# Patient Record
Sex: FEMALE | Race: WHITE | Employment: OTHER | URBAN - METROPOLITAN AREA
[De-identification: names, ages, dates, MRNs, and addresses within clinical notes are randomized per-mention and may not be internally consistent; named-entity substitution may affect disease eponyms.]

---

## 2017-01-05 ENCOUNTER — HOSPITAL ENCOUNTER (OUTPATIENT)
Dept: NUCLEAR MEDICINE | Facility: CLINIC | Age: 67
Discharge: HOME | End: 2017-01-05
Attending: INTERNAL MEDICINE

## 2017-01-05 ENCOUNTER — HOSPITAL ENCOUNTER (OUTPATIENT)
Dept: CARDIOLOGY | Facility: CLINIC | Age: 67
Discharge: HOME | End: 2017-01-05
Attending: INTERNAL MEDICINE

## 2017-01-05 DIAGNOSIS — R06.09 OTHER FORMS OF DYSPNEA: ICD-10-CM

## 2017-01-05 DIAGNOSIS — R06.09 DOE (DYSPNEA ON EXERTION): ICD-10-CM

## 2017-01-05 LAB
CV STRESS CURRENT BP HE: NORMAL
CV STRESS CURRENT HR HE: 101
CV STRESS CURRENT HR HE: 101
CV STRESS CURRENT HR HE: 102
CV STRESS CURRENT HR HE: 103
CV STRESS CURRENT HR HE: 104
CV STRESS CURRENT HR HE: 104
CV STRESS CURRENT HR HE: 105
CV STRESS CURRENT HR HE: 106
CV STRESS CURRENT HR HE: 107
CV STRESS CURRENT HR HE: 108
CV STRESS CURRENT HR HE: 108
CV STRESS CURRENT HR HE: 87
CV STRESS CURRENT HR HE: 89
CV STRESS CURRENT HR HE: 94
CV STRESS DEVIATION TIME HE: NORMAL
CV STRESS EXERCISE STAGE HE: NORMAL
CV STRESS FINAL RESTING BP HE: NORMAL
CV STRESS FINAL RESTING HR HE: 101
CV STRESS MAX TREADMILL GRADE HE: 0
CV STRESS MAX TREADMILL SPEED HE: 1
CV STRESS PEAK DIA BP HE: NORMAL
CV STRESS PEAK SYS BP HE: NORMAL
CV STRESS PHASE HE: NORMAL
CV STRESS PROTOCOL HE: NORMAL
CV STRESS RESTING PT POSITION HE: NORMAL
CV STRESS ST DEVIATION AMOUNT HE: NORMAL
CV STRESS ST DEVIATION ELEVATION HE: NORMAL
CV STRESS ST EVELATION AMOUNT HE: NORMAL
CV STRESS TEST TYPE HE: NORMAL
CV STRESS TOTAL STAGE TIME MIN 1 HE: NORMAL
NUC STRESS EJECTION FRACTION: >70 %
STRESS ECHO BASELINE BP: NORMAL
STRESS ECHO BASELINE HR: 92
STRESS ECHO LAST STRESS BP: NORMAL
STRESS ECHO LAST STRESS HR: 108

## 2017-01-07 ENCOUNTER — OFFICE VISIT - HEALTHEAST (OUTPATIENT)
Dept: FAMILY MEDICINE | Facility: CLINIC | Age: 67
End: 2017-01-07

## 2017-01-07 DIAGNOSIS — J32.9 SINUSITIS: ICD-10-CM

## 2017-01-07 RX ORDER — ACETAMINOPHEN 500 MG
500 TABLET ORAL EVERY 6 HOURS PRN
Status: SHIPPED | COMMUNITY
Start: 2017-01-07 | End: 2024-07-26

## 2017-01-23 ENCOUNTER — COMMUNICATION - HEALTHEAST (OUTPATIENT)
Dept: RHEUMATOLOGY | Facility: CLINIC | Age: 67
End: 2017-01-23

## 2017-01-23 DIAGNOSIS — M06.09 SERONEGATIVE RHEUMATOID ARTHRITIS OF MULTIPLE SITES (H): ICD-10-CM

## 2017-01-24 ENCOUNTER — COMMUNICATION - HEALTHEAST (OUTPATIENT)
Dept: INTERNAL MEDICINE | Facility: CLINIC | Age: 67
End: 2017-01-24

## 2017-01-24 DIAGNOSIS — E78.5 HYPERLIPIDEMIA: ICD-10-CM

## 2017-02-02 ENCOUNTER — COMMUNICATION - HEALTHEAST (OUTPATIENT)
Dept: ADMINISTRATIVE | Facility: CLINIC | Age: 67
End: 2017-02-02

## 2017-02-02 DIAGNOSIS — M06.09 SERONEGATIVE RHEUMATOID ARTHRITIS OF MULTIPLE SITES (H): ICD-10-CM

## 2017-02-06 ENCOUNTER — RECORDS - HEALTHEAST (OUTPATIENT)
Dept: ADMINISTRATIVE | Facility: OTHER | Age: 67
End: 2017-02-06

## 2017-02-15 ENCOUNTER — RECORDS - HEALTHEAST (OUTPATIENT)
Dept: ADMINISTRATIVE | Facility: OTHER | Age: 67
End: 2017-02-15

## 2017-02-15 ENCOUNTER — COMMUNICATION - HEALTHEAST (OUTPATIENT)
Dept: RHEUMATOLOGY | Facility: CLINIC | Age: 67
End: 2017-02-15

## 2017-02-15 DIAGNOSIS — Z79.899 HIGH RISK MEDICATION USE: ICD-10-CM

## 2017-02-15 DIAGNOSIS — M06.09 SERONEGATIVE RHEUMATOID ARTHRITIS OF MULTIPLE SITES (H): ICD-10-CM

## 2017-02-17 ENCOUNTER — RECORDS - HEALTHEAST (OUTPATIENT)
Dept: ADMINISTRATIVE | Facility: OTHER | Age: 67
End: 2017-02-17

## 2017-02-24 ENCOUNTER — COMMUNICATION - HEALTHEAST (OUTPATIENT)
Dept: ADMINISTRATIVE | Facility: CLINIC | Age: 67
End: 2017-02-24

## 2017-02-24 DIAGNOSIS — M06.09 SERONEGATIVE RHEUMATOID ARTHRITIS OF MULTIPLE SITES (H): ICD-10-CM

## 2017-02-27 ENCOUNTER — AMBULATORY - HEALTHEAST (OUTPATIENT)
Dept: LAB | Facility: CLINIC | Age: 67
End: 2017-02-27

## 2017-02-27 DIAGNOSIS — M06.09 SERONEGATIVE RHEUMATOID ARTHRITIS OF MULTIPLE SITES (H): ICD-10-CM

## 2017-02-27 LAB
ALT SERPL W P-5'-P-CCNC: 21 U/L (ref 0–45)
CREAT SERPL-MCNC: 1.39 MG/DL (ref 0.6–1.1)
GFR SERPL CREATININE-BSD FRML MDRD: 38 ML/MIN/1.73M2

## 2017-03-02 ENCOUNTER — OFFICE VISIT - HEALTHEAST (OUTPATIENT)
Dept: RHEUMATOLOGY | Facility: CLINIC | Age: 67
End: 2017-03-02

## 2017-03-02 DIAGNOSIS — M06.09 SERONEGATIVE RHEUMATOID ARTHRITIS OF MULTIPLE SITES (H): ICD-10-CM

## 2017-03-02 DIAGNOSIS — N18.9 CHRONIC KIDNEY DISEASE, UNSPECIFIED: ICD-10-CM

## 2017-03-02 DIAGNOSIS — Z79.899 HIGH RISK MEDICATION USE: ICD-10-CM

## 2017-03-02 DIAGNOSIS — D64.9 ANEMIA, UNSPECIFIED: ICD-10-CM

## 2017-03-16 ENCOUNTER — COMMUNICATION - HEALTHEAST (OUTPATIENT)
Dept: INTERNAL MEDICINE | Facility: CLINIC | Age: 67
End: 2017-03-16

## 2017-03-16 DIAGNOSIS — I10 HTN (HYPERTENSION): ICD-10-CM

## 2017-04-04 ENCOUNTER — OFFICE VISIT - HEALTHEAST (OUTPATIENT)
Dept: INTERNAL MEDICINE | Facility: CLINIC | Age: 67
End: 2017-04-04

## 2017-04-04 DIAGNOSIS — I10 ESSENTIAL HYPERTENSION WITH GOAL BLOOD PRESSURE LESS THAN 130/80: ICD-10-CM

## 2017-04-04 DIAGNOSIS — E78.5 HYPERLIPIDEMIA: ICD-10-CM

## 2017-04-04 DIAGNOSIS — N89.8 VAGINAL DRYNESS: ICD-10-CM

## 2017-04-05 ENCOUNTER — COMMUNICATION - HEALTHEAST (OUTPATIENT)
Dept: INTERNAL MEDICINE | Facility: CLINIC | Age: 67
End: 2017-04-05

## 2017-04-05 DIAGNOSIS — Z78.0 MENOPAUSE: ICD-10-CM

## 2017-04-10 ENCOUNTER — COMMUNICATION - HEALTHEAST (OUTPATIENT)
Dept: INTERNAL MEDICINE | Facility: CLINIC | Age: 67
End: 2017-04-10

## 2017-04-19 ENCOUNTER — RECORDS - HEALTHEAST (OUTPATIENT)
Dept: ADMINISTRATIVE | Facility: OTHER | Age: 67
End: 2017-04-19

## 2017-04-25 ENCOUNTER — COMMUNICATION - HEALTHEAST (OUTPATIENT)
Dept: RHEUMATOLOGY | Facility: CLINIC | Age: 67
End: 2017-04-25

## 2017-04-25 DIAGNOSIS — M06.09 SERONEGATIVE RHEUMATOID ARTHRITIS OF MULTIPLE SITES (H): ICD-10-CM

## 2017-04-25 DIAGNOSIS — Z79.899 HIGH RISK MEDICATION USE: ICD-10-CM

## 2017-04-28 ENCOUNTER — COMMUNICATION - HEALTHEAST (OUTPATIENT)
Dept: INTERNAL MEDICINE | Facility: CLINIC | Age: 67
End: 2017-04-28

## 2017-04-28 DIAGNOSIS — M10.9 GOUT: ICD-10-CM

## 2017-05-01 ENCOUNTER — AMBULATORY - HEALTHEAST (OUTPATIENT)
Dept: LAB | Facility: CLINIC | Age: 67
End: 2017-05-01

## 2017-05-01 DIAGNOSIS — M06.09 SERONEGATIVE RHEUMATOID ARTHRITIS OF MULTIPLE SITES (H): ICD-10-CM

## 2017-05-01 LAB
ALT SERPL W P-5'-P-CCNC: 19 U/L (ref 0–45)
CREAT SERPL-MCNC: 1.54 MG/DL (ref 0.6–1.1)
GFR SERPL CREATININE-BSD FRML MDRD: 34 ML/MIN/1.73M2

## 2017-05-22 ENCOUNTER — COMMUNICATION - HEALTHEAST (OUTPATIENT)
Dept: RHEUMATOLOGY | Facility: CLINIC | Age: 67
End: 2017-05-22

## 2017-05-22 DIAGNOSIS — M06.09 SERONEGATIVE RHEUMATOID ARTHRITIS OF MULTIPLE SITES (H): ICD-10-CM

## 2017-06-02 ENCOUNTER — OFFICE VISIT - HEALTHEAST (OUTPATIENT)
Dept: FAMILY MEDICINE | Facility: CLINIC | Age: 67
End: 2017-06-02

## 2017-06-02 DIAGNOSIS — H69.91 EUSTACHIAN TUBE DYSFUNCTION, RIGHT: ICD-10-CM

## 2017-06-13 ENCOUNTER — RECORDS - HEALTHEAST (OUTPATIENT)
Dept: ADMINISTRATIVE | Facility: OTHER | Age: 67
End: 2017-06-13

## 2017-06-26 ENCOUNTER — AMBULATORY - HEALTHEAST (OUTPATIENT)
Dept: LAB | Facility: CLINIC | Age: 67
End: 2017-06-26

## 2017-06-26 DIAGNOSIS — M06.09 SERONEGATIVE RHEUMATOID ARTHRITIS OF MULTIPLE SITES (H): ICD-10-CM

## 2017-06-26 LAB
ALT SERPL W P-5'-P-CCNC: 21 U/L (ref 0–45)
CREAT SERPL-MCNC: 1.35 MG/DL (ref 0.6–1.1)
GFR SERPL CREATININE-BSD FRML MDRD: 39 ML/MIN/1.73M2

## 2017-07-27 ENCOUNTER — OFFICE VISIT - HEALTHEAST (OUTPATIENT)
Dept: RHEUMATOLOGY | Facility: CLINIC | Age: 67
End: 2017-07-27

## 2017-07-27 DIAGNOSIS — Z79.899 HIGH RISK MEDICATION USE: ICD-10-CM

## 2017-07-27 DIAGNOSIS — M06.09 SERONEGATIVE RHEUMATOID ARTHRITIS OF MULTIPLE SITES (H): ICD-10-CM

## 2017-07-27 ASSESSMENT — MIFFLIN-ST. JEOR: SCORE: 1246.17

## 2017-08-30 ENCOUNTER — AMBULATORY - HEALTHEAST (OUTPATIENT)
Dept: LAB | Facility: CLINIC | Age: 67
End: 2017-08-30

## 2017-08-30 DIAGNOSIS — M06.09 SERONEGATIVE RHEUMATOID ARTHRITIS OF MULTIPLE SITES (H): ICD-10-CM

## 2017-08-30 LAB
ALT SERPL W P-5'-P-CCNC: 23 U/L (ref 0–45)
CREAT SERPL-MCNC: 1.71 MG/DL (ref 0.6–1.1)
GFR SERPL CREATININE-BSD FRML MDRD: 30 ML/MIN/1.73M2

## 2017-09-27 ENCOUNTER — COMMUNICATION - HEALTHEAST (OUTPATIENT)
Dept: INTERNAL MEDICINE | Facility: CLINIC | Age: 67
End: 2017-09-27

## 2017-09-27 DIAGNOSIS — M10.9 GOUT: ICD-10-CM

## 2017-10-04 ENCOUNTER — OFFICE VISIT - HEALTHEAST (OUTPATIENT)
Dept: INTERNAL MEDICINE | Facility: CLINIC | Age: 67
End: 2017-10-04

## 2017-10-04 DIAGNOSIS — D63.1 ANEMIA IN CHRONIC KIDNEY DISEASE: ICD-10-CM

## 2017-10-04 DIAGNOSIS — M47.817 LUMBOSACRAL SPONDYLOSIS WITHOUT MYELOPATHY: ICD-10-CM

## 2017-10-04 DIAGNOSIS — N18.9 ANEMIA IN CHRONIC KIDNEY DISEASE: ICD-10-CM

## 2017-10-04 DIAGNOSIS — I10 ESSENTIAL HYPERTENSION: ICD-10-CM

## 2017-10-04 DIAGNOSIS — R73.09 OTHER ABNORMAL GLUCOSE: ICD-10-CM

## 2017-10-04 DIAGNOSIS — E03.9 HYPOTHYROIDISM: ICD-10-CM

## 2017-10-04 DIAGNOSIS — E78.5 HYPERLIPEMIA: ICD-10-CM

## 2017-10-04 DIAGNOSIS — M10.9 GOUT: ICD-10-CM

## 2017-10-20 ENCOUNTER — RECORDS - HEALTHEAST (OUTPATIENT)
Dept: ADMINISTRATIVE | Facility: OTHER | Age: 67
End: 2017-10-20

## 2017-10-31 ENCOUNTER — AMBULATORY - HEALTHEAST (OUTPATIENT)
Dept: LAB | Facility: CLINIC | Age: 67
End: 2017-10-31

## 2017-10-31 DIAGNOSIS — D63.1 ANEMIA IN CHRONIC KIDNEY DISEASE: ICD-10-CM

## 2017-10-31 DIAGNOSIS — N18.9 ANEMIA IN CHRONIC KIDNEY DISEASE: ICD-10-CM

## 2017-10-31 DIAGNOSIS — E03.9 HYPOTHYROIDISM: ICD-10-CM

## 2017-10-31 DIAGNOSIS — M10.9 GOUT: ICD-10-CM

## 2017-10-31 DIAGNOSIS — M47.817 LUMBOSACRAL SPONDYLOSIS WITHOUT MYELOPATHY: ICD-10-CM

## 2017-10-31 DIAGNOSIS — I10 ESSENTIAL HYPERTENSION: ICD-10-CM

## 2017-10-31 DIAGNOSIS — R73.09 OTHER ABNORMAL GLUCOSE: ICD-10-CM

## 2017-10-31 DIAGNOSIS — E78.5 HYPERLIPEMIA: ICD-10-CM

## 2017-10-31 LAB
HBA1C MFR BLD: 5.5 % (ref 3.5–6)
LDLC SERPL CALC-MCNC: 59 MG/DL

## 2017-11-01 ENCOUNTER — OFFICE VISIT - HEALTHEAST (OUTPATIENT)
Dept: INTERNAL MEDICINE | Facility: CLINIC | Age: 67
End: 2017-11-01

## 2017-11-01 DIAGNOSIS — E78.5 HYPERLIPEMIA: ICD-10-CM

## 2017-11-01 DIAGNOSIS — I10 HTN (HYPERTENSION): ICD-10-CM

## 2017-11-01 DIAGNOSIS — H26.9 CATARACTS, BILATERAL: ICD-10-CM

## 2017-11-01 DIAGNOSIS — Z01.818 PREOP EXAMINATION: ICD-10-CM

## 2017-11-01 DIAGNOSIS — R73.09 OTHER ABNORMAL GLUCOSE: ICD-10-CM

## 2017-11-02 ENCOUNTER — OFFICE VISIT - HEALTHEAST (OUTPATIENT)
Dept: RHEUMATOLOGY | Facility: CLINIC | Age: 67
End: 2017-11-02

## 2017-11-02 DIAGNOSIS — M06.09 SERONEGATIVE RHEUMATOID ARTHRITIS OF MULTIPLE SITES (H): ICD-10-CM

## 2017-11-02 DIAGNOSIS — N18.30 STAGE 3 CHRONIC KIDNEY DISEASE (H): ICD-10-CM

## 2017-11-02 DIAGNOSIS — Z79.899 HIGH RISK MEDICATION USE: ICD-10-CM

## 2017-12-28 ENCOUNTER — AMBULATORY - HEALTHEAST (OUTPATIENT)
Dept: LAB | Facility: CLINIC | Age: 67
End: 2017-12-28

## 2017-12-28 DIAGNOSIS — M06.09 SERONEGATIVE RHEUMATOID ARTHRITIS OF MULTIPLE SITES (H): ICD-10-CM

## 2017-12-28 LAB
ALT SERPL W P-5'-P-CCNC: 25 U/L (ref 0–45)
CREAT SERPL-MCNC: 1.36 MG/DL (ref 0.6–1.1)
GFR SERPL CREATININE-BSD FRML MDRD: 39 ML/MIN/1.73M2

## 2018-01-09 ENCOUNTER — COMMUNICATION - HEALTHEAST (OUTPATIENT)
Dept: INTERNAL MEDICINE | Facility: CLINIC | Age: 68
End: 2018-01-09

## 2018-01-09 DIAGNOSIS — E03.9 HYPOTHYROIDISM: ICD-10-CM

## 2018-01-11 ENCOUNTER — COMMUNICATION - HEALTHEAST (OUTPATIENT)
Dept: INTERNAL MEDICINE | Facility: CLINIC | Age: 68
End: 2018-01-11

## 2018-01-11 DIAGNOSIS — Z78.9 PATIENT TRAVELS: ICD-10-CM

## 2018-01-18 ENCOUNTER — OFFICE VISIT - HEALTHEAST (OUTPATIENT)
Dept: INTERNAL MEDICINE | Facility: CLINIC | Age: 68
End: 2018-01-18

## 2018-01-18 ENCOUNTER — COMMUNICATION - HEALTHEAST (OUTPATIENT)
Dept: INTERNAL MEDICINE | Facility: CLINIC | Age: 68
End: 2018-01-18

## 2018-01-18 DIAGNOSIS — M10.9 GOUT: ICD-10-CM

## 2018-01-18 DIAGNOSIS — I10 ESSENTIAL HYPERTENSION: ICD-10-CM

## 2018-01-18 DIAGNOSIS — R19.09 LEFT GROIN MASS: ICD-10-CM

## 2018-01-18 DIAGNOSIS — F41.1 ANXIETY STATE: ICD-10-CM

## 2018-01-18 DIAGNOSIS — R19.09 MASS OF LEFT INGUINAL REGION: ICD-10-CM

## 2018-01-18 DIAGNOSIS — Z71.84 COUNSELING ABOUT TRAVEL: ICD-10-CM

## 2018-01-18 RX ORDER — ACYCLOVIR 400 MG/1
400 TABLET ORAL EVERY 8 HOURS PRN
Qty: 60 TABLET | Refills: 1 | Status: SHIPPED | OUTPATIENT
Start: 2018-01-18 | End: 2022-01-21

## 2018-01-19 ENCOUNTER — HOSPITAL ENCOUNTER (OUTPATIENT)
Dept: CT IMAGING | Facility: CLINIC | Age: 68
Discharge: HOME | End: 2018-01-19
Attending: INTERNAL MEDICINE

## 2018-01-19 DIAGNOSIS — R19.09 MASS OF LEFT INGUINAL REGION: ICD-10-CM

## 2018-01-23 ENCOUNTER — COMMUNICATION - HEALTHEAST (OUTPATIENT)
Dept: INTERNAL MEDICINE | Facility: CLINIC | Age: 68
End: 2018-01-23

## 2018-01-30 ENCOUNTER — COMMUNICATION - HEALTHEAST (OUTPATIENT)
Dept: INTERNAL MEDICINE | Facility: CLINIC | Age: 68
End: 2018-01-30

## 2018-01-30 DIAGNOSIS — I10 HTN (HYPERTENSION): ICD-10-CM

## 2018-02-05 ENCOUNTER — COMMUNICATION - HEALTHEAST (OUTPATIENT)
Dept: RHEUMATOLOGY | Facility: CLINIC | Age: 68
End: 2018-02-05

## 2018-02-05 DIAGNOSIS — M06.09 SERONEGATIVE RHEUMATOID ARTHRITIS OF MULTIPLE SITES (H): ICD-10-CM

## 2018-02-12 ENCOUNTER — AMBULATORY - HEALTHEAST (OUTPATIENT)
Dept: LAB | Facility: CLINIC | Age: 68
End: 2018-02-12

## 2018-02-12 DIAGNOSIS — M06.09 SERONEGATIVE RHEUMATOID ARTHRITIS OF MULTIPLE SITES (H): ICD-10-CM

## 2018-02-12 LAB
ALBUMIN SERPL-MCNC: 3.9 G/DL (ref 3.5–5)
ALT SERPL W P-5'-P-CCNC: 22 U/L (ref 0–45)
CREAT SERPL-MCNC: 1.27 MG/DL (ref 0.6–1.1)
ERYTHROCYTE [DISTWIDTH] IN BLOOD BY AUTOMATED COUNT: 14.4 % (ref 11–14.5)
GFR SERPL CREATININE-BSD FRML MDRD: 42 ML/MIN/1.73M2
HCT VFR BLD AUTO: 29.5 % (ref 35–47)
HGB BLD-MCNC: 9.8 G/DL (ref 12–16)
MCH RBC QN AUTO: 36.2 PG (ref 27–34)
MCHC RBC AUTO-ENTMCNC: 33.2 G/DL (ref 32–36)
MCV RBC AUTO: 109 FL (ref 80–100)
PLATELET # BLD AUTO: 151 THOU/UL (ref 140–440)
PMV BLD AUTO: 11.3 FL (ref 8.5–12.5)
RBC # BLD AUTO: 2.71 MILL/UL (ref 3.8–5.4)
WBC: 5.1 THOU/UL (ref 4–11)

## 2018-03-15 ENCOUNTER — OFFICE VISIT - HEALTHEAST (OUTPATIENT)
Dept: RHEUMATOLOGY | Facility: CLINIC | Age: 68
End: 2018-03-15

## 2018-03-15 DIAGNOSIS — M06.09 SERONEGATIVE RHEUMATOID ARTHRITIS OF MULTIPLE SITES (H): ICD-10-CM

## 2018-03-15 DIAGNOSIS — N18.30 STAGE 3 CHRONIC KIDNEY DISEASE (H): ICD-10-CM

## 2018-03-15 DIAGNOSIS — Z79.899 HIGH RISK MEDICATION USE: ICD-10-CM

## 2018-03-15 ASSESSMENT — MIFFLIN-ST. JEOR: SCORE: 1241.64

## 2018-03-26 ENCOUNTER — COMMUNICATION - HEALTHEAST (OUTPATIENT)
Dept: RHEUMATOLOGY | Facility: CLINIC | Age: 68
End: 2018-03-26

## 2018-03-26 DIAGNOSIS — M06.09 SERONEGATIVE RHEUMATOID ARTHRITIS OF MULTIPLE SITES (H): ICD-10-CM

## 2018-04-27 ENCOUNTER — COMMUNICATION - HEALTHEAST (OUTPATIENT)
Dept: INTERNAL MEDICINE | Facility: CLINIC | Age: 68
End: 2018-04-27

## 2018-04-27 DIAGNOSIS — M06.09 SERONEGATIVE RHEUMATOID ARTHRITIS OF MULTIPLE SITES (H): ICD-10-CM

## 2018-05-01 ENCOUNTER — COMMUNICATION - HEALTHEAST (OUTPATIENT)
Dept: ADMINISTRATIVE | Facility: CLINIC | Age: 68
End: 2018-05-01

## 2018-05-02 ENCOUNTER — AMBULATORY - HEALTHEAST (OUTPATIENT)
Dept: LAB | Facility: CLINIC | Age: 68
End: 2018-05-02

## 2018-05-02 DIAGNOSIS — M06.09 SERONEGATIVE RHEUMATOID ARTHRITIS OF MULTIPLE SITES (H): ICD-10-CM

## 2018-05-02 LAB
ALBUMIN SERPL-MCNC: 3.6 G/DL (ref 3.5–5)
ALT SERPL W P-5'-P-CCNC: 21 U/L (ref 0–45)
CREAT SERPL-MCNC: 1.39 MG/DL (ref 0.6–1.1)
ERYTHROCYTE [DISTWIDTH] IN BLOOD BY AUTOMATED COUNT: 12.7 % (ref 11–14.5)
GFR SERPL CREATININE-BSD FRML MDRD: 38 ML/MIN/1.73M2
HCT VFR BLD AUTO: 28.8 % (ref 35–47)
HGB BLD-MCNC: 9.8 G/DL (ref 12–16)
MCH RBC QN AUTO: 35.4 PG (ref 27–34)
MCHC RBC AUTO-ENTMCNC: 34.2 G/DL (ref 32–36)
MCV RBC AUTO: 103 FL (ref 80–100)
PLATELET # BLD AUTO: 135 THOU/UL (ref 140–440)
PMV BLD AUTO: 7.8 FL (ref 7–10)
RBC # BLD AUTO: 2.78 MILL/UL (ref 3.8–5.4)
WBC: 3.8 THOU/UL (ref 4–11)

## 2018-05-25 ENCOUNTER — COMMUNICATION - HEALTHEAST (OUTPATIENT)
Dept: ADMINISTRATIVE | Facility: CLINIC | Age: 68
End: 2018-05-25

## 2018-06-04 ENCOUNTER — COMMUNICATION - HEALTHEAST (OUTPATIENT)
Dept: RHEUMATOLOGY | Facility: CLINIC | Age: 68
End: 2018-06-04

## 2018-06-04 DIAGNOSIS — M06.09 SERONEGATIVE RHEUMATOID ARTHRITIS OF MULTIPLE SITES (H): ICD-10-CM

## 2018-06-07 ENCOUNTER — OFFICE VISIT - HEALTHEAST (OUTPATIENT)
Dept: INTERNAL MEDICINE | Facility: CLINIC | Age: 68
End: 2018-06-07

## 2018-06-07 DIAGNOSIS — E03.9 HYPOTHYROIDISM: ICD-10-CM

## 2018-06-07 DIAGNOSIS — I10 ESSENTIAL HYPERTENSION: ICD-10-CM

## 2018-06-07 DIAGNOSIS — Z00.00 HEALTHCARE MAINTENANCE: ICD-10-CM

## 2018-06-07 DIAGNOSIS — D63.1 ANEMIA IN CHRONIC KIDNEY DISEASE: ICD-10-CM

## 2018-06-07 DIAGNOSIS — M06.09 SERONEGATIVE RHEUMATOID ARTHRITIS OF MULTIPLE SITES (H): ICD-10-CM

## 2018-06-07 DIAGNOSIS — N18.30 STAGE 3 CHRONIC KIDNEY DISEASE (H): ICD-10-CM

## 2018-06-07 DIAGNOSIS — Z00.00 ROUTINE GENERAL MEDICAL EXAMINATION AT A HEALTH CARE FACILITY: ICD-10-CM

## 2018-06-07 DIAGNOSIS — N18.9 ANEMIA IN CHRONIC KIDNEY DISEASE: ICD-10-CM

## 2018-06-07 DIAGNOSIS — R73.09 OTHER ABNORMAL GLUCOSE: ICD-10-CM

## 2018-06-07 DIAGNOSIS — E78.5 HYPERLIPEMIA: ICD-10-CM

## 2018-06-07 ASSESSMENT — MIFFLIN-ST. JEOR: SCORE: 1239.08

## 2018-06-11 ENCOUNTER — COMMUNICATION - HEALTHEAST (OUTPATIENT)
Dept: INTERNAL MEDICINE | Facility: CLINIC | Age: 68
End: 2018-06-11

## 2018-06-11 ENCOUNTER — AMBULATORY - HEALTHEAST (OUTPATIENT)
Dept: LAB | Facility: CLINIC | Age: 68
End: 2018-06-11

## 2018-06-11 DIAGNOSIS — D63.1 ANEMIA IN CHRONIC KIDNEY DISEASE: ICD-10-CM

## 2018-06-11 DIAGNOSIS — N18.30 STAGE 3 CHRONIC KIDNEY DISEASE (H): ICD-10-CM

## 2018-06-11 DIAGNOSIS — I10 HTN (HYPERTENSION): ICD-10-CM

## 2018-06-11 DIAGNOSIS — I10 ESSENTIAL HYPERTENSION: ICD-10-CM

## 2018-06-11 DIAGNOSIS — M06.09 SERONEGATIVE RHEUMATOID ARTHRITIS OF MULTIPLE SITES (H): ICD-10-CM

## 2018-06-11 DIAGNOSIS — Z00.00 HEALTHCARE MAINTENANCE: ICD-10-CM

## 2018-06-11 DIAGNOSIS — E78.5 HYPERLIPEMIA: ICD-10-CM

## 2018-06-11 DIAGNOSIS — R73.09 OTHER ABNORMAL GLUCOSE: ICD-10-CM

## 2018-06-11 DIAGNOSIS — E03.9 HYPOTHYROIDISM: ICD-10-CM

## 2018-06-11 DIAGNOSIS — N18.9 ANEMIA IN CHRONIC KIDNEY DISEASE: ICD-10-CM

## 2018-06-11 LAB
ALBUMIN SERPL-MCNC: 3.9 G/DL (ref 3.5–5)
ALBUMIN UR-MCNC: NEGATIVE MG/DL
ALP SERPL-CCNC: 80 U/L (ref 45–120)
ALT SERPL W P-5'-P-CCNC: 22 U/L (ref 0–45)
ANION GAP SERPL CALCULATED.3IONS-SCNC: 12 MMOL/L (ref 5–18)
APPEARANCE UR: CLEAR
AST SERPL W P-5'-P-CCNC: 28 U/L (ref 0–40)
BACTERIA #/AREA URNS HPF: ABNORMAL HPF
BILIRUB SERPL-MCNC: 0.4 MG/DL (ref 0–1)
BILIRUB UR QL STRIP: NEGATIVE
BUN SERPL-MCNC: 19 MG/DL (ref 8–22)
CALCIUM SERPL-MCNC: 9.7 MG/DL (ref 8.5–10.5)
CHLORIDE BLD-SCNC: 108 MMOL/L (ref 98–107)
CHOLEST SERPL-MCNC: 145 MG/DL
CO2 SERPL-SCNC: 18 MMOL/L (ref 22–31)
COLOR UR AUTO: YELLOW
CREAT SERPL-MCNC: 1.42 MG/DL (ref 0.6–1.1)
ERYTHROCYTE [DISTWIDTH] IN BLOOD BY AUTOMATED COUNT: 12.7 % (ref 11–14.5)
FASTING STATUS PATIENT QL REPORTED: YES
GFR SERPL CREATININE-BSD FRML MDRD: 37 ML/MIN/1.73M2
GLUCOSE BLD-MCNC: 105 MG/DL (ref 70–125)
GLUCOSE UR STRIP-MCNC: NEGATIVE MG/DL
HBA1C MFR BLD: 5.6 % (ref 3.5–6)
HCT VFR BLD AUTO: 29.7 % (ref 35–47)
HDLC SERPL-MCNC: 71 MG/DL
HGB BLD-MCNC: 9.7 G/DL (ref 12–16)
HGB UR QL STRIP: NEGATIVE
KETONES UR STRIP-MCNC: NEGATIVE MG/DL
LDLC SERPL CALC-MCNC: 46 MG/DL
LEUKOCYTE ESTERASE UR QL STRIP: ABNORMAL
MCH RBC QN AUTO: 33.9 PG (ref 27–34)
MCHC RBC AUTO-ENTMCNC: 32.5 G/DL (ref 32–36)
MCV RBC AUTO: 104 FL (ref 80–100)
NITRATE UR QL: NEGATIVE
PH UR STRIP: 6 [PH] (ref 5–8)
PLATELET # BLD AUTO: 167 THOU/UL (ref 140–440)
PMV BLD AUTO: 8.2 FL (ref 7–10)
POTASSIUM BLD-SCNC: 4.4 MMOL/L (ref 3.5–5)
PROT SERPL-MCNC: 6.5 G/DL (ref 6–8)
RBC # BLD AUTO: 2.85 MILL/UL (ref 3.8–5.4)
RBC #/AREA URNS AUTO: ABNORMAL HPF
SODIUM SERPL-SCNC: 138 MMOL/L (ref 136–145)
SP GR UR STRIP: 1.02 (ref 1–1.03)
SQUAMOUS #/AREA URNS AUTO: ABNORMAL LPF
TRANS CELLS #/AREA URNS HPF: ABNORMAL LPF
TRIGL SERPL-MCNC: 139 MG/DL
TSH SERPL DL<=0.005 MIU/L-ACNC: 2.96 UIU/ML (ref 0.3–5)
UROBILINOGEN UR STRIP-ACNC: ABNORMAL
WBC #/AREA URNS AUTO: ABNORMAL HPF
WBC: 4.3 THOU/UL (ref 4–11)

## 2018-06-12 LAB
25(OH)D3 SERPL-MCNC: 26.1 NG/ML (ref 30–80)
25(OH)D3 SERPL-MCNC: 26.1 NG/ML (ref 30–80)

## 2018-06-25 ENCOUNTER — COMMUNICATION - HEALTHEAST (OUTPATIENT)
Dept: INTERNAL MEDICINE | Facility: CLINIC | Age: 68
End: 2018-06-25

## 2018-06-25 DIAGNOSIS — E78.5 HYPERLIPIDEMIA: ICD-10-CM

## 2018-07-25 ENCOUNTER — COMMUNICATION - HEALTHEAST (OUTPATIENT)
Dept: INTERNAL MEDICINE | Facility: CLINIC | Age: 68
End: 2018-07-25

## 2018-07-25 DIAGNOSIS — F41.1 ANXIETY STATE: ICD-10-CM

## 2018-07-26 ENCOUNTER — AMBULATORY - HEALTHEAST (OUTPATIENT)
Dept: LAB | Facility: CLINIC | Age: 68
End: 2018-07-26

## 2018-07-26 DIAGNOSIS — M06.09 SERONEGATIVE RHEUMATOID ARTHRITIS OF MULTIPLE SITES (H): ICD-10-CM

## 2018-07-26 LAB
ALBUMIN SERPL-MCNC: 3.9 G/DL (ref 3.5–5)
ALT SERPL W P-5'-P-CCNC: 23 U/L (ref 0–45)
CREAT SERPL-MCNC: 1.63 MG/DL (ref 0.6–1.1)
ERYTHROCYTE [DISTWIDTH] IN BLOOD BY AUTOMATED COUNT: 12.6 % (ref 11–14.5)
GFR SERPL CREATININE-BSD FRML MDRD: 31 ML/MIN/1.73M2
HCT VFR BLD AUTO: 28.1 % (ref 35–47)
HGB BLD-MCNC: 9.4 G/DL (ref 12–16)
MCH RBC QN AUTO: 34.9 PG (ref 27–34)
MCHC RBC AUTO-ENTMCNC: 33.4 G/DL (ref 32–36)
MCV RBC AUTO: 104 FL (ref 80–100)
PLATELET # BLD AUTO: 173 THOU/UL (ref 140–440)
PMV BLD AUTO: 7.6 FL (ref 7–10)
RBC # BLD AUTO: 2.69 MILL/UL (ref 3.8–5.4)
WBC: 4.9 THOU/UL (ref 4–11)

## 2018-07-27 ENCOUNTER — COMMUNICATION - HEALTHEAST (OUTPATIENT)
Dept: RHEUMATOLOGY | Facility: CLINIC | Age: 68
End: 2018-07-27

## 2018-07-27 DIAGNOSIS — Z79.899 HIGH RISK MEDICATION USE: ICD-10-CM

## 2018-07-27 DIAGNOSIS — M06.09 SERONEGATIVE RHEUMATOID ARTHRITIS OF MULTIPLE SITES (H): ICD-10-CM

## 2018-07-30 ENCOUNTER — OFFICE VISIT - HEALTHEAST (OUTPATIENT)
Dept: RHEUMATOLOGY | Facility: CLINIC | Age: 68
End: 2018-07-30

## 2018-07-30 DIAGNOSIS — Z79.899 HIGH RISK MEDICATION USE: ICD-10-CM

## 2018-07-30 DIAGNOSIS — M06.09 SERONEGATIVE RHEUMATOID ARTHRITIS OF MULTIPLE SITES (H): ICD-10-CM

## 2018-07-30 DIAGNOSIS — N18.30 STAGE 3 CHRONIC KIDNEY DISEASE (H): ICD-10-CM

## 2018-09-03 ENCOUNTER — COMMUNICATION - HEALTHEAST (OUTPATIENT)
Dept: RHEUMATOLOGY | Facility: CLINIC | Age: 68
End: 2018-09-03

## 2018-09-03 ENCOUNTER — COMMUNICATION - HEALTHEAST (OUTPATIENT)
Dept: INTERNAL MEDICINE | Facility: CLINIC | Age: 68
End: 2018-09-03

## 2018-09-03 DIAGNOSIS — I10 HTN (HYPERTENSION): ICD-10-CM

## 2018-09-03 DIAGNOSIS — M06.09 SERONEGATIVE RHEUMATOID ARTHRITIS OF MULTIPLE SITES (H): ICD-10-CM

## 2018-09-07 ENCOUNTER — RECORDS - HEALTHEAST (OUTPATIENT)
Dept: ADMINISTRATIVE | Facility: OTHER | Age: 68
End: 2018-09-07

## 2018-09-19 ENCOUNTER — HOSPITAL ENCOUNTER (OUTPATIENT)
Dept: MAMMOGRAPHY | Facility: CLINIC | Age: 68
Discharge: HOME | End: 2018-09-19
Attending: INTERNAL MEDICINE

## 2018-09-19 DIAGNOSIS — Z12.31 VISIT FOR SCREENING MAMMOGRAM: ICD-10-CM

## 2018-09-19 DIAGNOSIS — Z00.00 HEALTHCARE MAINTENANCE: ICD-10-CM

## 2018-09-24 ENCOUNTER — AMBULATORY - HEALTHEAST (OUTPATIENT)
Dept: LAB | Facility: CLINIC | Age: 68
End: 2018-09-24

## 2018-09-24 DIAGNOSIS — M06.09 SERONEGATIVE RHEUMATOID ARTHRITIS OF MULTIPLE SITES (H): ICD-10-CM

## 2018-09-24 LAB
ALBUMIN SERPL-MCNC: 3.9 G/DL (ref 3.5–5)
ALT SERPL W P-5'-P-CCNC: 18 U/L (ref 0–45)
CREAT SERPL-MCNC: 1.34 MG/DL (ref 0.6–1.1)
ERYTHROCYTE [DISTWIDTH] IN BLOOD BY AUTOMATED COUNT: 12.2 % (ref 11–14.5)
GFR SERPL CREATININE-BSD FRML MDRD: 39 ML/MIN/1.73M2
HCT VFR BLD AUTO: 28.9 % (ref 35–47)
HGB BLD-MCNC: 9.6 G/DL (ref 12–16)
MCH RBC QN AUTO: 35.2 PG (ref 27–34)
MCHC RBC AUTO-ENTMCNC: 33.2 G/DL (ref 32–36)
MCV RBC AUTO: 106 FL (ref 80–100)
PLATELET # BLD AUTO: 158 THOU/UL (ref 140–440)
PMV BLD AUTO: 7.9 FL (ref 7–10)
RBC # BLD AUTO: 2.72 MILL/UL (ref 3.8–5.4)
WBC: 5.6 THOU/UL (ref 4–11)

## 2018-10-01 ENCOUNTER — COMMUNICATION - HEALTHEAST (OUTPATIENT)
Dept: INTERNAL MEDICINE | Facility: CLINIC | Age: 68
End: 2018-10-01

## 2018-10-01 DIAGNOSIS — E03.9 HYPOTHYROIDISM: ICD-10-CM

## 2018-10-05 ENCOUNTER — COMMUNICATION - HEALTHEAST (OUTPATIENT)
Dept: RHEUMATOLOGY | Facility: CLINIC | Age: 68
End: 2018-10-05

## 2018-10-05 DIAGNOSIS — M06.09 SERONEGATIVE RHEUMATOID ARTHRITIS OF MULTIPLE SITES (H): ICD-10-CM

## 2018-10-08 ENCOUNTER — COMMUNICATION - HEALTHEAST (OUTPATIENT)
Dept: INTERNAL MEDICINE | Facility: CLINIC | Age: 68
End: 2018-10-08

## 2018-11-21 ENCOUNTER — AMBULATORY - HEALTHEAST (OUTPATIENT)
Dept: LAB | Facility: CLINIC | Age: 68
End: 2018-11-21

## 2018-11-21 DIAGNOSIS — M06.09 SERONEGATIVE RHEUMATOID ARTHRITIS OF MULTIPLE SITES (H): ICD-10-CM

## 2018-11-21 LAB
ALBUMIN SERPL-MCNC: 3.9 G/DL (ref 3.5–5)
ALT SERPL W P-5'-P-CCNC: 20 U/L (ref 0–45)
CREAT SERPL-MCNC: 1.39 MG/DL (ref 0.6–1.1)
ERYTHROCYTE [DISTWIDTH] IN BLOOD BY AUTOMATED COUNT: 12.9 % (ref 11–14.5)
GFR SERPL CREATININE-BSD FRML MDRD: 38 ML/MIN/1.73M2
HCT VFR BLD AUTO: 30 % (ref 35–47)
HGB BLD-MCNC: 10.2 G/DL (ref 12–16)
MCH RBC QN AUTO: 35.6 PG (ref 27–34)
MCHC RBC AUTO-ENTMCNC: 33.9 G/DL (ref 32–36)
MCV RBC AUTO: 105 FL (ref 80–100)
PLATELET # BLD AUTO: 138 THOU/UL (ref 140–440)
PMV BLD AUTO: 7.6 FL (ref 7–10)
RBC # BLD AUTO: 2.85 MILL/UL (ref 3.8–5.4)
WBC: 4.9 THOU/UL (ref 4–11)

## 2018-11-26 ENCOUNTER — OFFICE VISIT - HEALTHEAST (OUTPATIENT)
Dept: RHEUMATOLOGY | Facility: CLINIC | Age: 68
End: 2018-11-26

## 2018-11-26 DIAGNOSIS — Z79.899 HIGH RISK MEDICATION USE: ICD-10-CM

## 2018-11-26 DIAGNOSIS — N18.30 STAGE 3 CHRONIC KIDNEY DISEASE (H): ICD-10-CM

## 2018-11-26 DIAGNOSIS — M06.09 SERONEGATIVE RHEUMATOID ARTHRITIS OF MULTIPLE SITES (H): ICD-10-CM

## 2018-12-07 ENCOUNTER — RECORDS - HEALTHEAST (OUTPATIENT)
Dept: ADMINISTRATIVE | Facility: OTHER | Age: 68
End: 2018-12-07

## 2019-01-24 ENCOUNTER — AMBULATORY - HEALTHEAST (OUTPATIENT)
Dept: LAB | Facility: CLINIC | Age: 69
End: 2019-01-24

## 2019-01-24 DIAGNOSIS — M06.09 SERONEGATIVE RHEUMATOID ARTHRITIS OF MULTIPLE SITES (H): ICD-10-CM

## 2019-01-24 LAB
ALBUMIN SERPL-MCNC: 4 G/DL (ref 3.5–5)
ALT SERPL W P-5'-P-CCNC: 24 U/L (ref 0–45)
CREAT SERPL-MCNC: 1.43 MG/DL (ref 0.6–1.1)
ERYTHROCYTE [DISTWIDTH] IN BLOOD BY AUTOMATED COUNT: 12.6 % (ref 11–14.5)
GFR SERPL CREATININE-BSD FRML MDRD: 36 ML/MIN/1.73M2
HCT VFR BLD AUTO: 30 % (ref 35–47)
HGB BLD-MCNC: 10 G/DL (ref 12–16)
MCH RBC QN AUTO: 34.9 PG (ref 27–34)
MCHC RBC AUTO-ENTMCNC: 33.3 G/DL (ref 32–36)
MCV RBC AUTO: 105 FL (ref 80–100)
PLATELET # BLD AUTO: 162 THOU/UL (ref 140–440)
PMV BLD AUTO: 7.4 FL (ref 7–10)
RBC # BLD AUTO: 2.85 MILL/UL (ref 3.8–5.4)
WBC: 5.9 THOU/UL (ref 4–11)

## 2019-01-30 ENCOUNTER — RECORDS - HEALTHEAST (OUTPATIENT)
Dept: ADMINISTRATIVE | Facility: OTHER | Age: 69
End: 2019-01-30

## 2019-02-03 ENCOUNTER — COMMUNICATION - HEALTHEAST (OUTPATIENT)
Dept: INTERNAL MEDICINE | Facility: CLINIC | Age: 69
End: 2019-02-03

## 2019-02-03 DIAGNOSIS — M10.9 GOUT: ICD-10-CM

## 2019-02-15 ENCOUNTER — COMMUNICATION - HEALTHEAST (OUTPATIENT)
Dept: INTERNAL MEDICINE | Facility: CLINIC | Age: 69
End: 2019-02-15

## 2019-02-15 DIAGNOSIS — Z78.0 MENOPAUSE: ICD-10-CM

## 2019-02-26 ENCOUNTER — COMMUNICATION - HEALTHEAST (OUTPATIENT)
Dept: RHEUMATOLOGY | Facility: CLINIC | Age: 69
End: 2019-02-26

## 2019-02-26 DIAGNOSIS — M06.09 SERONEGATIVE RHEUMATOID ARTHRITIS OF MULTIPLE SITES (H): ICD-10-CM

## 2019-03-01 ENCOUNTER — RECORDS - HEALTHEAST (OUTPATIENT)
Dept: ADMINISTRATIVE | Facility: OTHER | Age: 69
End: 2019-03-01

## 2019-03-04 ENCOUNTER — COMMUNICATION - HEALTHEAST (OUTPATIENT)
Dept: INTERNAL MEDICINE | Facility: CLINIC | Age: 69
End: 2019-03-04

## 2019-03-04 DIAGNOSIS — F41.1 ANXIETY STATE: ICD-10-CM

## 2019-03-06 ENCOUNTER — COMMUNICATION - HEALTHEAST (OUTPATIENT)
Dept: INTERNAL MEDICINE | Facility: CLINIC | Age: 69
End: 2019-03-06

## 2019-03-06 DIAGNOSIS — J31.0 CHRONIC RHINITIS: ICD-10-CM

## 2019-03-06 DIAGNOSIS — J31.0 RHINITIS, UNSPECIFIED TYPE: ICD-10-CM

## 2019-03-12 ENCOUNTER — RECORDS - HEALTHEAST (OUTPATIENT)
Dept: ADMINISTRATIVE | Facility: OTHER | Age: 69
End: 2019-03-12

## 2019-03-13 ENCOUNTER — RECORDS - HEALTHEAST (OUTPATIENT)
Dept: HEALTH INFORMATION MANAGEMENT | Facility: CLINIC | Age: 69
End: 2019-03-13

## 2019-03-19 ENCOUNTER — COMMUNICATION - HEALTHEAST (OUTPATIENT)
Dept: RHEUMATOLOGY | Facility: CLINIC | Age: 69
End: 2019-03-19

## 2019-03-19 DIAGNOSIS — M06.09 SERONEGATIVE RHEUMATOID ARTHRITIS OF MULTIPLE SITES (H): ICD-10-CM

## 2019-03-21 ENCOUNTER — COMMUNICATION - HEALTHEAST (OUTPATIENT)
Dept: INTERNAL MEDICINE | Facility: CLINIC | Age: 69
End: 2019-03-21

## 2019-03-21 DIAGNOSIS — E03.9 HYPOTHYROIDISM: ICD-10-CM

## 2019-03-26 ENCOUNTER — AMBULATORY - HEALTHEAST (OUTPATIENT)
Dept: LAB | Facility: CLINIC | Age: 69
End: 2019-03-26

## 2019-03-26 DIAGNOSIS — M06.09 SERONEGATIVE RHEUMATOID ARTHRITIS OF MULTIPLE SITES (H): ICD-10-CM

## 2019-03-26 LAB
ALBUMIN SERPL-MCNC: 4 G/DL (ref 3.5–5)
ALT SERPL W P-5'-P-CCNC: 17 U/L (ref 0–45)
CREAT SERPL-MCNC: 1.31 MG/DL (ref 0.6–1.1)
ERYTHROCYTE [DISTWIDTH] IN BLOOD BY AUTOMATED COUNT: 12.6 % (ref 11–14.5)
GFR SERPL CREATININE-BSD FRML MDRD: 40 ML/MIN/1.73M2
HCT VFR BLD AUTO: 30.1 % (ref 35–47)
HGB BLD-MCNC: 9.9 G/DL (ref 12–16)
MCH RBC QN AUTO: 34.9 PG (ref 27–34)
MCHC RBC AUTO-ENTMCNC: 32.7 G/DL (ref 32–36)
MCV RBC AUTO: 107 FL (ref 80–100)
PLATELET # BLD AUTO: 167 THOU/UL (ref 140–440)
PMV BLD AUTO: 7.4 FL (ref 7–10)
RBC # BLD AUTO: 2.83 MILL/UL (ref 3.8–5.4)
WBC: 5.8 THOU/UL (ref 4–11)

## 2019-05-01 ENCOUNTER — RECORDS - HEALTHEAST (OUTPATIENT)
Dept: ADMINISTRATIVE | Facility: OTHER | Age: 69
End: 2019-05-01

## 2019-05-10 ENCOUNTER — COMMUNICATION - HEALTHEAST (OUTPATIENT)
Dept: LAB | Facility: CLINIC | Age: 69
End: 2019-05-10

## 2019-05-10 DIAGNOSIS — M06.09 SERONEGATIVE RHEUMATOID ARTHRITIS OF MULTIPLE SITES (H): ICD-10-CM

## 2019-05-20 ENCOUNTER — AMBULATORY - HEALTHEAST (OUTPATIENT)
Dept: LAB | Facility: CLINIC | Age: 69
End: 2019-05-20

## 2019-05-20 DIAGNOSIS — M06.09 SERONEGATIVE RHEUMATOID ARTHRITIS OF MULTIPLE SITES (H): ICD-10-CM

## 2019-05-20 LAB
ALBUMIN SERPL-MCNC: 3.9 G/DL (ref 3.5–5)
ALT SERPL W P-5'-P-CCNC: 15 U/L (ref 0–45)
CREAT SERPL-MCNC: 1.45 MG/DL (ref 0.6–1.1)
ERYTHROCYTE [DISTWIDTH] IN BLOOD BY AUTOMATED COUNT: 11.5 % (ref 11–14.5)
GFR SERPL CREATININE-BSD FRML MDRD: 36 ML/MIN/1.73M2
HCT VFR BLD AUTO: 29.6 % (ref 35–47)
HGB BLD-MCNC: 9.8 G/DL (ref 12–16)
MCH RBC QN AUTO: 34.5 PG (ref 27–34)
MCHC RBC AUTO-ENTMCNC: 33.2 G/DL (ref 32–36)
MCV RBC AUTO: 104 FL (ref 80–100)
PLATELET # BLD AUTO: 148 THOU/UL (ref 140–440)
PMV BLD AUTO: 7.2 FL (ref 7–10)
RBC # BLD AUTO: 2.84 MILL/UL (ref 3.8–5.4)
WBC: 5.2 THOU/UL (ref 4–11)

## 2019-05-23 ENCOUNTER — OFFICE VISIT - HEALTHEAST (OUTPATIENT)
Dept: RHEUMATOLOGY | Facility: CLINIC | Age: 69
End: 2019-05-23

## 2019-05-23 DIAGNOSIS — Z79.899 HIGH RISK MEDICATION USE: ICD-10-CM

## 2019-05-23 DIAGNOSIS — M17.0 BILATERAL PRIMARY OSTEOARTHRITIS OF KNEE: ICD-10-CM

## 2019-05-23 DIAGNOSIS — N18.30 STAGE 3 CHRONIC KIDNEY DISEASE (H): ICD-10-CM

## 2019-05-23 DIAGNOSIS — M06.09 SERONEGATIVE RHEUMATOID ARTHRITIS OF MULTIPLE SITES (H): ICD-10-CM

## 2019-05-28 ENCOUNTER — COMMUNICATION - HEALTHEAST (OUTPATIENT)
Dept: ADMINISTRATIVE | Facility: CLINIC | Age: 69
End: 2019-05-28

## 2019-05-28 DIAGNOSIS — M06.09 SERONEGATIVE RHEUMATOID ARTHRITIS OF MULTIPLE SITES (H): ICD-10-CM

## 2019-06-04 ENCOUNTER — COMMUNICATION - HEALTHEAST (OUTPATIENT)
Dept: INTERNAL MEDICINE | Facility: CLINIC | Age: 69
End: 2019-06-04

## 2019-06-04 DIAGNOSIS — I10 HTN (HYPERTENSION): ICD-10-CM

## 2019-06-20 ENCOUNTER — OFFICE VISIT - HEALTHEAST (OUTPATIENT)
Dept: FAMILY MEDICINE | Facility: CLINIC | Age: 69
End: 2019-06-20

## 2019-06-20 DIAGNOSIS — J20.9 ACUTE BRONCHITIS, UNSPECIFIED ORGANISM: ICD-10-CM

## 2019-06-20 RX ORDER — CYCLOSPORINE 0.5 MG/ML
EMULSION OPHTHALMIC
Refills: 11 | Status: SHIPPED | COMMUNITY
Start: 2019-05-24 | End: 2022-07-20

## 2019-07-02 ENCOUNTER — COMMUNICATION - HEALTHEAST (OUTPATIENT)
Dept: INTERNAL MEDICINE | Facility: CLINIC | Age: 69
End: 2019-07-02

## 2019-07-02 DIAGNOSIS — E78.5 HYPERLIPIDEMIA: ICD-10-CM

## 2019-07-02 DIAGNOSIS — E03.9 HYPOTHYROIDISM: ICD-10-CM

## 2019-07-08 ENCOUNTER — COMMUNICATION - HEALTHEAST (OUTPATIENT)
Dept: RHEUMATOLOGY | Facility: CLINIC | Age: 69
End: 2019-07-08

## 2019-07-08 DIAGNOSIS — M06.09 SERONEGATIVE RHEUMATOID ARTHRITIS OF MULTIPLE SITES (H): ICD-10-CM

## 2019-07-18 ENCOUNTER — OFFICE VISIT - HEALTHEAST (OUTPATIENT)
Dept: RHEUMATOLOGY | Facility: CLINIC | Age: 69
End: 2019-07-18

## 2019-07-18 DIAGNOSIS — M25.561 CHRONIC PAIN OF BOTH KNEES: ICD-10-CM

## 2019-07-18 DIAGNOSIS — M25.562 CHRONIC PAIN OF BOTH KNEES: ICD-10-CM

## 2019-07-18 DIAGNOSIS — M17.0 BILATERAL PRIMARY OSTEOARTHRITIS OF KNEE: ICD-10-CM

## 2019-07-18 DIAGNOSIS — G89.29 CHRONIC PAIN OF BOTH KNEES: ICD-10-CM

## 2019-07-18 DIAGNOSIS — N18.30 STAGE 3 CHRONIC KIDNEY DISEASE (H): ICD-10-CM

## 2019-07-22 ENCOUNTER — COMMUNICATION - HEALTHEAST (OUTPATIENT)
Dept: INTERNAL MEDICINE | Facility: CLINIC | Age: 69
End: 2019-07-22

## 2019-07-22 DIAGNOSIS — F41.1 ANXIETY STATE: ICD-10-CM

## 2019-07-23 ENCOUNTER — COMMUNICATION - HEALTHEAST (OUTPATIENT)
Dept: INTERNAL MEDICINE | Facility: CLINIC | Age: 69
End: 2019-07-23

## 2019-07-23 ENCOUNTER — AMBULATORY - HEALTHEAST (OUTPATIENT)
Dept: LAB | Facility: CLINIC | Age: 69
End: 2019-07-23

## 2019-07-23 DIAGNOSIS — M06.09 SERONEGATIVE RHEUMATOID ARTHRITIS OF MULTIPLE SITES (H): ICD-10-CM

## 2019-07-23 DIAGNOSIS — F41.1 ANXIETY STATE: ICD-10-CM

## 2019-07-23 LAB
ALBUMIN SERPL-MCNC: 4 G/DL (ref 3.5–5)
ALT SERPL W P-5'-P-CCNC: 17 U/L (ref 0–45)
CREAT SERPL-MCNC: 1.5 MG/DL (ref 0.6–1.1)
ERYTHROCYTE [DISTWIDTH] IN BLOOD BY AUTOMATED COUNT: 13 % (ref 11–14.5)
GFR SERPL CREATININE-BSD FRML MDRD: 34 ML/MIN/1.73M2
HCT VFR BLD AUTO: 30.5 % (ref 35–47)
HGB BLD-MCNC: 10 G/DL (ref 12–16)
MCH RBC QN AUTO: 33.4 PG (ref 27–34)
MCHC RBC AUTO-ENTMCNC: 32.7 G/DL (ref 32–36)
MCV RBC AUTO: 102 FL (ref 80–100)
PLATELET # BLD AUTO: 171 THOU/UL (ref 140–440)
PMV BLD AUTO: 7.2 FL (ref 7–10)
RBC # BLD AUTO: 2.99 MILL/UL (ref 3.8–5.4)
WBC: 6.8 THOU/UL (ref 4–11)

## 2019-08-01 ENCOUNTER — OFFICE VISIT - HEALTHEAST (OUTPATIENT)
Dept: INTERNAL MEDICINE | Facility: CLINIC | Age: 69
End: 2019-08-01

## 2019-08-01 DIAGNOSIS — Z00.00 ROUTINE GENERAL MEDICAL EXAMINATION AT A HEALTH CARE FACILITY: ICD-10-CM

## 2019-08-01 DIAGNOSIS — M10.9 GOUT, UNSPECIFIED CAUSE, UNSPECIFIED CHRONICITY, UNSPECIFIED SITE: ICD-10-CM

## 2019-08-01 DIAGNOSIS — I10 ESSENTIAL HYPERTENSION: ICD-10-CM

## 2019-08-01 DIAGNOSIS — R73.09 OTHER ABNORMAL GLUCOSE: ICD-10-CM

## 2019-08-01 DIAGNOSIS — M06.09 SERONEGATIVE RHEUMATOID ARTHRITIS OF MULTIPLE SITES (H): ICD-10-CM

## 2019-08-01 DIAGNOSIS — E03.9 HYPOTHYROIDISM, UNSPECIFIED TYPE: ICD-10-CM

## 2019-08-01 DIAGNOSIS — L98.9 SKIN ABNORMALITY: ICD-10-CM

## 2019-08-01 DIAGNOSIS — E78.5 HYPERLIPIDEMIA, UNSPECIFIED HYPERLIPIDEMIA TYPE: ICD-10-CM

## 2019-08-01 DIAGNOSIS — N18.30 ANEMIA IN STAGE 3 CHRONIC KIDNEY DISEASE (H): ICD-10-CM

## 2019-08-01 DIAGNOSIS — N18.30 STAGE 3 CHRONIC KIDNEY DISEASE (H): ICD-10-CM

## 2019-08-01 DIAGNOSIS — D63.1 ANEMIA IN STAGE 3 CHRONIC KIDNEY DISEASE (H): ICD-10-CM

## 2019-08-01 LAB
ALBUMIN SERPL-MCNC: 4.3 G/DL (ref 3.5–5)
ALBUMIN UR-MCNC: NEGATIVE MG/DL
ALP SERPL-CCNC: 67 U/L (ref 45–120)
ALT SERPL W P-5'-P-CCNC: 23 U/L (ref 0–45)
ANION GAP SERPL CALCULATED.3IONS-SCNC: 11 MMOL/L (ref 5–18)
APPEARANCE UR: CLEAR
AST SERPL W P-5'-P-CCNC: 28 U/L (ref 0–40)
BACTERIA #/AREA URNS HPF: ABNORMAL HPF
BILIRUB SERPL-MCNC: 0.7 MG/DL (ref 0–1)
BILIRUB UR QL STRIP: NEGATIVE
BUN SERPL-MCNC: 27 MG/DL (ref 8–22)
CALCIUM SERPL-MCNC: 9.7 MG/DL (ref 8.5–10.5)
CHLORIDE BLD-SCNC: 108 MMOL/L (ref 98–107)
CO2 SERPL-SCNC: 19 MMOL/L (ref 22–31)
COLOR UR AUTO: YELLOW
CREAT SERPL-MCNC: 1.6 MG/DL (ref 0.6–1.1)
GFR SERPL CREATININE-BSD FRML MDRD: 32 ML/MIN/1.73M2
GLUCOSE BLD-MCNC: 107 MG/DL (ref 70–125)
GLUCOSE UR STRIP-MCNC: NEGATIVE MG/DL
HBA1C MFR BLD: 5.6 % (ref 3.5–6)
HGB UR QL STRIP: ABNORMAL
KETONES UR STRIP-MCNC: NEGATIVE MG/DL
LDLC SERPL CALC-MCNC: 47 MG/DL
LEUKOCYTE ESTERASE UR QL STRIP: ABNORMAL
NITRATE UR QL: NEGATIVE
PH UR STRIP: 5.5 [PH] (ref 5–8)
POTASSIUM BLD-SCNC: 4.7 MMOL/L (ref 3.5–5)
PROT SERPL-MCNC: 6.5 G/DL (ref 6–8)
RBC #/AREA URNS AUTO: ABNORMAL HPF
SODIUM SERPL-SCNC: 138 MMOL/L (ref 136–145)
SP GR UR STRIP: 1.01 (ref 1–1.03)
SQUAMOUS #/AREA URNS AUTO: ABNORMAL LPF
URATE SERPL-MCNC: 4.2 MG/DL (ref 2–7.5)
UROBILINOGEN UR STRIP-ACNC: ABNORMAL
WBC #/AREA URNS AUTO: ABNORMAL HPF

## 2019-08-01 ASSESSMENT — MIFFLIN-ST. JEOR: SCORE: 1240.5

## 2019-08-02 ENCOUNTER — COMMUNICATION - HEALTHEAST (OUTPATIENT)
Dept: INTERNAL MEDICINE | Facility: CLINIC | Age: 69
End: 2019-08-02

## 2019-08-02 DIAGNOSIS — E03.9 HYPOTHYROIDISM, UNSPECIFIED TYPE: ICD-10-CM

## 2019-08-02 LAB
25(OH)D3 SERPL-MCNC: 36 NG/ML (ref 30–80)
25(OH)D3 SERPL-MCNC: 36 NG/ML (ref 30–80)
BACTERIA SPEC CULT: NO GROWTH
TSH SERPL DL<=0.005 MIU/L-ACNC: 2.58 UIU/ML (ref 0.3–5)

## 2019-08-19 ENCOUNTER — OFFICE VISIT - HEALTHEAST (OUTPATIENT)
Dept: INTERNAL MEDICINE | Facility: CLINIC | Age: 69
End: 2019-08-19

## 2019-08-19 ENCOUNTER — COMMUNICATION - HEALTHEAST (OUTPATIENT)
Dept: RHEUMATOLOGY | Facility: CLINIC | Age: 69
End: 2019-08-19

## 2019-08-19 DIAGNOSIS — M06.09 SERONEGATIVE RHEUMATOID ARTHRITIS OF MULTIPLE SITES (H): ICD-10-CM

## 2019-08-19 DIAGNOSIS — Z79.899 HIGH RISK MEDICATION USE: ICD-10-CM

## 2019-08-19 DIAGNOSIS — I10 ESSENTIAL HYPERTENSION: ICD-10-CM

## 2019-08-29 ENCOUNTER — COMMUNICATION - HEALTHEAST (OUTPATIENT)
Dept: INTERNAL MEDICINE | Facility: CLINIC | Age: 69
End: 2019-08-29

## 2019-09-03 ENCOUNTER — OFFICE VISIT - HEALTHEAST (OUTPATIENT)
Dept: INTERNAL MEDICINE | Facility: CLINIC | Age: 69
End: 2019-09-03

## 2019-09-03 DIAGNOSIS — I10 ESSENTIAL HYPERTENSION: ICD-10-CM

## 2019-09-05 ENCOUNTER — RECORDS - HEALTHEAST (OUTPATIENT)
Dept: ADMINISTRATIVE | Facility: OTHER | Age: 69
End: 2019-09-05

## 2019-09-10 ENCOUNTER — RECORDS - HEALTHEAST (OUTPATIENT)
Dept: ADMINISTRATIVE | Facility: OTHER | Age: 69
End: 2019-09-10

## 2019-09-23 ENCOUNTER — AMBULATORY - HEALTHEAST (OUTPATIENT)
Dept: LAB | Facility: CLINIC | Age: 69
End: 2019-09-23

## 2019-09-23 DIAGNOSIS — M06.09 SERONEGATIVE RHEUMATOID ARTHRITIS OF MULTIPLE SITES (H): ICD-10-CM

## 2019-09-23 LAB
ALBUMIN SERPL-MCNC: 3.7 G/DL (ref 3.5–5)
ALT SERPL W P-5'-P-CCNC: 23 U/L (ref 0–45)
CREAT SERPL-MCNC: 1.5 MG/DL (ref 0.6–1.1)
ERYTHROCYTE [DISTWIDTH] IN BLOOD BY AUTOMATED COUNT: 13.3 % (ref 11–14.5)
GFR SERPL CREATININE-BSD FRML MDRD: 34 ML/MIN/1.73M2
HCT VFR BLD AUTO: 29.8 % (ref 35–47)
HGB BLD-MCNC: 10 G/DL (ref 12–16)
MCH RBC QN AUTO: 35.5 PG (ref 27–34)
MCHC RBC AUTO-ENTMCNC: 33.6 G/DL (ref 32–36)
MCV RBC AUTO: 106 FL (ref 80–100)
PLATELET # BLD AUTO: 162 THOU/UL (ref 140–440)
PMV BLD AUTO: 7.9 FL (ref 7–10)
RBC # BLD AUTO: 2.82 MILL/UL (ref 3.8–5.4)
WBC: 5.7 THOU/UL (ref 4–11)

## 2019-09-25 ENCOUNTER — COMMUNICATION - HEALTHEAST (OUTPATIENT)
Dept: RHEUMATOLOGY | Facility: CLINIC | Age: 69
End: 2019-09-25

## 2019-09-25 DIAGNOSIS — M06.09 SERONEGATIVE RHEUMATOID ARTHRITIS OF MULTIPLE SITES (H): ICD-10-CM

## 2019-09-30 ENCOUNTER — COMMUNICATION - HEALTHEAST (OUTPATIENT)
Dept: INTERNAL MEDICINE | Facility: CLINIC | Age: 69
End: 2019-09-30

## 2019-09-30 DIAGNOSIS — E03.9 HYPOTHYROIDISM: ICD-10-CM

## 2019-10-14 ENCOUNTER — HOSPITAL ENCOUNTER (OUTPATIENT)
Dept: MAMMOGRAPHY | Facility: CLINIC | Age: 69
Discharge: HOME | End: 2019-10-14
Attending: INTERNAL MEDICINE

## 2019-10-14 ENCOUNTER — RECORDS - HEALTHEAST (OUTPATIENT)
Dept: BONE DENSITY | Facility: CLINIC | Age: 69
End: 2019-10-14

## 2019-10-14 ENCOUNTER — RECORDS - HEALTHEAST (OUTPATIENT)
Dept: ADMINISTRATIVE | Facility: OTHER | Age: 69
End: 2019-10-14

## 2019-10-14 DIAGNOSIS — Z78.0 ASYMPTOMATIC MENOPAUSAL STATE: ICD-10-CM

## 2019-10-14 DIAGNOSIS — Z12.31 VISIT FOR SCREENING MAMMOGRAM: ICD-10-CM

## 2019-10-22 ENCOUNTER — COMMUNICATION - HEALTHEAST (OUTPATIENT)
Dept: INTERNAL MEDICINE | Facility: CLINIC | Age: 69
End: 2019-10-22

## 2019-11-08 ENCOUNTER — COMMUNICATION - HEALTHEAST (OUTPATIENT)
Dept: INTERNAL MEDICINE | Facility: CLINIC | Age: 69
End: 2019-11-08

## 2019-11-08 DIAGNOSIS — I10 ESSENTIAL HYPERTENSION: ICD-10-CM

## 2019-11-10 ENCOUNTER — COMMUNICATION - HEALTHEAST (OUTPATIENT)
Dept: RHEUMATOLOGY | Facility: CLINIC | Age: 69
End: 2019-11-10

## 2019-11-10 DIAGNOSIS — M06.09 SERONEGATIVE RHEUMATOID ARTHRITIS OF MULTIPLE SITES (H): ICD-10-CM

## 2019-11-14 ENCOUNTER — AMBULATORY - HEALTHEAST (OUTPATIENT)
Dept: LAB | Facility: CLINIC | Age: 69
End: 2019-11-14

## 2019-11-14 DIAGNOSIS — M06.09 SERONEGATIVE RHEUMATOID ARTHRITIS OF MULTIPLE SITES (H): ICD-10-CM

## 2019-11-14 LAB
ALBUMIN SERPL-MCNC: 3.8 G/DL (ref 3.5–5)
ALT SERPL W P-5'-P-CCNC: 24 U/L (ref 0–45)
CREAT SERPL-MCNC: 1.65 MG/DL (ref 0.6–1.1)
ERYTHROCYTE [DISTWIDTH] IN BLOOD BY AUTOMATED COUNT: 13 % (ref 11–14.5)
GFR SERPL CREATININE-BSD FRML MDRD: 31 ML/MIN/1.73M2
HCT VFR BLD AUTO: 29.6 % (ref 35–47)
HGB BLD-MCNC: 9.7 G/DL (ref 12–16)
MCH RBC QN AUTO: 35.3 PG (ref 27–34)
MCHC RBC AUTO-ENTMCNC: 32.9 G/DL (ref 32–36)
MCV RBC AUTO: 107 FL (ref 80–100)
PLATELET # BLD AUTO: 169 THOU/UL (ref 140–440)
PMV BLD AUTO: 7.8 FL (ref 7–10)
RBC # BLD AUTO: 2.76 MILL/UL (ref 3.8–5.4)
WBC: 5.5 THOU/UL (ref 4–11)

## 2019-11-18 ENCOUNTER — OFFICE VISIT - HEALTHEAST (OUTPATIENT)
Dept: RHEUMATOLOGY | Facility: CLINIC | Age: 69
End: 2019-11-18

## 2019-11-18 DIAGNOSIS — N18.30 ANEMIA IN STAGE 3 CHRONIC KIDNEY DISEASE (H): ICD-10-CM

## 2019-11-18 DIAGNOSIS — N18.30 STAGE 3 CHRONIC KIDNEY DISEASE (H): ICD-10-CM

## 2019-11-18 DIAGNOSIS — D63.1 ANEMIA IN STAGE 3 CHRONIC KIDNEY DISEASE (H): ICD-10-CM

## 2019-11-18 DIAGNOSIS — M06.09 SERONEGATIVE RHEUMATOID ARTHRITIS OF MULTIPLE SITES (H): ICD-10-CM

## 2019-11-18 DIAGNOSIS — Z79.899 HIGH RISK MEDICATION USE: ICD-10-CM

## 2019-11-18 DIAGNOSIS — M17.0 BILATERAL PRIMARY OSTEOARTHRITIS OF KNEE: ICD-10-CM

## 2019-11-18 ASSESSMENT — MIFFLIN-ST. JEOR: SCORE: 1240.5

## 2019-12-04 ENCOUNTER — COMMUNICATION - HEALTHEAST (OUTPATIENT)
Dept: RHEUMATOLOGY | Facility: CLINIC | Age: 69
End: 2019-12-04

## 2019-12-04 DIAGNOSIS — M06.09 SERONEGATIVE RHEUMATOID ARTHRITIS OF MULTIPLE SITES (H): ICD-10-CM

## 2019-12-06 ENCOUNTER — RECORDS - HEALTHEAST (OUTPATIENT)
Dept: ADMINISTRATIVE | Facility: OTHER | Age: 69
End: 2019-12-06

## 2019-12-20 ENCOUNTER — RECORDS - HEALTHEAST (OUTPATIENT)
Dept: ADMINISTRATIVE | Facility: OTHER | Age: 69
End: 2019-12-20

## 2019-12-20 LAB — RETINOPATHY: NEGATIVE

## 2019-12-22 ENCOUNTER — COMMUNICATION - HEALTHEAST (OUTPATIENT)
Dept: INTERNAL MEDICINE | Facility: CLINIC | Age: 69
End: 2019-12-22

## 2019-12-22 DIAGNOSIS — F41.1 ANXIETY STATE: ICD-10-CM

## 2019-12-22 DIAGNOSIS — M10.9 GOUT: ICD-10-CM

## 2019-12-31 ENCOUNTER — RECORDS - HEALTHEAST (OUTPATIENT)
Dept: HEALTH INFORMATION MANAGEMENT | Facility: CLINIC | Age: 69
End: 2019-12-31

## 2020-01-10 ENCOUNTER — RECORDS - HEALTHEAST (OUTPATIENT)
Dept: ADMINISTRATIVE | Facility: OTHER | Age: 70
End: 2020-01-10

## 2020-01-13 ENCOUNTER — AMBULATORY - HEALTHEAST (OUTPATIENT)
Dept: LAB | Facility: CLINIC | Age: 70
End: 2020-01-13

## 2020-01-13 DIAGNOSIS — M06.09 SERONEGATIVE RHEUMATOID ARTHRITIS OF MULTIPLE SITES (H): ICD-10-CM

## 2020-01-13 LAB
ALBUMIN SERPL-MCNC: 4.2 G/DL (ref 3.5–5)
ALT SERPL W P-5'-P-CCNC: 19 U/L (ref 0–45)
CREAT SERPL-MCNC: 1.54 MG/DL (ref 0.6–1.1)
ERYTHROCYTE [DISTWIDTH] IN BLOOD BY AUTOMATED COUNT: 14.6 % (ref 11–14.5)
GFR SERPL CREATININE-BSD FRML MDRD: 33 ML/MIN/1.73M2
HCT VFR BLD AUTO: 28.8 % (ref 35–47)
HGB BLD-MCNC: 9.5 G/DL (ref 12–16)
MCH RBC QN AUTO: 35.2 PG (ref 27–34)
MCHC RBC AUTO-ENTMCNC: 33 G/DL (ref 32–36)
MCV RBC AUTO: 107 FL (ref 80–100)
PLATELET # BLD AUTO: 160 THOU/UL (ref 140–440)
PMV BLD AUTO: 11.1 FL (ref 8.5–12.5)
RBC # BLD AUTO: 2.7 MILL/UL (ref 3.8–5.4)
WBC: 4.6 THOU/UL (ref 4–11)

## 2020-01-16 ENCOUNTER — OFFICE VISIT - HEALTHEAST (OUTPATIENT)
Dept: RHEUMATOLOGY | Facility: CLINIC | Age: 70
End: 2020-01-16

## 2020-01-16 DIAGNOSIS — N18.30 STAGE 3 CHRONIC KIDNEY DISEASE (H): ICD-10-CM

## 2020-01-16 DIAGNOSIS — M17.0 BILATERAL PRIMARY OSTEOARTHRITIS OF KNEE: ICD-10-CM

## 2020-01-16 DIAGNOSIS — M06.09 SERONEGATIVE RHEUMATOID ARTHRITIS OF MULTIPLE SITES (H): ICD-10-CM

## 2020-01-16 ASSESSMENT — MIFFLIN-ST. JEOR: SCORE: 1242.77

## 2020-01-21 ASSESSMENT — MIFFLIN-ST. JEOR
SCORE: 1259.55
SCORE: 1259.55

## 2020-01-22 ASSESSMENT — MIFFLIN-ST. JEOR
SCORE: 1257.74
SCORE: 1257.74

## 2020-01-23 ENCOUNTER — ANESTHESIA - HEALTHEAST (OUTPATIENT)
Dept: SURGERY | Facility: CLINIC | Age: 70
End: 2020-01-23

## 2020-01-23 ENCOUNTER — SURGERY - HEALTHEAST (OUTPATIENT)
Dept: SURGERY | Facility: CLINIC | Age: 70
End: 2020-01-23

## 2020-01-23 ASSESSMENT — MIFFLIN-ST. JEOR
SCORE: 1266.35
SCORE: 1266.35

## 2020-01-26 ASSESSMENT — MIFFLIN-ST. JEOR
SCORE: 1255.92
SCORE: 1255.92

## 2020-01-27 ASSESSMENT — MIFFLIN-ST. JEOR
SCORE: 1267.72
SCORE: 1267.72

## 2020-01-28 ASSESSMENT — MIFFLIN-ST. JEOR
SCORE: 1259.1
SCORE: 1259.1

## 2020-01-29 ASSESSMENT — MIFFLIN-ST. JEOR
SCORE: 1256.38
SCORE: 1256.38

## 2020-01-30 ASSESSMENT — MIFFLIN-ST. JEOR
SCORE: 1256.38
SCORE: 1256.38

## 2020-01-31 ASSESSMENT — MIFFLIN-ST. JEOR
SCORE: 1250.93
SCORE: 1250.93

## 2020-02-01 ASSESSMENT — MIFFLIN-ST. JEOR
SCORE: 1250.48
SCORE: 1250.48

## 2020-02-02 ASSESSMENT — MIFFLIN-ST. JEOR
SCORE: 1252.29
SCORE: 1252.29

## 2020-02-03 ASSESSMENT — MIFFLIN-ST. JEOR
SCORE: 1253.65
SCORE: 1253.65

## 2020-02-04 ASSESSMENT — MIFFLIN-ST. JEOR
SCORE: 1253.65
SCORE: 1253.65

## 2020-02-05 ASSESSMENT — MIFFLIN-ST. JEOR
SCORE: 1225.53
SCORE: 1225.53

## 2020-02-06 ASSESSMENT — MIFFLIN-ST. JEOR
SCORE: 1220.09
SCORE: 1220.09

## 2020-02-07 ENCOUNTER — SURGERY - HEALTHEAST (OUTPATIENT)
Dept: SURGERY | Facility: CLINIC | Age: 70
End: 2020-02-07

## 2020-02-07 ENCOUNTER — ANESTHESIA - HEALTHEAST (OUTPATIENT)
Dept: SURGERY | Facility: CLINIC | Age: 70
End: 2020-02-07

## 2020-02-07 ASSESSMENT — MIFFLIN-ST. JEOR
SCORE: 1227.34
SCORE: 1227.34

## 2020-02-08 ASSESSMENT — MIFFLIN-ST. JEOR
SCORE: 1218.73
SCORE: 1218.73

## 2020-02-29 ENCOUNTER — COMMUNICATION - HEALTHEAST (OUTPATIENT)
Dept: SURGERY | Facility: CLINIC | Age: 70
End: 2020-02-29

## 2020-03-12 ENCOUNTER — RECORDS - HEALTHEAST (OUTPATIENT)
Dept: ADMINISTRATIVE | Facility: OTHER | Age: 70
End: 2020-03-12

## 2020-03-19 ENCOUNTER — COMMUNICATION - HEALTHEAST (OUTPATIENT)
Dept: INTERNAL MEDICINE | Facility: CLINIC | Age: 70
End: 2020-03-19

## 2020-03-19 ENCOUNTER — COMMUNICATION - HEALTHEAST (OUTPATIENT)
Dept: SURGERY | Facility: CLINIC | Age: 70
End: 2020-03-19

## 2020-03-20 ENCOUNTER — COMMUNICATION - HEALTHEAST (OUTPATIENT)
Dept: SURGERY | Facility: CLINIC | Age: 70
End: 2020-03-20

## 2020-03-20 ENCOUNTER — COMMUNICATION - HEALTHEAST (OUTPATIENT)
Dept: SCHEDULING | Facility: CLINIC | Age: 70
End: 2020-03-20

## 2020-03-25 ENCOUNTER — OFFICE VISIT - HEALTHEAST (OUTPATIENT)
Dept: INTERNAL MEDICINE | Facility: CLINIC | Age: 70
End: 2020-03-25

## 2020-03-25 DIAGNOSIS — K56.609 SMALL BOWEL OBSTRUCTION (H): ICD-10-CM

## 2020-03-25 DIAGNOSIS — M06.09 SERONEGATIVE RHEUMATOID ARTHRITIS OF MULTIPLE SITES (H): ICD-10-CM

## 2020-03-25 DIAGNOSIS — E03.9 HYPOTHYROIDISM, UNSPECIFIED TYPE: ICD-10-CM

## 2020-03-25 DIAGNOSIS — I10 ESSENTIAL HYPERTENSION: ICD-10-CM

## 2020-03-25 DIAGNOSIS — M54.50 ACUTE BILATERAL LOW BACK PAIN WITHOUT SCIATICA: ICD-10-CM

## 2020-04-02 ENCOUNTER — COMMUNICATION - HEALTHEAST (OUTPATIENT)
Dept: ADMINISTRATIVE | Facility: CLINIC | Age: 70
End: 2020-04-02

## 2020-04-02 ENCOUNTER — COMMUNICATION - HEALTHEAST (OUTPATIENT)
Dept: INTERNAL MEDICINE | Facility: CLINIC | Age: 70
End: 2020-04-02

## 2020-04-09 ENCOUNTER — RECORDS - HEALTHEAST (OUTPATIENT)
Dept: ADMINISTRATIVE | Facility: OTHER | Age: 70
End: 2020-04-09

## 2020-04-09 ENCOUNTER — COMMUNICATION - HEALTHEAST (OUTPATIENT)
Dept: ADMINISTRATIVE | Facility: CLINIC | Age: 70
End: 2020-04-09

## 2020-04-09 ENCOUNTER — OFFICE VISIT - HEALTHEAST (OUTPATIENT)
Dept: INTERNAL MEDICINE | Facility: CLINIC | Age: 70
End: 2020-04-09

## 2020-04-09 DIAGNOSIS — M54.16 LEFT LUMBAR RADICULOPATHY: ICD-10-CM

## 2020-04-15 ENCOUNTER — COMMUNICATION - HEALTHEAST (OUTPATIENT)
Dept: INTERNAL MEDICINE | Facility: CLINIC | Age: 70
End: 2020-04-15

## 2020-04-15 ENCOUNTER — OFFICE VISIT - HEALTHEAST (OUTPATIENT)
Dept: SURGERY | Facility: CLINIC | Age: 70
End: 2020-04-15

## 2020-04-15 DIAGNOSIS — Z93.1 GASTROSTOMY IN PLACE (H): ICD-10-CM

## 2020-04-15 DIAGNOSIS — I10 ESSENTIAL HYPERTENSION: ICD-10-CM

## 2020-04-16 ENCOUNTER — RECORDS - HEALTHEAST (OUTPATIENT)
Dept: ADMINISTRATIVE | Facility: OTHER | Age: 70
End: 2020-04-16

## 2020-04-16 ENCOUNTER — OFFICE VISIT - HEALTHEAST (OUTPATIENT)
Dept: RHEUMATOLOGY | Facility: CLINIC | Age: 70
End: 2020-04-16

## 2020-04-16 ENCOUNTER — COMMUNICATION - HEALTHEAST (OUTPATIENT)
Dept: RHEUMATOLOGY | Facility: CLINIC | Age: 70
End: 2020-04-16

## 2020-04-16 ENCOUNTER — COMMUNICATION - HEALTHEAST (OUTPATIENT)
Dept: SURGERY | Facility: CLINIC | Age: 70
End: 2020-04-16

## 2020-04-16 DIAGNOSIS — N18.30 STAGE 3 CHRONIC KIDNEY DISEASE (H): ICD-10-CM

## 2020-04-16 DIAGNOSIS — M17.0 BILATERAL PRIMARY OSTEOARTHRITIS OF KNEE: ICD-10-CM

## 2020-04-16 DIAGNOSIS — M06.09 SERONEGATIVE RHEUMATOID ARTHRITIS OF MULTIPLE SITES (H): ICD-10-CM

## 2020-04-16 DIAGNOSIS — N18.30 ANEMIA IN STAGE 3 CHRONIC KIDNEY DISEASE (H): ICD-10-CM

## 2020-04-16 DIAGNOSIS — D63.1 ANEMIA IN STAGE 3 CHRONIC KIDNEY DISEASE (H): ICD-10-CM

## 2020-04-28 ENCOUNTER — RECORDS - HEALTHEAST (OUTPATIENT)
Dept: ADMINISTRATIVE | Facility: OTHER | Age: 70
End: 2020-04-28

## 2020-04-29 ENCOUNTER — COMMUNICATION - HEALTHEAST (OUTPATIENT)
Dept: INTERNAL MEDICINE | Facility: CLINIC | Age: 70
End: 2020-04-29

## 2020-04-29 DIAGNOSIS — M54.16 LEFT LUMBAR RADICULOPATHY: ICD-10-CM

## 2020-05-05 ENCOUNTER — HOSPITAL ENCOUNTER (OUTPATIENT)
Dept: PHYSICAL MEDICINE AND REHAB | Facility: CLINIC | Age: 70
Discharge: HOME | End: 2020-05-05
Attending: INTERNAL MEDICINE

## 2020-05-05 DIAGNOSIS — M54.16 LUMBAR RADICULAR PAIN: ICD-10-CM

## 2020-05-05 DIAGNOSIS — M54.50 LUMBAR SPINE PAIN: ICD-10-CM

## 2020-05-05 DIAGNOSIS — M47.816 LUMBAR SPONDYLOSIS: ICD-10-CM

## 2020-05-08 ENCOUNTER — COMMUNICATION - HEALTHEAST (OUTPATIENT)
Dept: PHYSICAL MEDICINE AND REHAB | Facility: CLINIC | Age: 70
End: 2020-05-08

## 2020-05-08 DIAGNOSIS — M54.16 LUMBAR RADICULAR PAIN: ICD-10-CM

## 2020-05-19 ENCOUNTER — COMMUNICATION - HEALTHEAST (OUTPATIENT)
Dept: INTERNAL MEDICINE | Facility: CLINIC | Age: 70
End: 2020-05-19

## 2020-05-19 ENCOUNTER — HOSPITAL ENCOUNTER (OUTPATIENT)
Dept: MRI IMAGING | Facility: CLINIC | Age: 70
Discharge: HOME | End: 2020-05-19
Attending: PHYSICAL MEDICINE & REHABILITATION

## 2020-05-19 DIAGNOSIS — M54.16 LUMBAR RADICULAR PAIN: ICD-10-CM

## 2020-05-19 DIAGNOSIS — I10 HTN (HYPERTENSION): ICD-10-CM

## 2020-05-19 DIAGNOSIS — M54.50 LUMBAR SPINE PAIN: ICD-10-CM

## 2020-05-19 DIAGNOSIS — M47.816 LUMBAR SPONDYLOSIS: ICD-10-CM

## 2020-05-20 ENCOUNTER — COMMUNICATION - HEALTHEAST (OUTPATIENT)
Dept: PHYSICAL MEDICINE AND REHAB | Facility: CLINIC | Age: 70
End: 2020-05-20

## 2020-05-21 ENCOUNTER — COMMUNICATION - HEALTHEAST (OUTPATIENT)
Dept: INTERNAL MEDICINE | Facility: CLINIC | Age: 70
End: 2020-05-21

## 2020-05-21 ENCOUNTER — HOSPITAL ENCOUNTER (OUTPATIENT)
Dept: PHYSICAL MEDICINE AND REHAB | Facility: CLINIC | Age: 70
Discharge: HOME | End: 2020-05-21
Attending: PHYSICAL MEDICINE & REHABILITATION

## 2020-05-21 DIAGNOSIS — S32.020D COMPRESSION FRACTURE OF L2 VERTEBRA WITH ROUTINE HEALING, SUBSEQUENT ENCOUNTER: ICD-10-CM

## 2020-05-21 DIAGNOSIS — M54.50 LUMBAR SPINE PAIN: ICD-10-CM

## 2020-05-21 DIAGNOSIS — J31.0 RHINITIS, UNSPECIFIED TYPE: ICD-10-CM

## 2020-05-22 ENCOUNTER — COMMUNICATION - HEALTHEAST (OUTPATIENT)
Dept: SCHEDULING | Facility: CLINIC | Age: 70
End: 2020-05-22

## 2020-05-22 ENCOUNTER — VIRTUAL VISIT (OUTPATIENT)
Dept: URGENT CARE | Facility: CLINIC | Age: 70
End: 2020-05-22
Payer: COMMERCIAL

## 2020-05-22 DIAGNOSIS — M54.50 LOW BACK PAIN WITHOUT SCIATICA, UNSPECIFIED BACK PAIN LATERALITY, UNSPECIFIED CHRONICITY: Primary | ICD-10-CM

## 2020-05-22 PROCEDURE — 99442: CPT | Performed by: NURSE PRACTITIONER

## 2020-05-23 NOTE — PATIENT INSTRUCTIONS
Try and manage your pain as we discussed with tylenol.  If you have severe pain you may need to be seen in an urgent care or ER.    Follow-up with your primary care provider next week and as needed.    Indications for emergent return to emergency department discussed with patient, who verbalized good understanding and agreement.  Patient understands the limitations of today's evaluation.

## 2020-05-23 NOTE — PROGRESS NOTES
"Claudia Wise is a 69 year old female who is being evaluated via a billable telephone visit.      The patient has been notified of following:     \"This telephone visit will be conducted via a call between you and your physician/provider. We have found that certain health care needs can be provided without the need for a physical exam.  This service lets us provide the care you need with a short phone conversation.  If a prescription is necessary we can send it directly to your pharmacy.  If lab work is needed we can place an order for that and you can then stop by our lab to have the test done at a later time.    Telephone visits are billed at different rates depending on your insurance coverage. During this emergency period, for some insurers they may be billed the same as an in-person visit.  Please reach out to your insurance provider with any questions.    If during the course of the call the physician/provider feels a telephone visit is not appropriate, you will not be charged for this service.\"    Patient has given verbal consent for Telephone visit?  Yes  What phone number would you like to be contacted at? [phone]        Subjective     Claudia Wise is a 69 year old female who prePatientsents via phone visit today for the following health issues:    HPI  Pt is wanting a refill on her hydrocodone. She has a fx in her back from 2 months ago. Did have hydrocodone for her PCP and called for a refill today around 5 pm as she ran out yesterday. She also just saw spine doctor who gave her calcitonin nasal spray for pain and she just started it but it is not working        PAST MEDICAL HISTORY: No past medical history on file.    PAST SURGICAL HISTORY: No past surgical history on file.    FAMILY HISTORY: No family history on file.    SOCIAL HISTORY:   Social History     Tobacco Use     Smoking status: Not on file   Substance Use Topics     Alcohol use: Not on file         Reviewed and updated as needed " this visit by Provider         Review of Systems No recent URI, sore throat,cough, chest pain, SOB, GI or  complaints.      Objective   Reported vitals:  There were no vitals taken for this visit.   PSYCH: Alert and oriented times 3; coherent speech, normal   rate and volume, able to articulate logical thoughts, able   to abstract reason, no tangential thoughts, no hallucinations   or delusions   RESP: No cough, no audible wheezing, able to talk in full sentences  Remainder of exam unable to be completed due to telephone visits  Assessment/Plan:   Diagnosis Comments   1. Low back pain without sciatica, unspecified back pain laterality, unspecified chronicity         I  Explained to patient that we do not do refills in urgent care and therefore I am unable to refill her hydrocodone. We discussed at length different pain management options for her. She will get her refill from her PCP and if she is in severe pain she understands she needs to be seen. Pt states understanding.      Phone call duration:  18 minutes    VESNA Marks

## 2020-05-26 ENCOUNTER — COMMUNICATION - HEALTHEAST (OUTPATIENT)
Dept: PHYSICAL MEDICINE AND REHAB | Facility: CLINIC | Age: 70
End: 2020-05-26

## 2020-05-26 DIAGNOSIS — S32.020D COMPRESSION FRACTURE OF L2 VERTEBRA WITH ROUTINE HEALING, SUBSEQUENT ENCOUNTER: ICD-10-CM

## 2020-05-27 ENCOUNTER — COMMUNICATION - HEALTHEAST (OUTPATIENT)
Dept: OTHER | Facility: CLINIC | Age: 70
End: 2020-05-27

## 2020-05-27 ENCOUNTER — OFFICE VISIT - HEALTHEAST (OUTPATIENT)
Dept: PHYSICAL THERAPY | Facility: REHABILITATION | Age: 70
End: 2020-05-27

## 2020-05-27 DIAGNOSIS — M54.50 ACUTE LOW BACK PAIN WITHOUT SCIATICA, UNSPECIFIED BACK PAIN LATERALITY: ICD-10-CM

## 2020-05-27 DIAGNOSIS — M54.50 LUMBAR SPINE PAINFUL ON MOVEMENT: ICD-10-CM

## 2020-05-27 DIAGNOSIS — S32.020A COMPRESSION FRACTURE OF L2 VERTEBRA, INITIAL ENCOUNTER (H): ICD-10-CM

## 2020-06-01 ENCOUNTER — COMMUNICATION - HEALTHEAST (OUTPATIENT)
Dept: INTERNAL MEDICINE | Facility: CLINIC | Age: 70
End: 2020-06-01

## 2020-06-02 ENCOUNTER — COMMUNICATION - HEALTHEAST (OUTPATIENT)
Dept: INTERNAL MEDICINE | Facility: CLINIC | Age: 70
End: 2020-06-02

## 2020-06-02 ENCOUNTER — OFFICE VISIT - HEALTHEAST (OUTPATIENT)
Dept: INTERNAL MEDICINE | Facility: CLINIC | Age: 70
End: 2020-06-02

## 2020-06-02 DIAGNOSIS — M81.0 SENILE OSTEOPOROSIS: ICD-10-CM

## 2020-06-02 DIAGNOSIS — S32.020D COMPRESSION FRACTURE OF L2 VERTEBRA WITH ROUTINE HEALING: ICD-10-CM

## 2020-06-02 DIAGNOSIS — N18.30 STAGE 3 CHRONIC KIDNEY DISEASE (H): ICD-10-CM

## 2020-06-02 DIAGNOSIS — M54.16 LEFT LUMBAR RADICULOPATHY: ICD-10-CM

## 2020-06-02 DIAGNOSIS — E44.0 MODERATE MALNUTRITION (H): ICD-10-CM

## 2020-06-02 DIAGNOSIS — M06.09 SERONEGATIVE RHEUMATOID ARTHRITIS OF MULTIPLE SITES (H): ICD-10-CM

## 2020-06-04 ENCOUNTER — COMMUNICATION - HEALTHEAST (OUTPATIENT)
Dept: INTERNAL MEDICINE | Facility: CLINIC | Age: 70
End: 2020-06-04

## 2020-06-08 ENCOUNTER — HOSPITAL ENCOUNTER (OUTPATIENT)
Dept: RADIOLOGY | Facility: CLINIC | Age: 70
Discharge: HOME | End: 2020-06-08
Attending: PHYSICAL MEDICINE & REHABILITATION

## 2020-06-08 DIAGNOSIS — M54.50 LUMBAR SPINE PAIN: ICD-10-CM

## 2020-06-08 DIAGNOSIS — S32.020D COMPRESSION FRACTURE OF L2 VERTEBRA WITH ROUTINE HEALING, SUBSEQUENT ENCOUNTER: ICD-10-CM

## 2020-06-09 ENCOUNTER — COMMUNICATION - HEALTHEAST (OUTPATIENT)
Dept: PHYSICAL MEDICINE AND REHAB | Facility: CLINIC | Age: 70
End: 2020-06-09

## 2020-06-11 ENCOUNTER — HOSPITAL ENCOUNTER (OUTPATIENT)
Dept: PHYSICAL MEDICINE AND REHAB | Facility: CLINIC | Age: 70
Discharge: HOME | End: 2020-06-11
Attending: PHYSICAL MEDICINE & REHABILITATION

## 2020-06-11 DIAGNOSIS — M54.50 LUMBAR SPINE PAIN: ICD-10-CM

## 2020-06-11 DIAGNOSIS — S32.020D COMPRESSION FRACTURE OF L2 VERTEBRA WITH ROUTINE HEALING, SUBSEQUENT ENCOUNTER: ICD-10-CM

## 2020-06-12 ENCOUNTER — COMMUNICATION - HEALTHEAST (OUTPATIENT)
Dept: INTERNAL MEDICINE | Facility: CLINIC | Age: 70
End: 2020-06-12

## 2020-06-12 ENCOUNTER — AMBULATORY - HEALTHEAST (OUTPATIENT)
Dept: LAB | Facility: CLINIC | Age: 70
End: 2020-06-12

## 2020-06-12 DIAGNOSIS — N18.30 STAGE 3 CHRONIC KIDNEY DISEASE (H): ICD-10-CM

## 2020-06-12 DIAGNOSIS — S32.020D COMPRESSION FRACTURE OF L2 VERTEBRA WITH ROUTINE HEALING: ICD-10-CM

## 2020-06-12 DIAGNOSIS — M81.0 SENILE OSTEOPOROSIS: ICD-10-CM

## 2020-06-12 LAB
ALBUMIN SERPL-MCNC: 4.3 G/DL (ref 3.5–5)
ALP SERPL-CCNC: 64 U/L (ref 45–120)
ALT SERPL W P-5'-P-CCNC: 16 U/L (ref 0–45)
ANION GAP SERPL CALCULATED.3IONS-SCNC: 12 MMOL/L (ref 5–18)
AST SERPL W P-5'-P-CCNC: 16 U/L (ref 0–40)
BILIRUB SERPL-MCNC: 0.3 MG/DL (ref 0–1)
BUN SERPL-MCNC: 34 MG/DL (ref 8–28)
CALCIUM SERPL-MCNC: 9.4 MG/DL (ref 8.5–10.5)
CALCIUM SERPL-MCNC: 9.7 MG/DL (ref 8.5–10.5)
CALCIUM, IONIZED MEASURED: 1.28 MMOL/L (ref 1.11–1.3)
CHLORIDE BLD-SCNC: 107 MMOL/L (ref 98–107)
CO2 SERPL-SCNC: 17 MMOL/L (ref 22–31)
CREAT SERPL-MCNC: 1.36 MG/DL (ref 0.6–1.1)
CREAT SERPL-MCNC: 1.38 MG/DL (ref 0.6–1.1)
GFR SERPL CREATININE-BSD FRML MDRD: 38 ML/MIN/1.73M2
GFR SERPL CREATININE-BSD FRML MDRD: 38 ML/MIN/1.73M2
GLUCOSE BLD-MCNC: 109 MG/DL (ref 70–125)
ION CA PH 7.4: 1.23 MMOL/L (ref 1.11–1.3)
PH: 7.31 (ref 7.35–7.45)
PHOSPHATE SERPL-MCNC: 4.2 MG/DL (ref 2.5–4.5)
POTASSIUM BLD-SCNC: 4.6 MMOL/L (ref 3.5–5)
PROT SERPL-MCNC: 7.2 G/DL (ref 6–8)
PTH-INTACT SERPL-MCNC: 121 PG/ML (ref 10–86)
SODIUM SERPL-SCNC: 136 MMOL/L (ref 136–145)

## 2020-06-16 ENCOUNTER — OFFICE VISIT - HEALTHEAST (OUTPATIENT)
Dept: RHEUMATOLOGY | Facility: CLINIC | Age: 70
End: 2020-06-16

## 2020-06-16 DIAGNOSIS — N18.30 STAGE 3 CHRONIC KIDNEY DISEASE (H): ICD-10-CM

## 2020-06-16 DIAGNOSIS — M17.0 BILATERAL PRIMARY OSTEOARTHRITIS OF KNEE: ICD-10-CM

## 2020-06-16 DIAGNOSIS — M81.0 SENILE OSTEOPOROSIS: ICD-10-CM

## 2020-06-16 DIAGNOSIS — M06.09 SERONEGATIVE RHEUMATOID ARTHRITIS OF MULTIPLE SITES (H): ICD-10-CM

## 2020-06-16 LAB — 25(OH)D3 SERPL-MCNC: 27.8 NG/ML (ref 30–80)

## 2020-06-17 ENCOUNTER — OFFICE VISIT - HEALTHEAST (OUTPATIENT)
Dept: INTERNAL MEDICINE | Facility: CLINIC | Age: 70
End: 2020-06-17

## 2020-06-17 DIAGNOSIS — M81.0 SENILE OSTEOPOROSIS: ICD-10-CM

## 2020-06-17 ASSESSMENT — MIFFLIN-ST. JEOR: SCORE: 1192.87

## 2020-06-18 ENCOUNTER — COMMUNICATION - HEALTHEAST (OUTPATIENT)
Dept: INTERNAL MEDICINE | Facility: CLINIC | Age: 70
End: 2020-06-18

## 2020-06-22 ENCOUNTER — COMMUNICATION - HEALTHEAST (OUTPATIENT)
Dept: INTERNAL MEDICINE | Facility: CLINIC | Age: 70
End: 2020-06-22

## 2020-06-22 DIAGNOSIS — M54.16 LEFT LUMBAR RADICULOPATHY: ICD-10-CM

## 2020-07-01 ENCOUNTER — RECORDS - HEALTHEAST (OUTPATIENT)
Dept: GENERAL RADIOLOGY | Facility: CLINIC | Age: 70
End: 2020-07-01

## 2020-07-01 DIAGNOSIS — S32.020D WEDGE COMPRESSION FRACTURE OF SECOND LUMBAR VERTEBRA, SUBSEQUENT ENCOUNTER FOR FRACTURE WITH ROUTINE HEALING: ICD-10-CM

## 2020-07-02 ENCOUNTER — COMMUNICATION - HEALTHEAST (OUTPATIENT)
Dept: PHYSICAL MEDICINE AND REHAB | Facility: CLINIC | Age: 70
End: 2020-07-02

## 2020-07-09 ENCOUNTER — HOSPITAL ENCOUNTER (OUTPATIENT)
Dept: PHYSICAL MEDICINE AND REHAB | Facility: CLINIC | Age: 70
Discharge: HOME | End: 2020-07-09
Attending: NURSE PRACTITIONER

## 2020-07-09 DIAGNOSIS — M54.50 LUMBAR SPINE PAIN: ICD-10-CM

## 2020-07-09 DIAGNOSIS — M54.16 LUMBAR RADICULAR PAIN: ICD-10-CM

## 2020-07-09 DIAGNOSIS — M54.50 CHRONIC LEFT-SIDED LOW BACK PAIN WITHOUT SCIATICA: ICD-10-CM

## 2020-07-09 DIAGNOSIS — S32.020D COMPRESSION FRACTURE OF L2 VERTEBRA WITH ROUTINE HEALING, SUBSEQUENT ENCOUNTER: ICD-10-CM

## 2020-07-09 DIAGNOSIS — G89.29 CHRONIC LEFT-SIDED LOW BACK PAIN WITHOUT SCIATICA: ICD-10-CM

## 2020-07-15 ENCOUNTER — OFFICE VISIT - HEALTHEAST (OUTPATIENT)
Dept: PHYSICAL THERAPY | Facility: REHABILITATION | Age: 70
End: 2020-07-15

## 2020-07-15 DIAGNOSIS — S32.020A COMPRESSION FRACTURE OF L2 VERTEBRA, INITIAL ENCOUNTER (H): ICD-10-CM

## 2020-07-15 DIAGNOSIS — M54.50 ACUTE LOW BACK PAIN WITHOUT SCIATICA, UNSPECIFIED BACK PAIN LATERALITY: ICD-10-CM

## 2020-07-15 DIAGNOSIS — M54.50 LUMBAR SPINE PAINFUL ON MOVEMENT: ICD-10-CM

## 2020-07-16 ENCOUNTER — COMMUNICATION - HEALTHEAST (OUTPATIENT)
Dept: INTERNAL MEDICINE | Facility: CLINIC | Age: 70
End: 2020-07-16

## 2020-07-16 DIAGNOSIS — M54.16 LEFT LUMBAR RADICULOPATHY: ICD-10-CM

## 2020-07-17 ENCOUNTER — COMMUNICATION - HEALTHEAST (OUTPATIENT)
Dept: PHYSICAL MEDICINE AND REHAB | Facility: CLINIC | Age: 70
End: 2020-07-17

## 2020-07-17 ENCOUNTER — RECORDS - HEALTHEAST (OUTPATIENT)
Dept: ADMINISTRATIVE | Facility: OTHER | Age: 70
End: 2020-07-17

## 2020-07-22 ENCOUNTER — OFFICE VISIT - HEALTHEAST (OUTPATIENT)
Dept: PHYSICAL THERAPY | Facility: REHABILITATION | Age: 70
End: 2020-07-22

## 2020-07-22 DIAGNOSIS — S32.020A COMPRESSION FRACTURE OF L2 VERTEBRA, INITIAL ENCOUNTER (H): ICD-10-CM

## 2020-07-22 DIAGNOSIS — M54.50 ACUTE LOW BACK PAIN WITHOUT SCIATICA, UNSPECIFIED BACK PAIN LATERALITY: ICD-10-CM

## 2020-07-22 DIAGNOSIS — M54.50 LUMBAR SPINE PAINFUL ON MOVEMENT: ICD-10-CM

## 2020-07-28 ENCOUNTER — OFFICE VISIT - HEALTHEAST (OUTPATIENT)
Dept: PHYSICAL THERAPY | Facility: REHABILITATION | Age: 70
End: 2020-07-28

## 2020-07-28 DIAGNOSIS — M54.50 LUMBAR SPINE PAINFUL ON MOVEMENT: ICD-10-CM

## 2020-07-28 DIAGNOSIS — M54.50 ACUTE LOW BACK PAIN WITHOUT SCIATICA, UNSPECIFIED BACK PAIN LATERALITY: ICD-10-CM

## 2020-07-28 DIAGNOSIS — S32.020A COMPRESSION FRACTURE OF L2 VERTEBRA, INITIAL ENCOUNTER (H): ICD-10-CM

## 2020-08-04 ENCOUNTER — COMMUNICATION - HEALTHEAST (OUTPATIENT)
Dept: LAB | Facility: CLINIC | Age: 70
End: 2020-08-04

## 2020-08-04 ENCOUNTER — OFFICE VISIT - HEALTHEAST (OUTPATIENT)
Dept: PHYSICAL THERAPY | Facility: REHABILITATION | Age: 70
End: 2020-08-04

## 2020-08-04 DIAGNOSIS — M54.50 LUMBAR SPINE PAINFUL ON MOVEMENT: ICD-10-CM

## 2020-08-04 DIAGNOSIS — M06.09 SERONEGATIVE RHEUMATOID ARTHRITIS OF MULTIPLE SITES (H): ICD-10-CM

## 2020-08-04 DIAGNOSIS — S32.020A COMPRESSION FRACTURE OF L2 VERTEBRA, INITIAL ENCOUNTER (H): ICD-10-CM

## 2020-08-04 DIAGNOSIS — M54.50 ACUTE LOW BACK PAIN WITHOUT SCIATICA, UNSPECIFIED BACK PAIN LATERALITY: ICD-10-CM

## 2020-08-06 ENCOUNTER — HOSPITAL ENCOUNTER (OUTPATIENT)
Dept: PHYSICAL MEDICINE AND REHAB | Facility: CLINIC | Age: 70
Discharge: HOME | End: 2020-08-06
Attending: NURSE PRACTITIONER

## 2020-08-06 ENCOUNTER — COMMUNICATION - HEALTHEAST (OUTPATIENT)
Dept: PHYSICAL MEDICINE AND REHAB | Facility: CLINIC | Age: 70
End: 2020-08-06

## 2020-08-06 DIAGNOSIS — S32.020D COMPRESSION FRACTURE OF L2 VERTEBRA WITH ROUTINE HEALING: ICD-10-CM

## 2020-08-06 DIAGNOSIS — M54.50 CHRONIC LEFT-SIDED LOW BACK PAIN WITHOUT SCIATICA: ICD-10-CM

## 2020-08-06 DIAGNOSIS — G89.29 CHRONIC LEFT-SIDED LOW BACK PAIN WITHOUT SCIATICA: ICD-10-CM

## 2020-08-14 ENCOUNTER — COMMUNICATION - HEALTHEAST (OUTPATIENT)
Dept: INTERNAL MEDICINE | Facility: CLINIC | Age: 70
End: 2020-08-14

## 2020-08-14 ENCOUNTER — AMBULATORY - HEALTHEAST (OUTPATIENT)
Dept: LAB | Facility: CLINIC | Age: 70
End: 2020-08-14

## 2020-08-14 DIAGNOSIS — M54.16 LEFT LUMBAR RADICULOPATHY: ICD-10-CM

## 2020-08-14 DIAGNOSIS — M06.09 SERONEGATIVE RHEUMATOID ARTHRITIS OF MULTIPLE SITES (H): ICD-10-CM

## 2020-08-14 LAB
ALBUMIN SERPL-MCNC: 4.2 G/DL (ref 3.5–5)
ALT SERPL W P-5'-P-CCNC: 19 U/L (ref 0–45)
CREAT SERPL-MCNC: 1.23 MG/DL (ref 0.6–1.1)
ERYTHROCYTE [DISTWIDTH] IN BLOOD BY AUTOMATED COUNT: 15.6 % (ref 11–14.5)
GFR SERPL CREATININE-BSD FRML MDRD: 43 ML/MIN/1.73M2
HCT VFR BLD AUTO: 30.3 % (ref 35–47)
HGB BLD-MCNC: 10.7 G/DL (ref 12–16)
MCH RBC QN AUTO: 31.7 PG (ref 27–34)
MCHC RBC AUTO-ENTMCNC: 35.2 G/DL (ref 32–36)
MCV RBC AUTO: 90 FL (ref 80–100)
PLATELET # BLD AUTO: 170 THOU/UL (ref 140–440)
PMV BLD AUTO: 7.9 FL (ref 7–10)
RBC # BLD AUTO: 3.36 MILL/UL (ref 3.8–5.4)
WBC: 6.8 THOU/UL (ref 4–11)

## 2020-08-19 ENCOUNTER — COMMUNICATION - HEALTHEAST (OUTPATIENT)
Dept: RHEUMATOLOGY | Facility: CLINIC | Age: 70
End: 2020-08-19

## 2020-08-19 ENCOUNTER — OFFICE VISIT - HEALTHEAST (OUTPATIENT)
Dept: RHEUMATOLOGY | Facility: CLINIC | Age: 70
End: 2020-08-19

## 2020-08-19 DIAGNOSIS — Z79.899 HIGH RISK MEDICATION USE: ICD-10-CM

## 2020-08-19 DIAGNOSIS — M17.0 BILATERAL PRIMARY OSTEOARTHRITIS OF KNEE: ICD-10-CM

## 2020-08-19 DIAGNOSIS — M06.09 SERONEGATIVE RHEUMATOID ARTHRITIS OF MULTIPLE SITES (H): ICD-10-CM

## 2020-08-19 RX ORDER — FOLIC ACID 1 MG/1
1 TABLET ORAL DAILY
Qty: 30 TABLET | Refills: 11 | Status: SHIPPED | OUTPATIENT
Start: 2020-08-19 | End: 2021-08-19

## 2020-08-21 ENCOUNTER — OFFICE VISIT - HEALTHEAST (OUTPATIENT)
Dept: PHYSICAL THERAPY | Facility: REHABILITATION | Age: 70
End: 2020-08-21

## 2020-08-21 DIAGNOSIS — M54.50 ACUTE LOW BACK PAIN WITHOUT SCIATICA, UNSPECIFIED BACK PAIN LATERALITY: ICD-10-CM

## 2020-08-21 DIAGNOSIS — S32.020A COMPRESSION FRACTURE OF L2 VERTEBRA, INITIAL ENCOUNTER (H): ICD-10-CM

## 2020-08-21 DIAGNOSIS — M54.50 LUMBAR SPINE PAINFUL ON MOVEMENT: ICD-10-CM

## 2020-08-24 ENCOUNTER — HOSPITAL ENCOUNTER (OUTPATIENT)
Dept: RADIOLOGY | Facility: CLINIC | Age: 70
Discharge: HOME | End: 2020-08-24

## 2020-08-24 DIAGNOSIS — M54.50 CHRONIC LEFT-SIDED LOW BACK PAIN WITHOUT SCIATICA: ICD-10-CM

## 2020-08-24 DIAGNOSIS — S32.020D COMPRESSION FRACTURE OF L2 VERTEBRA WITH ROUTINE HEALING: ICD-10-CM

## 2020-08-24 DIAGNOSIS — G89.29 CHRONIC LEFT-SIDED LOW BACK PAIN WITHOUT SCIATICA: ICD-10-CM

## 2020-08-25 ENCOUNTER — COMMUNICATION - HEALTHEAST (OUTPATIENT)
Dept: PHYSICAL MEDICINE AND REHAB | Facility: CLINIC | Age: 70
End: 2020-08-25

## 2020-08-28 ENCOUNTER — OFFICE VISIT - HEALTHEAST (OUTPATIENT)
Dept: PHYSICAL THERAPY | Facility: REHABILITATION | Age: 70
End: 2020-08-28

## 2020-08-28 DIAGNOSIS — M54.50 ACUTE LOW BACK PAIN WITHOUT SCIATICA, UNSPECIFIED BACK PAIN LATERALITY: ICD-10-CM

## 2020-08-28 DIAGNOSIS — S32.020A COMPRESSION FRACTURE OF L2 VERTEBRA, INITIAL ENCOUNTER (H): ICD-10-CM

## 2020-08-28 DIAGNOSIS — M54.50 LUMBAR SPINE PAINFUL ON MOVEMENT: ICD-10-CM

## 2020-09-03 ENCOUNTER — HOSPITAL ENCOUNTER (OUTPATIENT)
Dept: PHYSICAL MEDICINE AND REHAB | Facility: CLINIC | Age: 70
Discharge: HOME | End: 2020-09-03
Attending: NURSE PRACTITIONER

## 2020-09-03 DIAGNOSIS — M43.16 SPONDYLOLISTHESIS OF LUMBAR REGION: ICD-10-CM

## 2020-09-03 DIAGNOSIS — M54.50 CHRONIC LEFT-SIDED LOW BACK PAIN WITHOUT SCIATICA: ICD-10-CM

## 2020-09-03 DIAGNOSIS — M47.816 LUMBAR FACET ARTHROPATHY: ICD-10-CM

## 2020-09-03 DIAGNOSIS — G89.29 CHRONIC LEFT-SIDED LOW BACK PAIN WITHOUT SCIATICA: ICD-10-CM

## 2020-09-03 DIAGNOSIS — S32.020D COMPRESSION FRACTURE OF L2 VERTEBRA WITH ROUTINE HEALING: ICD-10-CM

## 2020-09-04 ENCOUNTER — RECORDS - HEALTHEAST (OUTPATIENT)
Dept: ADMINISTRATIVE | Facility: OTHER | Age: 70
End: 2020-09-04

## 2020-09-04 LAB — RETINOPATHY: NEGATIVE

## 2020-09-08 ENCOUNTER — OFFICE VISIT - HEALTHEAST (OUTPATIENT)
Dept: PHYSICAL THERAPY | Facility: REHABILITATION | Age: 70
End: 2020-09-08

## 2020-09-08 DIAGNOSIS — M54.50 ACUTE LOW BACK PAIN WITHOUT SCIATICA, UNSPECIFIED BACK PAIN LATERALITY: ICD-10-CM

## 2020-09-08 DIAGNOSIS — S32.020A COMPRESSION FRACTURE OF L2 VERTEBRA, INITIAL ENCOUNTER (H): ICD-10-CM

## 2020-09-08 DIAGNOSIS — M54.50 LUMBAR SPINE PAINFUL ON MOVEMENT: ICD-10-CM

## 2020-09-09 ENCOUNTER — RECORDS - HEALTHEAST (OUTPATIENT)
Dept: HEALTH INFORMATION MANAGEMENT | Facility: CLINIC | Age: 70
End: 2020-09-09

## 2020-09-10 ENCOUNTER — COMMUNICATION - HEALTHEAST (OUTPATIENT)
Dept: LAB | Facility: CLINIC | Age: 70
End: 2020-09-10

## 2020-09-10 DIAGNOSIS — M06.09 SERONEGATIVE RHEUMATOID ARTHRITIS OF MULTIPLE SITES (H): ICD-10-CM

## 2020-09-14 ENCOUNTER — RECORDS - HEALTHEAST (OUTPATIENT)
Dept: ADMINISTRATIVE | Facility: OTHER | Age: 70
End: 2020-09-14

## 2020-09-16 ENCOUNTER — AMBULATORY - HEALTHEAST (OUTPATIENT)
Dept: LAB | Facility: CLINIC | Age: 70
End: 2020-09-16

## 2020-09-16 ENCOUNTER — OFFICE VISIT - HEALTHEAST (OUTPATIENT)
Dept: PHYSICAL THERAPY | Facility: REHABILITATION | Age: 70
End: 2020-09-16

## 2020-09-16 DIAGNOSIS — M06.09 SERONEGATIVE RHEUMATOID ARTHRITIS OF MULTIPLE SITES (H): ICD-10-CM

## 2020-09-16 DIAGNOSIS — M54.50 LUMBAR SPINE PAINFUL ON MOVEMENT: ICD-10-CM

## 2020-09-16 DIAGNOSIS — M54.50 ACUTE LOW BACK PAIN WITHOUT SCIATICA, UNSPECIFIED BACK PAIN LATERALITY: ICD-10-CM

## 2020-09-16 DIAGNOSIS — S32.020A COMPRESSION FRACTURE OF L2 VERTEBRA, INITIAL ENCOUNTER (H): ICD-10-CM

## 2020-09-16 LAB
ALBUMIN SERPL-MCNC: 4.1 G/DL (ref 3.5–5)
ALT SERPL W P-5'-P-CCNC: 19 U/L (ref 0–45)
CREAT SERPL-MCNC: 1.33 MG/DL (ref 0.6–1.1)
ERYTHROCYTE [DISTWIDTH] IN BLOOD BY AUTOMATED COUNT: 13.8 % (ref 11–14.5)
GFR SERPL CREATININE-BSD FRML MDRD: 39 ML/MIN/1.73M2
HCT VFR BLD AUTO: 30.4 % (ref 35–47)
HGB BLD-MCNC: 10.2 G/DL (ref 12–16)
MCH RBC QN AUTO: 32.8 PG (ref 27–34)
MCHC RBC AUTO-ENTMCNC: 33.5 G/DL (ref 32–36)
MCV RBC AUTO: 98 FL (ref 80–100)
PLATELET # BLD AUTO: 171 THOU/UL (ref 140–440)
PMV BLD AUTO: 7.8 FL (ref 7–10)
RBC # BLD AUTO: 3.11 MILL/UL (ref 3.8–5.4)
WBC: 5.2 THOU/UL (ref 4–11)

## 2020-09-18 ENCOUNTER — COMMUNICATION - HEALTHEAST (OUTPATIENT)
Dept: INTERNAL MEDICINE | Facility: CLINIC | Age: 70
End: 2020-09-18

## 2020-09-18 DIAGNOSIS — M54.16 LEFT LUMBAR RADICULOPATHY: ICD-10-CM

## 2020-09-22 ENCOUNTER — OFFICE VISIT - HEALTHEAST (OUTPATIENT)
Dept: PHYSICAL THERAPY | Facility: REHABILITATION | Age: 70
End: 2020-09-22

## 2020-09-22 DIAGNOSIS — M54.50 LUMBAR SPINE PAINFUL ON MOVEMENT: ICD-10-CM

## 2020-09-22 DIAGNOSIS — M54.50 ACUTE LOW BACK PAIN WITHOUT SCIATICA, UNSPECIFIED BACK PAIN LATERALITY: ICD-10-CM

## 2020-09-22 DIAGNOSIS — S32.020A COMPRESSION FRACTURE OF L2 VERTEBRA, INITIAL ENCOUNTER (H): ICD-10-CM

## 2020-09-29 ENCOUNTER — OFFICE VISIT - HEALTHEAST (OUTPATIENT)
Dept: PHYSICAL THERAPY | Facility: REHABILITATION | Age: 70
End: 2020-09-29

## 2020-09-29 DIAGNOSIS — S32.020A COMPRESSION FRACTURE OF L2 VERTEBRA, INITIAL ENCOUNTER (H): ICD-10-CM

## 2020-09-29 DIAGNOSIS — M54.50 ACUTE LOW BACK PAIN WITHOUT SCIATICA, UNSPECIFIED BACK PAIN LATERALITY: ICD-10-CM

## 2020-09-29 DIAGNOSIS — M54.50 LUMBAR SPINE PAINFUL ON MOVEMENT: ICD-10-CM

## 2020-10-01 ENCOUNTER — RECORDS - HEALTHEAST (OUTPATIENT)
Dept: ADMINISTRATIVE | Facility: OTHER | Age: 70
End: 2020-10-01

## 2020-10-01 ENCOUNTER — RECORDS - HEALTHEAST (OUTPATIENT)
Dept: BONE DENSITY | Facility: CLINIC | Age: 70
End: 2020-10-01

## 2020-10-01 ENCOUNTER — OFFICE VISIT - HEALTHEAST (OUTPATIENT)
Dept: INTERNAL MEDICINE | Facility: CLINIC | Age: 70
End: 2020-10-01

## 2020-10-01 DIAGNOSIS — E44.0 MODERATE PROTEIN-CALORIE MALNUTRITION (H): ICD-10-CM

## 2020-10-01 DIAGNOSIS — S32.020D WEDGE COMPRESSION FRACTURE OF SECOND LUMBAR VERTEBRA, SUBSEQUENT ENCOUNTER FOR FRACTURE WITH ROUTINE HEALING: ICD-10-CM

## 2020-10-01 DIAGNOSIS — M81.0 AGE-RELATED OSTEOPOROSIS WITHOUT CURRENT PATHOLOGICAL FRACTURE: ICD-10-CM

## 2020-10-01 DIAGNOSIS — I10 ESSENTIAL HYPERTENSION: ICD-10-CM

## 2020-10-01 DIAGNOSIS — E78.5 HYPERLIPIDEMIA: ICD-10-CM

## 2020-10-01 DIAGNOSIS — E03.9 HYPOTHYROIDISM, UNSPECIFIED TYPE: ICD-10-CM

## 2020-10-01 DIAGNOSIS — N18.32 STAGE 3B CHRONIC KIDNEY DISEASE (H): ICD-10-CM

## 2020-10-01 DIAGNOSIS — Z00.00 ROUTINE GENERAL MEDICAL EXAMINATION AT A HEALTH CARE FACILITY: ICD-10-CM

## 2020-10-01 DIAGNOSIS — K56.609 SMALL BOWEL OBSTRUCTION (H): ICD-10-CM

## 2020-10-01 DIAGNOSIS — E44.0 MODERATE MALNUTRITION (H): ICD-10-CM

## 2020-10-01 DIAGNOSIS — M10.9 GOUT, UNSPECIFIED CAUSE, UNSPECIFIED CHRONICITY, UNSPECIFIED SITE: ICD-10-CM

## 2020-10-01 DIAGNOSIS — I48.92 PAROXYSMAL ATRIAL FLUTTER (H): ICD-10-CM

## 2020-10-01 DIAGNOSIS — N18.30 CHRONIC KIDNEY DISEASE, STAGE 3 UNSPECIFIED (H): ICD-10-CM

## 2020-10-01 DIAGNOSIS — M06.09 RHEUMATOID ARTHRITIS WITHOUT RHEUMATOID FACTOR, MULTIPLE SITES (H): ICD-10-CM

## 2020-10-01 DIAGNOSIS — S32.020D COMPRESSION FRACTURE OF L2 VERTEBRA WITH ROUTINE HEALING: ICD-10-CM

## 2020-10-01 DIAGNOSIS — M06.09 SERONEGATIVE RHEUMATOID ARTHRITIS OF MULTIPLE SITES (H): ICD-10-CM

## 2020-10-01 DIAGNOSIS — M81.0 SENILE OSTEOPOROSIS: ICD-10-CM

## 2020-10-01 LAB
ALBUMIN SERPL-MCNC: 4.3 G/DL (ref 3.5–5)
ALBUMIN UR-MCNC: NEGATIVE MG/DL
ALP SERPL-CCNC: 63 U/L (ref 45–120)
ALT SERPL W P-5'-P-CCNC: 18 U/L (ref 0–45)
ANION GAP SERPL CALCULATED.3IONS-SCNC: 13 MMOL/L (ref 5–18)
APPEARANCE UR: CLEAR
AST SERPL W P-5'-P-CCNC: 21 U/L (ref 0–40)
BILIRUB SERPL-MCNC: 0.8 MG/DL (ref 0–1)
BILIRUB UR QL STRIP: NEGATIVE
BUN SERPL-MCNC: 30 MG/DL (ref 8–28)
CALCIUM SERPL-MCNC: 8.7 MG/DL (ref 8.5–10.5)
CHLORIDE BLD-SCNC: 110 MMOL/L (ref 98–107)
CO2 SERPL-SCNC: 14 MMOL/L (ref 22–31)
COLOR UR AUTO: YELLOW
CREAT SERPL-MCNC: 1.18 MG/DL (ref 0.6–1.1)
ERYTHROCYTE [DISTWIDTH] IN BLOOD BY AUTOMATED COUNT: 14.5 % (ref 11–14.5)
GFR SERPL CREATININE-BSD FRML MDRD: 45 ML/MIN/1.73M2
GLUCOSE BLD-MCNC: 114 MG/DL (ref 70–125)
GLUCOSE UR STRIP-MCNC: NEGATIVE MG/DL
HBA1C MFR BLD: 5.6 %
HCT VFR BLD AUTO: 31.2 % (ref 35–47)
HGB BLD-MCNC: 10.4 G/DL (ref 12–16)
HGB UR QL STRIP: NEGATIVE
KETONES UR STRIP-MCNC: NEGATIVE MG/DL
LDLC SERPL CALC-MCNC: 62 MG/DL
LEUKOCYTE ESTERASE UR QL STRIP: NEGATIVE
MCH RBC QN AUTO: 33.4 PG (ref 27–34)
MCHC RBC AUTO-ENTMCNC: 33.4 G/DL (ref 32–36)
MCV RBC AUTO: 100 FL (ref 80–100)
NITRATE UR QL: NEGATIVE
PH UR STRIP: 5.5 [PH] (ref 5–8)
PLATELET # BLD AUTO: 151 THOU/UL (ref 140–440)
PMV BLD AUTO: 8.7 FL (ref 7–10)
POTASSIUM BLD-SCNC: 4.4 MMOL/L (ref 3.5–5)
PROT SERPL-MCNC: 6.8 G/DL (ref 6–8)
RBC # BLD AUTO: 3.12 MILL/UL (ref 3.8–5.4)
SODIUM SERPL-SCNC: 137 MMOL/L (ref 136–145)
SP GR UR STRIP: 1.02 (ref 1–1.03)
TSH SERPL DL<=0.005 MIU/L-ACNC: 1.56 UIU/ML (ref 0.3–5)
URATE SERPL-MCNC: 4.1 MG/DL (ref 2–7.5)
UROBILINOGEN UR STRIP-ACNC: NORMAL
WBC: 7.3 THOU/UL (ref 4–11)

## 2020-10-01 RX ORDER — ATORVASTATIN CALCIUM 10 MG/1
10 TABLET, FILM COATED ORAL AT BEDTIME
Qty: 90 TABLET | Refills: 3 | Status: SHIPPED | OUTPATIENT
Start: 2020-10-01 | End: 2021-11-24

## 2020-10-01 ASSESSMENT — MIFFLIN-ST. JEOR: SCORE: 1208.29

## 2020-10-02 ENCOUNTER — COMMUNICATION - HEALTHEAST (OUTPATIENT)
Dept: ADMINISTRATIVE | Facility: CLINIC | Age: 70
End: 2020-10-02

## 2020-10-02 LAB
25(OH)D3 SERPL-MCNC: 28.1 NG/ML (ref 30–80)
25(OH)D3 SERPL-MCNC: 28.1 NG/ML (ref 30–80)

## 2020-10-06 ENCOUNTER — RECORDS - HEALTHEAST (OUTPATIENT)
Dept: ADMINISTRATIVE | Facility: OTHER | Age: 70
End: 2020-10-06

## 2020-10-06 LAB — RETINOPATHY: NEGATIVE

## 2020-10-09 ENCOUNTER — RECORDS - HEALTHEAST (OUTPATIENT)
Dept: HEALTH INFORMATION MANAGEMENT | Facility: CLINIC | Age: 70
End: 2020-10-09

## 2020-10-12 ENCOUNTER — COMMUNICATION - HEALTHEAST (OUTPATIENT)
Dept: RHEUMATOLOGY | Facility: CLINIC | Age: 70
End: 2020-10-12

## 2020-10-12 DIAGNOSIS — M06.09 SERONEGATIVE RHEUMATOID ARTHRITIS OF MULTIPLE SITES (H): ICD-10-CM

## 2020-10-13 ENCOUNTER — OFFICE VISIT - HEALTHEAST (OUTPATIENT)
Dept: PHYSICAL THERAPY | Facility: REHABILITATION | Age: 70
End: 2020-10-13

## 2020-10-13 DIAGNOSIS — M54.50 ACUTE LOW BACK PAIN WITHOUT SCIATICA, UNSPECIFIED BACK PAIN LATERALITY: ICD-10-CM

## 2020-10-13 DIAGNOSIS — S32.020A COMPRESSION FRACTURE OF L2 VERTEBRA, INITIAL ENCOUNTER (H): ICD-10-CM

## 2020-10-13 DIAGNOSIS — M54.50 LUMBAR SPINE PAINFUL ON MOVEMENT: ICD-10-CM

## 2020-10-14 ENCOUNTER — RECORDS - HEALTHEAST (OUTPATIENT)
Dept: ADMINISTRATIVE | Facility: OTHER | Age: 70
End: 2020-10-14

## 2020-10-19 ENCOUNTER — OFFICE VISIT - HEALTHEAST (OUTPATIENT)
Dept: RHEUMATOLOGY | Facility: CLINIC | Age: 70
End: 2020-10-19

## 2020-10-19 DIAGNOSIS — M17.0 BILATERAL PRIMARY OSTEOARTHRITIS OF KNEE: ICD-10-CM

## 2020-10-19 DIAGNOSIS — Z79.899 HIGH RISK MEDICATION USE: ICD-10-CM

## 2020-10-19 DIAGNOSIS — N18.32 STAGE 3B CHRONIC KIDNEY DISEASE (H): ICD-10-CM

## 2020-10-19 DIAGNOSIS — M06.09 SERONEGATIVE RHEUMATOID ARTHRITIS OF MULTIPLE SITES (H): ICD-10-CM

## 2020-10-28 ENCOUNTER — COMMUNICATION - HEALTHEAST (OUTPATIENT)
Dept: INTERNAL MEDICINE | Facility: CLINIC | Age: 70
End: 2020-10-28

## 2020-10-28 DIAGNOSIS — M54.16 LEFT LUMBAR RADICULOPATHY: ICD-10-CM

## 2020-11-04 ENCOUNTER — OFFICE VISIT - HEALTHEAST (OUTPATIENT)
Dept: PHYSICAL THERAPY | Facility: REHABILITATION | Age: 70
End: 2020-11-04

## 2020-11-04 DIAGNOSIS — M54.50 LUMBAR SPINE PAINFUL ON MOVEMENT: ICD-10-CM

## 2020-11-04 DIAGNOSIS — S32.020A COMPRESSION FRACTURE OF L2 VERTEBRA, INITIAL ENCOUNTER (H): ICD-10-CM

## 2020-11-04 DIAGNOSIS — M54.50 ACUTE LOW BACK PAIN WITHOUT SCIATICA, UNSPECIFIED BACK PAIN LATERALITY: ICD-10-CM

## 2020-11-13 ENCOUNTER — COMMUNICATION - HEALTHEAST (OUTPATIENT)
Dept: ADMINISTRATIVE | Facility: CLINIC | Age: 70
End: 2020-11-13

## 2020-11-17 ENCOUNTER — COMMUNICATION - HEALTHEAST (OUTPATIENT)
Dept: INTERNAL MEDICINE | Facility: CLINIC | Age: 70
End: 2020-11-17

## 2020-11-17 ENCOUNTER — COMMUNICATION - HEALTHEAST (OUTPATIENT)
Dept: PHYSICAL THERAPY | Facility: REHABILITATION | Age: 70
End: 2020-11-17

## 2020-11-17 ENCOUNTER — AMBULATORY - HEALTHEAST (OUTPATIENT)
Dept: LAB | Facility: CLINIC | Age: 70
End: 2020-11-17

## 2020-11-17 DIAGNOSIS — I10 HTN (HYPERTENSION): ICD-10-CM

## 2020-11-17 DIAGNOSIS — M06.09 SERONEGATIVE RHEUMATOID ARTHRITIS OF MULTIPLE SITES (H): ICD-10-CM

## 2020-11-17 LAB
ALBUMIN SERPL-MCNC: 4.4 G/DL (ref 3.5–5)
ALT SERPL W P-5'-P-CCNC: 15 U/L (ref 0–45)
CREAT SERPL-MCNC: 1.34 MG/DL (ref 0.6–1.1)
ERYTHROCYTE [DISTWIDTH] IN BLOOD BY AUTOMATED COUNT: 14.1 % (ref 11–14.5)
GFR SERPL CREATININE-BSD FRML MDRD: 39 ML/MIN/1.73M2
HCT VFR BLD AUTO: 32.4 % (ref 35–47)
HGB BLD-MCNC: 10.7 G/DL (ref 12–16)
MCH RBC QN AUTO: 33.9 PG (ref 27–34)
MCHC RBC AUTO-ENTMCNC: 33 G/DL (ref 32–36)
MCV RBC AUTO: 102 FL (ref 80–100)
PLATELET # BLD AUTO: 179 THOU/UL (ref 140–440)
PMV BLD AUTO: 9.2 FL (ref 7–10)
RBC # BLD AUTO: 3.16 MILL/UL (ref 3.8–5.4)
WBC: 6.4 THOU/UL (ref 4–11)

## 2020-11-24 ENCOUNTER — COMMUNICATION - HEALTHEAST (OUTPATIENT)
Dept: INTERNAL MEDICINE | Facility: CLINIC | Age: 70
End: 2020-11-24

## 2020-11-24 DIAGNOSIS — E03.9 HYPOTHYROIDISM: ICD-10-CM

## 2020-11-25 RX ORDER — LEVOTHYROXINE SODIUM 50 UG/1
TABLET ORAL
Qty: 90 TABLET | Refills: 3 | Status: SHIPPED | OUTPATIENT
Start: 2020-11-25 | End: 2021-12-02

## 2020-12-04 ENCOUNTER — AMBULATORY - HEALTHEAST (OUTPATIENT)
Dept: INTERNAL MEDICINE | Facility: CLINIC | Age: 70
End: 2020-12-04

## 2020-12-04 DIAGNOSIS — M81.0 SENILE OSTEOPOROSIS: ICD-10-CM

## 2020-12-04 DIAGNOSIS — N18.32 STAGE 3B CHRONIC KIDNEY DISEASE (H): ICD-10-CM

## 2020-12-04 DIAGNOSIS — Z92.29 PERSONAL HISTORY OF OTHER DRUG THERAPY: ICD-10-CM

## 2020-12-07 ENCOUNTER — RECORDS - HEALTHEAST (OUTPATIENT)
Dept: ADMINISTRATIVE | Facility: OTHER | Age: 70
End: 2020-12-07

## 2020-12-07 LAB — RETINOPATHY: POSITIVE

## 2020-12-09 ENCOUNTER — RECORDS - HEALTHEAST (OUTPATIENT)
Dept: HEALTH INFORMATION MANAGEMENT | Facility: CLINIC | Age: 70
End: 2020-12-09

## 2020-12-09 ENCOUNTER — COMMUNICATION - HEALTHEAST (OUTPATIENT)
Dept: INTERNAL MEDICINE | Facility: CLINIC | Age: 70
End: 2020-12-09

## 2020-12-09 DIAGNOSIS — F51.02 ADJUSTMENT INSOMNIA: ICD-10-CM

## 2020-12-09 DIAGNOSIS — F41.1 ANXIETY STATE: ICD-10-CM

## 2020-12-10 RX ORDER — LORAZEPAM 0.5 MG/1
TABLET ORAL
Qty: 30 TABLET | Refills: 0 | Status: SHIPPED | OUTPATIENT
Start: 2020-12-10 | End: 2022-02-09

## 2020-12-11 ENCOUNTER — COMMUNICATION - HEALTHEAST (OUTPATIENT)
Dept: INTERNAL MEDICINE | Facility: CLINIC | Age: 70
End: 2020-12-11

## 2020-12-11 DIAGNOSIS — M10.9 GOUT, UNSPECIFIED CAUSE, UNSPECIFIED CHRONICITY, UNSPECIFIED SITE: ICD-10-CM

## 2020-12-11 DIAGNOSIS — M54.16 LEFT LUMBAR RADICULOPATHY: ICD-10-CM

## 2020-12-12 RX ORDER — ALLOPURINOL 100 MG/1
100 TABLET ORAL 2 TIMES DAILY WITH MEALS
Qty: 180 TABLET | Refills: 3 | Status: SHIPPED | OUTPATIENT
Start: 2020-12-12 | End: 2022-07-06

## 2020-12-17 ENCOUNTER — AMBULATORY - HEALTHEAST (OUTPATIENT)
Dept: NURSING | Facility: CLINIC | Age: 70
End: 2020-12-17

## 2021-01-13 ENCOUNTER — AMBULATORY - HEALTHEAST (OUTPATIENT)
Dept: LAB | Facility: CLINIC | Age: 71
End: 2021-01-13

## 2021-01-13 DIAGNOSIS — M06.09 SERONEGATIVE RHEUMATOID ARTHRITIS OF MULTIPLE SITES (H): ICD-10-CM

## 2021-01-13 LAB
ALBUMIN SERPL-MCNC: 4.2 G/DL (ref 3.5–5)
ALT SERPL W P-5'-P-CCNC: 20 U/L (ref 0–45)
CREAT SERPL-MCNC: 1.41 MG/DL (ref 0.6–1.1)
ERYTHROCYTE [DISTWIDTH] IN BLOOD BY AUTOMATED COUNT: 12 % (ref 11–14.5)
GFR SERPL CREATININE-BSD FRML MDRD: 37 ML/MIN/1.73M2
HCT VFR BLD AUTO: 31 % (ref 35–47)
HGB BLD-MCNC: 10.4 G/DL (ref 12–16)
MCH RBC QN AUTO: 34.6 PG (ref 27–34)
MCHC RBC AUTO-ENTMCNC: 33.6 G/DL (ref 32–36)
MCV RBC AUTO: 103 FL (ref 80–100)
PLATELET # BLD AUTO: 177 THOU/UL (ref 140–440)
PMV BLD AUTO: 7.9 FL (ref 7–10)
RBC # BLD AUTO: 3.01 MILL/UL (ref 3.8–5.4)
WBC: 6.5 THOU/UL (ref 4–11)

## 2021-01-18 ENCOUNTER — COMMUNICATION - HEALTHEAST (OUTPATIENT)
Dept: INTERNAL MEDICINE | Facility: CLINIC | Age: 71
End: 2021-01-18

## 2021-01-18 ENCOUNTER — OFFICE VISIT - HEALTHEAST (OUTPATIENT)
Dept: RHEUMATOLOGY | Facility: CLINIC | Age: 71
End: 2021-01-18

## 2021-01-18 DIAGNOSIS — Z79.899 HIGH RISK MEDICATION USE: ICD-10-CM

## 2021-01-18 DIAGNOSIS — M15.0 PRIMARY OSTEOARTHRITIS INVOLVING MULTIPLE JOINTS: ICD-10-CM

## 2021-01-18 DIAGNOSIS — M06.09 SERONEGATIVE RHEUMATOID ARTHRITIS OF MULTIPLE SITES (H): ICD-10-CM

## 2021-01-18 DIAGNOSIS — N18.32 STAGE 3B CHRONIC KIDNEY DISEASE (H): ICD-10-CM

## 2021-02-08 ENCOUNTER — COMMUNICATION - HEALTHEAST (OUTPATIENT)
Dept: INTERNAL MEDICINE | Facility: CLINIC | Age: 71
End: 2021-02-08

## 2021-02-17 ENCOUNTER — OFFICE VISIT - HEALTHEAST (OUTPATIENT)
Dept: RHEUMATOLOGY | Facility: CLINIC | Age: 71
End: 2021-02-17

## 2021-02-17 DIAGNOSIS — N18.32 STAGE 3B CHRONIC KIDNEY DISEASE (H): ICD-10-CM

## 2021-02-17 DIAGNOSIS — M17.0 BILATERAL PRIMARY OSTEOARTHRITIS OF KNEE: ICD-10-CM

## 2021-02-17 DIAGNOSIS — M06.09 SERONEGATIVE RHEUMATOID ARTHRITIS OF MULTIPLE SITES (H): ICD-10-CM

## 2021-02-17 ASSESSMENT — MIFFLIN-ST. JEOR: SCORE: 1244.36

## 2021-03-23 ENCOUNTER — HOSPITAL ENCOUNTER (OUTPATIENT)
Dept: MAMMOGRAPHY | Facility: CLINIC | Age: 71
Discharge: HOME | End: 2021-03-23
Attending: INTERNAL MEDICINE

## 2021-03-23 DIAGNOSIS — Z00.00 ROUTINE GENERAL MEDICAL EXAMINATION AT A HEALTH CARE FACILITY: ICD-10-CM

## 2021-03-23 DIAGNOSIS — Z12.31 VISIT FOR SCREENING MAMMOGRAM: ICD-10-CM

## 2021-03-29 ENCOUNTER — COMMUNICATION - HEALTHEAST (OUTPATIENT)
Dept: RHEUMATOLOGY | Facility: CLINIC | Age: 71
End: 2021-03-29

## 2021-03-29 DIAGNOSIS — M06.09 SERONEGATIVE RHEUMATOID ARTHRITIS OF MULTIPLE SITES (H): ICD-10-CM

## 2021-04-09 ENCOUNTER — RECORDS - HEALTHEAST (OUTPATIENT)
Dept: ADMINISTRATIVE | Facility: OTHER | Age: 71
End: 2021-04-09

## 2021-04-09 LAB — RETINOPATHY: NEGATIVE

## 2021-04-12 ENCOUNTER — HOSPITAL ENCOUNTER (OUTPATIENT)
Dept: PHYSICAL MEDICINE AND REHAB | Facility: CLINIC | Age: 71
Discharge: HOME | End: 2021-04-12
Attending: NURSE PRACTITIONER

## 2021-04-12 ENCOUNTER — HOSPITAL ENCOUNTER (OUTPATIENT)
Dept: RADIOLOGY | Facility: CLINIC | Age: 71
Discharge: HOME | End: 2021-04-12

## 2021-04-12 DIAGNOSIS — M54.50 CHRONIC LEFT-SIDED LOW BACK PAIN WITHOUT SCIATICA: ICD-10-CM

## 2021-04-12 DIAGNOSIS — S32.020D COMPRESSION FRACTURE OF L2 VERTEBRA WITH ROUTINE HEALING: ICD-10-CM

## 2021-04-12 DIAGNOSIS — M47.816 LUMBAR FACET ARTHROPATHY: ICD-10-CM

## 2021-04-12 DIAGNOSIS — G89.29 CHRONIC LEFT-SIDED LOW BACK PAIN WITHOUT SCIATICA: ICD-10-CM

## 2021-04-12 DIAGNOSIS — G89.29 CHRONIC MIDLINE THORACIC BACK PAIN: ICD-10-CM

## 2021-04-12 DIAGNOSIS — M43.16 SPONDYLOLISTHESIS OF LUMBAR REGION: ICD-10-CM

## 2021-04-12 DIAGNOSIS — M54.6 CHRONIC MIDLINE THORACIC BACK PAIN: ICD-10-CM

## 2021-04-13 ENCOUNTER — COMMUNICATION - HEALTHEAST (OUTPATIENT)
Dept: PHYSICAL MEDICINE AND REHAB | Facility: CLINIC | Age: 71
End: 2021-04-13

## 2021-04-14 ENCOUNTER — RECORDS - HEALTHEAST (OUTPATIENT)
Dept: HEALTH INFORMATION MANAGEMENT | Facility: CLINIC | Age: 71
End: 2021-04-14

## 2021-04-20 ENCOUNTER — COMMUNICATION - HEALTHEAST (OUTPATIENT)
Dept: RHEUMATOLOGY | Facility: CLINIC | Age: 71
End: 2021-04-20

## 2021-04-20 DIAGNOSIS — M06.09 SERONEGATIVE RHEUMATOID ARTHRITIS OF MULTIPLE SITES (H): ICD-10-CM

## 2021-04-21 ENCOUNTER — COMMUNICATION - HEALTHEAST (OUTPATIENT)
Dept: INTERNAL MEDICINE | Facility: CLINIC | Age: 71
End: 2021-04-21

## 2021-04-21 DIAGNOSIS — I10 ESSENTIAL HYPERTENSION: ICD-10-CM

## 2021-04-21 RX ORDER — AMLODIPINE BESYLATE 10 MG/1
10 TABLET ORAL DAILY
Qty: 90 TABLET | Refills: 3 | Status: SHIPPED | OUTPATIENT
Start: 2021-04-21 | End: 2022-05-16

## 2021-04-22 ENCOUNTER — COMMUNICATION - HEALTHEAST (OUTPATIENT)
Dept: INTERNAL MEDICINE | Facility: CLINIC | Age: 71
End: 2021-04-22

## 2021-04-22 DIAGNOSIS — I10 ESSENTIAL HYPERTENSION: ICD-10-CM

## 2021-05-07 ENCOUNTER — COMMUNICATION - HEALTHEAST (OUTPATIENT)
Dept: INTERNAL MEDICINE | Facility: CLINIC | Age: 71
End: 2021-05-07

## 2021-05-07 DIAGNOSIS — I10 HTN (HYPERTENSION): ICD-10-CM

## 2021-05-08 RX ORDER — LOSARTAN POTASSIUM 50 MG/1
50 TABLET ORAL DAILY
Qty: 90 TABLET | Refills: 1 | Status: SHIPPED | OUTPATIENT
Start: 2021-05-08 | End: 2021-11-08

## 2021-05-10 ENCOUNTER — COMMUNICATION - HEALTHEAST (OUTPATIENT)
Dept: PHYSICAL MEDICINE AND REHAB | Facility: CLINIC | Age: 71
End: 2021-05-10

## 2021-05-10 DIAGNOSIS — M47.816 SPONDYLOSIS OF LUMBAR REGION WITHOUT MYELOPATHY OR RADICULOPATHY: ICD-10-CM

## 2021-05-11 ENCOUNTER — AMBULATORY - HEALTHEAST (OUTPATIENT)
Dept: LAB | Facility: CLINIC | Age: 71
End: 2021-05-11

## 2021-05-11 DIAGNOSIS — M06.09 SERONEGATIVE RHEUMATOID ARTHRITIS OF MULTIPLE SITES (H): ICD-10-CM

## 2021-05-11 LAB
ALBUMIN SERPL-MCNC: 4.3 G/DL (ref 3.5–5)
ALT SERPL W P-5'-P-CCNC: 25 U/L (ref 0–45)
CREAT SERPL-MCNC: 1.36 MG/DL (ref 0.6–1.1)
ERYTHROCYTE [DISTWIDTH] IN BLOOD BY AUTOMATED COUNT: 14.8 % (ref 11–14.5)
GFR SERPL CREATININE-BSD FRML MDRD: 38 ML/MIN/1.73M2
HCT VFR BLD AUTO: 33.3 % (ref 35–47)
HGB BLD-MCNC: 10.9 G/DL (ref 12–16)
MCH RBC QN AUTO: 33.4 PG (ref 27–34)
MCHC RBC AUTO-ENTMCNC: 32.7 G/DL (ref 32–36)
MCV RBC AUTO: 102 FL (ref 80–100)
PLATELET # BLD AUTO: 163 THOU/UL (ref 140–440)
PMV BLD AUTO: 10.1 FL (ref 7–10)
RBC # BLD AUTO: 3.26 MILL/UL (ref 3.8–5.4)
WBC: 8.3 THOU/UL (ref 4–11)

## 2021-05-19 ENCOUNTER — COMMUNICATION - HEALTHEAST (OUTPATIENT)
Dept: INTERNAL MEDICINE | Facility: CLINIC | Age: 71
End: 2021-05-19

## 2021-05-19 ENCOUNTER — AMBULATORY - HEALTHEAST (OUTPATIENT)
Dept: PHYSICAL MEDICINE AND REHAB | Facility: CLINIC | Age: 71
End: 2021-05-19

## 2021-05-19 DIAGNOSIS — M47.816 LUMBAR FACET ARTHROPATHY: ICD-10-CM

## 2021-05-19 DIAGNOSIS — G89.29 CHRONIC LEFT-SIDED LOW BACK PAIN WITHOUT SCIATICA: ICD-10-CM

## 2021-05-19 DIAGNOSIS — M54.50 CHRONIC LEFT-SIDED LOW BACK PAIN WITHOUT SCIATICA: ICD-10-CM

## 2021-05-19 DIAGNOSIS — M54.16 LEFT LUMBAR RADICULOPATHY: ICD-10-CM

## 2021-05-19 RX ORDER — HYDROCODONE BITARTRATE AND ACETAMINOPHEN 5; 325 MG/1; MG/1
1 TABLET ORAL EVERY 8 HOURS PRN
Qty: 18 TABLET | Refills: 0 | Status: SHIPPED | OUTPATIENT
Start: 2021-05-19 | End: 2021-08-17

## 2021-05-21 ENCOUNTER — HOSPITAL ENCOUNTER (OUTPATIENT)
Dept: PHYSICAL MEDICINE AND REHAB | Facility: CLINIC | Age: 71
Discharge: HOME | End: 2021-05-21
Attending: PAIN MEDICINE
Payer: COMMERCIAL

## 2021-05-21 DIAGNOSIS — M47.816 LUMBAR FACET ARTHROPATHY: ICD-10-CM

## 2021-05-21 DIAGNOSIS — M54.50 CHRONIC LEFT-SIDED LOW BACK PAIN WITHOUT SCIATICA: ICD-10-CM

## 2021-05-21 DIAGNOSIS — G89.29 CHRONIC LEFT-SIDED LOW BACK PAIN WITHOUT SCIATICA: ICD-10-CM

## 2021-05-23 ENCOUNTER — RECORDS - HEALTHEAST (OUTPATIENT)
Dept: ADMINISTRATIVE | Facility: OTHER | Age: 71
End: 2021-05-23

## 2021-05-24 ENCOUNTER — AMBULATORY - HEALTHEAST (OUTPATIENT)
Dept: PHYSICAL MEDICINE AND REHAB | Facility: CLINIC | Age: 71
End: 2021-05-24

## 2021-05-24 DIAGNOSIS — M47.816 LUMBAR FACET ARTHROPATHY: ICD-10-CM

## 2021-05-24 DIAGNOSIS — M54.50 CHRONIC LEFT-SIDED LOW BACK PAIN WITHOUT SCIATICA: ICD-10-CM

## 2021-05-24 DIAGNOSIS — G89.29 CHRONIC LEFT-SIDED LOW BACK PAIN WITHOUT SCIATICA: ICD-10-CM

## 2021-05-25 ENCOUNTER — OFFICE VISIT - HEALTHEAST (OUTPATIENT)
Dept: RHEUMATOLOGY | Facility: CLINIC | Age: 71
End: 2021-05-25

## 2021-05-25 DIAGNOSIS — N18.32 STAGE 3B CHRONIC KIDNEY DISEASE (H): ICD-10-CM

## 2021-05-25 DIAGNOSIS — M06.09 SERONEGATIVE RHEUMATOID ARTHRITIS OF MULTIPLE SITES (H): ICD-10-CM

## 2021-05-25 DIAGNOSIS — Z79.899 HIGH RISK MEDICATION USE: ICD-10-CM

## 2021-05-25 DIAGNOSIS — M17.0 BILATERAL PRIMARY OSTEOARTHRITIS OF KNEE: ICD-10-CM

## 2021-05-25 RX ORDER — HYDROXYCHLOROQUINE SULFATE 200 MG/1
200 TABLET, FILM COATED ORAL 2 TIMES DAILY
Qty: 180 TABLET | Refills: 0 | Status: SHIPPED
Start: 2021-05-25 | End: 2021-08-25

## 2021-05-26 NOTE — TELEPHONE ENCOUNTER
Refill Approved    Rx renewed per Medication Renewal Policy. Medication was last renewed on 10/2/18.    Miracle Soler, [facility] Triage/Med Refill 3/22/2019     Requested Prescriptions   Pending Prescriptions Disp Refills     levothyroxine (SYNTHROID, LEVOTHROID) 50 MCG tablet [Pharmacy Med Name: LEVOTHYROXIN 50MCG  TAB] 90 tablet 1     Sig: TAKE 1 TABLET BY MOUTH ONCE DAILY IN THE MORNING    Thyroid Hormones Protocol Passed - 3/21/2019 11:20 AM       Passed - Provider visit in past 12 months or next 3 months    Last office visit with prescriber/PCP: 1/18/2018 Isabel Maurer MD OR same dept: Visit date not found OR same specialty: 1/18/2018 Isabel Maurer MD  Last physical: 6/7/2018 Last MTM visit: Visit date not found   Next visit within 3 mo: Visit date not found  Next physical within 3 mo: Visit date not found  Prescriber OR PCP: Isabel Maurer MD  Last diagnosis associated with med order: 1. Hypothyroidism  - levothyroxine (SYNTHROID, LEVOTHROID) 50 MCG tablet [Pharmacy Med Name: LEVOTHYROXIN 50MCG  TAB]; TAKE 1 TABLET BY MOUTH ONCE DAILY IN THE MORNING  Dispense: 90 tablet; Refill: 1    If protocol passes may refill for 12 months if within 3 months of last provider visit (or a total of 15 months).            Passed - TSH on file in past 12 months for patient age 12 & older    TSH   Date Value Ref Range Status   06/11/2018 2.96 0.30 - 5.00 uIU/mL Final

## 2021-05-27 ENCOUNTER — RECORDS - HEALTHEAST (OUTPATIENT)
Dept: ADMINISTRATIVE | Facility: CLINIC | Age: 71
End: 2021-05-27

## 2021-05-29 ENCOUNTER — RECORDS - HEALTHEAST (OUTPATIENT)
Dept: ADMINISTRATIVE | Facility: CLINIC | Age: 71
End: 2021-05-29

## 2021-05-29 NOTE — TELEPHONE ENCOUNTER
Due to be seen    Rx renewed per Medication Renewal Policy. Medication was last renewed on 9/4/18.    Miracle Soler, [facility] Triage/Med Refill 6/5/2019     Requested Prescriptions   Pending Prescriptions Disp Refills     losartan (COZAAR) 50 MG tablet [Pharmacy Med Name: LOSARTAN 50MG TAB] 90 tablet 2     Sig: TAKE 1 TABLET BY MOUTH ONCE DAILY       Angiotensin Receptor Blocker Protocol Passed - 6/4/2019 10:00 AM        Passed - PCP or prescribing provider visit in past 12 months       Last office visit with prescriber/PCP: 1/18/2018 Isabel Maurer MD OR same dept: Visit date not found OR same specialty: 1/18/2018 Isabel Maurer MD  Last physical: 6/7/2018 Last MTM visit: Visit date not found   Next visit within 3 mo: Visit date not found  Next physical within 3 mo: Visit date not found  Prescriber OR PCP: Isabel Maurer MD  Last diagnosis associated with med order: 1. HTN (hypertension)  - losartan (COZAAR) 50 MG tablet [Pharmacy Med Name: LOSARTAN 50MG TAB]; TAKE 1 TABLET BY MOUTH ONCE DAILY  Dispense: 90 tablet; Refill: 2    If protocol passes may refill for 12 months if within 3 months of last provider visit (or a total of 15 months).             Passed - Serum potassium within the past 12 months     Lab Results   Component Value Date    Potassium 4.4 06/11/2018             Passed - Blood pressure filed in past 12 months     BP Readings from Last 1 Encounters:   05/23/19 120/68             Passed - Serum creatinine within the past 12 months     Creatinine   Date Value Ref Range Status   05/20/2019 1.45 (H) 0.60 - 1.10 mg/dL Final

## 2021-05-29 NOTE — PROGRESS NOTES
ASSESSMENT AND PLAN:   Claudia Wise 68 y.o. female is here for follow-up of seronegative rheumatoid arthritis, osteoarthritis, and chronic renal impairment, she is therefore on reduced dose of methotrexate, hydroxychloroquine.  She follows up ophthalmology and nephrology.  Her left elbow pain is more likely related to epicondylitis.  Management was discussed.  She appears to be doing well with rheumatoid arthritis.   Recent labs are abnormal but stable showing stable she is to continue the current regimen.  She is aware that she cannot take ibuprofen or Aleve of the nonsteroidals.  Return for follow-up this time in 6 months with labs every 2 months.    Diagnoses and all orders for this visit:    Seronegative rheumatoid arthritis of multiple sites (H)  -     methotrexate 2.5 MG tablet; Take 4 tablets (10 mg total) by mouth once a week.  Dispense: 48 tablet; Refill: 0    Stage 3 chronic kidney disease (H)    High risk medication use    Bilateral primary osteoarthritis of knee        HISTORY OF PRESENTING ILLNESS:  Claudia Wise 68 y.o. is here for followup of seronegative Rheumatoid Arthritis, osteoarthritis with his various manifestations, renal impairment and osteoarthritis.  She is on low-dose methotrexate and hydroxychloroquine and doing good.  She noted pain.  This is in her elbow.  This on the left side.  She has been lifting weights no more than 3 pounds.  This is worse with activity no radiation no swelling.  Right side is not affected.  She has moderate pain in her knees.  This troubles her more when she is going up and down the steps but not so when she is walking such as on a treadmill.  She rated pain level of 3.5/10 overall.  This is interfering with some of the day-to-day activities.  Overall disease activity:  stable. Limitation on activities as noted in the MDHAQ scanned in the EMR.  Further historical information, including ROS as noted in the multidimensional health assessment questionnaire  scanned in the EMR and in the assessment and plan section.  ALLERGIES:Bupropion hcl; Erythromycin base; Tetracyclines; and Valacyclovir    PAST MEDICAL/ACTIVE PROBLEMS/MEDICATION/SOCIAL DATA  Past Medical History:   Diagnosis Date     Anemia      Anxiety      Chronic kidney disease      Diabetes mellitus, type II (H)     prediabetes     Disease of thyroid gland     hypothyroid     Family history of myocardial infarction      Gout      High cholesterol      Hypertension      Inverted nipple     right     Osteoarthritis 2012     Rheumatoid arthritis (H) 2012     Social History     Tobacco Use   Smoking Status Former Smoker     Last attempt to quit: 1995     Years since quittin.8   Smokeless Tobacco Never Used     Patient Active Problem List   Diagnosis     Herpes Simplex Type II     Anxiety     Anemia in chronic kidney disease     Hyperlipemia     Gout     Hypertension     Prediabetes     Endometriosis     Chronic kidney disease     Subclinical Hypothyroidism     Seronegative rheumatoid arthritis of multiple sites (H)     High risk medication use     Current Outpatient Medications   Medication Sig Dispense Refill     acetaminophen (TYLENOL) 500 MG tablet Take 500 mg by mouth every 6 (six) hours as needed for pain.       acyclovir (ZOVIRAX) 400 MG tablet Take 1 tablet (400 mg total) by mouth every 8 (eight) hours as needed. 60 tablet 1     allopurinol (ZYLOPRIM) 100 MG tablet TAKE ONE TABLET BY MOUTH THREE TIMES DAILY WITH MEALS 270 tablet 1     atorvastatin (LIPITOR) 10 MG tablet TAKE ONE TABLET BY MOUTH ONCE DAILY AT BEDTIME 90 tablet 3     cholecalciferol, vitamin D3, 1,000 unit tablet Take 2,000 Units by mouth every evening.        conjugated estrogens (PREMARIN) vaginal cream Intravaginal: 0.5 g twice weekly (eg, Monday and Thursday) or once daily cyclically (21 days on, 7 days off) 120 g 0     fluticasone (FLONASE) 50 mcg/actuation nasal spray INHALE ONE SPRAY(S) IN EACH NOSTRIL ONCE DAILY 48 g 1      folic acid (FOLVITE) 1 MG tablet TAKE ONE TABLET BY MOUTH ONCE DAILY 90 tablet 3     hydroxychloroquine (PLAQUENIL) 200 mg tablet TAKE 1 TABLET TWICE DAILY WITH FOOD. 180 tablet 0     Lactobacillus rhamnosus GG (CULTURELLE) 10-15 Billion cell capsule Take 1 capsule by mouth daily.       levothyroxine (SYNTHROID, LEVOTHROID) 50 MCG tablet TAKE 1 TABLET BY MOUTH ONCE DAILY IN THE MORNING 90 tablet 0     LORazepam (ATIVAN) 0.5 MG tablet TAKE 1 TABLET BY MOUTH EVERY 8 HOURS AS NEEDED FOR ANXIETY 30 tablet 0     losartan (COZAAR) 50 MG tablet TAKE 1 TABLET BY MOUTH ONCE DAILY 90 tablet 2     methotrexate 2.5 MG tablet TAKE 4 TABLETS ONE TIME WEEKLY. 48 tablet 0     sodium bicarbonate 650 MG tablet Take 1,296 mg by mouth 2 (two) times a day. (2 tabs twice daily)       UNABLE TO FIND Tumeric       No current facility-administered medications for this visit.      DETAILED EXAMINATION  05/23/19  :  Vitals:    05/23/19 1553   BP: 120/68   Patient Site: Right Arm   Patient Position: Sitting   Cuff Size: Adult Large   Pulse: 84   Weight: 162 lb (73.5 kg)     Alert oriented. Head including the face is examined for malar rash, heliotropes, scarring, lupus pernio. Eyes examined for redness such as in episcleritis/scleritis, periorbital lesions.   Neck/ Face examined for parotid gland swelling, range of motion of neck.  Left upper and lower and right upper and lower extremities examined for tenderness, swelling, warmth of the appendicular joints, range of motion, edema, rash.  Some of the important findings included:@he does not have evidence of synovitis in any of the palpable joints of the upper extremities.  No significant deformities of the digits.  She has bilateral JLT however no effusion or warmth of the knees.  She is quite tender in the left lateral epicondyle.     LAB / IMAGING DATA:  ALT   Date Value Ref Range Status   05/20/2019 15 0 - 45 U/L Final   03/26/2019 17 0 - 45 U/L Final   01/24/2019 24 0 - 45 U/L Final      Albumin   Date Value Ref Range Status   05/20/2019 3.9 3.5 - 5.0 g/dL Final   03/26/2019 4.0 3.5 - 5.0 g/dL Final   01/24/2019 4.0 3.5 - 5.0 g/dL Final     Creatinine   Date Value Ref Range Status   05/20/2019 1.45 (H) 0.60 - 1.10 mg/dL Final   03/26/2019 1.31 (H) 0.60 - 1.10 mg/dL Final   01/24/2019 1.43 (H) 0.60 - 1.10 mg/dL Final       WBC   Date Value Ref Range Status   05/20/2019 5.2 4.0 - 11.0 thou/uL Final   03/26/2019 5.8 4.0 - 11.0 thou/uL Final   08/31/2015 4.7 4.0 - 11.0 thou/uL Final   07/01/2015 5.5 4.0 - 11.0 thou/uL Final     Hemoglobin   Date Value Ref Range Status   05/20/2019 9.8 (L) 12.0 - 16.0 g/dL Final   03/26/2019 9.9 (L) 12.0 - 16.0 g/dL Final   01/24/2019 10.0 (L) 12.0 - 16.0 g/dL Final     Platelets   Date Value Ref Range Status   05/20/2019 148 140 - 440 thou/uL Final   03/26/2019 167 140 - 440 thou/uL Final   01/24/2019 162 140 - 440 thou/uL Final       Lab Results   Component Value Date    RF <15.0 11/23/2015    SEDRATE 16 10/08/2015

## 2021-05-29 NOTE — TELEPHONE ENCOUNTER
Dr. Vinson     Please send rx for the MTX to [facility] mail order pharmacy instead of [facility].     Claudia @ [phone]

## 2021-05-29 NOTE — PROGRESS NOTES
Assessment:     1. Acute bronchitis, unspecified organism  benzonatate (TESSALON) 200 MG capsule    albuterol (PROAIR HFA;PROVENTIL HFA;VENTOLIN HFA) 90 mcg/actuation inhaler          Plan:     Differential diagnosis include but not limited to upper respiratory infection, acute bronchitis, or pneumonia.  On exam patient lungs are clear to auscultation.  Patient had occasional cough during the visit.  I discussed with the patient in regard to the findings.  At this time I do not think he has pneumonia, we will treat her for acute bronchitis with Tessalon Perles 3 times daily as needed and albuterol inhaler.  Patient has been advised to increase fluid intake.  May use ibuprofen or Tylenol for pain, fever, or headache.  May also use hot tea with lemon, ginger, and honey.  If her symptoms persist she may return to the clinic for further evaluation.  Patient was in agreement with the plan of care.    Subjective:       68 y.o. female presents for evaluation of a cough.  Patient reports that she has been experiencing the symptoms for the past 1 week.  Initially she had a hoarse voice then on Saturday she had laryngitis that lasted for about 1 day.  She admits that her cough is keeping her up at night.  She also has a headache.  Also the beginning of her illness she was having nasal congestion but she has not had any fever.  She admits to shortness of breath especially with coughing.  She denies nausea, vomiting, or diarrhea.  She has been using Sudafed, Robitussin DM nighttime which give her symptom relief for a few hours.  Today about 8:30 AM she took Benadryl which she does not feel it give her any symptom relief.  No one else around her is sick.      The following portions of the patient's history were reviewed and updated as appropriate: allergies, current medications, past family history, past medical history, past social history, past surgical history and problem list.    Review of Systems  A 12 point comprehensive  review of systems was negative except as noted.      Objective:      /74 (Patient Site: Right Arm, Patient Position: Sitting, Cuff Size: Adult Regular)   Pulse 86   Temp 98.2  F (36.8  C) (Oral)   Resp 16   Wt 163 lb (73.9 kg)   SpO2 99%   BMI 27.55 kg/m    General appearance: alert, appears stated age, cooperative and moderate distress  Head: Normocephalic, without obvious abnormality, atraumatic, sinuses nontender to percussion  Eyes: conjunctivae/corneas clear. PERRL, EOM's intact. Fundi benign.  Ears: abnormal TM right ear - bulging and air-fluid level and abnormal TM left ear - bulging and air-fluid level  Nose: Nares normal. Septum midline. Mucosa normal. No drainage or sinus tenderness.  Throat: abnormal findings: mild oropharyngeal erythema and Postnasal drainage  Lungs: clear to auscultation bilaterally  Heart: regular rate and rhythm, S1, S2 normal, no murmur, click, rub or gallop  Extremities: extremities normal, atraumatic, no cyanosis or edema  Pulses: 2+ and symmetric  Skin: Skin color, texture, turgor normal. No rashes or lesions  Lymph nodes: Cervical, supraclavicular, and axillary nodes normal.  Neurologic: Grossly normal     This note has been dictated using voice recognition software. Any grammatical or context distortions are unintentional and inherent to the software

## 2021-05-30 ENCOUNTER — RECORDS - HEALTHEAST (OUTPATIENT)
Dept: ADMINISTRATIVE | Facility: CLINIC | Age: 71
End: 2021-05-30

## 2021-05-30 VITALS — WEIGHT: 161.3 LBS | BODY MASS INDEX: 26.84 KG/M2

## 2021-05-30 VITALS — BODY MASS INDEX: 26.84 KG/M2 | WEIGHT: 161.3 LBS

## 2021-05-30 NOTE — PROGRESS NOTES
ASSESSMENT AND PLAN:  Claudia Wise 68 y.o. female is seen here on 07/18/19 for evaluation of worsening pain in the knees bilaterally she has a background of rheumatoid arthritis, osteoarthritis, on balance it would appear that her symptoms are secondary to the latter.  This is discussed with her.  Of the various options, especially given the background of renal impairment and relative contraindications to nonsteroidals, she chooses to go for corticosteroid injections which were done today with 40 mg of Kenalog under ultrasound guidance.  She is to keep her previously noted appointment.  Diagnoses and all orders for this visit:    Bilateral primary osteoarthritis of knee  -     triamcinolone acetonide 40 mg/mL injection 40 mg (KENALOG-40)  -     triamcinolone acetonide 40 mg/mL injection 40 mg (KENALOG-40)    Stage 3 chronic kidney disease (H)    Chronic pain of both knees          HISTORY OF PRESENTING ILLNESS ON 07/18/19 :  Claudia Wise 68 y.o. is here for a moderately severe flare up of pain. Here for a moderately severe flare up of pain.  Joints affected include both knee(s). This has gone on for several weeks. Pain is described as sharp. It is worse with activity at times bedtime..  Her symptoms are moderately severe. The symptoms are progressive.  Associated findings include /do not include: swelling, rash.  There is no associated recent fall or trauma.  Over-the-counter treatment to date has been without significant relief.    Further historical information, including ROS and limitation in activities as noted in the multidimensional health assessment questionnaire scanned in the EMR and in the assessment and plan section.    ALLERGIES:Bupropion hcl; Erythromycin base; Tetracyclines; and Valacyclovir    PAST MEDICAL/ACTIVE PROBLEMS/MEDICATION/SOCIAL DATA  Past Medical History:   Diagnosis Date     Anemia      Anxiety      Chronic kidney disease      Diabetes mellitus, type II (H)     prediabetes      Disease of thyroid gland     hypothyroid     Family history of myocardial infarction      Gout      High cholesterol      Hypertension      Inverted nipple     right     Osteoarthritis 2012     Rheumatoid arthritis (H) 2012     Social History     Tobacco Use   Smoking Status Former Smoker     Last attempt to quit: 1995     Years since quittin.0   Smokeless Tobacco Never Used     Patient Active Problem List   Diagnosis     Herpes Simplex Type II     Anxiety     Anemia in chronic kidney disease     Hyperlipemia     Gout     Hypertension     Prediabetes     Endometriosis     Chronic kidney disease     Subclinical Hypothyroidism     Seronegative rheumatoid arthritis of multiple sites (H)     High risk medication use     Bilateral primary osteoarthritis of knee     Current Outpatient Medications   Medication Sig Dispense Refill     acetaminophen (TYLENOL) 500 MG tablet Take 500 mg by mouth every 6 (six) hours as needed for pain.       acyclovir (ZOVIRAX) 400 MG tablet Take 1 tablet (400 mg total) by mouth every 8 (eight) hours as needed. 60 tablet 1     albuterol (PROAIR HFA;PROVENTIL HFA;VENTOLIN HFA) 90 mcg/actuation inhaler Inhale 2 puffs every 6 (six) hours as needed. 1 each 1     allopurinol (ZYLOPRIM) 100 MG tablet TAKE ONE TABLET BY MOUTH THREE TIMES DAILY WITH MEALS 270 tablet 1     atorvastatin (LIPITOR) 10 MG tablet TAKE 1 TABLET BY MOUTH ONCE DAILY AT BEDTIME 90 tablet 3     cholecalciferol, vitamin D3, 1,000 unit tablet Take 2,000 Units by mouth every evening.        conjugated estrogens (PREMARIN) vaginal cream Intravaginal: 0.5 g twice weekly (eg, Monday and Thursday) or once daily cyclically (21 days on, 7 days off) 120 g 0     fluticasone (FLONASE) 50 mcg/actuation nasal spray INHALE ONE SPRAY(S) IN EACH NOSTRIL ONCE DAILY 48 g 1     folic acid (FOLVITE) 1 MG tablet TAKE ONE TABLET BY MOUTH ONCE DAILY 90 tablet 3     hydroxychloroquine (PLAQUENIL) 200 mg tablet TAKE 1 TABLET TWICE DAILY WITH  FOOD 180 tablet 0     Lactobacillus rhamnosus GG (CULTURELLE) 10-15 Billion cell capsule Take 1 capsule by mouth daily.       levothyroxine (SYNTHROID, LEVOTHROID) 50 MCG tablet TAKE 1 TABLET BY MOUTH ONCE DAILY IN THE MORNING 90 tablet 0     LORazepam (ATIVAN) 0.5 MG tablet TAKE 1 TABLET BY MOUTH EVERY 8 HOURS AS NEEDED FOR ANXIETY 30 tablet 0     losartan (COZAAR) 50 MG tablet TAKE 1 TABLET BY MOUTH ONCE DAILY 90 tablet 2     methotrexate 2.5 MG tablet Take 4 tablets (10 mg total) by mouth once a week. 48 tablet 0     neomycin-polymyxin-dexamethamethasone (POLYDEX) 3.5 mg/g-10,000 unit/g-0.1 % Oint   1     prednisoLONE acetate (PRED-FORTE) 1 % ophthalmic suspension   6     RESTASIS 0.05 % ophthalmic emulsion INSTILL 1 DROP INTO EACH EYE TWICE DAILY  11     sodium bicarbonate 650 MG tablet Take 1,296 mg by mouth 2 (two) times a day. (2 tabs twice daily)       [facility]       Current Facility-Administered Medications   Medication Dose Route Frequency Provider Last Rate Last Dose     triamcinolone acetonide 40 mg/mL injection 40 mg (KENALOG-40)  40 mg Intra-articular Once Akila Vinson MBBS         triamcinolone acetonide 40 mg/mL injection 40 mg (KENALOG-40)  40 mg Intra-articular Once Akila Vinson MBBS             DETAILED EXAMINATION  07/18/19  :  Vitals:    07/18/19 1523   BP: 118/58   Patient Site: Right Arm   Patient Position: Sitting   Cuff Size: Adult Large   Pulse: 96   Weight: 163 lb (73.9 kg)     Alert oriented. Head including the face is examined for malar rash, heliotropes, scarring, lupus pernio. Eyes examined for redness such as in episcleritis/scleritis, periorbital lesions.   Neck/ Face examined for parotid gland swelling, range of motion of neck.  Left upper and lower and right upper and lower extremities examined for tenderness, swelling, warmth of the appendicular joints, range of motion, edema, rash.  Some of the important findings included: Joint line tenderness, bilaterally, knees,  without effusion, bilaterally warm.             LAB / IMAGING DATA:  ALT   Date Value Ref Range Status   05/20/2019 15 0 - 45 U/L Final   03/26/2019 17 0 - 45 U/L Final   01/24/2019 24 0 - 45 U/L Final     Albumin   Date Value Ref Range Status   05/20/2019 3.9 3.5 - 5.0 g/dL Final   03/26/2019 4.0 3.5 - 5.0 g/dL Final   01/24/2019 4.0 3.5 - 5.0 g/dL Final     Creatinine   Date Value Ref Range Status   05/20/2019 1.45 (H) 0.60 - 1.10 mg/dL Final   03/26/2019 1.31 (H) 0.60 - 1.10 mg/dL Final   01/24/2019 1.43 (H) 0.60 - 1.10 mg/dL Final       WBC   Date Value Ref Range Status   05/20/2019 5.2 4.0 - 11.0 thou/uL Final   03/26/2019 5.8 4.0 - 11.0 thou/uL Final   08/31/2015 4.7 4.0 - 11.0 thou/uL Final   07/01/2015 5.5 4.0 - 11.0 thou/uL Final     Hemoglobin   Date Value Ref Range Status   05/20/2019 9.8 (L) 12.0 - 16.0 g/dL Final   03/26/2019 9.9 (L) 12.0 - 16.0 g/dL Final   01/24/2019 10.0 (L) 12.0 - 16.0 g/dL Final     Platelets   Date Value Ref Range Status   05/20/2019 148 140 - 440 thou/uL Final   03/26/2019 167 140 - 440 thou/uL Final   01/24/2019 162 140 - 440 thou/uL Final       Lab Results   Component Value Date    RF <15.0 11/23/2015    SEDRATE 16 10/08/2015

## 2021-05-30 NOTE — TELEPHONE ENCOUNTER
RN cannot approve Refill Request: Levothyroxine    RN can NOT refill this medication PCP messaged that patient is overdue for Labs. Last office visit: 1/18/2018 Isabel Maurer MD Last Physical: 6/7/2018 Last MTM visit: Visit date not found Last visit same specialty: 1/18/2018 Isabel Maurer MD.  Next visit within 3 mo: Visit date not found  Next physical within 3 mo: Visit date not found      Kaitlynn Ryder, [facility] Triage/Med Refill 7/2/2019    Requested Prescriptions   Pending Prescriptions Disp Refills     atorvastatin (LIPITOR) 10 MG tablet [Pharmacy Med Name: ATORVASTATIN 10MG   TAB] 90 tablet 3     Sig: TAKE 1 TABLET BY MOUTH ONCE DAILY AT BEDTIME       Statins Refill Protocol (Hmg CoA Reductase Inhibitors) Passed - 7/2/2019 10:26 AM        Passed - PCP or prescribing provider visit in past 12 months      Last office visit with prescriber/PCP: 1/18/2018 Isabel Maurer MD OR same dept: Visit date not found OR same specialty: 1/18/2018 Isabel Maurer MD  Last physical: 6/7/2018 Last MTM visit: Visit date not found   Next visit within 3 mo: Visit date not found  Next physical within 3 mo: Visit date not found  Prescriber OR PCP: Isabel Maurer MD  Last diagnosis associated with med order: 1. Hyperlipidemia  - atorvastatin (LIPITOR) 10 MG tablet [Pharmacy Med Name: ATORVASTATIN 10MG   TAB]; TAKE 1 TABLET BY MOUTH ONCE DAILY AT BEDTIME  Dispense: 90 tablet; Refill: 3    2. Hypothyroidism  - levothyroxine (SYNTHROID, LEVOTHROID) 50 MCG tablet [Pharmacy Med Name: LEVOTHYROXIN 50MCG  TAB]; TAKE 1 TABLET BY MOUTH ONCE DAILY IN THE MORNING  Dispense: 90 tablet; Refill: 0    If protocol passes may refill for 12 months if within 3 months of last provider visit (or a total of 15 months).             levothyroxine (SYNTHROID, LEVOTHROID) 50 MCG tablet [Pharmacy Med Name: LEVOTHYROXIN 50MCG  TAB] 90 tablet 0     Sig: TAKE 1 TABLET BY MOUTH ONCE DAILY IN THE MORNING       Thyroid Hormones Protocol Failed -  7/2/2019 10:26 AM        Failed - TSH on file in past 12 months for patient age 12 & older     TSH   Date Value Ref Range Status   06/11/2018 2.96 0.30 - 5.00 uIU/mL Final                   Passed - Provider visit in past 12 months or next 3 months     Last office visit with prescriber/PCP: 1/18/2018 Isabel Maurer MD OR same dept: Visit date not found OR same specialty: 1/18/2018 Isabel Maurer MD  Last physical: 6/7/2018 Last MTM visit: Visit date not found   Next visit within 3 mo: Visit date not found  Next physical within 3 mo: Visit date not found  Prescriber OR PCP: Isabel Maurer MD  Last diagnosis associated with med order: 1. Hyperlipidemia  - atorvastatin (LIPITOR) 10 MG tablet [Pharmacy Med Name: ATORVASTATIN 10MG   TAB]; TAKE 1 TABLET BY MOUTH ONCE DAILY AT BEDTIME  Dispense: 90 tablet; Refill: 3    2. Hypothyroidism  - levothyroxine (SYNTHROID, LEVOTHROID) 50 MCG tablet [Pharmacy Med Name: LEVOTHYROXIN 50MCG  TAB]; TAKE 1 TABLET BY MOUTH ONCE DAILY IN THE MORNING  Dispense: 90 tablet; Refill: 0    If protocol passes may refill for 12 months if within 3 months of last provider visit (or a total of 15 months).

## 2021-05-30 NOTE — TELEPHONE ENCOUNTER
RN cannot approve Refill Request: Atorvastatin    RN can NOT refill this medication PCP messaged that patient is overdue for Office Visit. Last office visit: 1/18/2018 Isabel Maurer MD Last Physical: 6/7/2018 Last MTM visit: Visit date not found Last visit same specialty: 1/18/2018 Isabel Maurer MD.  Next visit within 3 mo: Visit date not found  Next physical within 3 mo: Visit date not found  Last office visit with PCP Dr JEAN Maurer 6/7/2018    Kaitlynn Ryder, [facility] Triage/Med Refill 7/2/2019    Requested Prescriptions   Pending Prescriptions Disp Refills     atorvastatin (LIPITOR) 10 MG tablet [Pharmacy Med Name: ATORVASTATIN 10MG   TAB] 90 tablet 3     Sig: TAKE 1 TABLET BY MOUTH ONCE DAILY AT BEDTIME       Statins Refill Protocol (Hmg CoA Reductase Inhibitors) Passed - 7/2/2019 10:26 AM        Passed - PCP or prescribing provider visit in past 12 months      Last office visit with prescriber/PCP: 1/18/2018 Isabel Maurer MD OR same dept: Visit date not found OR same specialty: 1/18/2018 Isabel Maurer MD  Last physical: 6/7/2018 Last MTM visit: Visit date not found   Next visit within 3 mo: Visit date not found  Next physical within 3 mo: Visit date not found  Prescriber OR PCP: Isabel Maurer MD  Last diagnosis associated with med order: 1. Hyperlipidemia  - atorvastatin (LIPITOR) 10 MG tablet [Pharmacy Med Name: ATORVASTATIN 10MG   TAB]; TAKE 1 TABLET BY MOUTH ONCE DAILY AT BEDTIME  Dispense: 90 tablet; Refill: 3    2. Hypothyroidism  - levothyroxine (SYNTHROID, LEVOTHROID) 50 MCG tablet [Pharmacy Med Name: LEVOTHYROXIN 50MCG  TAB]; TAKE 1 TABLET BY MOUTH ONCE DAILY IN THE MORNING  Dispense: 90 tablet; Refill: 0    If protocol passes may refill for 12 months if within 3 months of last provider visit (or a total of 15 months).             levothyroxine (SYNTHROID, LEVOTHROID) 50 MCG tablet [Pharmacy Med Name: LEVOTHYROXIN 50MCG  TAB] 90 tablet 0     Sig: TAKE 1 TABLET BY MOUTH ONCE DAILY  IN THE MORNING       Thyroid Hormones Protocol Failed - 7/2/2019 10:26 AM        Failed - TSH on file in past 12 months for patient age 12 & older     TSH   Date Value Ref Range Status   06/11/2018 2.96 0.30 - 5.00 uIU/mL Final                   Passed - Provider visit in past 12 months or next 3 months     Last office visit with prescriber/PCP: 1/18/2018 Isabel Maurer MD OR same dept: Visit date not found OR same specialty: 1/18/2018 Isabel Maurer MD  Last physical: 6/7/2018 Last MTM visit: Visit date not found   Next visit within 3 mo: Visit date not found  Next physical within 3 mo: Visit date not found  Prescriber OR PCP: Isabel Maurer MD  Last diagnosis associated with med order: 1. Hyperlipidemia  - atorvastatin (LIPITOR) 10 MG tablet [Pharmacy Med Name: ATORVASTATIN 10MG   TAB]; TAKE 1 TABLET BY MOUTH ONCE DAILY AT BEDTIME  Dispense: 90 tablet; Refill: 3    2. Hypothyroidism  - levothyroxine (SYNTHROID, LEVOTHROID) 50 MCG tablet [Pharmacy Med Name: LEVOTHYROXIN 50MCG  TAB]; TAKE 1 TABLET BY MOUTH ONCE DAILY IN THE MORNING  Dispense: 90 tablet; Refill: 0    If protocol passes may refill for 12 months if within 3 months of last provider visit (or a total of 15 months).

## 2021-05-31 VITALS — WEIGHT: 161 LBS | BODY MASS INDEX: 26.79 KG/M2

## 2021-05-31 VITALS — WEIGHT: 160 LBS | BODY MASS INDEX: 26.63 KG/M2

## 2021-05-31 VITALS — BODY MASS INDEX: 26.63 KG/M2 | WEIGHT: 160 LBS

## 2021-05-31 VITALS — BODY MASS INDEX: 27.34 KG/M2 | WEIGHT: 164.3 LBS

## 2021-05-31 VITALS — WEIGHT: 161 LBS | BODY MASS INDEX: 26.82 KG/M2 | HEIGHT: 65 IN

## 2021-05-31 NOTE — PATIENT INSTRUCTIONS - HE
No changes in blood pressure medications for now.    Follow-up with Dr. Maurer in February as originally scheduled.    Follow-up with I or Dr. Maurer sooner, if needed.

## 2021-05-31 NOTE — PROGRESS NOTES
Clinic Note    Assessment:     Assessment and Plan:  1. Hypertension     BP is 160/70 today.  We will have her restart amlodipine 2.5 mg.  She will come back and see me in 2 weeks for BP recheck, bring a log of blood pressures with her to that appointment, and bring her blood pressure cuff to check its accuracy as well.    - amLODIPine (NORVASC) 2.5 MG tablet; Take 1 tablet (2.5 mg total) by mouth daily.  Dispense: 90 tablet; Refill: 0       Patient Instructions   New prescription for amlodipine 2.5 mg sent into your pharmacy.    Take 1 tablet daily with the rest of your medications.    Follow-up with me in 2 weeks for blood pressure recheck.  Bring a log of blood pressures with you to that appointment.  I would like you to bring your blood pressure cuff as well.    Return in about 2 weeks (around 2019).         Subjective:      Patient comes to clinic today for BP follow-up.    She was seen by her PCP, Dr. Maurer on .  At that time, her blood pressure was 154/80.    She was instructed to follow-up in 2 weeks for BP recheck.  Today, patient's blood pressure is 160/70.  She does have a blood pressure cuff at home and checks regularly.  Most measurements are WNL, with today's being 124/67.    She does not have a history of whitecoat hypertension or feel particularly anxious today.    She is otherwise completely asymptomatic and feels well today.    She was on amlodipine last year.  This was discontinued by her nephrologist due to low blood pressures.    The following portions of the patient's history were reviewed and updated as appropriate: Allergies, medications, problems, prior note.    Review of Systems:    Review is otherwise negative except for what is mentioned above.     Social Hx:    Social History     Tobacco Use   Smoking Status Former Smoker     Last attempt to quit: 1995     Years since quittin.1   Smokeless Tobacco Never Used         Objective:     Vitals:    19 1308   BP: 160/70    Pulse: 85   Weight: 161 lb 8 oz (73.3 kg)       Exam:    General: No apparent distress. Calm. Alert and Oriented X3. Pt behavior is appropriate.      Patient Active Problem List   Diagnosis     Herpes Simplex Type II     Anxiety     Anemia in chronic kidney disease     Hyperlipemia     Gout     Hypertension     Prediabetes     Endometriosis     Chronic kidney disease     Subclinical Hypothyroidism     Seronegative rheumatoid arthritis of multiple sites (H)     High risk medication use     Bilateral primary osteoarthritis of knee     Chronic pain of both knees     Current Outpatient Medications   Medication Sig Dispense Refill     acetaminophen (TYLENOL) 500 MG tablet Take 500 mg by mouth every 6 (six) hours as needed for pain.       acyclovir (ZOVIRAX) 400 MG tablet Take 1 tablet (400 mg total) by mouth every 8 (eight) hours as needed. 60 tablet 1     allopurinol (ZYLOPRIM) 100 MG tablet TAKE ONE TABLET BY MOUTH THREE TIMES DAILY WITH MEALS 270 tablet 1     atorvastatin (LIPITOR) 10 MG tablet TAKE 1 TABLET BY MOUTH ONCE DAILY AT BEDTIME 90 tablet 3     cholecalciferol, vitamin D3, 1,000 unit tablet Take 2,000 Units by mouth every evening.        conjugated estrogens (PREMARIN) vaginal cream Intravaginal: 0.5 g twice weekly (eg, Monday and Thursday) or once daily cyclically (21 days on, 7 days off) 120 g 0     fluticasone (FLONASE) 50 mcg/actuation nasal spray INHALE ONE SPRAY(S) IN EACH NOSTRIL ONCE DAILY 48 g 1     folic acid (FOLVITE) 1 MG tablet TAKE 1 TABLET BY MOUTH ONCE DAILY 90 tablet 3     hydroxychloroquine (PLAQUENIL) 200 mg tablet TAKE 1 TABLET TWICE DAILY WITH FOOD 180 tablet 0     Lactobacillus rhamnosus GG (CULTURELLE) 10-15 Billion cell capsule Take 1 capsule by mouth daily.       levothyroxine (SYNTHROID, LEVOTHROID) 50 MCG tablet TAKE 1 TABLET BY MOUTH ONCE DAILY IN THE MORNING 90 tablet 0     LORazepam (ATIVAN) 0.5 MG tablet Take 1 tablet (0.5 mg total) by mouth every 8 (eight) hours as needed  for anxiety. 30 tablet 0     losartan (COZAAR) 50 MG tablet TAKE 1 TABLET BY MOUTH ONCE DAILY 90 tablet 2     methotrexate 2.5 MG tablet Take 4 tablets (10 mg total) by mouth once a week. 48 tablet 0     neomycin-polymyxin-dexamethamethasone (POLYDEX) 3.5 mg/g-10,000 unit/g-0.1 % Oint   1     prednisoLONE acetate (PRED-FORTE) 1 % ophthalmic suspension   6     RESTASIS 0.05 % ophthalmic emulsion INSTILL 1 DROP INTO EACH EYE TWICE DAILY  11     sodium bicarbonate 650 MG tablet Take 1,296 mg by mouth 2 (two) times a day. (2 tabs twice daily)       UNABLE TO FIND Tumeric       amLODIPine (NORVASC) 2.5 MG tablet Take 1 tablet (2.5 mg total) by mouth daily. 90 tablet 0     No current facility-administered medications for this visit.            Larry Cruz (Rob), CNP    8/19/2019

## 2021-05-31 NOTE — PROGRESS NOTES
Assessment and Plan:       1. Routine general medical examination at a health care facility    - DXA Bone Density Scan; Future  - Mammo Screening Bilateral; Future    2. Anemia in stage 3 chronic kidney disease (H)  Stable, Hgb stays around 10, last time checked  Component      Latest Ref Rng & Units 7/23/2019   WBC      4.0 - 11.0 thou/uL 6.8   RBC      3.80 - 5.40 mill/uL 2.99 (L)   Hemoglobin      12.0 - 16.0 g/dL 10.0 (L)   Hematocrit      35.0 - 47.0 % 30.5 (L)   MCV      80 - 100 fL 102 (H)   MCH      27.0 - 34.0 pg 33.4   MCHC      32.0 - 36.0 g/dL 32.7   RDW      11.0 - 14.5 % 13.0   Platelets      140 - 440 thou/uL 171   MPV      7.0 - 10.0 fL 7.2       3. Stage 3 chronic kidney disease (H)  Stable, will see Nephrologist next month.  Component      Latest Ref Rng & Units 7/23/2019   Creatinine      0.60 - 1.10 mg/dL 1.50 (H)   GFR MDRD Af Amer      >60 mL/min/1.73m2 42 (L)   GFR MDRD Non Af Amer      >60 mL/min/1.73m2 34 (L)     - Urinalysis-UC if Indicated  - Vitamin D, Total (25-Hydroxy)    4. Gout, unspecified cause, unspecified chronicity, unspecified site    - Uric Acid    5. Hyperlipidemia, unspecified hyperlipidemia type    - LDL Cholesterol, Direct    6. Hypertension  BP elevated today. She will check at home and f/u in 2 weeks.  - Comprehensive Metabolic Panel    7. Prediabetes    - Glycosylated Hemoglobin A1c    8. Seronegative rheumatoid arthritis of multiple sites (H)  Stable on Plaquenil and methotrexate, seeing Rheumatologist.    9. Hypothyroidism, unspecified type  On levothyroxine    10. Skin abnormality  Has some skin sore on the left temporal area for a year. It heals, scab falls off, but then comes back again.  - Ambulatory referral to Dermatology     The patient's current medical problems were reviewed.    I have had an Advance Directives discussion with the patient.  The following health maintenance schedule was reviewed with the patient and provided in printed form in the after visit  summary:   Health Maintenance   Topic Date Due     DXA SCAN  07/27/2018     MEDICARE ANNUAL WELLNESS VISIT  06/07/2019     FALL RISK ASSESSMENT  06/07/2019     INFLUENZA VACCINE RULE BASED (1) 08/01/2019     ZOSTER VACCINES (3 of 3) 08/12/2019     MAMMOGRAM  09/19/2020     COLONOSCOPY  10/12/2021     ADVANCE CARE PLANNING  06/07/2023     TD 18+ HE  06/07/2028     HEPATITIS C SCREENING  Completed     PNEUMOCOCCAL POLYSACCHARIDE VACCINE AGE 65 AND OVER  Completed     PNEUMOCOCCAL CONJUGATE VACCINE FOR ADULTS (PCV13 OR PREVNAR)  Completed        Subjective:   Chief Complaint: Claudia Wise is an 68 y.o. female here for an Annual Wellness visit.   HPI:  Acute and chronic medical problems reviewed as above.    Review of Systems:    Please see above.  The rest of the review of systems are negative for all systems.    Patient Care Team:  Isabel Maurer MD as PCP - General     Patient Active Problem List   Diagnosis     Herpes Simplex Type II     Anxiety     Anemia in chronic kidney disease     Hyperlipemia     Gout     Hypertension     Prediabetes     Endometriosis     Chronic kidney disease     Subclinical Hypothyroidism     Seronegative rheumatoid arthritis of multiple sites (H)     High risk medication use     Bilateral primary osteoarthritis of knee     Chronic pain of both knees     Past Medical History:   Diagnosis Date     Anemia      Anxiety      Chronic kidney disease      Diabetes mellitus, type II (H)     prediabetes     Disease of thyroid gland     hypothyroid     Family history of myocardial infarction      Gout      High cholesterol      Hypertension      Inverted nipple     right     Osteoarthritis 2012     Rheumatoid arthritis (H) 2012      Past Surgical History:   Procedure Laterality Date     COLON SURGERY  1986     COLPORRHAPHY N/A 7/7/2016    Procedure: ANTERIOR REPAIR WITH MESH;  Surgeon: Zac Stone MD;  Location: [facility];  Service:      HYSTERECTOMY       OOPHORECTOMY         Family History   Problem Relation Age of Onset     Breast cancer Maternal Aunt      Cancer Maternal Aunt      Cancer Father      Cancer Sister      Cancer Maternal Grandmother      Cancer Cousin      Heart attack Mother 49      Social History     Socioeconomic History     Marital status:      Spouse name: Not on file     Number of children: Not on file     Years of education: Not on file     Highest education level: Not on file   Occupational History     Not on file   Social Needs     Financial resource strain: Not on file     Food insecurity:     Worry: Not on file     Inability: Not on file     Transportation needs:     Medical: Not on file     Non-medical: Not on file   Tobacco Use     Smoking status: Former Smoker     Last attempt to quit: 1995     Years since quittin.0     Smokeless tobacco: Never Used   Substance and Sexual Activity     Alcohol use: Yes     Comment: occasional     Drug use: No     Sexual activity: Not on file   Lifestyle     Physical activity:     Days per week: Not on file     Minutes per session: Not on file     Stress: Not on file   Relationships     Social connections:     Talks on phone: Not on file     Gets together: Not on file     Attends Orthodoxy service: Not on file     Active member of club or organization: Not on file     Attends meetings of clubs or organizations: Not on file     Relationship status: Not on file     Intimate partner violence:     Fear of current or ex partner: Not on file     Emotionally abused: Not on file     Physically abused: Not on file     Forced sexual activity: Not on file   Other Topics Concern     Not on file   Social History Narrative     Not on file      Current Outpatient Medications   Medication Sig Dispense Refill     acetaminophen (TYLENOL) 500 MG tablet Take 500 mg by mouth every 6 (six) hours as needed for pain.       acyclovir (ZOVIRAX) 400 MG tablet Take 1 tablet (400 mg total) by mouth every 8 (eight) hours as needed. 60  tablet 1     allopurinol (ZYLOPRIM) 100 MG tablet TAKE ONE TABLET BY MOUTH THREE TIMES DAILY WITH MEALS 270 tablet 1     atorvastatin (LIPITOR) 10 MG tablet TAKE 1 TABLET BY MOUTH ONCE DAILY AT BEDTIME 90 tablet 3     cholecalciferol, vitamin D3, 1,000 unit tablet Take 2,000 Units by mouth every evening.        conjugated estrogens (PREMARIN) vaginal cream Intravaginal: 0.5 g twice weekly (eg, Monday and Thursday) or once daily cyclically (21 days on, 7 days off) 120 g 0     fluticasone (FLONASE) 50 mcg/actuation nasal spray INHALE ONE SPRAY(S) IN EACH NOSTRIL ONCE DAILY 48 g 1     folic acid (FOLVITE) 1 MG tablet TAKE ONE TABLET BY MOUTH ONCE DAILY 90 tablet 3     hydroxychloroquine (PLAQUENIL) 200 mg tablet TAKE 1 TABLET TWICE DAILY WITH FOOD 180 tablet 0     levothyroxine (SYNTHROID, LEVOTHROID) 50 MCG tablet TAKE 1 TABLET BY MOUTH ONCE DAILY IN THE MORNING 90 tablet 0     LORazepam (ATIVAN) 0.5 MG tablet Take 1 tablet (0.5 mg total) by mouth every 8 (eight) hours as needed for anxiety. 30 tablet 0     losartan (COZAAR) 50 MG tablet TAKE 1 TABLET BY MOUTH ONCE DAILY 90 tablet 2     methotrexate 2.5 MG tablet Take 4 tablets (10 mg total) by mouth once a week. 48 tablet 0     sodium bicarbonate 650 MG tablet Take 1,296 mg by mouth 2 (two) times a day. (2 tabs twice daily)       UNABLE TO FIND Tumeric       albuterol (PROAIR HFA;PROVENTIL HFA;VENTOLIN HFA) 90 mcg/actuation inhaler Inhale 2 puffs every 6 (six) hours as needed. 1 each 1     Lactobacillus rhamnosus GG (CULTURELLE) 10-15 Billion cell capsule Take 1 capsule by mouth daily.       neomycin-polymyxin-dexamethamethasone (POLYDEX) 3.5 mg/g-10,000 unit/g-0.1 % Oint   1     prednisoLONE acetate (PRED-FORTE) 1 % ophthalmic suspension   6     RESTASIS 0.05 % ophthalmic emulsion INSTILL 1 DROP INTO EACH EYE TWICE DAILY  11     No current facility-administered medications for this visit.       Objective:   Vital Signs:   Visit Vitals  /80   Pulse 81   Ht 5'  "4.5\" (1.638 m)   Wt 161 lb 8 oz (73.3 kg)   BMI 27.29 kg/m         VisionScreening:  No exam data present     PHYSICAL EXAM  General Appearance: Alert, cooperative, no distress, appears stated age.  Head: Normocephalic, without obvious abnormality, atraumatic  Eyes: PERRL, conjunctiva/corneas clear, EOM's intact  Ears: Normal TM's and external ear canals, both ears  Nose: Nares normal, septum midline,mucosa normal, no drainage  Throat: Lips, mucosa, and tongue normal; teeth and gums normal  Neck: Supple, symmetrical, trachea midline, no adenopathy;  thyroid: not enlarged, symmetric, no tenderness/mass/nodules; no carotid bruit or JVD  Back: Symmetric, no curvature, ROM normal, no CVA tenderness.  Lungs: Clear to auscultation bilaterally, respirations unlabored.  Breasts: No breast masses, tenderness, asymmetry, or nipple discharge.  Heart: Regular rate and rhythm, S1 and S2 normal, no murmur, rub, or gallop.  Abdomen: Soft, non-tender, bowel sounds active all four quadrants,  no masses, no organomegaly.  Pelvic:declined  Extremities: Extremities normal, atraumatic, no cyanosis or edema.  Skin: Skin color, texture, turgor normal, no rashes. Small, round 2 mm, pearly lesion on the left temporal area.  Lymph nodes: Cervical, supraclavicular, and axillary nodes normal.  Neurologic: No focal neurological findings.      Assessment Results 8/1/2019   Activities of Daily Living No help needed   Instrumental Activities of Daily Living No help needed   Mini Cog Total Score 4   Some recent data might be hidden     A Mini-Cog score of 0-2 suggests the possibility of dementia, score of 3-5 suggests no dementia    Identified Health Risks:     The patient was counseled and encouraged to consider modifying their diet and eating habits. She was provided with information on recommended healthy diet options.  Information on urinary incontinence and treatment options given to patient.  Patient's advanced directive was discussed and I " am comfortable with the patient's wishes.

## 2021-05-31 NOTE — TELEPHONE ENCOUNTER
MILLIEI on the below BP. Please advise about the lab question, not seeing a TSH.    Marjan Pedroza [facility]  12:55 PM  8/2/2019

## 2021-05-31 NOTE — PROGRESS NOTES
Clinic Note    Assessment:     Assessment and Plan:  1. Hypertension  Controlled.  Blood pressure 130/60 today.  I will have her continue on her amlodipine and meet with her nephrologist later this week as originally scheduled.  She will follow-up with her PCP in 5 months.       Patient Instructions   No changes in blood pressure medications for now.    Follow-up with Dr. Maurer in February as originally scheduled.    Follow-up with I or Dr. Maurer sooner, if needed.    Return in about 5 months (around 2/3/2020).         Subjective:      Claudia Wise is a 68 y.o. female who comes to clinic today for follow-up.    I saw her a few weeks ago.  At that time, her blood pressure was 160/70.  We elected to have her start amlodipine 2.5 mg daily.  At that time, she had been dealing with a great deal of stress as it pertains to her recently  brother.  He committed suicide unexpectedly.    Today, her blood pressure is 130/60.  She is tolerating her amlodipine medication.  From a mood perspective, she feels she is doing okay.  She has a grieving session in place on a weekly basis.    The following portions of the patient's history were reviewed and updated as appropriate: Allergies, medications, problems, prior note.    Review of Systems:    Review is otherwise negative except for what is mentioned above.     Social Hx:    Social History     Tobacco Use   Smoking Status Former Smoker     Last attempt to quit: 1995     Years since quittin.1   Smokeless Tobacco Never Used         Objective:     Vitals:    19 1128   BP: 130/60   Pulse: 90   Weight: 161 lb 8 oz (73.3 kg)       Exam:    General: No apparent distress. Calm. Alert and Oriented X3. Pt behavior is appropriate.          Patient Active Problem List   Diagnosis     Herpes Simplex Type II     Anxiety     Anemia in chronic kidney disease     Hyperlipemia     Gout     Hypertension     Prediabetes     Endometriosis     Chronic kidney disease      Subclinical Hypothyroidism     Seronegative rheumatoid arthritis of multiple sites (H)     High risk medication use     Bilateral primary osteoarthritis of knee     Chronic pain of both knees     Current Outpatient Medications   Medication Sig Dispense Refill     acetaminophen (TYLENOL) 500 MG tablet Take 500 mg by mouth every 6 (six) hours as needed for pain.       acyclovir (ZOVIRAX) 400 MG tablet Take 1 tablet (400 mg total) by mouth every 8 (eight) hours as needed. 60 tablet 1     allopurinol (ZYLOPRIM) 100 MG tablet TAKE ONE TABLET BY MOUTH THREE TIMES DAILY WITH MEALS 270 tablet 1     amLODIPine (NORVASC) 2.5 MG tablet Take 1 tablet (2.5 mg total) by mouth daily. 90 tablet 0     atorvastatin (LIPITOR) 10 MG tablet TAKE 1 TABLET BY MOUTH ONCE DAILY AT BEDTIME 90 tablet 3     cholecalciferol, vitamin D3, 1,000 unit tablet Take 2,000 Units by mouth every evening.        conjugated estrogens (PREMARIN) vaginal cream Intravaginal: 0.5 g twice weekly (eg, Monday and Thursday) or once daily cyclically (21 days on, 7 days off) 120 g 0     fluticasone (FLONASE) 50 mcg/actuation nasal spray INHALE ONE SPRAY(S) IN EACH NOSTRIL ONCE DAILY 48 g 1     folic acid (FOLVITE) 1 MG tablet TAKE 1 TABLET BY MOUTH ONCE DAILY 90 tablet 3     hydroxychloroquine (PLAQUENIL) 200 mg tablet TAKE 1 TABLET TWICE DAILY WITH FOOD 180 tablet 0     Lactobacillus rhamnosus GG (CULTURELLE) 10-15 Billion cell capsule Take 1 capsule by mouth daily.       levothyroxine (SYNTHROID, LEVOTHROID) 50 MCG tablet TAKE 1 TABLET BY MOUTH ONCE DAILY IN THE MORNING 90 tablet 0     LORazepam (ATIVAN) 0.5 MG tablet Take 1 tablet (0.5 mg total) by mouth every 8 (eight) hours as needed for anxiety. 30 tablet 0     losartan (COZAAR) 50 MG tablet TAKE 1 TABLET BY MOUTH ONCE DAILY 90 tablet 2     neomycin-polymyxin-dexamethamethasone (POLYDEX) 3.5 mg/g-10,000 unit/g-0.1 % Oint   1     prednisoLONE acetate (PRED-FORTE) 1 % ophthalmic suspension   6     RESTASIS  0.05 % ophthalmic emulsion INSTILL 1 DROP INTO EACH EYE TWICE DAILY  11     sodium bicarbonate 650 MG tablet Take 1,296 mg by mouth 2 (two) times a day. (2 tabs twice daily)       UNABLE TO FIND Tumeric       methotrexate 2.5 MG tablet Take 4 tablets (10 mg total) by mouth once a week. 48 tablet 0     No current facility-administered medications for this visit.        I spent 15 minutes with patient face to face, of which >50% was counseling regarding the above plan       Larry Cruz (Rob), CNP    9/3/2019

## 2021-05-31 NOTE — PATIENT INSTRUCTIONS - HE
New prescription for amlodipine 2.5 mg sent into your pharmacy.    Take 1 tablet daily with the rest of your medications.    Follow-up with me in 2 weeks for blood pressure recheck.  Bring a log of blood pressures with you to that appointment.  I would like you to bring your blood pressure cuff as well.

## 2021-06-01 VITALS — BODY MASS INDEX: 26.85 KG/M2 | WEIGHT: 161.19 LBS | HEIGHT: 65 IN

## 2021-06-01 VITALS — BODY MASS INDEX: 27.04 KG/M2 | WEIGHT: 160 LBS

## 2021-06-01 VITALS — BODY MASS INDEX: 26.66 KG/M2 | WEIGHT: 160 LBS | HEIGHT: 65 IN

## 2021-06-01 NOTE — TELEPHONE ENCOUNTER
Refill Approved    Rx renewed per Medication Renewal Policy. Medication was last renewed on 7/2/19.    Miracle Soler, [facility] Triage/Med Refill 9/30/2019     Requested Prescriptions   Pending Prescriptions Disp Refills     levothyroxine (SYNTHROID, LEVOTHROID) 50 MCG tablet [Pharmacy Med Name: LEVOTHYROXIN 50MCG  TAB] 90 tablet 0     Sig: TAKE 1 TABLET BY MOUTH ONCE DAILY IN THE MORNING       Thyroid Hormones Protocol Passed - 9/30/2019  9:00 AM        Passed - Provider visit in past 12 months or next 3 months     Last office visit with prescriber/PCP: 1/18/2018 Isabel Maurer MD OR same dept: 9/3/2019 Larry Cruz CNP OR same specialty: 9/3/2019 Larry Cruz CNP  Last physical: 8/1/2019 Last MTM visit: Visit date not found   Next visit within 3 mo: Visit date not found  Next physical within 3 mo: Visit date not found  Prescriber OR PCP: Isabel Maurer MD  Last diagnosis associated with med order: 1. Hypothyroidism  - levothyroxine (SYNTHROID, LEVOTHROID) 50 MCG tablet [Pharmacy Med Name: LEVOTHYROXIN 50MCG  TAB]; TAKE 1 TABLET BY MOUTH ONCE DAILY IN THE MORNING  Dispense: 90 tablet; Refill: 0    If protocol passes may refill for 12 months if within 3 months of last provider visit (or a total of 15 months).             Passed - TSH on file in past 12 months for patient age 12 & older     TSH   Date Value Ref Range Status   08/01/2019 2.58 0.30 - 5.00 uIU/mL Final

## 2021-06-02 VITALS — WEIGHT: 163 LBS | BODY MASS INDEX: 27.55 KG/M2

## 2021-06-03 VITALS — BODY MASS INDEX: 27.38 KG/M2 | WEIGHT: 162 LBS

## 2021-06-03 VITALS
WEIGHT: 161.5 LBS | HEART RATE: 90 BPM | SYSTOLIC BLOOD PRESSURE: 130 MMHG | BODY MASS INDEX: 27.29 KG/M2 | DIASTOLIC BLOOD PRESSURE: 60 MMHG

## 2021-06-03 VITALS — WEIGHT: 163 LBS | BODY MASS INDEX: 27.55 KG/M2

## 2021-06-03 VITALS — HEIGHT: 65 IN | BODY MASS INDEX: 26.91 KG/M2 | WEIGHT: 161.5 LBS

## 2021-06-03 VITALS — BODY MASS INDEX: 27.29 KG/M2 | WEIGHT: 161.5 LBS

## 2021-06-03 NOTE — PROGRESS NOTES
ASSESSMENT AND PLAN:   Claudia Wise 69 y.o. female is here for follow-up.  Last she has seronegative rheumatoid arthritis, osteoarthritis chronic renal impairment stage III, associated anemia, RA symptoms are under good control with methotrexate and hydroxychloroquine reminded of her eye examination, more of her pain emanating from knees likely due to osteoarthritis management principles reviewed avoidance of nonsteroidals and acetaminophen will will be 1 of the choices.  Corticosteroid injections have been helpful most recently done 5 months ago.  Will meet here in 6months for follow-up of rheumatoid arthritis while she does the labs every 2 months.         Diagnoses and all orders for this visit:    Seronegative rheumatoid arthritis of multiple sites (H)  -     hydroxychloroquine (PLAQUENIL) 200 mg tablet; Take 1 tablet (200 mg total) by mouth 2 (two) times a day.  Dispense: 180 tablet; Refill: 0  -     methotrexate 2.5 MG tablet; Take 4 tablets (10 mg total) by mouth once a week.  Dispense: 48 tablet; Refill: 0  -     Discontinue: triamcinolone acetonide 40 mg/mL injection 40 mg (KENALOG-40)  -     Discontinue: triamcinolone acetonide 40 mg/mL injection 40 mg (KENALOG-40)    Anemia in stage 3 chronic kidney disease (H)    Stage 3 chronic kidney disease (H)    Bilateral primary osteoarthritis of knee  -     Discontinue: triamcinolone acetonide 40 mg/mL injection 40 mg (KENALOG-40)  -     Discontinue: triamcinolone acetonide 40 mg/mL injection 40 mg (KENALOG-40)    High risk medication use        HISTORY OF PRESENTING ILLNESS:  Claudia Wise 69 y.o. is here for followup of seronegative Rheumatoid Arthritis, osteoarthritis with his various manifestations, renal impairment and osteoarthritis.  She is on low-dose methotrexate and hydroxychloroquine and doing good.  She noted pain.  This is in her knees.  This is mild she takes Tylenol with good effect.  She is traveling to [place] with her friends and  wondered if she should get the knee injection just prior to her departure instead of the usual 3 to 4 months interval.  She has noted pain level to be 4.5/10 more to do with the neck and back pain for that neck pain she took CBD that was helpful she goes to the chiropractor.  She has noted little to no pain in her hands, stiffness in the morning down to 15 minutes recent labs reviewed.  This is interfering with some of the day-to-day activities.  Overall disease activity:  stable. Limitation on activities as noted in the MDHAQ scanned in the EMR.  Further historical information, including ROS as noted in the multidimensional health assessment questionnaire scanned in the EMR and in the assessment and plan section.  ALLERGIES:Bupropion hcl; Erythromycin base; Tetracyclines; and Valacyclovir    PAST MEDICAL/ACTIVE PROBLEMS/MEDICATION/SOCIAL DATA  Past Medical History:   Diagnosis Date     Anemia      Anxiety      Chronic kidney disease      Diabetes mellitus, type II (H)     prediabetes     Disease of thyroid gland     hypothyroid     Family history of myocardial infarction      Gout      High cholesterol      Hypertension      Inverted nipple     right     Osteoarthritis 2012     Rheumatoid arthritis (H) 2012     Social History     Tobacco Use   Smoking Status Former Smoker     Last attempt to quit: 1995     Years since quittin.3   Smokeless Tobacco Never Used     Patient Active Problem List   Diagnosis     Herpes Simplex Type II     Anxiety     Anemia in chronic kidney disease     Hyperlipemia     Gout     Hypertension     Prediabetes     Endometriosis     Chronic kidney disease     Subclinical Hypothyroidism     Seronegative rheumatoid arthritis of multiple sites (H)     High risk medication use     Bilateral primary osteoarthritis of knee     Chronic pain of both knees     Current Outpatient Medications   Medication Sig Dispense Refill     acetaminophen (TYLENOL) 500 MG tablet Take 500 mg by mouth every 6  (six) hours as needed for pain.       acyclovir (ZOVIRAX) 400 MG tablet Take 1 tablet (400 mg total) by mouth every 8 (eight) hours as needed. 60 tablet 1     allopurinol (ZYLOPRIM) 100 MG tablet TAKE ONE TABLET BY MOUTH THREE TIMES DAILY WITH MEALS 270 tablet 1     amLODIPine (NORVASC) 2.5 MG tablet TAKE 1 TABLET BY MOUTH ONCE DAILY 90 tablet 3     atorvastatin (LIPITOR) 10 MG tablet TAKE 1 TABLET BY MOUTH ONCE DAILY AT BEDTIME 90 tablet 3     cholecalciferol, vitamin D3, 1,000 unit tablet Take 2,000 Units by mouth every evening.        conjugated estrogens (PREMARIN) vaginal cream Intravaginal: 0.5 g twice weekly (eg, Monday and Thursday) or once daily cyclically (21 days on, 7 days off) 120 g 0     fluticasone (FLONASE) 50 mcg/actuation nasal spray INHALE ONE SPRAY(S) IN EACH NOSTRIL ONCE DAILY 48 g 1     folic acid (FOLVITE) 1 MG tablet TAKE 1 TABLET BY MOUTH ONCE DAILY 90 tablet 3     hydroxychloroquine (PLAQUENIL) 200 mg tablet TAKE 1 TABLET TWICE DAILY WITH FOOD 180 tablet 0     Lactobacillus rhamnosus GG (CULTURELLE) 10-15 Billion cell capsule Take 1 capsule by mouth daily.       levothyroxine (SYNTHROID, LEVOTHROID) 50 MCG tablet TAKE 1 TABLET BY MOUTH ONCE DAILY IN THE MORNING 90 tablet 3     LORazepam (ATIVAN) 0.5 MG tablet Take 1 tablet (0.5 mg total) by mouth every 8 (eight) hours as needed for anxiety. 30 tablet 0     losartan (COZAAR) 50 MG tablet TAKE 1 TABLET BY MOUTH ONCE DAILY 90 tablet 2     methotrexate 2.5 MG tablet TAKE 4 TABLETS (10 MG TOTAL) BY MOUTH ONCE A WEEK. 16 tablet 0     RESTASIS 0.05 % ophthalmic emulsion INSTILL 1 DROP INTO EACH EYE TWICE DAILY  11     sodium bicarbonate 650 MG tablet Take 1,296 mg by mouth 2 (two) times a day. (2 tabs twice daily)       neomycin-polymyxin-dexamethamethasone (POLYDEX) 3.5 mg/g-10,000 unit/g-0.1 % Oint   1     UNABLE TO FIND Tumeric       No current facility-administered medications for this visit.      DETAILED EXAMINATION  11/18/19  :  Vitals:     "11/18/19 1203 11/18/19 1207   BP: 150/66 144/62   Patient Site: Right Arm Right Arm   Patient Position: Sitting Sitting   Cuff Size: Adult Regular Adult Regular   Pulse: 82    Weight: 161 lb 8 oz (73.3 kg)    Height: 5' 4.5\" (1.638 m)      Alert oriented. Head including the face is examined for malar rash, heliotropes, scarring, lupus pernio. Eyes examined for redness such as in episcleritis/scleritis, periorbital lesions.   Neck/ Face examined for parotid gland swelling, range of motion of neck.  Left upper and lower and right upper and lower extremities examined for tenderness, swelling, warmth of the appendicular joints, range of motion, edema, rash.  Some of the important findings included: No synovitis and palpable joints of upper extremities.  [place] nodes, GLT of the knees.   LAB / IMAGING DATA:  ALT   Date Value Ref Range Status   11/14/2019 24 0 - 45 U/L Final   09/23/2019 23 0 - 45 U/L Final   08/01/2019 23 0 - 45 U/L Final     Albumin   Date Value Ref Range Status   11/14/2019 3.8 3.5 - 5.0 g/dL Final   09/23/2019 3.7 3.5 - 5.0 g/dL Final   08/01/2019 4.3 3.5 - 5.0 g/dL Final     Creatinine   Date Value Ref Range Status   11/14/2019 1.65 (H) 0.60 - 1.10 mg/dL Final   09/23/2019 1.50 (H) 0.60 - 1.10 mg/dL Final   08/01/2019 1.60 (H) 0.60 - 1.10 mg/dL Final       WBC   Date Value Ref Range Status   11/14/2019 5.5 4.0 - 11.0 thou/uL Final   09/23/2019 5.7 4.0 - 11.0 thou/uL Final   08/31/2015 4.7 4.0 - 11.0 thou/uL Final   07/01/2015 5.5 4.0 - 11.0 thou/uL Final     Hemoglobin   Date Value Ref Range Status   11/14/2019 9.7 (L) 12.0 - 16.0 g/dL Final   09/23/2019 10.0 (L) 12.0 - 16.0 g/dL Final   07/23/2019 10.0 (L) 12.0 - 16.0 g/dL Final     Platelets   Date Value Ref Range Status   11/14/2019 169 140 - 440 thou/uL Final   09/23/2019 162 140 - 440 thou/uL Final   07/23/2019 171 140 - 440 thou/uL Final       Lab Results   Component Value Date    RF <15.0 11/23/2015    SEDRATE 16 10/08/2015        "

## 2021-06-03 NOTE — TELEPHONE ENCOUNTER
Refill Approved    Rx renewed per Medication Renewal Policy. Medication was last renewed on 8/19/19.    Miracle Soler, [facility] Triage/Med Refill 11/8/2019     Requested Prescriptions   Pending Prescriptions Disp Refills     amLODIPine (NORVASC) 2.5 MG tablet [Pharmacy Med Name: AMLODIPINE 2.5MG TAB] 90 tablet 0     Sig: TAKE 1 TABLET BY MOUTH ONCE DAILY       Calcium-Channel Blockers Protocol Passed - 11/8/2019 10:33 AM        Passed - PCP or prescribing provider visit in past 12 months or next 3 months     Last office visit with prescriber/PCP: 9/3/2019 Larry Cruz CNP OR same dept: 9/3/2019 Larry Cruz CNP OR same specialty: 9/3/2019 Larry Cruz CNP  Last physical: Visit date not found Last MTM visit: Visit date not found   Next visit within 3 mo: Visit date not found  Next physical within 3 mo: Visit date not found  Prescriber OR PCP: Larry Cruz CNP  Last diagnosis associated with med order: 1. Hypertension  - amLODIPine (NORVASC) 2.5 MG tablet [Pharmacy Med Name: AMLODIPINE 2.5MG TAB]; TAKE 1 TABLET BY MOUTH ONCE DAILY  Dispense: 90 tablet; Refill: 0    If protocol passes may refill for 12 months if within 3 months of last provider visit (or a total of 15 months).             Passed - Blood pressure filed in past 12 months     BP Readings from Last 1 Encounters:   09/03/19 130/60

## 2021-06-04 ENCOUNTER — HOSPITAL ENCOUNTER (OUTPATIENT)
Dept: PHYSICAL MEDICINE AND REHAB | Facility: CLINIC | Age: 71
Discharge: HOME | End: 2021-06-04
Attending: PAIN MEDICINE
Payer: COMMERCIAL

## 2021-06-04 VITALS
DIASTOLIC BLOOD PRESSURE: 60 MMHG | HEIGHT: 65 IN | BODY MASS INDEX: 26.99 KG/M2 | SYSTOLIC BLOOD PRESSURE: 132 MMHG | WEIGHT: 162 LBS

## 2021-06-04 VITALS
SYSTOLIC BLOOD PRESSURE: 121 MMHG | BODY MASS INDEX: 26.38 KG/M2 | DIASTOLIC BLOOD PRESSURE: 62 MMHG | OXYGEN SATURATION: 97 % | HEIGHT: 64 IN | HEART RATE: 74 BPM | WEIGHT: 154.5 LBS

## 2021-06-04 VITALS
HEART RATE: 82 BPM | DIASTOLIC BLOOD PRESSURE: 62 MMHG | BODY MASS INDEX: 26.91 KG/M2 | SYSTOLIC BLOOD PRESSURE: 144 MMHG | HEIGHT: 65 IN | WEIGHT: 161.5 LBS

## 2021-06-04 VITALS
SYSTOLIC BLOOD PRESSURE: 128 MMHG | BODY MASS INDEX: 27.2 KG/M2 | HEART RATE: 88 BPM | DIASTOLIC BLOOD PRESSURE: 64 MMHG | WEIGHT: 156 LBS

## 2021-06-04 VITALS
BODY MASS INDEX: 26.11 KG/M2 | BODY MASS INDEX: 26.11 KG/M2 | WEIGHT: 156.7 LBS | HEIGHT: 65 IN | HEIGHT: 65 IN | WEIGHT: 156.7 LBS

## 2021-06-04 DIAGNOSIS — M54.50 CHRONIC LEFT-SIDED LOW BACK PAIN WITHOUT SCIATICA: ICD-10-CM

## 2021-06-04 DIAGNOSIS — G89.29 CHRONIC LEFT-SIDED LOW BACK PAIN WITHOUT SCIATICA: ICD-10-CM

## 2021-06-04 DIAGNOSIS — M47.816 LUMBAR FACET ARTHROPATHY: ICD-10-CM

## 2021-06-04 NOTE — TELEPHONE ENCOUNTER
Patient Returning Call  Reason for call:  Patient called back.  Information relayed to patient:  n/a  Patient has additional questions:  Yes  If YES, what are your questions/concerns:  Calling on the status of these two medications. Would like the covering provider to fill today and call patient when meds are sent.  Okay to leave a detailed message?: Yes

## 2021-06-04 NOTE — TELEPHONE ENCOUNTER
Patient notified of clinician's message and verbalized understanding. No further questions at this time.

## 2021-06-04 NOTE — TELEPHONE ENCOUNTER
Last office visit: 11/18/19    Last Lab:11/14/19    Future office visit: 1/6/19    Future Lab:n/a

## 2021-06-05 VITALS
HEART RATE: 68 BPM | DIASTOLIC BLOOD PRESSURE: 64 MMHG | BODY MASS INDEX: 29.7 KG/M2 | SYSTOLIC BLOOD PRESSURE: 130 MMHG | WEIGHT: 167.6 LBS | HEIGHT: 63 IN

## 2021-06-05 VITALS
HEART RATE: 73 BPM | HEIGHT: 64 IN | BODY MASS INDEX: 26.96 KG/M2 | DIASTOLIC BLOOD PRESSURE: 63 MMHG | SYSTOLIC BLOOD PRESSURE: 134 MMHG | OXYGEN SATURATION: 100 % | WEIGHT: 157.9 LBS

## 2021-06-05 NOTE — ANESTHESIA CARE TRANSFER NOTE
Last vitals:   Vitals:    02/07/20 1050   BP: 149/65   Pulse: 88   Resp: 14   Temp: 36.1  C (97  F)   SpO2: 100%     Patient's level of consciousness is drowsy  Spontaneous respirations: yes  Maintains airway independently: yes  Dentition unchanged: yes  Oropharynx: oropharynx clear of all foreign objects    QCDR Measures:  ASA# 20 - Surgical Safety Checklist: WHO surgical safety checklist completed prior to induction    PQRS# 430 - Adult PONV Prevention: 4558F - Pt received => 2 anti-emetic agents (different classes) preop & intraop  ASA# 8 - Peds PONV Prevention: NA - Not pediatric patient, not GA or 2 or more risk factors NOT present  PQRS# 424 - Ying-op Temp Management: 4559F - At least one body temp DOCUMENTED => 35.5C or 95.9F within required timeframe  PQRS# 426 - PACU Transfer Protocol: - Transfer of care checklist used  ASA# 14 - Acute Post-op Pain: ASA14B - Patient did NOT experience pain >= 7 out of 10

## 2021-06-05 NOTE — ANESTHESIA CARE TRANSFER NOTE
Last vitals:   Vitals:    01/23/20 2020   BP: 170/72   Pulse: (!) 101   Resp: 16   Temp: 37.1  C (98.7  F)   SpO2: 99%     Patient's level of consciousness is awake  Spontaneous respirations: yes  Maintains airway independently: yes  Dentition unchanged: yes  Oropharynx: oropharynx clear of all foreign objects    QCDR Measures:  ASA# 20 - Surgical Safety Checklist: WHO surgical safety checklist completed prior to induction    PQRS# 430 - Adult PONV Prevention: 4558F - Pt received => 2 anti-emetic agents (different classes) preop & intraop  ASA# 8 - Peds PONV Prevention: NA - Not pediatric patient, not GA or 2 or more risk factors NOT present  PQRS# 424 - Ying-op Temp Management: 4559F - At least one body temp DOCUMENTED => 35.5C or 95.9F within required timeframe  PQRS# 426 - PACU Transfer Protocol: - Transfer of care checklist used  ASA# 14 - Acute Post-op Pain: ASA14B - Patient did NOT experience pain >= 7 out of 10

## 2021-06-05 NOTE — ANESTHESIA PREPROCEDURE EVALUATION
Anesthesia Evaluation      Patient summary reviewed     Airway   Mallampati: II  Neck ROM: full   Pulmonary     breath sounds clear to auscultation  (+) a smoker  (-) pneumonia, asthma, shortness of breath, recent URI, sleep apnea                         Cardiovascular   Exercise tolerance: > or = 4 METS  (+) hypertension, ,     (-) angina  ECG reviewed  Rhythm: regular  Rate: normal,         Neuro/Psych    (+) anxiety/panic attacks,   (-) no seizures, no neuromuscular disease, no CVA    Endo/Other    (+) diabetes mellitus, hypothyroidism, arthritis,      GI/Hepatic/Renal    (+)   chronic renal disease CRI,      Other findings: Protracted vomiting / SBO      Dental    (+) caps and chipped                       Anesthesia Plan  Planned anesthetic: general endotracheal  Magnesium gtt for pain  ASA 3 - emergent   Induction: intravenous   Anesthetic plan and risks discussed with: patient  Anesthesia plan special considerations: rapid sequence induction, antiemetics,   Post-op plan: routine recovery

## 2021-06-05 NOTE — ANESTHESIA POSTPROCEDURE EVALUATION
Patient: Claudia Wise  Procedure(s):  EXPLORATORY LAPARATOMY, EXTENSIVE LYSIS OF ADHESIONS, SMALL BOWEL RESECTION  Anesthesia type: general    Patient location: PACU  Last vitals:   Vitals Value Taken Time   /67 1/23/2020  9:20 PM   Temp 37.1  C (98.7  F) 1/23/2020  8:20 PM   Pulse 90 1/23/2020  9:20 PM   Resp 14 1/23/2020  9:20 PM   SpO2 99 % 1/23/2020  9:20 PM     Post vital signs: stable  Level of consciousness: awake and responds to simple questions  Post-anesthesia pain: pain controlled  Post-anesthesia nausea and vomiting: no  Pulmonary: unassisted, return to baseline  Cardiovascular: stable and blood pressure at baseline  Hydration: adequate  Anesthetic events: no    QCDR Measures:  ASA# 11 - Ying-op Cardiac Arrest: ASA11B - Patient did NOT experience unanticipated cardiac arrest  ASA# 12 - Ying-op Mortality Rate: ASA12B - Patient did NOT die  ASA# 13 - PACU Re-Intubation Rate: ASA13B - Patient did NOT require a new airway mgmt  ASA# 10 - Composite Anes Safety: ASA10A - No serious adverse event    Additional Notes:

## 2021-06-05 NOTE — PROGRESS NOTES
ASSESSMENT AND PLAN:  Claudia Wise 69 y.o. female is seen here on 01/16/20 for evaluation of worsening bilateral knee pain.  She has osteoarthritis, she has 0- rheumatoid arthritis in the background of renal impairment, more likely the pain is from the OA.  She a few options are discussed today.  She is not a candidate for nonsteroidals given the renal impairment.  She would like to go for corticosteroid injections.  Pros and cons outlined informed consent obtained 40 mg of Kenalog injected into each knee anterolateral approach aseptically.  She is to follow-up here in 3 months.  Diagnoses and all orders for this visit:    Bilateral primary osteoarthritis of knee  -     triamcinolone acetonide 40 mg/mL injection 40 mg (KENALOG-40)  -     triamcinolone acetonide 40 mg/mL injection 40 mg (KENALOG-40)    Seronegative rheumatoid arthritis of multiple sites (H)  -     triamcinolone acetonide 40 mg/mL injection 40 mg (KENALOG-40)  -     triamcinolone acetonide 40 mg/mL injection 40 mg (KENALOG-40)    Stage 3 chronic kidney disease (H)          HISTORY OF PRESENTING ILLNESS ON 01/16/20 :  Claudia Wise 69 y.o. is here for a moderately severe flare up of pain. Here for a moderately severe flare up of pain.  Joints affected include both knee(s). This has gone on for several weeks. Pain is described as sharp. It is worse with activity at times bedtime..  Her symptoms are moderately severe. The symptoms are progressive.  Associated findings include: swelling, rash.  There is no associated recent fall or trauma.  Over-the-counter treatment to date has been without significant relief.    Further historical information, including ROS and limitation in activities as noted in the multidimensional health assessment questionnaire scanned in the EMR and in the assessment and plan section.    ALLERGIES:Bupropion hcl; Erythromycin base; Tetracyclines; and Valacyclovir    PAST MEDICAL/ACTIVE PROBLEMS/MEDICATION/SOCIAL DATA  Past  Medical History:   Diagnosis Date     Anemia      Anxiety      Chronic kidney disease      Diabetes mellitus, type II (H)     prediabetes     Disease of thyroid gland     hypothyroid     Family history of myocardial infarction      Gout      High cholesterol      Hypertension      Inverted nipple     right     Osteoarthritis 2012     Rheumatoid arthritis (H)      Social History     Tobacco Use   Smoking Status Former Smoker     Last attempt to quit: 1995     Years since quittin.5   Smokeless Tobacco Never Used     Patient Active Problem List   Diagnosis     Herpes Simplex Type II     Anxiety     Anemia in chronic kidney disease     Hyperlipemia     Gout     Hypertension     Prediabetes     Endometriosis     Chronic kidney disease     Subclinical Hypothyroidism     Seronegative rheumatoid arthritis of multiple sites (H)     High risk medication use     Bilateral primary osteoarthritis of knee     Chronic pain of both knees     Current Outpatient Medications   Medication Sig Dispense Refill     acetaminophen (TYLENOL) 500 MG tablet Take 500 mg by mouth every 6 (six) hours as needed for pain.       acyclovir (ZOVIRAX) 400 MG tablet Take 1 tablet (400 mg total) by mouth every 8 (eight) hours as needed. 60 tablet 1     allopurinol (ZYLOPRIM) 100 MG tablet TAKE ONE TABLET BY MOUTH THREE TIMES DAILY WITH MEALS 270 tablet 1     amLODIPine (NORVASC) 2.5 MG tablet TAKE 1 TABLET BY MOUTH ONCE DAILY 90 tablet 3     atorvastatin (LIPITOR) 10 MG tablet TAKE 1 TABLET BY MOUTH ONCE DAILY AT BEDTIME 90 tablet 3     cholecalciferol, vitamin D3, 1,000 unit tablet Take 2,000 Units by mouth every evening.        conjugated estrogens (PREMARIN) vaginal cream Intravaginal: 0.5 g twice weekly (eg, Monday and Thursday) or once daily cyclically (21 days on, 7 days off) 120 g 0     fluticasone (FLONASE) 50 mcg/actuation nasal spray INHALE ONE SPRAY(S) IN EACH NOSTRIL ONCE DAILY 48 g 1     folic acid (FOLVITE) 1 MG tablet TAKE 1  "TABLET BY MOUTH ONCE DAILY 90 tablet 3     hydroxychloroquine (PLAQUENIL) 200 mg tablet Take 1 tablet (200 mg total) by mouth 2 (two) times a day. 180 tablet 0     Lactobacillus rhamnosus GG (CULTURELLE) 10-15 Billion cell capsule Take 1 capsule by mouth daily.       levothyroxine (SYNTHROID, LEVOTHROID) 50 MCG tablet TAKE 1 TABLET BY MOUTH ONCE DAILY IN THE MORNING 90 tablet 3     LORazepam (ATIVAN) 0.5 MG tablet Take 1 tablet (0.5 mg total) by mouth every 8 (eight) hours as needed for anxiety. 30 tablet 0     losartan (COZAAR) 50 MG tablet TAKE 1 TABLET BY MOUTH ONCE DAILY 90 tablet 2     methotrexate 2.5 MG tablet Take 4 tablets (10 mg total) by mouth once a week. 48 tablet 0     neomycin-polymyxin-dexamethamethasone (POLYDEX) 3.5 mg/g-10,000 unit/g-0.1 % Oint   1     RESTASIS 0.05 % ophthalmic emulsion INSTILL 1 DROP INTO EACH EYE TWICE DAILY  11     sodium bicarbonate 650 MG tablet Take 1,296 mg by mouth 2 (two) times a day. (2 tabs twice daily)       UNABLE TO FIND [facility]       Current Facility-Administered Medications   Medication Dose Route Frequency Provider Last Rate Last Dose     triamcinolone acetonide 40 mg/mL injection 40 mg (KENALOG-40)  40 mg Intra-articular Once Akila Vinson MBBS         triamcinolone acetonide 40 mg/mL injection 40 mg (KENALOG-40)  40 mg Intra-articular Once Akila Vinson MBBS             DETAILED EXAMINATION  01/16/20  :  Vitals:    01/16/20 1425   BP: 132/60   Patient Site: Right Arm   Patient Position: Sitting   Cuff Size: Adult Regular   Weight: 162 lb (73.5 kg)   Height: 5' 4.5\" (1.638 m)     Alert oriented. Head including the face is examined for malar rash, heliotropes, scarring, lupus pernio. Eyes examined for redness such as in episcleritis/scleritis, periorbital lesions.   Neck/ Face examined for parotid gland swelling, range of motion of neck.  Left upper and lower and right upper and lower extremities examined for tenderness, swelling, warmth of the appendicular joints, " range of motion, edema, rash.  Some of the important findings included: Warmth and marked tenderness of the joint line especially medially both knees without effusion.         LAB / IMAGING DATA:  ALT   Date Value Ref Range Status   01/13/2020 19 0 - 45 U/L Final   11/14/2019 24 0 - 45 U/L Final   09/23/2019 23 0 - 45 U/L Final     Albumin   Date Value Ref Range Status   01/13/2020 4.2 3.5 - 5.0 g/dL Final   11/14/2019 3.8 3.5 - 5.0 g/dL Final   09/23/2019 3.7 3.5 - 5.0 g/dL Final     Creatinine   Date Value Ref Range Status   01/13/2020 1.54 (H) 0.60 - 1.10 mg/dL Final   11/14/2019 1.65 (H) 0.60 - 1.10 mg/dL Final   09/23/2019 1.50 (H) 0.60 - 1.10 mg/dL Final       WBC   Date Value Ref Range Status   01/13/2020 4.6 4.0 - 11.0 thou/uL Final   11/14/2019 5.5 4.0 - 11.0 thou/uL Final   08/31/2015 4.7 4.0 - 11.0 thou/uL Final   07/01/2015 5.5 4.0 - 11.0 thou/uL Final     Hemoglobin   Date Value Ref Range Status   01/13/2020 9.5 (L) 12.0 - 16.0 g/dL Final   11/14/2019 9.7 (L) 12.0 - 16.0 g/dL Final   09/23/2019 10.0 (L) 12.0 - 16.0 g/dL Final     Platelets   Date Value Ref Range Status   01/13/2020 160 140 - 440 thou/uL Final   11/14/2019 169 140 - 440 thou/uL Final   09/23/2019 162 140 - 440 thou/uL Final       Lab Results   Component Value Date    RF <15.0 11/23/2015    SEDRATE 16 10/08/2015

## 2021-06-05 NOTE — ANESTHESIA PREPROCEDURE EVALUATION
Anesthesia Evaluation      Patient summary reviewed     Airway   Mallampati: II  Neck ROM: full   Pulmonary     breath sounds clear to auscultation  (+) a smoker  (-) pneumonia, asthma, shortness of breath, recent URI, sleep apnea                         Cardiovascular   Exercise tolerance: > or = 4 METS  (+) hypertension, ,     (-) angina  ECG reviewed  Rhythm: regular  Rate: normal,         Neuro/Psych    (+) anxiety/panic attacks,   (-) no seizures, no neuromuscular disease, no CVA    Endo/Other    (+) diabetes mellitus, hypothyroidism, arthritis,      GI/Hepatic/Renal    (+)   chronic renal disease,      Other findings: Protracted vomiting / SBO      Dental    (+) caps and chipped                         Anesthesia Plan  Planned anesthetic: general endotracheal  Decadron, Zofran.  Diprivan infusion.  ASA 3 - emergent   Induction: intravenous   Anesthetic plan and risks discussed with: patient  Anesthesia plan special considerations: antiemetics,   Post-op plan: routine recovery

## 2021-06-05 NOTE — ANESTHESIA POSTPROCEDURE EVALUATION
Patient: Claudia Wise  Procedure(s):  Percutaneous Endoscopic Gastromstomy Tube Placement  Anesthesia type: general    Patient location: [facility] Surgical Floor  Last vitals:   Vitals Value Taken Time   /65 2/7/2020  4:02 PM   Temp 36.6  C (97.9  F) 2/7/2020  4:02 PM   Pulse 85 2/7/2020  4:02 PM   Resp 18 2/7/2020  4:02 PM   SpO2 96 % 2/7/2020  4:02 PM     Post vital signs: stable  Level of consciousness: awake and responds to simple questions  Post-anesthesia pain: pain controlled  Post-anesthesia nausea and vomiting: no  Pulmonary: unassisted, return to baseline  Cardiovascular: stable and blood pressure at baseline  Hydration: adequate  Anesthetic events: no

## 2021-06-06 ENCOUNTER — RECORDS - HEALTHEAST (OUTPATIENT)
Dept: ADMINISTRATIVE | Facility: OTHER | Age: 71
End: 2021-06-06

## 2021-06-07 NOTE — PROGRESS NOTES
"Claudia Wise is a 69 y.o. female who is being evaluated via a billable video visit.      The patient has been notified of following:     \"This video visit will be conducted via a call between you and your physician/provider. We have found that certain health care needs can be provided without the need for an in-person physical exam.  This service lets us provide the care you need with a video conversation.  If a prescription is necessary we can send it directly to your pharmacy.  If lab work is needed we can place an order for that and you can then stop by our lab to have the test done at a later time.    Video visits are billed at different rates depending on your insurance coverage. Please reach out to your insurance provider with any questions.    If during the course of the call the physician/provider feels a video visit is not appropriate, you will not be charged for this service.\"    Patient has given verbal consent to a Video visit? Yes    Patient would like to receive their AVS by AVS Preference: Dmitry.    Patient would like the video invitation sent by: Text to cell phone: [phone]    Will anyone else be joining your video visit? No        Video Start Time: 1017    Additional provider notes:     Assessment/Plan:      Diagnoses and all orders for this visit:    Lumbar spine pain  -     MR Lumbar Spine Without Contrast; Future; Expected date: 05/05/2020    Lumbar radicular pain  -     MR Lumbar Spine Without Contrast; Future; Expected date: 05/05/2020    Lumbar spondylosis  -     MR Lumbar Spine Without Contrast; Future; Expected date: 05/05/2020        Assessment: Pleasant 69 y.o. female with a history of anxiety, rheumatoid arthritis, diabetes mellitus, hypertension, hyperlipidemia, chronic kidney disease, thyroid disorder, gout, osteopenia recent small bowel obstruction with surgical decompression with:    1.  About 6-week history of significant right lower extremity and low back pain with " radicular pain into the right lateral thigh and anterior thigh.  The thigh symptoms have improved and now just right gluteal pain.  This is potentially lumbar radiculopathy L3-L5 distribution versus pain work related to a compression fracture she does have osteopenia.      Discussion:    1.  We discussed the diagnosis and treatment thus far.  She has been through physical therapy, Medrol Dosepak, tried mild dose of gabapentin which was not helpful and had some side effects, along with taking Norco daily.  We discussed medication options along with imaging.    2.  Recommend MRI lumbar spine to evaluate for nerve root compression along with potential compression fracture in the lower lumbar spine.  She would like this done at [facility].  Given the duration of symptoms and her ongoing management with hydrocodone, I would recommend that this MRI be done sooner rather than later.  She needs definitive diagnosis.    3.  We will have her restart gabapentin 100 mg at bedtime slowly increasing to 300 mg at bedtime.  If she has cognitive side effects to his drowsiness she should  decreased the dose to the last effective dose.  She can call us with questions.  We discussed side effects.    4.  Continue with Tylenol over-the-counter along with over-the-counter topicals such as Aspercreme or icy hot.    5.  Can alternate heat and ice.    6.  She is encouraged to contact us and I will follow-up with her 1 to 2 days after the MRI to discuss treatment options which may include epidural if we can find the nerve root that is being compressed.      It was our pleasure caring for your patient today, if there any questions or concerns please do not hesitate to contact us.      Subjective:   Patient ID: Claudia Wise is a 69 y.o. female.    History of Present Illness: Patient presents at the request of Dr. Maurer for an evaluation of low back pain with left gluteal pain and left leg radicular pain.  She was recently  hospitalized for small bowel obstruction having surgical decompression subsequently went to [place] and followed by  a transitional care unit in [facility].  While in [place] she bent forward to tie her shoe and felt a pop in the back in the left leg.  Initially she was having some pain down the lateral thigh to the anterior thigh not past the knee which was mild and has improved over the past 6 weeks.  This all happened on March 14.  But she has had persistent back pain in the low back and left gluteal region.  The pain is constant described as a sharp pain.  Worse with standing for over 10 minutes sitting for 30 minutes and bending is the worst for her.  Better with laying in bed.  She did have some in-home physical therapy after she came back from [facility] which has not been helpful.  She has been seen by Dr. Maurer.  Try to Medrol Dosepak.  While on the Dosepak she had 3 days of good relief and then the last 2 days the pain returned and is been significant.  Gabapentin 100 mg 3 times daily was not tolerated well due to cognitive side effects and was not helpful for the pain.  She is now taking hydrocodone/acetaminophen 1/day and Tylenol otherwise.  She denies any radiation down the leg or paresthesias now no rashes in the.  She has no bowel or bladder incontinence that is new and has had some urinary leakage with sitting to standing.    Imaging: CT scan abdomen and pelvis personally reviewed.  This was prior to her injury.  She has some relatively mild degenerative changes in the lower lumbar spine through L2-3 without any evidence of significant foraminal or central stenosis on the CT scan that I can see.         Review of Systems: Some urinary leakage is not new seems positional.  She has some joint pain related to rheumatoid arthritis and muscle pain.  No fevers, headaches, change in vision, chest pain, shortness of breath, abdominal pain, nausea, vomiting, itching or rashes, poor balance, sleep  issues. Remainder of 12 point review systems negative unless listed above.    Past Medical History:   Diagnosis Date     Anemia      Anxiety      Chronic kidney disease      Diabetes mellitus, type II (H)     prediabetes     Disease of thyroid gland     hypothyroid     Family history of myocardial infarction      Gout      High cholesterol      Hypertension      Inverted nipple     right     Macular edema      Osteoarthritis 2012     Rheumatoid arthritis (H) 2012       Family History   Problem Relation Age of Onset     Breast cancer Maternal Aunt      Cancer Maternal Aunt      Cancer Father      Cancer Sister      Cancer Maternal Grandmother      Cancer Cousin      Heart attack Mother 49         Social History     Socioeconomic History     Marital status:      Spouse name: None     Number of children: None     Years of education: None     Highest education level: None   Occupational History     None   Social Needs     Financial resource strain: None     Food insecurity     Worry: None     Inability: None     Transportation needs     Medical: None     Non-medical: None   Tobacco Use     Smoking status: Former Smoker     Last attempt to quit: 1995     Years since quittin.8     Smokeless tobacco: Never Used   Substance and Sexual Activity     Alcohol use: Yes     Comment: occasional couple times a year     Drug use: No     Sexual activity: None   Lifestyle     Physical activity     Days per week: None     Minutes per session: None     Stress: None   Relationships     Social connections     Talks on phone: None     Gets together: None     Attends Sikh service: None     Active member of club or organization: None     Attends meetings of clubs or organizations: None     Relationship status: None     Intimate partner violence     Fear of current or ex partner: None     Emotionally abused: None     Physically abused: None     Forced sexual activity: None   Other Topics Concern     None   Social History  Narrative     None     Social history: .  No tobacco or alcohol.    The following portions of the patient's history were reviewed and updated as appropriate: allergies, current medications, past family history, past medical history, past social history, past surgical history and problem list.      WHO 5: 8    ALEJANDRA Score: 40      Objective:   Physical Exam:    There were no vitals filed for this visit.  There is no height or weight on file to calculate BMI.      General:  Well-appearing male in no acute distress.  Overweight, pleasant,   cooperative, and interactive throughout the examination and interview.  HEENT: Head atraumatic normocephalic, sclera clear.  Skin: No rashes or lesions seen over the face.  Respirations unlabored.  MSK: Gait is nonantalgic.  Able to heel-toe stand without difficulty.    Range of motion: Appears to have mildly decreased lumbar flexion and finger to floor testing extension to neutral. Side bending is full bilaterally.         Neurologic exam: Mental status: Patient is alert and oriented with normal affect.  Attention, knowledge, memory, and language are intact.  Normal coordination throughout the examination.     Manual muscle testing : Appears to have at least antigravity strength in the  dorsiflexors EHLs and anterior plantar flexors.            Video-Visit Details    Type of service:  Video Visit    Video End Time (time video stopped): 10:50 AM  Originating Location (pt. Location): Home    Distant Location (provider location):  [facility]     Platform used for Video Visit: Willie Clifford DO

## 2021-06-07 NOTE — TELEPHONE ENCOUNTER
Controlled Substance Refill Request  Medication Name:   Requested Prescriptions     Pending Prescriptions Disp Refills     HYDROcodone-acetaminophen 5-325 mg per tablet 18 tablet 0     Sig: Take 1 tablet by mouth every 8 (eight) hours as needed for pain.     Date Last Fill: 4/9/20  Is patient out of medication?: No, One days left  Patient notified refills processed within 3 business days:  Yes  Requested Pharmacy: [facility]  Submit electronically to pharmacy  Controlled Substance Agreement on file:   Encounter-Level CSA Scan Date - 01/18/2018:    Scan on 1/22/2018 10:49 AM        Last office visit:  Virtual Visit 4/10/20.    Patient does not have an appointment with the [facility] until 5/5/20.  She is asking if Isabel Maurer MD will give her more pain medication.

## 2021-06-07 NOTE — PATIENT INSTRUCTIONS - HE
1.  Start gabapentin at bedtime.  Start with 100 mg at bedtime and slowly increase by 100 mg every 3 days to a maximum of 300 mg at bedtime.    2. Get MRI of Lumbar spin at [facility]    3.  Call and schedule a video visit with me within 1 to 2 days after the MRI.

## 2021-06-07 NOTE — TELEPHONE ENCOUNTER
Orders being requested:   PT 2 times a week for 2 weeks  Reason service is needed/diagnosis: Back strengthening and functionality.  When are orders needed by: ASAP  Where to send Orders: Phone:  [phone]  Okay to leave detailed message?  Yes

## 2021-06-07 NOTE — PROGRESS NOTES
"Claudia Wise is a 69 y.o. female who is being evaluated via a billable telephone visit.      The patient has been notified of following:     \"This telephone visit will be conducted via a call between you and your physician/provider. We have found that certain health care needs can be provided without the need for a physical exam.  This service lets us provide the care you need with a short phone conversation.  If a prescription is necessary we can send it directly to your pharmacy.  If lab work is needed we can place an order for that and you can then stop by our lab to have the test done at a later time.    Telephone visits are billed at different rates depending on your insurance coverage. During this emergency period, for some insurers they may be billed the same as an in-person visit.  Please reach out to your insurance provider with any questions.    If during the course of the call the physician/provider feels a telephone visit is not appropriate, you will not be charged for this service.\"    Patient has given verbal consent to a Telephone visit? Yes    Patient would like to receive their AVS by AVS Preference: Dmitry.    Additional provider notes:     Aileen Joshi CMA       ASSESSMENT AND PLAN:    Diagnoses and all orders for this visit:    Seronegative rheumatoid arthritis of multiple sites (H)  -     hydroxychloroquine (PLAQUENIL) 200 mg tablet; Take 1 tablet (200 mg total) by mouth 2 (two) times a day.  Dispense: 180 tablet; Refill: 0    Bilateral primary osteoarthritis of knee    Stage 3 chronic kidney disease (H)    Anemia in stage 3 chronic kidney disease (H)          HISTORY OF PRESENTING ILLNESS:  Claudia Wise 69 y.o. is evaluated here via phone link.  She has rheumatoid arthritis, osteoarthritis, she is not on methotrexate on hydroxychloroquine.  These were discontinued while she was in the hospital.  She reports having developed small bowel obstruction.  Was in the hospital for 2 " weeks.  And then she was in the [place] for recuperation in the 2 weeks.  A for the past 2 months she has not been on any of the DMARDs.  We discussed various options.  We concluded by planning to stay on hydroxychloroquine and get off methotrexate meet here in 2 months, check labs prior., stiffness in the morning down to 15 minutes recent labs reviewed.  This is interfering with some of the day-to-day activities.  Today we also discussed the issues related to the current pandemic, the pros and cons of the current treatment plan, the CDC guidelines such as social distancing washing the hands covering the cough.  ALLERGIES:Bupropion hcl; Erythromycin base; Tetracyclines; and Valacyclovir    PAST MEDICAL/ACTIVE PROBLEMS/MEDICATION/SOCIAL DATA  Past Medical History:   Diagnosis Date     Anemia      Anxiety      Chronic kidney disease      Diabetes mellitus, type II (H)     prediabetes     Disease of thyroid gland     hypothyroid     Family history of myocardial infarction      Gout      High cholesterol      Hypertension      Inverted nipple     right     Macular edema      Osteoarthritis 2012     Rheumatoid arthritis (H) 2012     Social History     Tobacco Use   Smoking Status Former Smoker     Last attempt to quit: 1995     Years since quittin.7   Smokeless Tobacco Never Used     Patient Active Problem List   Diagnosis     Herpes Simplex Type II     Anxiety     Anemia in chronic kidney disease     Hyperlipemia     Gout     Hypertension     Prediabetes     Endometriosis     Chronic kidney disease     Subclinical Hypothyroidism     Seronegative rheumatoid arthritis of multiple sites (H)     Bilateral primary osteoarthritis of knee     Paroxysmal atrial flutter (H)     Moderate malnutrition (H)     Small bowel obstruction (H)     Current Outpatient Medications   Medication Sig Dispense Refill     acetaminophen (TYLENOL) 500 MG tablet Take 500 mg by mouth every 6 (six) hours as needed for pain.        allopurinoL (ZYLOPRIM) 100 MG tablet Take 1 tablet (100 mg total) by mouth 2 (two) times a day with meals.  0     amLODIPine (NORVASC) 10 MG tablet Take 1 tablet (10 mg total) by mouth daily. 90 tablet 3     atorvastatin (LIPITOR) 10 MG tablet TAKE 1 TABLET BY MOUTH ONCE DAILY AT BEDTIME 90 tablet 3     carboxymethylcellulose (REFRESH PLUS) 0.5 % Dpet ophthalmic dropperette Administer 1-2 drops to both eyes 4 (four) times a day as needed.        carboxymethylcellulose sodium (THERATEARS) 1 % DpGe Administer 1 drop to both eyes at bedtime.        Continue current TPN formula Infuse into a venous catheter continuous. 1 Package 0     cyanocobalamin, vitamin B-12, 1,000 mcg Subl Place 1 tablet (1,000 mcg total) under the tongue daily.  0     fat emul-soy-mct-oliv-fish oiL 20 % Emul Infuse 250 mL into a venous catheter 3 (three) times a week in the evening.  0     fish oil-omega-3 fatty acids 300-1,000 mg capsule Take 1 g by mouth daily.       fluticasone (FLONASE) 50 mcg/actuation nasal spray INHALE ONE SPRAY(S) IN EACH NOSTRIL ONCE DAILY 48 g 1     folic acid (FOLVITE) 1 MG tablet TAKE 1 TABLET BY MOUTH ONCE DAILY 90 tablet 3     gabapentin (NEURONTIN) 100 MG capsule Take 100 mg by mouth 3 (three) times a day. 30 capsule 0     HYDROcodone-acetaminophen 5-325 mg per tablet Take 1 tablet by mouth every 8 (eight) hours as needed for pain. 18 tablet 0     levothyroxine (SYNTHROID, LEVOTHROID) 50 MCG tablet TAKE 1 TABLET BY MOUTH ONCE DAILY IN THE MORNING 90 tablet 3     LORazepam (ATIVAN) 0.5 MG tablet Take 0.5 tablets (0.25 mg total) by mouth every 6 (six) hours as needed for anxiety. 4 tablet 0     losartan (COZAAR) 50 MG tablet TAKE 1 TABLET BY MOUTH ONCE DAILY 90 tablet 2     melatonin 3 mg Tab tablet Take 1 tablet (3 mg total) by mouth at bedtime as needed.  0     metoprolol tartrate (LOPRESSOR) 25 MG tablet Take 1 tablet (25 mg total) by mouth 2 (two) times a day. 180 tablet 3     prednisoLONE acetate (PRED-FORTE) 1  % ophthalmic suspension Administer 1 drop into the left eye 2 (two) times a day.  0     RESTASIS 0.05 % ophthalmic emulsion INSTILL 1 DROP INTO EACH EYE TWICE DAILY  11     UNABLE TO FIND Tumeric unknown strength gel capsules. Take 1 capsule daily. Indication: Rheumatoid Arthritis.       acyclovir (ZOVIRAX) 400 MG tablet Take 1 tablet (400 mg total) by mouth every 8 (eight) hours as needed. 60 tablet 1     cyclobenzaprine (FLEXERIL) 5 MG tablet Take 1 tablet (5 mg total) by mouth every 8 (eight) hours as needed for muscle spasms. 30 tablet 0     No current facility-administered medications for this visit.          EXAMINATION:   Phone visit    LAB / IMAGING DATA:  ALT   Date Value Ref Range Status   03/02/2020 28 0 - 45 U/L Final   02/28/2020 30 0 - 45 U/L Final   02/11/2020 14 0 - 45 U/L Final     Albumin   Date Value Ref Range Status   03/10/2020 2.7 (L) 3.5 - 5.0 g/dL Final   03/03/2020 2.2 (L) 3.5 - 5.0 g/dL Final   03/02/2020 2.3 (L) 3.5 - 5.0 g/dL Final     Creatinine   Date Value Ref Range Status   03/10/2020 1.33 (H) 0.60 - 1.10 mg/dL Final   03/06/2020 1.18 (H) 0.60 - 1.10 mg/dL Final   03/03/2020 1.11 (H) 0.60 - 1.10 mg/dL Final       WBC   Date Value Ref Range Status   03/02/2020 4.3 4.0 - 11.0 thou/uL Final   02/28/2020 4.6 4.0 - 11.0 thou/uL Final   08/31/2015 4.7 4.0 - 11.0 thou/uL Final   07/01/2015 5.5 4.0 - 11.0 thou/uL Final     Hemoglobin   Date Value Ref Range Status   03/02/2020 7.7 (L) 12.0 - 16.0 g/dL Final   02/29/2020 7.7 (L) 12.0 - 16.0 g/dL Final   02/28/2020 7.8 (L) 12.0 - 16.0 g/dL Final     Platelets   Date Value Ref Range Status   03/02/2020 242 140 - 440 thou/uL Final   02/28/2020 274 140 - 440 thou/uL Final   02/11/2020 270 140 - 440 thou/uL Final       Lab Results   Component Value Date    RF <15.0 11/23/2015    SEDRATE 16 10/08/2015     Duration of the call:6  Minutes  Call start: 1.11  pm  Call end:   1.17 pm

## 2021-06-07 NOTE — TELEPHONE ENCOUNTER
Pt called and said that she needs to schedule an appt with Dr. Jeronimo to have PEG tube removed. Please call pt and let her know when that can or should be scheduled. She can be reached at [phone].

## 2021-06-07 NOTE — PROGRESS NOTES
Claudia comes in for follow up of a long complicated hospital stay.  She had an exploratory laparotomy with extensive lysis of adhesions and then had a prolonged ileus afterwards requiring a gastrostomy tube placement and then a long stay at [facility] for TPN.  She is now been home for about 3 weeks and has been eating well without any problems.  She has not used her gastrostomy tube for over a month.  She comes in and have that removed.    Physical exam:  Looks comfortable.  Abdomen is soft, nontender.  The gastrostomy was removed by applying gentle pressure.  There is a fair amount of drainage afterwards.  In fact she was about to leave when she had another sudden gush of drainage and another large amount of gauze was placed.    Impression: Overall doing well.  Did warn her that she may have a fair amount come out the site until it seals.  Typically it seals pretty quickly but it can take several days sometimes.  Very rarely does it take longer than just a few hours.    Plan: She is to keep the area covered with bacitracin and dry gauze.  Advised her not to have anything to eat or drink for several hours just to allow this to seal.  She will let me know how it is doing via my chart and follow-up with me in person will be as needed.

## 2021-06-07 NOTE — PROGRESS NOTES
"Claudia Wise is a 69 y.o. female who is being evaluated via a billable telephone visit.      The patient has been notified of following:     \"This telephone visit will be conducted via a call between you and your physician/provider. We have found that certain health care needs can be provided without the need for a physical exam.  This service lets us provide the care you need with a short phone conversation.  If a prescription is necessary we can send it directly to your pharmacy.  If lab work is needed we can place an order for that and you can then stop by our lab to have the test done at a later time.    Telephone visits are billed at different rates depending on your insurance coverage. During this emergency period, for some insurers they may be billed the same as an in-person visit.  Please reach out to your insurance provider with any questions.    If during the course of the call the physician/provider feels a telephone visit is not appropriate, you will not be charged for this service.\"    Patient has given verbal consent to a Telephone visit? Yes        Claudia Wise complains of    Chief Complaint   Patient presents with     Back Pain     Back pain on going x2 weeks-Heat and ice isn't helping anymoreTylenol either       I have reviewed and updated the patient's Past Medical History, Social History, Family History and Medication List.    ALLERGIES  Bupropion hcl; Erythromycin base; Tetracyclines; and Valacyclovir        Assessment/Plan:  1. Left lumbar radiculopathy  She twisted her back in mid Tori since then has pain in her mid lower back radiating to the left side. No leg weakness or numbness or incontinence.  She was taking Tylenol every 6-8 hours prn, Vicodin and Flexeril. Flexeril was making her very sleepy. The pain was getting better, but then last night was so bad that she could not sleep. She tried heat and ice, and creams. Tylenol helped a little bit. Pain is now radiating down to " her left anterior thigh, but not below the knee.She did not take ibuprofen or Aleve knowing that will be hard on her stomach and will affect her kidneys.  We discussed use of OTC patches and creams.  I sent Vicodin and Gabapentin and Medrolpak but we discussed constipation as a big problem with use of narcotics. She did  increase use of stool softeners and she tolerated Vicodin well. She will see [facility].  - methylPREDNISolone (MEDROL, ELVER,) 4 mg tablet; follow package directions  Dispense: 21 tablet; Refill: 0  - gabapentin (NEURONTIN) 100 MG capsule; Take 100 mg by mouth 3 (three) times a day.  Dispense: 30 capsule; Refill: 0  - Ambulatory referral to [facility]    Phone call duration:  7 minutes    Isabel Maurer MD

## 2021-06-07 NOTE — TELEPHONE ENCOUNTER
Continue stool softener daily, increase fluid and fiber intake, Probiotics daily. To call his surgeon tomorrow for the instructions over the weekend.

## 2021-06-07 NOTE — TELEPHONE ENCOUNTER
Spoke with pt's home care nurse, Kristin about decreased bowel movements in pt.  Hx of SBO and was initially treated conservatively which failed and she was taken to the OR on 1/24/2020 for ex lap, extensive lysis of adhesions, and small bowel resection.  Her post op course was c/b an ileus with bouts of n/v, an upper GI bleed possibly due to recurring need for NG tube.     Pt has tried stool softener with no success. Is passing gas. Denies any nausea or vomiting. Encouraged fluids and continue stool softerns. Advised I would review with Dr. Jeronimo. Pt asked to schedule a follow up appointment for questions of her PEG tube as well. Informed her that in office visits are limited to provider discretion due to COVID19, however could discuss her questions over the phone with Dr. Jeronimo when she is available.

## 2021-06-07 NOTE — TELEPHONE ENCOUNTER
The RN called about two issues. This goes with an encounter for order request from today.    Symptom  Describe your symptoms: Patient is post op small bowel obstruction.  The nurse states the patient has not had a bowel movement since Saturday. Passing gas  All four bowel sounds positive.  Did try to get hold of surgeon with no respoonse per nurse .  Please advise.    Any pain: no  New/Ongoing: New  How long have you been having symptoms: 5  day(s)  Have you been seen for this:  Home care patient  Appointment offered?: No  Triage offered?: Home care nurse calling who is not at bedside,  Home remedies tried: on stool softners  Requested Pharmacy: [facility]  Okay to leave a detailed message? Yes [phone]

## 2021-06-07 NOTE — TELEPHONE ENCOUNTER
Patient has a pending appointment with Dr. Vinson and would like to know if she will need blood work before the appointment. She was recently hospitalized and was dc on March 12. All records in [facility].    Is she able to do a televisit with Dr. Vinson instead of coming in?     Please advise.     Claudia @ [phone]

## 2021-06-07 NOTE — TELEPHONE ENCOUNTER
Nurse also called about a symptom call. Please note.      Orders being requested: Skilled nursing home visit one time x three days then two times a week for two weeks then one time a week for three weeks.  Additional 1-3 Skilled nursing PRN visits PT/OT to eval and treat.   Reason service is needed/diagnosis: post bowel obstruction   When are orders needed by: ASAP  Where to send Orders: Phone:  [phone]  Okay to leave detailed message?  Yes

## 2021-06-07 NOTE — PROGRESS NOTES
"Claudia Wise is a 69 y.o. female who is being evaluated via a billable telephone visit.      The patient has been notified of following:     \"This telephone visit will be conducted via a call between you and your physician/provider. We have found that certain health care needs can be provided without the need for a physical exam.  This service lets us provide the care you need with a short phone conversation.  If a prescription is necessary we can send it directly to your pharmacy.  If lab work is needed we can place an order for that and you can then stop by our lab to have the test done at a later time.    If during the course of the call the physician/provider feels a telephone visit is not appropriate, you will not be charged for this service.\"         Claudia Wise complains of    Chief Complaint   Patient presents with     Hospital Visit Follow Up     1/21-2/10 [facility] Small Bowel Obstruction-Went tn [facility] After.       I have reviewed and updated the patient's Past Medical History, Social History, Family History and Medication List.    ALLERGIES  Bupropion hcl; Erythromycin base; Tetracyclines; and Valacyclovir    Additional provider notes: See below    Assessment/Plan:  1. Small bowel obstruction (H)  Patient presented in the ER on 1/21/2020 with abdominal pain, CT of her abdomen revealed a distal SBO and was initially treated conservatively which failed and she was taken to the OR on 1/24/2020 for ex lap, extensive lysis of adhesions, and small bowel resection.  Her post op course was c/b an ileus with bouts of n/v, an upper GI bleed possibly due to recurring need for NG tube placement requiring a one unit prbc transfusion, and CHANDANA/CKD3.  With her recurring N/V, she eventually had a PEG tube placed on 2/7 placed to gravity with improvement to her overall symptoms.  She remains on TPN and was transferred to [facility] on 2/10 for continued management of her complex medical issues.  She now has her PEG " clamped and is tolerating a low fiber regular diet. She was in the TCU for few weeks after [place] and came home a week ago. She has home care and PT coming to her home.    We reviewed all her medications.   She reports no abdominal pain, managing bowel movements with Miralax every other day, appetite is good. PEG tube is still in place and she will see her Surgeon Dr Jeronimo in 3 weeks.        2. Hypertension  Stable on losartan and metoprolol.    3. Hypothyroidism, unspecified type  On levothyroxine.    4. Seronegative rheumatoid arthritis of multiple sites (H)  Was previously on Plaquenil, and methotrexate. These were d/franky while in hospital. She will call Dr Vinson for his recommendations when to restart these medications.    5. Acute bilateral low back pain without sciatica  She twisted her back a week ago and since then has pain in her mid lower back radiating to the left side, but not to her legs. No leg weakness or numbness or incontinence.  She is taking Tylenol every 6-8 hours over the last few days. The pain is worse in the morning when getting up from the bed.she is grading her pain as 8/10. She did not take ibuprofen or Aleve knowing that will be hard on her stomach and will affect her kidneys.  We discussed use of OTC patches and creams.  I sent Vicodin and Flexeryl RX, but we discussed constipation as a big problem with use of narcotics. She will use it only when pain is severe and will increase use of stool softeners. PT will come to her home tomorrow and I asked her to tell them to evaluate her back and give me a call.   - HYDROcodone-acetaminophen 5-325 mg per tablet; Take 1 tablet by mouth every 8 (eight) hours as needed for pain.  Dispense: 18 tablet; Refill: 0  - cyclobenzaprine (FLEXERIL) 5 MG tablet; Take 1 tablet (5 mg total) by mouth every 8 (eight) hours as needed for muscle spasms.  Dispense: 30 tablet; Refill: 0    She will call me or send My Chart message in a few days to report how she  is doing and if her back pain is improving.    Phone call duration:  16 minutes    Alda Snyder

## 2021-06-07 NOTE — TELEPHONE ENCOUNTER
Pt notified that she does not need labs prior to appt as they were just done 3/2/20. Changed pts follow up appt to video visit. Pt will let us know if she is not able to download [facility] gracia

## 2021-06-07 NOTE — TELEPHONE ENCOUNTER
Spoke with pt's home care nurse, Kristin about decreased bowel movements in pt.  Hx of SBO and was initially treated conservatively which failed and she was taken to the OR on 1/24/2020 for ex lap, extensive lysis of adhesions, and small bowel resection.  Her post op course was c/b an ileus with bouts of n/v, an upper GI bleed possibly due to recurring need for NG tube.    Pt has tried stool softener with no success. Is passing gas. Recommended a dose of Miralax today and encouraging fluids. She will call tomorrow if no bowel movement for further recommendations. Pt overall doing well and has minimal pain. Denies any nausea or vomiting.     Provided direct phone number for nurse to call me tomorrow with an update.

## 2021-06-08 NOTE — TELEPHONE ENCOUNTER
Call to pt with provider's results and recommendations. Pt stated understanding. Pt is scheduled for follow-up video visit on 6/11 with Dr. Clifford.

## 2021-06-08 NOTE — PROGRESS NOTES
Name: Claudia Wise  Age: 66 y.o.  Gender: female  : 1950  Date of Encounter: 2017      HPI:  Claudia Wise is a 66 y.o.  female with history of RA on immunosuppressive medications who presents to the clinic with concerns of possible sinus infection.  Reports having runny nose, head congestion and face pain and pressure behind eyes and into cheeks bilaterally. Also reports cough for last 3-4 month which is unchanged today.  She is being worked up by her PCP currently. Has taken acetaminophen for discomfort.  Has history of sinus infections.  Lives with  who has not been ill.  Current Medication:   Medications reviewed and updated.    Current Outpatient Prescriptions:      acetaminophen (TYLENOL) 500 MG tablet, Take 500 mg by mouth every 6 (six) hours as needed for pain., Disp: , Rfl:      allopurinol (ZYLOPRIM) 100 MG tablet, Take 1 tablet (100 mg total) by mouth 3 (three) times a day with meals., Disp: 90 tablet, Rfl: 2     amLODIPine (NORVASC) 5 MG tablet, Take 1 tablet (5 mg total) by mouth every morning., Disp: 90 tablet, Rfl: 3     atorvastatin (LIPITOR) 10 MG tablet, Take 1 tablet (10 mg total) by mouth bedtime., Disp: 90 tablet, Rfl: 2     cholecalciferol, vitamin D3, 1,000 unit tablet, Take 2,000 Units by mouth every evening. , Disp: , Rfl:      hydroxychloroquine (PLAQUENIL) 200 mg tablet, TAKE ONE TABLET BY MOUTH TWICE DAILY WITH FOOD, Disp: 60 tablet, Rfl: 3     levothyroxine (SYNTHROID, LEVOTHROID) 50 MCG tablet, Take 1 tablet (50 mcg total) by mouth every morning., Disp: 90 tablet, Rfl: 3     losartan (COZAAR) 50 MG tablet, Take 1 tablet (50 mg total) by mouth daily., Disp: 90 tablet, Rfl: 3     methotrexate 2.5 MG tablet, Take 4 tablets (10 mg total) by mouth once a week., Disp: 24 tablet, Rfl: 1     sodium bicarbonate 650 MG tablet, Take 1,296 mg by mouth 2 (two) times a day. (2 tabs twice daily), Disp: , Rfl:      acyclovir (ZOVIRAX) 400 MG tablet, Take 400 mg by mouth  every 8 (eight) hours as needed., Disp: , Rfl:      amoxicillin (AMOXIL) 500 MG capsule, Take 1 capsule (500 mg total) by mouth 3 (three) times a day for 10 days., Disp: 30 capsule, Rfl: 0     folic acid (FOLVITE) 1 MG tablet, Take 1 tablet (1 mg total) by mouth every evening., Disp: 90 tablet, Rfl: 1     LORazepam (ATIVAN) 0.5 MG tablet, Take 1 tablet (0.5 mg total) by mouth every 8 (eight) hours as needed for anxiety., Disp: 30 tablet, Rfl: 0    Past Med / Surg History:  Past Medical History   Diagnosis Date     Anemia      Anxiety      Chronic kidney disease      Diabetes mellitus, type II      prediabetes     Disease of thyroid gland      hypothyroid     Family history of myocardial infarction      Gout      High cholesterol      Hypertension      Inverted nipple      right     Osteoarthritis 2012     Rheumatoid arthritis 2012     Past Surgical History   Procedure Laterality Date     Hysterectomy       Oophorectomy       Colon surgery  1986     Colporrhaphy N/A 7/7/2016     Procedure: ANTERIOR REPAIR WITH MESH;  Surgeon: Zac Stone MD;  Location: [facility] OR;  Service:        Fam / Soc History:  Family History   Problem Relation Age of Onset     Breast cancer Maternal Aunt      Cancer Maternal Aunt      Cancer Father      Cancer Sister      Cancer Maternal Grandmother      Cancer Cousin      Heart attack Mother 49     Social History     Social History     Marital status:      Spouse name: N/A     Number of children: N/A     Years of education: N/A     Occupational History     Not on file.     Social History Main Topics     Smoking status: Former Smoker     Quit date: 7/7/1995     Smokeless tobacco: Never Used     Alcohol use Yes      Comment: occasional     Drug use: No     Sexual activity: Not on file     Other Topics Concern     Not on file     Social History Narrative       ROS:  14 point review of systems unremarkable except as mentioned in HPI    Objective:  Vitals:   Visit Vitals      /66     Pulse 83     Temp 97.5  F (36.4  C) (Oral)     Resp 16     Wt 161 lb 4.8 oz (73.2 kg)     SpO2 100%     BMI 26.84 kg/m2       Gen: Alert, awake, well appearing  HEENT: NC, AT,   Ears:  Ear canals clear.  TMs normal appearing.  Eyes:  Pupils equally round and reactive to light. Conjunctivae clear.  Sclera non-icteric.  Nose:  Nasal mucosa boggy, septum midline.  Bilateral maxillary sinus tenderness.  Mouth:  MMM. Oropharynx clear. No tonsillar exudate. Teeth, gum, tongue and lips clear.  Neck:  Supple, FROM, No lymphandenpathy, No TM  Heart: Regular rate and rhythm; normal S1 and S2; no murmurs, gallops, or rubs.  Peripheral Vessels: Normal pulses and perfusion.  Lungs: Unlabored respirations; symmetric chest expansion; clear breath sounds.  Mental Status: Alert, oriented, in no distress. Mood and affect appropriate.    Assessment:  sinusitis    Plan:  Discussed symptomatic cares including warm compresses, humidified air, fluids and rest.  Using saline irrigation, prescribed 10 days of amoxicillin.     Can continue to use over the medication as well for symptomatic cares.  Reviewed expected course duration of illness and   Reasons to return for re-evaluation.   Patient voiced understanding and was agreement      Milena Kaur MD  1/16/2017

## 2021-06-08 NOTE — TELEPHONE ENCOUNTER
I will order her TLSO to help her with her pain.  It also looks like she is on gabapentin.  Please get her current dose and we can slowly increase her dose to a more therapeutic level which could be 300 mg 3 times daily or up to 600 mg 3 times daily.

## 2021-06-08 NOTE — PROGRESS NOTES
"Claudia Wise is a 69 y.o. female who is being evaluated via a billable video visit.      The patient has been notified of following:     \"This video visit will be conducted via a call between you and your physician/provider. We have found that certain health care needs can be provided without the need for an in-person physical exam.  This service lets us provide the care you need with a video conversation.  If a prescription is necessary we can send it directly to your pharmacy.  If lab work is needed we can place an order for that and you can then stop by our lab to have the test done at a later time.    Video visits are billed at different rates depending on your insurance coverage. Please reach out to your insurance provider with any questions.    If during the course of the call the physician/provider feels a video visit is not appropriate, you will not be charged for this service.\"    Patient has given verbal consent to a Video visit? Yes    Patient would like to receive their AVS by AVS Preference: Dmitry.    Patient would like the video invitation sent by: Text to cell phone: [phone]    Will anyone else be joining your video visit? No        Video Start Time: 11:10 AM    Additional provider notes:   Assessment/Plan:      Diagnoses and all orders for this visit:    Lumbar spine pain    Compression fracture of L2 vertebra with routine healing, subsequent encounter        Assessment: Pleasant 69 y.o. female with a history of anxiety, rheumatoid arthritis, diabetes mellitus, hypertension, hyperlipidemia, chronic kidney disease, thyroid disorder, gout, osteopenia recent small bowel obstruction with surgical decompression with:    1.    Approximate 8-week history of significant right lower extremity and low back pain with radicular pain into the right lateral thigh and anterior thigh.  The thigh symptoms have improved and now just lumbar spine pain.  She has an L2 subacute compression fracture likely " causing her pain.    2.  History of osteopenia which is now  osteoporosis given her fracture.      Discussion:    1. *We discussed the compression fracture I reviewed the MRI.  We discussed the options of bracing along with medication, physical therapy, and further imaging.  She would like to start conservatively.    2.  Given that this is a stable type of fracture, bracing is optional for severe pain.  She would like to continue with her lumbar corset will contact me if she would like more aggressive bracing.    3.  Trial calcitonin nasal spray 1 spray daily for pain.  This can be used for bone pain.    4.  Start physical therapy for some lumbar exercises for the pain   And strategies for osteoporosis    5.  We discussed activity modification such as avoiding bending until she can see physical therapy and she should also listen to her body and if she is in severe pain take more frequent breaks.    6.  She now has osteoporosis given her fracture and she should follow-up with her primary care provider for further treatment    7.  We will follow-up with her in 3 weeks and order a x-ray of the lumbar spine prior to that visit to evaluate for fracture stability.  If she would like a brace in the interim she can contact us and I will refer her to orthotics for TLSO.      It was our pleasure caring for your patient today, if there any questions or concerns please do not hesitate to contact us.      Subjective:   Patient ID: Claudia Wise is a 69 y.o. female.    History of Present Illness:Patient presents for follow-up of low back pain.  The pain is at the lower lumbar spine bilaterally rated a 6/10 today.  She has pain that is fairly significant with standing and walking as well as standing doing dishes or cooking.  Better with laying down.  She has been taking hydrocodone 1 tablet/day and also Tylenol throughout the day otherwise.  She has had some physical therapy exercises in the past prior to her recent MRI but  nothing since the MRI confirming the diagnosis of an L2 compression fracture.  She has denied radiation down the legs paresthesias or weakness no bowel or bladder incontinence.  Since her last visit she does have a soft lumbar corset that she wears which does help mildly.    She has a history of osteopenia and is only taking calcium supplements      Imaging: MRI report and images were personally reviewed and discussed with the patient.  A plastic model was utilized during the discussion.  MRI of the lumbar spine personally reviewed.  This shows some degenerative disc disease mid lumbar spine.  She has an acute/subacute compression fracture L2.  No high-grade central stenosis.    Review of Systems: No numbness, tingling or weakness.  No bowel or bladder incontinence.  No urinary retention.  No fevers, unintentional weight loss, balance changes, headaches, frequent falling, difficulty swallowing, or coordination difficulties.      Past Medical History:   Diagnosis Date     Anemia      Anxiety      Chronic kidney disease      Diabetes mellitus, type II (H)     prediabetes     Disease of thyroid gland     hypothyroid     Family history of myocardial infarction      Gout      High cholesterol      Hypertension      Inverted nipple     right     Macular edema      Osteoarthritis 2012     Rheumatoid arthritis (H) 2012       The following portions of the patient's history were reviewed and updated as appropriate: allergies, current medications, past family history, past medical history, past social history, past surgical history and problem list.           Objective:   Physical Exam:    There were no vitals filed for this visit.  There is no height or weight on file to calculate BMI.        General:  Well-appearing male in no acute distress.  Pleasant,   cooperative, and interactive throughout the examination and interview.  HEENT: Head atraumatic normocephalic, sclera clear.  Skin: No rashes or lesions seen over the face.   Respirations unlabored.  MSK: Gait is nonantalgic.      Range of motion: Has decreased lumbar flexion fairly significantly and finger to floor testing.  She is cautious.  Extremities:   Full ankle dorsiflexion bilaterally.     Neurologic exam: Mental status: Patient is alert and oriented with normal affect.  Attention, knowledge, memory, and language are intact.  Normal coordination throughout the examination.     Manual muscle testing :   Appears to have at least antigravity strength in the bilateral ankle dorsiflexors, EHL and anterior plantar flexors from seated..       Video-Visit Details    Type of service:  Video Visit    Video End Time (time video stopped): 11:31 AM  Originating Location (pt. Location): Home    Distant Location (provider location):  [facility]     Platform used for Video Visit: Willie Clifford DO

## 2021-06-08 NOTE — TELEPHONE ENCOUNTER
Refill Approved    Rx renewed per Medication Renewal Policy. Medication was last renewed on 3/12/19.    iMracle Soler, [facility] Triage/Med Refill 5/26/2020     Requested Prescriptions   Pending Prescriptions Disp Refills     fluticasone propionate (FLONASE) 50 mcg/actuation nasal spray [Pharmacy Med Name: Fluticasone Propionate 50 MCG/ACT Nasal Suspension] 16 g 0     Sig: Use 1 spray(s) in each nostril once daily       Nasal Steroid Refill Protocol Passed - 5/21/2020 10:34 AM        Passed - Patient has had office visit/physical in last 2 years     Last office visit with prescriber/PCP: Visit date not found OR same dept: 9/3/2019 Larry Cruz CNP OR same specialty: 9/3/2019 Larry Cruz CNP Last physical: 8/1/2019 Last MTM visit: Visit date not found    Next appt within 3 mo: Visit date not found  Next physical within 3 mo: Visit date not found  Prescriber OR PCP: Isabel Maurer MD  Last diagnosis associated with med order: 1. Rhinitis, unspecified type  - fluticasone propionate (FLONASE) 50 mcg/actuation nasal spray [Pharmacy Med Name: Fluticasone Propionate 50 MCG/ACT Nasal Suspension]; Use 1 spray(s) in each nostril once daily  Dispense: 16 g; Refill: 0     If protocol passes may refill for 12 months if within 3 months of last provider visit (or a total of 15 months).

## 2021-06-08 NOTE — TELEPHONE ENCOUNTER
I can give her a trial of baclofen as a muscle relaxer to see if that could help some of her pain but this can also make her sleepy.  It looks like she was given hydrocodone for pain in the past another provider.  If she would like more of this medication, I would need to do a video visit with her.

## 2021-06-08 NOTE — TELEPHONE ENCOUNTER
Call to pt with provider's response. Pt states she will make MRI appt. Radiology # provided as pt would like appt at .     Pt does not she is claustrophobic. Pt requesting oral sedation for the MRI. Will send request to provider. Please send rx to [facility] in [place] on pt's list if appropriate.

## 2021-06-08 NOTE — TELEPHONE ENCOUNTER
Please call the pt and explain the info about the Forteo. Can you set her up to come to see me few days after her lab appt in June? I need to show her some payment options for Forteo and give some forms. She does not have to  Forteo from the pharmacy until she sees me and discuss all treatment and payment options. Thanks

## 2021-06-08 NOTE — TELEPHONE ENCOUNTER
PSP:  Dr. Clifford  Last clinic visit:  Video visit 5/21/2020  Clinical information: Pt calling to report she is in severe pain that has increased over the weekend. She reports she was prescribed calcitonin nasal spray which has not provided her with any relief. She also has been trying ES Tylenol and icy hot. She has her 1st PT session scheduled tomorrow.   Pt inquiring about further medication recommendations at this time. Pharmacy verified.   Pt additionally wondering how much calcium it would be advised that she take each day.   Pt also wondering about order for back brace that was discussed at her last appointment.   Advice given to patient: Will send update and all of her questions to provider.   Provider to address: Please address.

## 2021-06-08 NOTE — TELEPHONE ENCOUNTER
Call to pt with provider's results and recommendations. Pt stated understanding. Pt scheduled for video visit with provider this AM to review MRI and discuss treatment options.

## 2021-06-08 NOTE — TELEPHONE ENCOUNTER
I think an MRI of the lumbar spine would be very helpful to evaluate for nerve root compromise and L3-5 distribution which  would explain her pain.  I also do get a  generalized picture of her hip if this is done at [facility] or [facility] and can see if there is any concerning issue with the hip itself.

## 2021-06-08 NOTE — PROGRESS NOTES
"Claudia Wise is a 69 y.o. female who is being evaluated via a billable video visit.      The patient has been notified of following:     \"This video visit will be conducted via a call between you and your physician/provider. We have found that certain health care needs can be provided without the need for an in-person physical exam.  This service lets us provide the care you need with a video conversation.  If a prescription is necessary we can send it directly to your pharmacy.  If lab work is needed we can place an order for that and you can then stop by our lab to have the test done at a later time.    Video visits are billed at different rates depending on your insurance coverage. Please reach out to your insurance provider with any questions.    If during the course of the call the physician/provider feels a video visit is not appropriate, you will not be charged for this service.\"    Patient has given verbal consent to a Video visit? Yes    Will anyone else be joining your video visit? No        Video Start Time: 11:27 AM    Additional provider notes:   Assessment/Plan:      There are no diagnoses linked to this encounter.    Assessment: Pleasant 69 y.o. female with a history of anxiety, rheumatoid arthritis, diabetes mellitus, hypertension, hyperlipidemia, chronic kidney disease, thyroid disorder, gout, osteopenia recent small bowel obstruction with surgical decompression with:       1.  Approximate 3 month history of significant right lower extremity and low back pain with radicular pain into the right lateral thigh and anterior thigh.  The thigh symptoms have improved and now just lumbar spine pain.  She has an L2 subacute compression fracture likely causing her pain.     2.    osteoporosis managed through Dr. Maurer         Discussion:    1.  We discussed the fracture is stable and new x-rays.  She is wearing her brace we discussed medication options as well as option for vertebroplasty.  She is  " significantly limited by pain at this point but would like to wait for a few more weeks before considering vertebroplasty.    2.  Continue to wear her brace.    3.  Continue with Tylenol as needed for pain and she is getting hydrocodone per PCP and is taking that half a tab twice a day as needed and is treating her constipation which is the limiting factor for the medication.    4.  Try Lyrica small dose 25 mg at bedtime we discussed the potential side effect of dizziness.  If she gets too dizzy she should stop the medication.    5.  Will have her obtain 1 more x-ray in 1 month's time to evaluate for stability.     6.  Follow-upWith x-rays done the day prior.  This can be with myself or Loree Rivas.  If she is not improved we will consider referral for vertebroplasty.  Would likely need a new MRI at that time.  She should also continue to follow with her primary care provider for management of osteoporosis.    It was our pleasure caring for your patient today, if there any questions or concerns please do not hesitate to contact us.      Subjective:   Patient ID: Claudia Wise is a 69 y.o. female.    History of Present Illness: Patient presents for evaluation of persistent low back pain in the low back no longer having leg pain.  Wearing her TLSO for L2 compression fracture.  This last week she has had increasing pain 7/10 at worst.  Worse with walking and prolonged standing such as cooking dinner.  Better with sitting on a hard surface.  TLSO limits her mobility not much help with the pain.  Was on calcitonin nasal spray not helpful with the pain and that is stopped.  Tried gabapentin and baclofen caused dizziness.  Taking hydrocodone from PCP one half tab but this does cause constipation.  She is taking Tylenol in between.  She recently received another  refill from PCP.  Also starting on Forteo per PCP in the near future after labs will be done and the patient can get her finances figured out for how  much this will cost.  The note was reviewed from PCP.  Did start physical therapy been to 1 visit but last week had increased pain and did not attend.      Imaging: Plain films lumbar spine personally reviewed showing L2 compression fracture up to 30% stable compared to MRI.  Images were personally reviewed and discussed with the patient and shown to the patient.    Review of Systems: No numbness, tingling or weakness.  No bowel or bladder incontinence.  No urinary retention.  No fevers, unintentional weight loss, balance changes, headaches, frequent falling, difficulty swallowing, or coordination difficulties.           Past Medical History:   Diagnosis Date     Anemia      Anxiety      Chronic kidney disease      Diabetes mellitus, type II (H)     prediabetes     Disease of thyroid gland     hypothyroid     Family history of myocardial infarction      Gout      High cholesterol      Hypertension      Inverted nipple     right     Macular edema      Osteoarthritis 2012     Rheumatoid arthritis (H) 2012       The following portions of the patient's history were reviewed and updated as appropriate: allergies, current medications, past family history, past medical history, past social history, past surgical history and problem list.           Objective:   Physical Exam:    There were no vitals filed for this visit.  There is no height or weight on file to calculate BMI.        General:  Well-appearing female in no acute distress.  Pleasant,   cooperative, and interactive throughout the examination and interview.  HEENT: Head atraumatic normocephalic, sclera clear.  Skin: No rashes or lesions seen over the face.  Respirations unlabored.  MSK: Gait is nonantalgic.  TLSO in place.  Able to bend at the waist but decreased range of motion given the brace.  This is with lumbar flexion.  Able to toe stand without difficulty difficulty with heel standing with the brace for balance.     Neurologic exam: Mental status: Patient  is alert and oriented with normal affect.  Attention, knowledge, memory, and language are intact.  Normal coordination throughout the examination.       Appears to have at least antigravity strength in the bilateral lower extremities        Video-Visit Details    Type of service:  Video Visit    Video End Time (time video stopped): 11:52 AM  Originating Location (pt. Location): Home    Distant Location (provider location):  [facility]     Platform used for Video Visit: Wlilie Clifford DO

## 2021-06-08 NOTE — TELEPHONE ENCOUNTER
Central PA team  [phone]  Pool: HE PA MED ([id number])          PA has been initiated.       PA form completed and faxed insurance via Cover My Meds     Key:  [id number]      Medication:  FORTEO    Insurance:  [employer]         Response will be received via fax and may take up to 5-10 business days depending on plan

## 2021-06-08 NOTE — PATIENT INSTRUCTIONS - HE
1. Continue with your physical therapy exercises    2. Try lyrica at bedtime for pain. If you are too drowsy or dizzy stop    3. Follow up 1 month with xrays the day before

## 2021-06-08 NOTE — TELEPHONE ENCOUNTER
Lorazepam provided for claustrophobia.  Instructed her not to take this until she arrives at her appointment.  They will instruct her when to take the medication.

## 2021-06-08 NOTE — PATIENT INSTRUCTIONS - HE
1.  You have a compression fracture of the L2 vertebrae.  Avoid prolonged walking or bending forward.  We can get you a brace if your pain is severe.    2.  Take calcitonin nasal spray 1 spray daily to help with bone pain.    3.  Start physical therapy for 1-2 visits to help with some stretches and activities for your low back.    4.  Follow-up with your doctor for treatment of osteoporosis.    5.  Please get a repeat lumbar x-ray in 3 weeks 1 to 2 days before our follow-up visit via video visit

## 2021-06-08 NOTE — PROGRESS NOTES
"  1. Senile osteoporosis       Patient was educated on safety of Prolia utilizing Patient Counseling Chart for Healthcare Providers, as outlined by the Prolia REMS progam.     Return in about 3 months (around 9/17/2020) for Annual physical.    Patient Instructions   Prolia 1st today.  Prolia 2nd in 6 months.    Last DXA was in 10/2019. Check with your insurance if you need to wait for October .   Phone number to schedule [phone].    Daily calcium need is 3994-9368 mg a day from the diet and supplements.  Calcium citrate is easier to digest.  Vitamin D 2000 IU daily recommended.      Chief Complaint   Patient presents with     Follow-up     F/U       /62   Pulse 74   Ht 5' 3.5\" (1.613 m)   Wt 154 lb 8 oz (70.1 kg)   SpO2 97%   BMI 26.94 kg/m        Any medication change in the last 6 months? no  Did you take prednisone or other immunosupressant drugs in the last 6 months   (chemo, transplant, rheum, dermatology conditions)? no  Did you have any serious infection in the last 6 months?no  Any recent hospitalizations?no  Do you plan any dental work in the next 2-3 months?no  How much calcium do you take daily from the diet and supplements?1200 mg  How much vit D do you take daily? 2000 IU  Last DXA? 10/14/19, Reviewed and discussed        69 y.o. female with a history of anxiety, rheumatoid arthritis, diabetes mellitus, hypertension, hyperlipidemia, chronic kidney disease, thyroid disorder, gout, osteopenia, recent small bowel obstruction with surgical decompression with malabsoprtin and TPN feedings for few weeks, has   approximate 10-week history of significant left lower extremity and low back pain with radicular pain into the left lateral thigh and anterior thigh.  The thigh symptoms have improved and now just lumbar spine pain.  She has an L2 subacute compression fracture likely causing her pain on MRI 5/19/20.  History of osteopenia which is now  osteoporosis given her fracture.   DXA was done in " 10/2019.  Was on calcitonin for 2-3 weeks, but did not notice any improvement in the level of pain. I advised her to stop calcitonin, since there is equivocal data that is helpful and is not used anymore for osteoporosis treatment.  She had CKD3 and RA.   We discussed use of anabolic agent as Forteo or Tymlos if covered by her insurance. She does not have h/o radiation, bone malignancy, Paget's. RX is sent to PA department.   Since PTH is elevated, we will not be able to start anabolic agent. Vit D and PTH has to be corrected first. We decided to start Prolia injections.   Calcium and vit D daily supplements and diet discussed in details. She is drinking protein drink a day which has 600 mg of Ca. She will need to add 600 mg of calcium /day.    Patient is here today for the 1st Prolia injection.  We discussed calcium and vit D daily needs today.   Next Prolia injection will be in 6 months.     Pain management is still crucial. Flexeryl and Baclofen make her dizzy, drowsy and unstable with walking. Gabapentin was giving her upset stomach. She is taking Tylenol 600 mg, 2 tab, once or twice a day. Vicodin was helpful when prescribed initially. She usually take one Vicodin pill a day and manages obstipation with Miralax daily.She has brace prescribed by Spine clinic. They gave her Lyrica 25 mg at bedtime and she still did not try it.  She did start physical therapy for some lumbar exercises for the pain  prescribed by the spine clinic. Strategies for osteoporosis discussed.  We discussed activity modification such as avoiding bending and lifting and she should also listen to her body and if she is in severe pain take more frequent breaks.    Patient was educated on safety of Prolia utilizing Patient Counseling Chart for Healthcare Providers, as outlined by the Prolia REMS progam.     25 minutes spent with the patient and more then 50 % of the time in counseling about osteoporosis, available osteoporosis treatment  options, medication side effects and contraindications, supplement use and weightbearing exercise.    This note has been dictated using voice recognition software. Any grammatical or context distortions are unintentional and inherent to the software      Patient Active Problem List   Diagnosis     Herpes Simplex Type II     Anxiety     Anemia in chronic kidney disease     Hyperlipemia     Gout     Hypertension     Prediabetes     Endometriosis     Chronic kidney disease     Subclinical Hypothyroidism     Seronegative rheumatoid arthritis of multiple sites (H)     Bilateral primary osteoarthritis of knee     Paroxysmal atrial flutter (H)     Moderate malnutrition (H)     Small bowel obstruction (H)     Senile osteoporosis     Compression fracture of L2 vertebra with routine healing       Current Outpatient Medications on File Prior to Visit   Medication Sig Dispense Refill     acetaminophen (TYLENOL) 500 MG tablet Take 500 mg by mouth every 6 (six) hours as needed for pain.       acyclovir (ZOVIRAX) 400 MG tablet Take 1 tablet (400 mg total) by mouth every 8 (eight) hours as needed. 60 tablet 1     allopurinoL (ZYLOPRIM) 100 MG tablet Take 1 tablet (100 mg total) by mouth 2 (two) times a day with meals.  0     amLODIPine (NORVASC) 10 MG tablet Take 1 tablet (10 mg total) by mouth daily. 90 tablet 3     atorvastatin (LIPITOR) 10 MG tablet TAKE 1 TABLET BY MOUTH ONCE DAILY AT BEDTIME 90 tablet 3     carboxymethylcellulose (REFRESH PLUS) 0.5 % Dpet ophthalmic dropperette Administer 1-2 drops to both eyes 4 (four) times a day as needed.        carboxymethylcellulose sodium (THERATEARS) 1 % DpGe Administer 1 drop to both eyes at bedtime.        cyanocobalamin, vitamin B-12, 1,000 mcg Subl Place 1 tablet (1,000 mcg total) under the tongue daily.  0     fat emul-soy-mct-oliv-fish oiL 20 % Emul Infuse 250 mL into a venous catheter 3 (three) times a week in the evening.  0     fish oil-omega-3 fatty acids 300-1,000 mg capsule  Take 1 g by mouth daily.       fluticasone propionate (FLONASE) 50 mcg/actuation nasal spray Use 1 spray(s) in each nostril once daily 48 g 3     HYDROcodone-acetaminophen 5-325 mg per tablet Take 1 tablet by mouth every 8 (eight) hours as needed for pain. 18 tablet 0     hydrOXYchloroQUINE (PLAQUENIL) 200 mg tablet Take 1 tablet (200 mg total) by mouth 2 (two) times a day. 180 tablet 0     levothyroxine (SYNTHROID, LEVOTHROID) 50 MCG tablet TAKE 1 TABLET BY MOUTH ONCE DAILY IN THE MORNING 90 tablet 3     LORazepam (ATIVAN) 0.5 MG tablet Take 0.5 tablets (0.25 mg total) by mouth every 6 (six) hours as needed for anxiety. 4 tablet 0     losartan (COZAAR) 50 MG tablet Take 1 tablet by mouth once daily 90 tablet 1     melatonin 3 mg Tab tablet Take 1 tablet (3 mg total) by mouth at bedtime as needed.  0     metoprolol tartrate (LOPRESSOR) 25 MG tablet Take 1 tablet (25 mg total) by mouth 2 (two) times a day. 180 tablet 3     prednisoLONE acetate (PRED-FORTE) 1 % ophthalmic suspension Administer 1 drop into the left eye 2 (two) times a day.  0     RESTASIS 0.05 % ophthalmic emulsion INSTILL 1 DROP INTO EACH EYE TWICE DAILY  11     UNABLE TO FIND Tumeric unknown strength gel capsules. Take 1 capsule daily. Indication: Rheumatoid Arthritis.       pregabalin (LYRICA) 25 MG capsule Take 1 capsule (25 mg total) by mouth at bedtime. 30 capsule 2     [DISCONTINUED] insulin syringe-needle U-100 (BD INSULIN SYRINGE ULTRA-FINE) 1 mL 31 gauge x 5/16 Syrg Use daily with Forteo injection 100 each 3     [DISCONTINUED] teriparatide (FORTEO) 20 mcg/dose - 600 mcg/2.4 mL injection Inject 0.08 mL (20 mcg total) under the skin daily. 2.4 mL 11     Current Facility-Administered Medications on File Prior to Visit   Medication Dose Route Frequency Provider Last Rate Last Dose     [START ON 6/26/2020] denosumab 60 mg (PROLIA 60 mg/ml)  60 mg Subcutaneous Once Isabel Maurer MD

## 2021-06-08 NOTE — TELEPHONE ENCOUNTER
"Call placed to pt with provider's response. Pt aware TLSO brace referral was placed and she will be contacted.   Pt states she was prescribed gabapentin but she has stopped taking it. \"It made me feel sick, and sleepy, and didn't take care of the pain\". Pt declined trying this medication again. Will update provider to see if further recommendations could be provided.     "

## 2021-06-08 NOTE — PROGRESS NOTES
"Claudia Wise is a 69 y.o. female who is being evaluated via a billable telephone visit.      The patient has been notified of following:     \"This telephone visit will be conducted via a call between you and your physician/provider. We have found that certain health care needs can be provided without the need for a physical exam.  This service lets us provide the care you need with a short phone conversation.  If a prescription is necessary we can send it directly to your pharmacy.  If lab work is needed we can place an order for that and you can then stop by our lab to have the test done at a later time.    Telephone visits are billed at different rates depending on your insurance coverage. During this emergency period, for some insurers they may be billed the same as an in-person visit.  Please reach out to your insurance provider with any questions.    If during the course of the call the physician/provider feels a telephone visit is not appropriate, you will not be charged for this service.\"    Patient has given verbal consent to a Telephone visit? Yes    What phone number would you like to be contacted at? [phone]    Patient would like to receive their AVS by AVS Preference: Dmitry.        Assessment/Plan:  69 y.o. female with a history of anxiety, rheumatoid arthritis, diabetes mellitus, hypertension, hyperlipidemia, chronic kidney disease, thyroid disorder, gout, osteopenia, recent small bowel obstruction with surgical decompression with malabsoprtin and TPN feedings for few weeks, has   approximate 10-week history of significant left lower extremity and low back pain with radicular pain into the left lateral thigh and anterior thigh.  The thigh symptoms have improved and now just lumbar spine pain.  She has an L2 subacute compression fracture likely causing her pain on MRI 5/19/20..    1. Senile osteoporosis  History of osteopenia which is now  osteoporosis given her fracture.   DXA was done in " 10/2019.  Currently on calcitonin for 2-3 weeks, but did not notice any improvement in the level of pain. I advised her to stop calcitonin, since there is equivocal data that is helpful and is not used anymore for osteoporosis treatment.  She had CKD3 and RA. We discussed use of anabolic agent as Forteo or Tymlos if covered by her insurance. She does not have h/o radiation, bone malignancy, Paget's. RX is sent to [facility] department. I need to see her labs priror we start daily injections.  If anabolic agent not covered, we can do Prolia injections. We will make final decision on the f/u telephone visit in 2 weeks after the blood work is done.  Calcium and vit D daily supplements and diet discussed in details. She is drinking protein drink a day which has 600 mg of Ca. She will need to add 600 mg of calcium /day.  - Comprehensive Metabolic Panel; Future  - Vitamin D, Total (25-Hydroxy); Future  - Calcium, Ionized, Measured; Future  - Parathyroid Hormone Intact with Minerals; Future  - teriparatide (FORTEO) 20 mcg/dose - 600 mcg/2.4 mL injection; Inject 0.08 mL (20 mcg total) under the skin daily.  Dispense: 2.4 mL; Refill: 11  - insulin syringe-needle U-100 (BD INSULIN SYRINGE ULTRA-FINE) 1 mL 31 gauge x 5/16 Syrg; Use daily with Forteo injection  Dispense: 100 each; Refill: 3    2. Compression fracture of L2 vertebra with routine healing  Pain management is still crucial. Flexeryl and Baclofen make her dizzy, drowsy and unstable with walking. Gabapentin was giving her upset stomach. She is taking Tylenol 600 mg, 2 tab, once or twice a day. Vicodin was helpful when prescribed initially. She usually take one Vicodin pill a day and manages obstipation with Miralax daily. I refilled Vicodin. She has brace prescribed by Sp[facility] clinic.  She did start physical therapy for some lumbar exercises for the pain  prescribed by the spine clinic. Strategies for osteoporosis discussed.  We discussed activity modification such as avoiding  bending and lifting and she should also listen to her body and if she is in severe pain take more frequent breaks.    - Comprehensive Metabolic Panel; Future  - Vitamin D, Total (25-Hydroxy); Future  - Calcium, Ionized, Measured; Future  - Parathyroid Hormone Intact with Minerals; Future  - teriparatide (FORTEO) 20 mcg/dose - 600 mcg/2.4 mL injection; Inject 0.08 mL (20 mcg total) under the skin daily.  Dispense: 2.4 mL; Refill: 11  - insulin syringe-needle U-100 (BD INSULIN SYRINGE ULTRA-FINE) 1 mL 31 gauge x 5/16 Syrg; Use daily with Forteo injection  Dispense: 100 each; Refill: 3    3. Stage 3 chronic kidney disease (H)  Because of CKD, she is not a candidate for bisphosphonate treatment..  - Comprehensive Metabolic Panel; Future  - Vitamin D, Total (25-Hydroxy); Future  - Calcium, Ionized, Measured; Future  - Parathyroid Hormone Intact with Minerals; Future  - teriparatide (FORTEO) 20 mcg/dose - 600 mcg/2.4 mL injection; Inject 0.08 mL (20 mcg total) under the skin daily.  Dispense: 2.4 mL; Refill: 11  - insulin syringe-needle U-100 (BD INSULIN SYRINGE ULTRA-FINE) 1 mL 31 gauge x 5/16 Syrg; Use daily with Forteo injection  Dispense: 100 each; Refill: 3    4. Seronegative rheumatoid arthritis of multiple sites (H)  On Plaquenil, stable. Has scheduled lab appt for Dr Vinson on 6/11. I added my lab orders.  - teriparatide (FORTEO) 20 mcg/dose - 600 mcg/2.4 mL injection; Inject 0.08 mL (20 mcg total) under the skin daily.  Dispense: 2.4 mL; Refill: 11  - insulin syringe-needle U-100 (BD INSULIN SYRINGE ULTRA-FINE) 1 mL 31 gauge x 5/16 Syrg; Use daily with Forteo injection  Dispense: 100 each; Refill: 3    5. Moderate malnutrition (H)  Her diet improved now and she is on oral feedings. Very complicated postop course after SBO in January,was treated with ex lap, extensive lysis of adhesions, and small bowel resection.  Her post op course was c/b an ileus with bouts of n/v, an upper GI bleed possibly due to recurring  need for NG tube placement requiring a one unit prbc transfusion, and CHANDANA/CKD3.  With her recurring N/V, she eventually had a PEG tube placed on 2/7 placed to gravity with improvement to her overall symptoms.  She was on TPN for few weeks and she now has her PEG clamped and is tolerating a low fiber regular diet.  Sudden weight loss and malnutrition led to bone density loss.    - teriparatide (FORTEO) 20 mcg/dose - 600 mcg/2.4 mL injection; Inject 0.08 mL (20 mcg total) under the skin daily.  Dispense: 2.4 mL; Refill: 11  - insulin syringe-needle U-100 (BD INSULIN SYRINGE ULTRA-FINE) 1 mL 31 gauge x 5/16 Syrg; Use daily with Forteo injection  Dispense: 100 each; Refill: 3    6. Left lumbar radiculopathy    - HYDROcodone-acetaminophen 5-325 mg per tablet; Take 1 tablet by mouth every 8 (eight) hours as needed for pain.  Dispense: 18 tablet; Refill: 0                Phone call duration:  25 minutes    Alda Snyder

## 2021-06-08 NOTE — PROGRESS NOTES
Visit made in error. Patient was not able to figure out virtual visit on her phone/ipad in order to proceed today. Patient and PT decided patient will continue visit in clinic later today.

## 2021-06-08 NOTE — TELEPHONE ENCOUNTER
Call to pt with provider's response. Pt would like to try baclofen rx. Will have provider place order. Pt will call with any questions/concerns.

## 2021-06-08 NOTE — TELEPHONE ENCOUNTER
----- Message from Cole Clifford DO sent at 5/20/2020  8:42 AM CDT -----  MRI of the lumbar spine confirms an acute/subacute L2 compression fracture.  20% height loss.    This appears stable but there are options for treatment based on her pain level.  I would like to have her follow-up with video visit today at 1040/11 or sometime tomorrow to discuss treatment options.

## 2021-06-08 NOTE — PATIENT INSTRUCTIONS - HE
Prolia 1st today.  Prolia 2nd in 6 months.    Last DXA was in 10/2019. Check with your insurance if you need to wait for October .   Phone number to schedule [phone].    Daily calcium need is [id number] mg a day from the diet and supplements.  Calcium citrate is easier to digest.  Vitamin D 2000 IU daily recommended.

## 2021-06-08 NOTE — TELEPHONE ENCOUNTER
I sent Rx for Forteo to her pharmacy:  Jm.   Osteoporosis  L2 compression fracture  CKD 3  Rheumatoid arthritis  Malnutrition    Can you please do the PA and let me know?  Thanks

## 2021-06-08 NOTE — TELEPHONE ENCOUNTER
Pt is calling    She had a video visit yesterday for a L2 lumbar fracture.  Low back pain. Pain is a 8/10 on pain scale.  Pain is constant.  Needs something for pain  Given nasal spray to take once daily. Calcitonin.  No help at all.  Took 2 ES Tylenol with no help.  Patient is unable to take Ibuprofen.  Heating pad and cold packs do not work as well either.      Protocol and home care measures reviewed with the patient.  I advised her to schedule an appointment with one of the [place] urgent cares for tonight.  She would like to do that, as she feels like she cannot wait until Tuesday.  I advised her to call back with any new or worsening signs, symptoms, concerns, or questions.  She verbalized understanding.    Argelia Pichardo RN   [facility] Nurse Advisor

## 2021-06-08 NOTE — PROGRESS NOTES
"Claudia Wise is a 69 y.o. female who is being evaluated via a billable video visit.      The patient has been notified of following:     \"This video visit will be conducted via a call between you and your physician/provider. We have found that certain health care needs can be provided without the need for an in-person physical exam.  This service lets us provide the care you need with a video conversation.  If a prescription is necessary we can send it directly to your pharmacy.  If lab work is needed we can place an order for that and you can then stop by our lab to have the test done at a later time.    Video visits are billed at different rates depending on your insurance coverage. Please reach out to your insurance provider with any questions.    If during the course of the call the physician/provider feels a video visit is not appropriate, you will not be charged for this service.\"    Patient has given verbal consent to a Video visit? Yes    Will anyone else be joining your video visit? No          Video-Visit Details    Type of service:  Video Visit      Originating Location (pt. Location): Home    Distant Location (provider location):  [facility]     Platform used for Video Visit: GetFeedback    Pt states she's currently in [facility].     ASSESSMENT AND PLAN:    Diagnoses and all orders for this visit:    Seronegative rheumatoid arthritis of multiple sites (H)  -     hydrOXYchloroQUINE (PLAQUENIL) 200 mg tablet; Take 1 tablet (200 mg total) by mouth 2 (two) times a day.  Dispense: 180 tablet; Refill: 0    Stage 3 chronic kidney disease (H)    Bilateral primary osteoarthritis of knee    Senile osteoporosis          HISTORY OF PRESENTING ILLNESS:  Claudia Wise 69 y.o. is evaluated here via video link.  This is for follow-up of seronegative rheumatoid arthritis, osteoarthritis, she is on hydroxychloroquine only on her previous visit methotrexate was withheld, office she was admitted to the " hospital for intestinal issues, perforation, a prolonged stay her DMARDs were held.  Over the past 2 months there has been no significant worsening of her joint symptoms he gets 10 to 5 minutes of stiffness in her hands and wrists.  She did develop a insufficiency fracture in the lumbar spine.  She is going to start Forteo at her primary physician's office.  She has renal impairment.  She is aware not to use nonsteroidals.  We discussed the way forward.  One option is to stay on hydroxychloroquine continue to monitor more closely than usual, and may be she can do without methotrexate.  She is aware that with this approach there is a small risk that things could flareup and she may require a small dose of prednisone for a short while or start methotrexate right away and allowed some time to get going.  She is comfortable with this plan.  We will meet in 2 months.  ROS enquiry held for fever, ocular symptoms, rash, headache,  GI issues.  Today we also discussed the issues related to the current pandemic, the pros and cons of the current treatment plan, the CDC guidelines such as social distancing washing the hands covering the cough.  ALLERGIES:Bupropion hcl; Erythromycin base; Tetracyclines; and Valacyclovir    PAST MEDICAL/ACTIVE PROBLEMS/MEDICATION/SOCIAL DATA  Past Medical History:   Diagnosis Date     Anemia      Anxiety      Chronic kidney disease      Diabetes mellitus, type II (H)     prediabetes     Disease of thyroid gland     hypothyroid     Family history of myocardial infarction      Gout      High cholesterol      Hypertension      Inverted nipple     right     Macular edema      Osteoarthritis 2012     Rheumatoid arthritis (H) 2012     Social History     Tobacco Use   Smoking Status Former Smoker     Last attempt to quit: 1995     Years since quittin.9   Smokeless Tobacco Never Used     Patient Active Problem List   Diagnosis     Herpes Simplex Type II     Anxiety     Anemia in chronic kidney  disease     Hyperlipemia     Gout     Hypertension     Prediabetes     Endometriosis     Chronic kidney disease     Subclinical Hypothyroidism     Seronegative rheumatoid arthritis of multiple sites (H)     Bilateral primary osteoarthritis of knee     Paroxysmal atrial flutter (H)     Moderate malnutrition (H)     Small bowel obstruction (H)     Senile osteoporosis     Compression fracture of L2 vertebra with routine healing     Current Outpatient Medications   Medication Sig Dispense Refill     acetaminophen (TYLENOL) 500 MG tablet Take 500 mg by mouth every 6 (six) hours as needed for pain.       acyclovir (ZOVIRAX) 400 MG tablet Take 1 tablet (400 mg total) by mouth every 8 (eight) hours as needed. 60 tablet 1     allopurinoL (ZYLOPRIM) 100 MG tablet Take 1 tablet (100 mg total) by mouth 2 (two) times a day with meals.  0     amLODIPine (NORVASC) 10 MG tablet Take 1 tablet (10 mg total) by mouth daily. 90 tablet 3     atorvastatin (LIPITOR) 10 MG tablet TAKE 1 TABLET BY MOUTH ONCE DAILY AT BEDTIME 90 tablet 3     carboxymethylcellulose (REFRESH PLUS) 0.5 % Dpet ophthalmic dropperette Administer 1-2 drops to both eyes 4 (four) times a day as needed.        carboxymethylcellulose sodium (THERATEARS) 1 % DpGe Administer 1 drop to both eyes at bedtime.        cyanocobalamin, vitamin B-12, 1,000 mcg Subl Place 1 tablet (1,000 mcg total) under the tongue daily.  0     fat emul-soy-mct-oliv-fish oiL 20 % Emul Infuse 250 mL into a venous catheter 3 (three) times a week in the evening.  0     fish oil-omega-3 fatty acids 300-1,000 mg capsule Take 1 g by mouth daily.       fluticasone propionate (FLONASE) 50 mcg/actuation nasal spray Use 1 spray(s) in each nostril once daily 48 g 3     folic acid (FOLVITE) 1 MG tablet TAKE 1 TABLET BY MOUTH ONCE DAILY 90 tablet 3     HYDROcodone-acetaminophen 5-325 mg per tablet Take 1 tablet by mouth every 8 (eight) hours as needed for pain. 18 tablet 0     hydroxychloroquine (PLAQUENIL)  200 mg tablet Take 1 tablet (200 mg total) by mouth 2 (two) times a day. 180 tablet 0     insulin syringe-needle U-100 (BD INSULIN SYRINGE ULTRA-FINE) 1 mL 31 gauge x 5/16 Syrg Use daily with Forteo injection 100 each 3     levothyroxine (SYNTHROID, LEVOTHROID) 50 MCG tablet TAKE 1 TABLET BY MOUTH ONCE DAILY IN THE MORNING 90 tablet 3     LORazepam (ATIVAN) 0.5 MG tablet Take 0.5 tablets (0.25 mg total) by mouth every 6 (six) hours as needed for anxiety. 4 tablet 0     losartan (COZAAR) 50 MG tablet Take 1 tablet by mouth once daily 90 tablet 1     melatonin 3 mg Tab tablet Take 1 tablet (3 mg total) by mouth at bedtime as needed.  0     metoprolol tartrate (LOPRESSOR) 25 MG tablet Take 1 tablet (25 mg total) by mouth 2 (two) times a day. 180 tablet 3     prednisoLONE acetate (PRED-FORTE) 1 % ophthalmic suspension Administer 1 drop into the left eye 2 (two) times a day.  0     pregabalin (LYRICA) 25 MG capsule Take 1 capsule (25 mg total) by mouth at bedtime. 30 capsule 2     RESTASIS 0.05 % ophthalmic emulsion INSTILL 1 DROP INTO EACH EYE TWICE DAILY  11     teriparatide (FORTEO) 20 mcg/dose - 600 mcg/2.4 mL injection Inject 0.08 mL (20 mcg total) under the skin daily. 2.4 mL 11     UNABLE TO FIND Tumeric unknown strength gel capsules. Take 1 capsule daily. Indication: Rheumatoid Arthritis.       Current Facility-Administered Medications   Medication Dose Route Frequency Provider Last Rate Last Dose     [START ON 6/26/2020] denosumab 60 mg (PROLIA 60 mg/ml)  60 mg Subcutaneous Once Isabel Maurer MD             EXAMINATION:    Using the audio and video link as best as possible the constitutional, neck, neurologic, psych, skin, both upper extremities areas/organ system were evaluated during this assessment.     LAB / IMAGING DATA:  ALT   Date Value Ref Range Status   06/12/2020 16 0 - 45 U/L Final   03/02/2020 28 0 - 45 U/L Final   02/28/2020 30 0 - 45 U/L Final     Albumin   Date Value Ref Range Status    06/12/2020 4.3 3.5 - 5.0 g/dL Final   03/10/2020 2.7 (L) 3.5 - 5.0 g/dL Final   03/03/2020 2.2 (L) 3.5 - 5.0 g/dL Final     Creatinine   Date Value Ref Range Status   06/12/2020 1.38 (H) 0.60 - 1.10 mg/dL Final   06/12/2020 1.36 (H) 0.60 - 1.10 mg/dL Final   03/10/2020 1.33 (H) 0.60 - 1.10 mg/dL Final       WBC   Date Value Ref Range Status   03/02/2020 4.3 4.0 - 11.0 thou/uL Final   02/28/2020 4.6 4.0 - 11.0 thou/uL Final   08/31/2015 4.7 4.0 - 11.0 thou/uL Final   07/01/2015 5.5 4.0 - 11.0 thou/uL Final     Hemoglobin   Date Value Ref Range Status   03/02/2020 7.7 (L) 12.0 - 16.0 g/dL Final   02/29/2020 7.7 (L) 12.0 - 16.0 g/dL Final   02/28/2020 7.8 (L) 12.0 - 16.0 g/dL Final     Platelets   Date Value Ref Range Status   03/02/2020 242 140 - 440 thou/uL Final   02/28/2020 274 140 - 440 thou/uL Final   02/11/2020 270 140 - 440 thou/uL Final       Lab Results   Component Value Date    RF <15.0 11/23/2015    SEDRATE 16 10/08/2015     Duration of the call:5  Minutes  Call start: 1147  pm  Call end:   1152 pm

## 2021-06-08 NOTE — TELEPHONE ENCOUNTER
----- Message from Cole Clifford DO sent at 6/9/2020 10:32 AM CDT -----  L2 compression fracture remains approximately 30 degree height loss similar to MRI.    Continue with follow-up appointment as scheduled

## 2021-06-08 NOTE — TELEPHONE ENCOUNTER
"PSP:  Dr. Clifford  Last clinic visit:  Video visit on 5/5/2020  Reason for call: Symptom changes  Clinical information:  Pt calling to state she has not yet scheduled her MRI as she has noticed a shifting in her symptoms since her video visit with provider. Pt states \"it's been feeling more muscular to me, above and around my hip. The pain hasn't gotten any worse, but it's different from what it was a week to 10 days ago\". The pt's symptoms are still on her left side.   Pt inquiring if she should have another video visit to discuss, or if she should proceed with the MRI. Pt is aware that Dr. Clifford will not be in clinic next week. She would be ok waiting for provider to return to clinic to have visit.   Advice given to patient: Will send update to provider and will call pt back. Ok to leave detailed message.   Provider to address: Please advise.              "

## 2021-06-09 NOTE — PROGRESS NOTES
ASSESSMENT AND PLAN:   Claudia Wise has says she is in negative rheumatoid arthritis, osteoarthritis, lumbar spondylosis in the background of chronic renal impairment and chronic anemia more likely due to R than methotrexate of which she is taking a small dose at 10 mg per week along with the hydroxychloroquine.  Her recent labs are reviewed and remain abnormal but stably so.  She had her eyes examined 2 weeks ago and reports no concerns from ophthalmology with regard to the use of hydroxychloroquine.I have asked her to take Tylenol in addition for osteoarthritis related symptoms.  Shayna follow up here in 4 months labs every 2 months.    Diagnoses and all orders for this visit:    Seronegative rheumatoid arthritis of multiple sites  -     methotrexate 2.5 MG tablet; Take 4 tablets (10 mg total) by mouth once a week.  Dispense: 24 tablet; Refill: 0  -     hydroxychloroquine (PLAQUENIL) 200 mg tablet; TAKE ONE TABLET BY MOUTH TWICE DAILY WITH FOOD  Dispense: 60 tablet; Refill: 3    Osteoarthritis Of The Knee    High risk medication use    Chronic Kidney Disease (NKF Classification)    Anemia        HISTORY OF PRESENTING ILLNESS:  Claudia Wise 66 y.o. is here for followup of seronegative Rheumatoid Arthritis, osteoarthritis with his various manifestations, worsening renal impairment and osteoarthritis and history of a trochanteric bursitis.  She is on methotrexate and hydroxychloroquine and doing good.  She had started with rheumatoid arthritis symptoms several years ago.  These were a moderate intensity at that time.  The gradual onset.  Multiple joints affected.  Around the way she is doing of renal impairment for which she is on low-dose methotrexate rather than the usual dose.  She is on hydroxychloroquine and her eyes examined recently.  She got good benefit from the left trochanteric area injection would sometimes she still gets a discomfort especially she stops doing the physical therapy exercises  that she remembers from the PT time for the left trochanteric bursa symptoms.She noted her pain level with 2.0/10.  She has minimal stiffness in the morning more than 15 minutes.  She noted no fever or weight loss blurry vision eye redness AND nausea cough or rash.    .Following is the excerpt from his recent visit note:  Joints affected include multiple joints. Her arthropathy presented a few years. Onset was gradual. Her rheumatoid arthritis  symptoms are stable.Symptoms included joint pain, joint swelling and morning stiffness and were of moderate severity.Symptoms are made worse by: nothing. Symptoms are helped by current regimen.  Patient denies associated nodules, oral ulcers, polydypsia, rashes/photosensitive, Raynaud's and seizures.  Overall disease activity:  stable. Limitation on activities as noted in the MDHAQ scanned in the EMR.  Further historical information, including ROS as noted in the multidimensional health assessment questionnaire scanned in the EMR and in the assessment and plan section.  She has noted back pain which is mild, overall pain level at 2.5/10 able to do most of her day-to-day activities.  She noted no pain in her hands and wrists elbows and shoulders.  Her morning stiffness is limited to 10 minutes. Rheumatoid Arthritis Disease Activity Measure used: RAPID3, reviewed  today and scanned in the chart.  She noted discomfort in her thighs a sense of fatigue especially when she is going up and down the steps or up hill.  At rest is not an issue.  She noted pain in the left trochanteric region.  Gone on for the past several months.  Worse when she rolls over.  Wakes her up.  Some radiation down the leg.   ALLERGIES:Bupropion hcl; Erythromycin base; Tetracyclines; and Valacyclovir    PAST MEDICAL/ACTIVE PROBLEMS/MEDICATION/SOCIAL DATA  Past Medical History:   Diagnosis Date     Anemia      Anxiety      Chronic kidney disease      Diabetes mellitus, type II     prediabetes     Disease of  thyroid gland     hypothyroid     Family history of myocardial infarction      Gout      High cholesterol      Hypertension      Inverted nipple     right     Osteoarthritis 2012     Rheumatoid arthritis 2012     History   Smoking Status     Former Smoker     Quit date: 7/7/1995   Smokeless Tobacco     Never Used     Patient Active Problem List   Diagnosis     Herpes Simplex Type II     Anxiety     Anemia     Osteoarthritis Of The Knee     Hyperlipemia     Gout     Hypertension     Prediabetes     Endometriosis     Chronic Kidney Disease (NKF Classification)     Subclinical Hypothyroidism     Lumbar Spondylosis     Seronegative rheumatoid arthritis of multiple sites     High risk medication use     Current Outpatient Prescriptions   Medication Sig Dispense Refill     acetaminophen (TYLENOL) 500 MG tablet Take 500 mg by mouth every 6 (six) hours as needed for pain.       acyclovir (ZOVIRAX) 400 MG tablet Take 400 mg by mouth every 8 (eight) hours as needed.       allopurinol (ZYLOPRIM) 100 MG tablet Take 1 tablet (100 mg total) by mouth 3 (three) times a day with meals. 90 tablet 2     amLODIPine (NORVASC) 5 MG tablet Take 1 tablet (5 mg total) by mouth every morning. 90 tablet 3     atorvastatin (LIPITOR) 10 MG tablet Take 1 tablet (10 mg total) by mouth bedtime. 90 tablet 2     cholecalciferol, vitamin D3, 1,000 unit tablet Take 2,000 Units by mouth every evening.        folic acid (FOLVITE) 1 MG tablet TAKE 1 TABLET (1 MG TOTAL) BY MOUTH EVERY EVENING. 30 tablet 0     hydroxychloroquine (PLAQUENIL) 200 mg tablet TAKE ONE TABLET BY MOUTH TWICE DAILY WITH FOOD 60 tablet 3     levothyroxine (SYNTHROID, LEVOTHROID) 50 MCG tablet Take 1 tablet (50 mcg total) by mouth every morning. 90 tablet 3     LORazepam (ATIVAN) 0.5 MG tablet Take 1 tablet (0.5 mg total) by mouth every 8 (eight) hours as needed for anxiety. 30 tablet 0     losartan (COZAAR) 50 MG tablet Take 1 tablet (50 mg total) by mouth daily. 90 tablet 3      methotrexate 2.5 MG tablet Take 4 tablets (10 mg total) by mouth once a week. 24 tablet 0     sodium bicarbonate 650 MG tablet Take 1,296 mg by mouth 2 (two) times a day. (2 tabs twice daily)       No current facility-administered medications for this visit.      DETAILED EXAMINATION (six area) :  Vitals:    03/02/17 1428   BP: 116/58   Patient Site: Right Arm   Patient Position: Sitting   Cuff Size: Adult Regular   Pulse: 80   Weight: 161 lb 4.8 oz (73.2 kg)     Alert oriented. Head including the face is examined for malar rash, heliotropes, scarring, lupus pernio. Eyes examined for redness such as in episcleritis/scleritis, periorbital lesions.   Neck examined  for lymph nodes, range of motion Both upper and lower extremities (all four) examined for swollen, warm &/or  tender joints, range of motion, rash, muscle weakness, edema. The salient normal / abnormal findings are appended.   No active synovitis in the palpable appendicular joints.  She has JLT of the right knee more so than left.  She has tenderness in the left trochanteric area.        LAB / IMAGING DATA:  ALT   Date Value Ref Range Status   02/27/2017 21 0 - 45 U/L Final   12/30/2016 27 0 - 45 U/L Final   12/27/2016 23 0 - 45 U/L Final     ALBUMIN   Date Value Ref Range Status   02/27/2017 4.0 3.5 - 5.0 g/dL Final   12/30/2016 4.0 3.5 - 5.0 g/dL Final   12/27/2016 3.9 3.5 - 5.0 g/dL Final     CREATININE   Date Value Ref Range Status   02/27/2017 1.39 (H) 0.60 - 1.10 mg/dL Final   12/30/2016 1.34 (H) 0.60 - 1.10 mg/dL Final   12/27/2016 1.56 (H) 0.60 - 1.10 mg/dL Final       WBC   Date Value Ref Range Status   02/27/2017 5.7 4.0 - 11.0 thou/uL Final   12/27/2016 6.1 4.0 - 11.0 thou/uL Final   08/31/2015 4.7 4.0 - 11.0 thou/uL Final   07/01/2015 5.5 4.0 - 11.0 thou/uL Final     HEMOGLOBIN   Date Value Ref Range Status   02/27/2017 9.5 (L) 12.0 - 16.0 g/dL Final   12/27/2016 9.5 (L) 12.0 - 16.0 g/dL Final   10/25/2016 9.8 (L) 12.0 - 16.0 g/dL Final      PLATELETS   Date Value Ref Range Status   02/27/2017 175 140 - 440 thou/uL Final   12/27/2016 166 140 - 440 thou/uL Final   10/25/2016 163 140 - 440 thou/uL Final       Lab Results   Component Value Date    RF <15.0 11/23/2015    SEDRATE 16 10/08/2015

## 2021-06-09 NOTE — TELEPHONE ENCOUNTER
Phone call to patient to review results and provider's recommendations. Results given and explained. Discussed the plan to return for her scheduled follow up. Stated understanding.

## 2021-06-09 NOTE — TELEPHONE ENCOUNTER
----- Message from Cole Clifford DO sent at 7/2/2020  7:35 AM CDT -----  X-ray reviewed.  Stable L2 compression fracture.    Continue plan to follow-up with Loree Rivas.

## 2021-06-09 NOTE — PATIENT INSTRUCTIONS - HE

## 2021-06-09 NOTE — PROGRESS NOTES
[facility] Rehabilitation Daily Progress     Patient Name: Claudia Wise  Date: 7/15/2020  Visit #: 2/8  Referral Diagnosis: L2 compression fracture  Referring provider: Loree Rivas C*  Visit Diagnosis:     ICD-10-CM    1. Compression fracture of L2 vertebra, initial encounter (H)  S32.020A    2. Lumbar spine painful on movement  M54.5    3. Acute low back pain without sciatica, unspecified back pain laterality  M54.5        Assessment:     Today patient returns for 1st follow up visit after not being seen since 5/27 evaluation. Patient improving in therapy, however still feels weakness and limited motion throughout the day. Patient feeling depressed and needs motivation to get moving throughout the day without increased pain symptoms, which is why she would be appropriate to continue skilled PT.     From Eval:  Patient is a 69 y.o. female that presents with signs and symptoms consistent with left low back pain secondary to Compression fracture of L2 vertebra with routine healing. Patient demonstrates impairments including decreased lumbar flexibility and mobility, with poor core stability and postural awareness, leading to impaired functional mobility. Patient's functional limitations include sitting and walking for lengthy periods of time, sleeping comfortably, and bending forwards properly with TLSO and without pain symptoms.      HEP/POC compliance is  good .  Patient demonstrates understanding/independence with home program.  Patient is appropriate to continue with skilled physical therapy intervention, as indicated by initial plan of care.    Goal Status:  Pt. will be independent with home exercise program in : 6 weeks    Pt will: be able to walk 10 minutes without difficulty or increase of back pain symptoms by 6 weeks.  Pt will: be able to squat down for a daily task without increased pain or difficulty and without TLSO by 6 weeks.  Pt will: be able to sit for longer than 30 minutes  without increase of back pain symptoms by 6 weeks.        Plan / Patient Education:     Continue with initial plan of care.  Progress with home program as tolerated.    Plan for next visit: review HEP, add core/lumbar strengthening as appropriate, Treadmill warm up, rotational exercises as appropriate, standing banded exercises, step ups, total gym, quadruped ex    Subjective:     Patient reports she is feeling a little depressed because she isn't getting better. Patient did go to spine center and they told her to make sure she wears the brace. Patient feels pain with standing within 5 minutes she feels soreness on left lower back. Patient able to do her exercises, sleeping has been going well. Patient has been trying to do small walks, maybe 600-800ft.     Functional limitations are described as occurring with:   bending, standing for longer periods of time, sleeping too, sit to stand transfers, sitting, feels good in the morning and as the day goes on the pain increased     Objective:       Treatment Today       Exercises:  Exercise #1: SKTC and DKTC - hold 30 sec  Comment #1: supine knee rocks - 20 reps  Exercise #2: supine piriformis stretch - hold 30 sec x 2  Comment #2: lift lift lower lower abdominal activation - 10 reps  Exercise #3: sit to stands - 5 reps, 3x daily  Comment #3: mini squats at sink - 10 reps  Exercise #4: supine SLR - 10-20 reps  Comment #4: supine bridge with hip adduction - 10-20 reps  Exercise #5: sidelying hip abd - 10-20 reps        TREATMENT MINUTES COMMENTS   Evaluation     Self-care/ Home management     Manual therapy     Neuromuscular Re-education     Therapeutic Activity     Therapeutic Exercises 30 See above flowsheet  Nustep 4 min   Gait training     Modality__________________                Total 30    Blank areas are intentional and mean the treatment did not include these items.       Diya Rivera, PT  7/15/2020

## 2021-06-09 NOTE — PROGRESS NOTES
"Claudia Wise is a 69 y.o. female who is being evaluated via a billable video visit.      The patient has been notified of following:     \"This video visit will be conducted via a call between you and your physician/provider. We have found that certain health care needs can be provided without the need for an in-person physical exam.  This service lets us provide the care you need with a video conversation.  If a prescription is necessary we can send it directly to your pharmacy.  If lab work is needed we can place an order for that and you can then stop by our lab to have the test done at a later time.    Video visits are billed at different rates depending on your insurance coverage. Please reach out to your insurance provider with any questions.    If during the course of the call the physician/provider feels a video visit is not appropriate, you will not be charged for this service.\"    Patient has given verbal consent to a Video visit? Yes  How would you like to obtain your AVS? AVS Preference: MyChart.  Patient would like the video invitation sent by: Text to cell phone: [phone]  Will anyone else be joining your video visit? No        Video Start Time: 12:46 PM    Video-Visit Details    Type of service:  Video Visit    Video End Time (time video stopped): 1:20 PM  Originating Location (pt. Location): Home    Distant Location (provider location):  [facility]     Platform used for Video Visit: Doximity (am well failed)      Loree Rivas, JANY    Assessment:     Diagnoses and all orders for this visit:    Compression fracture of L2 vertebra with routine healing, subsequent encounter    Lumbar spine pain    Lumbar radicular pain       Claudia Wise is a 69 y.o. y.o. female with past medical history significant for anxiety, rheumatoid arthritis managed by Dr. Vinson, diabetes myelitis, hypertension, hyperlipidemia, chronic kidney disease, thyroid disorder, gout, osteopenia, recent " small bowel obstruction with surgical decompression, who presents today for follow-up regarding:    -Approximately 4-month history of left low back pain, previous radicular pain has resolved.. Patient has L2 subacute compression fracture that is stable on recent x-ray.    *Dr. Clifford primary spine provider     Plan:     A shared decision making plan was used. The patient's values and choices were respected. Prior medical records from 6/11/2020 recurrent were reviewed today. The following represents what was discussed and decided upon by the provider and the patient.        -DIAGNOSTIC TESTS: Images were personally reviewed and interpreted.   --If no benefit with bracing and physical therapy in the next 2 to 4 weeks would consider updated imaging for potential vertebroplasty treatments.  Patient would like to trial therapy and wearing the brace more consistently first.  --Lumbar spine x-ray 7/1/2020 shows stable L2 compression deformity, grade 1 L3-4 spondylolisthesis.    -Encourage patient to continue wearing her TLSO back brace, she reports it is very cumbersome therefore only wears it max of an hour a day and does not find it helpful.  Advised patient to wear it consistently for the next 1 week to see if it notices a difference.      -INTERVENTIONS: Consider vertebroplasty if symptoms or not improving.    -MEDICATIONS: Advised patient to continue with hydrocodone which she takes only a half a tablet as needed which is managed by her PCP.  Discussed side effects of medications and proper use. Patient verbalized understanding.    -PHYSICAL THERAPY: Referral to physical therapy placed again for her to see Diya for her ongoing low back pain.  Discussed the importance of core strengthening, ROM, stretching exercises with the patient and how each of these entities is important in decreasing pain.  Explained to the patient that the purpose of physical therapy is to teach the patient a home exercise program.  These  exercises need to be performed every day in order to decrease pain and prevent future occurrences of pain.        -PATIENT EDUCATION:  34 minutes of total visit time was spent face to face with the patient today, 60 % of the visit was spent on counseling, education, and coordinating care.   -5 minutes spent outside of visit time, non-face-to-face time, reviewing chart.  -Today we also discussed the issues related to the current COVID-19 pandemic, the pros and cons of the current treatment plan, the CDC guidelines such as social distancing, washing the hands, and covering the cough.    -FOLLOW UP: Advised patient to follow-up in 4 weeks however sooner if symptoms are not improving.  Advised patient to call us in 1 week or [employer] message to see how she is doing with wearing the brace on a consistent level and see if it makes a difference.  If it does make a difference I would recommend wearing it for the next 3 weeks and then we can see how she is doing and potentially wean off at that time.  Advised to contact clinic if symptoms worsen or change.    Subjective:     Claudia Wise is a 69 y.o. female who presents today for follow-up regarding ongoing low back pain left lower lumbar spine for the last 4 months.  She does report that she only wears her TLSO back brace for about 1 hour every other day or so and does not necessarily find it helpful, she feels it very cumbersome as well.  She reports that she has not necessarily worn it on a regular basis.    Patient was evaluated by Dr. Maurer 6/17/2020 for osteoporosis, was given first Prolia injection and recommend second in 6 months.  Bone density recheck in October.    Treatment to Date: No prior spinal surgery.    Medications:  Tylenol  Hydrocodone    Previous medications:  Lyrica however patient has stopped this medication on her own without increase in back pain    Patient Active Problem List   Diagnosis     Herpes Simplex Type II     Anxiety     Anemia in  chronic kidney disease     Hyperlipemia     Gout     Hypertension     Prediabetes     Endometriosis     Chronic kidney disease     Subclinical Hypothyroidism     Seronegative rheumatoid arthritis of multiple sites (H)     Bilateral primary osteoarthritis of knee     Paroxysmal atrial flutter (H)     Moderate malnutrition (H)     Small bowel obstruction (H)     Senile osteoporosis     Compression fracture of L2 vertebra with routine healing       Current Outpatient Medications on File Prior to Encounter   Medication Sig Dispense Refill     acetaminophen (TYLENOL) 500 MG tablet Take 500 mg by mouth every 6 (six) hours as needed for pain.       acyclovir (ZOVIRAX) 400 MG tablet Take 1 tablet (400 mg total) by mouth every 8 (eight) hours as needed. 60 tablet 1     allopurinoL (ZYLOPRIM) 100 MG tablet Take 1 tablet (100 mg total) by mouth 2 (two) times a day with meals.  0     amLODIPine (NORVASC) 10 MG tablet Take 1 tablet (10 mg total) by mouth daily. 90 tablet 3     atorvastatin (LIPITOR) 10 MG tablet TAKE 1 TABLET BY MOUTH ONCE DAILY AT BEDTIME 90 tablet 3     carboxymethylcellulose (REFRESH PLUS) 0.5 % Dpet ophthalmic dropperette Administer 1-2 drops to both eyes 4 (four) times a day as needed.        cyanocobalamin, vitamin B-12, 1,000 mcg Subl Place 1 tablet (1,000 mcg total) under the tongue daily.  0     fat emul-soy-mct-oliv-fish oiL 20 % Emul Infuse 250 mL into a venous catheter 3 (three) times a week in the evening.  0     fish oil-omega-3 fatty acids 300-1,000 mg capsule Take 1 g by mouth daily.       fluticasone propionate (FLONASE) 50 mcg/actuation nasal spray Use 1 spray(s) in each nostril once daily 48 g 3     HYDROcodone-acetaminophen 5-325 mg per tablet Take 1 tablet by mouth every 8 (eight) hours as needed for pain. 18 tablet 0     hydrOXYchloroQUINE (PLAQUENIL) 200 mg tablet Take 1 tablet (200 mg total) by mouth 2 (two) times a day. 180 tablet 0     levothyroxine (SYNTHROID, LEVOTHROID) 50 MCG tablet  TAKE 1 TABLET BY MOUTH ONCE DAILY IN THE MORNING 90 tablet 3     LORazepam (ATIVAN) 0.5 MG tablet Take 0.5 tablets (0.25 mg total) by mouth every 6 (six) hours as needed for anxiety. 4 tablet 0     losartan (COZAAR) 50 MG tablet Take 1 tablet by mouth once daily 90 tablet 1     melatonin 3 mg Tab tablet Take 1 tablet (3 mg total) by mouth at bedtime as needed.  0     metoprolol tartrate (LOPRESSOR) 25 MG tablet Take 1 tablet (25 mg total) by mouth 2 (two) times a day. 180 tablet 3     prednisoLONE acetate (PRED-FORTE) 1 % ophthalmic suspension Administer 1 drop into the left eye 2 (two) times a day.  0     RESTASIS 0.05 % ophthalmic emulsion INSTILL 1 DROP INTO EACH EYE TWICE DAILY  11     UNABLE TO FIND Tumeric unknown strength gel capsules. Take 1 capsule daily. Indication: Rheumatoid Arthritis.       carboxymethylcellulose sodium (THERATEARS) 1 % DpGe Administer 1 drop to both eyes at bedtime.        pregabalin (LYRICA) 25 MG capsule Take 1 capsule (25 mg total) by mouth at bedtime. 30 capsule 2     No current facility-administered medications on file prior to encounter.        Allergies   Allergen Reactions     Bupropion Hcl Nausea Only     Erythromycin Base Nausea Only     Tetracyclines Itching     Valacyclovir Nausea Only and Nausea And Vomiting       Past Medical History:   Diagnosis Date     Anemia      Anxiety      Chronic kidney disease      Diabetes mellitus, type II (H)     prediabetes     Disease of thyroid gland     hypothyroid     Family history of myocardial infarction      Gout      High cholesterol      Hypertension      Inverted nipple     right     Macular edema      Osteoarthritis 2012     Rheumatoid arthritis (H) 2012        Review of Systems  ROS:  Specifically negative for bowel/bladder dysfunction, balance changes, headache, dizziness, foot drop, fevers, chills, appetite changes, nausea/vomiting, unexplained weight loss. Otherwise 13 systems reviewed are negative. Please see the patient's  intake questionnaire from today for details.    Reviewed Social, Family, Past Medical and Past Surgical history with patient, no significant changes noted since prior visit.     Objective:     VIRTUAL PHYSICAL EXAM:   --CONSTITUTIONAL: Well developed, well nourished, healthy appearing individual.  --PSYCHIATRIC: Appropriate mood and affect. No difficulty interacting due to temper, social withdrawal, or memory issues.  --SKIN: Lumbar region is visually dry and intact.   --RESPIRATORY: Normal rhythm and effort. No abnormal accessory muscle breathing patterns noted.   --STANDING EXAMINATION:  Normal lumbar lordosis noted, no lateral shift.  --MUSCULOSKELETAL: Lumbar spine inspection reveals no evidence of deformity.  No back brace noted on exam today.  --GROSS MOTOR: Gait is non-antalgic. Easily arises from a seated position.   --LOWER EXTREMITY MOTOR TESTING:   Full and equal bilateral active range of motion with knee extension/flexion and ankle dorsiflexion/extension to anti-gravity.     RESULTS:   Imaging: Lumbar spine imaging was reviewed today. The images were shown to the patient and the findings were explained using a spine model.    Xr Lumbar Spine 2 Or 3 Vws  Result Date: 7/2/2020  INDICATION: Low back pain compression fracture L2 evaluate for stability COMPARISON: 06/08/2020   Very shallow left apex curvature of the lumbar spine. Grade 1 retrolisthesis of L3 on L4. The bones appear diffusely demineralized, suggestive of osteoporosis. Stable appearance of the L2 compression deformity. No new compression deformities.  Atherosclerotic calcification of the abdominal aorta. Degenerative changes of the sacroiliac joints.

## 2021-06-09 NOTE — PROGRESS NOTES
Assessment/Plan:        1. Essential hypertension with goal blood pressure less than 130/80     2. Hyperlipidemia  atorvastatin (LIPITOR) 10 MG tablet   3. Vaginal dryness     4. Skin disease         Return in about 3 months (around 7/4/2017) for Recheck.    There are no Patient Instructions on file for this visit.    Chief Complaint   Patient presents with     Hypertension     1. Follow up hypertension-patient saw nephrologist in February and he decreased the amlodipine from 5 mg to 2.5 mg.  She states that her blood pressure at home stays stable.  I rechecked her blood pressure today.  She does not have any ankle swelling.  2.  She is complaining about vaginal dryness and pain with intercourse-she would like to try Premarin topical estrogen and prescription is sent.    /70  Pulse 76  Wt 161 lb 4.8 oz (73.2 kg)  BMI 26.84 kg/m2  15 minutes spent with the patient and more then 50 % of the time in counseling.  This note has been dictated using voice recognition software. Any grammatical or context distortions are unintentional and inherent to the software      Patient Active Problem List   Diagnosis     Herpes Simplex Type II     Anxiety     Anemia     Osteoarthritis Of The Knee     Hyperlipemia     Gout     Hypertension     Prediabetes     Endometriosis     Chronic Kidney Disease (NKF Classification)     Subclinical Hypothyroidism     Lumbar Spondylosis     Seronegative rheumatoid arthritis of multiple sites     High risk medication use       Current Outpatient Prescriptions on File Prior to Visit   Medication Sig Dispense Refill     acetaminophen (TYLENOL) 500 MG tablet Take 500 mg by mouth every 6 (six) hours as needed for pain.       acyclovir (ZOVIRAX) 400 MG tablet Take 400 mg by mouth every 8 (eight) hours as needed.       allopurinol (ZYLOPRIM) 100 MG tablet Take 1 tablet (100 mg total) by mouth 3 (three) times a day with meals. 90 tablet 2     cholecalciferol, vitamin D3, 1,000 unit tablet Take  2,000 Units by mouth every evening.        folic acid (FOLVITE) 1 MG tablet TAKE 1 TABLET (1 MG TOTAL) BY MOUTH EVERY EVENING. 30 tablet 0     hydroxychloroquine (PLAQUENIL) 200 mg tablet TAKE ONE TABLET BY MOUTH TWICE DAILY WITH FOOD 60 tablet 3     levothyroxine (SYNTHROID, LEVOTHROID) 50 MCG tablet Take 1 tablet (50 mcg total) by mouth every morning. 90 tablet 3     LORazepam (ATIVAN) 0.5 MG tablet Take 1 tablet (0.5 mg total) by mouth every 8 (eight) hours as needed for anxiety. 30 tablet 0     losartan (COZAAR) 50 MG tablet Take 1 tablet (50 mg total) by mouth daily. 90 tablet 3     methotrexate 2.5 MG tablet Take 4 tablets (10 mg total) by mouth once a week. 24 tablet 0     sodium bicarbonate 650 MG tablet Take 1,296 mg by mouth 2 (two) times a day. (2 tabs twice daily)       [DISCONTINUED] atorvastatin (LIPITOR) 10 MG tablet Take 1 tablet (10 mg total) by mouth bedtime. 90 tablet 2     [DISCONTINUED] amLODIPine (NORVASC) 5 MG tablet Take 1 tablet (5 mg total) by mouth every morning. 90 tablet 3     No current facility-administered medications on file prior to visit.

## 2021-06-09 NOTE — TELEPHONE ENCOUNTER
"Returned call to patient. She had left message on nurse navigation line regarding status with wearing her back brace. Patient reports she is finding she is feeling better. \"It is giving me support and my posture is better.\" She plans to continue to wear it regularly. She has started PT this past week as well. 2x/week for the next couple of weeks and then down to 1x/week.     Transferred to scheduling to make her follow up appointment with Loree Rivas CNP for 4 weeks out from 7/9.     "

## 2021-06-09 NOTE — PROGRESS NOTES
[facility] Rehabilitation Daily Progress     Patient Name: Claudia Wise  Date: 7/22/2020  Visit #: 3/8  Referral Diagnosis: L2 compression fracture  Referring provider: Isabel Maurer MD  Visit Diagnosis:     ICD-10-CM    1. Compression fracture of L2 vertebra, initial encounter (H)  S32.020A    2. Lumbar spine painful on movement  M54.5    3. Acute low back pain without sciatica, unspecified back pain laterality  M54.5        Assessment:     Today patient returns for 2nd follow up visit after not being seen since 5/27 evaluation. Patient improving in therapy, however still feels weakness and limited motion throughout the day. Patient feeling depressed and needs motivation to get moving throughout the day without increased pain symptoms, which is why she would be appropriate to continue skilled PT. Patient feeling a little wobbly with normal gait.     From Eval:  Patient is a 69 y.o. female that presents with signs and symptoms consistent with left low back pain secondary to Compression fracture of L2 vertebra with routine healing. Patient demonstrates impairments including decreased lumbar flexibility and mobility, with poor core stability and postural awareness, leading to impaired functional mobility. Patient's functional limitations include sitting and walking for lengthy periods of time, sleeping comfortably, and bending forwards properly with TLSO and without pain symptoms.      HEP/POC compliance is  good .  Patient demonstrates understanding/independence with home program.  Patient is appropriate to continue with skilled physical therapy intervention, as indicated by initial plan of care.    Goal Status:  Pt. will be independent with home exercise program in : 6 weeks    Pt will: be able to walk 10 minutes without difficulty or increase of back pain symptoms by 6 weeks.  Pt will: be able to squat down for a daily task without increased pain or difficulty and without TLSO by 6 weeks.  Pt will:  be able to sit for longer than 30 minutes without increase of back pain symptoms by 6 weeks.        Plan / Patient Education:     Continue with initial plan of care.  Progress with home program as tolerated.    Plan for next visit: review HEP, add core/lumbar strengthening as appropriate, Treadmill warm up, rotational exercises as appropriate, standing banded exercises, step ups, total gym, quadruped ex    Subjective:     Patient was a little sore after last visit due to squats. Patient still frustrated that she isn't getting better. Standing is still tough for her due to fatigue, she still has to sit down to feel better.     Functional limitations are described as occurring with:   bending, standing for longer periods of time, sleeping too, sit to stand transfers, sitting, feels good in the morning and as the day goes on the pain increased     Objective:       Treatment Today       Exercises:  Exercise #1: SKTC and DKTC - hold 30 sec  Comment #1: supine knee rocks - 20 reps  Exercise #2: supine piriformis stretch - hold 30 sec x 2  Comment #2: lift lift lower lower abdominal activation - 10 reps  Exercise #3: sit to stands - 5 reps, 3x daily  Comment #3: mini squats at sink - 10 reps  Exercise #4: supine SLR - 10-20 reps  Comment #4: supine bridge with hip adduction - 10-20 reps  Exercise #5: sidelying hip abd - 10-20 reps  Comment #5: step ups with leg lift - 10 reps bilat, 3 sets        TREATMENT MINUTES COMMENTS   Evaluation     Self-care/ Home management     Manual therapy     Neuromuscular Re-education     Therapeutic Activity     Therapeutic Exercises 22 See above flowsheet  Nustep 5 min   Gait training 8 Normal gait pattern, feet wider than hips for more stability and balance   Modality__________________                Total 30    Blank areas are intentional and mean the treatment did not include these items.       Diya Rivera, PT  7/22/2020

## 2021-06-10 ENCOUNTER — OFFICE VISIT - HEALTHEAST (OUTPATIENT)
Dept: INTERNAL MEDICINE | Facility: CLINIC | Age: 71
End: 2021-06-10

## 2021-06-10 DIAGNOSIS — S32.020D COMPRESSION FRACTURE OF L2 VERTEBRA WITH ROUTINE HEALING: ICD-10-CM

## 2021-06-10 DIAGNOSIS — E03.8 OTHER SPECIFIED HYPOTHYROIDISM: ICD-10-CM

## 2021-06-10 DIAGNOSIS — R35.0 URINARY FREQUENCY: ICD-10-CM

## 2021-06-10 DIAGNOSIS — F41.9 ANXIETY: ICD-10-CM

## 2021-06-10 DIAGNOSIS — M06.09 SERONEGATIVE RHEUMATOID ARTHRITIS OF MULTIPLE SITES (H): ICD-10-CM

## 2021-06-10 DIAGNOSIS — N18.32 STAGE 3B CHRONIC KIDNEY DISEASE (H): ICD-10-CM

## 2021-06-10 DIAGNOSIS — M81.0 SENILE OSTEOPOROSIS: ICD-10-CM

## 2021-06-10 DIAGNOSIS — I10 ESSENTIAL HYPERTENSION: ICD-10-CM

## 2021-06-10 DIAGNOSIS — R25.1 TREMOR: ICD-10-CM

## 2021-06-10 LAB
ALBUMIN UR-MCNC: NEGATIVE G/DL
APPEARANCE UR: CLEAR
BILIRUB UR QL STRIP: NEGATIVE
COLOR UR AUTO: YELLOW
GLUCOSE UR STRIP-MCNC: NEGATIVE MG/DL
HGB UR QL STRIP: NEGATIVE
KETONES UR STRIP-MCNC: NEGATIVE MG/DL
LEUKOCYTE ESTERASE UR QL STRIP: NEGATIVE
NITRATE UR QL: NEGATIVE
PH UR STRIP: 5 [PH] (ref 5–8)
SP GR UR STRIP: 1.02 (ref 1–1.03)
UROBILINOGEN UR STRIP-ACNC: NORMAL

## 2021-06-10 RX ORDER — SODIUM BICARBONATE 650 MG/1
1300 TABLET ORAL 2 TIMES DAILY
Qty: 100 TABLET | Refills: 3 | Status: SHIPPED | OUTPATIENT
Start: 2021-06-10 | End: 2022-11-23

## 2021-06-10 RX ORDER — METOPROLOL TARTRATE 50 MG
50 TABLET ORAL 2 TIMES DAILY
Qty: 180 TABLET | Refills: 3 | Status: SHIPPED | OUTPATIENT
Start: 2021-06-10 | End: 2022-06-01

## 2021-06-10 RX ORDER — PAROXETINE 10 MG/1
10 TABLET, FILM COATED ORAL DAILY
Qty: 30 TABLET | Refills: 2 | Status: SHIPPED | OUTPATIENT
Start: 2021-06-10 | End: 2021-10-01

## 2021-06-10 NOTE — PROGRESS NOTES
ASSESSMENT AND PLAN:  Claudia Wise 69 y.o. female is seen here on 08/19/20 for evaluation of polyarthralgia in the context of osteoarthritis, seronegative rheumatoid arthritis, she is going to go back on methotrexate given the recurrence of pain and stiffness in her knees, right hand, in addition she would like to proceed local injection steroids after pros and cons were outlined 40 mg of Kenalog injected to each of the knee anterolateral approach she tolerated procedure well.  Because of the addition of methotrexate back again she will check her labs every 4 week follow-up in 3 months on video.  Diagnoses and all orders for this visit:    Bilateral primary osteoarthritis of knee  -     triamcinolone acetonide 40 mg/mL injection 40 mg (KENALOG-40)  -     triamcinolone acetonide 40 mg/mL injection 40 mg (KENALOG-40)    Seronegative rheumatoid arthritis of multiple sites (H)  -     hydrOXYchloroQUINE (PLAQUENIL) 200 mg tablet; Take 1 tablet (200 mg total) by mouth 2 (two) times a day.  Dispense: 180 tablet; Refill: 0  -     methotrexate 2.5 MG tablet; Take 4 tablets (10 mg total) by mouth once a week.  Dispense: 18 tablet; Refill: 1    High risk medication use  -     folic acid (FOLVITE) 1 MG tablet; Take 1 tablet (1 mg total) by mouth daily.  Dispense: 30 tablet; Refill: 11          HISTORY OF PRESENTING ILLNESS ON 08/19/20 :  Claudia Wise 69 y.o. is here for a moderately severe flare up of pain. Here for a moderately severe flare up of pain.  Joints affected include multiple joints, both knee(s) and At times she has pain in her MCP #4, other times she is notices that the left index and middle finger are not supple in the morning.. This has gone on for several weeks. Pain is described as sharp. It is worse with activity at times bedtime..  Her symptoms are moderately severe. The symptoms are progressive.  Associated findings include /do not include: swelling, rash.  There is no associated recent fall or  trauma.  Over-the-counter treatment to date has been without significant relief.    Further historical information, including ROS and limitation in activities as noted in the multidimensional health assessment questionnaire scanned in the EMR and in the assessment and plan section.    ALLERGIES:Bupropion hcl; Erythromycin base; Tetracyclines; and Valacyclovir    PAST MEDICAL/ACTIVE PROBLEMS/MEDICATION/SOCIAL DATA  Past Medical History:   Diagnosis Date     Anemia      Anxiety      Chronic kidney disease      Diabetes mellitus, type II (H)     prediabetes     Disease of thyroid gland     hypothyroid     Family history of myocardial infarction      Gout      High cholesterol      Hypertension      Inverted nipple     right     Macular edema      Osteoarthritis 2012     Rheumatoid arthritis (H) 2012     Social History     Tobacco Use   Smoking Status Former Smoker     Last attempt to quit: 1995     Years since quittin.1   Smokeless Tobacco Never Used     Patient Active Problem List   Diagnosis     Herpes Simplex Type II     Anxiety     Anemia in chronic kidney disease     Hyperlipemia     Gout     Hypertension     Prediabetes     Endometriosis     Chronic kidney disease     Subclinical Hypothyroidism     Seronegative rheumatoid arthritis of multiple sites (H)     Bilateral primary osteoarthritis of knee     Paroxysmal atrial flutter (H)     Moderate malnutrition (H)     Small bowel obstruction (H)     Senile osteoporosis     Compression fracture of L2 vertebra with routine healing     Current Outpatient Medications   Medication Sig Dispense Refill     acetaminophen (TYLENOL) 500 MG tablet Take 500 mg by mouth every 6 (six) hours as needed for pain.       acyclovir (ZOVIRAX) 400 MG tablet Take 1 tablet (400 mg total) by mouth every 8 (eight) hours as needed. 60 tablet 1     allopurinoL (ZYLOPRIM) 100 MG tablet Take 1 tablet (100 mg total) by mouth 2 (two) times a day with meals.  0     amLODIPine (NORVASC) 10  MG tablet Take 1 tablet (10 mg total) by mouth daily. 90 tablet 3     atorvastatin (LIPITOR) 10 MG tablet TAKE 1 TABLET BY MOUTH ONCE DAILY AT BEDTIME 90 tablet 3     carboxymethylcellulose (REFRESH PLUS) 0.5 % Dpet ophthalmic dropperette Administer 1-2 drops to both eyes 4 (four) times a day as needed.        cyanocobalamin, vitamin B-12, 1,000 mcg Subl Place 1 tablet (1,000 mcg total) under the tongue daily.  0     fat emul-soy-mct-oliv-fish oiL 20 % Emul Infuse 250 mL into a venous catheter 3 (three) times a week in the evening.  0     fish oil-omega-3 fatty acids 300-1,000 mg capsule Take 1 g by mouth daily.       fluticasone propionate (FLONASE) 50 mcg/actuation nasal spray Use 1 spray(s) in each nostril once daily 48 g 3     HYDROcodone-acetaminophen 5-325 mg per tablet Take 1 tablet by mouth every 8 (eight) hours as needed for pain. 18 tablet 0     hydrOXYchloroQUINE (PLAQUENIL) 200 mg tablet Take 1 tablet (200 mg total) by mouth 2 (two) times a day. 180 tablet 0     levothyroxine (SYNTHROID, LEVOTHROID) 50 MCG tablet TAKE 1 TABLET BY MOUTH ONCE DAILY IN THE MORNING 90 tablet 3     LORazepam (ATIVAN) 0.5 MG tablet Take 0.5 tablets (0.25 mg total) by mouth every 6 (six) hours as needed for anxiety. 4 tablet 0     losartan (COZAAR) 50 MG tablet Take 1 tablet by mouth once daily 90 tablet 1     melatonin 3 mg Tab tablet Take 1 tablet (3 mg total) by mouth at bedtime as needed.  0     metoprolol tartrate (LOPRESSOR) 25 MG tablet Take 1 tablet (25 mg total) by mouth 2 (two) times a day. 180 tablet 3     prednisoLONE acetate (PRED-FORTE) 1 % ophthalmic suspension Administer 1 drop into the left eye 2 (two) times a day.  0     RESTASIS 0.05 % ophthalmic emulsion INSTILL 1 DROP INTO EACH EYE TWICE DAILY  11     UNABLE TO FIND Tumeric unknown strength gel capsules. Take 1 capsule daily. Indication: Rheumatoid Arthritis.       folic acid (FOLVITE) 1 MG tablet Take 1 tablet (1 mg total) by mouth daily. 30 tablet 11      methotrexate 2.5 MG tablet Take 4 tablets (10 mg total) by mouth once a week. 18 tablet 1     Current Facility-Administered Medications   Medication Dose Route Frequency Provider Last Rate Last Dose     triamcinolone acetonide 40 mg/mL injection 40 mg (KENALOG-40)  40 mg Intra-articular Once Akila Vinson MBBS         triamcinolone acetonide 40 mg/mL injection 40 mg (KENALOG-40)  40 mg Intra-articular Once Akila Vinson MBBS             DETAILED EXAMINATION  08/19/20  :  Vitals:    08/19/20 1427   BP: 128/64   Patient Site: Right Arm   Patient Position: Sitting   Cuff Size: Adult Large   Pulse: 88   Weight: 156 lb (70.8 kg)     Alert oriented. Head including the face is examined for malar rash, heliotropes, scarring, lupus pernio. Eyes examined for redness such as in episcleritis/scleritis, periorbital lesions.   Neck/ Face examined for parotid gland swelling, range of motion of neck.  Left upper and lower and right upper and lower extremities examined for tenderness, swelling, warmth of the appendicular joints, range of motion, edema, rash.  Some of the important findings included: Joint line tenderness knees, she has mild tenderness of the right fourth MCP.           LAB / IMAGING DATA:  ALT   Date Value Ref Range Status   08/14/2020 19 0 - 45 U/L Final   06/12/2020 16 0 - 45 U/L Final   03/02/2020 28 0 - 45 U/L Final     Albumin   Date Value Ref Range Status   08/14/2020 4.2 3.5 - 5.0 g/dL Final   06/12/2020 4.3 3.5 - 5.0 g/dL Final   03/10/2020 2.7 (L) 3.5 - 5.0 g/dL Final     Creatinine   Date Value Ref Range Status   08/14/2020 1.23 (H) 0.60 - 1.10 mg/dL Final   06/12/2020 1.38 (H) 0.60 - 1.10 mg/dL Final   06/12/2020 1.36 (H) 0.60 - 1.10 mg/dL Final       WBC   Date Value Ref Range Status   08/14/2020 6.8 4.0 - 11.0 thou/uL Final   03/02/2020 4.3 4.0 - 11.0 thou/uL Final   08/31/2015 4.7 4.0 - 11.0 thou/uL Final   07/01/2015 5.5 4.0 - 11.0 thou/uL Final     Hemoglobin   Date Value Ref Range Status   08/14/2020  10.7 (L) 12.0 - 16.0 g/dL Final   03/02/2020 7.7 (L) 12.0 - 16.0 g/dL Final   02/29/2020 7.7 (L) 12.0 - 16.0 g/dL Final     Platelets   Date Value Ref Range Status   08/14/2020 170 140 - 440 thou/uL Final   03/02/2020 242 140 - 440 thou/uL Final   02/28/2020 274 140 - 440 thou/uL Final       Lab Results   Component Value Date    RF <15.0 11/23/2015    SEDRATE 16 10/08/2015

## 2021-06-10 NOTE — PATIENT INSTRUCTIONS - HE
[facility] Scheduling    Please call [phone] to schedule your image(s) (select option #1). There are 3 different locations, see below. You can do walk-in visits for xray only images if you want.     [facility]  [address] [place] [id number]    [address] [address]    [place] [id number] [facility] [address]

## 2021-06-10 NOTE — PROGRESS NOTES
[facility] Rehabilitation Daily Progress     Patient Name: Claudia Wise  Date: 7/28/2020  Visit #: 4/8  Referral Diagnosis: L2 compression fracture  Referring provider: Isabel Maurer MD  Visit Diagnosis:     ICD-10-CM    1. Compression fracture of L2 vertebra, initial encounter (H)  S32.020A    2. Lumbar spine painful on movement  M54.5    3. Acute low back pain without sciatica, unspecified back pain laterality  M54.5        Assessment:     Today patient returns for 3rd follow up visit after not being seen since 5/27 evaluation. Patient improving in therapy,  noticing she is getting stronger and can sit for longer without feeling sore or fatigue.     Patient feeling depressed and needs motivation to get moving throughout the day without increased pain symptoms, which is why she would be appropriate to continue skilled PT. Patient feeling a little wobbly with normal gait.     From Eval:  Patient is a 69 y.o. female that presents with signs and symptoms consistent with left low back pain secondary to Compression fracture of L2 vertebra with routine healing. Patient demonstrates impairments including decreased lumbar flexibility and mobility, with poor core stability and postural awareness, leading to impaired functional mobility. Patient's functional limitations include sitting and walking for lengthy periods of time, sleeping comfortably, and bending forwards properly with TLSO and without pain symptoms.      HEP/POC compliance is  good .  Patient demonstrates understanding/independence with home program.  Patient is appropriate to continue with skilled physical therapy intervention, as indicated by initial plan of care.    Goal Status:  Pt. will be independent with home exercise program in : 6 weeks    Pt will: be able to walk 10 minutes without difficulty or increase of back pain symptoms by 6 weeks.  Pt will: be able to squat down for a daily task without increased pain or difficulty and without  TLSO by 6 weeks.  Pt will: be able to sit for longer than 30 minutes without increase of back pain symptoms by 6 weeks.        Plan / Patient Education:     Continue with initial plan of care.  Progress with home program as tolerated.    Plan for next visit: review HEP, add core/lumbar strengthening as appropriate, Treadmill warm up, rotational exercises as appropriate, standing banded exercises, step ups, total gym, quadruped ex    Subjective:     Patient feeling better, noticing some changes with her fatigue and sitting is getting better.     Functional limitations are described as occurring with:   bending, standing for longer periods of time, sleeping too, sit to stand transfers, sitting, feels good in the morning and as the day goes on the pain increased     Objective:       Treatment Today       Exercises:  Exercise #1: SKTC and DKTC - hold 30 sec  Comment #1: supine knee rocks - 20 reps  Exercise #2: supine piriformis stretch - hold 30 sec x 2  Comment #2: lift lift lower lower abdominal activation - 10 reps  Exercise #3: sit to stands - 5 reps, 3x daily  Comment #3: mini squats at sink - 10 reps  Exercise #4: supine SLR - 10-20 reps  Comment #4: supine bridge with hip adduction - 10-20 reps  Exercise #5: sidelying hip abd - 10-20 reps  Comment #5: step ups with leg lift - 10 reps bilat, 3 sets  Exercise #6: supine or seated hip abduction with band - 10-20 reps  Comment #6: standing hip abduction and extension - orange band x10-20        TREATMENT MINUTES COMMENTS   Evaluation     Self-care/ Home management     Manual therapy     Neuromuscular Re-education     Therapeutic Activity     Therapeutic Exercises 25 See above flowsheet  Nustep 5 min   Gait training  Normal gait pattern, feet wider than hips for more stability and balance   Modality__________________                Total 25    Blank areas are intentional and mean the treatment did not include these items.       Diya Rivera, PT  7/28/2020

## 2021-06-10 NOTE — TELEPHONE ENCOUNTER
Call to pt with provider's results and recommendations. Pt stated understanding. She feels her pain is manageable most of the time. She will try weaning off her brace. Pt has follow-up appt on 9/3 that she plans to keep to check in.

## 2021-06-10 NOTE — TELEPHONE ENCOUNTER
----- Message from Loree Rivas CNP sent at 8/25/2020  8:43 AM CDT -----  Please call patient and notify her that her lumbar spine x-ray was reviewed.  L2 compression fracture is stable, however due to the fracture there is a change in alignment with her spine with a forward curvature/kyphosis at this level, unchanged from previous imaging.  At this point if her pain is manageable she can slowly wean off of wearing her thoracolumbar support brace.  Recommend removing the brace for a couple of hours a day to see if she tolerates that after couple of days then she can remove the brace for 4 hours a day for a couple of days then 6 hours a day and so on.

## 2021-06-10 NOTE — PROGRESS NOTES
[facility] Rehabilitation Daily Progress     Patient Name: Claudia Wise  Date: 8/21/2020  Visit #: 6/8  Referral Diagnosis: L2 compression fracture  Referring provider: Isabel Maurer MD  Visit Diagnosis:     ICD-10-CM    1. Compression fracture of L2 vertebra, initial encounter (H)  S32.020A    2. Lumbar spine painful on movement  M54.5    3. Acute low back pain without sciatica, unspecified back pain laterality  M54.5        Assessment:     Today patient returns for 5th follow up, feeling improvements, getting stronger day to day.     Patient feeling depressed and needs motivation to get moving throughout the day without increased pain symptoms, which is why she would be appropriate to continue skilled PT.     From Eval:  Patient is a 69 y.o. female that presents with signs and symptoms consistent with left low back pain secondary to Compression fracture of L2 vertebra with routine healing. Patient demonstrates impairments including decreased lumbar flexibility and mobility, with poor core stability and postural awareness, leading to impaired functional mobility. Patient's functional limitations include sitting and walking for lengthy periods of time, sleeping comfortably, and bending forwards properly with TLSO and without pain symptoms.      HEP/POC compliance is  good .  Patient demonstrates understanding/independence with home program.  Patient is appropriate to continue with skilled physical therapy intervention, as indicated by initial plan of care.    Goal Status:  Pt. will be independent with home exercise program in : 6 weeks    Pt will: be able to walk 10 minutes without difficulty or increase of back pain symptoms by 6 weeks.  Pt will: be able to squat down for a daily task without increased pain or difficulty and without TLSO by 6 weeks.  Pt will: be able to sit for longer than 30 minutes without increase of back pain symptoms by 6 weeks.        Plan / Patient Education:     Continue with  initial plan of care.  Progress with home program as tolerated.    Plan for next visit: review HEP, add core/lumbar strengthening as appropriate, Treadmill warm up, rotational exercises as appropriate, standing banded exercises, step ups, total gym, quadruped ex    Subjective:     Patient got up this morning without her brace and she felt okay, but once she was walking outside on uneven surfaces she could tell she didn't have her brace. Riding in the car is still sore for her, lower left back. Patient hasn't really worked on her band exercises. Patient was able to bend forwards to pick something up from the floor without increased pain.     Functional limitations are described as occurring with:   bending, standing for longer periods of time, sleeping too, sit to stand transfers, sitting, feels good in the morning and as the day goes on the pain increased     Objective:       Treatment Today       Exercises:  Exercise #1: SKTC and DKTC - hold 30 sec  Comment #1: supine knee rocks - 20 reps  Exercise #2: supine piriformis stretch - hold 30 sec x 2  Comment #2: lift lift lower lower abdominal activation - 10 reps  Exercise #3: sit to stands - 5 reps, 3x daily  Comment #3: mini squats at sink - 10 reps  Exercise #4: supine SLR - 10-20 reps  Comment #4: supine bridge with hip adduction - 10-20 reps, hold 5 sec  Exercise #5: sidelying hip abd - 10-20 reps  Comment #5: step ups with leg lift - 10 reps bilat, 3 sets  Exercise #6: supine or seated hip abduction with band - 10-20 reps  Comment #6: standing hip abduction and extension - orange band x10-20  Exercise #7: total gym squats, level 10, x 20, SL x 20        TREATMENT MINUTES COMMENTS   Evaluation     Self-care/ Home management     Manual therapy     Neuromuscular Re-education     Therapeutic Activity     Therapeutic Exercises 30 See above flowsheet  Nustep 5 min   Gait training  Normal gait pattern, feet wider than hips for more stability and balance    Modality__________________                Total 30    Blank areas are intentional and mean the treatment did not include these items.       Diya Rivera, PT  8/21/2020

## 2021-06-10 NOTE — TELEPHONE ENCOUNTER
[place] APPROVED:    Approval start date: 7/21/2020  Approval end date:  8/21/2023    Pharmacy has been notified of approval and will contact patient when medication is ready for pickup.           Per letter received by clinic below.

## 2021-06-10 NOTE — TELEPHONE ENCOUNTER
"Patient has a future lab appointment on 08/07/2020 for \"[facility] labs\". There are no lab orders. Could you please review the patient's chart and place orders?    If no lab orders are needed at this time, please forward this message to [facility] Registration Pool. They will then call the patient and inform them that no labs are needed at this time per provider.     Thanks    "

## 2021-06-10 NOTE — PROGRESS NOTES
Assessment:     Diagnoses and all orders for this visit:    Chronic left-sided low back pain without sciatica  -     XR Lumbar Spine 2 or 3 VWS; Future; Expected date: 08/20/2020    Compression fracture of L2 vertebra with routine healing  -     XR Lumbar Spine 2 or 3 VWS; Future; Expected date: 08/20/2020       Claudia Wise is a 69 y.o. y.o. female with past medical history significant for  anxiety, rheumatoid arthritis managed by Dr. Vinson, diabetes myelitis, hypertension, hyperlipidemia, chronic kidney disease, thyroid disorder, gout, osteopenia, recent small bowel obstruction with surgical decompression who presents today for follow-up regarding:    -Ongoing left low back pain since initial compression fracture L2 March 2020.  Some improvement with physical therapy.    *Dr. Clifford primary spine provider.     Plan:     A shared decision making plan was used. The patient's values and choices were respected. Prior medical records from 7/9/2020 were reviewed today. The following represents what was discussed and decided upon by the provider and the patient.        -DIAGNOSTIC TESTS: Images were personally reviewed and interpreted.   --Ordered updated lumbar spine x-ray to be completed 8/20/2020 or after to evaluate L2 compression fracture.  At that time the compression fracture is stable again recommend weaning off of her brace.  --Lumbar spine x-ray 7/1/2020 shows stable L2 compression deformity, grade 1 L3-4 spondylolisthesis.  --Lumbar spine MRI 5/19/2020 with 20% acute/subacute compression fracture L2.  There is mild bilateral lateral recess stenosis L3-4 and L4-5.  Bilateral synovial cysts L4-5 with moderate facet arthropathy.    -INTERVENTIONS: Could consider left lumbar facet injection versus SI injection on the left down the road if left low back pain is not improving.  She is following up with Dr. Maurer for knee injections in the next couple of weeks however therefore will hold  off.    -Encouraged patient to continue with TLSO back brace for another 2-4 weeks.  She does find benefit so far with wearing it.    -MEDICATIONS: Encourage him to continue with hydrocodone as prescribed by Dr. Juanis macario as needed for severe breakthrough pain.  Otherwise can continue Tylenol.  -Patient does report that she tried Lyrica 25 mg for a couple of days but noticed no benefit therefore she discontinued.  Advised patient to try it again for another week and see if it makes a difference at that point, if it does not we could either increase it to 2 tablets at bedtime versus discontinue.  Discussed side effects of medications and proper use. Patient verbalized understanding.    -PHYSICAL THERAPY: Encourage patient continue with physical therapy [facility] at this time.  Discussed the importance of core strengthening, ROM, stretching exercises with the patient and how each of these entities is important in decreasing pain.  Explained to the patient that the purpose of physical therapy is to teach the patient a home exercise program.  These exercises need to be performed every day in order to decrease pain and prevent future occurrences of pain.        -PATIENT EDUCATION:  20 minutes of total visit time was spent face to face with the patient today, 60 % of the visit was spent on counseling, education, and coordinating care.   -5 minutes spent outside of visit time, non-face-to-face time, reviewing chart.  -Today we also discussed the issues related to the current COVID-19 pandemic, the pros and cons of the current treatment plan, the CDC guidelines such as social distancing, washing the hands, and covering the cough.    -FOLLOW UP: Follow-up in 4 weeks, video visit okay.  Sooner at any time if pain worsens or new symptoms arise.  Advised to contact clinic if symptoms worsen or change.    Subjective:     Claudia Wise is a 69 y.o. female who presents today for follow-up regarding ongoing left low back pain  at the lumbosacral junction rating to the left buttock for the last 4-5 months.  She has reported some relief with physical therapy however still having pain on the left lumbosacral junction currently a 3/10 from a 6 at its worst more bothersome with standing, does improve with lying down.  Patient denies lower extremity pain, denies recent trips or falls or balance changes, denies bowel or bladder loss control, denies saddle anesthesia.    Treatment to Date: No prior spinal surgery or spinal injection.  Physical therapy [facility] x5 sessions last 8/4/2024 compression fracture/spine pain.    Medications:  Plaquenil 4 seronegative rheumatoid arthritis  Tylenol  Hydrocodone managed by Dr. Erasto Guzmán 25 mg bedtime however patient only tried this medication for a couple of days with no benefit therefore she discontinued on her own.    Patient Active Problem List   Diagnosis     Herpes Simplex Type II     Anxiety     Anemia in chronic kidney disease     Hyperlipemia     Gout     Hypertension     Prediabetes     Endometriosis     Chronic kidney disease     Subclinical Hypothyroidism     Seronegative rheumatoid arthritis of multiple sites (H)     Bilateral primary osteoarthritis of knee     Paroxysmal atrial flutter (H)     Moderate malnutrition (H)     Small bowel obstruction (H)     Senile osteoporosis     Compression fracture of L2 vertebra with routine healing       Current Outpatient Medications on File Prior to Encounter   Medication Sig Dispense Refill     acetaminophen (TYLENOL) 500 MG tablet Take 500 mg by mouth every 6 (six) hours as needed for pain.       HYDROcodone-acetaminophen 5-325 mg per tablet Take 1 tablet by mouth every 8 (eight) hours as needed for pain. 18 tablet 0     acyclovir (ZOVIRAX) 400 MG tablet Take 1 tablet (400 mg total) by mouth every 8 (eight) hours as needed. 60 tablet 1     allopurinoL (ZYLOPRIM) 100 MG tablet Take 1 tablet (100 mg total) by mouth 2 (two) times a day with meals.  0      amLODIPine (NORVASC) 10 MG tablet Take 1 tablet (10 mg total) by mouth daily. 90 tablet 3     atorvastatin (LIPITOR) 10 MG tablet TAKE 1 TABLET BY MOUTH ONCE DAILY AT BEDTIME 90 tablet 3     carboxymethylcellulose (REFRESH PLUS) 0.5 % Dpet ophthalmic dropperette Administer 1-2 drops to both eyes 4 (four) times a day as needed.        cyanocobalamin, vitamin B-12, 1,000 mcg Subl Place 1 tablet (1,000 mcg total) under the tongue daily.  0     fat emul-soy-mct-oliv-fish oiL 20 % Emul Infuse 250 mL into a venous catheter 3 (three) times a week in the evening.  0     fish oil-omega-3 fatty acids 300-1,000 mg capsule Take 1 g by mouth daily.       fluticasone propionate (FLONASE) 50 mcg/actuation nasal spray Use 1 spray(s) in each nostril once daily 48 g 3     hydrOXYchloroQUINE (PLAQUENIL) 200 mg tablet Take 1 tablet (200 mg total) by mouth 2 (two) times a day. 180 tablet 0     levothyroxine (SYNTHROID, LEVOTHROID) 50 MCG tablet TAKE 1 TABLET BY MOUTH ONCE DAILY IN THE MORNING 90 tablet 3     LORazepam (ATIVAN) 0.5 MG tablet Take 0.5 tablets (0.25 mg total) by mouth every 6 (six) hours as needed for anxiety. 4 tablet 0     losartan (COZAAR) 50 MG tablet Take 1 tablet by mouth once daily 90 tablet 1     melatonin 3 mg Tab tablet Take 1 tablet (3 mg total) by mouth at bedtime as needed.  0     metoprolol tartrate (LOPRESSOR) 25 MG tablet Take 1 tablet (25 mg total) by mouth 2 (two) times a day. 180 tablet 3     prednisoLONE acetate (PRED-FORTE) 1 % ophthalmic suspension Administer 1 drop into the left eye 2 (two) times a day.  0     RESTASIS 0.05 % ophthalmic emulsion INSTILL 1 DROP INTO EACH EYE TWICE DAILY  11     UNABLE TO FIND Tumeric unknown strength gel capsules. Take 1 capsule daily. Indication: Rheumatoid Arthritis.       [DISCONTINUED] pregabalin (LYRICA) 25 MG capsule Take 1 capsule (25 mg total) by mouth at bedtime. 30 capsule 2     No current facility-administered medications on file prior to encounter.         Allergies   Allergen Reactions     Bupropion Hcl Nausea Only     Erythromycin Base Nausea Only     Tetracyclines Itching     Valacyclovir Nausea Only and Nausea And Vomiting       Past Medical History:   Diagnosis Date     Anemia      Anxiety      Chronic kidney disease      Diabetes mellitus, type II (H)     prediabetes     Disease of thyroid gland     hypothyroid     Family history of myocardial infarction      Gout      High cholesterol      Hypertension      Inverted nipple     right     Macular edema      Osteoarthritis 2012     Rheumatoid arthritis (H) 2012        Review of Systems  ROS:  Specifically negative for bowel/bladder dysfunction, balance changes, headache, dizziness, foot drop, fevers, chills, appetite changes, nausea/vomiting, unexplained weight loss. Otherwise 13 systems reviewed are negative. Please see the patient's intake questionnaire from today for details.    Reviewed Social, Family, Past Medical and Past Surgical history with patient, no significant changes noted since prior visit.     Objective:     /64 (Patient Site: Right Arm, Patient Position: Sitting)     PHYSICAL EXAMINATION:    --CONSTITUTIONAL: Well developed, well nourished, healthy appearing individual.  --PSYCHIATRIC: Appropriate mood and affect. No difficulty interacting due to temper, social withdrawal, or memory issues.  --SKIN: Lumbar region is dry and intact.   --RESPIRATORY: Normal rhythm and effort. No abnormal accessory muscle breathing patterns noted.   --MUSCULOSKELETAL:  Normal lumbar lordosis noted, no lateral shift.  --GROSS MOTOR: Easily arises from a seated position. Gait is non-antalgic  --LUMBAR SPINE:  Inspection reveals no evidence of deformity. Range of motion is not limited in lumbar lumbar extension or lateral rotation, some increased pain with forward flexion however. No tenderness to palpation thoracic or lumbar spine. Straight leg raising in the seated position is negative to radicular pain.  Sciatic notch non-tender.   --SACROILIAC JOINT: One Finger point test positive.  --LOWER EXTREMITY MOTOR TESTING:  Plantar flexion left 5/5, right 5/5   Dorsiflexion left 5/5, right 5/5   Great toe MTP extension left 5/5, right 5/5  Knee flexion left 5/5, right 5/5  Knee extension left 5/5, right 5/5   Hip flexion left 5/5, right 5/5  Hip abduction left 5/5, right 5/5  Hip adduction left 5/5, right 5/5   --HIPS: Full range of motion bilaterally.   --NEUROLOGIC: Bilateral patellar and achilles reflexes are physiologic and symmetric. Sensation to light touch is intact in the bilateral L4, L5, and S1 dermatomes.    RESULTS:   Imaging: Lumbar spine imaging was reviewed today. The images were shown to the patient and the findings were explained using a spine model.    No results found.

## 2021-06-10 NOTE — PROGRESS NOTES
[facility] Daily Progress     Patient Name: Claudia Wise  Date: 8/28/2020  Visit #: 7/8  Referral Diagnosis: L2 compression fracture  Referring provider: Isabel Maurer MD  Visit Diagnosis:     ICD-10-CM    1. Compression fracture of L2 vertebra, initial encounter (H)  S32.020A    2. Lumbar spine painful on movement  M54.5    3. Acute low back pain without sciatica, unspecified back pain laterality  M54.5        Assessment:     Today patient returns for 6th follow up, feeling improvements, getting stronger day to day. Worked on getting up from the floor today and she was surprised that she could work on that.       From Eval:  Patient is a 69 y.o. female that presents with signs and symptoms consistent with left low back pain secondary to Compression fracture of L2 vertebra with routine healing. Patient demonstrates impairments including decreased lumbar flexibility and mobility, with poor core stability and postural awareness, leading to impaired functional mobility. Patient's functional limitations include sitting and walking for lengthy periods of time, sleeping comfortably, and bending forwards properly with TLSO and without pain symptoms.      HEP/POC compliance is  good .  Patient demonstrates understanding/independence with home program.  Patient is appropriate to continue with skilled physical therapy intervention, as indicated by initial plan of care.    Goal Status:  Pt. will be independent with home exercise program in : 6 weeks  Pt will: be able to walk 10 minutes without difficulty or increase of back pain symptoms by 6 weeks.  Pt will: be able to squat down for a daily task without increased pain or difficulty and without TLSO by 6 weeks.  Pt will: be able to sit for longer than 30 minutes without increase of back pain symptoms by 6 weeks.      Plan / Patient Education:     Continue with initial plan of care.  Progress with home program as tolerated.    Plan for next visit:  review HEP, add core/lumbar strengthening as appropriate, Treadmill warm up, rotational exercises as appropriate, standing banded exercises, step ups, total gym, quadruped ex    Subjective:     Patient doing well! Very minimal pain, might benefit from further strengthening for endurance of her low back/core.     Functional limitations are described as occurring with:   bending, standing for longer periods of time, sleeping too, sit to stand transfers, sitting, feels good in the morning and as the day goes on the pain increased     Objective:       Treatment Today       Exercises:  Exercise #1: SKTC and DKTC - hold 30 sec  Comment #1: supine knee rocks - 20 reps  Exercise #2: supine piriformis stretch - hold 30 sec x 2  Comment #2: lift lift lower lower abdominal activation - 10 reps  Exercise #3: sit to stands - 5 reps, 3x daily  Comment #3: mini squats at sink - 10 reps  Exercise #4: supine SLR - 10-20 reps  Comment #4: supine bridge with hip adduction - 10-20 reps, hold 5 sec  Exercise #5: sidelying hip abd - 10-20 reps  Comment #5: step ups with leg lift - 10 reps bilat, 3 sets  Exercise #6: supine or seated hip abduction with band - 10-20 reps  Comment #6: standing hip abduction and extension - orange band x10-20  Exercise #7: total gym squats, level 10, x 20, SL x 20        TREATMENT MINUTES COMMENTS   Evaluation     Self-care/ Home management     Manual therapy     Neuromuscular Re-education     Therapeutic Activity     Therapeutic Exercises 30 See above flowsheet  Nustep 5 min  Entire review of HEP for posture, progression and to assess progress  Functional getting up from floor appropriately without pain or difficulty   Gait training  Normal gait pattern, feet wider than hips for more stability and balance   Modality__________________                Total 30    Blank areas are intentional and mean the treatment did not include these items.       Diya Rivera, PT  8/28/2020

## 2021-06-10 NOTE — PROGRESS NOTES
[facility] Daily Progress     Patient Name: Claudia Wise  Date: 8/4/2020  Visit #: 5/8  Referral Diagnosis: L2 compression fracture  Referring provider: Isabel Maurer MD  Visit Diagnosis:     ICD-10-CM    1. Compression fracture of L2 vertebra, initial encounter (H)  S32.020A    2. Lumbar spine painful on movement  M54.5    3. Acute low back pain without sciatica, unspecified back pain laterality  M54.5        Assessment:     Today patient returns for 4th follow up visit after not being seen since 5/27 evaluation. Patient improving in therapy,  noticing she is getting stronger and can sit and stand for longer without feeling sore or fatigue. Patient appropriate to continue for at least 3 more visits.    Patient feeling depressed and needs motivation to get moving throughout the day without increased pain symptoms, which is why she would be appropriate to continue skilled PT.     From Eval:  Patient is a 69 y.o. female that presents with signs and symptoms consistent with left low back pain secondary to Compression fracture of L2 vertebra with routine healing. Patient demonstrates impairments including decreased lumbar flexibility and mobility, with poor core stability and postural awareness, leading to impaired functional mobility. Patient's functional limitations include sitting and walking for lengthy periods of time, sleeping comfortably, and bending forwards properly with TLSO and without pain symptoms.      HEP/POC compliance is  good .  Patient demonstrates understanding/independence with home program.  Patient is appropriate to continue with skilled physical therapy intervention, as indicated by initial plan of care.    Goal Status:  Pt. will be independent with home exercise program in : 6 weeks    Pt will: be able to walk 10 minutes without difficulty or increase of back pain symptoms by 6 weeks.  Pt will: be able to squat down for a daily task without increased pain or  difficulty and without TLSO by 6 weeks.  Pt will: be able to sit for longer than 30 minutes without increase of back pain symptoms by 6 weeks.        Plan / Patient Education:     Continue with initial plan of care.  Progress with home program as tolerated.    Plan for next visit: review HEP, add core/lumbar strengthening as appropriate, Treadmill warm up, rotational exercises as appropriate, standing banded exercises, step ups, total gym, quadruped ex    Subjective:     Patient reports she had a couple bad days, she was really sore after last visit. She didn't do any mobility or movement the days after that. Patient did go to [employer] and stood for about an hour which probably caused her the most pain.       Functional limitations are described as occurring with:   bending, standing for longer periods of time, sleeping too, sit to stand transfers, sitting, feels good in the morning and as the day goes on the pain increased     Objective:       Treatment Today       Exercises:  Exercise #1: SKTC and DKTC - hold 30 sec  Comment #1: supine knee rocks - 20 reps  Exercise #2: supine piriformis stretch - hold 30 sec x 2  Comment #2: lift lift lower lower abdominal activation - 10 reps  Exercise #3: sit to stands - 5 reps, 3x daily  Comment #3: mini squats at sink - 10 reps  Exercise #4: supine SLR - 10-20 reps  Comment #4: supine bridge with hip adduction - 10-20 reps, hold 5 sec  Exercise #5: sidelying hip abd - 10-20 reps  Comment #5: step ups with leg lift - 10 reps bilat, 3 sets  Exercise #6: supine or seated hip abduction with band - 10-20 reps  Comment #6: standing hip abduction and extension - orange band x10-20        TREATMENT MINUTES COMMENTS   Evaluation     Self-care/ Home management     Manual therapy     Neuromuscular Re-education     Therapeutic Activity     Therapeutic Exercises 30 See above flowsheet  Nustep 5 min   Gait training  Normal gait pattern, feet wider than hips for more stability and balance    Modality__________________                Total 30    Blank areas are intentional and mean the treatment did not include these items.       Diya Rivera, PT  8/4/2020

## 2021-06-11 ENCOUNTER — COMMUNICATION - HEALTHEAST (OUTPATIENT)
Dept: PHYSICAL MEDICINE AND REHAB | Facility: CLINIC | Age: 71
End: 2021-06-11

## 2021-06-11 NOTE — PROGRESS NOTES
[facility] Daily Progress     Patient Name: Claudia Wise  Date: 9/16/2020  Visit #: 9/8  Referral Diagnosis: L2 compression fracture  Referring provider: Isabel Maurer MD  Visit Diagnosis:     ICD-10-CM    1. Compression fracture of L2 vertebra, initial encounter (H)  S32.020A    2. Lumbar spine painful on movement  M54.5    3. Acute low back pain without sciatica, unspecified back pain laterality  M54.5        Assessment:     Today patient feeling improvements, getting stronger day to day. Feeling comfortable with her progress.     From Eval:  Patient is a 69 y.o. female that presents with signs and symptoms consistent with left low back pain secondary to Compression fracture of L2 vertebra with routine healing. Patient demonstrates impairments including decreased lumbar flexibility and mobility, with poor core stability and postural awareness, leading to impaired functional mobility. Patient's functional limitations include sitting and walking for lengthy periods of time, sleeping comfortably, and bending forwards properly with TLSO and without pain symptoms.      HEP/POC compliance is  good .  Patient demonstrates understanding/independence with home program.  Patient is appropriate to continue with skilled physical therapy intervention, as indicated by initial plan of care.    Goal Status:  Pt. will be independent with home exercise program in : 6 weeks  Pt will: be able to walk 10 minutes without difficulty or increase of back pain symptoms by 6 weeks.  Pt will: be able to squat down for a daily task without increased pain or difficulty and without TLSO by 6 weeks.  Pt will: be able to sit for longer than 30 minutes without increase of back pain symptoms by 6 weeks.      Plan / Patient Education:     Continue with initial plan of care.  Progress with home program as tolerated.    Plan for next visit: review HEP, add core/lumbar strengthening as appropriate, Treadmill warm up,  rotational exercises as appropriate, standing banded exercises, step ups, total gym, quadruped ex    Subjective:     Patient feeling like she had a back ache for the last week, it has been hurting more when she gets up out of bed, but she has been more tired with sitting down. Patient has been slowly taking off the brace and that might be why she is feeling more soreness.     Functional limitations are described as occurring with:   bending, standing for longer periods of time, sleeping too, sit to stand transfers, sitting, feels good in the morning and as the day goes on the pain increased     Objective:       Treatment Today       Exercises:  Exercise #1: SKTC and DKTC - hold 30 sec  Comment #1: supine knee rocks - 20 reps  Exercise #2: supine piriformis stretch - hold 30 sec x 2  Comment #2: lift lift lower lower abdominal activation - 10 reps  Exercise #3: sit to stands - 5 reps, 3x daily  Comment #3: mini squats at sink - 10 reps  Exercise #4: supine SLR - 10-20 reps  Comment #4: supine bridge with hip adduction - 10-20 reps, hold 5 sec  Exercise #5: sidelying hip abd - 10-20 reps  Comment #5: step ups with leg lift - 10 reps bilat, 3 sets  Exercise #6: supine or seated hip abduction with band - 10-20 reps  Comment #6: standing hip abduction and extension - orange band x10-20  Exercise #7: total gym squats, level 12 x 10 with hip adduction, level 15 x 10, SL x 10 bilat level10        TREATMENT MINUTES COMMENTS   Evaluation     Self-care/ Home management     Manual therapy 15 SL on R, MFR to thoracolumbar paraspinals    Neuromuscular Re-education     Therapeutic Activity     Therapeutic Exercises 15 See above flowsheet  Treadmill 4 min  Entire review of HEP for posture, progression and to assess progress  Functional getting up from floor appropriately without pain or difficulty   Gait training  Normal gait pattern, feet wider than hips for more stability and balance   Modality__________________                 Total 30    Blank areas are intentional and mean the treatment did not include these items.       Diya Rivera, PT  9/16/2020

## 2021-06-11 NOTE — PROGRESS NOTES
[facility] Rehabilitation Daily Progress     Patient Name: Claudia Wise  Date: 9/22/2020  Visit #: 10/8  Referral Diagnosis: L2 compression fracture  Referring provider: Isabel Maurer MD  Visit Diagnosis:     ICD-10-CM    1. Compression fracture of L2 vertebra, initial encounter (H)  S32.020A    2. Lumbar spine painful on movement  M54.5    3. Acute low back pain without sciatica, unspecified back pain laterality  M54.5        Assessment:     Today patient feeling improvements, getting stronger day to day. Feeling comfortable with her progress. Patient wearing brace about 4 hours on and off. Yesterday she did more activity yesterday and she was able to stand for about 30 minutes without an issue.     From Eval:  Patient is a 69 y.o. female that presents with signs and symptoms consistent with left low back pain secondary to Compression fracture of L2 vertebra with routine healing. Patient demonstrates impairments including decreased lumbar flexibility and mobility, with poor core stability and postural awareness, leading to impaired functional mobility. Patient's functional limitations include sitting and walking for lengthy periods of time, sleeping comfortably, and bending forwards properly with TLSO and without pain symptoms.      HEP/POC compliance is  good .  Patient demonstrates understanding/independence with home program.  Patient is appropriate to continue with skilled physical therapy intervention, as indicated by initial plan of care.    Goal Status:  Pt. will be independent with home exercise program in : 6 weeks  Pt will: be able to walk 10 minutes without difficulty or increase of back pain symptoms by 6 weeks.  Pt will: be able to squat down for a daily task without increased pain or difficulty and without TLSO by 6 weeks.  Pt will: be able to sit for longer than 30 minutes without increase of back pain symptoms by 6 weeks.      Plan / Patient Education:     Continue with initial plan  of care.  Progress with home program as tolerated.    Plan for next visit: review HEP, add core/lumbar strengthening as appropriate, Treadmill warm up, rotational exercises as appropriate, standing banded exercises, step ups, total gym, quadruped ex    Subjective:     Some soreness waking up this morning, she is okay right now tho. Patient is sleeping better, pain doesn't wake her up anymore. Patient wondering about working on rough terrain and small hills, uneven surfaces.     Functional limitations are described as occurring with:   bending, standing for longer periods of time, sleeping too, sit to stand transfers, sitting, feels good in the morning and as the day goes on the pain increased     Objective:       Treatment Today       Exercises:  Exercise #1: SKTC and DKTC - hold 30 sec  Comment #1: supine knee rocks - 20 reps  Exercise #2: supine piriformis stretch - hold 30 sec x 2  Comment #2: lift lift lower lower abdominal activation - 10 reps  Exercise #3: sit to stands - 5 reps, 3x daily  Comment #3: mini squats at sink - 10 reps  Exercise #4: supine SLR - 10-20 reps  Comment #4: supine bridge with hip adduction - 10-20 reps, hold 5 sec  Exercise #5: sidelying hip abd - 10-20 reps  Comment #5: step ups with leg lift - 10 reps bilat, 3 sets  Exercise #6: supine or seated hip abduction with band - 10-20 reps  Comment #6: standing hip abduction and extension - orange band x10-20  Exercise #7: total gym squats, level 20 x 10 with hip adduction, SL x 10 bilat level10        TREATMENT MINUTES COMMENTS   Evaluation     Self-care/ Home management     Manual therapy  SL on R, MFR to thoracolumbar paraspinals    Neuromuscular Re-education     Therapeutic Activity     Therapeutic Exercises 30 See above flowsheet  Treadmill 5 min  Entire review of HEP for posture, progression and to assess progress  Functional getting up from floor appropriately without pain or difficulty      Quadruped arm and leg lift  Wall push ups     Gait training  Normal gait pattern, feet wider than hips for more stability and balance   Modality__________________                Total 30    Blank areas are intentional and mean the treatment did not include these items.       Diya Rivera, PT  9/22/2020

## 2021-06-11 NOTE — TELEPHONE ENCOUNTER
"Patient has a future lab appointment on 09/16/2020 for \"Rheum labs\". There are no lab orders. Could you please review the patient's chart and place orders?    If no lab orders are needed at this time, please forward this message to [facility] Registration Pool. They will then call the patient and inform them that no labs are needed at this time per provider.     Thanks      "

## 2021-06-11 NOTE — PROGRESS NOTES
Chief Complaint   Patient presents with     Ear Pain     over 10 days. right ear.       HPI:    Patient is here for 10 days of full/plugged right ear with muffled hearing without significant pain. She has had runny nose as well. No cough, fever, sore throat.     ROS: Pertinent ROS noted in HPI.     Allergies   Allergen Reactions     Bupropion Hcl Nausea Only     Erythromycin Base Nausea Only     Tetracyclines Itching     Valacyclovir Nausea Only and Nausea And Vomiting       Patient Active Problem List   Diagnosis     Herpes Simplex Type II     Anxiety     Anemia     Osteoarthritis Of The Knee     Hyperlipemia     Gout     Hypertension     Prediabetes     Endometriosis     Chronic Kidney Disease (NKF Classification)     Subclinical Hypothyroidism     Lumbar Spondylosis     Seronegative rheumatoid arthritis of multiple sites     High risk medication use       Family History   Problem Relation Age of Onset     Breast cancer Maternal Aunt      Cancer Maternal Aunt      Cancer Father      Cancer Sister      Cancer Maternal Grandmother      Cancer Cousin      Heart attack Mother 49       Social History     Social History     Marital status:      Spouse name: N/A     Number of children: N/A     Years of education: N/A     Occupational History     Not on file.     Social History Main Topics     Smoking status: Former Smoker     Quit date: 7/7/1995     Smokeless tobacco: Never Used     Alcohol use Yes      Comment: occasional     Drug use: No     Sexual activity: Not on file     Other Topics Concern     Not on file     Social History Narrative         Objective:    Vitals:    06/02/17 1348   BP: 132/78   Pulse: 84   Resp: 14   Temp: 97.6  F (36.4  C)   SpO2: 99%       Gen:NAD  Oropharynx: normal  Ears: R TM with effusion, R ear canal normal. L TM and canal normal  Nose: no discharge  CV: RRR  Pulm: CTAB    Eustachian tube dysfunction, right  -     fluticasone (FLONASE) 50 mcg/actuation nasal spray; 2 sprays into each  nostril daily.

## 2021-06-11 NOTE — PROGRESS NOTES
[facility] Rehabilitation Daily Progress     Patient Name: Claudia Wise  Date: 9/8/2020  Visit #: 8/8  Referral Diagnosis: L2 compression fracture  Referring provider: Isabel Maurer MD  Visit Diagnosis:     ICD-10-CM    1. Compression fracture of L2 vertebra, initial encounter (H)  S32.020A    2. Lumbar spine painful on movement  M54.5    3. Acute low back pain without sciatica, unspecified back pain laterality  M54.5        Assessment:     Today patient feeling improvements, getting stronger day to day. Feeling comfortable with her progress.     From Eval:  Patient is a 69 y.o. female that presents with signs and symptoms consistent with left low back pain secondary to Compression fracture of L2 vertebra with routine healing. Patient demonstrates impairments including decreased lumbar flexibility and mobility, with poor core stability and postural awareness, leading to impaired functional mobility. Patient's functional limitations include sitting and walking for lengthy periods of time, sleeping comfortably, and bending forwards properly with TLSO and without pain symptoms.      HEP/POC compliance is  good .  Patient demonstrates understanding/independence with home program.  Patient is appropriate to continue with skilled physical therapy intervention, as indicated by initial plan of care.    Goal Status:  Pt. will be independent with home exercise program in : 6 weeks  Pt will: be able to walk 10 minutes without difficulty or increase of back pain symptoms by 6 weeks.  Pt will: be able to squat down for a daily task without increased pain or difficulty and without TLSO by 6 weeks.  Pt will: be able to sit for longer than 30 minutes without increase of back pain symptoms by 6 weeks.      Plan / Patient Education:     Continue with initial plan of care.  Progress with home program as tolerated.    Plan for next visit: review HEP, add core/lumbar strengthening as appropriate, Treadmill warm up,  rotational exercises as appropriate, standing banded exercises, step ups, total gym, quadruped ex    Subjective:     Patient bent forwards to pick something up from the floor today and she has been sore ever since. Patient went a little further distance walking and it wasn't tough for her at all. Patient felt soreness from getting up/down from the floor after last session. Xray is stable     Functional limitations are described as occurring with:   bending, standing for longer periods of time, sleeping too, sit to stand transfers, sitting, feels good in the morning and as the day goes on the pain increased     Objective:       Treatment Today       Exercises:  Exercise #1: SKTC and DKTC - hold 30 sec  Comment #1: supine knee rocks - 20 reps  Exercise #2: supine piriformis stretch - hold 30 sec x 2  Comment #2: lift lift lower lower abdominal activation - 10 reps  Exercise #3: sit to stands - 5 reps, 3x daily  Comment #3: mini squats at sink - 10 reps  Exercise #4: supine SLR - 10-20 reps  Comment #4: supine bridge with hip adduction - 10-20 reps, hold 5 sec  Exercise #5: sidelying hip abd - 10-20 reps  Comment #5: step ups with leg lift - 10 reps bilat, 3 sets  Exercise #6: supine or seated hip abduction with band - 10-20 reps  Comment #6: standing hip abduction and extension - orange band x10-20  Exercise #7: total gym squats, level 10, x 20, SL x 20        TREATMENT MINUTES COMMENTS   Evaluation     Self-care/ Home management     Manual therapy 15 SL on R, MFR to thoracolumbar paraspinals    Neuromuscular Re-education     Therapeutic Activity     Therapeutic Exercises 15 See above flowsheet  Nustep 5 min  Entire review of HEP for posture, progression and to assess progress  Functional getting up from floor appropriately without pain or difficulty   Gait training  Normal gait pattern, feet wider than hips for more stability and balance   Modality__________________                Total 30    Blank areas are  intentional and mean the treatment did not include these items.       Diya Rivera, PT  9/8/2020

## 2021-06-11 NOTE — PROGRESS NOTES
Assessment and Plan:     Patient has been advised of split billing requirements and indicates understanding: Yes  1. Routine general medical examination at a health care facility    - Mammo Screening Bilateral; Future    2. Seronegative rheumatoid arthritis of multiple sites (H)  Stable on Plaquenil    3. Small bowel obstruction (H)  Long complicated hospitalization Jan-March 2020. Doing well now, on regular diet, per Surgery recommendation low fiber diet. She would like to see [facility] for consultation about prevention of similar episodes.  - Ambulatory referral to Gastroenterology    4. Paroxysmal atrial flutter (H)  Happened during the hospital stay. Now in NSR. On metoprolol.    5. Moderate malnutrition (H)  Improved since back to normal diet and TPN stopped.    6. Compression fracture of L2 vertebra with routine healing  Newly diagnosed this spring. Pain better now, taking pain med only as needed once in a few days.     7. Senile osteoporosis  Prolia due in December. Tolerated first dose well.  - Vitamin D, Total (25-Hydroxy)    8. Stage 3b chronic kidney disease  Stable.  - Urinalysis-UC if Indicated  - HM2(CBC w/o Differential)  - Comprehensive Metabolic Panel    9. Gout, unspecified cause, unspecified chronicity, unspecified site  On allopurinol    10. Hypertension  Stable.  - amLODIPine (NORVASC) 10 MG tablet; Take 0.5 tablets (5 mg total) by mouth daily.  Dispense: 90 tablet; Refill: 3    11. Hyperlipidemia    - atorvastatin (LIPITOR) 10 MG tablet; Take 1 tablet (10 mg total) by mouth at bedtime.  Dispense: 90 tablet; Refill: 3  - LDL Cholesterol, Direct  - Glycosylated Hemoglobin A1c  - Thyroid Stimulating Hormone (TSH)    12. Hypothyroidism, unspecified type  On levothyroxine     The patient's current medical problems were reviewed.    I have had an Advance Directives discussion with the patient.  The following health maintenance schedule was reviewed with the patient and provided in printed form in the  after visit summary:   Health Maintenance   Topic Date Due     MEDICARE ANNUAL WELLNESS VISIT  08/01/2020     FALL RISK ASSESSMENT  10/01/2021     COLORECTAL CANCER SCREENING  10/12/2021     MAMMOGRAM  10/14/2021     DXA SCAN  10/14/2021     LIPID  06/11/2023     ADVANCE CARE PLANNING  08/01/2024     Pneumococcal Vaccine: 65+ Years (1 of 1 - PPSV23) 10/21/2024     TD 18+ HE  06/07/2028     HEPATITIS C SCREENING  Completed     INFLUENZA VACCINE RULE BASED  Completed     ZOSTER VACCINES  Completed     Pneumococcal Vaccine: Pediatrics (0 to 5 Years) and At-Risk Patients (6 to 64 Years)  Aged Out     HEPATITIS B VACCINES  Aged Out        Subjective:   Chief Complaint: Claudia Wise is an 69 y.o. female here for an Annual Wellness visit.   HPI:  Acute and chronic medical problems discussed as above.    Review of Systems:    Please see above.  The rest of the review of systems are negative for all systems.    Patient Care Team:  Isabel Maurer MD as PCP - General  Isabel Maurer MD as Assigned PCP     Patient Active Problem List   Diagnosis     Herpes Simplex Type II     Anxiety     Anemia in chronic kidney disease     Hyperlipemia     Gout     Hypertension     Prediabetes     Endometriosis     Chronic kidney disease     Subclinical Hypothyroidism     Seronegative rheumatoid arthritis of multiple sites (H)     Bilateral primary osteoarthritis of knee     Paroxysmal atrial flutter (H)     Moderate malnutrition (H)     Small bowel obstruction (H)     Senile osteoporosis     Compression fracture of L2 vertebra with routine healing     Past Medical History:   Diagnosis Date     Anemia      Anxiety      Chronic kidney disease      Diabetes mellitus, type II (H)     prediabetes     Disease of thyroid gland     hypothyroid     Family history of myocardial infarction      Gout      High cholesterol      Hypertension      Inverted nipple     right     Macular edema      Osteoarthritis 2012     Rheumatoid arthritis (H) 2012       Past Surgical History:   Procedure Laterality Date     COLON SURGERY       COLPORRHAPHY N/A 2016    Procedure: ANTERIOR REPAIR WITH MESH;  Surgeon: Zac Stone MD;  Location: [facility] OR;  Service:      EXPLORATORY LAPAROTOMY N/A 2020    Procedure: EXPLORATORY LAPARATOMY, EXTENSIVE LYSIS OF ADHESIONS, SMALL BOWEL RESECTION;  Surgeon: Claudia Jeronimo MD;  Location: [facility] OR;  Service: General     HYSTERECTOMY       OOPHORECTOMY       DC EGD PERCUTANEOUS PLACEMENT GASTROSTOMY TUBE N/A 2020    Procedure: Percutaneous Endoscopic Gastromstomy Tube Placement;  Surgeon: Zion Schwarz MD;  Location: [facility] OR;  Service: General      Family History   Problem Relation Age of Onset     Breast cancer Maternal Aunt      Cancer Maternal Aunt      Cancer Father      Cancer Sister      Cancer Maternal Grandmother      Cancer Cousin      Heart attack Mother 49      Social History     Socioeconomic History     Marital status:      Spouse name: Not on file     Number of children: Not on file     Years of education: Not on file     Highest education level: Not on file   Occupational History     Not on file   Social Needs     Financial resource strain: Not on file     Food insecurity     Worry: Not on file     Inability: Not on file     Transportation needs     Medical: Not on file     Non-medical: Not on file   Tobacco Use     Smoking status: Former Smoker     Quit date: 1995     Years since quittin.2     Smokeless tobacco: Never Used   Substance and Sexual Activity     Alcohol use: Yes     Comment: occasional couple times a year     Drug use: No     Sexual activity: Not on file   Lifestyle     Physical activity     Days per week: Not on file     Minutes per session: Not on file     Stress: Not on file   Relationships     Social connections     Talks on phone: Not on file     Gets together: Not on file     Attends [employer] service: Not on file     Active  member of club or organization: Not on file     Attends meetings of clubs or organizations: Not on file     Relationship status: Not on file     Intimate partner violence     Fear of current or ex partner: Not on file     Emotionally abused: Not on file     Physically abused: Not on file     Forced sexual activity: Not on file   Other Topics Concern     Not on file   Social History Narrative     Not on file      Current Outpatient Medications   Medication Sig Dispense Refill     acetaminophen (TYLENOL) 500 MG tablet Take 500 mg by mouth every 6 (six) hours as needed for pain.       acyclovir (ZOVIRAX) 400 MG tablet Take 1 tablet (400 mg total) by mouth every 8 (eight) hours as needed. 60 tablet 1     allopurinoL (ZYLOPRIM) 100 MG tablet Take 1 tablet (100 mg total) by mouth 2 (two) times a day with meals.  0     amLODIPine (NORVASC) 10 MG tablet Take 1 tablet (10 mg total) by mouth daily. 90 tablet 3     atorvastatin (LIPITOR) 10 MG tablet TAKE 1 TABLET BY MOUTH ONCE DAILY AT BEDTIME 90 tablet 3     carboxymethylcellulose (REFRESH PLUS) 0.5 % Dpet ophthalmic dropperette Administer 1-2 drops to both eyes 4 (four) times a day as needed.        cyanocobalamin, vitamin B-12, 1,000 mcg Subl Place 1 tablet (1,000 mcg total) under the tongue daily.  0     fish oil-omega-3 fatty acids 300-1,000 mg capsule Take 1 g by mouth daily.       fluticasone propionate (FLONASE) 50 mcg/actuation nasal spray Use 1 spray(s) in each nostril once daily 48 g 3     folic acid (FOLVITE) 1 MG tablet Take 1 tablet (1 mg total) by mouth daily. 30 tablet 11     HYDROcodone-acetaminophen 5-325 mg per tablet Take 1 tablet by mouth every 8 (eight) hours as needed for pain. 18 tablet 0     hydrOXYchloroQUINE (PLAQUENIL) 200 mg tablet Take 1 tablet (200 mg total) by mouth 2 (two) times a day. 180 tablet 0     levothyroxine (SYNTHROID, LEVOTHROID) 50 MCG tablet TAKE 1 TABLET BY MOUTH ONCE DAILY IN THE MORNING 90 tablet 3     LORazepam (ATIVAN) 0.5 MG  "tablet Take 0.5 tablets (0.25 mg total) by mouth every 6 (six) hours as needed for anxiety. 4 tablet 0     losartan (COZAAR) 50 MG tablet Take 1 tablet by mouth once daily 90 tablet 1     melatonin 3 mg Tab tablet Take 1 tablet (3 mg total) by mouth at bedtime as needed.  0     metoprolol tartrate (LOPRESSOR) 25 MG tablet Take 1 tablet (25 mg total) by mouth 2 (two) times a day. 180 tablet 3     prednisoLONE acetate (PRED-FORTE) 1 % ophthalmic suspension Administer 1 drop into the left eye 2 (two) times a day.  0     RESTASIS 0.05 % ophthalmic emulsion INSTILL 1 DROP INTO EACH EYE TWICE DAILY  11     UNABLE TO FIND Tumeric unknown strength gel capsules. Take 1 capsule daily. Indication: Rheumatoid Arthritis.       No current facility-administered medications for this visit.       Objective:   Vital Signs:   Visit Vitals  /63   Pulse 73   Ht 5' 3.5\" (1.613 m)   Wt 157 lb 14.4 oz (71.6 kg)   SpO2 100%   BMI 27.53 kg/m           VisionScreening:  No exam data present     PHYSICAL EXAM  General Appearance: Alert, cooperative, no distress, appears stated age.  Head: Normocephalic, without obvious abnormality, atraumatic  Eyes: PERRL, conjunctiva/corneas clear, EOM's intact  Ears: Normal TM's and external ear canals, both ears  Nose: Nares normal, septum midline,mucosa normal, no drainage  Throat: Lips, mucosa, and tongue normal; teeth and gums normal  Neck: Supple, symmetrical, trachea midline, no adenopathy;  thyroid: not enlarged, symmetric, no tenderness/mass/nodules; no carotid bruit or JVD  Back: Symmetric, no curvature, ROM normal, no CVA tenderness.  Lungs: Clear to auscultation bilaterally, respirations unlabored.  Breasts: No breast masses, tenderness, asymmetry, or nipple discharge.  Heart: Regular rate and rhythm, S1 and S2 normal, no murmur, rub, or gallop.  Abdomen: Soft, non-tender, bowel sounds active all four quadrants,  no masses, no organomegaly.  Pelvic:declined  Extremities: Extremities normal, " atraumatic, no cyanosis or edema.  Skin: Skin color, texture, turgor normal, no rashes or lesions.  Lymph nodes: Cervical, supraclavicular, and axillary nodes normal.  Neurologic: No focal neurological findings.      Assessment Results 10/1/2020   Activities of Daily Living No help needed   Instrumental Activities of Daily Living No help needed   Mini Cog Total Score 5   Some recent data might be hidden     A Mini-Cog score of 0-2 suggests the possibility of dementia, score of 3-5 suggests no dementia      Identified Health Risks:     The patient was provided with suggestions to help her develop a healthy physical lifestyle.   The patient was counseled and encouraged to consider modifying their diet and eating habits. She was provided with information on recommended healthy diet options.  Information on urinary incontinence and treatment options given to patient.  The patient was provided with suggestions to help her develop a healthy emotional lifestyle.   Patient's advanced directive was discussed and I am comfortable with the patient's wishes.

## 2021-06-11 NOTE — PATIENT INSTRUCTIONS - HE

## 2021-06-11 NOTE — PROGRESS NOTES
"Claudia Wise is a 69 y.o. female who is being evaluated via a billable video visit.      The patient has been notified of following:     \"This video visit will be conducted via a call between you and your physician/provider. We have found that certain health care needs can be provided without the need for an in-person physical exam.  This service lets us provide the care you need with a video conversation.  If a prescription is necessary we can send it directly to your pharmacy.  If lab work is needed we can place an order for that and you can then stop by our lab to have the test done at a later time.    Video visits are billed at different rates depending on your insurance coverage. Please reach out to your insurance provider with any questions.    If during the course of the call the physician/provider feels a video visit is not appropriate, you will not be charged for this service.\"    Patient has given verbal consent to a Video visit? Yes  How would you like to obtain your AVS? AVS Preference: [employer].  If dropped by the video visit, the video invitation should be sent to: Text to cell phone: [phone]  Will anyone else be joining your video visit? No        Video Start Time: 11:04 AM    Video-Visit Details    Type of service:  Video Visit    Video End Time (time video stopped): 11:20 AM  Originating Location (pt. Location): Home    Distant Location (provider location):  [facility]     Platform used for Video Visit: Doximity -am well failed      Loree Rivas, JANY    Assessment:     Diagnoses and all orders for this visit:    Chronic left-sided low back pain without sciatica    Compression fracture of L2 vertebra with routine healing    Lumbar facet arthropathy    Spondylolisthesis of lumbar region       Claudia Wise is a 69 y.o. y.o. female with past medical history significant for anxiety, rheumatoid arthritis managed by Dr. Vinson, diabetes myelitis, hypertension, " hyperlipidemia, chronic kidney disease, thyroid disorder, gout, osteopenia, recent small bowel obstruction with surgical intervention who presents today for follow-up regarding:    -Ongoing left low back pain that has improved, initial compression fracture March 2020.  Symptoms are more mild at this time.     Plan:     A shared decision making plan was used. The patient's values and choices were respected. Prior medical records from 8/6/2020 to current were reviewed today. The following represents what was discussed and decided upon by the provider and the patient.        -DIAGNOSTIC TESTS: Images were personally reviewed and interpreted.   --Lumbar spine x-ray 8/24/2020 shows stable L2 compression deformity.  ----Lumbar spine x-ray 7/1/2020 shows stable L2 compression deformity, grade 1 L3-4 spondylolisthesis.  --Lumbar spine MRI 5/19/2020 with 20% acute/subacute compression fracture L2.  There is mild bilateral lateral recess stenosis L3-4 and L4-5.  Bilateral synovial cysts L4-5 with moderate facet arthropathy.    -INTERVENTIONS: No injection recommendations at this time as patient is doing well.    -MEDICATIONS: Advised patient to continue with Tylenol as needed for pain.  Discussed side effects of medications and proper use. Patient verbalized understanding.    -PHYSICAL THERAPY: Encourage patient to continue with physical therapy which she is doing well with.  Discussed the importance of core strengthening, ROM, stretching exercises with the patient and how each of these entities is important in decreasing pain.  Explained to the patient that the purpose of physical therapy is to teach the patient a home exercise program.  These exercises need to be performed every day in order to decrease pain and prevent future occurrences of pain.        -Given her x-rays are stable advised patient at this point that she can start weaning off of her TLSO back brace by removing it for a couple of hours a day for the next  week and then at that time if she is doing well she can remove it for 4 hours a day for a week and then 6-8 hours a day for a week and so on.  Advised patient to notify us if her pain worsens at any time.    -PATIENT EDUCATION:  16 minutes of total visit time was spent face to face with the patient today, 60 % of the visit was spent on counseling, education, and coordinating care.   -5 minutes spent outside of visit time, non-face-to-face time, reviewing chart.  -Today we also discussed the issues related to the current COVID-19 pandemic, the pros and cons of the current treatment plan, the CDC guidelines such as social distancing, washing the hands, and covering the cough.    -FOLLOW UP: Follow-up in 6 weeks to evaluate how she is doing with removing the brace, sooner if needed at any point.  Advised to contact clinic if symptoms worsen or change.    Subjective:     Claudia Wise is a 69 y.o. female who presents today for follow-up regarding: Left low back pain at the belt line and lumbosacral junction that has significantly improved, currently still having some mild pain at a 3/10 and sometimes with prolonged standing her pain still gets up to a 6/10 at its worst but many times is a 0/10 and doing quite well.  Patient does report that she has been outside a couple of times with out her back brace and done quite well.  She is frustrated that it is taken 6 months to notice improvement but she does feel things are getting better.  Patient denies any new symptoms, denies right low back pain, denies lower extremity symptoms.  Denies any recent trips or falls or balance changes, denies bowel or bladder loss control, denies lower extremity weakness.    Treatment to Date: No prior spinal surgery or spinal injection.  Physical therapy [facility] x7 sessions last 8/28/2024 compression fracture/spine pain.     Medications:  Plaquenil 4 seronegative rheumatoid arthritis  Tylenol  Hydrocodone managed by Dr. Erasto Guzmán  25 mg bedtime however patient only tried this medication for a couple of days with no benefit therefore she discontinued on her own.    Patient Active Problem List   Diagnosis     Herpes Simplex Type II     Anxiety     Anemia in chronic kidney disease     Hyperlipemia     Gout     Hypertension     Prediabetes     Endometriosis     Chronic kidney disease     Subclinical Hypothyroidism     Seronegative rheumatoid arthritis of multiple sites (H)     Bilateral primary osteoarthritis of knee     Paroxysmal atrial flutter (H)     Moderate malnutrition (H)     Small bowel obstruction (H)     Senile osteoporosis     Compression fracture of L2 vertebra with routine healing       Current Outpatient Medications on File Prior to Encounter   Medication Sig Dispense Refill     acetaminophen (TYLENOL) 500 MG tablet Take 500 mg by mouth every 6 (six) hours as needed for pain.       HYDROcodone-acetaminophen 5-325 mg per tablet Take 1 tablet by mouth every 8 (eight) hours as needed for pain. 18 tablet 0     acyclovir (ZOVIRAX) 400 MG tablet Take 1 tablet (400 mg total) by mouth every 8 (eight) hours as needed. 60 tablet 1     allopurinoL (ZYLOPRIM) 100 MG tablet Take 1 tablet (100 mg total) by mouth 2 (two) times a day with meals.  0     amLODIPine (NORVASC) 10 MG tablet Take 1 tablet (10 mg total) by mouth daily. 90 tablet 3     atorvastatin (LIPITOR) 10 MG tablet TAKE 1 TABLET BY MOUTH ONCE DAILY AT BEDTIME 90 tablet 3     carboxymethylcellulose (REFRESH PLUS) 0.5 % Dpet ophthalmic dropperette Administer 1-2 drops to both eyes 4 (four) times a day as needed.        cyanocobalamin, vitamin B-12, 1,000 mcg Subl Place 1 tablet (1,000 mcg total) under the tongue daily.  0     fat emul-soy-mct-oliv-fish oiL 20 % Emul Infuse 250 mL into a venous catheter 3 (three) times a week in the evening.  0     fish oil-omega-3 fatty acids 300-1,000 mg capsule Take 1 g by mouth daily.       fluticasone propionate (FLONASE) 50 mcg/actuation nasal  spray Use 1 spray(s) in each nostril once daily 48 g 3     folic acid (FOLVITE) 1 MG tablet Take 1 tablet (1 mg total) by mouth daily. 30 tablet 11     hydrOXYchloroQUINE (PLAQUENIL) 200 mg tablet Take 1 tablet (200 mg total) by mouth 2 (two) times a day. 180 tablet 0     levothyroxine (SYNTHROID, LEVOTHROID) 50 MCG tablet TAKE 1 TABLET BY MOUTH ONCE DAILY IN THE MORNING 90 tablet 3     LORazepam (ATIVAN) 0.5 MG tablet Take 0.5 tablets (0.25 mg total) by mouth every 6 (six) hours as needed for anxiety. 4 tablet 0     losartan (COZAAR) 50 MG tablet Take 1 tablet by mouth once daily 90 tablet 1     melatonin 3 mg Tab tablet Take 1 tablet (3 mg total) by mouth at bedtime as needed.  0     methotrexate 2.5 MG tablet Take 4 tablets (10 mg total) by mouth once a week. 18 tablet 1     metoprolol tartrate (LOPRESSOR) 25 MG tablet Take 1 tablet (25 mg total) by mouth 2 (two) times a day. 180 tablet 3     prednisoLONE acetate (PRED-FORTE) 1 % ophthalmic suspension Administer 1 drop into the left eye 2 (two) times a day.  0     RESTASIS 0.05 % ophthalmic emulsion INSTILL 1 DROP INTO EACH EYE TWICE DAILY  11     UNABLE TO FIND Tumeric unknown strength gel capsules. Take 1 capsule daily. Indication: Rheumatoid Arthritis.       No current facility-administered medications on file prior to encounter.        Allergies   Allergen Reactions     Bupropion Hcl Nausea Only     Erythromycin Base Nausea Only     Tetracyclines Itching     Valacyclovir Nausea Only and Nausea And Vomiting       Past Medical History:   Diagnosis Date     Anemia      Anxiety      Chronic kidney disease      Diabetes mellitus, type II (H)     prediabetes     Disease of thyroid gland     hypothyroid     Family history of myocardial infarction      Gout      High cholesterol      Hypertension      Inverted nipple     right     Macular edema      Osteoarthritis 2012     Rheumatoid arthritis (H) 2012        Review of Systems  ROS:  Specifically negative for  bowel/bladder dysfunction, balance changes, headache, dizziness, foot drop, fevers, chills, appetite changes, nausea/vomiting, unexplained weight loss. Otherwise 13 systems reviewed are negative. Please see the patient's intake questionnaire from today for details.    Reviewed Social, Family, Past Medical and Past Surgical history with patient, no significant changes noted since prior visit.     Objective:     VIRTUAL PHYSICAL EXAM:   --CONSTITUTIONAL: Well developed, well nourished, healthy appearing individual.  --PSYCHIATRIC: Appropriate mood and affect. No difficulty interacting due to temper, social withdrawal, or memory issues.  --SKIN: Lumbar region is visually dry and intact.   --RESPIRATORY: Normal rhythm and effort. No abnormal accessory muscle breathing patterns noted.   --STANDING EXAMINATION:  Normal lumbar lordosis noted, no lateral shift.  --MUSCULOSKELETAL: Lumbar spine inspection reveals no evidence of deformity. Range of motion is not limited in lumbar flexion, extension, lateral rotation.   --GROSS MOTOR: Gait is non-antalgic.     RESULTS:   Imaging: Lumbar spine imaging was reviewed today. The images were shown to the patient and the findings were explained using a spine model.      Xr Lumbar Spine 2 Or 3 Vws  Result Date: 8/25/2020  INDICATION: Evaluate L2 compression fracture. COMPARISON: 7/10/2020.   Five lumbar-type vertebral bodies. Stable moderate L2 compression deformity. Segmental kyphosis about the fracture from the superior L1 endplate through the inferior L3 endplate measures 21 degrees, which is unchanged. Vertebral body heights are otherwise maintained. Mild loss of disc space height L3-L4 through L5-S1. Mild lower lumbar facet arthropathy.      XR LUMBAR SPINE 2 OR 3 VWS  LOCATION: [facility]  DATE/TIME: 7/1/2020   INDICATION: Low back pain compression fracture L2 evaluate for stability  COMPARISON: 06/08/2020  IMPRESSION:   Very shallow left apex curvature of the  lumbar spine. Grade 1 retrolisthesis of L3 on L4. The bones appear diffusely demineralized, suggestive of osteoporosis. Stable appearance of the L2 compression deformity. No new compression deformities.   Atherosclerotic calcification of the abdominal aorta. Degenerative changes of the sacroiliac joints.      XR LUMBAR SPINE 2 OR 3 VWS  LOCATION: [facility]  DATE/TIME: 6/8/2020   INDICATION: Low back pain evaluate L2 compression fracture.  COMPARISON: MR 05/19/2020.  IMPRESSION:   Five lumbar type vertebral bodies presumed. Subacute/chronic appearing compression is identified at the L2, with approximately 30-40% loss of superior body height, and fragmentation anterior superior corner similar to previous MR appearance. Sclerosis   associated with the compressed superior endplate at this level. Degenerative changes elsewhere in the lumbar spine stable. 2 mm posterior subluxation L3 upon L4 is more prominent, possibly due to positioning. If there is concern for instability flexion   and extension views could be helpful to assess this level. Otherwise satisfactory height and alignment lumbar spine. Leftward lumbar curve apex L4. Satisfactory appearance to the sacrum. Vascular calcification. Degenerative changes both SI joints.   Obscuration of the right hemisacrum due to bowel gas and stool.  Consider flexion/extension views to assess possible L3-L4 instability as clinically indicated.      MR LUMBAR SPINE WO CONTRAST  LOCATION: [facility]  DATE/TIME: 5/19/2020   INDICATION: Back pain or radiculopathy, > 6 wks Low back pain with left radicular pain  COMPARISON: Correlation with abdomen and pelvis CT 02/28/2020  TECHNIQUE: Without IV contrast  FINDINGS:   Nomenclature is based on 5 lumbar type vertebral bodies. The conus ends at distal L1. Mild levocurvature of the lumbar spine. 1 mm retrolisthesis from L3-L4 to L5-S1. 20% superior endplate compression fracture of L2 with associated moderate marrow  edema.   Small amount of prevertebral soft tissue edema from L1 to L3. The rest of the vertebral body heights are maintained. Nonpathologic marrow signal. No MRI evidence for pars defects. Mild symmetric atrophy of the posterior paraspinal musculature. Normal diameter of the distal abdominal aorta. The visualized bony pelvis and proximal femora are grossly within normal limits.  T12-L1: Mild intervertebral disc height loss. Loss of the normal T2 signal within the disc. Small broad-based disc bulge with superimposed left paracentral disc protrusion. Mild bilateral facet arthropathy. No spinal canal narrowing. No neuroforaminal narrowing.  L1-L2: Mild intervertebral disc height loss. Loss of the normal T2 signal within the disc. Small broad-based disc bulge. Mild bilateral facet arthropathy. No spinal canal narrowing. No neuroforaminal narrowing.  L2-L3: Mild intervertebral disc height loss. Loss of the normal T2 signal within the disc. Small broad-based disc bulge. Mild bilateral facet arthropathy. No spinal canal narrowing. No neuroforaminal narrowing.   L3-L4: Normal intervertebral disc height. Loss of the normal T2 signal within the disc. Small broad-based disc bulge. Moderate bilateral facet arthropathy. Small amount of fluid within the facet joints. Mild narrowing of the lateral recesses. No spinal canal narrowing. Mild right neuroforaminal narrowing. Mild left neuroforaminal narrowing   L4-L5: Normal intervertebral disc height. Loss of the normal T2 signal within the disc. Small broad-based disc bulge. Moderate bilateral facet arthropathy. Small amount of fluid within the facet joints. Mild narrowing of the lateral recesses. No spinal canal narrowing. Mild right neuroforaminal narrowing. No left neuroforaminal narrowing. 2-3 mm bilateral lateral synovial cyst.  L5-S1: Normal intervertebral disc height. Loss of the normal T2 signal within the disc. Small broad-based disc bulge. Mild to moderate bilateral facet  arthropathy. No spinal canal narrowing. No right neuroforaminal narrowing. No left neuroforaminal narrowing.  IMPRESSION:   1.  20% acute/subacute superior endplate compression fracture of L2.   2.  Mild narrowing of the lateral recesses at L3-L4 and L4-L5.  3.  No high-grade spinal canal.  4.  Mild bilateral neuroforaminal narrowing at L3-L4. Mild right neuroforaminal narrowing at L4-L5.  5.  Moderate facet arthropathy at L3-L4 and L4-L5 with small amount of fluid within the facet joints.

## 2021-06-11 NOTE — PROGRESS NOTES
[facility] Daily Progress     Patient Name: Claudia Wise  Date: 9/29/2020  Visit #: 11/8  Referral Diagnosis: L2 compression fracture  Referring provider: Isabel Maurer MD  Visit Diagnosis:     ICD-10-CM    1. Compression fracture of L2 vertebra, initial encounter (H)  S32.020A    2. Lumbar spine painful on movement  M54.5    3. Acute low back pain without sciatica, unspecified back pain laterality  M54.5        Assessment:     Today patient feeling improvements, getting stronger day to day. Feeling comfortable with her progress. Patient wearing brace about 4 hours on and off, woke up today and didn't have to wear the brace.     From Eval:  Patient is a 69 y.o. female that presents with signs and symptoms consistent with left low back pain secondary to Compression fracture of L2 vertebra with routine healing. Patient demonstrates impairments including decreased lumbar flexibility and mobility, with poor core stability and postural awareness, leading to impaired functional mobility. Patient's functional limitations include sitting and walking for lengthy periods of time, sleeping comfortably, and bending forwards properly with TLSO and without pain symptoms.      HEP/POC compliance is  good .  Patient demonstrates understanding/independence with home program.  Patient is appropriate to continue with skilled physical therapy intervention, as indicated by initial plan of care.    Goal Status:  Pt. will be independent with home exercise program in : 6 weeks  Pt will: be able to walk 10 minutes without difficulty or increase of back pain symptoms by 6 weeks.  Pt will: be able to squat down for a daily task without increased pain or difficulty and without TLSO by 6 weeks.  Pt will: be able to sit for longer than 30 minutes without increase of back pain symptoms by 6 weeks.      Plan / Patient Education:     Continue with initial plan of care.  Progress with home program as tolerated.    Plan  for next visit: review HEP, add core/lumbar strengthening as appropriate, Treadmill warm up, rotational exercises as appropriate, standing banded exercises, step ups, total gym, quadruped ex    Subjective:     Some soreness waking up this morning, she is okay right now tho. Patient is sleeping better, pain doesn't wake her up anymore. Patient wondering about working on rough terrain and small hills, uneven surfaces.     Functional limitations are described as occurring with:   bending, standing for longer periods of time, sleeping too, sit to stand transfers, sitting, feels good in the morning and as the day goes on the pain increased     Objective:       Treatment Today       Exercises:  Exercise #1: SKTC and DKTC - hold 30 sec  Comment #1: supine knee rocks - 20 reps  Exercise #2: supine piriformis stretch - hold 30 sec x 2  Comment #2: lift lift lower lower abdominal activation - 10 reps  Exercise #3: sit to stands - 5 reps, 3x daily  Comment #3: mini squats at sink - 10 reps  Exercise #4: supine SLR - 10-20 reps  Comment #4: supine bridge with hip adduction - 10-20 reps, hold 5 sec  Exercise #5: sidelying hip abd - 10-20 reps  Comment #5: step ups with leg lift - 10 reps bilat, 3 sets  Exercise #6: supine or seated hip abduction with band - 10-20 reps  Comment #6: standing hip abduction and extension - orange band x10-20  Exercise #7: total gym squats, level 20 x 10 with hip adduction, SL x 10 bilat level10        TREATMENT MINUTES COMMENTS   Evaluation     Self-care/ Home management     Manual therapy 15 SL on R, MFR to thoracolumbar paraspinals    Neuromuscular Re-education     Therapeutic Activity     Therapeutic Exercises 15 See above flowsheet  Treadmill 5 min  Entire review of HEP for posture, progression and to assess progress  Functional getting up from floor appropriately without pain or difficulty      Quadruped arm and leg lift  Wall push ups    Gait training  Normal gait pattern, feet wider than hips  for more stability and balance   Modality__________________                Total 30    Blank areas are intentional and mean the treatment did not include these items.       Diya Rivera, PT  9/29/2020

## 2021-06-12 NOTE — PROGRESS NOTES
[facility] Rehabilitation Discharge Summary  Patient Name: Claudia Wise  Date: 12/1/2020  Referral Diagnosis: L2 compression fracture  Referring provider: Isabel Maurer MD  Visit Diagnosis:   1. Compression fracture of L2 vertebra, initial encounter (H)     2. Lumbar spine painful on movement     3. Acute low back pain without sciatica, unspecified back pain laterality         Goals:  Pt. will be independent with home exercise program in : 6 weeks  Pt will: be able to walk 10 minutes without difficulty or increase of back pain symptoms by 6 weeks.  Pt will: be able to squat down for a daily task without increased pain or difficulty and without TLSO by 6 weeks.  Pt will: be able to sit for longer than 30 minutes without increase of back pain symptoms by 6 weeks.       Patient was seen for 13 visits for physical therapy of L2 compression fracture  from 5/27/2020 to 11/4/2020 with no follow up appointments.   The patient met goals and has demonstrated understanding of and independence in the home program for self-care, and progression to next steps.  She will initiate contact if questions or concerns arise.  The patient reports feeling better and did not wish to schedule further therapy at this time.    Therapy will be discontinued at this time.  The patient will need a new referral to resume physical therapy treatment. Please see below for patient's current status.    Thank you for your referral.  Diya Rivera, PT, DPT  12/1/2020   9:59 AM      [facility] Rehabilitation Daily Progress     Patient Name: Claudia Wise  Date: 11/4/2020  Visit #: 13  Referral Diagnosis: L2 compression fracture  Referring provider: Isabel Maurer MD  Visit Diagnosis:     ICD-10-CM    1. Compression fracture of L2 vertebra, initial encounter (H)  S32.020A    2. Lumbar spine painful on movement  M54.5    3. Acute low back pain without sciatica, unspecified back pain laterality  M54.5        Assessment:  PHYSICIAN NEXT STEPS:  Review Only    CHIEF COMPLAINT:  Chief Complaint/Protocol Used: Information Only Call  Onset: information only      ASSESSMENT:  ? Onset: information only  ? Normal True  ? Normal, no trouble breathing True  ? Reason For Call Or Question: information about COVID-19 testing sites  -------------------------------------------------------    DISPOSITION:  Disposition Recommendation: Home Care - Information or Advice Only Call  Questions that led to disposition:  ? Question about upcoming scheduled test, no triage required and triager able to answer question  Patient Directed To: Unspecified  Patient Intended Action: Other      CALL NOTES:  10/05/2020 at 9:40 AM by Bimal Reagan  ? patient had questions about covid-19 test sites, [facility] recommended    DISPOSITION OVERRIDE/PROVIDER CONSULT:  Disposition Override: N/A  Override Source: Unspecified  Consulted with PCP: No  Consulted with On-Call Physician: No    CALLER CONTACT INFO:  Name: Nirav Santos (Self)  Phone 1: [phone] (Mobile) - Preferred  Phone 2: [phone] (Home Phone)      ENCOUNTER STARTED:  10/05/20 09:36:17 AM  ENCOUNTER ASSIGNED TO/CLOSED BY:  Bimal Reagan @ 10/05/20 09:40:52 AM      -------------------------------------------------------    CARE ADVICE given per Information Only Call guideline.  HOME CARE - INFORMATION OR ADVICE ONLY CALL.; CALL BACK IF:    * You have more questions.      UNDERSTANDS CARE ADVICE: Yes    AGREES WITH CARE ADVICE: Yes    WILL FOLLOW CARE ADVICE: Yes    -------------------------------------------------------       Today patient hasn't been seen in 3+ weeks, reporting aggravating her symptoms while working on with an exercise DVD. Prior to this increase in symptoms she was feeling improvements, getting stronger day to day. Patient tolerated manual therapy to address her recent increase of symptoms.   Feeling comfortable with her progress. Patient isn't really wearing her brace at all, only on days that she is sore. Patient might be okay with independence.     From Eval:  Patient is a 69 y.o. female that presents with signs and symptoms consistent with left low back pain secondary to Compression fracture of L2 vertebra with routine healing. Patient demonstrates impairments including decreased lumbar flexibility and mobility, with poor core stability and postural awareness, leading to impaired functional mobility. Patient's functional limitations include sitting and walking for lengthy periods of time, sleeping comfortably, and bending forwards properly with TLSO and without pain symptoms.      HEP/POC compliance is  good .  Patient demonstrates understanding/independence with home program.  Patient is appropriate to continue with skilled physical therapy intervention, as indicated by initial plan of care.    Goal Status:  Pt. will be independent with home exercise program in : 6 weeks  Pt will: be able to walk 10 minutes without difficulty or increase of back pain symptoms by 6 weeks.  Pt will: be able to squat down for a daily task without increased pain or difficulty and without TLSO by 6 weeks.  Pt will: be able to sit for longer than 30 minutes without increase of back pain symptoms by 6 weeks.      Plan / Patient Education:     Continue with initial plan of care.  Progress with home program as tolerated.    Plan for next visit: review HEP, add core/lumbar strengthening as appropriate, Treadmill warm up, rotational exercises as appropriate, standing banded exercises, step ups, total gym, quadruped ex    Subjective:      Patient reports provoking her symptoms after doing a DVD workout last Wednesday. The symptoms began the next day and have been present since it happened. She used ice to relieve the pain. She started using her brace a couple days later after noticing her posture getting bad. Leaning forward while sitting causes her to feel a burning sensation in her back. Patient is interested in trying TENS during the next session.    Functional limitations are described as occurring with:   bending, standing for longer periods of time, sleeping too, sit to stand transfers, sitting, feels good in the morning and as the day goes on the pain increased     Objective:       Treatment Today       Exercises:  Comment #3: mini squats at sink - 10 reps  Exercise #4: supine SLR - 10-20 reps  Comment #6: standing hip abduction and extension - orange band x10-20  Exercise #7: total gym squats, level 20 x 10 with hip adduction, SL x 10 bilat level10        TREATMENT MINUTES COMMENTS   Evaluation     Self-care/ Home management     Manual therapy 15 SL on R, MFR to thoracolumbar paraspinals    Neuromuscular Re-education     Therapeutic Activity     Therapeutic Exercises 10 See above flowsheet  Treadmill 5 min  Entire review of HEP for posture, progression and to assess progress  Functional getting up from floor appropriately without pain or difficulty  REVIEW of HEP    Quadruped arm and leg lift  Wall push ups    Gait training  Normal gait pattern, feet wider than hips for more stability and balance   Modality__________________                Total 25    Blank areas are intentional and mean the treatment did not include these items.     Toribio Rosario, SPT  I attest that I was present in the room, making skilled assessments and directing the service provided today.  I was responsible for the assessment and treatment of the patient.  During this time, I was not engaged in treating another patient or doing other tasks.    Diya Rivera,  PT  11/4/2020

## 2021-06-12 NOTE — PROGRESS NOTES
[facility] Rehabilitation Daily Progress     Patient Name: Claudia Wise  Date: 10/13/2020  Visit #: 12  Referral Diagnosis: L2 compression fracture  Referring provider: Isabel Maurer MD  Visit Diagnosis:     ICD-10-CM    1. Compression fracture of L2 vertebra, initial encounter (H)  S32.020A    2. Lumbar spine painful on movement  M54.5    3. Acute low back pain without sciatica, unspecified back pain laterality  M54.5        Assessment:     Today patient feeling improvements, getting stronger day to day. Feeling comfortable with her progress. Patient isn't really wearing her brace at all, only on days that she is sore. Patient might be okay with independence.     From Eval:  Patient is a 69 y.o. female that presents with signs and symptoms consistent with left low back pain secondary to Compression fracture of L2 vertebra with routine healing. Patient demonstrates impairments including decreased lumbar flexibility and mobility, with poor core stability and postural awareness, leading to impaired functional mobility. Patient's functional limitations include sitting and walking for lengthy periods of time, sleeping comfortably, and bending forwards properly with TLSO and without pain symptoms.      HEP/POC compliance is  good .  Patient demonstrates understanding/independence with home program.  Patient is appropriate to continue with skilled physical therapy intervention, as indicated by initial plan of care.    Goal Status:  Pt. will be independent with home exercise program in : 6 weeks  Pt will: be able to walk 10 minutes without difficulty or increase of back pain symptoms by 6 weeks.  Pt will: be able to squat down for a daily task without increased pain or difficulty and without TLSO by 6 weeks.  Pt will: be able to sit for longer than 30 minutes without increase of back pain symptoms by 6 weeks.      Plan / Patient Education:     Continue with initial plan of care.  Progress with home program  as tolerated.    Plan for next visit: review HEP, add core/lumbar strengthening as appropriate, Treadmill warm up, rotational exercises as appropriate, standing banded exercises, step ups, total gym, quadruped ex    Subjective:     Some soreness waking up this morning, she is okay right now tho. Patient is sleeping better, pain doesn't wake her up anymore. Patient wondering about working on rough terrain and small hills, uneven surfaces.     Functional limitations are described as occurring with:   bending, standing for longer periods of time, sleeping too, sit to stand transfers, sitting, feels good in the morning and as the day goes on the pain increased     Objective:       Treatment Today       Exercises:  Exercise #1: SKTC and DKTC - hold 30 sec  Comment #1: supine knee rocks - 20 reps  Exercise #2: supine piriformis stretch - hold 30 sec x 2  Comment #2: lift lift lower lower abdominal activation - 10 reps  Exercise #3: sit to stands - 5 reps, 3x daily  Comment #3: mini squats at sink - 10 reps  Exercise #4: supine SLR - 10-20 reps  Comment #4: supine bridge with hip adduction - 10-20 reps, hold 5 sec  Exercise #5: sidelying hip abd - 10-20 reps  Comment #5: step ups with leg lift - 10 reps bilat, 3 sets  Exercise #6: supine or seated hip abduction with band - 10-20 reps  Comment #6: standing hip abduction and extension - orange band x10-20  Exercise #7: total gym squats, level 20 x 10 with hip adduction, SL x 10 bilat level10        TREATMENT MINUTES COMMENTS   Evaluation     Self-care/ Home management     Manual therapy  SL on R, MFR to thoracolumbar paraspinals    Neuromuscular Re-education     Therapeutic Activity     Therapeutic Exercises 30 See above flowsheet  Treadmill 5 min  Entire review of HEP for posture, progression and to assess progress  Functional getting up from floor appropriately without pain or difficulty  REVIEW of HEP    Quadruped arm and leg lift  Wall push ups    Gait training  Normal  gait pattern, feet wider than hips for more stability and balance   Modality__________________                Total 30    Blank areas are intentional and mean the treatment did not include these items.       Diya Rivera, PT  10/13/2020

## 2021-06-12 NOTE — PROGRESS NOTES
"Claudia Wise is a 69 y.o. female who is being evaluated via a billable telephone visit.      The patient has been notified of following:     \"This telephone visit will be conducted via a call between you and your physician/provider. We have found that certain health care needs can be provided without the need for a physical exam.  This service lets us provide the care you need with a short phone conversation.  If a prescription is necessary we can send it directly to your pharmacy.  If lab work is needed we can place an order for that and you can then stop by our lab to have the test done at a later time.    Telephone visits are billed at different rates depending on your insurance coverage. During this emergency period, for some insurers they may be billed the same as an in-person visit.  Please reach out to your insurance provider with any questions.    If during the course of the call the physician/provider feels a telephone visit is not appropriate, you will not be charged for this service.\"    Patient has given verbal consent to a Telephone visit? Yes    What phone number would you like to be contacted at? [phone]    Patient would like to receive their AVS by AVS Preference: Dmitry.        ASSESSMENT AND PLAN:    Diagnoses and all orders for this visit:    Seronegative rheumatoid arthritis of multiple sites (H)  -     methotrexate 2.5 MG tablet; Take 4 tablets (10 mg total) by mouth once a week.  Dispense: 18 tablet; Refill: 1    Bilateral primary osteoarthritis of knee    Stage 3b chronic kidney disease    High risk medication use          HISTORY OF PRESENTING ILLNESS:  Claudia Wise 69 y.o. is evaluated here via phone link.  This is for follow-up.  She has rheumatoid arthritis.  This is longstanding.  This is not associated with signals of autoimmunity.  She has associated comorbidities.  These include osteoarthritis.  She has renal impairment.  She has done good with methotrexate low-dose, " hydroxychloroquine.  We discussed more recent data around hydroxychloroquine.  Should she be prescribed Z-Ayad she will tell her prescriber is to look for an alternative.  In view of her recent labs I will ask her to check them every 6 weeks x 2, and follow-up here in 3 months.  ROS enquiry held for fever, ocular symptoms, rash, headache,  GI issues.  Today we also discussed the issues related to the current pandemic, the pros and cons of the current treatment plan, the CDC guidelines such as social distancing washing the hands covering the cough.  ALLERGIES:Bupropion hcl, Erythromycin base, Tetracyclines, and Valacyclovir    PAST MEDICAL/ACTIVE PROBLEMS/MEDICATION/SOCIAL DATA  Past Medical History:   Diagnosis Date     Anemia      Anxiety      Chronic kidney disease      Diabetes mellitus, type II (H)     prediabetes     Disease of thyroid gland     hypothyroid     Family history of myocardial infarction      Gout      High cholesterol      Hypertension      Inverted nipple     right     Macular edema      Osteoarthritis 2012     Rheumatoid arthritis (H) 2012     Social History     Tobacco Use   Smoking Status Former Smoker     Quit date: 1995     Years since quittin.3   Smokeless Tobacco Never Used     Patient Active Problem List   Diagnosis     Herpes Simplex Type II     Anxiety     Anemia in chronic kidney disease     Hyperlipemia     Gout     Hypertension     Prediabetes     Endometriosis     Chronic kidney disease     Subclinical Hypothyroidism     Seronegative rheumatoid arthritis of multiple sites (H)     Bilateral primary osteoarthritis of knee     Paroxysmal atrial flutter (H)     Moderate malnutrition (H)     Small bowel obstruction (H)     Senile osteoporosis     Compression fracture of L2 vertebra with routine healing     Current Outpatient Medications   Medication Sig Dispense Refill     allopurinoL (ZYLOPRIM) 100 MG tablet Take 1 tablet (100 mg total) by mouth 2 (two) times a day with meals.   0     amLODIPine (NORVASC) 10 MG tablet Take 0.5 tablets (5 mg total) by mouth daily. 90 tablet 3     atorvastatin (LIPITOR) 10 MG tablet Take 1 tablet (10 mg total) by mouth at bedtime. 90 tablet 3     cyanocobalamin, vitamin B-12, 1,000 mcg Subl Place 1 tablet (1,000 mcg total) under the tongue daily.  0     fluticasone propionate (FLONASE) 50 mcg/actuation nasal spray Use 1 spray(s) in each nostril once daily 48 g 3     folic acid (FOLVITE) 1 MG tablet Take 1 tablet (1 mg total) by mouth daily. 30 tablet 11     hydrOXYchloroQUINE (PLAQUENIL) 200 mg tablet Take 1 tablet by mouth twice daily 180 tablet 0     levothyroxine (SYNTHROID, LEVOTHROID) 50 MCG tablet TAKE 1 TABLET BY MOUTH ONCE DAILY IN THE MORNING 90 tablet 3     losartan (COZAAR) 50 MG tablet Take 1 tablet by mouth once daily 90 tablet 1     metoprolol tartrate (LOPRESSOR) 25 MG tablet Take 1 tablet (25 mg total) by mouth 2 (two) times a day. 180 tablet 3     prednisoLONE acetate (PRED-FORTE) 1 % ophthalmic suspension Administer 1 drop into the left eye 2 (two) times a day.  0     RESTASIS 0.05 % ophthalmic emulsion INSTILL 1 DROP INTO EACH EYE TWICE DAILY  11     acetaminophen (TYLENOL) 500 MG tablet Take 500 mg by mouth every 6 (six) hours as needed for pain.       acyclovir (ZOVIRAX) 400 MG tablet Take 1 tablet (400 mg total) by mouth every 8 (eight) hours as needed. 60 tablet 1     carboxymethylcellulose (REFRESH PLUS) 0.5 % Dpet ophthalmic dropperette Administer 1-2 drops to both eyes 4 (four) times a day as needed.        HYDROcodone-acetaminophen 5-325 mg per tablet Take 1 tablet by mouth every 8 (eight) hours as needed for pain. 18 tablet 0     LORazepam (ATIVAN) 0.5 MG tablet Take 0.5 tablets (0.25 mg total) by mouth every 6 (six) hours as needed for anxiety. 4 tablet 0     melatonin 3 mg Tab tablet Take 1 tablet (3 mg total) by mouth at bedtime as needed.  0     No current facility-administered medications for this visit.          EXAMINATION:  She sounded comfortable on the phone, alert, oriented, speech was fluent, she did not sound like she was in pain or short of breath.  LAB / IMAGING DATA:  ALT   Date Value Ref Range Status   10/01/2020 18 0 - 45 U/L Final   09/16/2020 19 0 - 45 U/L Final   08/14/2020 19 0 - 45 U/L Final     Albumin   Date Value Ref Range Status   10/01/2020 4.3 3.5 - 5.0 g/dL Final   09/16/2020 4.1 3.5 - 5.0 g/dL Final   08/14/2020 4.2 3.5 - 5.0 g/dL Final     Creatinine   Date Value Ref Range Status   10/01/2020 1.18 (H) 0.60 - 1.10 mg/dL Final   09/16/2020 1.33 (H) 0.60 - 1.10 mg/dL Final   08/14/2020 1.23 (H) 0.60 - 1.10 mg/dL Final       WBC   Date Value Ref Range Status   10/01/2020 7.3 4.0 - 11.0 thou/uL Final   09/16/2020 5.2 4.0 - 11.0 thou/uL Final   08/31/2015 4.7 4.0 - 11.0 thou/uL Final   07/01/2015 5.5 4.0 - 11.0 thou/uL Final     Hemoglobin   Date Value Ref Range Status   10/01/2020 10.4 (L) 12.0 - 16.0 g/dL Final   09/16/2020 10.2 (L) 12.0 - 16.0 g/dL Final   08/14/2020 10.7 (L) 12.0 - 16.0 g/dL Final     Platelets   Date Value Ref Range Status   10/01/2020 151 140 - 440 thou/uL Final   09/16/2020 171 140 - 440 thou/uL Final   08/14/2020 170 140 - 440 thou/uL Final       Lab Results   Component Value Date    RF <15.0 11/23/2015    SEDRATE 16 10/08/2015     Duration of the call:6  Minutes  Call start: 1114am  Call end:   1120am

## 2021-06-12 NOTE — PROGRESS NOTES
ASSESSMENT AND PLAN:   Claudia Wise is here for follow-up of seronegative rheumatoid arthritis.  She is doing well with a combination of hydroxychloroquine and low-dose methotrexate in the background of renal impairment.  She noted stiffness of morning to 10 minutes.  She has osteoarthritis in the elevator of the management principles.  Her recent labs are stable with chronic anemia renal impairment.  She had her eyes examined earlier this year.  She is going to continue her medications as now.  Return for follow-up in 4 months with labs every 2 months.  Diagnoses and all orders for this visit:    Seronegative rheumatoid arthritis of multiple sites  -     methotrexate 2.5 MG tablet; TAKE FOUR TABLETS BY MOUTH ONCE A WEEK  Dispense: 24 tablet; Refill: 1  -     hydroxychloroquine (PLAQUENIL) 200 mg tablet; TAKE ONE TABLET BY MOUTH TWICE DAILY WITH FOOD  Dispense: 60 tablet; Refill: 3    Osteoarthritis Of The Knee    High risk medication use        HISTORY OF PRESENTING ILLNESS:  Claudia Wise 66 y.o. is here for followup of Rheumatoid Arthritis, which is seronegative, osteoarthritis with his various manifestations, stable renal impairment and osteoarthritis and history of a trochanteric bursitis.  She is on methotrexate and hydroxychloroquine and doing good.  She had started with rheumatoid arthritis symptoms several years ago.  These were a moderate intensity at that time.  The gradual onset.  Multiple joints affected.  Around the way she is doing of renal impairment for which she is on low-dose methotrexate rather than the usual dose.  She is on hydroxychloroquine and her eyes examined recently.  She got good benefit from the left trochanteric area injection would sometimes she still gets a discomfort especially she stops doing the physical therapy exercises that she remembers from the PT time for the left trochanteric bursa symptoms.She noted her pain level with 2.0/10.  She has minimal stiffness in the  morning more than 10minutes.  She noted no fever or weight loss blurry vision eye redness no nausea cough or rash.    .Following is the excerpt from his recent visit note:  Joints affected include multiple joints. Her arthropathy presented a few years. Onset was gradual. Her rheumatoid arthritis  symptoms are stable.Symptoms included joint pain, joint swelling and morning stiffness and were of moderate severity.Symptoms are made worse by: nothing. Symptoms are helped by current regimen.  Patient denies associated nodules, oral ulcers, polydypsia, rashes/photosensitive, Raynaud's and seizures.  Overall disease activity:  stable. Limitation on activities as noted in the MDHAQ scanned in the EMR.  Further historical information, including ROS as noted in the multidimensional health assessment questionnaire scanned in the EMR and in the assessment and plan section.  She has noted back pain which is mild, overall pain level at 2.5/10 able to do most of her day-to-day activities.  She noted no pain in her hands and wrists elbows and shoulders.  Her morning stiffness is limited to 10 minutes. Rheumatoid Arthritis Disease Activity Measure used: RAPID3, reviewed  today and scanned in the chart.  She noted discomfort in her thighs a sense of fatigue especially when she is going up and down the steps or up hill.  At rest is not an issue.  She noted pain in the left trochanteric region.  Gone on for the past several months.  Worse when she rolls over.  Wakes her up.  Some radiation down the leg.   ALLERGIES:Bupropion hcl; Erythromycin base; Tetracyclines; and Valacyclovir    PAST MEDICAL/ACTIVE PROBLEMS/MEDICATION/SOCIAL DATA  Past Medical History:   Diagnosis Date     Anemia      Anxiety      Chronic kidney disease      Diabetes mellitus, type II     prediabetes     Disease of thyroid gland     hypothyroid     Family history of myocardial infarction      Gout      High cholesterol      Hypertension      Inverted nipple      right     Osteoarthritis 2012     Rheumatoid arthritis 2012     History   Smoking Status     Former Smoker     Quit date: 7/7/1995   Smokeless Tobacco     Never Used     Patient Active Problem List   Diagnosis     Herpes Simplex Type II     Anxiety     Anemia     Osteoarthritis Of The Knee     Hyperlipemia     Gout     Hypertension     Prediabetes     Endometriosis     Chronic Kidney Disease (NKF Classification)     Subclinical Hypothyroidism     Lumbar Spondylosis     Seronegative rheumatoid arthritis of multiple sites     High risk medication use     Current Outpatient Prescriptions   Medication Sig Dispense Refill     acetaminophen (TYLENOL) 500 MG tablet Take 500 mg by mouth every 6 (six) hours as needed for pain.       acyclovir (ZOVIRAX) 400 MG tablet Take 400 mg by mouth every 8 (eight) hours as needed.       allopurinol (ZYLOPRIM) 100 MG tablet Take 1 tablet (100 mg total) by mouth 3 (three) times a day with meals. 90 tablet 2     atorvastatin (LIPITOR) 10 MG tablet Take 1 tablet (10 mg total) by mouth at bedtime. 90 tablet 3     cholecalciferol, vitamin D3, 1,000 unit tablet Take 2,000 Units by mouth every evening.        conjugated estrogens (PREMARIN) vaginal cream Intravaginal: 0.5 g twice weekly (eg, Monday and Thursday) or once daily cyclically (21 days on, 7 days off) 120 g 3     fluticasone (FLONASE) 50 mcg/actuation nasal spray 2 sprays into each nostril daily. 16 g 1     hydroxychloroquine (PLAQUENIL) 200 mg tablet TAKE ONE TABLET BY MOUTH TWICE DAILY WITH FOOD 60 tablet 3     levothyroxine (SYNTHROID, LEVOTHROID) 50 MCG tablet Take 1 tablet (50 mcg total) by mouth every morning. 90 tablet 3     LORazepam (ATIVAN) 0.5 MG tablet Take 1 tablet (0.5 mg total) by mouth every 8 (eight) hours as needed for anxiety. 30 tablet 0     losartan (COZAAR) 50 MG tablet Take 1 tablet (50 mg total) by mouth daily. 90 tablet 3     methotrexate 2.5 MG tablet TAKE FOUR TABLETS BY MOUTH ONCE A WEEK 24 tablet 1      "sodium bicarbonate 650 MG tablet Take 1,296 mg by mouth 2 (two) times a day. (2 tabs twice daily)       folic acid (FOLVITE) 1 MG tablet Take 1 tablet (1 mg total) by mouth daily. 90 tablet 3     No current facility-administered medications for this visit.      DETAILED EXAMINATION  07/27/17  :  Vitals:    07/27/17 1349   BP: 130/68   Patient Site: Right Arm   Patient Position: Sitting   Cuff Size: Adult Regular   Weight: 161 lb (73 kg)   Height: 5' 5\" (1.651 m)     Alert oriented. Head including the face is examined for malar rash, heliotropes, scarring, lupus pernio. Eyes examined for redness such as in episcleritis/scleritis, periorbital lesions.   Neck examined  for lymph nodes, range of motion Both upper and lower extremities (all four) examined for swollen, warm &/or  tender joints, range of motion, rash, muscle weakness, edema. The salient normal / abnormal findings are appended.  No active synovitis in the palpable appendicular joints.  She has JLT of the right knee more so than left.          LAB / IMAGING DATA:  ALT   Date Value Ref Range Status   06/26/2017 21 0 - 45 U/L Final   05/01/2017 19 0 - 45 U/L Final   02/27/2017 21 0 - 45 U/L Final     Albumin   Date Value Ref Range Status   06/26/2017 3.8 3.5 - 5.0 g/dL Final   05/01/2017 3.8 3.5 - 5.0 g/dL Final   02/27/2017 4.0 3.5 - 5.0 g/dL Final     Creatinine   Date Value Ref Range Status   06/26/2017 1.35 (H) 0.60 - 1.10 mg/dL Final   05/01/2017 1.54 (H) 0.60 - 1.10 mg/dL Final   02/27/2017 1.39 (H) 0.60 - 1.10 mg/dL Final       WBC   Date Value Ref Range Status   06/26/2017 5.7 4.0 - 11.0 thou/uL Final   05/01/2017 4.7 4.0 - 11.0 thou/uL Final   08/31/2015 4.7 4.0 - 11.0 thou/uL Final   07/01/2015 5.5 4.0 - 11.0 thou/uL Final     Hemoglobin   Date Value Ref Range Status   06/26/2017 10.0 (L) 12.0 - 16.0 g/dL Final   05/01/2017 9.8 (L) 12.0 - 16.0 g/dL Final   02/27/2017 9.5 (L) 12.0 - 16.0 g/dL Final     Platelets   Date Value Ref Range Status "   06/26/2017 155 140 - 440 thou/uL Final   05/01/2017 150 140 - 440 thou/uL Final   02/27/2017 175 140 - 440 thou/uL Final       Lab Results   Component Value Date    RF <15.0 11/23/2015    SEDRATE 16 10/08/2015

## 2021-06-13 NOTE — TELEPHONE ENCOUNTER
Controlled Substance Refill Request  Medication Name:   Requested Prescriptions     Pending Prescriptions Disp Refills     LORazepam (ATIVAN) 0.5 MG tablet [Pharmacy Med Name: LORazepam 0.5 MG Oral Tablet] 30 tablet 0     Sig: TAKE 1 TABLET BY MOUTH EVERY 8 HOURS AS NEEDED FOR ANXIETY     Date Last Fill: 3/12/20  Requested Pharmacy: [facility]  Submit electronically to pharmacy  Controlled Substance Agreement on file:   Encounter-Level CSA Scan Date - 01/18/2018:    Scan on 1/22/2018 10:49 AM        Last office visit:  10/1/20

## 2021-06-13 NOTE — TELEPHONE ENCOUNTER
Pt had called on Friday to set up an appt for knee injections, however now would like to wait due to the increase in COVID numbers, she does not want to leave her home. Pt will call back in a month or so or sooner if the pain gets too bad to schedule an appt.

## 2021-06-13 NOTE — TELEPHONE ENCOUNTER
Refill Approved    Rx renewed per Medication Renewal Policy. Medication was last renewed on 9/30/19.    Miracel Soler, [facility] Triage/Med Refill 11/25/2020     Requested Prescriptions   Pending Prescriptions Disp Refills     levothyroxine (SYNTHROID, LEVOTHROID) 50 MCG tablet [Pharmacy Med Name: Levothyroxine Sodium 50 MCG Oral Tablet] 90 tablet 0     Sig: TAKE 1 TABLET BY MOUTH ONCE DAILY IN THE MORNING       Thyroid Hormones Protocol Passed - 11/24/2020  2:09 PM        Passed - Provider visit in past 12 months or next 3 months     Last office visit with prescriber/PCP: 6/17/2020 Isabel Maurer MD OR same dept: 6/17/2020 Isabel Maurer MD OR same specialty: 6/17/2020 Isabel Maurer MD  Last physical: 10/1/2020 Last MTM visit: Visit date not found   Next visit within 3 mo: Visit date not found  Next physical within 3 mo: Visit date not found  Prescriber OR PCP: Isabel Maurer MD  Last diagnosis associated with med order: 1. Hypothyroidism  - levothyroxine (SYNTHROID, LEVOTHROID) 50 MCG tablet [Pharmacy Med Name: Levothyroxine Sodium 50 MCG Oral Tablet]; TAKE 1 TABLET BY MOUTH ONCE DAILY IN THE MORNING  Dispense: 90 tablet; Refill: 0    If protocol passes may refill for 12 months if within 3 months of last provider visit (or a total of 15 months).             Passed - TSH on file in past 12 months for patient age 12 & older     TSH   Date Value Ref Range Status   10/01/2020 1.56 0.30 - 5.00 uIU/mL Final

## 2021-06-13 NOTE — PROGRESS NOTES
"Prolia Injection Phone Screen      Screening questions have been asked 2-3 days prior to administration visit for Prolia. If any questions are answered with \"Yes,\" this phone encounter were will routed to ordering provider for further evaluation.     1.  When was the last injection?  6/17/2020    2.  Has insurance for this injection been verified?  Yes    3.  Did you experience any new onset achiness or rashes that lasted for over a month with your previous Prolia injection?   No    4.  Do you have a fever over 101?F or a new deep cough that is unusual for you today? No    5.  Have you started any new medications in the last 6 months that you were told could affect your immune system? These may have been prescribed by oncologist, transplant, rheumatology, or dermatology.   No    6.  In the last 6 months have you have gastric bypass or parathyroid surgery?   No    7.  Do you plan dental work requiring drilling into the bone such as implants/extractions or oral surgery in the next 2-3 months?   No    8. Do you have new insurance since the last injection? No    Patient informed if symptoms discussed above present prior to their administration appointment, they are to notify clinic immediately.     Mona Liu              "

## 2021-06-13 NOTE — PROGRESS NOTES
Assessment/Plan:        1. Lumbar Spondylosis  Glycosylated Hemoglobin A1c    Comprehensive Metabolic Panel    Thyroid Stimulating Hormone (TSH)    HM2(CBC w/o Differential)    Uric Acid    LDL Cholesterol, Direct   2. Anemia in chronic kidney disease  Glycosylated Hemoglobin A1c    Comprehensive Metabolic Panel    Thyroid Stimulating Hormone (TSH)    HM2(CBC w/o Differential)    Uric Acid    LDL Cholesterol, Direct   3. Gout  Glycosylated Hemoglobin A1c    Comprehensive Metabolic Panel    Thyroid Stimulating Hormone (TSH)    HM2(CBC w/o Differential)    Uric Acid    LDL Cholesterol, Direct   4. Hypertension  Glycosylated Hemoglobin A1c    Comprehensive Metabolic Panel    Thyroid Stimulating Hormone (TSH)    HM2(CBC w/o Differential)    Uric Acid    LDL Cholesterol, Direct   5. Prediabetes  Glycosylated Hemoglobin A1c    Comprehensive Metabolic Panel    Thyroid Stimulating Hormone (TSH)    HM2(CBC w/o Differential)    Uric Acid    LDL Cholesterol, Direct   6. Hypothyroidism  Glycosylated Hemoglobin A1c    Comprehensive Metabolic Panel    Thyroid Stimulating Hormone (TSH)    HM2(CBC w/o Differential)    Uric Acid    LDL Cholesterol, Direct   7. Hyperlipemia  Glycosylated Hemoglobin A1c    Comprehensive Metabolic Panel    Thyroid Stimulating Hormone (TSH)    HM2(CBC w/o Differential)    Uric Acid    LDL Cholesterol, Direct        1. prediabetes - HgbA1c with other labs in few weeks  2. Hyperlipidemia - on statin.   3. Hypertension - well controlled  4. chronic anemia,  related to the chronic renal insufficiency and she will  See her nephrologist again soon.  5. thyroid test will be done with next labs  6. RA, stable  7. Gout, on allopurinol    This note has been dictated using voice recognition software. Any grammatical or context distortions are unintentional and inherent to the software.      Return in about 6 months (around 4/4/2018) for Recheck.    Patient Instructions   Follow up with Nephrologist and  Rheumatologist.  Labs to be done with Dr Vinson's labs.          Subjective:    Claudia Wise is a 66 y.o. female  here for    Chief Complaint   Patient presents with     Hypertension     The patient is here with the multiple chronic medical problems.  1. Patient has prediabetes.  2. Patient has RA, on methotrexate and Plaquenil. She saw rheumatologist a in July.   3. Patient has hyperlipidemia and is taking statin.  4. She has hypothyroidism and is on levothyroxine  5. hypertension is well managed with lisinopril,  Amlodipine was stopped in June by Nephrologist.  6. Chronic renal insufficiency with a baseline creatinine 1.5.   7. Chronic anemia with hemoglobin 9.2.    Social History     Social History     Marital status:      Spouse name: N/A     Number of children: N/A     Years of education: N/A     Occupational History     Not on file.     Social History Main Topics     Smoking status: Former Smoker     Quit date: 7/7/1995     Smokeless tobacco: Never Used     Alcohol use Yes      Comment: occasional     Drug use: No     Sexual activity: Not on file     Other Topics Concern     Not on file     Social History Narrative       Family History   Problem Relation Age of Onset     Breast cancer Maternal Aunt      Cancer Maternal Aunt      Cancer Father      Cancer Sister      Cancer Maternal Grandmother      Cancer Cousin      Heart attack Mother 49     Review of Systems:     A 12 point comprehensive review of systems was negative except as noted in HPI.            Objective:    Physical Exam   /70  Pulse 72  Wt 164 lb 4.8 oz (74.5 kg)  BMI 27.34 kg/m2    Constitutional: oriented to person, place, and time, appears well-nourished. No distress.   HENT:   Head: Normocephalic.   Mouth/Throat: Oropharynx is clear and moist.   Eyes: Conjunctivae are normal. Pupils are equal, round, and reactive to light.   Neck: Normal range of motion. Neck supple.   Cardiovascular: Normal rate, regular rhythm and normal  heart sounds.    Pulmonary/Chest: Effort normal and breath sounds normal.  Abdominal: Soft. Bowel sounds are normal.   Musculoskeletal: Normal range of motion.   Neurological: alert and oriented to person, place, and time.  Psychiatric:  normal mood and affect.    Patient Active Problem List   Diagnosis     Herpes Simplex Type II     Anxiety     Anemia in chronic kidney disease     Osteoarthritis Of The Knee     Hyperlipemia     Gout     Hypertension     Prediabetes     Endometriosis     Chronic Kidney Disease (NKF Classification)     Subclinical Hypothyroidism     Seronegative rheumatoid arthritis of multiple sites     High risk medication use       Current Outpatient Prescriptions on File Prior to Visit   Medication Sig Dispense Refill     acetaminophen (TYLENOL) 500 MG tablet Take 500 mg by mouth every 6 (six) hours as needed for pain.       acyclovir (ZOVIRAX) 400 MG tablet Take 400 mg by mouth every 8 (eight) hours as needed.       allopurinol (ZYLOPRIM) 100 MG tablet TAKE ONE TABLET (100 MG TOTAL) BY MOUTH 3 (THREE) TIMES DAILY WITH MEALS 270 tablet 0     atorvastatin (LIPITOR) 10 MG tablet Take 1 tablet (10 mg total) by mouth at bedtime. 90 tablet 3     cholecalciferol, vitamin D3, 1,000 unit tablet Take 2,000 Units by mouth every evening.        hydroxychloroquine (PLAQUENIL) 200 mg tablet TAKE ONE TABLET BY MOUTH TWICE DAILY WITH FOOD 60 tablet 3     levothyroxine (SYNTHROID, LEVOTHROID) 50 MCG tablet Take 1 tablet (50 mcg total) by mouth every morning. 90 tablet 3     LORazepam (ATIVAN) 0.5 MG tablet Take 1 tablet (0.5 mg total) by mouth every 8 (eight) hours as needed for anxiety. 30 tablet 0     losartan (COZAAR) 50 MG tablet Take 1 tablet (50 mg total) by mouth daily. 90 tablet 3     methotrexate 2.5 MG tablet TAKE FOUR TABLETS BY MOUTH ONCE A WEEK 24 tablet 1     sodium bicarbonate 650 MG tablet Take 1,296 mg by mouth 2 (two) times a day. (2 tabs twice daily)       [DISCONTINUED] conjugated estrogens  (PREMARIN) vaginal cream Intravaginal: 0.5 g twice weekly (eg, Monday and Thursday) or once daily cyclically (21 days on, 7 days off) 120 g 3     [DISCONTINUED] fluticasone (FLONASE) 50 mcg/actuation nasal spray 2 sprays into each nostril daily. 16 g 1     folic acid (FOLVITE) 1 MG tablet Take 1 tablet (1 mg total) by mouth daily. 90 tablet 3     No current facility-administered medications on file prior to visit.                Isabel Maurer  10/4/2017

## 2021-06-13 NOTE — PROGRESS NOTES
ASSESSMENT AND PLAN:   Claudia Wise 67 y.o. female is here for follow-up of seronegative rheumatoid arthritis, osteoarthritis lumbar spondylosis chronic renal impairment.  She is on methotrexate reduced dose.  She is on 10 mg per week.  She takes hydroxychloroquine regularly.  She had her eyes examined and scanned, was told that she has contracted but otherwise no concerns.  She may undergo cataract surgery shortly.  She is planning he was in [place] and wondered if she could take typhoid vaccination.  She is going to have a done at primary physician's office, there is no contraindication with regards to her current management of rheumatoid arthritis.  I have asked her to return for follow-up here in 4 months with labs every 2 months.      Diagnoses and all orders for this visit:    Seronegative rheumatoid arthritis of multiple sites  -     methotrexate 2.5 MG tablet; TAKE FOUR TABLETS BY MOUTH ONCE A WEEK  Dispense: 24 tablet; Refill: 1    High risk medication use    Stage 3 chronic kidney disease        HISTORY OF PRESENTING ILLNESS:  Claudia Wise 67 y.o. is here for followup of seronegative Rheumatoid Arthritis, osteoarthritis with his various manifestations, renal impairment and osteoarthritis and history of a trochanteric bursitis.  She is on methotrexate and hydroxychloroquine and doing good.  She had started with rheumatoid arthritis symptoms several years ago.  She noted discomfort in her second MCP of the left hand more so than the right hand that was there for couple of days.  At this is subsided.  She noted that pain to be moderately severe.  She is able to do most of her day-to-day activities without any or with some difficulty.  She noted all her pain level at 3.0/10.  She noted her morning stiffness is limited to 10-15 minutes only.  There is no fever or weight loss she has had blurry vision likely due to cataract.  She has no eye redness mouth also nausea or rash.  She plans to go to.,  Her   is a [place] vet, they are planning to go there in February.  She has questions around vaccinations.. Overall disease activity:  stable. Limitation on activities as noted in the MDHAQ scanned in the EMR.  Further historical information, including ROS as noted in the multidimensional health assessment questionnaire scanned in the EMR and in the assessment and plan section.  ALLERGIES:Bupropion hcl; Erythromycin base; Tetracyclines; and Valacyclovir    PAST MEDICAL/ACTIVE PROBLEMS/MEDICATION/SOCIAL DATA  Past Medical History:   Diagnosis Date     Anemia      Anxiety      Chronic kidney disease      Diabetes mellitus, type II     prediabetes     Disease of thyroid gland     hypothyroid     Family history of myocardial infarction      Gout      High cholesterol      Hypertension      Inverted nipple     right     Osteoarthritis 2012     Rheumatoid arthritis 2012     History   Smoking Status     Former Smoker     Quit date: 7/7/1995   Smokeless Tobacco     Never Used     Patient Active Problem List   Diagnosis     Herpes Simplex Type II     Anxiety     Anemia in chronic kidney disease     Osteoarthritis Of The Knee     Hyperlipemia     Gout     Hypertension     Prediabetes     Endometriosis     Chronic Kidney Disease (NKF Classification)     Subclinical Hypothyroidism     Seronegative rheumatoid arthritis of multiple sites     High risk medication use     Current Outpatient Prescriptions   Medication Sig Dispense Refill     acetaminophen (TYLENOL) 500 MG tablet Take 500 mg by mouth every 6 (six) hours as needed for pain.       acyclovir (ZOVIRAX) 400 MG tablet Take 400 mg by mouth every 8 (eight) hours as needed.       allopurinol (ZYLOPRIM) 100 MG tablet TAKE ONE TABLET (100 MG TOTAL) BY MOUTH 3 (THREE) TIMES DAILY WITH MEALS 270 tablet 0     atorvastatin (LIPITOR) 10 MG tablet Take 1 tablet (10 mg total) by mouth at bedtime. 90 tablet 3     cholecalciferol, vitamin D3, 1,000 unit tablet Take 2,000 Units by  mouth every evening.        folic acid (FOLVITE) 1 MG tablet Take 1 tablet (1 mg total) by mouth daily. 90 tablet 3     hydroxychloroquine (PLAQUENIL) 200 mg tablet TAKE ONE TABLET BY MOUTH TWICE DAILY WITH FOOD 60 tablet 3     levothyroxine (SYNTHROID, LEVOTHROID) 50 MCG tablet Take 1 tablet (50 mcg total) by mouth every morning. 90 tablet 3     LORazepam (ATIVAN) 0.5 MG tablet Take 1 tablet (0.5 mg total) by mouth every 8 (eight) hours as needed for anxiety. 30 tablet 0     losartan (COZAAR) 50 MG tablet Take 1 tablet (50 mg total) by mouth daily. 90 tablet 3     methotrexate 2.5 MG tablet TAKE FOUR TABLETS BY MOUTH ONCE A WEEK 24 tablet 1     sodium bicarbonate 650 MG tablet Take 1,296 mg by mouth 2 (two) times a day. (2 tabs twice daily)       No current facility-administered medications for this visit.      DETAILED EXAMINATION (six area) :  Vitals:    11/02/17 1256   BP: 136/62   Pulse: 88   Weight: 161 lb (73 kg)     Alert oriented. Head including the face is examined for malar rash, heliotropes, scarring, lupus pernio. Eyes examined for redness such as in episcleritis/scleritis, periorbital lesions.   Neck examined  for range of motion Both upper and lower extremities (all four) examined for swollen, warm &/or  tender joints, range of motion, rash, muscle weakness, edema. The salient normal / abnormal findings are appended.  No active synovitis in the palpable appendicular joints.  She has JLT of the knees bilaterally.         LAB / IMAGING DATA:  ALT   Date Value Ref Range Status   10/31/2017 23 0 - 45 U/L Final   08/30/2017 23 0 - 45 U/L Final   06/26/2017 21 0 - 45 U/L Final     Albumin   Date Value Ref Range Status   10/31/2017 3.9 3.5 - 5.0 g/dL Final   08/30/2017 3.9 3.5 - 5.0 g/dL Final   06/26/2017 3.8 3.5 - 5.0 g/dL Final     Creatinine   Date Value Ref Range Status   10/31/2017 1.44 (H) 0.60 - 1.10 mg/dL Final   08/30/2017 1.71 (H) 0.60 - 1.10 mg/dL Final   06/26/2017 1.35 (H) 0.60 - 1.10 mg/dL  Final       WBC   Date Value Ref Range Status   10/31/2017 5.1 4.0 - 11.0 thou/uL Final   08/30/2017 3.6 (L) 4.0 - 11.0 thou/uL Final   08/31/2015 4.7 4.0 - 11.0 thou/uL Final   07/01/2015 5.5 4.0 - 11.0 thou/uL Final     Hemoglobin   Date Value Ref Range Status   10/31/2017 9.7 (L) 12.0 - 16.0 g/dL Final   08/30/2017 9.2 (L) 12.0 - 16.0 g/dL Final   06/26/2017 10.0 (L) 12.0 - 16.0 g/dL Final     Platelets   Date Value Ref Range Status   10/31/2017 172 140 - 440 thou/uL Final   08/30/2017 159 140 - 440 thou/uL Final   06/26/2017 155 140 - 440 thou/uL Final       Lab Results   Component Value Date    RF <15.0 11/23/2015    SEDRATE 16 10/08/2015

## 2021-06-13 NOTE — PROGRESS NOTES
Assessment/Plan:      Visit for Preoperative Exam.     Patient approved for surgery with general or local anesthesia.     Subjective:     Scheduled Procedure: cataract  Surgery Date:  11/9/17-right. 11/30/17-left  Surgery Location:  [facility]  Surgeon:  Dr. Hauser    ASSESSED PROBLEMS:  Problem List Items Addressed This Visit     None          CHIEF COMPLAINT:  Chief Complaint   Patient presents with     Pre-op Exam       HISTORY OF PRESENT ILLNESS:  Claudia Wise is a 67 y.o. female here for an internal medicine pre-operative consultation. The exam is requested by   in preparation for cataract surgery to be performed at [facility]  on 11/9/17. Today s examination on 11/1/17  is done to review the underlying surgical condition of bilateral cataracts, clear for anesthesia, and review medical problems with appropriate changes in medications.    She is known that she has had cataracts for the past 2 years.  Now, she is experiencing glare with headlights.  She sensitive to needing light to read.  She is not able to drive at night now.    Her past medical history is significant for hypertension, prediabetes, rheumatoid arthritis and hyperlipidemia.  These have been stable.    Claudia Wise has tolerated previous surgeries well without bleeding or anesthesia difficulty.   .............................................................................................................................................  Current Outpatient Prescriptions   Medication Sig Dispense Refill     acetaminophen (TYLENOL) 500 MG tablet Take 500 mg by mouth every 6 (six) hours as needed for pain.       acyclovir (ZOVIRAX) 400 MG tablet Take 400 mg by mouth every 8 (eight) hours as needed.       allopurinol (ZYLOPRIM) 100 MG tablet TAKE ONE TABLET (100 MG TOTAL) BY MOUTH 3 (THREE) TIMES DAILY WITH MEALS 270 tablet 0     atorvastatin (LIPITOR) 10 MG tablet Take 1 tablet (10 mg total) by mouth at bedtime. 90  tablet 3     cholecalciferol, vitamin D3, 1,000 unit tablet Take 2,000 Units by mouth every evening.        hydroxychloroquine (PLAQUENIL) 200 mg tablet TAKE ONE TABLET BY MOUTH TWICE DAILY WITH FOOD 60 tablet 3     levothyroxine (SYNTHROID, LEVOTHROID) 50 MCG tablet Take 1 tablet (50 mcg total) by mouth every morning. 90 tablet 3     LORazepam (ATIVAN) 0.5 MG tablet Take 1 tablet (0.5 mg total) by mouth every 8 (eight) hours as needed for anxiety. 30 tablet 0     losartan (COZAAR) 50 MG tablet Take 1 tablet (50 mg total) by mouth daily. 90 tablet 3     methotrexate 2.5 MG tablet TAKE FOUR TABLETS BY MOUTH ONCE A WEEK 24 tablet 1     sodium bicarbonate 650 MG tablet Take 1,296 mg by mouth 2 (two) times a day. (2 tabs twice daily)       folic acid (FOLVITE) 1 MG tablet Take 1 tablet (1 mg total) by mouth daily. 90 tablet 3     No current facility-administered medications for this visit.        Allergies   Allergen Reactions     Bupropion Hcl Nausea Only     Erythromycin Base Nausea Only     Tetracyclines Itching     Valacyclovir Nausea Only and Nausea And Vomiting       Immunization History   Administered Date(s) Administered     DT (pediatric) 01/01/1998     Hep A, historic 02/24/2003, 08/25/2003, 11/05/2007     Influenza [id number], 02/03/2010     Influenza high dose, seasonal 10/17/2016, 10/04/2017     Influenza, Seasonal, Inj PF 09/20/2011     Influenza, inj, historic 11/05/2007, 09/28/2012, 10/03/2015     Pneumo Conj 13-V (2010&after) 07/01/2015     Pneumo Polysac 23-V 08/20/2013     Tdap 12/08/2008     Typhoid, historic 11/05/2007     Yellow Fever 11/05/2007     ZOSTER 09/05/2014       Patient Active Problem List   Diagnosis     Herpes Simplex Type II     Anxiety     Anemia in chronic kidney disease     Osteoarthritis Of The Knee     Hyperlipemia     Gout     Hypertension     Prediabetes     Endometriosis     Chronic Kidney Disease (NKF Classification)     Subclinical Hypothyroidism     Seronegative rheumatoid  arthritis of multiple sites     High risk medication use       Past Medical History:   Diagnosis Date     Anemia      Anxiety      Chronic kidney disease      Diabetes mellitus, type II     prediabetes     Disease of thyroid gland     hypothyroid     Family history of myocardial infarction      Gout      High cholesterol      Hypertension      Inverted nipple     right     Osteoarthritis 2012     Rheumatoid arthritis 2012       Social History     Social History     Marital status:      Spouse name: N/A     Number of children: N/A     Years of education: N/A     Occupational History     Not on file.     Social History Main Topics     Smoking status: Former Smoker     Quit date: 7/7/1995     Smokeless tobacco: Never Used     Alcohol use Yes      Comment: occasional     Drug use: No     Sexual activity: Not on file     Other Topics Concern     Not on file     Social History Narrative       Past Surgical History:   Procedure Laterality Date     COLON SURGERY  1986     COLPORRHAPHY N/A 7/7/2016    Procedure: ANTERIOR REPAIR WITH MESH;  Surgeon: Zac Stone MD;  Location: [facility];  Service:      HYSTERECTOMY       OOPHORECTOMY           History of Present Illness  Recent Health  Fever: no  Chills: no  Fatigue: no  Chest Pain: no  Cough: no  Dyspnea: no  Urinary Frequency: no  Nausea: no  Vomiting: no  Diarrhea: no  Abdominal Pain: no  Easy Bruising: no  Lower Extremity Swelling: no  Poor Exercise Tolerance: no    Most recent Health Maintenance Visit:  1 month(s) ago    Pertinent History  Prior Anesthesia: yes  Previous Anesthesia Reaction:  no  Diabetes: yes-prediabetes  Cardiovascular Disease: no  Pulmonary Disease: no  Renal Disease: yes- chronic kidney disease  GI Disease: no  Sleep Apnea: no  Thromboembolic Problems: no  Clotting Disorder: no  Bleeding Disorder: no  Transfusion Reaction: no  Impaired Immunity: no  Steroid use in the last 6 months: no  Frequent Aspirin use: no    Family  history of MI-mother    Social history of patient does not wear denture or partial plates, there is no transfusion refusal and there are no concerns regarding care after surgery    After surgery, the patient plans to recover at home with family.    Review of Systems  Pertinent items are noted in HPI.          Objective:      Vitals:    11/01/17 1254   BP: 128/60   Pulse: 70        Physical Exam:  General Appearance: Alert, cooperative, no distress, appears stated age   Head: Normocephalic, without obvious abnormality, atraumatic   Eyes: PERRL, conjunctiva/corneas clear, EOM's intact   Throat: Lips, mucosa, and tongue normal; teeth and gums normal   Neck: Normal ROM   Lungs: Clear to auscultation bilaterally, respirations unlabored.   Heart: Regular rate and rhythm, S1 and S2 normal, no murmur, rub, or gallop,   Abdomen: Soft, non-tender, bowel sounds active all four quadrants, no masses, no organomegaly   Extremities: Extremities normal, atraumatic, no cyanosis or edema   Skin: Skin color, texture, turgor normal, no rashes or lesions   Neurologic: Normal

## 2021-06-13 NOTE — TELEPHONE ENCOUNTER
Refill Request    Medication name:   Requested Prescriptions     Pending Prescriptions Disp Refills     losartan (COZAAR) 50 MG tablet 90 tablet 1     Sig: Take 1 tablet (50 mg total) by mouth daily.     Who prescribed the medication: Dr. Isabel Maurer  Date Last Renewed: 05/20/2020  Requested Pharmacy: [facility]  Is patient out of medication: No.  2 days left

## 2021-06-13 NOTE — TELEPHONE ENCOUNTER
Patient has been having knee pain and wants to discuss scheduling an injection with Dr Vinson.    Please call to discuss

## 2021-06-14 NOTE — PROGRESS NOTES
Claudia Wise is a 70 y.o. female who is being evaluated via a billable video visit.      How would you like to obtain your AVS? MyChart.  If dropped from the video visit, the video invitation should be resent by: Send to e-mail at: [email or web address]  Will anyone else be joining your video visit? No      Video Start Time:       Subjective     Claudia Wise is 70 y.o. and presents to clinic today for the following health issues   HPI       Additional provider notes:     Review of Systems        Objective       Vitals:  No vitals were obtained today due to virtual visit.    Physical Exam              Video-Visit Details    Type of service:  Video Visit    Video End Time (time video stopped):   Originating Location (pt. Location): [place]    Distant Location (provider location):  [facility]     Platform used for Video Visit: [employer]     This document was created using a software with less than 100% fidelity, at times resulting in unintended, even erroneous syntax and grammar.  The reader is advised to keep this under consideration while reviewing, interpreting this note.    ASSESSMENT AND PLAN:    Diagnoses and all orders for this visit:    Seronegative rheumatoid arthritis of multiple sites (H)  -     methotrexate 2.5 MG tablet; TAKE 4 TABLETS BY MOUTH ONCE A WEEK  Dispense: 36 tablet; Refill: 0  -     hydrOXYchloroQUINE (PLAQUENIL) 200 mg tablet; Take 1 tablet (200 mg total) by mouth 2 (two) times a day.  Dispense: 180 tablet; Refill: 0    Stage 3b chronic kidney disease    High risk medication use    Primary osteoarthritis involving multiple joints  -     DULoxetine (CYMBALTA) 20 MG capsule; Take 1 capsule (20 mg total) by mouth daily.  Dispense: 30 capsule; Refill: 2          HISTORY OF PRESENTING ILLNESS:  Claudia Wise 70 y.o. is evaluated here via video link.  This is for follow-up.  She has rheumatoid arthritis, osteoarthritis, renal impairment.  She is complaining of  increasing pain in her knees, bases of the thumbs, worse with activity, not a candidate for nonsteroidals.  And the rest of the joints with the current combination of low dose of methotrexate again in view of the renal impairment and hydroxychloroquine she has not had a flareup.  Her recent labs are reviewed, stable, renal impairment bit worse.  She is aware to avoid nonsteroidals.  She is considering corticosteroid injection at the bases of the thumbs possibly in the knees.  Meanwhile she will stay the course reminded of eye examination which she does regularly.    I have asked her to stay the course, follow-up here in the next few weeks for evaluation if corticosteroid injections might be a suitable option.  We discussed options such as duloxetine major side effects outlined she would like to try.  Prescription for which is given.       ROS enquiry held for fever, ocular symptoms, rash, headache,  GI issues.  Today we also discussed the issues related to the current pandemic, the pros and cons of the current treatment plan, the CDC guidelines such as social distancing washing the hands covering the cough.  ALLERGIES:Bupropion hcl, Erythromycin base, Tetracyclines, and Valacyclovir    PAST MEDICAL/ACTIVE PROBLEMS/MEDICATION/SOCIAL DATA  Past Medical History:   Diagnosis Date     Anemia      Anxiety      Chronic kidney disease      Diabetes mellitus, type II (H)     prediabetes     Disease of thyroid gland     hypothyroid     Family history of myocardial infarction      Gout      High cholesterol      Hypertension      Inverted nipple     right     Macular edema      Osteoarthritis 2012     Rheumatoid arthritis (H) 2012     Social History     Tobacco Use   Smoking Status Former Smoker     Quit date: 1995     Years since quittin.5   Smokeless Tobacco Never Used     Patient Active Problem List   Diagnosis     Herpes Simplex Type II     Anxiety     Anemia in chronic kidney disease     Hyperlipemia     Gout      Hypertension     Prediabetes     Endometriosis     Chronic kidney disease     Subclinical Hypothyroidism     Seronegative rheumatoid arthritis of multiple sites (H)     High risk medication use     Bilateral primary osteoarthritis of knee     Paroxysmal atrial flutter (H)     Moderate malnutrition (H)     Small bowel obstruction (H)     Senile osteoporosis     Compression fracture of L2 vertebra with routine healing     Chronic kidney disease, stage 3     Current Outpatient Medications   Medication Sig Dispense Refill     cyanocobalamin/folic acid (VITAMIN B12-FOLIC ACID) 1,000-400 mcg Lozg Take 1 tablet by mouth.       acetaminophen (TYLENOL) 500 MG tablet Take 500 mg by mouth every 6 (six) hours as needed for pain.       acyclovir (ZOVIRAX) 400 MG tablet Take 1 tablet (400 mg total) by mouth every 8 (eight) hours as needed. 60 tablet 1     allopurinoL (ZYLOPRIM) 100 MG tablet Take 1 tablet (100 mg total) by mouth 2 (two) times a day with meals. 180 tablet 3     amLODIPine (NORVASC) 10 MG tablet Take 0.5 tablets (5 mg total) by mouth daily. 90 tablet 3     atorvastatin (LIPITOR) 10 MG tablet Take 1 tablet (10 mg total) by mouth at bedtime. 90 tablet 3     carboxymethylcellulose (REFRESH PLUS) 0.5 % Dpet ophthalmic dropperette Administer 1-2 drops to both eyes 4 (four) times a day as needed.        cyanocobalamin, vitamin B-12, 1,000 mcg Subl Place 1 tablet (1,000 mcg total) under the tongue daily.  0     fluticasone propionate (FLONASE) 50 mcg/actuation nasal spray Use 1 spray(s) in each nostril once daily 48 g 3     folic acid (FOLVITE) 1 MG tablet Take 1 tablet (1 mg total) by mouth daily. 30 tablet 11     HYDROcodone-acetaminophen 5-325 mg per tablet Take 1 tablet by mouth every 8 (eight) hours as needed for pain. 18 tablet 0     hydrOXYchloroQUINE (PLAQUENIL) 200 mg tablet Take 1 tablet (200 mg total) by mouth 2 (two) times a day. 180 tablet 0     levothyroxine (SYNTHROID, LEVOTHROID) 50 MCG tablet TAKE 1  TABLET BY MOUTH ONCE DAILY IN THE MORNING 90 tablet 3     LORazepam (ATIVAN) 0.5 MG tablet TAKE 1 TABLET BY MOUTH EVERY 8 HOURS AS NEEDED FOR ANXIETY 30 tablet 0     losartan (COZAAR) 50 MG tablet Take 1 tablet (50 mg total) by mouth daily. 90 tablet 1     melatonin 3 mg Tab tablet Take 1 tablet (3 mg total) by mouth at bedtime as needed.  0     methotrexate 2.5 MG tablet TAKE 4 TABLETS BY MOUTH ONCE A WEEK 36 tablet 0     metoprolol tartrate (LOPRESSOR) 25 MG tablet Take 1 tablet (25 mg total) by mouth 2 (two) times a day. 180 tablet 3     prednisoLONE acetate (PRED-FORTE) 1 % ophthalmic suspension Administer 1 drop into the left eye 2 (two) times a day.  0     RESTASIS 0.05 % ophthalmic emulsion INSTILL 1 DROP INTO EACH EYE TWICE DAILY  11     Current Facility-Administered Medications   Medication Dose Route Frequency Provider Last Rate Last Admin     denosumab 60 mg (PROLIA 60 mg/ml)  60 mg Subcutaneous Q6 Months Isabel Maurer MD   60 mg at 12/17/20 1147         EXAMINATION:    Using the audio and video link as best as possible the constitutional, neck, neurologic, psych, skin, both upper extremities areas/organ system were evaluated during this assessment.  Some of the important findings: Alert, oriented, speech fluent.   Able to fully flex the digits, into fists bilaterally, wrist and elbow elbow range of motion appear normal, abduction of the shoulder is normal.  She pointed to the base of the thumbs at the site of primary area of pain.      LAB / IMAGING DATA:  ALT   Date Value Ref Range Status   01/13/2021 20 0 - 45 U/L Final   11/17/2020 15 0 - 45 U/L Final   10/01/2020 18 0 - 45 U/L Final     Albumin   Date Value Ref Range Status   01/13/2021 4.2 3.5 - 5.0 g/dL Final   11/17/2020 4.4 3.5 - 5.0 g/dL Final   10/01/2020 4.3 3.5 - 5.0 g/dL Final     Creatinine   Date Value Ref Range Status   01/13/2021 1.41 (H) 0.60 - 1.10 mg/dL Final   11/17/2020 1.34 (H) 0.60 - 1.10 mg/dL Final   10/01/2020 1.18 (H) 0.60  - 1.10 mg/dL Final       WBC   Date Value Ref Range Status   01/13/2021 6.5 4.0 - 11.0 thou/uL Final   11/17/2020 6.4 4.0 - 11.0 thou/uL Final   08/31/2015 4.7 4.0 - 11.0 thou/uL Final   07/01/2015 5.5 4.0 - 11.0 thou/uL Final     Hemoglobin   Date Value Ref Range Status   01/13/2021 10.4 (L) 12.0 - 16.0 g/dL Final   11/17/2020 10.7 (L) 12.0 - 16.0 g/dL Final   10/01/2020 10.4 (L) 12.0 - 16.0 g/dL Final     Platelets   Date Value Ref Range Status   01/13/2021 177 140 - 440 thou/uL Final   11/17/2020 179 140 - 440 thou/uL Final   10/01/2020 151 140 - 440 thou/uL Final       Lab Results   Component Value Date    RF <15.0 11/23/2015    SEDRATE 16 10/08/2015     Call start: 11:46am   Call end:  1204 am

## 2021-06-15 NOTE — PROGRESS NOTES
ASSESSMENT AND PLAN:  Claudia Wise 70 y.o. female is seen here on 02/17/21 for evaluation of polyarthralgia in the context of osteoarthritis, seronegative rheumatoid arthritis, she has been hurting more in her knees, especially going up and down the steps, squatting, she has chronic renal impairment therefore not a candidate for taking nonsteroidals.  Would like to go for corticosteroid injections done after pros and cons were outlined with 40 mg of Kenalog into each knee anterolateral approach.  Follow-up in 3 months for RA.      Diagnoses and all orders for this visit:    Bilateral primary osteoarthritis of knee  -     triamcinolone acetonide 40 mg/mL injection 40 mg (KENALOG-40)  -     triamcinolone acetonide 40 mg/mL injection 40 mg (KENALOG-40)    Stage 3b chronic kidney disease  -     triamcinolone acetonide 40 mg/mL injection 40 mg (KENALOG-40)  -     triamcinolone acetonide 40 mg/mL injection 40 mg (KENALOG-40)    Seronegative rheumatoid arthritis of multiple sites (H)          HISTORY OF PRESENTING ILLNESS ON 02/17/21 :  Claudia Wise 70 y.o. is here for a moderately severe flare up of pain. Here for a moderately severe flare up of pain.  Joints affected include both knee(s). This has gone on for several weeks. Pain is described as sharp. It is worse with activity at times bedtime..  Her symptoms are moderately severe. The symptoms are progressive.  Associated findings include /do not include: swelling, rash.  There is no associated recent fall or trauma.  Over-the-counter treatment to date has been without significant relief.    Further historical information, including ROS and limitation in activities as noted in the multidimensional health assessment questionnaire scanned in the EMR and in the assessment and plan section.    ALLERGIES:Bupropion hcl, Erythromycin base, Tetracyclines, and Valacyclovir    PAST MEDICAL/ACTIVE PROBLEMS/MEDICATION/SOCIAL DATA  Past Medical History:   Diagnosis Date      Anemia      Anxiety      Chronic kidney disease      Diabetes mellitus, type II (H)     prediabetes     Disease of thyroid gland     hypothyroid     Family history of myocardial infarction      Gout      High cholesterol      Hypertension      Inverted nipple     right     Macular edema      Osteoarthritis 2012     Rheumatoid arthritis (H) 2012     Social History     Tobacco Use   Smoking Status Former Smoker     Quit date: 1995     Years since quittin.6   Smokeless Tobacco Never Used     Patient Active Problem List   Diagnosis     Herpes Simplex Type II     Anxiety     Anemia in chronic kidney disease     Hyperlipemia     Gout     Hypertension     Prediabetes     Endometriosis     Chronic kidney disease     Subclinical Hypothyroidism     Seronegative rheumatoid arthritis of multiple sites (H)     High risk medication use     Bilateral primary osteoarthritis of knee     Paroxysmal atrial flutter (H)     Moderate malnutrition (H)     Small bowel obstruction (H)     Senile osteoporosis     Compression fracture of L2 vertebra with routine healing     Chronic kidney disease, stage 3     Current Outpatient Medications   Medication Sig Dispense Refill     acetaminophen (TYLENOL) 500 MG tablet Take 500 mg by mouth every 6 (six) hours as needed for pain.       acyclovir (ZOVIRAX) 400 MG tablet Take 1 tablet (400 mg total) by mouth every 8 (eight) hours as needed. 60 tablet 1     allopurinoL (ZYLOPRIM) 100 MG tablet Take 1 tablet (100 mg total) by mouth 2 (two) times a day with meals. 180 tablet 3     amLODIPine (NORVASC) 10 MG tablet Take 0.5 tablets (5 mg total) by mouth daily. 90 tablet 3     atorvastatin (LIPITOR) 10 MG tablet Take 1 tablet (10 mg total) by mouth at bedtime. 90 tablet 3     carboxymethylcellulose (REFRESH PLUS) 0.5 % Dpet ophthalmic dropperette Administer 1-2 drops to both eyes 4 (four) times a day as needed.        cyanocobalamin, vitamin B-12, 1,000 mcg Subl Place 1 tablet (1,000 mcg  total) under the tongue daily.  0     cyanocobalamin/folic acid (VITAMIN B12-FOLIC ACID) 1,000-400 mcg Lozg Take 1 tablet by mouth.       DULoxetine (CYMBALTA) 20 MG capsule Take 1 capsule (20 mg total) by mouth daily. 30 capsule 2     fluticasone propionate (FLONASE) 50 mcg/actuation nasal spray Use 1 spray(s) in each nostril once daily 48 g 3     folic acid (FOLVITE) 1 MG tablet Take 1 tablet (1 mg total) by mouth daily. 30 tablet 11     HYDROcodone-acetaminophen 5-325 mg per tablet Take 1 tablet by mouth every 8 (eight) hours as needed for pain. 18 tablet 0     hydrOXYchloroQUINE (PLAQUENIL) 200 mg tablet Take 1 tablet (200 mg total) by mouth 2 (two) times a day. 180 tablet 0     levothyroxine (SYNTHROID, LEVOTHROID) 50 MCG tablet TAKE 1 TABLET BY MOUTH ONCE DAILY IN THE MORNING 90 tablet 3     LORazepam (ATIVAN) 0.5 MG tablet TAKE 1 TABLET BY MOUTH EVERY 8 HOURS AS NEEDED FOR ANXIETY 30 tablet 0     losartan (COZAAR) 50 MG tablet Take 1 tablet (50 mg total) by mouth daily. 90 tablet 1     melatonin 3 mg Tab tablet Take 1 tablet (3 mg total) by mouth at bedtime as needed.  0     methotrexate 2.5 MG tablet TAKE 4 TABLETS BY MOUTH ONCE A WEEK 36 tablet 0     metoprolol tartrate (LOPRESSOR) 25 MG tablet Take 1 tablet (25 mg total) by mouth 2 (two) times a day. 180 tablet 3     RESTASIS 0.05 % ophthalmic emulsion INSTILL 1 DROP INTO EACH EYE TWICE DAILY  11     Current Facility-Administered Medications   Medication Dose Route Frequency Provider Last Rate Last Admin     denosumab 60 mg (PROLIA 60 mg/ml)  60 mg Subcutaneous Q6 Months Isabel Maurer MD   60 mg at 12/17/20 1147     triamcinolone acetonide 40 mg/mL injection 40 mg (KENALOG-40)  40 mg Intra-articular Once Akila Vinson MBBS         triamcinolone acetonide 40 mg/mL injection 40 mg (KENALOG-40)  40 mg Intra-articular Once Akila Vinson MBBS             DETAILED EXAMINATION  02/17/21  :  Vitals:    02/17/21 1221   BP: 130/64   Pulse: 68   Weight: 167 lb 9.6  "oz (76 kg)   Height: 5' 3\" (1.6 m)     Alert oriented. Head including the face is examined for malar rash, heliotropes, scarring, lupus pernio. Eyes examined for redness such as in episcleritis/scleritis, periorbital lesions.   Neck/ Face examined for parotid gland swelling, range of motion of neck.  Left upper and lower and right upper and lower extremities examined for tenderness, swelling, warmth of the appendicular joints, range of motion, edema, rash.  Some of the important findings included: She has joint line tenderness of the knees more so medially, she has warmth in both knees, no detectable effusion.      LAB / IMAGING DATA:  ALT   Date Value Ref Range Status   01/13/2021 20 0 - 45 U/L Final   11/17/2020 15 0 - 45 U/L Final   10/01/2020 18 0 - 45 U/L Final     Albumin   Date Value Ref Range Status   01/13/2021 4.2 3.5 - 5.0 g/dL Final   11/17/2020 4.4 3.5 - 5.0 g/dL Final   10/01/2020 4.3 3.5 - 5.0 g/dL Final     Creatinine   Date Value Ref Range Status   01/13/2021 1.41 (H) 0.60 - 1.10 mg/dL Final   11/17/2020 1.34 (H) 0.60 - 1.10 mg/dL Final   10/01/2020 1.18 (H) 0.60 - 1.10 mg/dL Final       WBC   Date Value Ref Range Status   01/13/2021 6.5 4.0 - 11.0 thou/uL Final   11/17/2020 6.4 4.0 - 11.0 thou/uL Final   08/31/2015 4.7 4.0 - 11.0 thou/uL Final   07/01/2015 5.5 4.0 - 11.0 thou/uL Final     Hemoglobin   Date Value Ref Range Status   01/13/2021 10.4 (L) 12.0 - 16.0 g/dL Final   11/17/2020 10.7 (L) 12.0 - 16.0 g/dL Final   10/01/2020 10.4 (L) 12.0 - 16.0 g/dL Final     Platelets   Date Value Ref Range Status   01/13/2021 177 140 - 440 thou/uL Final   11/17/2020 179 140 - 440 thou/uL Final   10/01/2020 151 140 - 440 thou/uL Final       Lab Results   Component Value Date    RF <15.0 11/23/2015    SEDRATE 16 10/08/2015        "

## 2021-06-15 NOTE — PROGRESS NOTES
Assessment/Plan:        1. Counseling about travel     2. Gout  allopurinol (ZYLOPRIM) 100 MG tablet   3. Anxiety  LORazepam (ATIVAN) 0.5 MG tablet   4. Left groin mass  US Groin Non Vascular Left   5. Hypertension       1. All chronic medical problems and labs reviewed.  2. Traveling consult - Typhoid IM given today. She will check if they will go to malaria areas in [place].  3. HTN, better when we checked second time.  4. US groin will be schedule for palpable mass.    This note has been dictated using voice recognition software. Any grammatical or context distortions are unintentional and inherent to the software.      Return in about 3 months (around 4/18/2018) for Annual physical.    Patient Instructions   Recheck BP today.    Typhoid vaccine.    US groin to schedule          Subjective:    Claudia Wise is a 67 y.o. female  here for    Chief Complaint   Patient presents with     Travel Consult     [facility] 2/19-3/5, due for Tdap and Typhoid     1. Chronic medical problems reviewed - she is seeing Rheumatologist regularly and had blood work in December. She has CKD and chronic anemia and will see Nephrologist in few months.  2. She is traveling to [facility] in mid February.  3. Left groin tender palpable mass for last few weeks.  4. HTN, she took losartan 2 hours ago.    Social History     Social History     Marital status:      Spouse name: N/A     Number of children: N/A     Years of education: N/A     Occupational History     Not on file.     Social History Main Topics     Smoking status: Former Smoker     Quit date: 7/7/1995     Smokeless tobacco: Never Used     Alcohol use Yes      Comment: occasional     Drug use: No     Sexual activity: Not on file     Other Topics Concern     Not on file     Social History Narrative       Family History   Problem Relation Age of Onset     Breast cancer Maternal Aunt      Cancer Maternal Aunt      Cancer Father      Cancer Sister      Cancer Maternal Grandmother       Cancer Cousin      Heart attack Mother 49     Review of Systems:     A 12 point comprehensive review of systems was negative except as noted in HPI.            Objective:    Physical Exam   /64  Pulse 74  Wt 160 lb (72.6 kg)  BMI 26.63 kg/m2    Constitutional: oriented to person, place, and time, appears well-nourished. No distress.   HENT:   Head: Normocephalic.   Mouth/Throat: Oropharynx is clear and moist.     Pulmonary/Chest: Effort normal  Abdominal: Soft. Bowel sounds are normal.   Left groin area- palpable marble size slightly tender nodule, movable, superficial. No redness or warmness.  Musculoskeletal: Normal range of motion.   Neurological: alert and oriented to person, place, and time.  Psychiatric:  normal mood and affect.    Patient Active Problem List   Diagnosis     Herpes Simplex Type II     Anxiety     Anemia in chronic kidney disease     Osteoarthritis Of The Knee     Hyperlipemia     Gout     Hypertension     Prediabetes     Endometriosis     Chronic kidney disease     Subclinical Hypothyroidism     Seronegative rheumatoid arthritis of multiple sites     High risk medication use       Current Outpatient Prescriptions on File Prior to Visit   Medication Sig Dispense Refill     acetaminophen (TYLENOL) 500 MG tablet Take 500 mg by mouth every 6 (six) hours as needed for pain.       atorvastatin (LIPITOR) 10 MG tablet Take 1 tablet (10 mg total) by mouth at bedtime. 90 tablet 3     cholecalciferol, vitamin D3, 1,000 unit tablet Take 2,000 Units by mouth every evening.        hydroxychloroquine (PLAQUENIL) 200 mg tablet TAKE ONE TABLET BY MOUTH TWICE DAILY WITH FOOD 60 tablet 3     levothyroxine (SYNTHROID, LEVOTHROID) 50 MCG tablet Take 1 tablet (50 mcg total) by mouth every morning. 90 tablet 2     losartan (COZAAR) 50 MG tablet Take 1 tablet (50 mg total) by mouth daily. 90 tablet 3     methotrexate 2.5 MG tablet TAKE FOUR TABLETS BY MOUTH ONCE A WEEK 24 tablet 1     sodium bicarbonate  650 MG tablet Take 1,296 mg by mouth 2 (two) times a day. (2 tabs twice daily)       [DISCONTINUED] acyclovir (ZOVIRAX) 400 MG tablet Take 400 mg by mouth every 8 (eight) hours as needed.       [DISCONTINUED] allopurinol (ZYLOPRIM) 100 MG tablet TAKE ONE TABLET (100 MG TOTAL) BY MOUTH 3 (THREE) TIMES DAILY WITH MEALS 270 tablet 0     [DISCONTINUED] LORazepam (ATIVAN) 0.5 MG tablet Take 1 tablet (0.5 mg total) by mouth every 8 (eight) hours as needed for anxiety. 30 tablet 0     folic acid (FOLVITE) 1 MG tablet Take 1 tablet (1 mg total) by mouth daily. 90 tablet 3     No current facility-administered medications on file prior to visit.                Isabel Maurer  1/18/2018

## 2021-06-15 NOTE — PROGRESS NOTES
This document was created using a software with less than 100% fidelity, at times resulting in unintended, even erroneous syntax and grammar.  The reader is advised to keep this under consideration while reviewing, interpreting this note.      ASSESSMENT AND PLAN:  Claudia Wise 70 y.o. female is seen here on 02/17/21 for evaluation of polyarthralgias in the background of osteoarthritis she also has rheumatoid arthritis, she has renal impairment therefore not a candidate for nonsteroidals, she finds corticosteroid injections are helpful, she wonders if this can be repeated in the knees which were done after pros and cons were outlined, 40 mg of Kenalog into each knee.  She has tried topical diclofenac gel for her first CMC's as long as she is to use it and minimal this is given the renal impairment and finds benefit this may be appropriate.  We will meet here in 3 to 4 months.  Diagnoses and all orders for this visit:    Bilateral primary osteoarthritis of knee  -     triamcinolone acetonide 40 mg/mL injection 40 mg (KENALOG-40)  -     triamcinolone acetonide 40 mg/mL injection 40 mg (KENALOG-40)    Stage 3b chronic kidney disease  -     triamcinolone acetonide 40 mg/mL injection 40 mg (KENALOG-40)  -     triamcinolone acetonide 40 mg/mL injection 40 mg (KENALOG-40)    Seronegative rheumatoid arthritis of multiple sites (H)          HISTORY OF PRESENTING ILLNESS ON 02/17/21 :  Claudia Wise 70 y.o. is here for a moderately severe flare up of pain. Here for a moderately severe flare up of pain.  Joints affected include multiple joints and both knee(s).  She had noted pain in her base of the thumb that has subsided since we had at the virtual visit.  This has gone on for several weeks. Pain is described as sharp. It is worse with activity at times bedtime..  Her symptoms are moderately severe. The symptoms are progressive.  Associated findings  do not include: swelling, rash.  There is no associated recent  fall or trauma.  Over-the-counter treatment to date has been without significant relief.    Further historical information, including ROS and limitation in activities as noted in the multidimensional health assessment questionnaire scanned in the EMR and in the assessment and plan section.    ALLERGIES:Bupropion hcl, Erythromycin base, Tetracyclines, and Valacyclovir    PAST MEDICAL/ACTIVE PROBLEMS/MEDICATION/SOCIAL DATA  Past Medical History:   Diagnosis Date     Anemia      Anxiety      Chronic kidney disease      Diabetes mellitus, type II (H)     prediabetes     Disease of thyroid gland     hypothyroid     Family history of myocardial infarction      Gout      High cholesterol      Hypertension      Inverted nipple     right     Macular edema      Osteoarthritis 2012     Rheumatoid arthritis (H) 2012     Social History     Tobacco Use   Smoking Status Former Smoker     Quit date: 1995     Years since quittin.6   Smokeless Tobacco Never Used     Patient Active Problem List   Diagnosis     Herpes Simplex Type II     Anxiety     Anemia in chronic kidney disease     Hyperlipemia     Gout     Hypertension     Prediabetes     Endometriosis     Chronic kidney disease     Subclinical Hypothyroidism     Seronegative rheumatoid arthritis of multiple sites (H)     High risk medication use     Bilateral primary osteoarthritis of knee     Paroxysmal atrial flutter (H)     Moderate malnutrition (H)     Small bowel obstruction (H)     Senile osteoporosis     Compression fracture of L2 vertebra with routine healing     Chronic kidney disease, stage 3     Current Outpatient Medications   Medication Sig Dispense Refill     acetaminophen (TYLENOL) 500 MG tablet Take 500 mg by mouth every 6 (six) hours as needed for pain.       acyclovir (ZOVIRAX) 400 MG tablet Take 1 tablet (400 mg total) by mouth every 8 (eight) hours as needed. 60 tablet 1     allopurinoL (ZYLOPRIM) 100 MG tablet Take 1 tablet (100 mg total) by mouth 2  (two) times a day with meals. 180 tablet 3     amLODIPine (NORVASC) 10 MG tablet Take 0.5 tablets (5 mg total) by mouth daily. 90 tablet 3     atorvastatin (LIPITOR) 10 MG tablet Take 1 tablet (10 mg total) by mouth at bedtime. 90 tablet 3     carboxymethylcellulose (REFRESH PLUS) 0.5 % Dpet ophthalmic dropperette Administer 1-2 drops to both eyes 4 (four) times a day as needed.        cyanocobalamin, vitamin B-12, 1,000 mcg Subl Place 1 tablet (1,000 mcg total) under the tongue daily.  0     cyanocobalamin/folic acid (VITAMIN B12-FOLIC ACID) 1,000-400 mcg Lozg Take 1 tablet by mouth.       DULoxetine (CYMBALTA) 20 MG capsule Take 1 capsule (20 mg total) by mouth daily. 30 capsule 2     fluticasone propionate (FLONASE) 50 mcg/actuation nasal spray Use 1 spray(s) in each nostril once daily 48 g 3     folic acid (FOLVITE) 1 MG tablet Take 1 tablet (1 mg total) by mouth daily. 30 tablet 11     HYDROcodone-acetaminophen 5-325 mg per tablet Take 1 tablet by mouth every 8 (eight) hours as needed for pain. 18 tablet 0     hydrOXYchloroQUINE (PLAQUENIL) 200 mg tablet Take 1 tablet (200 mg total) by mouth 2 (two) times a day. 180 tablet 0     levothyroxine (SYNTHROID, LEVOTHROID) 50 MCG tablet TAKE 1 TABLET BY MOUTH ONCE DAILY IN THE MORNING 90 tablet 3     LORazepam (ATIVAN) 0.5 MG tablet TAKE 1 TABLET BY MOUTH EVERY 8 HOURS AS NEEDED FOR ANXIETY 30 tablet 0     losartan (COZAAR) 50 MG tablet Take 1 tablet (50 mg total) by mouth daily. 90 tablet 1     melatonin 3 mg Tab tablet Take 1 tablet (3 mg total) by mouth at bedtime as needed.  0     methotrexate 2.5 MG tablet TAKE 4 TABLETS BY MOUTH ONCE A WEEK 36 tablet 0     metoprolol tartrate (LOPRESSOR) 25 MG tablet Take 1 tablet (25 mg total) by mouth 2 (two) times a day. 180 tablet 3     RESTASIS 0.05 % ophthalmic emulsion INSTILL 1 DROP INTO EACH EYE TWICE DAILY  11     Current Facility-Administered Medications   Medication Dose Route Frequency Provider Last Rate Last Admin  "    denosumab 60 mg (PROLIA 60 mg/ml)  60 mg Subcutaneous Q6 Months Isabel Maurer MD   60 mg at 12/17/20 1147     triamcinolone acetonide 40 mg/mL injection 40 mg (KENALOG-40)  40 mg Intra-articular Once Akila Vinson MBBS         triamcinolone acetonide 40 mg/mL injection 40 mg (KENALOG-40)  40 mg Intra-articular Once Akila Vinson MBBS             DETAILED EXAMINATION  02/17/21  :  Vitals:    02/17/21 1221   BP: 130/64   Pulse: 68   Weight: 167 lb 9.6 oz (76 kg)   Height: 5' 3\" (1.6 m)     Alert oriented. Head including the face is examined for malar rash, heliotropes, scarring, lupus pernio. Eyes examined for redness such as in episcleritis/scleritis, periorbital lesions.   Neck/ Face examined for parotid gland swelling, range of motion of neck.  Left upper and lower and right upper and lower extremities examined for tenderness, swelling, warmth of the appendicular joints, range of motion, edema, rash.  Some of the important findings included: She has joint line tenderness of the knees, she has mild tenderness the first CMC's bilaterally.           LAB / IMAGING DATA:  ALT   Date Value Ref Range Status   01/13/2021 20 0 - 45 U/L Final   11/17/2020 15 0 - 45 U/L Final   10/01/2020 18 0 - 45 U/L Final     Albumin   Date Value Ref Range Status   01/13/2021 4.2 3.5 - 5.0 g/dL Final   11/17/2020 4.4 3.5 - 5.0 g/dL Final   10/01/2020 4.3 3.5 - 5.0 g/dL Final     Creatinine   Date Value Ref Range Status   01/13/2021 1.41 (H) 0.60 - 1.10 mg/dL Final   11/17/2020 1.34 (H) 0.60 - 1.10 mg/dL Final   10/01/2020 1.18 (H) 0.60 - 1.10 mg/dL Final       WBC   Date Value Ref Range Status   01/13/2021 6.5 4.0 - 11.0 thou/uL Final   11/17/2020 6.4 4.0 - 11.0 thou/uL Final   08/31/2015 4.7 4.0 - 11.0 thou/uL Final   07/01/2015 5.5 4.0 - 11.0 thou/uL Final     Hemoglobin   Date Value Ref Range Status   01/13/2021 10.4 (L) 12.0 - 16.0 g/dL Final   11/17/2020 10.7 (L) 12.0 - 16.0 g/dL Final   10/01/2020 10.4 (L) 12.0 - 16.0 g/dL Final "     Platelets   Date Value Ref Range Status   01/13/2021 177 140 - 440 thou/uL Final   11/17/2020 179 140 - 440 thou/uL Final   10/01/2020 151 140 - 440 thou/uL Final       Lab Results   Component Value Date    RF <15.0 11/23/2015    SEDRATE 16 10/08/2015

## 2021-06-16 PROBLEM — M81.0 SENILE OSTEOPOROSIS: Status: ACTIVE | Noted: 2020-06-02

## 2021-06-16 PROBLEM — S32.020D COMPRESSION FRACTURE OF L2 VERTEBRA WITH ROUTINE HEALING: Status: ACTIVE | Noted: 2020-06-02

## 2021-06-16 PROBLEM — M17.0 BILATERAL PRIMARY OSTEOARTHRITIS OF KNEE: Status: ACTIVE | Noted: 2019-05-23

## 2021-06-16 PROBLEM — K56.609 SMALL BOWEL OBSTRUCTION (H): Status: ACTIVE | Noted: 2020-02-10

## 2021-06-16 PROBLEM — N18.30 CHRONIC KIDNEY DISEASE, STAGE 3 (H): Status: ACTIVE | Noted: 2021-01-18

## 2021-06-16 NOTE — PROGRESS NOTES
Assessment:     Diagnoses and all orders for this visit:    Chronic left-sided low back pain without sciatica  -     XR Lumbar Spine 2 or 3 VWS; Future; Expected date: 04/12/2021  -     XR Thoracic Spine 3 VWS; Future; Expected date: 04/12/2021    Compression fracture of L2 vertebra with routine healing  -     XR Lumbar Spine 2 or 3 VWS; Future; Expected date: 04/12/2021  -     XR Thoracic Spine 3 VWS; Future; Expected date: 04/12/2021    Lumbar facet arthropathy  -     XR Lumbar Spine 2 or 3 VWS; Future; Expected date: 04/12/2021    Spondylolisthesis of lumbar region  -     XR Lumbar Spine 2 or 3 VWS; Future; Expected date: 04/12/2021    Chronic midline thoracic back pain  -     XR Thoracic Spine 3 VWS; Future; Expected date: 04/12/2021       Claudia Wise is a 70 y.o. y.o. female with past medical history significant for anxiety, rheumatoid arthritis managed by Dr. Vinson, diabetes myelitis, hypertension, hyperlipidemia, chronic kidney disease, thyroid disorder, gout, osteopenia, recent small bowel obstruction with surgical intervention who presents today for follow-up regarding:    -Ongoing left low back pain L3-L5 region worse with standing and walking.  History of compression fracture March 2020 L2.    Negative SI provocative maneuvers today.    -Chronic midline thoracic pain with mild pinpoint tenderness T3-T5 region.     Plan:     A shared decision making plan was used. The patient's values and choices were respected. Prior medical records from 9/3/2020 recurrent were reviewed today. The following represents what was discussed and decided upon by the provider and the patient.        -DIAGNOSTIC TESTS: Images were personally reviewed and interpreted.   --Ordered updated lumbar and thoracic spine x-ray to evaluate thoracic tenderness as well as T2 compression fracture stability.  If progression of the compression fracture noted all I would recommend updated lumbar spine imaging.  --Lumbar spine x-ray  8/24/2020 shows stable L2 compression deformity.  --Lumbar spine x-ray 7/1/2020 shows stable L2 compression deformity, grade 1 L3-4 spondylolisthesis.  --Lumbar spine MRI 5/19/2020 with 20% acute/subacute compression fracture L2.  There is mild bilateral lateral recess stenosis L3-4 and L4-5.  Bilateral synovial cysts L4-5 with moderate facet arthropathy.    -INTERVENTIONS: Otherwise if compression fracture overall looks normal I would recommend a left L3-4 and L4-5 facet joint steroid injection.  Patient did state that she would be interested in this if reasonable pending x-ray results.      -MEDICATIONS: Continue with CBD balm as well as Tylenol as needed hydrocodone as needed for severe breakthrough pain.  Discussed side effects of medications and proper use. Patient verbalized understanding.    -PHYSICAL THERAPY: Encourage patient continue with home exercises from prior physical therapy sessions which she is tolerating at this time.  Discussed the importance of core strengthening, ROM, stretching exercises with the patient and how each of these entities is important in decreasing pain.  Explained to the patient that the purpose of physical therapy is to teach the patient a home exercise program.  These exercises need to be performed every day in order to decrease pain and prevent future occurrences of pain.        -PATIENT EDUCATION:  Total time of 32 minutes spent with the patient, reviewing the chart, placing orders, and documenting.   -Today we also discussed the issues related to the current COVID-19 pandemic, the pros and cons of the current treatment plan, the CDC guidelines such as social distancing, washing the hands, and covering the cough.    -FOLLOW UP: Follow-up if symptoms worsen, discussed that we can call her with the x-ray results which she prefers.  Advised to contact clinic if symptoms worsen or change.    Subjective:     Claudia Wise is a 70 y.o. female who presents today for follow-up  regarding ongoing left low back pain at the belt line slightly above as well as slightly below that is worse with standing and walking.  She reports that this is the same type of pain that she has had since her initial fracture previously as it never completely went away.  Currently her pain is a 4/10, and 8 at its worst, a 1 at its best but she does report that it is fairly tolerable at this time.  Patient reports that laying down and sitting her her more comfortable positions.  Patient denies any lower extremity radiating pain, denies recent trips or falls or balance changes, denies bowel or bladder loss control.    Patient does report that she sees a chiropractor for low velocity manipulation of her upper thoracic and does have some tenderness over her mid spine in that region as well, mild however.  That is not her biggest concern her concern is her left lower lumbar spine pain.    Treatment to Date: No prior spinal surgery or spinal injection.  Physical therapy [facility] sessions last 8/28/2024 compression fracture/spine pain.     Medications:  Plaquenil 4 seronegative rheumatoid arthritis  Tylenol  Hydrocodone managed by Dr. Erasto Guzmán 25 mg bedtime however patient only tried this medication for a couple of days with no benefit therefore she discontinued on her own.    Patient Active Problem List   Diagnosis     Herpes Simplex Type II     Anxiety     Anemia in chronic kidney disease     Hyperlipemia     Gout     Hypertension     Prediabetes     Endometriosis     Chronic kidney disease     Subclinical Hypothyroidism     Seronegative rheumatoid arthritis of multiple sites (H)     High risk medication use     Bilateral primary osteoarthritis of knee     Paroxysmal atrial flutter (H)     Moderate malnutrition (H)     Small bowel obstruction (H)     Senile osteoporosis     Compression fracture of L2 vertebra with routine healing     Chronic kidney disease, stage 3       Current Outpatient Medications on  File Prior to Encounter   Medication Sig Dispense Refill     acetaminophen (TYLENOL) 500 MG tablet Take 500 mg by mouth every 6 (six) hours as needed for pain.       acyclovir (ZOVIRAX) 400 MG tablet Take 1 tablet (400 mg total) by mouth every 8 (eight) hours as needed. 60 tablet 1     allopurinoL (ZYLOPRIM) 100 MG tablet Take 1 tablet (100 mg total) by mouth 2 (two) times a day with meals. 180 tablet 3     amLODIPine (NORVASC) 10 MG tablet Take 0.5 tablets (5 mg total) by mouth daily. 90 tablet 3     atorvastatin (LIPITOR) 10 MG tablet Take 1 tablet (10 mg total) by mouth at bedtime. 90 tablet 3     carboxymethylcellulose (REFRESH PLUS) 0.5 % Dpet ophthalmic dropperette Administer 1-2 drops to both eyes 4 (four) times a day as needed.        cyanocobalamin, vitamin B-12, 1,000 mcg Subl Place 1 tablet (1,000 mcg total) under the tongue daily.  0     cyanocobalamin/folic acid (VITAMIN B12-FOLIC ACID) 1,000-400 mcg Lozg Take 1 tablet by mouth.       DULoxetine (CYMBALTA) 20 MG capsule Take 1 capsule (20 mg total) by mouth daily. 30 capsule 2     fluticasone propionate (FLONASE) 50 mcg/actuation nasal spray Use 1 spray(s) in each nostril once daily 48 g 3     folic acid (FOLVITE) 1 MG tablet Take 1 tablet (1 mg total) by mouth daily. 30 tablet 11     HYDROcodone-acetaminophen 5-325 mg per tablet Take 1 tablet by mouth every 8 (eight) hours as needed for pain. 18 tablet 0     hydrOXYchloroQUINE (PLAQUENIL) 200 mg tablet Take 1 tablet (200 mg total) by mouth 2 (two) times a day. 180 tablet 0     levothyroxine (SYNTHROID, LEVOTHROID) 50 MCG tablet TAKE 1 TABLET BY MOUTH ONCE DAILY IN THE MORNING 90 tablet 3     LORazepam (ATIVAN) 0.5 MG tablet TAKE 1 TABLET BY MOUTH EVERY 8 HOURS AS NEEDED FOR ANXIETY 30 tablet 0     losartan (COZAAR) 50 MG tablet Take 1 tablet (50 mg total) by mouth daily. 90 tablet 1     melatonin 3 mg Tab tablet Take 1 tablet (3 mg total) by mouth at bedtime as needed.  0     methotrexate 2.5 MG tablet  TAKE 4 TABLETS BY MOUTH ONCE A WEEK 36 tablet 0     metoprolol tartrate (LOPRESSOR) 25 MG tablet Take 1 tablet (25 mg total) by mouth 2 (two) times a day. 180 tablet 3     RESTASIS 0.05 % ophthalmic emulsion INSTILL 1 DROP INTO EACH EYE TWICE DAILY  11     Current Facility-Administered Medications on File Prior to Encounter   Medication Dose Route Frequency Provider Last Rate Last Admin     denosumab 60 mg (PROLIA 60 mg/ml)  60 mg Subcutaneous Q6 Months Isabel Maurer MD   60 mg at 12/17/20 1147       Allergies   Allergen Reactions     Bupropion Hcl Nausea Only     Erythromycin Base Nausea Only     Tetracyclines Itching     Valacyclovir Nausea Only and Nausea And Vomiting       Past Medical History:   Diagnosis Date     Anemia      Anxiety      Chronic kidney disease      Diabetes mellitus, type II (H)     prediabetes     Disease of thyroid gland     hypothyroid     Family history of myocardial infarction      Gout      High cholesterol      Hypertension      Inverted nipple     right     Macular edema      Osteoarthritis 2012     Rheumatoid arthritis (H) 2012        Review of Systems  ROS:  Specifically negative for bowel/bladder dysfunction, balance changes, headache, dizziness, foot drop, fevers, chills, appetite changes, nausea/vomiting, unexplained weight loss. Otherwise 13 systems reviewed are negative. Please see the patient's intake questionnaire from today for details.    Reviewed Social, Family, Past Medical and Past Surgical history with patient, no significant changes noted since prior visit.     Objective:     /62 (Patient Site: Right Arm, Patient Position: Sitting)     PHYSICAL EXAMINATION:    --CONSTITUTIONAL: Well developed, well nourished, healthy appearing individual.  --PSYCHIATRIC: Appropriate mood and affect. No difficulty interacting due to temper, social withdrawal, or memory issues.  --SKIN: Lumbar region is dry and intact.   --RESPIRATORY: Normal rhythm and effort. No abnormal  accessory muscle breathing patterns noted.   --MUSCULOSKELETAL:  Normal lumbar lordosis noted, no lateral shift.  --GROSS MOTOR: Easily arises from a seated position. Gait is non-antalgic  --LUMBAR SPINE:  Inspection reveals no evidence of deformity. Range of motion is not limited in lumbar flexion, mild increased pain with lumbar extension and lateral rotation simultaneously to the left. No tenderness to palpation lumbar spine, mild tenderness over midline thoracic region T3-T5. Straight leg raising in the supine position is negative to radicular pain. Sciatic notch non-tender.   --SACROILIAC JOINT: Negative distraction.  Negative Alyssa's with reproduction of pain to affected extremity. One Finger point test negative.  --LOWER EXTREMITY MOTOR TESTING:  Plantar flexion left 5/5, right 5/5   Dorsiflexion left 5/5, right 5/5   Great toe MTP extension left 5/5, right 5/5  Knee flexion left 5/5, right 5/5  Knee extension left 5/5, right 5/5   Hip flexion left 5/5, right 5/5  Hip abduction left 5/5, right 5/5  Hip adduction left 5/5, right 5/5   --HIPS: Full range of motion bilaterally. Negative FABERs on the involved lower extremity.  Negative scour maneuver.  --NEUROLOGIC: Bilateral patellar and achilles reflexes are physiologic and symmetric. Sensation to light touch is intact in the bilateral L4, L5, and S1 dermatomes.    RESULTS:   Imaging: Lumbar spine imaging was reviewed today. The images were shown to the patient and the findings were explained using a spine model.      Xr Lumbar Spine 2 Or 3 Vws  Result Date: 8/25/2020  INDICATION: Evaluate L2 compression fracture. COMPARISON: 7/10/2020.   Five lumbar-type vertebral bodies. Stable moderate L2 compression deformity. Segmental kyphosis about the fracture from the superior L1 endplate through the inferior L3 endplate measures 21 degrees, which is unchanged. Vertebral body heights are otherwise maintained. Mild loss of disc space height L3-L4 through L5-S1. Mild  lower lumbar facet arthropathy.        XR LUMBAR SPINE 2 OR 3 VWS  LOCATION: [facility]  DATE/TIME: 7/1/2020   INDICATION: Low back pain compression fracture L2 evaluate for stability  COMPARISON: 06/08/2020  IMPRESSION:   Very shallow left apex curvature of the lumbar spine. Grade 1 retrolisthesis of L3 on L4. The bones appear diffusely demineralized, suggestive of osteoporosis. Stable appearance of the L2 compression deformity. No new compression deformities.   Atherosclerotic calcification of the abdominal aorta. Degenerative changes of the sacroiliac joints.        XR LUMBAR SPINE 2 OR 3 VWS  LOCATION: [facility]  DATE/TIME: 6/8/2020   INDICATION: Low back pain evaluate L2 compression fracture.  COMPARISON: MR 05/19/2020.  IMPRESSION:   Five lumbar type vertebral bodies presumed. Subacute/chronic appearing compression is identified at the L2, with approximately 30-40% loss of superior body height, and fragmentation anterior superior corner similar to previous MR appearance. Sclerosis   associated with the compressed superior endplate at this level. Degenerative changes elsewhere in the lumbar spine stable. 2 mm posterior subluxation L3 upon L4 is more prominent, possibly due to positioning. If there is concern for instability flexion   and extension views could be helpful to assess this level. Otherwise satisfactory height and alignment lumbar spine. Leftward lumbar curve apex L4. Satisfactory appearance to the sacrum. Vascular calcification. Degenerative changes both SI joints.   Obscuration of the right hemisacrum due to bowel gas and stool.  Consider flexion/extension views to assess possible L3-L4 instability as clinically indicated.        MR LUMBAR SPINE WO CONTRAST  LOCATION: [facility]  DATE/TIME: 5/19/2020   INDICATION: Back pain or radiculopathy, > 6 wks Low back pain with left radicular pain  COMPARISON: Correlation with abdomen and pelvis CT 02/28/2020  TECHNIQUE:  Without IV contrast  FINDINGS:   Nomenclature is based on 5 lumbar type vertebral bodies. The conus ends at distal L1. Mild levocurvature of the lumbar spine. 1 mm retrolisthesis from L3-L4 to L5-S1. 20% superior endplate compression fracture of L2 with associated moderate marrow edema.   Small amount of prevertebral soft tissue edema from L1 to L3. The rest of the vertebral body heights are maintained. Nonpathologic marrow signal. No MRI evidence for pars defects. Mild symmetric atrophy of the posterior paraspinal musculature. Normal diameter of the distal abdominal aorta. The visualized bony pelvis and proximal femora are grossly within normal limits.  T12-L1: Mild intervertebral disc height loss. Loss of the normal T2 signal within the disc. Small broad-based disc bulge with superimposed left paracentral disc protrusion. Mild bilateral facet arthropathy. No spinal canal narrowing. No neuroforaminal narrowing.  L1-L2: Mild intervertebral disc height loss. Loss of the normal T2 signal within the disc. Small broad-based disc bulge. Mild bilateral facet arthropathy. No spinal canal narrowing. No neuroforaminal narrowing.  L2-L3: Mild intervertebral disc height loss. Loss of the normal T2 signal within the disc. Small broad-based disc bulge. Mild bilateral facet arthropathy. No spinal canal narrowing. No neuroforaminal narrowing.   L3-L4: Normal intervertebral disc height. Loss of the normal T2 signal within the disc. Small broad-based disc bulge. Moderate bilateral facet arthropathy. Small amount of fluid within the facet joints. Mild narrowing of the lateral recesses. No spinal canal narrowing. Mild right neuroforaminal narrowing. Mild left neuroforaminal narrowing   L4-L5: Normal intervertebral disc height. Loss of the normal T2 signal within the disc. Small broad-based disc bulge. Moderate bilateral facet arthropathy. Small amount of fluid within the facet joints. Mild narrowing of the lateral recesses. No spinal  canal narrowing. Mild right neuroforaminal narrowing. No left neuroforaminal narrowing. 2-3 mm bilateral lateral synovial cyst.  L5-S1: Normal intervertebral disc height. Loss of the normal T2 signal within the disc. Small broad-based disc bulge. Mild to moderate bilateral facet arthropathy. No spinal canal narrowing. No right neuroforaminal narrowing. No left neuroforaminal narrowing.  IMPRESSION:   1.  20% acute/subacute superior endplate compression fracture of L2.   2.  Mild narrowing of the lateral recesses at L3-L4 and L4-L5.  3.  No high-grade spinal canal.  4.  Mild bilateral neuroforaminal narrowing at L3-L4. Mild right neuroforaminal narrowing at L4-L5.  5.  Moderate facet arthropathy at L3-L4 and L4-L5 with small amount of fluid within the facet joints.

## 2021-06-16 NOTE — PROGRESS NOTES
ASSESSMENT AND PLAN:   Claudia Wise 67 y.o. female is here for follow-up.  She has seronegative rheumatoid arthritis, widespread osteoarthritis both axial and appendicular system, chronic renal impairment, unable to take nonsteroidals because of this.  She is on low-dose methotrexate.  She is done great with this.  She is on hydroxychloroquine.  She had her eyes examined recently.  She gets painful knees especially going up and down the steps.  She has well-defined osteoarthritis.  We discussed options including corticosteroid injections.  In the muscle strengthening workouts.  She is going to return for follow-up in 4 months labs every 2.        Diagnoses and all orders for this visit:    Seronegative rheumatoid arthritis of multiple sites  -     methotrexate 2.5 MG tablet; TAKE FOUR TABLETS BY MOUTH ONCE A WEEK  Dispense: 16 tablet; Refill: 1    Stage 3 chronic kidney disease    High risk medication use    Osteoarthritis Of The Knee        HISTORY OF PRESENTING ILLNESS:  Claudia Wise 67 y.o. is here for followup of seronegative Rheumatoid Arthritis, osteoarthritis with his various manifestations, renal impairment and osteoarthritis.  She is on low-dose methotrexate and hydroxychloroquine and doing good.  She has noted pain in her knees bilaterally, this is worse with activity, she cannot take nonsteroidals in view of the renal impairment, she does take acetaminophen off and on, this does interfere with some of the day-to-day activities as noted.  This is gone on for the past several months.  There is no associated swelling.  When she leans it especially hard for her to get out.  No stiffness there is about 30 minutes in the morning.  Her other joints are doing much better.  She has noted no fever weight loss eye redness mouth also nausea no rash.  Overall disease activity:  stable. Limitation on activities as noted in the MDHAQ scanned in the EMR.  Further historical information, including ROS as noted  in the multidimensional health assessment questionnaire scanned in the EMR and in the assessment and plan section.  ALLERGIES:Bupropion hcl; Erythromycin base; Tetracyclines; and Valacyclovir    PAST MEDICAL/ACTIVE PROBLEMS/MEDICATION/SOCIAL DATA  Past Medical History:   Diagnosis Date     Anemia      Anxiety      Chronic kidney disease      Diabetes mellitus, type II     prediabetes     Disease of thyroid gland     hypothyroid     Family history of myocardial infarction      Gout      High cholesterol      Hypertension      Inverted nipple     right     Osteoarthritis 2012     Rheumatoid arthritis 2012     History   Smoking Status     Former Smoker     Quit date: 7/7/1995   Smokeless Tobacco     Never Used     Patient Active Problem List   Diagnosis     Herpes Simplex Type II     Anxiety     Anemia in chronic kidney disease     Osteoarthritis Of The Knee     Hyperlipemia     Gout     Hypertension     Prediabetes     Endometriosis     Chronic kidney disease     Subclinical Hypothyroidism     Seronegative rheumatoid arthritis of multiple sites     High risk medication use     Current Outpatient Prescriptions   Medication Sig Dispense Refill     acetaminophen (TYLENOL) 500 MG tablet Take 500 mg by mouth every 6 (six) hours as needed for pain.       acyclovir (ZOVIRAX) 400 MG tablet Take 1 tablet (400 mg total) by mouth every 8 (eight) hours as needed. 60 tablet 1     allopurinol (ZYLOPRIM) 100 MG tablet TAKE ONE TABLET (100 MG TOTAL) BY MOUTH 3 (THREE) TIMES DAILY WITH MEALS 270 tablet 3     atorvastatin (LIPITOR) 10 MG tablet Take 1 tablet (10 mg total) by mouth at bedtime. 90 tablet 3     cholecalciferol, vitamin D3, 1,000 unit tablet Take 2,000 Units by mouth every evening.        fluticasone (FLONASE) 50 mcg/actuation nasal spray 1 spray into each nostril daily. 16 g 2     hydroxychloroquine (PLAQUENIL) 200 mg tablet TAKE ONE TABLET BY MOUTH TWICE DAILY WITH FOOD 60 tablet 3     Lactobacillus rhamnosus GG  "(CULTURELLE) 10-15 Billion cell capsule Take 1 capsule by mouth daily.       levothyroxine (SYNTHROID, LEVOTHROID) 50 MCG tablet Take 1 tablet (50 mcg total) by mouth every morning. 90 tablet 2     LORazepam (ATIVAN) 0.5 MG tablet Take 1 tablet (0.5 mg total) by mouth every 8 (eight) hours as needed for anxiety. 30 tablet 0     losartan (COZAAR) 50 MG tablet TAKE ONE TABLET BY MOUTH ONCE DAILY 90 tablet 0     methotrexate 2.5 MG tablet TAKE FOUR TABLETS BY MOUTH ONCE A WEEK 16 tablet 0     sodium bicarbonate 650 MG tablet Take 1,296 mg by mouth 2 (two) times a day. (2 tabs twice daily)       folic acid (FOLVITE) 1 MG tablet Take 1 tablet (1 mg total) by mouth daily. 90 tablet 3     No current facility-administered medications for this visit.      DETAILED EXAMINATION  03/15/18  :  Vitals:    03/15/18 1414   BP: 138/70   Pulse: 84   Weight: 160 lb (72.6 kg)   Height: 5' 5\" (1.651 m)     Alert oriented. Head including the face is examined for malar rash, heliotropes, scarring, lupus pernio. Eyes examined for redness such as in episcleritis/scleritis, periorbital lesions.   Neck/ Face examined for parotid gland swelling, range of motion of neck.  Left upper and lower and right upper and lower extremities examined for tenderness, swelling, warmth of the appendicular joints, range of motion, edema, rash.  Some of the important findings included:  No appreciable synovitis in any of the palpable appendicular joints.  Joint line tenderness, bilaterally, knees.       LAB / IMAGING DATA:  ALT   Date Value Ref Range Status   02/12/2018 22 0 - 45 U/L Final   12/28/2017 25 0 - 45 U/L Final   10/31/2017 23 0 - 45 U/L Final     Albumin   Date Value Ref Range Status   02/12/2018 3.9 3.5 - 5.0 g/dL Final   12/28/2017 3.7 3.5 - 5.0 g/dL Final   10/31/2017 3.9 3.5 - 5.0 g/dL Final     Creatinine   Date Value Ref Range Status   02/12/2018 1.27 (H) 0.60 - 1.10 mg/dL Final   12/28/2017 1.36 (H) 0.60 - 1.10 mg/dL Final   10/31/2017 1.44 " (H) 0.60 - 1.10 mg/dL Final       WBC   Date Value Ref Range Status   02/12/2018 5.1 4.0 - 11.0 thou/uL Final   12/28/2017 5.0 4.0 - 11.0 thou/uL Final   08/31/2015 4.7 4.0 - 11.0 thou/uL Final   07/01/2015 5.5 4.0 - 11.0 thou/uL Final     Hemoglobin   Date Value Ref Range Status   02/12/2018 9.8 (L) 12.0 - 16.0 g/dL Final   12/28/2017 9.6 (L) 12.0 - 16.0 g/dL Final   10/31/2017 9.7 (L) 12.0 - 16.0 g/dL Final     Platelets   Date Value Ref Range Status   02/12/2018 151 140 - 440 thou/uL Final   12/28/2017 167 140 - 440 thou/uL Final   10/31/2017 172 140 - 440 thou/uL Final       Lab Results   Component Value Date    RF <15.0 11/23/2015    SEDRATE 16 10/08/2015

## 2021-06-16 NOTE — TELEPHONE ENCOUNTER
----- Message from Loree Rivas CNP sent at 4/13/2021  8:37 AM CDT -----  Please call patient and notify her that I did review her x-rays.  The thoracic x-ray looks good, no concern for fracture.  Lumbar spine x-ray shows very stable L2 compression fracture that does not look like it is changed since prior x-ray in August 2020.  Therefore if she would like to trial an injection as we discussed previously that would be reasonable at this point, could place an order for a left L3-4 and L4-5 facet joint steroid injection to target her left low back pain.

## 2021-06-16 NOTE — PATIENT INSTRUCTIONS - HE
~Please call our [facility] Nurse Navigation line [phone] with any questions or concerns about your treatment plan, if symptoms worsen and you would like to be seen urgently, or if you have problems controlling bladder and bowel function.    Importance of specialized Physical Therapy: Discussed the importance of core strengthening, ROM, stretching exercises with the patient and how each of these entities is important in decreasing pain.  Explained to the patient that the purpose of physical therapy is to teach the patient a home exercise program individualized to them and their specific health concerns.  These exercises need to be performed every day in order to decrease pain and prevent future occurrences of pain.          Imaging has been ordered. Radiology will call you to schedule. Please call below if you do not hear from them in the next couple of days.     [facility] Radiology Scheduling  Please call [phone] to schedule your image(s) (select option #1). There are 2 different locations, see below.     [facility]  [address] [address]    [place] [id number] [place] [phone]

## 2021-06-16 NOTE — TELEPHONE ENCOUNTER
Call placed to patient with provider's results and recommendations.   Left message to return call.

## 2021-06-16 NOTE — TELEPHONE ENCOUNTER
Pt returned call. Results and recommendations given. Pt stated understanding. Pt would like to think about this and will call back if she would like to proceed or if she has questions/concerns.

## 2021-06-16 NOTE — TELEPHONE ENCOUNTER
Refill Approved    Rx renewed per Medication Renewal Policy. Medication was last renewed on 4/15/20.    David Diggs, [facility] Triage/Med Refill 4/23/2021     Requested Prescriptions   Pending Prescriptions Disp Refills     metoprolol tartrate (LOPRESSOR) 25 MG tablet [Pharmacy Med Name: Metoprolol Tartrate 25 MG Oral Tablet] 180 tablet 0     Sig: Take 1 tablet by mouth twice daily       Beta-Blockers Refill Protocol Passed - 4/22/2021  8:57 AM        Passed - PCP or prescribing provider visit in past 12 months or next 3 months     Last office visit with prescriber/PCP: 6/17/2020 Isabel Maurer MD OR same dept: 6/17/2020 Isabel Maurer MD OR same specialty: 6/17/2020 Isabel Maurer MD  Last physical: 10/1/2020 Last MTM visit: Visit date not found   Next visit within 3 mo: Visit date not found  Next physical within 3 mo: Visit date not found  Prescriber OR PCP: Isabel Maurer MD  Last diagnosis associated with med order: 1. Hypertension  - metoprolol tartrate (LOPRESSOR) 25 MG tablet [Pharmacy Med Name: Metoprolol Tartrate 25 MG Oral Tablet]; Take 1 tablet by mouth twice daily  Dispense: 180 tablet; Refill: 0    If protocol passes may refill for 12 months if within 3 months of last provider visit (or a total of 15 months).             Passed - Blood pressure filed in past 12 months     BP Readings from Last 1 Encounters:   04/12/21 142/62

## 2021-06-17 NOTE — TELEPHONE ENCOUNTER
Telephone Encounter by Court Dewitt at 8/20/2020  1:32 PM     Author: Court Dewitt Service: -- Author Type: Financial Resource Guide    Filed: 8/20/2020  1:35 PM Encounter Date: 8/19/2020 Status: Signed    : Court Dewitt (Financial Resource Guide)       Received [place] letter

## 2021-06-17 NOTE — TELEPHONE ENCOUNTER
Patient calling as she would like to schedule the injection that was offered to her.     Chart reviewed. Per result note telephone encounter of 4/13/21. Order placed for left L3-4 and L4-5 facet joint steroid injection. Injection requirements reviewed with patient. Discussed steroids have immunosuppressant properties which could potentially put patient at a higher risk for a worsened course of any infection including COVID. Stated understanding. 2nd COVID-19 vaccination was received on 2/26/21. Will have schedulers contact patient to schedule for this with Dr. Gonzalez.

## 2021-06-17 NOTE — TELEPHONE ENCOUNTER
Telephone Encounter by Shaniqua Gomez at 6/2/2020 11:33 AM     Author: Shaniqua Gomez Service: -- Author Type: --    Filed: 6/2/2020 12:47 PM Encounter Date: 6/2/2020 Status: Addendum    : Shaniqua Gomez    Related Notes: Original Note by Shaniqua Gomez filed at 6/2/2020 11:34 AM       [place] APPR[place]ED:    Approval start date: 5/3/2020  Approval end date:  6/2/2022    Pharmacy has been notified of approval and will contact patient when medication is ready for pickup.

## 2021-06-17 NOTE — PATIENT INSTRUCTIONS - HE
Patient Instructions by Rose Marie Mg CNP at 6/20/2019  1:20 PM     Author: Rose Marie Mg CNP Service: -- Author Type: Nurse Practitioner    Filed: 6/20/2019  1:46 PM Encounter Date: 6/20/2019 Status: Signed    : Rose Marie Mg CNP (Nurse Practitioner)         Patient Education     Acute Bronchitis  Your healthcare provider has told you that you have acute bronchitis. Bronchitis is infection or inflammation of the bronchial tubes (airways in the lungs). Normally, air moves easily in and out of the airways. Bronchitis narrows the airways, making it harder for air to flow in and out of the lungs. This causes symptoms such as shortness of breath, coughing up yellow or green mucus, and wheezing. Bronchitis can be acute or chronic. Acute means the condition comes on quickly and goes away in a short time, usually within 3 to 10 days. Chronic means a condition lasts a long time and often comes back.    What causes acute bronchitis?  Acute bronchitis almost always starts as a viral respiratory infection, such as a cold or the flu. Certain factors make it more likely for a cold or flu to turn into bronchitis. These include being very young, being elderly, having a heart or lung problem, or having a weak immune system. Cigarette smoking also makes bronchitis more likely.  When bronchitis develops, the airways become swollen. The airways may also become infected with bacteria. This is known as a secondary infection.  Diagnosing acute bronchitis  Your healthcare provider will examine you and ask about your symptoms and health history. You may also have a sputum culture to test the fluid in your lungs. Chest X-rays may be done to look for infection in the lungs.  Treating acute bronchitis  Bronchitis usually clears up as the cold or flu goes away. You can help feel better faster by doing the following:    Take medicine as directed. You may be told to take ibuprofen or other over-the-counter  medicines. These help relieve inflammation in your bronchial tubes. Your healthcare provider may prescribe an inhaler to help open up the bronchial tubes. Most of the time, acute bronchitis is caused by a viral infection. Antibiotics are usually not prescribed for viral infections.    Drink plenty of fluids, such as water, juice, or warm soup. Fluids loosen mucus so that you can cough it up. This helps you breathe more easily. Fluids also prevent dehydration.    Make sure you get plenty of rest.    Do not smoke. Do not allow anyone else to smoke in your home.  Recovery and follow-up  Follow up with your doctor as you are told. You will likely feel better in a week or two. But a dry cough can linger beyond that time. Let your doctor know if you still have symptoms (other than a dry cough) after 2 weeks, or if youre prone to getting bronchial infections. Take steps to protect yourself from future infections. These steps include stopping smoking and avoiding tobacco smoke, washing your hands often, and getting a yearly flu shot.  When to call your healthcare provider  Call the healthcare provider if you have any of the following:    Fever of 100.4 F (38.0 C) or higher, or as advised    Symptoms that get worse, or new symptoms    Trouble breathing    Symptoms that dont start to improve within a week, or within 3 days of taking antibiotics   Date Last Reviewed: 12/1/2016 2000-2017 [employer]. [address] [place] [address], [place] [address]. All rights reserved. This information is not intended as a substitute for professional medical care. Always follow your healthcare professional's instructions.

## 2021-06-17 NOTE — PATIENT INSTRUCTIONS - HE
Patient Instructions by Isabel Maurer MD at 8/1/2019  4:00 PM     Author: Isabel Maurer MD Service: -- Author Type: Physician    Filed: 8/1/2019  4:35 PM Encounter Date: 8/1/2019 Status: Addendum    : Isabel Maurer MD (Physician)    Related Notes: Original Note by Isabel Maurer MD (Physician) filed at 8/1/2019  4:27 PM       Recheck BP. If still high, to see Geovanny in 2 weeks.      Patient Education   Understanding USDA MyPlate  The USDA (US Department of Agriculture) has guidelines to help you make healthy food choices. These are called MyPlate. MyPlate shows the food groups that make up healthy meals using the image of a place setting. Before you eat, think about the healthiest choices for what to put onto your plate or into your cup or bowl. To learn more about building a healthy plate, visit www.choosemyplate.gov.       The Food Groups    Fruits: Any fruit or 100% fruit juice counts as part of the Fruit Group. Fruits may be fresh, canned, frozen, or dried, and may be whole, cut-up, or pureed. Make half your plate fruits and vegetables.    Vegetables: Any vegetable or 100% vegetable juice counts as a member of the Vegetable Group. Vegetables may be fresh, frozen, canned, or dried. They can be served raw or cooked and may be whole, cut-up, or mashed. Make half your plate fruits and vegetables.     Grains: All foods made from grains are part of the Grains Group. These include wheat, rice, oats, cornmeal, and barley such as bread, pasta, oatmeal, cereal, tortillas, and grits. Grains should be no more than a quarter of your plate. At least half of your grains should be whole grains.    Protein: This group includes meat, poultry, seafood, beans and peas, eggs, processed soy products (like tofu), nuts (including nut butters), and seeds. Make protein choices no more than a quarter of your plate. Meat and poultry choices should be lean or low fat.    Dairy: All fluid milk products and foods made from milk  that contain calcium, like yogurt and cheese are part of the Dairy Group. (Foods that have little calcium, such as cream, butter, and cream cheese, are not part of the group.) Most dairy choices should be low-fat or fat-free.    Oils: These are fats that are liquid at room temperature. They include canola, corn, olive, soybean, and sunflower oil. Foods that are mainly oil include mayonnaise, certain salad dressings, and soft margarines. You should have only 5 to 7 teaspoons of oils a day. You probably already get this much from the food you eat.  Use [employer] to Help Build Your Meals  The [employer] can help you plan and track your meals and activity. You can look up individual foods to see or compare their nutritional value. You can get guidelines for what and how much you should eat. You can compare your food choices. And you can assess personal physical activities and see ways you can improve. Go to www.[employer].gov/Crowdtapcker/.    8320-7048 [employer]. [address] [place] [address]. All rights reserved. This information is not intended as a substitute for professional medical care. Always follow your healthcare professional's instructions.           Patient Education   Urinary Incontinence, Female (Adult)  Urinary incontinence means loss of control of the bladder. This problem affects many women, especially as they get older. If you have incontinence, you may be embarrassed to ask for help. But know that this problem can be treated.  Types of Incontinence  There are different types of incontinence. Two of the main types are described here. You can have more than one type.    Stress incontinence. With this type, urine leaks when pressure (stress) is put on the bladder. This may happen when you cough, sneeze, or laugh. Stress incontinence most often occurs because the pelvic floor muscles that support the bladder and urethra are weak. This can happen after pregnancy and  vaginal childbirth or a hysterectomy. It can also be due to excess body weight or hormone changes.    Urge incontinence (also called overactive bladder). With this type, a sudden urge to urinate is felt often. This may happen even though there may not be much urine in the bladder. The need to urinate often during the night is common. Urge incontinence most often occurs because of bladder spasms. This may be due to bladder irritation or infection. Damage to bladder nerves or pelvic muscles, constipation, and certain medicines can also lead to urge incontinence.  Treatment of urinary incontinence depends on the cause. Further evaluation is needed to find the type you have. This will likely include an exam and certain tests. Based on the results, you and your healthcare provider can then plan treatment. Until a diagnosis is made, the home care tips below can help relieve symptoms.  Home care    Do pelvic floor muscle exercises, if they are prescribed. The pelvic floor muscles help support the bladder and urethra. Many women find that their symptoms improve when doing special exercises that strengthen these muscles. To do the exercises contract the muscles you would use to stop your stream of urine, but do this when youre not urinating. Hold for 10 seconds, then relax. Repeat 10 to 20 times in a row, at least 3 times a day. Your provider may give you other instructions for how to do the exercises and how often.    Keep a bladder diary. This helps track how often and how much you urinate over a set period of time. Bring this diary with you to your next visit with the provider. The information can help your provider learn more about your bladder problem.    Lose weight, if advised to by your provider. Excess weight puts pressure on the bladder. Your provider can help you create a weight-loss plan thats right for you. This may include exercising more and making certain diet changes.    Don't consume foods and drinks that  may irritate the bladder. These can include alcohol and caffeinated drinks.    Quit smoking. Smoking and other tobacco use can lead to chronic cough that strains the pelvic floor muscles. Smoking may also damage the bladder and urethra. Talk with your provider about treatments or methods you can use to quit smoking.    If drinking large amounts of fluid causes you to have symptoms, you may be advised to limit your fluid intake. You may also be advised to drink most of your fluids during the day and to limit fluids at night.    If youre worried about urine leakage or accidents, you may wear absorbent pads to catch urine. Change the pads often. This helps reduce discomfort. It may also reduce the risk of skin or bladder infections.  Follow-up care  Follow up with your healthcare provider, or as directed. It may take some to find the right treatment for your problem. Your treatment plan may include special therapies or medicines. Certain procedures or surgery may also be options. Be sure to discuss any questions you have with your provider.  When to seek medical advice  Call the healthcare provider right away if any of these occur:    Fever of 100.4 F (38 C) or higher, or as directed by your provider    Bladder pain or fullness    Abdominal swelling    Nausea or vomiting    Back pain    Weakness, dizziness or fainting  Date Last Reviewed: 10/1/2017    2169-9830 [employer]. [address] [place] [address], [place] [address]. All rights reserved. This information is not intended as a substitute for professional medical care. Always follow your healthcare professional's instructions.       Advance Directive  Patients advance directive was discussed and I am comfortable with the patients wishes.  Patient Education   Personalized Prevention Plan  You are due for the preventive services outlined below.  Your care team is available to assist you in scheduling these services.  If you have already completed any of these  items, please share that information with your care team to update in your medical record.  Health Maintenance   Topic Date Due   ? DXA SCAN  07/27/2018   ? MEDICARE ANNUAL WELLNESS VISIT  06/07/2019   ? FALL RISK ASSESSMENT  06/07/2019   ? INFLUENZA VACCINE RULE BASED (1) 08/01/2019   ? ZOSTER VACCINES (3 of 3) 08/12/2019   ? MAMMOGRAM  09/19/2020   ? COLONOSCOPY  10/12/2021   ? ADVANCE CARE PLANNING  06/07/2023   ? TD 18+ HE  06/07/2028   ? HEPATITIS C SCREENING  Completed   ? PNEUMOCOCCAL POLYSACCHARIDE VACCINE AGE 65 AND OVER  Completed   ? PNEUMOCOCCAL CONJUGATE VACCINE FOR ADULTS (PCV13 OR PREVNAR)  Completed

## 2021-06-17 NOTE — PROGRESS NOTES
ASSESSMENT AND PLAN:   Claudia Wise 70 y.o. female is here for follow-up. She has rheumatoid arthritis, seronegative, osteoarthritis both peripheral and axial, chronic impairment. She has been off methotrexate and like to see how she does without it. She is on hydroxychloroquine. She does not seem to be experiencing a flareup right now. She is aware that this still early days. She is aware not to take nonsteroidals in view of the renal impairment. In the past she has had corticosteroid injections in her knees with the provider with durable relief. She will stay on hydroxychloroquine reminded of eye examination, follow-up in 3 months.      Diagnoses and all orders for this visit:    Seronegative rheumatoid arthritis of multiple sites (H)  -     hydrOXYchloroQUINE (PLAQUENIL) 200 mg tablet; Take 1 tablet (200 mg total) by mouth 2 (two) times a day.  Dispense: 180 tablet; Refill: 0    Bilateral primary osteoarthritis of knee    High risk medication use    Stage 3b chronic kidney disease        HISTORY OF PRESENTING ILLNESS:  Claudia Wise 70 y.o. is here for followup of seronegative Rheumatoid Arthritis, osteoarthritis with his various manifestations, renal impairment and osteoarthritis.  She WAS on low-dose methotrexate and hydroxychloroquine and doing good. She decided to hold the methotrexate that she has been on for the past 3 weeks, in part because she has been on it for such a long time and she is concerned about potential side effects. Fortunately so far she has done well from a RA perspective. She has had more problem in her lower back. She was seen in spine clinic. An exploratory injection with lidocaine provided her 6 hours of relief and they are considering the corticosteroid injection or nerve ablation. She is going to continue to follow-up with. In the past knees have troubled her quite a bit but this time knees are doing quite good and does not seem like she needs corticosteroid injections.  Reminded of eye examination, she is headed to them on ..  Overall disease activity:  stable. Limitation on activities as noted in the MDHAQ scanned in the EMR.  Further historical information, including ROS as noted in the multidimensional health assessment questionnaire scanned in the EMR and in the assessment and plan section.  ALLERGIES:Bupropion hcl, Erythromycin base, Tetracyclines, and Valacyclovir    PAST MEDICAL/ACTIVE PROBLEMS/MEDICATION/SOCIAL DATA  Past Medical History:   Diagnosis Date     Anemia      Anxiety      Chronic kidney disease      Diabetes mellitus, type II (H)     prediabetes     Disease of thyroid gland     hypothyroid     Family history of myocardial infarction      Gout      High cholesterol      Hypertension      Inverted nipple     right     Macular edema      Osteoarthritis 2012     Rheumatoid arthritis (H)      Social History     Tobacco Use   Smoking Status Former Smoker     Quit date: 1995     Years since quittin.9   Smokeless Tobacco Never Used     Patient Active Problem List   Diagnosis     Herpes Simplex Type II     Anxiety     Anemia in chronic kidney disease     Hyperlipemia     Gout     Hypertension     Prediabetes     Endometriosis     Chronic kidney disease     Subclinical Hypothyroidism     Seronegative rheumatoid arthritis of multiple sites (H)     High risk medication use     Bilateral primary osteoarthritis of knee     Paroxysmal atrial flutter (H)     Moderate malnutrition (H)     Small bowel obstruction (H)     Senile osteoporosis     Compression fracture of L2 vertebra with routine healing     Chronic kidney disease, stage 3     Current Outpatient Medications   Medication Sig Dispense Refill     acetaminophen (TYLENOL) 500 MG tablet Take 500 mg by mouth every 6 (six) hours as needed for pain.       acyclovir (ZOVIRAX) 400 MG tablet Take 1 tablet (400 mg total) by mouth every 8 (eight) hours as needed. 60 tablet 1     allopurinoL (ZYLOPRIM) 100 MG  tablet Take 1 tablet (100 mg total) by mouth 2 (two) times a day with meals. 180 tablet 3     amLODIPine (NORVASC) 10 MG tablet Take 1 tablet (10 mg total) by mouth daily. 90 tablet 3     atorvastatin (LIPITOR) 10 MG tablet Take 1 tablet (10 mg total) by mouth at bedtime. 90 tablet 3     carboxymethylcellulose (REFRESH PLUS) 0.5 % Dpet ophthalmic dropperette Administer 1-2 drops to both eyes 4 (four) times a day as needed.        cyanocobalamin, vitamin B-12, 1,000 mcg Subl Place 1 tablet (1,000 mcg total) under the tongue daily.  0     cyanocobalamin/folic acid (VITAMIN B12-FOLIC ACID) 1,000-400 mcg Lozg Take 1 tablet by mouth.       fluticasone propionate (FLONASE) 50 mcg/actuation nasal spray Use 1 spray(s) in each nostril once daily 48 g 3     folic acid (FOLVITE) 1 MG tablet Take 1 tablet (1 mg total) by mouth daily. 30 tablet 11     HYDROcodone-acetaminophen 5-325 mg per tablet Take 1 tablet by mouth every 8 (eight) hours as needed for pain. 18 tablet 0     hydrOXYchloroQUINE (PLAQUENIL) 200 mg tablet Take 1 tablet (200 mg total) by mouth 2 (two) times a day. 180 tablet 0     levothyroxine (SYNTHROID, LEVOTHROID) 50 MCG tablet TAKE 1 TABLET BY MOUTH ONCE DAILY IN THE MORNING 90 tablet 3     LORazepam (ATIVAN) 0.5 MG tablet TAKE 1 TABLET BY MOUTH EVERY 8 HOURS AS NEEDED FOR ANXIETY 30 tablet 0     losartan (COZAAR) 50 MG tablet Take 1 tablet (50 mg total) by mouth daily. 90 tablet 1     melatonin 3 mg Tab tablet Take 1 tablet (3 mg total) by mouth at bedtime as needed.  0     metoprolol tartrate (LOPRESSOR) 25 MG tablet Take 1 tablet by mouth twice daily 180 tablet 1     RESTASIS 0.05 % ophthalmic emulsion INSTILL 1 DROP INTO EACH EYE TWICE DAILY  11     Current Facility-Administered Medications   Medication Dose Route Frequency Provider Last Rate Last Admin     denosumab 60 mg (PROLIA 60 mg/ml)  60 mg Subcutaneous Q6 Months Isabel Maurer MD   60 mg at 12/17/20 1147     DETAILED EXAMINATION  05/25/21   :  Vitals:    05/25/21 1107   BP: 130/70   Pulse: 78   Weight: 170 lb 11.2 oz (77.4 kg)     Alert oriented. Head including the face is examined for malar rash, heliotropes, scarring, lupus pernio. Eyes examined for redness such as in episcleritis/scleritis, periorbital lesions.   Neck/ Face examined for parotid gland swelling, range of motion of neck.  Left upper and lower and right upper and lower extremities examined for tenderness, swelling, warmth of the appendicular joints, range of motion, edema, rash.  Some of the important findings included: No synovitis and palpable joints of upper extremities.  [place] nodes, JLT of the knees.   LAB / IMAGING DATA:  ALT   Date Value Ref Range Status   05/11/2021 25 0 - 45 U/L Final   01/13/2021 20 0 - 45 U/L Final   11/17/2020 15 0 - 45 U/L Final     Albumin   Date Value Ref Range Status   05/11/2021 4.3 3.5 - 5.0 g/dL Final   01/13/2021 4.2 3.5 - 5.0 g/dL Final   11/17/2020 4.4 3.5 - 5.0 g/dL Final     Creatinine   Date Value Ref Range Status   05/11/2021 1.36 (H) 0.60 - 1.10 mg/dL Final   01/13/2021 1.41 (H) 0.60 - 1.10 mg/dL Final   11/17/2020 1.34 (H) 0.60 - 1.10 mg/dL Final       WBC   Date Value Ref Range Status   05/11/2021 8.3 4.0 - 11.0 thou/uL Final   01/13/2021 6.5 4.0 - 11.0 thou/uL Final   08/31/2015 4.7 4.0 - 11.0 thou/uL Final   07/01/2015 5.5 4.0 - 11.0 thou/uL Final     Hemoglobin   Date Value Ref Range Status   05/11/2021 10.9 (L) 12.0 - 16.0 g/dL Final   01/13/2021 10.4 (L) 12.0 - 16.0 g/dL Final   11/17/2020 10.7 (L) 12.0 - 16.0 g/dL Final     Platelets   Date Value Ref Range Status   05/11/2021 163 140 - 440 thou/uL Final   01/13/2021 177 140 - 440 thou/uL Final   11/17/2020 179 140 - 440 thou/uL Final       Lab Results   Component Value Date    RF <15.0 11/23/2015    SEDRATE 16 10/08/2015

## 2021-06-17 NOTE — PATIENT INSTRUCTIONS - HE
Please complete your pain diary and return the diary to the [facility] at your earliest convenience.  The [facility] will contact you to schedule your next appointment after your pain diary is reviewed by your doctor.  Thank you.    DISCHARGE INSTRUCTIONS    During office hours (8:00 a.m.- 4:00 p.m.) questions or concerns may be answered  by calling [facility] Center Navigation Nurses at  [phone].  Messages received after hours will be returned the following business day.      In the case of an emergency, please dial 911 or seek assistance at the nearest Emergency Room/Urgent Care facility.     All Patients:  ? You may experience an increase in your symptoms for the first 2 days, once the numbing medication wears off.    ? You may resume your regular medication, no pain medication until you have completed your diary    ? You may shower. No swimming, tub bath or hot tub for 24 hours; remove bandage after 4 hours    ? Continue your activities that can cause you pain to test the blocks.                         ? You should not drive for the next 3 to 5 hours (have someone drive you)           POSSIBLE PROCEDURE SIDE EFFECTS  -Call Spine Center if concerned-    Increased Pain  Increased numbness/tingling     Nausea/Vomiting  Bruising/bleeding at site (hematoma)             Swelling at site (edema) Headache  Difficulty walking  Infection        Fever greater than 100.5

## 2021-06-18 ENCOUNTER — HOSPITAL ENCOUNTER (OUTPATIENT)
Dept: PHYSICAL MEDICINE AND REHAB | Facility: CLINIC | Age: 71
Discharge: HOME | End: 2021-06-18
Attending: NURSE PRACTITIONER
Payer: COMMERCIAL

## 2021-06-18 ENCOUNTER — AMBULATORY - HEALTHEAST (OUTPATIENT)
Dept: INTERNAL MEDICINE | Facility: CLINIC | Age: 71
End: 2021-06-18

## 2021-06-18 DIAGNOSIS — M54.50 CHRONIC LEFT-SIDED LOW BACK PAIN WITHOUT SCIATICA: ICD-10-CM

## 2021-06-18 DIAGNOSIS — N18.32 STAGE 3B CHRONIC KIDNEY DISEASE (H): ICD-10-CM

## 2021-06-18 DIAGNOSIS — G89.29 CHRONIC LEFT-SIDED LOW BACK PAIN WITHOUT SCIATICA: ICD-10-CM

## 2021-06-18 DIAGNOSIS — M81.0 SENILE OSTEOPOROSIS: ICD-10-CM

## 2021-06-18 DIAGNOSIS — M47.816 LUMBAR FACET ARTHROPATHY: ICD-10-CM

## 2021-06-18 DIAGNOSIS — M43.16 SPONDYLOLISTHESIS OF LUMBAR REGION: ICD-10-CM

## 2021-06-18 NOTE — PROGRESS NOTES
Assessment and Plan:       1. Healthcare maintenance    - Mammo Screening Bilateral; Future  - Glycosylated Hemoglobin A1c; Future  - Comprehensive Metabolic Panel; Future  - Lipid Profile; Future  - HM2(CBC w/o Differential); Future  - Vitamin D, Total (25-Hydroxy); Future  - Urinalysis; Future  - Thyroid Stimulating Hormone (TSH); Future    2. Stage 3 chronic kidney disease    - Glycosylated Hemoglobin A1c; Future  - Comprehensive Metabolic Panel; Future  - Lipid Profile; Future  - HM2(CBC w/o Differential); Future  - Vitamin D, Total (25-Hydroxy); Future  - Urinalysis; Future  - Thyroid Stimulating Hormone (TSH); Future    3. Anemia in chronic kidney disease    - Glycosylated Hemoglobin A1c; Future  - Comprehensive Metabolic Panel; Future  - Lipid Profile; Future  - HM2(CBC w/o Differential); Future  - Vitamin D, Total (25-Hydroxy); Future  - Urinalysis; Future  - Thyroid Stimulating Hormone (TSH); Future    4. Hypertension    - Glycosylated Hemoglobin A1c; Future  - Comprehensive Metabolic Panel; Future  - Lipid Profile; Future  - HM2(CBC w/o Differential); Future  - Vitamin D, Total (25-Hydroxy); Future  - Urinalysis; Future  - Thyroid Stimulating Hormone (TSH); Future    5. Hyperlipemia    - Glycosylated Hemoglobin A1c; Future  - Comprehensive Metabolic Panel; Future  - Lipid Profile; Future  - HM2(CBC w/o Differential); Future  - Vitamin D, Total (25-Hydroxy); Future  - Urinalysis; Future  - Thyroid Stimulating Hormone (TSH); Future    6. Prediabetes    - Glycosylated Hemoglobin A1c; Future  - Comprehensive Metabolic Panel; Future  - Lipid Profile; Future  - HM2(CBC w/o Differential); Future  - Vitamin D, Total (25-Hydroxy); Future  - Urinalysis; Future  - Thyroid Stimulating Hormone (TSH); Future    7. Seronegative rheumatoid arthritis of multiple sites    - Glycosylated Hemoglobin A1c; Future  - Comprehensive Metabolic Panel; Future  - Lipid Profile; Future  - HM2(CBC w/o Differential); Future  - Vitamin  D, Total (25-Hydroxy); Future  - Urinalysis; Future  - Thyroid Stimulating Hormone (TSH); Future    8. Hypothyroidism    - Glycosylated Hemoglobin A1c; Future  - Comprehensive Metabolic Panel; Future  - Lipid Profile; Future  - HM2(CBC w/o Differential); Future  - Vitamin D, Total (25-Hydroxy); Future  - Urinalysis; Future  - Thyroid Stimulating Hormone (TSH); Future    9. Routine general medical examination at a health care facility       The patient's current medical problems were reviewed.    I have had an Advance Directives discussion with the patient.  The following health maintenance schedule was reviewed with the patient and provided in printed form in the after visit summary:   Health Maintenance   Topic Date Due     TD 18+ HE  12/08/2018     DXA SCAN  07/27/2018     FALL RISK ASSESSMENT  10/04/2018     MAMMOGRAM  12/06/2018     ADVANCE DIRECTIVES DISCUSSED WITH PATIENT  07/01/2021     COLONOSCOPY  10/12/2021     PNEUMOCOCCAL POLYSACCHARIDE VACCINE AGE 65 AND OVER  Completed     INFLUENZA VACCINE RULE BASED  Completed     PNEUMOCOCCAL CONJUGATE VACCINE FOR ADULTS (PCV13 OR PREVNAR)  Completed     ZOSTER VACCINE  Completed        Subjective:   Chief Complaint: Claudia Wise is an 67 y.o. female here for an Annual Wellness visit.   HPI:  Chronic medical problems reviewed. No acute issues.        Review of Systems:    Please see above.  The rest of the review of systems are negative for all systems.    Patient Care Team:  Isabel Maurer MD as PCP - General     Patient Active Problem List   Diagnosis     Herpes Simplex Type II     Anxiety     Anemia in chronic kidney disease     Hyperlipemia     Gout     Hypertension     Prediabetes     Endometriosis     Chronic kidney disease     Subclinical Hypothyroidism     Seronegative rheumatoid arthritis of multiple sites     High risk medication use     Past Medical History:   Diagnosis Date     Anemia      Anxiety      Chronic kidney disease      Diabetes mellitus,  type II     prediabetes     Disease of thyroid gland     hypothyroid     Family history of myocardial infarction      Gout      High cholesterol      Hypertension      Inverted nipple     right     Osteoarthritis 2012     Rheumatoid arthritis 2012      Past Surgical History:   Procedure Laterality Date     COLON SURGERY  1986     COLPORRHAPHY N/A 7/7/2016    Procedure: ANTERIOR REPAIR WITH MESH;  Surgeon: Zac Stone MD;  Location: [facility] Main OR;  Service:      HYSTERECTOMY       OOPHORECTOMY        Family History   Problem Relation Age of Onset     Breast cancer Maternal Aunt      Cancer Maternal Aunt      Cancer Father      Cancer Sister      Cancer Maternal Grandmother      Cancer Cousin      Heart attack Mother 49      Social History     Social History     Marital status:      Spouse name: N/A     Number of children: N/A     Years of education: N/A     Occupational History     Not on file.     Social History Main Topics     Smoking status: Former Smoker     Quit date: 7/7/1995     Smokeless tobacco: Never Used     Alcohol use Yes      Comment: occasional     Drug use: No     Sexual activity: Not on file     Other Topics Concern     Not on file     Social History Narrative      Current Outpatient Prescriptions   Medication Sig Dispense Refill     acetaminophen (TYLENOL) 500 MG tablet Take 500 mg by mouth every 6 (six) hours as needed for pain.       acyclovir (ZOVIRAX) 400 MG tablet Take 1 tablet (400 mg total) by mouth every 8 (eight) hours as needed. 60 tablet 1     allopurinol (ZYLOPRIM) 100 MG tablet TAKE ONE TABLET (100 MG TOTAL) BY MOUTH 3 (THREE) TIMES DAILY WITH MEALS 270 tablet 3     atorvastatin (LIPITOR) 10 MG tablet Take 1 tablet (10 mg total) by mouth at bedtime. 90 tablet 3     cholecalciferol, vitamin D3, 1,000 unit tablet Take 2,000 Units by mouth every evening.        fluticasone (FLONASE) 50 mcg/actuation nasal spray 1 spray into each nostril daily. 16 g 2      "hydroxychloroquine (PLAQUENIL) 200 mg tablet TAKE ONE TABLET BY MOUTH TWICE DAILY WITH FOOD 180 tablet 0     Lactobacillus rhamnosus GG (CULTURELLE) 10-15 Billion cell capsule Take 1 capsule by mouth daily.       levothyroxine (SYNTHROID, LEVOTHROID) 50 MCG tablet Take 1 tablet (50 mcg total) by mouth every morning. 90 tablet 2     LORazepam (ATIVAN) 0.5 MG tablet Take 1 tablet (0.5 mg total) by mouth every 8 (eight) hours as needed for anxiety. 30 tablet 0     losartan (COZAAR) 50 MG tablet TAKE ONE TABLET BY MOUTH ONCE DAILY 90 tablet 0     methotrexate 2.5 MG tablet TAKE 4 TABLETS ONE TIME WEEKLY 48 tablet 0     sodium bicarbonate 650 MG tablet Take 1,296 mg by mouth 2 (two) times a day. (2 tabs twice daily)       folic acid (FOLVITE) 1 MG tablet Take 1 tablet (1 mg total) by mouth daily. 90 tablet 3     No current facility-administered medications for this visit.       Objective:   Vital Signs:   Visit Vitals     /78     Pulse 76     Temp 97.6  F (36.4  C) (Oral)     Resp 16     Ht 5' 4.5\" (1.638 m)     Wt 161 lb 3 oz (73.1 kg)     BMI 27.24 kg/m2        VisionScreening:  No exam data present     PHYSICAL EXAM  General Appearance: Alert, cooperative, no distress, appears stated age.  Head: Normocephalic, without obvious abnormality, atraumatic  Eyes: PERRL, conjunctiva/corneas clear, EOM's intact  Ears: Normal TM's and external ear canals, both ears  Nose: Nares normal, septum midline,mucosa normal, no drainage  Throat: Lips, mucosa, and tongue normal; teeth and gums normal  Neck: Supple, symmetrical, trachea midline, no adenopathy;  thyroid: not enlarged, symmetric, no tenderness/mass/nodules; no carotid bruit or JVD  Back: Symmetric, no curvature, ROM normal, no CVA tenderness.  Lungs: Clear to auscultation bilaterally, respirations unlabored.  Breasts: No breast masses, tenderness, asymmetry, or nipple discharge.  Heart: Regular rate and rhythm, S1 and S2 normal, no murmur, rub, or gallop.  Abdomen: " Soft, non-tender, bowel sounds active all four quadrants,  no masses, no organomegaly.  Pelvic:declined, no palpable nodule in left groin that we imaged in Feb.  Extremities: Extremities normal, atraumatic, no cyanosis or edema.  Skin: Skin color, texture, turgor normal, no rashes or lesions.  Lymph nodes: Cervical, supraclavicular, and axillary nodes normal.  Neurologic: No focal neurological findings.      Assessment Results 6/7/2018   Activities of Daily Living No help needed   Instrumental Activities of Daily Living No help needed   Mini Cog Total Score 5   Some recent data might be hidden     A Mini-Cog score of 0-2 suggests the possibility of dementia, score of 3-5 suggests no dementia    Identified Health Risks:     The patient was counseled and encouraged to consider modifying their diet and eating habits. She was provided with information on recommended healthy diet options.  Patient's advanced directive was discussed and I am comfortable with the patient's wishes.

## 2021-06-19 NOTE — PROGRESS NOTES
ASSESSMENT AND PLAN:   Claudia Wise 67 y.o. female is here for follow-up.  This patient has seronegative rheumatoid arthritis, osteoarthritis, background of chronic renal impairment.  She is on modest dose of methotrexate.  She has tolerated that well.  Recent labs are stable she has chronic anemia more likely due to renal impairment and methotrexate or rheumatoid arthritis.  Her RA is close to remission.  Reminded of eye examination.  Just had cataract surgery.  Management principles of osteoarthritis reviewed.  Will meet here in 4 months labs every 2 months for    Diagnoses and all orders for this visit:    Seronegative rheumatoid arthritis of multiple sites (H)  -     methotrexate 2.5 MG tablet; TAKE 4 TABLETS ONE TIME WEEKLY  Dispense: 48 tablet; Refill: 0    High risk medication use    Stage 3 chronic kidney disease        HISTORY OF PRESENTING ILLNESS:  Claudia Wise 67 y.o. is here for followup of seronegative Rheumatoid Arthritis, osteoarthritis with his various manifestations, renal impairment and osteoarthritis.  She is on low-dose methotrexate and hydroxychloroquine and doing good.  She noted fatigue.  This typically troubles her more after she has been doing something more sternal S.  The other day she walked on the treadmill and then the day after she had more tightness in her legs similarly if she were to do various household chores the day after she feels more exhausted and weak especially in her lower half of the body.  There are no specific joint areas that hurt.  She noted overall pain level mild in multiple joint areas, 2.5/10 able to do most of her day-to-day activities without any or with some difficulty.  Morning stiffness no more than 15 minutes.  Recent labs are done and reviewed.   regularly every 6 months.  She finished a amral chi class today.  She has noted pain in her knees bilaterally, this is worse with activity, she cannot take nonsteroidals in view of the renal impairment,  she does take acetaminophen off and on, this does interfere with some of the day-to-day activities as noted.  This is gone on for the past several months.  There is no associated swelling.  When she leans it especially hard for her to get out.  No stiffness there is about 30 minutes in the morning.  Her other joints are doing much better.  She has noted no fever weight loss eye redness mouth also nausea no rash.  Overall disease activity:  stable. Limitation on activities as noted in the MDHAQ scanned in the EMR.  Further historical information, including ROS as noted in the multidimensional health assessment questionnaire scanned in the EMR and in the assessment and plan section.  ALLERGIES:Bupropion hcl; Erythromycin base; Tetracyclines; and Valacyclovir    PAST MEDICAL/ACTIVE PROBLEMS/MEDICATION/SOCIAL DATA  Past Medical History:   Diagnosis Date     Anemia      Anxiety      Chronic kidney disease      Diabetes mellitus, type II (H)     prediabetes     Disease of thyroid gland     hypothyroid     Family history of myocardial infarction      Gout      High cholesterol      Hypertension      Inverted nipple     right     Osteoarthritis 2012     Rheumatoid arthritis (H) 2012     History   Smoking Status     Former Smoker     Quit date: 7/7/1995   Smokeless Tobacco     Never Used     Patient Active Problem List   Diagnosis     Herpes Simplex Type II     Anxiety     Anemia in chronic kidney disease     Hyperlipemia     Gout     Hypertension     Prediabetes     Endometriosis     Chronic kidney disease     Subclinical Hypothyroidism     Seronegative rheumatoid arthritis of multiple sites (H)     High risk medication use     Current Outpatient Prescriptions   Medication Sig Dispense Refill     acetaminophen (TYLENOL) 500 MG tablet Take 500 mg by mouth every 6 (six) hours as needed for pain.       acyclovir (ZOVIRAX) 400 MG tablet Take 1 tablet (400 mg total) by mouth every 8 (eight) hours as needed. 60 tablet 1      allopurinol (ZYLOPRIM) 100 MG tablet TAKE ONE TABLET (100 MG TOTAL) BY MOUTH 3 (THREE) TIMES DAILY WITH MEALS 270 tablet 3     atorvastatin (LIPITOR) 10 MG tablet TAKE ONE TABLET BY MOUTH ONCE DAILY AT BEDTIME 90 tablet 3     cholecalciferol, vitamin D3, 1,000 unit tablet Take 2,000 Units by mouth every evening.        fluticasone (FLONASE) 50 mcg/actuation nasal spray 1 spray into each nostril daily. 16 g 2     folic acid (FOLVITE) 1 MG tablet TAKE ONE TABLET BY MOUTH ONCE DAILY 90 tablet 3     hydroxychloroquine (PLAQUENIL) 200 mg tablet TAKE ONE TABLET BY MOUTH TWICE DAILY WITH FOOD 180 tablet 0     Lactobacillus rhamnosus GG (CULTURELLE) 10-15 Billion cell capsule Take 1 capsule by mouth daily.       levothyroxine (SYNTHROID, LEVOTHROID) 50 MCG tablet Take 1 tablet (50 mcg total) by mouth every morning. 90 tablet 2     LORazepam (ATIVAN) 0.5 MG tablet TAKE ONE TABLET BY MOUTH EVERY 8 HOURS AS NEEDED FOR ANXIETY 30 tablet 0     losartan (COZAAR) 50 MG tablet Take 1 tablet (50 mg total) by mouth daily. 90 tablet 0     methotrexate 2.5 MG tablet TAKE 4 TABLETS ONE TIME WEEKLY 48 tablet 0     sodium bicarbonate 650 MG tablet Take 1,296 mg by mouth 2 (two) times a day. (2 tabs twice daily)       UNABLE TO FIND Tumeric       No current facility-administered medications for this visit.      DETAILED EXAMINATION  07/30/18  :  Vitals:    07/30/18 1243   BP: 114/62   Patient Site: Right Arm   Patient Position: Sitting   Cuff Size: Adult Regular   Pulse: 72   Weight: 160 lb (72.6 kg)     Alert oriented. Head including the face is examined for malar rash, heliotropes, scarring, lupus pernio. Eyes examined for redness such as in episcleritis/scleritis, periorbital lesions.   Neck/ Face examined for parotid gland swelling, range of motion of neck.  Left upper and lower and right upper and lower extremities examined for tenderness, swelling, warmth of the appendicular joints, range of motion, edema, rash.  Some of the  important findings included: She does not have synovitis in any of the palpable appendical joints of the upper extremities.  There is no joint line tenderness of the knees.  She has full range of motion of abduction at the shoulder joints.   LAB / IMAGING DATA:  ALT   Date Value Ref Range Status   07/26/2018 23 0 - 45 U/L Final   06/11/2018 22 0 - 45 U/L Final   05/02/2018 21 0 - 45 U/L Final     Albumin   Date Value Ref Range Status   07/26/2018 3.9 3.5 - 5.0 g/dL Final   06/11/2018 3.9 3.5 - 5.0 g/dL Final   05/02/2018 3.6 3.5 - 5.0 g/dL Final     Creatinine   Date Value Ref Range Status   07/26/2018 1.63 (H) 0.60 - 1.10 mg/dL Final   06/11/2018 1.42 (H) 0.60 - 1.10 mg/dL Final   05/02/2018 1.39 (H) 0.60 - 1.10 mg/dL Final       WBC   Date Value Ref Range Status   07/26/2018 4.9 4.0 - 11.0 thou/uL Final   06/11/2018 4.3 4.0 - 11.0 thou/uL Final   08/31/2015 4.7 4.0 - 11.0 thou/uL Final   07/01/2015 5.5 4.0 - 11.0 thou/uL Final     Hemoglobin   Date Value Ref Range Status   07/26/2018 9.4 (L) 12.0 - 16.0 g/dL Final   06/11/2018 9.7 (L) 12.0 - 16.0 g/dL Final   05/02/2018 9.8 (L) 12.0 - 16.0 g/dL Final     Platelets   Date Value Ref Range Status   07/26/2018 173 140 - 440 thou/uL Final   06/11/2018 167 140 - 440 thou/uL Final   05/02/2018 135 (L) 140 - 440 thou/uL Final       Lab Results   Component Value Date    RF <15.0 11/23/2015    SEDRATE 16 10/08/2015

## 2021-06-21 NOTE — PROGRESS NOTES
ASSESSMENT AND PLAN:   Claudia Wise 68 y.o. female is here for follow-up of seronegative rheumatoid arthritis, osteoarthritis, and she is therefore on reduced dose of methotrexate, hydroxychloroquine.  She appears to be doing well with rheumatoid arthritis.  She has had cataract surgery had eyes examined.  Recent labs are stable showing stable she is to continue the current regimen.  She is aware that she cannot take ibuprofen or Aleve of the nonsteroidals.  Return for follow-up this time in 6 months with labs every 2 months.    Diagnoses and all orders for this visit:    Seronegative rheumatoid arthritis of multiple sites (H)  -     hydroxychloroquine (PLAQUENIL) 200 mg tablet; TAKE 1 TABLET TWICE DAILY WITH FOOD.  Dispense: 180 tablet; Refill: 0  -     methotrexate 2.5 MG tablet; TAKE 4 TABLETS ONE TIME WEEKLY.  Dispense: 48 tablet; Refill: 0    Stage 3 chronic kidney disease (H)    High risk medication use        HISTORY OF PRESENTING ILLNESS:  Claudia Wise 68 y.o. is here for followup of seronegative Rheumatoid Arthritis, osteoarthritis with his various manifestations, renal impairment and osteoarthritis.  She is on low-dose methotrexate and hydroxychloroquine and doing good.  She has noted discomfort what she calls shoulder in fact points to the trapezius area on the left side, noted this to be moderately severe, other joint areas are mildly painful.  Overall pain level noted to be 3.0/10.  Difficulty performing some of the day-to-day activities.  Neck and back moderately severely painful no radiation down the arm.  Her morning stiffness is limited to 15 minutes only.  He does not have fever weight loss he does not have eye redness mouth ulcer nausea or rash.  She has been to the chiropractor.  She feels that the pain has improved somewhat since.  Overall disease activity:  stable. Limitation on activities as noted in the MDHAQ scanned in the EMR.  Further historical information, including ROS as  noted in the multidimensional health assessment questionnaire scanned in the EMR and in the assessment and plan section.  ALLERGIES:Bupropion hcl; Erythromycin base; Tetracyclines; and Valacyclovir    PAST MEDICAL/ACTIVE PROBLEMS/MEDICATION/SOCIAL DATA  Past Medical History:   Diagnosis Date     Anemia      Anxiety      Chronic kidney disease      Diabetes mellitus, type II (H)     prediabetes     Disease of thyroid gland     hypothyroid     Family history of myocardial infarction      Gout      High cholesterol      Hypertension      Inverted nipple     right     Osteoarthritis 2012     Rheumatoid arthritis (H)      Social History     Tobacco Use   Smoking Status Former Smoker     Last attempt to quit: 1995     Years since quittin.4   Smokeless Tobacco Never Used     Patient Active Problem List   Diagnosis     Herpes Simplex Type II     Anxiety     Anemia in chronic kidney disease     Hyperlipemia     Gout     Hypertension     Prediabetes     Endometriosis     Chronic kidney disease     Subclinical Hypothyroidism     Seronegative rheumatoid arthritis of multiple sites (H)     High risk medication use     Current Outpatient Medications   Medication Sig Dispense Refill     acetaminophen (TYLENOL) 500 MG tablet Take 500 mg by mouth every 6 (six) hours as needed for pain.       acyclovir (ZOVIRAX) 400 MG tablet Take 1 tablet (400 mg total) by mouth every 8 (eight) hours as needed. 60 tablet 1     allopurinol (ZYLOPRIM) 100 MG tablet TAKE ONE TABLET (100 MG TOTAL) BY MOUTH 3 (THREE) TIMES DAILY WITH MEALS 270 tablet 3     atorvastatin (LIPITOR) 10 MG tablet TAKE ONE TABLET BY MOUTH ONCE DAILY AT BEDTIME 90 tablet 3     cholecalciferol, vitamin D3, 1,000 unit tablet Take 2,000 Units by mouth every evening.        fluticasone (FLONASE) 50 mcg/actuation nasal spray 1 spray into each nostril daily. 16 g 2     folic acid (FOLVITE) 1 MG tablet TAKE ONE TABLET BY MOUTH ONCE DAILY 90 tablet 3     hydroxychloroquine  (PLAQUENIL) 200 mg tablet TAKE 1 TABLET TWICE DAILY WITH FOOD 180 tablet 0     Lactobacillus rhamnosus GG (CULTURELLE) 10-15 Billion cell capsule Take 1 capsule by mouth daily.       levothyroxine (SYNTHROID, LEVOTHROID) 50 MCG tablet Take 1 tablet (50 mcg total) by mouth every morning. 90 tablet 1     LORazepam (ATIVAN) 0.5 MG tablet TAKE ONE TABLET BY MOUTH EVERY 8 HOURS AS NEEDED FOR ANXIETY 30 tablet 0     losartan (COZAAR) 50 MG tablet TAKE 1 TABLET BY MOUTH ONCE DAILY 90 tablet 2     methotrexate 2.5 MG tablet TAKE 4 TABLETS ONE TIME WEEKLY 48 tablet 0     sodium bicarbonate 650 MG tablet Take 1,296 mg by mouth 2 (two) times a day. (2 tabs twice daily)       UNABLE TO FIND Tumeric       No current facility-administered medications for this visit.      DETAILED EXAMINATION  11/26/18  :  Vitals:    11/26/18 1217   BP: 130/60   Patient Site: Right Arm   Patient Position: Sitting   Cuff Size: Adult Large   Pulse: 88   Weight: 163 lb (73.9 kg)     Alert oriented. Head including the face is examined for malar rash, heliotropes, scarring, lupus pernio. Eyes examined for redness such as in episcleritis/scleritis, periorbital lesions.   Neck/ Face examined for parotid gland swelling, range of motion of neck.  Left upper and lower and right upper and lower extremities examined for tenderness, swelling, warmth of the appendicular joints, range of motion, edema, rash.  Some of the important findings included: She does not have synovitis in any of the palpable joints of upper extremities.  She has full range of motion of the shoulder, no JLT effusion warmth of the of the knees on either side.  LAB / IMAGING DATA:  ALT   Date Value Ref Range Status   11/21/2018 20 0 - 45 U/L Final   09/24/2018 18 0 - 45 U/L Final   07/26/2018 23 0 - 45 U/L Final     Albumin   Date Value Ref Range Status   11/21/2018 3.9 3.5 - 5.0 g/dL Final   09/24/2018 3.9 3.5 - 5.0 g/dL Final   07/26/2018 3.9 3.5 - 5.0 g/dL Final     Creatinine   Date  Value Ref Range Status   11/21/2018 1.39 (H) 0.60 - 1.10 mg/dL Final   09/24/2018 1.34 (H) 0.60 - 1.10 mg/dL Final   07/26/2018 1.63 (H) 0.60 - 1.10 mg/dL Final       WBC   Date Value Ref Range Status   11/21/2018 4.9 4.0 - 11.0 thou/uL Final   09/24/2018 5.6 4.0 - 11.0 thou/uL Final   08/31/2015 4.7 4.0 - 11.0 thou/uL Final   07/01/2015 5.5 4.0 - 11.0 thou/uL Final     Hemoglobin   Date Value Ref Range Status   11/21/2018 10.2 (L) 12.0 - 16.0 g/dL Final   09/24/2018 9.6 (L) 12.0 - 16.0 g/dL Final   07/26/2018 9.4 (L) 12.0 - 16.0 g/dL Final     Platelets   Date Value Ref Range Status   11/21/2018 138 (L) 140 - 440 thou/uL Final   09/24/2018 158 140 - 440 thou/uL Final   07/26/2018 173 140 - 440 thou/uL Final       Lab Results   Component Value Date    RF <15.0 11/23/2015    SEDRATE 16 10/08/2015

## 2021-06-23 NOTE — TELEPHONE ENCOUNTER
Refill Approved    Rx renewed per Medication Renewal Policy. Medication was last renewed on 1/18/18.    Miracle Soler, [facility] Triage/Med Refill 2/5/2019     Requested Prescriptions   Pending Prescriptions Disp Refills     allopurinol (ZYLOPRIM) 100 MG tablet [Pharmacy Med Name: ALLOPURINOL 100MG TAB] 270 tablet 3     Sig: TAKE ONE TABLET BY MOUTH THREE TIMES DAILY WITH MEALS    Allopurinol/Febuxostat Refill Protocol  Passed - 2/3/2019 12:10 PM       Passed - LFT or AST or ALT in last 12 months    Albumin   Date Value Ref Range Status   01/24/2019 4.0 3.5 - 5.0 g/dL Final     Bilirubin, Total   Date Value Ref Range Status   06/11/2018 0.4 0.0 - 1.0 mg/dL Final     Alkaline Phosphatase   Date Value Ref Range Status   06/11/2018 80 45 - 120 U/L Final     AST   Date Value Ref Range Status   06/11/2018 28 0 - 40 U/L Final     ALT   Date Value Ref Range Status   01/24/2019 24 0 - 45 U/L Final     Protein, Total   Date Value Ref Range Status   06/11/2018 6.5 6.0 - 8.0 g/dL Final               Passed - Visit with PCP or prescribing provider visit in past 12 months    Last office visit with prescriber/PCP: 1/18/2018 Isabel Maurer MD OR same dept: Visit date not found OR same specialty: 1/18/2018 Isabel Maurer MD  Last physical: 6/7/2018 Last MTM visit: Visit date not found   Next visit within 3 mo: Visit date not found  Next physical within 3 mo: Visit date not found  Prescriber OR PCP: Isabel Maurer MD  Last diagnosis associated with med order: 1. Gout  - allopurinol (ZYLOPRIM) 100 MG tablet [Pharmacy Med Name: ALLOPURINOL 100MG TAB]; TAKE ONE TABLET BY MOUTH THREE TIMES DAILY WITH MEALS  Dispense: 270 tablet; Refill: 3    If protocol passes may refill for 12 months if within 3 months of last provider visit (or a total of 15 months).

## 2021-06-24 NOTE — TELEPHONE ENCOUNTER
Controlled Substance Refill Request  Medication:   Requested Prescriptions     Pending Prescriptions Disp Refills     LORazepam (ATIVAN) 0.5 MG tablet [Pharmacy Med Name: LORAZEPAM 0.5MG     TAB] 30 tablet 0     Sig: TAKE 1 TABLET BY MOUTH EVERY 8 HOURS AS NEEDED FOR ANXIETY     Date Last Fill: 7/25/18  Pharmacy: [facility]   Submit electronically to pharmacy  Controlled Substance Agreement on File: 1/18/18  Encounter-Level CSA Scan Date - 01/18/2018:    Scan on 1/22/2018 10:49 AM (below)         Last office visit: 6/7/18 - Physical  Beatriz Mcdermott, RN, [facility] RN Triage/Med Refills    .

## 2021-06-24 NOTE — TELEPHONE ENCOUNTER
Refill Approved    Rx renewed per Medication Renewal Policy. Medication was last renewed on 1/18/18.    Last office visit 6/7/18    Ricky Mayer, [facility] Triage/Med Refill 3/12/2019     Requested Prescriptions   Pending Prescriptions Disp Refills     fluticasone (FLONASE) 50 mcg/actuation nasal spray [Pharmacy Med Name: FLUTICASONE 50MCG   SPR]  2     Sig: INHALE ONE SPRAY(S) IN EACH NOSTRIL ONCE DAILY    Nasal Steroid Refill Protocol Passed - 3/6/2019  3:03 PM       Passed - Patient has had office visit/physical in last 2 years    Last office visit with prescriber/PCP: 1/18/2018 OR same dept: Visit date not found OR same specialty: 1/18/2018 sIabel Maurer MD Last physical: 6/7/2018 Last MTM visit: Visit date not found    Next appt within 3 mo: Visit date not found  Next physical within 3 mo: Visit date not found  Prescriber OR PCP: Isabel Maurer MD  Last diagnosis associated with med order: There are no diagnoses linked to this encounter.   If protocol passes may refill for 12 months if within 3 months of last provider visit (or a total of 15 months).

## 2021-06-25 NOTE — TELEPHONE ENCOUNTER
Patient was called to discuss having a f/u after her DX Left L2 L3 L4 MBB's was done, with Dr. Gonzalez.  Loree Rivas would like her to come back and discuss options after her Pain Diary was returned & reviewed, either in-person or video visit would be acceptable.  Best phone # to reach pt is [phone].  She is in agreement with this plan and would like to be called back beginning of next week to schedule.  Msg forwarded to Scheduling to help with this.

## 2021-06-26 NOTE — PROGRESS NOTES
1. Senile osteoporosis     2. Seronegative rheumatoid arthritis of multiple sites (H)     3. Other specified hypothyroidism     4. Stage 3b chronic kidney disease     5. Compression fracture of L2 vertebra with routine healing       The following steps were completed to comply with the REMS program for Prolia:    Reviewed information in the Medication Guide and Patient Counseling Chart, including the serious risks of Prolia  and the symptoms of each risk.    Advised patient to seek prompt medical attention if they have signs or symptoms of any of the serious risks.    Provided each patient a copy of the Medication Guide and Patient Brochure    Return in about 6 months (around 12/10/2021) for Annual physical.    There are no Patient Instructions on file for this visit.    Chief Complaint   Patient presents with     Osteoporosis Follow Up     Osteo F/U       /62   Pulse 75   Wt 170 lb 14.4 oz (77.5 kg)   SpO2 100%   BMI 30.27 kg/m        Did you experience any problems with previous Prolia injection? no  Any medication change in the last 6 months? no  Did you take prednisone or other immunosupressant drugs in the last 6 months   (chemo, transplant, rheum, dermatology conditions)? no  Did you have any serious infection in the last 6 months?no  Any recent hospitalizations?no  Do you plan any dental work in the next 2-3 months?no  How much calcium do you take daily from the diet and supplements?1200 mg  How much vit D do you take daily? 2000 IU  Last DXA? 10/2020, Reviewed and discussed      Patient is here today for the 3rd Prolia injection. Patient tolerated previous injections well.   We discussed calcium and vit D daily needs today.   I reviewed the lab results:   Ref Range & Units 1/13/21 1102     Creatinine 0.60 - 1.10 mg/dL 1.41High      GFR MDRD Af Amer >60 mL/min/1.73m2 45Low      GFR MDRD Non Af Amer >60 mL/min/1.73m2 37Low       Component      Latest Ref Rng & Units 10/1/2020   Vitamin D, Total  (25-Hydroxy)      30.0 - 80.0 ng/mL 28.1 (L)     Other chronic medical problems discussed today:  1. HTN - noticed significant ankle swelling since amlodipine dose increased to 10 mg daily. We will increase metoprolol to 50 mg two times a day, continue losartan and decrease amlodipine to 5 mg daily.  2. Noticed different urine smell and frequency for few weeks - UA/UCX today.  3. Left arm tremor getting worse, diagnosed as essential tremor few years ago when saw Neurologist.   4. RA, on Plaquenil  5. Feeling more anxious then before, short tempered, would like to try medication - Rx for paxil sent.  6. CKD 3, stable, she needs refill for bicarbonate pills.  7. Low back pain - xrays done in April did not show any new fractures. She will continue management with Spine clinic.      Next Prolia injection will be in 6 months.     Patient was educated on safety of Prolia utilizing Patient Counseling Chart for Healthcare Providers, as outlined by the Prolia REMS progam.         This note has been dictated using voice recognition software. Any grammatical or context distortions are unintentional and inherent to the software      Patient Active Problem List   Diagnosis     Herpes Simplex Type II     Anxiety     Anemia in chronic kidney disease     Hyperlipemia     Gout     Hypertension     Prediabetes     Endometriosis     Other specified hypothyroidism     Seronegative rheumatoid arthritis of multiple sites (H)     High risk medication use     Bilateral primary osteoarthritis of knee     Small bowel obstruction (H)     Senile osteoporosis     Compression fracture of L2 vertebra with routine healing     Chronic kidney disease, stage 3       Current Outpatient Medications on File Prior to Visit   Medication Sig Dispense Refill     acetaminophen (TYLENOL) 500 MG tablet Take 500 mg by mouth every 6 (six) hours as needed for pain.       acyclovir (ZOVIRAX) 400 MG tablet Take 1 tablet (400 mg total) by mouth every 8 (eight) hours  as needed. 60 tablet 1     allopurinoL (ZYLOPRIM) 100 MG tablet Take 1 tablet (100 mg total) by mouth 2 (two) times a day with meals. 180 tablet 3     amLODIPine (NORVASC) 10 MG tablet Take 1 tablet (10 mg total) by mouth daily. 90 tablet 3     atorvastatin (LIPITOR) 10 MG tablet Take 1 tablet (10 mg total) by mouth at bedtime. 90 tablet 3     carboxymethylcellulose (REFRESH PLUS) 0.5 % Dpet ophthalmic dropperette Administer 1-2 drops to both eyes 4 (four) times a day as needed.        cyanocobalamin, vitamin B-12, 1,000 mcg Subl Place 1 tablet (1,000 mcg total) under the tongue daily.  0     fluticasone propionate (FLONASE) 50 mcg/actuation nasal spray Use 1 spray(s) in each nostril once daily 48 g 3     folic acid (FOLVITE) 1 MG tablet Take 1 tablet (1 mg total) by mouth daily. 30 tablet 11     HYDROcodone-acetaminophen 5-325 mg per tablet Take 1 tablet by mouth every 8 (eight) hours as needed for pain. 18 tablet 0     hydrOXYchloroQUINE (PLAQUENIL) 200 mg tablet Take 1 tablet (200 mg total) by mouth 2 (two) times a day. 180 tablet 0     levothyroxine (SYNTHROID, LEVOTHROID) 50 MCG tablet TAKE 1 TABLET BY MOUTH ONCE DAILY IN THE MORNING 90 tablet 3     LORazepam (ATIVAN) 0.5 MG tablet TAKE 1 TABLET BY MOUTH EVERY 8 HOURS AS NEEDED FOR ANXIETY 30 tablet 0     losartan (COZAAR) 50 MG tablet Take 1 tablet (50 mg total) by mouth daily. 90 tablet 1     melatonin 3 mg Tab tablet Take 1 tablet (3 mg total) by mouth at bedtime as needed.  0     metoprolol tartrate (LOPRESSOR) 25 MG tablet Take 1 tablet by mouth twice daily 180 tablet 1     RESTASIS 0.05 % ophthalmic emulsion INSTILL 1 DROP INTO EACH EYE TWICE DAILY  11     [DISCONTINUED] cyanocobalamin/folic acid (VITAMIN B12-FOLIC ACID) 1,000-400 mcg Lozg Take 1 tablet by mouth.       Current Facility-Administered Medications on File Prior to Visit   Medication Dose Route Frequency Provider Last Rate Last Admin     denosumab 60 mg (PROLIA 60 mg/ml)  60 mg Subcutaneous Q6  Months Isabel Maurer MD   60 mg at 12/17/20 1149

## 2021-06-26 NOTE — PATIENT INSTRUCTIONS - HE
~Please call our [facility] Nurse Navigation line [phone] with any questions or concerns about your treatment plan, if symptoms worsen and you would like to be seen urgently, or if you have problems controlling bladder and bowel function.      A left lumbar radiofrequency ablation injection has been ordered today to potentially help with your pain symptoms. These injections do not fix what is going on in your back, therefore they typically do not take away the pain completely, however they can many times help improve symptoms. Injections should always be completed along with other modalities such as physical therapy for the best long term outcomes. If injections alone are done, then pain will likely return.     [facility] Injection Requirements      A  is required for all fluoroscopically-guided injections.    Injection appointments may be cancelled if there are signs/symptoms of an active infection or if the patient is being actively treated with antibiotics for a diagnosed infection.    Patients may have their steroid injection cancelled if they have had another steroid injection within 2 weeks.    Diabetic patients will have their blood glucose levels checked the day of their injection and the appointment will be rescheduled if the blood glucose level is 300 or higher.    Patients with allergies to cortisone, local anesthetics, iodine, or contrast dye should contact the Spine Center to further discuss these considerations.    Patients scheduled for medial branch block diagnostic injections should refrain from taking pain medication the day of the procedure.  The medial branch block injection appointment will be rescheduled if the patient's pain rating is not 5/10 or greater at the time of the procedure.    Patients taking warfarin/Coumadin will have their INR checked the day of the procedure and the procedure may be rescheduled if the INR is greater than  3.0.    Please contact the Spine Center [phone]) if you are taking any prescription blood-thinning medications (warfarin, Plavix, Lovenox, Eliquis, Brilinta, Effient, etc.) as special dosing adjustments may need to be made depending on the type of injection you are scheduled to receive.    It is recommended that you delay having your steroid injection if you have received a flu shot or shingles vaccine within 2 weeks.

## 2021-06-26 NOTE — PATIENT INSTRUCTIONS - HE
*Return Pain Diary as soon as possible. We will review & get back to you with future plan of care.      DISCHARGE INSTRUCTIONS    During office hours (8:00 a.m.- 4:30 p.m.) questions or concerns may be answered  by calling  [phone] or spine navigation [phone]. If you experience any problems after hours  please call [phone] and you will be connected to [facility] Care Connection     All Patients:  ? You may experience an increase in your symptoms for the first 2 days, once the numbing medication wears off.    ? You may resume your regular medication, no pain medication until you have completed your diary    ? You may shower. No swimming, tub bath or hot tub for 24 hours; remove bandage after 4 hours    ? Continue your activities that can cause you pain to test the blocks.                         ? You should not drive for the next 3 to 5 hours (have someone drive you)           POSSIBLE PROCEDURE SIDE EFFECTS  -Call Spine Center if concerned-    Increased Pain  Increased numbness/tingling     Nausea/Vomiting  Bruising/bleeding at site (hematoma)             Swelling at site (edema) Headache  Difficulty walking  Infection        Fever greater than 100.5

## 2021-06-26 NOTE — PROGRESS NOTES
Claudia Wise is a 70 y.o. female who is being evaluated via a billable video visit.      How would you like to obtain your AVS? MyChart.        Video Start Time: 8:33 AM      Video-Visit Details    Type of service:  Video Visit    Video End Time (time video stopped): 8:48 AM  Originating Location (pt. Location): Home    [facility]     Platform used for Video Visit: [facility]    Assessment:     Diagnoses and all orders for this visit:    Chronic left-sided low back pain without sciatica  -     OPS RFA Lumbar Or Sacral Facet Joint Nerve Unilateral; Future; Expected date: 06/18/2021    Lumbar facet arthropathy  -     OPS RFA Lumbar Or Sacral Facet Joint Nerve Unilateral; Future; Expected date: 06/18/2021    Spondylolisthesis of lumbar region  -     OPS RFA Lumbar Or Sacral Facet Joint Nerve Unilateral; Future; Expected date: 06/18/2021       Claudia Wise is a 70 y.o. y.o. female with past medical history significant for anxiety, rheumatoid arthritis managed by Dr. Vinson, diabetes myelitis, hypertension, hyperlipidemia, chronic kidney disease, thyroid disorder, gout, osteopenia, recent small bowel obstruction with surgical intervention who presents today for follow-up regarding:    -Chronic left low back pain mid lumbar spine.  Patient did have positive response with diagnostic blocks 5/21/2021 and 6/4/2021. Patient felt that this was substantial relief and had significant improvement in mobility postinjection for the first 2 hours.  Patient denies radicular pain.    Plan:     A shared decision making plan was used. The patient's values and choices were respected. Prior medical records from 4/12/2021 current were reviewed today. The following represents what was discussed and decided upon by the provider and the patient.        -DIAGNOSTIC TESTS: Images were personally reviewed and interpreted.   --Lumbar spine x-ray 8/24/2020 shows stable L2  compression deformity.  --Lumbar spine x-ray 7/1/2020 shows stable L2 compression deformity, grade 1 L3-4 spondylolisthesis.  --Lumbar spine MRI 5/19/2020 with 20% acute/subacute compression fracture L2.  There is mild bilateral lateral recess stenosis L3-4 and L4-5.  Bilateral synovial cysts L4-5 with moderate facet arthropathy.    -INTERVENTIONS: Ordered left L2, L3, L4 radiofrequency ablation targeting the left L3-4 and L4-5 facet joint to see if we can get further relief of her chronic left low back pain.  She did have a positive response with 2 medial branch blocks and felt her pain was substantially improved and her mobility was substantially improved.    -MEDICATIONS: No changes in medications at this time.  Discussed side effects of medications and proper use. Patient verbalized understanding.    -PHYSICAL THERAPY: Encourage patient continue with home exercises from prior physical therapy sessions.  Discussed the importance of core strengthening, ROM, stretching exercises with the patient and how each of these entities is important in decreasing pain.  Explained to the patient that the purpose of physical therapy is to teach the patient a home exercise program.  These exercises need to be performed every day in order to decrease pain and prevent future occurrences of pain.        -PATIENT EDUCATION:  Total time of 28 minutes spent with the patient, reviewing the chart, placing orders, and documenting.   -Today we also discussed the issues related to the current COVID-19 pandemic, the pros and cons of the current treatment plan, the CDC guidelines such as social distancing, washing the hands, and covering the cough.    -FOLLOW UP: Follow-up with Dr. Gonzalez for injection then 4 weeks postinjection  Advised to contact clinic if symptoms worsen or change.    Subjective:     Claudia Wise is a 70 y.o. female who presents today for follow-up regarding left low back pain at the belt line and slightly above  that is been chronic and ongoing.  Currently her pain is a 6/10, and 8 at its worse with standing as well as first thing in the morning, a 3 at its best.  She does report that laying down in bed with her knees up gives her some benefit.  She denies radicular pain.  She is following up to discuss her medial branch blocks which she felt substantial relief in the first block, did have some pressure post injection with the second injection however felt that her typical back pain was dramatically improved over 80% for both blocks for the first couple of hours, however then her pain returned back to baseline.  She reports that during those first couple of hours she was able to do more activity with less pain and felt her function significantly improved.  She felt that this was meaningful relief for her.    Treatment to Date: No prior spinal surgery or spinal injection.  Physical therapy [facility] x7 sessions last 8/28/2024 compression fracture/spine pain.    Left L2, L3, L4 MBB 51/2/2021 with positive response.  Left L2, L3, L4 MBB 6/4/2021 with positive response.     Medications:  Plaquenil 4 seronegative rheumatoid arthritis  Tylenol  Hydrocodone managed by Dr. Erasto Guzmán 25 mg bedtime however patient only tried this medication for a couple of days with no benefit therefore she discontinued on her own.    Patient Active Problem List   Diagnosis     Herpes Simplex Type II     Anxiety     Anemia in chronic kidney disease     Hyperlipemia     Gout     Hypertension     Prediabetes     Endometriosis     Other specified hypothyroidism     Seronegative rheumatoid arthritis of multiple sites (H)     High risk medication use     Bilateral primary osteoarthritis of knee     Small bowel obstruction (H)     Senile osteoporosis     Compression fracture of L2 vertebra with routine healing     Chronic kidney disease, stage 3       Current Outpatient Medications on File Prior to Encounter   Medication Sig Dispense Refill      acetaminophen (TYLENOL) 500 MG tablet Take 500 mg by mouth every 6 (six) hours as needed for pain.       acyclovir (ZOVIRAX) 400 MG tablet Take 1 tablet (400 mg total) by mouth every 8 (eight) hours as needed. 60 tablet 1     allopurinoL (ZYLOPRIM) 100 MG tablet Take 1 tablet (100 mg total) by mouth 2 (two) times a day with meals. 180 tablet 3     amLODIPine (NORVASC) 10 MG tablet Take 1 tablet (10 mg total) by mouth daily. 90 tablet 3     atorvastatin (LIPITOR) 10 MG tablet Take 1 tablet (10 mg total) by mouth at bedtime. 90 tablet 3     carboxymethylcellulose (REFRESH PLUS) 0.5 % Dpet ophthalmic dropperette Administer 1-2 drops to both eyes 4 (four) times a day as needed.        cyanocobalamin, vitamin B-12, 1,000 mcg Subl Place 1 tablet (1,000 mcg total) under the tongue daily.  0     fluticasone propionate (FLONASE) 50 mcg/actuation nasal spray Use 1 spray(s) in each nostril once daily 48 g 3     folic acid (FOLVITE) 1 MG tablet Take 1 tablet (1 mg total) by mouth daily. 30 tablet 11     HYDROcodone-acetaminophen 5-325 mg per tablet Take 1 tablet by mouth every 8 (eight) hours as needed for pain. 18 tablet 0     hydrOXYchloroQUINE (PLAQUENIL) 200 mg tablet Take 1 tablet (200 mg total) by mouth 2 (two) times a day. 180 tablet 0     levothyroxine (SYNTHROID, LEVOTHROID) 50 MCG tablet TAKE 1 TABLET BY MOUTH ONCE DAILY IN THE MORNING 90 tablet 3     LORazepam (ATIVAN) 0.5 MG tablet TAKE 1 TABLET BY MOUTH EVERY 8 HOURS AS NEEDED FOR ANXIETY 30 tablet 0     losartan (COZAAR) 50 MG tablet Take 1 tablet (50 mg total) by mouth daily. 90 tablet 1     melatonin 3 mg Tab tablet Take 1 tablet (3 mg total) by mouth at bedtime as needed.  0     metoprolol tartrate (LOPRESSOR) 50 MG tablet Take 1 tablet (50 mg total) by mouth 2 (two) times a day. 180 tablet 3     PARoxetine (PAXIL) 10 MG tablet Take 1 tablet (10 mg total) by mouth daily. 30 tablet 2     RESTASIS 0.05 % ophthalmic emulsion INSTILL 1 DROP INTO EACH EYE TWICE  DAILY  11     sodium bicarbonate 650 MG tablet Take 2 tablets (1,300 mg total) by mouth 2 (two) times a day. 100 tablet 3     No current facility-administered medications on file prior to encounter.        Allergies   Allergen Reactions     Bupropion Hcl Nausea Only     Erythromycin Base Nausea Only     Tetracyclines Itching     Valacyclovir Nausea Only and Nausea And Vomiting       Past Medical History:   Diagnosis Date     Anemia      Anxiety      Chronic kidney disease      Diabetes mellitus, type II (H)     prediabetes     Disease of thyroid gland     hypothyroid     Family history of myocardial infarction      Gout      High cholesterol      Hypertension      Inverted nipple     right     Macular edema      Osteoarthritis 2012     Rheumatoid arthritis (H) 2012        Review of Systems  ROS:  Specifically negative for bowel/bladder dysfunction, balance changes, headache, dizziness, foot drop, fevers, chills, appetite changes, nausea/vomiting, unexplained weight loss. Otherwise 13 systems reviewed are negative. Please see the patient's intake questionnaire from today for details.    Reviewed Social, Family, Past Medical and Past Surgical history with patient, no significant changes noted since prior visit.     Objective:     VIRTUAL PHYSICAL EXAM:   --CONSTITUTIONAL: Well developed, well nourished, healthy appearing individual.  --PSYCHIATRIC: Appropriate mood and affect. No difficulty interacting due to temper, social withdrawal, or memory issues.  --SKIN: Lumbar region is visually dry and intact.   --RESPIRATORY: Normal rhythm and effort. No abnormal accessory muscle breathing patterns noted.   --STANDING EXAMINATION:  Normal lumbar lordosis noted, no lateral shift.  --MUSCULOSKELETAL: Lumbar spine inspection reveals no evidence of deformity. Range of motion is not limited in lumbar flexion, extension, lateral rotation.   --GROSS MOTOR: Easily arises from a seated position.   --LOWER EXTREMITY MOTOR TESTING:    Full and equal bilateral active range of motion with knee extension/flexion and ankle dorsiflexion/extension to anti-gravity.     RESULTS:   Imaging: Lumbar spine imaging was reviewed today. The images were shown to the patient and the findings were explained using a spine model.      Xr Lumbar Spine 2 Or 3 Vws  Result Date: 8/25/2020  INDICATION: Evaluate L2 compression fracture. COMPARISON: 7/10/2020.   Five lumbar-type vertebral bodies. Stable moderate L2 compression deformity. Segmental kyphosis about the fracture from the superior L1 endplate through the inferior L3 endplate measures 21 degrees, which is unchanged. Vertebral body heights are otherwise maintained. Mild loss of disc space height L3-L4 through L5-S1. Mild lower lumbar facet arthropathy.        XR LUMBAR SPINE 2 OR 3 VWS  LOCATION: [facility]  DATE/TIME: 7/1/2020   INDICATION: Low back pain compression fracture L2 evaluate for stability  COMPARISON: 06/08/2020  IMPRESSION:   Very shallow left apex curvature of the lumbar spine. Grade 1 retrolisthesis of L3 on L4. The bones appear diffusely demineralized, suggestive of osteoporosis. Stable appearance of the L2 compression deformity. No new compression deformities.   Atherosclerotic calcification of the abdominal aorta. Degenerative changes of the sacroiliac joints.        XR LUMBAR SPINE 2 OR 3 VWS  LOCATION: [facility]  DATE/TIME: 6/8/2020   INDICATION: Low back pain evaluate L2 compression fracture.  COMPARISON: MR 05/19/2020.  IMPRESSION:   Five lumbar type vertebral bodies presumed. Subacute/chronic appearing compression is identified at the L2, with approximately 30-40% loss of superior body height, and fragmentation anterior superior corner similar to previous MR appearance. Sclerosis   associated with the compressed superior endplate at this level. Degenerative changes elsewhere in the lumbar spine stable. 2 mm posterior subluxation L3 upon L4 is more prominent,  possibly due to positioning. If there is concern for instability flexion   and extension views could be helpful to assess this level. Otherwise satisfactory height and alignment lumbar spine. Leftward lumbar curve apex L4. Satisfactory appearance to the sacrum. Vascular calcification. Degenerative changes both SI joints.   Obscuration of the right hemisacrum due to bowel gas and stool.  Consider flexion/extension views to assess possible L3-L4 instability as clinically indicated.        MR LUMBAR SPINE WO CONTRAST  LOCATION: [facility]  DATE/TIME: 5/19/2020   INDICATION: Back pain or radiculopathy, > 6 wks Low back pain with left radicular pain  COMPARISON: Correlation with abdomen and pelvis CT 02/28/2020  TECHNIQUE: Without IV contrast  FINDINGS:   Nomenclature is based on 5 lumbar type vertebral bodies. The conus ends at distal L1. Mild levocurvature of the lumbar spine. 1 mm retrolisthesis from L3-L4 to L5-S1. 20% superior endplate compression fracture of L2 with associated moderate marrow edema.   Small amount of prevertebral soft tissue edema from L1 to L3. The rest of the vertebral body heights are maintained. Nonpathologic marrow signal. No MRI evidence for pars defects. Mild symmetric atrophy of the posterior paraspinal musculature. Normal diameter of the distal abdominal aorta. The visualized bony pelvis and proximal femora are grossly within normal limits.  T12-L1: Mild intervertebral disc height loss. Loss of the normal T2 signal within the disc. Small broad-based disc bulge with superimposed left paracentral disc protrusion. Mild bilateral facet arthropathy. No spinal canal narrowing. No neuroforaminal narrowing.  L1-L2: Mild intervertebral disc height loss. Loss of the normal T2 signal within the disc. Small broad-based disc bulge. Mild bilateral facet arthropathy. No spinal canal narrowing. No neuroforaminal narrowing.  L2-L3: Mild intervertebral disc height loss. Loss of the normal T2 signal  within the disc. Small broad-based disc bulge. Mild bilateral facet arthropathy. No spinal canal narrowing. No neuroforaminal narrowing.   L3-L4: Normal intervertebral disc height. Loss of the normal T2 signal within the disc. Small broad-based disc bulge. Moderate bilateral facet arthropathy. Small amount of fluid within the facet joints. Mild narrowing of the lateral recesses. No spinal canal narrowing. Mild right neuroforaminal narrowing. Mild left neuroforaminal narrowing   L4-L5: Normal intervertebral disc height. Loss of the normal T2 signal within the disc. Small broad-based disc bulge. Moderate bilateral facet arthropathy. Small amount of fluid within the facet joints. Mild narrowing of the lateral recesses. No spinal canal narrowing. Mild right neuroforaminal narrowing. No left neuroforaminal narrowing. 2-3 mm bilateral lateral synovial cyst.  L5-S1: Normal intervertebral disc height. Loss of the normal T2 signal within the disc. Small broad-based disc bulge. Mild to moderate bilateral facet arthropathy. No spinal canal narrowing. No right neuroforaminal narrowing. No left neuroforaminal narrowing.  IMPRESSION:   1.  20% acute/subacute superior endplate compression fracture of L2.   2.  Mild narrowing of the lateral recesses at L3-L4 and L4-L5.  3.  No high-grade spinal canal.  4.  Mild bilateral neuroforaminal narrowing at L3-L4. Mild right neuroforaminal narrowing at L4-L5.  5.  Moderate facet arthropathy at L3-L4 and L4-L5 with small amount of fluid within the facet joints.

## 2021-06-26 NOTE — PATIENT INSTRUCTIONS - HE
Increase metoprolol to 50 mg two times a day.  Decrease amlodipine to 5 mg.    F/u in 2 -3 weeks for BP check. Please bring BP numbers from home.    For anxiety - let start Paxil once a day.    For tremor - you can see Neurologist.    Urine test today.    Prolia 3rd today.  Prolia 4th in 6 months with your physical.    DXA in 10/2022.   Phone number to schedule [phone].    Daily calcium need is [id number] mg a day from the diet and supplements.  Calcium citrate is easier to digest.  Vitamin D 2000 IU daily recommended.

## 2021-06-29 NOTE — PROGRESS NOTES
Progress Notes by Diya Rivera PT at 5/27/2020  4:00 PM     Author: Diya Rivera PT Service: -- Author Type: Physical Therapist    Filed: 5/27/2020  4:57 PM Encounter Date: 5/27/2020 Status: Attested    : Diya Rivera PT (Physical Therapist) Cosigner: Cole Clifford DO at 5/28/2020  7:41 AM    Attestation signed by Cole Clifford DO at 5/28/2020  7:41 AM    Follow the therapist's recommendation                    [facility] Certification Request    May 27, 2020      Patient: Claudia Wise  MR Number: [id number]  YOB: 1950  Date of Visit: 5/27/2020      Dear Dr. Clifford,    Thank you for this referral.   We are seeing Claudia Wise for Physical Therapy of lumbar compression fracture.    Medicare and/or Medicaid requires physician review and approval of the treatment plan. Please review the plan of care and verify that you agree with the therapy plan of care by co-signing this note.      Plan of Care  Communication with: Referral Source  Patient Related Instruction: Nature of Condition;Treatment plan and rationale;Self Care instruction;Basis of treatment;Body mechanics;Precautions;Expected outcome;Next steps  Times per Week: 1-2  Number of Weeks: 8  Number of Visits: 8  Discharge Planning: independent HEP and/or plateau of progress  Therapeutic Exercise: ROM;Stretching;Strengthening  Neuromuscular Reeducation: kinesio tape;posture;TNE;core  Manual Therapy: soft tissue mobilization;myofascial release;joint mobilization;muscle energy  Modalities: TENS  Gait Training: as indicated      Goals:  Pt. will be independent with home exercise program in : 6 weeks    Pt will: be able to walk 10 minutes without difficulty or increase of back pain symptoms by 6 weeks.  Pt will: be able to squat down for a daily task without increased pain or difficulty and without TLSO by 6 weeks.  Pt will: be able to sit for longer than 30 minutes without increase of back pain symptoms by 6  weeks.        If you have any questions or concerns, please don't hesitate to call.    Sincerely,      Diya Rivera, PT  [phone]      Physician recommendation:     ___ Follow therapist's recommendation        ___ Modify therapy      *Physician co-signature indicates they certify the need for these services furnished within this plan and while under their care.        [facility]   [place]-Pelvic Initial Evaluation    Patient Name: Claudia Wise  Date of evaluation: 5/27/2020  Referral Diagnosis: Lumbar spine pain  Referring provider: Cole Clifford DO  Visit Diagnosis:     ICD-10-CM    1. Compression fracture of L2 vertebra, initial encounter (H)  S32.020A    2. Lumbar spine painful on movement  M54.5        Assessment:        Patient is a 69 y.o. female that presents with signs and symptoms consistent with left low back pain secondary to Compression fracture of L2 vertebra with routine healing. Patient demonstrates impairments including decreased lumbar flexibility and mobility, with poor core stability and postural awareness, leading to impaired functional mobility. Patient's functional limitations include sitting and walking for lengthy periods of time, sleeping comfortably, and bending forwards properly with TLSO and without pain symptoms. Today patient responded well to patient education and therapeutic exercise, reported leaving with slight increase of dullness around L low back area.    Patient educated, demonstrated understanding and is in agreement with nature of impairment, plan of care, patient role and HEP. Patient compliant with PT and prognosis is good. Patient would benefit from skilled PT to progress and improve above impairments.    The POC is dynamic and will be modified on an ongoing basis.  Patient will return to clinic if symptoms persist.  Barriers to achieving goals as noted in the assessment section may affect outcome.  Prognosis to achieve goals is  fair    Pt. is appropriate for skilled PT intervention as outlined in the Plan of Care (POC).  Pt. is a good candidate for skilled PT services to improve pain levels and function.    Goals:  Pt. will be independent with home exercise program in : 6 weeks    Pt will: be able to walk 10 minutes without difficulty or increase of back pain symptoms by 6 weeks.  Pt will: be able to squat down for a daily task without increased pain or difficulty and without TLSO by 6 weeks.  Pt will: be able to sit for longer than 30 minutes without increase of back pain symptoms by 6 weeks.      Patient's expectations/goals are realistic.    Barriers to Learning or Achieving Goals:  Chronicity of the problem.       Plan / Patient Instructions:        Plan of Care:   Communication with: Referral Source  Patient Related Instruction: Nature of Condition;Treatment plan and rationale;Self Care instruction;Basis of treatment;Body mechanics;Precautions;Expected outcome;Next steps  Times per Week: 1-2  Number of Weeks: 8  Number of Visits: 8  Discharge Planning: independent HEP and/or plateau of progress  Therapeutic Exercise: ROM;Stretching;Strengthening  Neuromuscular Reeducation: kinesio tape;posture;TNE;core  Manual Therapy: soft tissue mobilization;myofascial release;joint mobilization;muscle energy  Modalities: TENS  Gait Training: as indicated      POC and pathology of condition were reviewed with patient.  Pt. is in agreement with the Plan of Care  A Home Exercise Program (HEP) was initiated today.  Pt. was instructed in exercises by PT and patient was given a handout with detailed instructions.    Plan for next visit: review HEP, add core/lumbar strengthening as appropriate, Treadmill warm up, rotational exercises as appropriate, mini squats     Subjective:         History of Present Illness:    Claudia is a 69 y.o. female who presents to therapy today with complaints of low back pain. Date of onset is March 2020 and onset was related to  bending forwards and getting an increased spurt of pain. 2 months prior to this happening she was in the hospital for a bowel obstruction. Symptoms are intermittent, getting better and not improving. Patient reports her restrictions are no bending and no prolonged. She denies history of similar symptoms. She describes their previous level of function as not limited.     Pain Ratin  Pain description: aching, dull, sharp and shooting    Functional limitations are described as occurring with:   bending, standing for longer periods of time, sleeping too, sit to stand transfers, sitting, feels good in the morning and as the day goes on the pain increased     Patient reports benefit from:  rest  , laying on back with pillow under knees, able to sleep, sometimes uses cold and heat         Objective:      Note: Items left blank indicates the item was not performed or not indicated at the time of the evaluation.    Examination  1. Compression fracture of L2 vertebra, initial encounter (H)     2. Lumbar spine painful on movement       Involved side: Left  Posture Observation:      General sitting posture is  fair.  General standing posture is fair.    Lumbar ROM: limited due to restrictions and mobility  Date: 2020     *Indicate scale AROM AROM AROM   Lumbar Flexion Fingers to knees - tightenss     Lumbar Extension       Right Left Right Left Right Left   Lumbar Sidebending         Lumbar Rotation         Thoracic Flexion      Thoracic Extension      Thoracic Sidebending         Thoracic Rotation           Lower Extremity Strength:   WFL no increase of pain    Lumbar Special Tests:     Lumbar Special Tests Right Left SI Tests Right  Left   Quadrant test   SI Compression     Straight leg raise - - SI Distraction     Crossover response   POSH Test     Slump - - Sacral Thrust     Sit-up test  FADIR     Trunk extensor endurance test  Resisted Abduction     Prone instability test  Other:     Pubic shotgun  Other:        Palpation: NT  Flexibility: decreased hamstrings, HF      Treatment Today       Patient Instruction:  EDU on sit to stand transfers without use of bend in her back, need for more hinge at the hips    Exercises:  Exercise #1: SKTC and DKTC - hold 30 sec  Comment #1: supine knee rocks - 20 reps  Exercise #2: supine piriformis stretch - hold 30 sec x 2  Comment #2: lift lift lower lower abdominal activation - 10 reps  Exercise #3: sit to stands - 5 reps, 3x daily          TREATMENT MINUTES COMMENTS   Evaluation 20    Self-care/ Home management     Manual therapy     Neuromuscular Re-education     Therapeutic Activity     Therapeutic Exercises 25 See flowsheet   Gait training     Modality__________________                Total 45    Blank areas are intentional and mean the treatment did not include these items.     PT Evaluation Code: (Please list factors)  Patient History/Comorbidities:   Patient Active Problem List   Diagnosis   ? Herpes Simplex Type II   ? Anxiety   ? Anemia in chronic kidney disease   ? Hyperlipemia   ? Gout   ? Hypertension   ? Prediabetes   ? Endometriosis   ? Chronic kidney disease   ? Subclinical Hypothyroidism   ? Seronegative rheumatoid arthritis of multiple sites (H)   ? Bilateral primary osteoarthritis of knee   ? Paroxysmal atrial flutter (H)   ? Moderate malnutrition (H)   ? Small bowel obstruction (H)       Examination: decreased mobility increased pain  Clinical Presentation: stable  Clinical Decision Making: low    Patient History/  Comorbidities Examination  (body structures and functions, activity limitations, and/or participation restrictions) Clinical Presentation Clinical Decision Making (Complexity)   No documented Comorbidities or personal factors 1-2 Elements Stable and/or uncomplicated Low   1-2 documented comorbidities or personal factor 3 Elements Evolving clinical presentation with changing characteristics Moderate   3-4 documented comorbidities or personal factors 4 or  more Unstable and unpredictable High                Diya Rivera, PT  5/27/2020  3:56 PM

## 2021-07-02 ENCOUNTER — HOSPITAL ENCOUNTER (OUTPATIENT)
Dept: PHYSICAL MEDICINE AND REHAB | Facility: CLINIC | Age: 71
Discharge: HOME | End: 2021-07-02
Attending: PAIN MEDICINE
Payer: COMMERCIAL

## 2021-07-02 DIAGNOSIS — G89.29 CHRONIC LEFT-SIDED LOW BACK PAIN WITHOUT SCIATICA: ICD-10-CM

## 2021-07-02 DIAGNOSIS — M47.816 LUMBAR FACET ARTHROPATHY: ICD-10-CM

## 2021-07-02 DIAGNOSIS — M43.16 SPONDYLOLISTHESIS OF LUMBAR REGION: ICD-10-CM

## 2021-07-02 DIAGNOSIS — M54.50 CHRONIC LEFT-SIDED LOW BACK PAIN WITHOUT SCIATICA: ICD-10-CM

## 2021-07-03 NOTE — ADDENDUM NOTE
Addendum Note by Bloch, Lisa M, CMA at 10/4/2017  5:37 PM     Author: Bloch, Lisa M, CMA Service: -- Author Type: Certified Medical Assistant    Filed: 10/4/2017  5:37 PM Encounter Date: 10/4/2017 Status: Signed    : Bloch, Lisa M, CMA (Certified Medical Assistant)    Addended by: BLOCH, LISA M on: 10/4/2017 05:37 PM        Modules accepted: Orders

## 2021-07-03 NOTE — ADDENDUM NOTE
Addendum Note by Nuria Valles at 8/1/2019  4:00 PM     Author: Nuria Valles Service: -- Author Type:     Filed: 8/2/2019  4:06 PM Encounter Date: 8/1/2019 Status: Signed    : Nuria Valles ()    Addended by: NURIA VALLES on: 8/2/2019 04:06 PM        Modules accepted: Orders

## 2021-07-04 NOTE — PATIENT INSTRUCTIONS - HE
Patient Instructions by Dottie Arcos RN at 7/2/2021 11:00 AM     Author: Dottie Arcos RN Service: -- Author Type: Registered Nurse    Filed: 7/2/2021 11:08 AM Date of Service: 7/2/2021 11:00 AM Status: Signed    : Dottie Arcos RN (Registered Nurse)       DISCHARGE INSTRUCTIONS    During office hours (8:00 a.m.- 4:00 p.m.) questions or concerns may be answered  by calling [facility] Nurses at  [phone].  Messages received after hours will be returned the following business day.      In the case of an emergency, please dial 911 or seek assistance at the nearest Emergency Room/Urgent Care facility.     All Patients:  ? You may experience an increase in your symptoms for the first 2 days, once the numbing medication wears off.    ? You may resume your regular medication, no pain medication until you have completed your diary    ? You may shower. No swimming, tub bath or hot tub for 24 hours; remove bandage after 4 hours    ? Continue your activities that can cause you pain to test the blocks.                         ? You should not drive for the next 3 to 5 hours (have someone drive you)           POSSIBLE PROCEDURE SIDE EFFECTS  -Call [facility] if concerned-    Increased Pain  Increased numbness/tingling     Nausea/Vomiting  Bruising/bleeding at site (hematoma)             Swelling at site (edema) Headache  Difficulty walking  Infection        Fever greater than 100.5

## 2021-07-05 PROBLEM — I87.2 VENOUS (PERIPHERAL) INSUFFICIENCY: Status: ACTIVE | Noted: 2021-07-01

## 2021-07-06 VITALS
HEART RATE: 78 BPM | WEIGHT: 170.7 LBS | BODY MASS INDEX: 30.24 KG/M2 | DIASTOLIC BLOOD PRESSURE: 70 MMHG | SYSTOLIC BLOOD PRESSURE: 130 MMHG

## 2021-07-06 VITALS
SYSTOLIC BLOOD PRESSURE: 120 MMHG | OXYGEN SATURATION: 100 % | DIASTOLIC BLOOD PRESSURE: 62 MMHG | HEART RATE: 75 BPM | BODY MASS INDEX: 30.27 KG/M2 | WEIGHT: 170.9 LBS

## 2021-07-09 ENCOUNTER — RECORDS - HEALTHEAST (OUTPATIENT)
Dept: ADMINISTRATIVE | Facility: OTHER | Age: 71
End: 2021-07-09

## 2021-07-09 LAB — RETINOPATHY: NEGATIVE

## 2021-07-12 ENCOUNTER — RECORDS - HEALTHEAST (OUTPATIENT)
Dept: HEALTH INFORMATION MANAGEMENT | Facility: CLINIC | Age: 71
End: 2021-07-12

## 2021-07-13 ENCOUNTER — RECORDS - HEALTHEAST (OUTPATIENT)
Dept: ADMINISTRATIVE | Facility: CLINIC | Age: 71
End: 2021-07-13

## 2021-07-15 NOTE — TELEPHONE ENCOUNTER
Patient has a future lab appointment on 05/20/19 . There are no lab orders could you review chart and place orders? Thank you.  
none

## 2021-07-21 ENCOUNTER — RECORDS - HEALTHEAST (OUTPATIENT)
Dept: ADMINISTRATIVE | Facility: CLINIC | Age: 71
End: 2021-07-21

## 2021-07-22 ENCOUNTER — RECORDS - HEALTHEAST (OUTPATIENT)
Dept: INTERNAL MEDICINE | Facility: CLINIC | Age: 71
End: 2021-07-22

## 2021-07-22 DIAGNOSIS — Z12.31 OTHER SCREENING MAMMOGRAM: ICD-10-CM

## 2021-07-28 ENCOUNTER — OFFICE VISIT (OUTPATIENT)
Dept: FAMILY MEDICINE | Facility: CLINIC | Age: 71
End: 2021-07-28
Payer: COMMERCIAL

## 2021-07-28 ENCOUNTER — HOSPITAL ENCOUNTER (EMERGENCY)
Facility: HOSPITAL | Age: 71
Discharge: HOME | End: 2021-07-28
Attending: EMERGENCY MEDICINE | Admitting: EMERGENCY MEDICINE
Payer: MEDICARE

## 2021-07-28 VITALS
WEIGHT: 160 LBS | TEMPERATURE: 98 F | HEART RATE: 72 BPM | OXYGEN SATURATION: 97 % | SYSTOLIC BLOOD PRESSURE: 121 MMHG | BODY MASS INDEX: 28.34 KG/M2 | RESPIRATION RATE: 20 BRPM | DIASTOLIC BLOOD PRESSURE: 70 MMHG

## 2021-07-28 VITALS
BODY MASS INDEX: 28.34 KG/M2 | WEIGHT: 160 LBS | TEMPERATURE: 98.2 F | SYSTOLIC BLOOD PRESSURE: 139 MMHG | HEART RATE: 68 BPM | OXYGEN SATURATION: 100 % | DIASTOLIC BLOOD PRESSURE: 64 MMHG | RESPIRATION RATE: 16 BRPM

## 2021-07-28 DIAGNOSIS — Z20.9 EXPOSURE TO BAT WITHOUT KNOWN BITE: ICD-10-CM

## 2021-07-28 DIAGNOSIS — Z20.9 EXPOSURE TO BAT WITHOUT KNOWN BITE: Primary | ICD-10-CM

## 2021-07-28 LAB — SARS-COV-2 RNA RESP QL NAA+PROBE: NEGATIVE

## 2021-07-28 PROCEDURE — 87635 SARS-COV-2 COVID-19 AMP PRB: CPT | Performed by: EMERGENCY MEDICINE

## 2021-07-28 PROCEDURE — 99213 OFFICE O/P EST LOW 20 MIN: CPT | Performed by: PHYSICIAN ASSISTANT

## 2021-07-28 PROCEDURE — 250N000011 HC RX IP 250 OP 636: Performed by: EMERGENCY MEDICINE

## 2021-07-28 PROCEDURE — 99284 EMERGENCY DEPT VISIT MOD MDM: CPT

## 2021-07-28 PROCEDURE — 90375 RABIES IG IM/SC: CPT | Performed by: EMERGENCY MEDICINE

## 2021-07-28 PROCEDURE — 90471 IMMUNIZATION ADMIN: CPT | Performed by: EMERGENCY MEDICINE

## 2021-07-28 PROCEDURE — 96372 THER/PROPH/DIAG INJ SC/IM: CPT | Performed by: EMERGENCY MEDICINE

## 2021-07-28 PROCEDURE — C9803 HOPD COVID-19 SPEC COLLECT: HCPCS

## 2021-07-28 PROCEDURE — 90675 RABIES VACCINE IM: CPT | Performed by: EMERGENCY MEDICINE

## 2021-07-28 RX ORDER — EPINEPHRINE 1 MG/ML
0.3 INJECTION, SOLUTION INTRAMUSCULAR; SUBCUTANEOUS EVERY 5 MIN PRN
Status: CANCELLED | OUTPATIENT
Start: 2021-07-31

## 2021-07-28 RX ORDER — METHYLPREDNISOLONE SODIUM SUCCINATE 125 MG/2ML
125 INJECTION, POWDER, LYOPHILIZED, FOR SOLUTION INTRAMUSCULAR; INTRAVENOUS
Status: CANCELLED
Start: 2021-07-31

## 2021-07-28 RX ORDER — MEPERIDINE HYDROCHLORIDE 25 MG/ML
25 INJECTION INTRAMUSCULAR; INTRAVENOUS; SUBCUTANEOUS EVERY 30 MIN PRN
Status: CANCELLED | OUTPATIENT
Start: 2021-07-31

## 2021-07-28 RX ORDER — ALBUTEROL SULFATE 90 UG/1
1-2 AEROSOL, METERED RESPIRATORY (INHALATION)
Status: CANCELLED
Start: 2021-07-31

## 2021-07-28 RX ORDER — DIPHENHYDRAMINE HYDROCHLORIDE 50 MG/ML
50 INJECTION INTRAMUSCULAR; INTRAVENOUS
Status: CANCELLED
Start: 2021-07-31

## 2021-07-28 RX ORDER — NALOXONE HYDROCHLORIDE 0.4 MG/ML
0.2 INJECTION, SOLUTION INTRAMUSCULAR; INTRAVENOUS; SUBCUTANEOUS
Status: CANCELLED | OUTPATIENT
Start: 2021-07-31

## 2021-07-28 RX ORDER — ALBUTEROL SULFATE 0.83 MG/ML
2.5 SOLUTION RESPIRATORY (INHALATION)
Status: CANCELLED | OUTPATIENT
Start: 2021-07-31

## 2021-07-28 RX ADMIN — RABIES VACCINE 1 ML: KIT at 13:54

## 2021-07-28 RX ADMIN — RABIES IMMUNE GLOBULIN (HUMAN) 1440 UNITS: 300 INJECTION, SOLUTION INFILTRATION; INTRAMUSCULAR at 13:59

## 2021-07-28 ASSESSMENT — ENCOUNTER SYMPTOMS
FEVER: 0
ABDOMINAL PAIN: 0
COUGH: 0
SHORTNESS OF BREATH: 0
WOUND: 0

## 2021-07-28 NOTE — DISCHARGE INSTRUCTIONS
"Hold your methotrexate at this time. Talk to your rheumatologist about when you should restart this after your Rabies vaccines.  Because you are on the methotrexate you may be considered \" immunocompromised\".  It is recommended that you get a another rabies vaccine on day 28 (August 25) if you are considered immunocompromised.  Talk with your family doctor and rheumatologist to see if you qualify as immunocompromised and need that additional dose of rabies vaccine.    Schedule the appointment with the infusion center to get the follow-up rabies vaccines. You will need your additional rabies doses on July 31, August 4 and Aug 11 (and possibly August 25).    Return to the emergency department with any concerns for reaction from the vaccine or any other concerns.    Thank you for choosing [facility].  It has been my pleasure caring for you today.     ~Dr. Margo MD  "

## 2021-07-28 NOTE — ED PROVIDER NOTES
EMERGENCY DEPARTMENT ENCOUNTER      NAME: Claudia Wise  AGE: 70 year old female  YOB: 1950  MRN: [id number]  EVALUATION DATE & TIME: No admission date for patient encounter.    PCP: Isabel Maurer    ED PROVIDER: Shayna Aragon M.D.        Chief Complaint   Patient presents with     Possible Exposure to Bat         FINAL IMPRESSION:    1. Exposure to bat without known bite            MEDICAL DECISION MAKIN year old female history of rheumatoid arthritis on methotrexate who presents emergency department after bat exposure.  Woke up to a bat in the room.  No known bites or injuries.  Prophylactic rabies immunoglobulin and rabies vaccine provided here in the ER.  Referral for outpatient infusion center for ongoing rabies vaccine on day 3, 7, and 14 has been ordered.  Also would recommend a 28 given her immunocompromise state due to methotrexate.      ED COURSE:  12:20 PM  I met with the patient for the initial interview and physical examination. Discussed plan for treatment and workup in the ED.     2:15 PM  Patient has received her rabies immunoglobulin and is ready for discharge.  Spoke with her about likely needing a day 28 rabies vaccine dose due to her immunocompromised state with methotrexate.  Spoke with ED pharmacist and they agree with holding her methotrexate this time as the patient has been told she can take it as needed and not a definite every day dose.  Patient in general is hoping to get off the medication.  I will have her touch base with her rheumatologist and family doctor to see if they want her to get that day 28 dose.  Orders have been placed for the infusion center for her to get the day 3, 7, and 14 dosing as an outpatient.  They are aware that have any issues with getting this dosing done that they can return to the ER and we will help troubleshoot this.  No known bite sites.  Patient otherwise stable without any concerns and will be discharged  home.      COVID-19 PPE worn during patient evaluation:  Mask: n95 and homemade masks  Eye Protection: goggles   Gown: none  Hair cover: yes  Face shield: none  Patient wearing a mask: yes    At the conclusion of the encounter I discussed the disposition. Their questions were answered. The patient (and any family present) acknowledged understanding and were agreeable with the care plan.          CONSULTANTS:  Infusion center          MEDICATIONS GIVEN IN THE EMERGENCY:  Medications   rabies vaccine, PCEC (chick embryo derived) (RABAVERT) injection 1 mL (has no administration in time range)   rabies vaccine, PCEC (chick embryo derived) (RABAVERT) injection 1 mL (has no administration in time range)   rabies vaccine, PCEC (chick embryo derived) (RABAVERT) injection 1 mL (has no administration in time range)   rabies vaccine, PCEC (chick embryo derived) (RABAVERT) injection 1 mL (has no administration in time range)   rabies immune globulin 300 units/mL (HYPERAB) injection 1,440 Units (1,440 Units Intramuscular Given 7/28/21 1359)   rabies vaccine, PCEC (chick embryo derived) (RABAVERT) injection 1 mL (1 mL Intramuscular Given 7/28/21 1354)             NEW PRESCRIPTIONS STARTED AT TODAY'S ER VISIT     Medication List      There are no discharge medications for this visit.             CONDITION:  Stable, improved        DISPOSITION:  discharge home         =================================================================  =================================================================    HPI    Patient information was obtained from: Patient    Use of Intrepreter: N/A     Claudia Wise is a 70 year old female with rheumatoid arthritis history who presents to the ER for evaluation after a potential bat exposure.     Patient reports waking up this morning in her bedroom with her  to a bat in the room. Patient had no known physical contact with the bat. Patient's  corralled the bat out of the house.  Patient endorses no symptoms or complaints at this time.  No known bat bites or injuries.    Of note, patient is currently taking methotrexate for rheumatoid arthritis.       REVIEW OF SYSTEMS  Review of Systems   Constitutional: Negative for fever.   Respiratory: Negative for cough and shortness of breath.    Cardiovascular: Negative for chest pain.   Gastrointestinal: Negative for abdominal pain.   Skin: Negative for wound.   Allergic/Immunologic: Positive for immunocompromised state (on methotrexate for RA).   All other systems reviewed and are negative.          PAST MEDICAL HISTORY:  Past Medical History:   Diagnosis Date     Anemia      Anxiety      Chronic kidney disease      Diabetes mellitus, type II (H)     prediabetes     Disease of thyroid gland     hypothyroid     Family history of myocardial infarction      Gout      High cholesterol      Hypertension      Inverted nipple     right     Macular edema      Osteoarthritis 2012     Rheumatoid arthritis (H) 2012         PAST SURGICAL HISTORY:  Past Surgical History:   Procedure Laterality Date     COLON SURGERY  1986     COLPORRHAPHY N/A 7/7/2016    Procedure: ANTERIOR REPAIR WITH MESH;  Surgeon: Zac Stone MD;  Location: [facility];  Service:       UGI ENDOSCOPY W PLACEMENT GASTROSTOMY TUBE PERCUT N/A 2/7/2020    Procedure: Percutaneous Endoscopic Gastromstomy Tube Placement;  Surgeon: Zion Schwarz MD;  Location: [facility] OR;  Service: General     HYSTERECTOMY       LAPAROTOMY EXPLORATORY N/A 1/23/2020    Procedure: EXPLORATORY LAPARATOMY, EXTENSIVE LYSIS OF ADHESIONS, SMALL BOWEL RESECTION;  Surgeon: Claudia Jeronimo MD;  Location: [facility];  Service: General     OOPHORECTOMY           CURRENT MEDICATIONS:    Prior to Admission medications    Medication Sig Start Date End Date Taking? Authorizing Provider   acetaminophen (TYLENOL) 500 MG tablet [ACETAMINOPHEN (TYLENOL) 500 MG TABLET] Take 500 mg by mouth every 6  (six) hours as needed for pain.  Patient not taking: Reported on 7/28/2021 1/7/17   Provider, Historical   acyclovir (ZOVIRAX) 400 MG tablet [ACYCLOVIR (ZOVIRAX) 400 MG TABLET] Take 1 tablet (400 mg total) by mouth every 8 (eight) hours as needed. 1/18/18   Isabel Maurer MD   allopurinoL (ZYLOPRIM) 100 MG tablet [ALLOPURINOL (ZYLOPRIM) 100 MG TABLET] Take 1 tablet (100 mg total) by mouth 2 (two) times a day with meals. 12/12/20   Isabel Maurer MD   amLODIPine (NORVASC) 10 MG tablet [AMLODIPINE (NORVASC) 10 MG TABLET] Take 1 tablet (10 mg total) by mouth daily. 4/21/21   Isabel Maurer MD   atorvastatin (LIPITOR) 10 MG tablet [ATORVASTATIN (LIPITOR) 10 MG TABLET] Take 1 tablet (10 mg total) by mouth at bedtime. 10/1/20   Isabel Maurer MD   carboxymethylcellulose (REFRESH PLUS) 0.5 % Dpet ophthalmic dropperette [CARBOXYMETHYLCELLULOSE (REFRESH PLUS) 0.5 % DPET OPHTHALMIC DROPPERETTE] Administer 1-2 drops to both eyes 4 (four) times a day as needed.  1/21/20   Provider, Historical   cyanocobalamin, vitamin B-12, 1,000 mcg Subl [CYANOCOBALAMIN, VITAMIN B-12, 1,000 MCG SUBL] Place 1 tablet (1,000 mcg total) under the tongue daily. 3/12/20   Carlos Saab MD   denosumab (PROLIA) 60 MG/ML SOSY injection Inject 60 mg Subcutaneous 7/2/21 6/26/22  Reported, Patient   DULoxetine (CYMBALTA) 20 MG capsule [DULOXETINE (CYMBALTA) 20 MG CAPSULE] Take 20 mg by mouth daily. 7/1/21   Provider, Historical   fluticasone propionate (FLONASE) 50 mcg/actuation nasal spray [FLUTICASONE PROPIONATE (FLONASE) 50 MCG/ACTUATION NASAL SPRAY] Use 1 spray(s) in each nostril once daily 5/26/20   Isabel Maurer MD   folic acid (FOLVITE) 1 MG tablet [FOLIC ACID (FOLVITE) 1 MG TABLET] Take 1 tablet (1 mg total) by mouth daily. 8/19/20 8/19/21  Akila Vinson MBBS   HYDROcodone-acetaminophen 5-325 mg per tablet [HYDROCODONE-ACETAMINOPHEN 5-325 MG PER TABLET] Take 1 tablet by mouth every 8 (eight) hours as needed for pain. 5/19/21    Isabel Maurer MD   hydrOXYchloroQUINE (PLAQUENIL) 200 mg tablet [HYDROXYCHLOROQUINE (PLAQUENIL) 200 MG TABLET] Take 1 tablet (200 mg total) by mouth 2 (two) times a day. 5/25/21   Akila Vinson MBBS   levothyroxine (SYNTHROID, LEVOTHROID) 50 MCG tablet [LEVOTHYROXINE (SYNTHROID, LEVOTHROID) 50 MCG TABLET] TAKE 1 TABLET BY MOUTH ONCE DAILY IN THE MORNING 11/25/20   Isabel Maurer MD   LORazepam (ATIVAN) 0.5 MG tablet [LORAZEPAM (ATIVAN) 0.5 MG TABLET] TAKE 1 TABLET BY MOUTH EVERY 8 HOURS AS NEEDED FOR ANXIETY 12/10/20   Isabel Maurer MD   losartan (COZAAR) 50 MG tablet [LOSARTAN (COZAAR) 50 MG TABLET] Take 1 tablet (50 mg total) by mouth daily. 5/8/21   Isabel Maurer MD   melatonin 3 mg Tab tablet [MELATONIN 3 MG TAB TABLET] Take 1 tablet (3 mg total) by mouth at bedtime as needed. 3/12/20   Carlos Saab MD   metoprolol tartrate (LOPRESSOR) 50 MG tablet [METOPROLOL TARTRATE (LOPRESSOR) 50 MG TABLET] Take 1 tablet (50 mg total) by mouth 2 (two) times a day. 6/10/21   Isabel Maurer MD   PARoxetine (PAXIL) 10 MG tablet [PAROXETINE (PAXIL) 10 MG TABLET] Take 1 tablet (10 mg total) by mouth daily. 6/10/21   Isabel Maurer MD   RESTASIS 0.05 % ophthalmic emulsion [RESTASIS 0.05 % OPHTHALMIC EMULSION] INSTILL 1 DROP INTO EACH EYE TWICE DAILY 5/24/19   Provider, Historical   sodium bicarbonate 650 MG tablet [SODIUM BICARBONATE 650 MG TABLET] Take 2 tablets (1,300 mg total) by mouth 2 (two) times a day. 6/10/21   Isabel Maurer MD         ALLERGIES:  Allergies   Allergen Reactions     Bupropion Hcl [Bupropion] Nausea     Erythromycin Base [Erythromycin] Nausea     Paxil [Paroxetine] Diarrhea     Tetracyclines Itching     Valacyclovir Nausea         FAMILY HISTORY:  Family History   Problem Relation Age of Onset     Breast Cancer Maternal Aunt      Cancer Maternal Aunt      Cancer Father      Cancer Sister      Cancer Maternal Grandmother      Cancer Cousin      Coronary Artery Disease Mother  49.00         SOCIAL HISTORY:  Social History     Socioeconomic History     Marital status:      Spouse name: Not on file     Number of children: Not on file     Years of education: Not on file     Highest education level: Not on file   Occupational History     Not on file   Tobacco Use     Smoking status: Former Smoker     Quit date: 1995     Years since quittin.0     Smokeless tobacco: Never Used   Substance and Sexual Activity     Alcohol use: Yes     Comment: Alcoholic Drinks/day: occasional couple times a year     Drug use: No     Sexual activity: Not on file   Other Topics Concern     Not on file   Social History Narrative     Not on file     Social Determinants of Health     Financial Resource Strain:      Difficulty of Paying Living Expenses:    Food Insecurity:      Worried About Running Out of Food in the Last Year:      Ran Out of Food in the Last Year:    Transportation Needs:      Lack of Transportation (Medical):      Lack of Transportation (Non-Medical):    Physical Activity:      Days of Exercise per Week:      Minutes of Exercise per Session:    Stress:      Feeling of Stress :    Social Connections:      Frequency of Communication with Friends and Family:      Frequency of Social Gatherings with Friends and Family:      Attends Jainism Services:      Active Member of Clubs or Organizations:      Attends Club or Organization Meetings:      Marital Status:    Intimate Partner Violence:      Fear of Current or Ex-Partner:      Emotionally Abused:      Physically Abused:      Sexually Abused:          VITALS:  Patient Vitals for the past 24 hrs:   BP Temp Temp src Pulse Resp SpO2 Weight   21 1158 139/64 98.2  F (36.8  C) Oral 68 16 100 % 72.6 kg (160 lb)       Wt Readings from Last 3 Encounters:   21 72.6 kg (160 lb)   21 72.6 kg (160 lb)   21 76.4 kg (168 lb 8 oz)         PHYSICAL EXAM    Constitutional:  Well developed, Well nourished, NAD, GCS 15  HENT:   Normocephalic, Atraumatic, Bilateral external ears normal, Nose normal. Neck- Normal range of motion, No tenderness, Supple, No stridor.  Eyes:  PERRL, EOMI, Conjunctiva normal, No discharge.  Respiratory:  Speaks full sentences easily.   Cardiovascular:  Normal heart rate  GI:  No obesity.    : deferred   Musculoskeletal: No cyanosis, No clubbing. Good range of motion in all major joints. No major deformities noted.   Integument:  Warm, Dry, No erythema, No rash.  No petechiae.   Neurologic:  Alert & oriented x 3, No focal deficits noted. Normal gait.   Psychiatric:  Affect normal, Cooperative         LAB:  All pertinent labs reviewed and interpreted.  No results found for this or any previous visit (from the past 24 hour(s)).    Lab Results   Component Value Date    ABORH A POS 01/26/2020           RADIOLOGY:  Reviewed all pertinent imaging. Please see official radiology report.    No orders to display         EKG:    None        PROCEDURES:  None        I, Mariya Mccrary, am serving as a scribe to document services personally performed by Dr. Shayna Aragon based on my observation and the provider's statements to me. I, Dr. Shayna Aragon MD attest that Mariya Mccrary is acting in a scribe capacity, has observed my performance of the services and has documented them in accordance with my direction.        Shayna Aragon M.D. [facility]  Emergency Medicine and Medical Toxicology  [facility] EMERGENCY DEPARTMENT  [facility] [facility] [address]  [phone]  Dept: [phone]           Shayna Aragon MD  07/28/21 3377

## 2021-07-28 NOTE — ED PROVIDER NOTES
Expected Patient Referral to ED  11:01 AM    Referring Clinic/Provider:  [facility]    Reason for referral/Clinical facts:  Needs Rabies Vaccine and IG.  Woke up with bat in their bedroom today.  No known bites or exposures.  They chased the back out of the room with a broom.  Both patients are feeling fine at this time but will need vaccination and immunoglobulin.    Recommendations provided:  We can see them here.    Caller was informed that this institution does  possess the capabilities and/or resources to provide for patient and should be transferred to our institution.    Based on the information provided, discussed that this patient likely is nota good candidate for direct admission to this institution and that provider could proceed as such.  If however direct admit is sought and any hurdles encountered, this ED would be happy to see the patient and evaluate.    Informed caller that recommendations provided are recommendations based only on the facts provided and that they responsible to accept or reject the advice, or to seek a formal in person consultation as needed and that this ED will see/treat patient should they arrive.      Lawson Ashley MD  Emergency Medicine  [facility] EMERGENCY DEPARTMENT  [address] [place] [id number]  [phone]         Lawson Ashley MD  07/28/21 9622

## 2021-07-28 NOTE — PROGRESS NOTES
Assessment:     Diagnoses and all orders for this visit:  Pain of left sacroiliac joint  -     PAIN Joint Injection Sacroiliac Joint Left; Future  -     Physical Therapy Referral; Future  -     lidocaine (LIDODERM) 5 % patch; Place 1 patch onto the skin every 24 hours To prevent lidocaine toxicity, patient should be patch free for 12 hrs daily.  Chronic bilateral low back pain without sciatica  -     PAIN Joint Injection Sacroiliac Joint Left; Future  -     Physical Therapy Referral; Future  Left buttock pain  -     PAIN Joint Injection Sacroiliac Joint Left; Future  -     Physical Therapy Referral; Future  -     lidocaine (LIDODERM) 5 % patch; Place 1 patch onto the skin every 24 hours To prevent lidocaine toxicity, patient should be patch free for 12 hrs daily.  SI (sacroiliac) joint dysfunction  -     PAIN Joint Injection Sacroiliac Joint Left; Future  -     Physical Therapy Referral; Future     Claudia Wise is a 70 year old y.o. female with past medical history significant for anxiety, rheumatoid arthritis managed by Dr. Vinson, diabetes myelitis, hypertension, hyperlipidemia, chronic kidney disease, thyroid disorder, gout, osteopenia, recent small bowel obstruction with surgical intervention who presents today for follow-up regarding:    -Bilateral left greater than right low back pain with 50% relief with radiofrequency ablation.    -Ongoing worsening significant left buttock pain localizing to the sacroiliac joint consistent with sacroiliac joint dysfunction     Plan:     A shared decision making plan was used. The patient's values and choices were respected. Prior medical records were reviewed today. The following represents what was discussed and decided upon by the provider and the patient.        -DIAGNOSTIC TESTS: Images were personally reviewed and interpreted.   --Lumbar spine x-ray 8/24/2020 shows stable L2 compression deformity.  --Lumbar spine x-ray 7/1/2020 shows stable L2 compression  deformity, grade 1 L3-4 spondylolisthesis.  --Lumbar spine MRI 5/19/2020 with 20% acute/subacute compression fracture L2.  There is mild bilateral lateral recess stenosis L3-4 and L4-5.  Bilateral synovial cysts L4-5 with moderate facet arthropathy.    -INTERVENTIONS: Ordered a left SI joint steroid injection, recommend scheduling 6 weeks post RFA which would be 8/13/2021 which patient is in agreements with.  Advised patient that if her pain improves in the next 2 weeks then I would recommend canceling the injection.      -MEDICATIONS: Prescribed lidocaine patch to trial to left buttock which has given her benefit in the past.   Discussed side effects of medications and proper use. Patient verbalized understanding.    -We did discuss potential referral to behavioral therapy which would be a good option for her, she wants to hold off at this time but will consider this in the future.    -PHYSICAL THERAPY: Referral to physical therapy placed to establish home exercises for SI joint dysfunction.  Discussed the importance of core strengthening, ROM, stretching exercises with the patient and how each of these entities is important in decreasing pain.  Explained to the patient that the purpose of physical therapy is to teach the patient a home exercise program.  These exercises need to be performed every day in order to decrease pain and prevent future occurrences of pain.        -PATIENT EDUCATION:  Total time of 32 minutes spent with the patient, reviewing the chart, placing orders, and documenting.   -Today we also discussed the issues related to the current COVID-19 pandemic, the pros and cons of the current treatment plan, the CDC guidelines such as social distancing, washing the hands, and covering the cough.    -FOLLOW UP: Follow-up for injection with Dr. Gonzalez 8/13/2021 or after.  Advised to contact clinic if symptoms worsen or change.    Subjective:     Claudia Wise is a 70 year old female who presents  today for follow-up regarding chronic bilateral low back pain that did improve slightly with radiofrequency ablation, about 50% relief she reports at this time.  However her biggest concern today is actually her buttock pain not her low back pain.  Her current pain is a 2/10 but up to a 6 at its worst again on the left buttock only with tenderness to palpation at that area.  Increased pain with standing as well as prolonged sitting, does improve with laying.  She otherwise denies any radiating pain into the lower extremities, denies any recent trips or falls or balance changes, denies bowel or bladder loss control.    Treatment to Date: No prior spinal surgery or spinal injection.  Physical therapy [facility] sessions last 8/28/2024 compression fracture/spine pain.     Left L2, L3, L4 MBB 51/2/2021 with positive response.  Left L2, L3, L4 MBB 6/4/2021 with positive response.  Bilateral L2, L3, L4 RFA 7/2/2021 with 50% relief LBP 4 weeks postinjection.  Preprocedure pain 6/10, post 2/10.    Medications:  Plaquenil 4 seronegative rheumatoid arthritis  Tylenol  Hydrocodone managed by Dr. Erasto Guzmán 25 mg bedtime however patient only tried this medication for a couple of days with no benefit therefore she discontinued on her own.    Patient Active Problem List   Diagnosis     Herpes Simplex Type II     Anxiety     Anemia in chronic kidney disease     Hyperlipemia     Gout     Hypertension     Prediabetes     Endometriosis     Other specified hypothyroidism     Seronegative rheumatoid arthritis of multiple sites (H)     High risk medication use     Bilateral primary osteoarthritis of knee     Small bowel obstruction (H)     Senile osteoporosis     Compression fracture of L2 vertebra with routine healing     Chronic kidney disease, stage 3     Venous (peripheral) insufficiency     Exposure to bat without known bite       Current Outpatient Medications   Medication     acetaminophen (TYLENOL) 500 MG tablet      lidocaine (LIDODERM) 5 % patch     acyclovir (ZOVIRAX) 400 MG tablet     allopurinoL (ZYLOPRIM) 100 MG tablet     amLODIPine (NORVASC) 10 MG tablet     atorvastatin (LIPITOR) 10 MG tablet     carboxymethylcellulose (REFRESH PLUS) 0.5 % Dpet ophthalmic dropperette     cyanocobalamin, vitamin B-12, 1,000 mcg Subl     denosumab (PROLIA) 60 MG/ML SOSY injection     DULoxetine (CYMBALTA) 20 MG capsule     fluticasone propionate (FLONASE) 50 mcg/actuation nasal spray     folic acid (FOLVITE) 1 MG tablet     HYDROcodone-acetaminophen 5-325 mg per tablet     hydrOXYchloroQUINE (PLAQUENIL) 200 mg tablet     levothyroxine (SYNTHROID, LEVOTHROID) 50 MCG tablet     LORazepam (ATIVAN) 0.5 MG tablet     losartan (COZAAR) 50 MG tablet     melatonin 3 mg Tab tablet     metoprolol tartrate (LOPRESSOR) 50 MG tablet     PARoxetine (PAXIL) 10 MG tablet     RESTASIS 0.05 % ophthalmic emulsion     sodium bicarbonate 650 MG tablet     No current facility-administered medications for this visit.       Allergies   Allergen Reactions     Bupropion Hcl [Bupropion] Nausea     Erythromycin Base [Erythromycin] Nausea     Paxil [Paroxetine] Diarrhea     Tetracyclines Itching     Valacyclovir Nausea       Past Medical History:   Diagnosis Date     Anemia      Anxiety      Chronic kidney disease      Diabetes mellitus, type II (H)     prediabetes     Disease of thyroid gland     hypothyroid     Family history of myocardial infarction      Gout      High cholesterol      Hypertension      Inverted nipple     right     Macular edema      Osteoarthritis 2012     Rheumatoid arthritis (H) 2012        Review of Systems  ROS:  Specifically negative for bowel/bladder dysfunction, balance changes, headache, dizziness, foot drop, fevers, chills, appetite changes, nausea/vomiting, unexplained weight loss. Otherwise 13 systems reviewed are negative. Please see the patient's intake questionnaire from today for details.    Reviewed Social, Family, Past Medical  and Past Surgical history with patient, no significant changes noted since prior visit.     Objective:     /57 (BP Location: Right arm, Patient Position: Sitting)   Pulse 71   SpO2 99%     PHYSICAL EXAMINATION:    --CONSTITUTIONAL: Well developed, well nourished, healthy appearing individual.  --PSYCHIATRIC: Appropriate mood and affect. No difficulty interacting due to temper, social withdrawal, or memory issues.  --SKIN: Lumbar region is dry and intact.   --RESPIRATORY: Normal rhythm and effort. No abnormal accessory muscle breathing patterns noted.   --MUSCULOSKELETAL:  Normal lumbar lordosis noted, no lateral shift.  --GROSS MOTOR: Easily arises from a seated position. Gait is non-antalgic  --LUMBAR SPINE:  Inspection reveals no evidence of deformity. Range of motion is not limited in lumbar flexion, extension, lateral rotation. No tenderness to palpation lumbar spine. Straight leg raising in the supine position is negative to radicular pain. Sciatic notch non-tender on the right, significantly tender on the left.   --SACROILIAC JOINT: Positive left distraction.  Positive left Alyssa's with reproduction of pain to affected extremity. One Finger point test positive left.  --LOWER EXTREMITY MOTOR TESTING:  Plantar flexion left 5/5, right 5/5   Dorsiflexion left 5/5, right 5/5   Great toe MTP extension left 5/5, right 5/5  Knee flexion left 5/5, right 5/5  Knee extension left 5/5, right 5/5   Hip flexion left 5/5, right 5/5  Hip abduction left 5/5, right 5/5  Hip adduction left 5/5, right 5/5   --HIPS: Full range of motion bilaterally. Negative FABERs on the involved lower extremity.   --NEUROLOGIC: Bilateral patellar and achilles reflexes are physiologic and symmetric. Sensation to light touch is intact in the bilateral L4, L5, and S1 dermatomes.    RESULTS:   Imaging: Lumbar spine imaging was reviewed today. The images were shown to the patient and the findings were explained using a spine model.      Xr  Lumbar Spine 2 Or 3 Vws  Result Date: 8/25/2020  INDICATION: Evaluate L2 compression fracture. COMPARISON: 7/10/2020.   Five lumbar-type vertebral bodies. Stable moderate L2 compression deformity. Segmental kyphosis about the fracture from the superior L1 endplate through the inferior L3 endplate measures 21 degrees, which is unchanged. Vertebral body heights are otherwise maintained. Mild loss of disc space height L3-L4 through L5-S1. Mild lower lumbar facet arthropathy.        XR LUMBAR SPINE 2 OR 3 VWS  LOCATION: [facility]  DATE/TIME: 7/1/2020   INDICATION: Low back pain compression fracture L2 evaluate for stability  COMPARISON: 06/08/2020  IMPRESSION:   Very shallow left apex curvature of the lumbar spine. Grade 1 retrolisthesis of L3 on L4. The bones appear diffusely demineralized, suggestive of osteoporosis. Stable appearance of the L2 compression deformity. No new compression deformities.   Atherosclerotic calcification of the abdominal aorta. Degenerative changes of the sacroiliac joints.        XR LUMBAR SPINE 2 OR 3 VWS  LOCATION: [facility]  DATE/TIME: 6/8/2020   INDICATION: Low back pain evaluate L2 compression fracture.  COMPARISON: MR 05/19/2020.  IMPRESSION:   Five lumbar type vertebral bodies presumed. Subacute/chronic appearing compression is identified at the L2, with approximately 30-40% loss of superior body height, and fragmentation anterior superior corner similar to previous MR appearance. Sclerosis   associated with the compressed superior endplate at this level. Degenerative changes elsewhere in the lumbar spine stable. 2 mm posterior subluxation L3 upon L4 is more prominent, possibly due to positioning. If there is concern for instability flexion   and extension views could be helpful to assess this level. Otherwise satisfactory height and alignment lumbar spine. Leftward lumbar curve apex L4. Satisfactory appearance to the sacrum. Vascular calcification.  Degenerative changes both SI joints.   Obscuration of the right hemisacrum due to bowel gas and stool.  Consider flexion/extension views to assess possible L3-L4 instability as clinically indicated.        MR LUMBAR SPINE WO CONTRAST  LOCATION: [facility]  DATE/TIME: 5/19/2020   INDICATION: Back pain or radiculopathy, > 6 wks Low back pain with left radicular pain  COMPARISON: Correlation with abdomen and pelvis CT 02/28/2020  TECHNIQUE: Without IV contrast  FINDINGS:   Nomenclature is based on 5 lumbar type vertebral bodies. The conus ends at distal L1. Mild levocurvature of the lumbar spine. 1 mm retrolisthesis from L3-L4 to L5-S1. 20% superior endplate compression fracture of L2 with associated moderate marrow edema.   Small amount of prevertebral soft tissue edema from L1 to L3. The rest of the vertebral body heights are maintained. Nonpathologic marrow signal. No MRI evidence for pars defects. Mild symmetric atrophy of the posterior paraspinal musculature. Normal diameter of the distal abdominal aorta. The visualized bony pelvis and proximal femora are grossly within normal limits.  T12-L1: Mild intervertebral disc height loss. Loss of the normal T2 signal within the disc. Small broad-based disc bulge with superimposed left paracentral disc protrusion. Mild bilateral facet arthropathy. No spinal canal narrowing. No neuroforaminal narrowing.  L1-L2: Mild intervertebral disc height loss. Loss of the normal T2 signal within the disc. Small broad-based disc bulge. Mild bilateral facet arthropathy. No spinal canal narrowing. No neuroforaminal narrowing.  L2-L3: Mild intervertebral disc height loss. Loss of the normal T2 signal within the disc. Small broad-based disc bulge. Mild bilateral facet arthropathy. No spinal canal narrowing. No neuroforaminal narrowing.   L3-L4: Normal intervertebral disc height. Loss of the normal T2 signal within the disc. Small broad-based disc bulge. Moderate bilateral facet  arthropathy. Small amount of fluid within the facet joints. Mild narrowing of the lateral recesses. No spinal canal narrowing. Mild right neuroforaminal narrowing. Mild left neuroforaminal narrowing   L4-L5: Normal intervertebral disc height. Loss of the normal T2 signal within the disc. Small broad-based disc bulge. Moderate bilateral facet arthropathy. Small amount of fluid within the facet joints. Mild narrowing of the lateral recesses. No spinal canal narrowing. Mild right neuroforaminal narrowing. No left neuroforaminal narrowing. 2-3 mm bilateral lateral synovial cyst.  L5-S1: Normal intervertebral disc height. Loss of the normal T2 signal within the disc. Small broad-based disc bulge. Mild to moderate bilateral facet arthropathy. No spinal canal narrowing. No right neuroforaminal narrowing. No left neuroforaminal narrowing.  IMPRESSION:   1.  20% acute/subacute superior endplate compression fracture of L2.   2.  Mild narrowing of the lateral recesses at L3-L4 and L4-L5.  3.  No high-grade spinal canal.  4.  Mild bilateral neuroforaminal narrowing at L3-L4. Mild right neuroforaminal narrowing at L4-L5.  5.  Moderate facet arthropathy at L3-L4 and L4-L5 with small amount of fluid within the facet joints.

## 2021-07-28 NOTE — PROGRESS NOTES
Patient presents with:  Bat exposure: this morning at home       Clinical Decision Making: Unable to rule out exposure to bat.  Rabies vaccine and immunoglobulin recommended.  Patient referred to [facility] emergency department.      ICD-10-CM    1. Exposure to bat without known bite  Z20.9        Patient Instructions   Complete evaluation at [facility] emergency department.      HPI:  Claudia Wise is a 70 year old female who presents today complaining of concern for bat exposure.  Patient woke up at about 5:30 this morning and note that there was a bat in her room.  No known bites.  Use a broom to hit the bat outside of an open window, so they do not have the bite available for testing.     History obtained from the patient.    Problem List:  2021: Venous (peripheral) insufficiency  2021: Chronic kidney disease, stage 3  2020: Senile osteoporosis  2020: Compression fracture of L2 vertebra with routine healing  2020: Small bowel obstruction (H)  2019: Bilateral primary osteoarthritis of knee  2016: High risk medication use  2016: Seronegative rheumatoid arthritis of multiple sites (H)  Herpes Simplex Type II  Anxiety  Anemia in chronic kidney disease  Hyperlipemia  Gout  Hypertension  Prediabetes  Endometriosis  Other specified hypothyroidism      Past Medical History:   Diagnosis Date     Anemia      Anxiety      Chronic kidney disease      Diabetes mellitus, type II (H)     prediabetes     Disease of thyroid gland     hypothyroid     Family history of myocardial infarction      Gout      High cholesterol      Hypertension      Inverted nipple     right     Macular edema      Osteoarthritis 2012     Rheumatoid arthritis (H) 2012       Social History     Tobacco Use     Smoking status: Former Smoker     Quit date: 1995     Years since quittin.0     Smokeless tobacco: Never Used   Substance Use Topics     Alcohol use: Yes     Comment: Alcoholic Drinks/day: occasional couple times  a year       Review of Systems   All other systems reviewed and are negative.      Vitals:    07/28/21 1024   BP: 121/70   Pulse: 72   Resp: 20   Temp: 98  F (36.7  C)   SpO2: 97%   Weight: 72.6 kg (160 lb)       Physical Exam  Vitals and nursing note reviewed.   Constitutional:       General: She is not in acute distress.     Appearance: She is not toxic-appearing or diaphoretic.   HENT:      Head: Normocephalic and atraumatic.      Right Ear: External ear normal.      Left Ear: External ear normal.   Eyes:      Conjunctiva/sclera: Conjunctivae normal.   Pulmonary:      Effort: Pulmonary effort is normal. No respiratory distress.   Neurological:      Mental Status: She is alert.   Psychiatric:         Mood and Affect: Mood normal.         Behavior: Behavior normal.         Thought Content: Thought content normal.         Judgment: Judgment normal.       At the end of the encounter, I discussed results, diagnosis, medications. Discussed red flags for immediate return to clinic/ER, as well as indications for follow up if no improvement. Patient understood and agreed to plan. Patient was stable for discharge.

## 2021-07-28 NOTE — ED TRIAGE NOTES
Patient here as a referral for evaluation and treatment of a possible exposure to a bat that was found in her home today. Patient unaware if any direct contact had occurred.

## 2021-07-29 ENCOUNTER — OFFICE VISIT (OUTPATIENT)
Dept: PHYSICAL MEDICINE AND REHAB | Facility: CLINIC | Age: 71
End: 2021-07-29
Payer: COMMERCIAL

## 2021-07-29 VITALS — SYSTOLIC BLOOD PRESSURE: 119 MMHG | HEART RATE: 71 BPM | DIASTOLIC BLOOD PRESSURE: 57 MMHG | OXYGEN SATURATION: 99 %

## 2021-07-29 DIAGNOSIS — M79.18 LEFT BUTTOCK PAIN: ICD-10-CM

## 2021-07-29 DIAGNOSIS — M54.50 CHRONIC BILATERAL LOW BACK PAIN WITHOUT SCIATICA: ICD-10-CM

## 2021-07-29 DIAGNOSIS — G89.29 CHRONIC BILATERAL LOW BACK PAIN WITHOUT SCIATICA: ICD-10-CM

## 2021-07-29 DIAGNOSIS — M53.3 SI (SACROILIAC) JOINT DYSFUNCTION: ICD-10-CM

## 2021-07-29 DIAGNOSIS — M53.3 PAIN OF LEFT SACROILIAC JOINT: Primary | ICD-10-CM

## 2021-07-29 PROCEDURE — 99214 OFFICE O/P EST MOD 30 MIN: CPT | Performed by: NURSE PRACTITIONER

## 2021-07-29 RX ORDER — LIDOCAINE 50 MG/G
1 PATCH TOPICAL EVERY 24 HOURS
Qty: 14 PATCH | Refills: 1 | Status: SHIPPED | OUTPATIENT
Start: 2021-07-29 | End: 2021-09-24

## 2021-07-29 ASSESSMENT — PAIN SCALES - GENERAL: PAINLEVEL: MILD PAIN (2)

## 2021-07-29 NOTE — PATIENT INSTRUCTIONS
~Please call our [facility] Nurse Navigation line [phone] with any questions or concerns about your treatment plan, if symptoms worsen and you would like to be seen urgently, or if you have problems controlling bladder and bowel function.      You have been referred for Physical Therapy to [facility]. They will call you to schedule an appointment.  Scheduling phone number is [phone].  Discussed the importance of core strengthening, ROM, stretching exercises and how each of these entities is important in decreasing pain and improving long term spine health.  The purpose of physical therapy is to teach you an individualized home exercise program.  These exercises need to be performed every day in order to decrease pain and prevent future occurrences of pain.          An injection has been ordered today to potentially help with your pain symptoms. These injections do not fix what is going on in your back, therefore they typically do not take away the pain completely, however they can many times help improve symptoms. Injections should always be completed along with other modalities such as physical therapy for the best long term outcomes. If injections alone are done, then pain will likely return.     [facility] Injection Requirements      A  is required for all fluoroscopically-guided injections.    Injection appointments may be cancelled if there are signs/symptoms of an active infection or if the patient is being actively treated with antibiotics for a diagnosed infection.    Patients may have their steroid injection cancelled if they have had another steroid injection within 2 weeks.    Diabetic patients will have their blood glucose levels checked the day of their injection and the appointment will be rescheduled if the blood glucose level is 300 or higher.    Patients with allergies to cortisone, local anesthetics, iodine, or contrast dye  should contact the Spine Center to further discuss these considerations.    Patients scheduled for medial branch block diagnostic injections should refrain from taking pain medication the day of the procedure.  The medial branch block injection appointment will be rescheduled if the patient's pain rating is not 5/10 or greater at the time of the procedure.    Patients taking warfarin/Coumadin will have their INR checked the day of the procedure and the procedure may be rescheduled if the INR is greater than 3.0.    Please contact the Spine Center [phone]) if you are taking any prescription blood-thinning medications (warfarin, Plavix, Lovenox, Eliquis, Brilinta, Effient, etc.) as special dosing adjustments may need to be made depending on the type of injection you are scheduled to receive.    It is recommended that you delay having your steroid injection if you have received a flu shot or shingles vaccine within 2 weeks.      The body accepts sensory input. The inputtravels to the brain where the brain based on previous experience, education or culture comes up with a response. Since your brain is very powerful you have the opportunity to learn to adjust the response. Research hasdemonstrated over and over  that people who are active participants in mental health/behavioral health approaches have better outcomes.    Do you feel stressed?  Are you havingdifficulties balancing your emotions, life circumstances, and pain?  Did you know that talking with someone can help you learn ways to manage stress?    Psychotherapy at the [facility] is focusedon helping you manage the emotional and psychological aspects of your spine condition and pain. Spine conditions and related pain affect a number of areas of a person s life. Psychotherapy helps address the impactof your pain on your life and can give you new tools to deal with it.    Individual psychotherapy sessions are tailored to address your  specific needs.

## 2021-07-29 NOTE — LETTER
7/29/2021         RE: Claudia Wise   [address] [facility] [address]        Dear Colleague,    Thank you for referring your patient, Claudia Wise, to the [facility]. Please see a copy of my visit note below.      Assessment:     Diagnoses and all orders for this visit:  Pain of left sacroiliac joint  -     PAIN Joint Injection Sacroiliac Joint Left; Future  -     Physical Therapy Referral; Future  -     lidocaine (LIDODERM) 5 % patch; Place 1 patch onto the skin every 24 hours To prevent lidocaine toxicity, patient should be patch free for 12 hrs daily.  Chronic bilateral low back pain without sciatica  -     PAIN Joint Injection Sacroiliac Joint Left; Future  -     Physical Therapy Referral; Future  Left buttock pain  -     PAIN Joint Injection Sacroiliac Joint Left; Future  -     Physical Therapy Referral; Future  -     lidocaine (LIDODERM) 5 % patch; Place 1 patch onto the skin every 24 hours To prevent lidocaine toxicity, patient should be patch free for 12 hrs daily.  SI (sacroiliac) joint dysfunction  -     PAIN Joint Injection Sacroiliac Joint Left; Future  -     Physical Therapy Referral; Future     Claudia Wise is a 70 year old y.o. female with past medical history significant for anxiety, rheumatoid arthritis managed by Dr. Vinson, diabetes myelitis, hypertension, hyperlipidemia, chronic kidney disease, thyroid disorder, gout, osteopenia, recent small bowel obstruction with surgical intervention who presents today for follow-up regarding:    -Bilateral left greater than right low back pain with 50% relief with radiofrequency ablation.    -Ongoing worsening significant left buttock pain localizing to the sacroiliac joint consistent with sacroiliac joint dysfunction     Plan:     A shared decision making plan was used. The patient's values and choices were respected. Prior medical records were reviewed today. The following represents what was discussed and  decided upon by the provider and the patient.        -DIAGNOSTIC TESTS: Images were personally reviewed and interpreted.   --Lumbar spine x-ray 8/24/2020 shows stable L2 compression deformity.  --Lumbar spine x-ray 7/1/2020 shows stable L2 compression deformity, grade 1 L3-4 spondylolisthesis.  --Lumbar spine MRI 5/19/2020 with 20% acute/subacute compression fracture L2.  There is mild bilateral lateral recess stenosis L3-4 and L4-5.  Bilateral synovial cysts L4-5 with moderate facet arthropathy.    -INTERVENTIONS: Ordered a left SI joint steroid injection, recommend scheduling 6 weeks post RFA which would be 8/13/2021 which patient is in agreements with.  Advised patient that if her pain improves in the next 2 weeks then I would recommend canceling the injection.      -MEDICATIONS: Prescribed lidocaine patch to trial to left buttock which has given her benefit in the past.   Discussed side effects of medications and proper use. Patient verbalized understanding.    -We did discuss potential referral to behavioral therapy which would be a good option for her, she wants to hold off at this time but will consider this in the future.    -PHYSICAL THERAPY: Referral to physical therapy placed to establish home exercises for SI joint dysfunction.  Discussed the importance of core strengthening, ROM, stretching exercises with the patient and how each of these entities is important in decreasing pain.  Explained to the patient that the purpose of physical therapy is to teach the patient a home exercise program.  These exercises need to be performed every day in order to decrease pain and prevent future occurrences of pain.        -PATIENT EDUCATION:  Total time of 32 minutes spent with the patient, reviewing the chart, placing orders, and documenting.   -Today we also discussed the issues related to the current COVID-19 pandemic, the pros and cons of the current treatment plan, the CDC guidelines such as social distancing,  washing the hands, and covering the cough.    -FOLLOW UP: Follow-up for injection with Dr. Gonzalez 8/13/2021 or after.  Advised to contact clinic if symptoms worsen or change.    Subjective:     Claudia Wise is a 70 year old female who presents today for follow-up regarding chronic bilateral low back pain that did improve slightly with radiofrequency ablation, about 50% relief she reports at this time.  However her biggest concern today is actually her buttock pain not her low back pain.  Her current pain is a 2/10 but up to a 6 at its worst again on the left buttock only with tenderness to palpation at that area.  Increased pain with standing as well as prolonged sitting, does improve with laying.  She otherwise denies any radiating pain into the lower extremities, denies any recent trips or falls or balance changes, denies bowel or bladder loss control.    Treatment to Date: No prior spinal surgery or spinal injection.  Physical therapy [facility] x7 sessions last 8/28/2024 compression fracture/spine pain.     Left L2, L3, L4 MBB 51/2/2021 with positive response.  Left L2, L3, L4 MBB 6/4/2021 with positive response.  Bilateral L2, L3, L4 RFA 7/2/2021 with 50% relief LBP 4 weeks postinjection.  Preprocedure pain 6/10, post 2/10.    Medications:  Plaquenil 4 seronegative rheumatoid arthritis  Tylenol  Hydrocodone managed by Dr. Erasto Guzmán 25 mg bedtime however patient only tried this medication for a couple of days with no benefit therefore she discontinued on her own.    Patient Active Problem List   Diagnosis     Herpes Simplex Type II     Anxiety     Anemia in chronic kidney disease     Hyperlipemia     Gout     Hypertension     Prediabetes     Endometriosis     Other specified hypothyroidism     Seronegative rheumatoid arthritis of multiple sites (H)     High risk medication use     Bilateral primary osteoarthritis of knee     Small bowel obstruction (H)     Senile osteoporosis     Compression  fracture of L2 vertebra with routine healing     Chronic kidney disease, stage 3     Venous (peripheral) insufficiency     Exposure to bat without known bite       Current Outpatient Medications   Medication     acetaminophen (TYLENOL) 500 MG tablet     lidocaine (LIDODERM) 5 % patch     acyclovir (ZOVIRAX) 400 MG tablet     allopurinoL (ZYLOPRIM) 100 MG tablet     amLODIPine (NORVASC) 10 MG tablet     atorvastatin (LIPITOR) 10 MG tablet     carboxymethylcellulose (REFRESH PLUS) 0.5 % Dpet ophthalmic dropperette     cyanocobalamin, vitamin B-12, 1,000 mcg Subl     denosumab (PROLIA) 60 MG/ML SOSY injection     DULoxetine (CYMBALTA) 20 MG capsule     fluticasone propionate (FLONASE) 50 mcg/actuation nasal spray     folic acid (FOLVITE) 1 MG tablet     HYDROcodone-acetaminophen 5-325 mg per tablet     hydrOXYchloroQUINE (PLAQUENIL) 200 mg tablet     levothyroxine (SYNTHROID, LEVOTHROID) 50 MCG tablet     LORazepam (ATIVAN) 0.5 MG tablet     losartan (COZAAR) 50 MG tablet     melatonin 3 mg Tab tablet     metoprolol tartrate (LOPRESSOR) 50 MG tablet     PARoxetine (PAXIL) 10 MG tablet     RESTASIS 0.05 % ophthalmic emulsion     sodium bicarbonate 650 MG tablet     No current facility-administered medications for this visit.       Allergies   Allergen Reactions     Bupropion Hcl [Bupropion] Nausea     Erythromycin Base [Erythromycin] Nausea     Paxil [Paroxetine] Diarrhea     Tetracyclines Itching     Valacyclovir Nausea       Past Medical History:   Diagnosis Date     Anemia      Anxiety      Chronic kidney disease      Diabetes mellitus, type II (H)     prediabetes     Disease of thyroid gland     hypothyroid     Family history of myocardial infarction      Gout      High cholesterol      Hypertension      Inverted nipple     right     Macular edema      Osteoarthritis 2012     Rheumatoid arthritis (H) 2012        Review of Systems  ROS:  Specifically negative for bowel/bladder dysfunction, balance changes, headache,  dizziness, foot drop, fevers, chills, appetite changes, nausea/vomiting, unexplained weight loss. Otherwise 13 systems reviewed are negative. Please see the patient's intake questionnaire from today for details.    Reviewed Social, Family, Past Medical and Past Surgical history with patient, no significant changes noted since prior visit.     Objective:     /57 (BP Location: Right arm, Patient Position: Sitting)   Pulse 71   SpO2 99%     PHYSICAL EXAMINATION:    --CONSTITUTIONAL: Well developed, well nourished, healthy appearing individual.  --PSYCHIATRIC: Appropriate mood and affect. No difficulty interacting due to temper, social withdrawal, or memory issues.  --SKIN: Lumbar region is dry and intact.   --RESPIRATORY: Normal rhythm and effort. No abnormal accessory muscle breathing patterns noted.   --MUSCULOSKELETAL:  Normal lumbar lordosis noted, no lateral shift.  --GROSS MOTOR: Easily arises from a seated position. Gait is non-antalgic  --LUMBAR SPINE:  Inspection reveals no evidence of deformity. Range of motion is not limited in lumbar flexion, extension, lateral rotation. No tenderness to palpation lumbar spine. Straight leg raising in the supine position is negative to radicular pain. Sciatic notch non-tender on the right, significantly tender on the left.   --SACROILIAC JOINT: Positive left distraction.  Positive left Alyssa's with reproduction of pain to affected extremity. One Finger point test positive left.  --LOWER EXTREMITY MOTOR TESTING:  Plantar flexion left 5/5, right 5/5   Dorsiflexion left 5/5, right 5/5   Great toe MTP extension left 5/5, right 5/5  Knee flexion left 5/5, right 5/5  Knee extension left 5/5, right 5/5   Hip flexion left 5/5, right 5/5  Hip abduction left 5/5, right 5/5  Hip adduction left 5/5, right 5/5   --HIPS: Full range of motion bilaterally. Negative FABERs on the involved lower extremity.   --NEUROLOGIC: Bilateral patellar and achilles reflexes are physiologic and  symmetric. Sensation to light touch is intact in the bilateral L4, L5, and S1 dermatomes.    RESULTS:   Imaging: Lumbar spine imaging was reviewed today. The images were shown to the patient and the findings were explained using a spine model.      Xr Lumbar Spine 2 Or 3 Vws  Result Date: 8/25/2020  INDICATION: Evaluate L2 compression fracture. COMPARISON: 7/10/2020.   Five lumbar-type vertebral bodies. Stable moderate L2 compression deformity. Segmental kyphosis about the fracture from the superior L1 endplate through the inferior L3 endplate measures 21 degrees, which is unchanged. Vertebral body heights are otherwise maintained. Mild loss of disc space height L3-L4 through L5-S1. Mild lower lumbar facet arthropathy.        XR LUMBAR SPINE 2 OR 3 VWS  LOCATION: [facility]  DATE/TIME: 7/1/2020   INDICATION: Low back pain compression fracture L2 evaluate for stability  COMPARISON: 06/08/2020  IMPRESSION:   Very shallow left apex curvature of the lumbar spine. Grade 1 retrolisthesis of L3 on L4. The bones appear diffusely demineralized, suggestive of osteoporosis. Stable appearance of the L2 compression deformity. No new compression deformities.   Atherosclerotic calcification of the abdominal aorta. Degenerative changes of the sacroiliac joints.        XR LUMBAR SPINE 2 OR 3 VWS  LOCATION: [facility]  DATE/TIME: 6/8/2020   INDICATION: Low back pain evaluate L2 compression fracture.  COMPARISON: MR 05/19/2020.  IMPRESSION:   Five lumbar type vertebral bodies presumed. Subacute/chronic appearing compression is identified at the L2, with approximately 30-40% loss of superior body height, and fragmentation anterior superior corner similar to previous MR appearance. Sclerosis   associated with the compressed superior endplate at this level. Degenerative changes elsewhere in the lumbar spine stable. 2 mm posterior subluxation L3 upon L4 is more prominent, possibly due to positioning. If there is  concern for instability flexion   and extension views could be helpful to assess this level. Otherwise satisfactory height and alignment lumbar spine. Leftward lumbar curve apex L4. Satisfactory appearance to the sacrum. Vascular calcification. Degenerative changes both SI joints.   Obscuration of the right hemisacrum due to bowel gas and stool.  Consider flexion/extension views to assess possible L3-L4 instability as clinically indicated.        MR LUMBAR SPINE WO CONTRAST  LOCATION: [facility]  DATE/TIME: 5/19/2020   INDICATION: Back pain or radiculopathy, > 6 wks Low back pain with left radicular pain  COMPARISON: Correlation with abdomen and pelvis CT 02/28/2020  TECHNIQUE: Without IV contrast  FINDINGS:   Nomenclature is based on 5 lumbar type vertebral bodies. The conus ends at distal L1. Mild levocurvature of the lumbar spine. 1 mm retrolisthesis from L3-L4 to L5-S1. 20% superior endplate compression fracture of L2 with associated moderate marrow edema.   Small amount of prevertebral soft tissue edema from L1 to L3. The rest of the vertebral body heights are maintained. Nonpathologic marrow signal. No MRI evidence for pars defects. Mild symmetric atrophy of the posterior paraspinal musculature. Normal diameter of the distal abdominal aorta. The visualized bony pelvis and proximal femora are grossly within normal limits.  T12-L1: Mild intervertebral disc height loss. Loss of the normal T2 signal within the disc. Small broad-based disc bulge with superimposed left paracentral disc protrusion. Mild bilateral facet arthropathy. No spinal canal narrowing. No neuroforaminal narrowing.  L1-L2: Mild intervertebral disc height loss. Loss of the normal T2 signal within the disc. Small broad-based disc bulge. Mild bilateral facet arthropathy. No spinal canal narrowing. No neuroforaminal narrowing.  L2-L3: Mild intervertebral disc height loss. Loss of the normal T2 signal within the disc. Small broad-based disc  bulge. Mild bilateral facet arthropathy. No spinal canal narrowing. No neuroforaminal narrowing.   L3-L4: Normal intervertebral disc height. Loss of the normal T2 signal within the disc. Small broad-based disc bulge. Moderate bilateral facet arthropathy. Small amount of fluid within the facet joints. Mild narrowing of the lateral recesses. No spinal canal narrowing. Mild right neuroforaminal narrowing. Mild left neuroforaminal narrowing   L4-L5: Normal intervertebral disc height. Loss of the normal T2 signal within the disc. Small broad-based disc bulge. Moderate bilateral facet arthropathy. Small amount of fluid within the facet joints. Mild narrowing of the lateral recesses. No spinal canal narrowing. Mild right neuroforaminal narrowing. No left neuroforaminal narrowing. 2-3 mm bilateral lateral synovial cyst.  L5-S1: Normal intervertebral disc height. Loss of the normal T2 signal within the disc. Small broad-based disc bulge. Mild to moderate bilateral facet arthropathy. No spinal canal narrowing. No right neuroforaminal narrowing. No left neuroforaminal narrowing.  IMPRESSION:   1.  20% acute/subacute superior endplate compression fracture of L2.   2.  Mild narrowing of the lateral recesses at L3-L4 and L4-L5.  3.  No high-grade spinal canal.  4.  Mild bilateral neuroforaminal narrowing at L3-L4. Mild right neuroforaminal narrowing at L4-L5.  5.  Moderate facet arthropathy at L3-L4 and L4-L5 with small amount of fluid within the facet joints.                  Again, thank you for allowing me to participate in the care of your patient.        Sincerely,        Loree Rivas, CNP

## 2021-07-31 ENCOUNTER — INFUSION THERAPY VISIT (OUTPATIENT)
Dept: INFUSION THERAPY | Facility: CLINIC | Age: 71
End: 2021-07-31
Attending: EMERGENCY MEDICINE
Payer: MEDICARE

## 2021-07-31 VITALS — DIASTOLIC BLOOD PRESSURE: 53 MMHG | SYSTOLIC BLOOD PRESSURE: 145 MMHG | HEART RATE: 79 BPM | OXYGEN SATURATION: 99 %

## 2021-07-31 DIAGNOSIS — Z20.9 EXPOSURE TO BAT WITHOUT KNOWN BITE: Primary | ICD-10-CM

## 2021-07-31 PROCEDURE — 90675 RABIES VACCINE IM: CPT | Performed by: EMERGENCY MEDICINE

## 2021-07-31 PROCEDURE — 90471 IMMUNIZATION ADMIN: CPT | Performed by: EMERGENCY MEDICINE

## 2021-07-31 PROCEDURE — 250N000011 HC RX IP 250 OP 636: Performed by: EMERGENCY MEDICINE

## 2021-07-31 RX ORDER — DIPHENHYDRAMINE HYDROCHLORIDE 50 MG/ML
50 INJECTION INTRAMUSCULAR; INTRAVENOUS
Status: CANCELLED
Start: 2021-08-04

## 2021-07-31 RX ORDER — MEPERIDINE HYDROCHLORIDE 25 MG/ML
25 INJECTION INTRAMUSCULAR; INTRAVENOUS; SUBCUTANEOUS EVERY 30 MIN PRN
Status: CANCELLED | OUTPATIENT
Start: 2021-08-04

## 2021-07-31 RX ORDER — NALOXONE HYDROCHLORIDE 0.4 MG/ML
0.2 INJECTION, SOLUTION INTRAMUSCULAR; INTRAVENOUS; SUBCUTANEOUS
Status: CANCELLED | OUTPATIENT
Start: 2021-08-04

## 2021-07-31 RX ORDER — EPINEPHRINE 1 MG/ML
0.3 INJECTION, SOLUTION INTRAMUSCULAR; SUBCUTANEOUS EVERY 5 MIN PRN
Status: CANCELLED | OUTPATIENT
Start: 2021-08-04

## 2021-07-31 RX ORDER — ALBUTEROL SULFATE 90 UG/1
1-2 AEROSOL, METERED RESPIRATORY (INHALATION)
Status: CANCELLED
Start: 2021-08-04

## 2021-07-31 RX ORDER — METHYLPREDNISOLONE SODIUM SUCCINATE 125 MG/2ML
125 INJECTION, POWDER, LYOPHILIZED, FOR SOLUTION INTRAMUSCULAR; INTRAVENOUS
Status: CANCELLED
Start: 2021-08-04

## 2021-07-31 RX ORDER — ALBUTEROL SULFATE 0.83 MG/ML
2.5 SOLUTION RESPIRATORY (INHALATION)
Status: CANCELLED | OUTPATIENT
Start: 2021-08-04

## 2021-07-31 RX ADMIN — RABIES VACCINE 1 ML: KIT at 13:06

## 2021-07-31 NOTE — LETTER
7/31/2021         RE: Claudia Wise   [address] [facility] [address]        Dear Colleague,    Thank you for referring your patient, Claudia Wise, to the [facility]. Please see a copy of my visit note below.    Nursing Note  Claudia Wise presents today to [facility] for: Rabies Vaccination.  During [facility] appointment, orders from Dr. Shayna Aragon were completed.  Frequency: patient will return for 3 remaining treatments.    Progress note:  Patient identification verified by name and date of birth.  Assessment completed.  Vitals recorded in Doc Flowsheets.  Patient was provided with education regarding medication/procedure and possible side effects.  Patient verbalized understanding.     present during visit today: Not Applicable.    Treatment Conditions: Non-applicable.    Premedications: were not ordered.    Drug Waste Record: No    Infusion length and rate:  Injection given in right deltoid muscle.    Labs: were not ordered for this appointment    Post Infusion Assessment:  Patient tolerated injection without incident.     Discharge Plan:   Follow up plan of care with: ongoing infusions at [facility].  Discharge instructions were reviewed with patient.  Patient/representative verbalized understanding of discharge instructions and all questions answered.  Patient discharged from [facility] in stable condition.    Leilani Sharma RN    Administrations This Visit     rabies vaccine, PCEC (chick embryo derived) (RABAVERT) injection 1 mL     Admin Date  07/31/2021 Action  Given Dose  1 mL Route  Intramuscular Administered By  Leilani Sharma RN                BP (!) 145/73 (BP Location: Right arm)   Pulse 79           Again, thank you for allowing me to participate in the care of your patient.         Sincerely,        [facility]

## 2021-07-31 NOTE — PROGRESS NOTES
Nursing Note  Claudia Wise [facility] for: Rabies Vaccination.  During [facility]'s [facility] appointment, orders from Dr. Shayna Aragon were completed.  Frequency: patient will return for 3 remaining treatments.    Progress note:  Patient identification verified by name and date of birth.  Assessment completed.  Vitals recorded in Doc Flowsheets.  Patient was provided with education regarding medication/procedure and possible side effects.  Patient verbalized understanding.     present during visit today: Not Applicable.    Treatment Conditions: Non-applicable.    Premedications: were not ordered.    Drug Waste Record: No    Infusion length and rate:  Injection given in right deltoid muscle.    Labs: were not ordered for this appointment    Post Infusion Assessment:  Patient tolerated injection without incident.     Discharge Plan:   Follow up plan of care with: ongoing infusions at  [facility].  Discharge instructions were reviewed with patient.  Patient/representative verbalized understanding of discharge instructions and all questions answered.  [facility] discharged [facility] in stable condition.    Leilani Sharma RN    Administrations This Visit     rabies vaccine, PCEC (chick embryo derived) (RABAVERT) injection 1 mL     Admin Date  07/31/2021 Action  Given Dose  1 mL Route  Intramuscular Administered By  Leilani Sharma RN                BP (!) 145/73 (BP Location: Right arm)   Pulse 79

## 2021-07-31 NOTE — PATIENT INSTRUCTIONS
Dear Claudia Wise    Thank you for choosing [facility] for your infusion.  The following information is a summary of our appointment as well as important reminders.  Patient Education     Patient Education    Rabies Vaccine Suspension for injection    Rabies Vaccine Suspension for injection  Rabies Vaccine Suspension for injection  What is this medicine?  RABIES VACCINE (ray BEES vax EEN) is used to prevent rabies infection. Rabies is mostly a disease of animals. Humans may get rabies if they are bitten by animals that have rabies. The vaccine may be given to protect someone with a high risk of rabies or it may be given to someone after they have been exposed.  This medicine may be used for other purposes; ask your health care provider or pharmacist if you have questions.  What should I tell my health care provider before I take this medicine?  They need to know if you have any of these conditions:    bleeding disorder    cancer    HIV or AIDS    immune system problems    low blood counts, like low white cell, platelet, or red cell counts    recent or ongoing radiation therapy    take medicines that treat or prevent blood clots    an unusual or allergic reaction to vaccines, albumin, eggs, neomycin, other medicines, foods, dyes, or preservatives    pregnant or trying to get pregnant    breast-feeding  How should I use this medicine?  This vaccine is for injection into a muscle. It is given by a health care professional.  A copy of Vaccine Information Statements will be given before each vaccination. Read this sheet carefully each time. The sheet may change frequently.  Talk to your pediatrician regarding the use of this medicine in children. While this drug may be prescribed for children and infants, precautions do apply.  Overdosage: If you think you've taken too much of this medicine contact a poison control center or emergency room at  once.  NOTE: This medicine is only for you. Do not share this medicine with others.  What if I miss a dose?  Keep appointments for follow-up doses as directed. It is important not to miss your dose. Call your doctor or health care professional if you are unable to keep an appointment. All of the vaccine doses must be given in order to provide proper protection.  What may interact with this medicine?    antimalarial drugs    etanercept    immune globulins    infliximab    medicines for organ transplant    medicines to treat cancer    other vaccines    some medicines for arthritis    steroid medicines like prednisone or cortisone  This list may not describe all possible interactions. Give your health care provider a list of all the medicines, herbs, non-prescription drugs, or dietary supplements you use. Also tell them if you smoke, drink alcohol, or use illegal drugs. Some items may interact with your medicine.  What should I watch for while using this medicine?  This vaccine, like all vaccines, may not fully protect everyone.  What side effects may I notice from receiving this medicine?  Side effects that you should report to your doctor or health care professional as soon as possible:    allergic reactions like skin rash, itching or hives, swelling of the face, lips, or tongue    changes in vision    joint pain with fever    pain, tingling, numbness in the hands or feet    stiff neck and sensitivity to light    unusually weak or tired  Side effects that usually do not require medical attention (Report these to your doctor or health care professional if they continue or are bothersome.):    dizziness    headache    muscle aches and pains    pain, redness, itching or swelling at site where injected    stomach pain    tiredness  This list may not describe all possible side effects. Call your doctor for medical advice about side effects. You may report side effects to FDA at 5-935-FDA-5406.  Where should I keep my  medicine?  This drug is given in a hospital or clinic and will not be stored at home.  NOTE: This sheet is a summary. It may not cover all possible information. If you have questions about this medicine, talk to your doctor, pharmacist, or health care provider.  NOTE:This sheet is a summary. It may not cover all possible information. If you have questions about this medicine, talk to your doctor, pharmacist, or health care provider. Copyright  2016 [employer] Standard             We look forward in seeing you on your next appointment here at [facility] ([facility]).  Please don t hesitate to call us at [phone] to reschedule any of your appointments or to speak with one of the [facility] registered nurses.  It was a pleasure taking care of you today.    Sincerely,    [facility]  [facility]  [address] [place] [address]  [id number]  Phone:  [phone]

## 2021-08-02 ENCOUNTER — HOSPITAL ENCOUNTER (OUTPATIENT)
Dept: PHYSICAL THERAPY | Facility: REHABILITATION | Age: 71
End: 2021-08-02
Attending: NURSE PRACTITIONER
Payer: COMMERCIAL

## 2021-08-02 DIAGNOSIS — M53.3 SI (SACROILIAC) JOINT DYSFUNCTION: ICD-10-CM

## 2021-08-02 DIAGNOSIS — G89.29 CHRONIC BILATERAL LOW BACK PAIN WITHOUT SCIATICA: Primary | ICD-10-CM

## 2021-08-02 DIAGNOSIS — M53.3 PAIN OF LEFT SACROILIAC JOINT: ICD-10-CM

## 2021-08-02 DIAGNOSIS — M79.18 LEFT BUTTOCK PAIN: ICD-10-CM

## 2021-08-02 DIAGNOSIS — M54.50 CHRONIC BILATERAL LOW BACK PAIN WITHOUT SCIATICA: Primary | ICD-10-CM

## 2021-08-02 PROCEDURE — 97110 THERAPEUTIC EXERCISES: CPT | Mod: GP | Performed by: PHYSICAL THERAPIST

## 2021-08-02 PROCEDURE — 97140 MANUAL THERAPY 1/> REGIONS: CPT | Mod: GP | Performed by: PHYSICAL THERAPIST

## 2021-08-02 PROCEDURE — 97161 PT EVAL LOW COMPLEX 20 MIN: CPT | Mod: GP | Performed by: PHYSICAL THERAPIST

## 2021-08-02 NOTE — PROGRESS NOTES
[facility]          OUTPATIENT PHYSICAL THERAPY ORTHOPEDIC EVALUATION  PLAN OF TREATMENT FOR OUTPATIENT REHABILITATION  (COMPLETE FOR INITIAL CLAIMS ONLY)  Patient's Last Name, First Name, M.I.  YOB: 1950  FrandyClaudia  JEAN    Provider s Name:  [facility]   Medical Record No.  [id number]   Start of Care Date:  08/02/21   Onset Date:      Type:     _X__PT   ___OT   ___SLP Medical Diagnosis:  Chronic bilateral low back pain without sciatica, Left buttock pain, Pain of left sacroiliac joint, SI (sacroiliac) joint dysfunction     PT Diagnosis:  pain, poor posture,    Visits from SOC:  1      _________________________________________________________________________________  Plan of Treatment/Functional Goals:  joint mobilization, manual therapy, neuromuscular re-education, ROM, strengthening, stretching           Goals  Goal Identifier: HEP  Goal Description: Patient will be independent with HEP and self management of symptoms  Target Date: 10/31/21    Goal Identifier: standing   Goal Description: Patient will stand for 20 minutes with pain 3/10 or less for meal prep  Target Date: 10/31/21    Goal Identifier: walking  Goal Description: Patient will walk for 15 minutes without pain  Target Date: 10/31/21                                                           Therapy Frequency:  2 times/Week  Predicted Duration of Therapy Intervention:  12 weeks    Ricky Guthrie, PT                 I CERTIFY THE NEED FOR THESE SERVICES FURNISHED UNDER        THIS PLAN OF TREATMENT AND WHILE UNDER MY CARE     (Physician co-signature of this document indicates review and certification of the therapy plan).                       Certification Date From:  08/02/21   Certification Date To:  10/31/21    Referring Provider:  Loree Rivsa CNP    Initial Assessment        See Epic Evaluation Start of Care Date: 08/02/21 08/02/21 0800  "  General Information   Type of Visit Initial OP Ortho PT Evaluation   Start of Care Date 08/02/21   Referring Physician Loree Rivas CNP   Patient/Family Goals Statement \"walk comfortable\"   Orders Evaluate and Treat   Date of Order 07/29/21   Certification Required? Yes   Medical Diagnosis Chronic bilateral low back pain without sciatica, Left buttock pain, Pain of left sacroiliac joint, SI (sacroiliac) joint dysfunction   Body Part(s)   Body Part(s) Lumbar Spine/SI   Presentation and Etiology   Impairments A. Pain;D. Decreased ROM;E. Decreased flexibility;F. Decreased strength and endurance   Functional Limitations perform activities of daily living   Symptom Location low back/SI  area   Pain rating (0-10 point scale) Best (/10);Worst (/10)   Best (/10) 4   Worst (/10) 6   Pain/symptoms exacerbated by I. Bending   Fall Risk Screen   Fall screen completed by PT   Have you fallen 2 or more times in the past year? No   Have you fallen and had an injury in the past year? No   Is patient a fall risk? No   Abuse Screen (yes response referral indicated)   Feels Unsafe at Home or Work/School no   Feels Threatened by Someone no   Does Anyone Try to Keep You From Having Contact with Others or Doing Things Outside Your Home? no   Physical Signs of Abuse Present no   System Outcome Measures   Outcome Measures Low Back Pain (see Oswestry and Emily)  (62%)   Lumbar Spine/SI Objective Findings   Posture flexed at waist with forward head, neck and shoulders   Gait/Locomotion slow antalgic   Flexion ROM severe motion loss   Extension ROM moderate   Right Side Bending ROM severe   Left Side Bending ROM severe   Hip Flexion (L2) Strength 4   Hip Abduction Strength 4   Hip Adduction Strength 4   Hip Extension Strength 4   Knee Flexion Strength 4   Knee Extension (L3) Strength 5   Ankle Dorsiflexion (L4) Strength 5   Hamstring Flexibility mod   Hip Flexor Flexibility mod severe   Quadricep Flexibility mod   SLR -   Slump Test " -   Planned Therapy Interventions   Planned Therapy Interventions joint mobilization;manual therapy;neuromuscular re-education;ROM;strengthening;stretching   Clinical Impression   Criteria for Skilled Therapeutic Interventions Met yes, treatment indicated   PT Diagnosis pain, poor posture,    Influenced by the following impairments left sided low back pain, SI pain, decreased strength   Clinical Presentation Stable/Uncomplicated   Clinical Decision Making (Complexity) Low complexity   Therapy Frequency 2 times/Week   Predicted Duration of Therapy Intervention (days/wks) 12 weeks   Risk & Benefits of therapy have been explained Yes   Patient, Family & other staff in agreement with plan of care Yes   Clinical Impression Comments Patient presentation consistent with medical diagnosis.   Low back and SI pain.   ORTHO GOALS   PT Ortho Eval Goals 1;2;3   Ortho Goal 1   Goal Identifier HEP   Goal Description Patient will be independent with HEP and self management of symptoms   Target Date 10/31/21   Ortho Goal 2   Goal Identifier standing    Goal Description Patient will stand for 20 minutes with pain 3/10 or less for meal prep   Target Date 10/31/21   Ortho Goal 3   Goal Identifier walking   Goal Description Patient will walk for 15 minutes without pain   Target Date 10/31/21   Total Evaluation Time   PT Eval, Low Complexity Minutes (58766) 2   Therapy Certification   Certification date from 08/02/21   Certification date to 10/31/21   Medical Diagnosis Chronic bilateral low back pain without sciatica, Left buttock pain, Pain of left sacroiliac joint, SI (sacroiliac) joint dysfunction

## 2021-08-03 PROBLEM — K56.609 SBO (SMALL BOWEL OBSTRUCTION) (H): Status: RESOLVED | Noted: 2020-01-21 | Resolved: 2020-03-25

## 2021-08-04 ENCOUNTER — INFUSION THERAPY VISIT (OUTPATIENT)
Dept: INFUSION THERAPY | Facility: CLINIC | Age: 71
End: 2021-08-04
Payer: MEDICARE

## 2021-08-04 VITALS
DIASTOLIC BLOOD PRESSURE: 80 MMHG | TEMPERATURE: 97.6 F | RESPIRATION RATE: 18 BRPM | SYSTOLIC BLOOD PRESSURE: 162 MMHG | HEART RATE: 85 BPM | OXYGEN SATURATION: 96 %

## 2021-08-04 DIAGNOSIS — Z20.9 EXPOSURE TO BAT WITHOUT KNOWN BITE: Primary | ICD-10-CM

## 2021-08-04 PROCEDURE — 90471 IMMUNIZATION ADMIN: CPT | Performed by: EMERGENCY MEDICINE

## 2021-08-04 PROCEDURE — 250N000011 HC RX IP 250 OP 636: Performed by: EMERGENCY MEDICINE

## 2021-08-04 PROCEDURE — 90675 RABIES VACCINE IM: CPT | Performed by: EMERGENCY MEDICINE

## 2021-08-04 RX ORDER — METHYLPREDNISOLONE SODIUM SUCCINATE 125 MG/2ML
125 INJECTION, POWDER, LYOPHILIZED, FOR SOLUTION INTRAMUSCULAR; INTRAVENOUS
Status: CANCELLED
Start: 2021-08-08

## 2021-08-04 RX ORDER — ALBUTEROL SULFATE 0.83 MG/ML
2.5 SOLUTION RESPIRATORY (INHALATION)
Status: CANCELLED | OUTPATIENT
Start: 2021-08-08

## 2021-08-04 RX ORDER — DIPHENHYDRAMINE HYDROCHLORIDE 50 MG/ML
50 INJECTION INTRAMUSCULAR; INTRAVENOUS
Status: CANCELLED
Start: 2021-08-08

## 2021-08-04 RX ORDER — ALBUTEROL SULFATE 90 UG/1
1-2 AEROSOL, METERED RESPIRATORY (INHALATION)
Status: CANCELLED
Start: 2021-08-08

## 2021-08-04 RX ORDER — MEPERIDINE HYDROCHLORIDE 50 MG/ML
25 INJECTION INTRAMUSCULAR; INTRAVENOUS; SUBCUTANEOUS EVERY 30 MIN PRN
Status: CANCELLED | OUTPATIENT
Start: 2021-08-08

## 2021-08-04 RX ORDER — EPINEPHRINE 1 MG/ML
0.3 INJECTION, SOLUTION INTRAMUSCULAR; SUBCUTANEOUS EVERY 5 MIN PRN
Status: CANCELLED | OUTPATIENT
Start: 2021-08-08

## 2021-08-04 RX ORDER — NALOXONE HYDROCHLORIDE 0.4 MG/ML
0.2 INJECTION, SOLUTION INTRAMUSCULAR; INTRAVENOUS; SUBCUTANEOUS
Status: CANCELLED | OUTPATIENT
Start: 2021-08-08

## 2021-08-04 RX ADMIN — RABIES VACCINE 1 ML: KIT at 14:35

## 2021-08-04 NOTE — PROGRESS NOTES
Claudia was here today for injection.  Pt tolerated well and was discharged.      Sharon Reddy CMA

## 2021-08-05 DIAGNOSIS — M06.09 SERONEGATIVE RHEUMATOID ARTHRITIS OF MULTIPLE SITES (H): ICD-10-CM

## 2021-08-05 NOTE — TELEPHONE ENCOUNTER
Left message for pt to call back    Per Dr Vinson- pt should hold HCQ one week as she just got rabies vaccine yesterday that can have contraindication with HCQ

## 2021-08-06 NOTE — TELEPHONE ENCOUNTER
Pt notified to hold HCQ for one week after rabies vaccin e. Pt states she has received two rabies vaccines so far and will be getting another next Wednesday, pt will hold HCQ for one week after that vaccine. Pt wonders if she should get the full 5 shot series for rabies, pt was told to ask Dr Vinson due to being immunocompromised from medications. Pt stopped methotrexate about a month ago as discussed at last ov. Pt is not sure if she was actually bit by the bat but is getting rabies vaccine out of caution.

## 2021-08-06 NOTE — PATIENT INSTRUCTIONS - HE
Patient Instructions by Isabel Maurer MD at 10/1/2020  3:20 PM     Author: Isabel Maurer MD Service: -- Author Type: Physician    Filed: 10/1/2020  3:45 PM Encounter Date: 10/1/2020 Status: Addendum    : Isabel Maurer MD (Physician)    Related Notes: Original Note by Isabel Maurer MD (Physician) filed at 10/1/2020  3:32 PM       2nd Prolia in December.    To see [facility].    F/u with Rheumatology.     Schedule mammogram.      Patient Education     Your Health Risk Assessment indicates you feel you are not in good physical health.    A healthy lifestyle helps keep the body fit and the mind alert. It helps protect you from disease, helps you fight disease, and helps prevent chronic disease (disease that doesn't go away) from getting worse. This is important as you get older and begin to notice twinges in muscles and joints and a decline in the strength and stamina you once took for granted. A healthy lifestyle includes good healthcare, good nutrition, weight control, recreation, and regular exercise. Avoid harmful substances and do what you can to keep safe. Another part of a healthy lifestyle is stay mentally active and socially involved.    Good healthcare     Have a wellness visit every year.     If you have new symptoms, let us know right away. Don't wait until the next checkup.     Take medicines exactly as prescribed and keep your medicines in a safe place. Tell us if your medicine causes problems.   Healthy diet and weight control     Eat 3 or 4 small, nutritious, low-fat, high-fiber meals a day. Include a variety of fruits, vegetables, and whole-grain foods.     Make sure you get enough calcium in your diet. Calcium, vitamin D, and exercise help prevent osteoporosis (bone thinning).     If you live alone, try eating with others when you can. That way you get a good meal and have company while you eat it.     Try to keep a healthy weight. If you eat more calories than your body uses for energy, it  will be stored as fat and you will gain weight.     Recreation   Recreation is not limited to sports and team events. It includes any activity that provides relaxation, interest, enjoyment, and exercise. Recreation provides an outlet for physical, mental, and social energy. It can give a sense of worth and achievement. It can help you stay healthy.       Patient Education   Understanding USDA MyPlate  The USDA ([place] Department of Agriculture) has guidelines to help you make healthy food choices. These are called MyPlate. MyPlate shows the food groups that make up healthy meals using the image of a place setting. Before you eat, think about the healthiest choices for what to put onto your plate or into your cup or bowl. To learn more about building a healthy plate, visit www.choosemyplate.gov.       The Food Groups    Fruits: Any fruit or 100% fruit juice counts as part of the Fruit Group. Fruits may be fresh, canned, frozen, or dried, and may be whole, cut-up, or pureed. Make half your plate fruits and vegetables.    Vegetables: Any vegetable or 100% vegetable juice counts as a member of the Vegetable Group. Vegetables may be fresh, frozen, canned, or dried. They can be served raw or cooked and may be whole, cut-up, or mashed. Make half your plate fruits and vegetables.     Grains: All foods made from grains are part of the Grains Group. These include wheat, rice, oats, cornmeal, and barley such as bread, pasta, oatmeal, cereal, tortillas, and grits. Grains should be no more than a quarter of your plate. At least half of your grains should be whole grains.    Protein: This group includes meat, poultry, seafood, beans and peas, eggs, processed soy products (like tofu), nuts (including nut butters), and seeds. Make protein choices no more than a quarter of your plate. Meat and poultry choices should be lean or low fat.    Dairy: All fluid milk products and foods made from milk that contain calcium, like yogurt and  cheese are part of the Dairy Group. (Foods that have little calcium, such as cream, butter, and cream cheese, are not part of the group.) Most dairy choices should be low-fat or fat-free.    Oils: These are fats that are liquid at room temperature. They include canola, corn, olive, soybean, and sunflower oil. Foods that are mainly oil include mayonnaise, certain salad dressings, and soft margarines. You should have only 5 to 7 teaspoons of oils a day. You probably already get this much from the food you eat.  Use [employer] to Help Build Your Meals  The [employer] can help you plan and track your meals and activity. You can look up individual foods to see or compare their nutritional value. You can get guidelines for what and how much you should eat. You can compare your food choices. And you can assess personal physical activities and see ways you can improve. Go to www.FastDue.gov/[employer]/.    3676-3076 [employer]. [address] [place] [address]. All rights reserved. This information is not intended as a substitute for professional medical care. Always follow your healthcare professional's instructions.           Patient Education   Urinary Incontinence, Female (Adult)  Urinary incontinence means loss of control of the bladder. This problem affects many women, especially as they get older. If you have incontinence, you may be embarrassed to ask for help. But know that this problem can be treated.  Types of Incontinence  There are different types of incontinence. Two of the main types are described here. You can have more than one type.    Stress incontinence. With this type, urine leaks when pressure (stress) is put on the bladder. This may happen when you cough, sneeze, or laugh. Stress incontinence most often occurs because the pelvic floor muscles that support the bladder and urethra are weak. This can happen after pregnancy and vaginal childbirth or a hysterectomy. It  can also be due to excess body weight or hormone changes.    Urge incontinence (also called overactive bladder). With this type, a sudden urge to urinate is felt often. This may happen even though there may not be much urine in the bladder. The need to urinate often during the night is common. Urge incontinence most often occurs because of bladder spasms. This may be due to bladder irritation or infection. Damage to bladder nerves or pelvic muscles, constipation, and certain medicines can also lead to urge incontinence.  Treatment of urinary incontinence depends on the cause. Further evaluation is needed to find the type you have. This will likely include an exam and certain tests. Based on the results, you and your healthcare provider can then plan treatment. Until a diagnosis is made, the home care tips below can help relieve symptoms.  Home care    Do pelvic floor muscle exercises, if they are prescribed. The pelvic floor muscles help support the bladder and urethra. Many women find that their symptoms improve when doing special exercises that strengthen these muscles. To do the exercises contract the muscles you would use to stop your stream of urine, but do this when youre not urinating. Hold for 10 seconds, then relax. Repeat 10 to 20 times in a row, at least 3 times a day. Your provider may give you other instructions for how to do the exercises and how often.    Keep a bladder diary. This helps track how often and how much you urinate over a set period of time. Bring this diary with you to your next visit with the provider. The information can help your provider learn more about your bladder problem.    Lose weight, if advised to by your provider. Excess weight puts pressure on the bladder. Your provider can help you create a weight-loss plan thats right for you. This may include exercising more and making certain diet changes.    Don't consume foods and drinks that may irritate the bladder. These can  include alcohol and caffeinated drinks.    Quit smoking. Smoking and other tobacco use can lead to chronic cough that strains the pelvic floor muscles. Smoking may also damage the bladder and urethra. Talk with your provider about treatments or methods you can use to quit smoking.    If drinking large amounts of fluid causes you to have symptoms, you may be advised to limit your fluid intake. You may also be advised to drink most of your fluids during the day and to limit fluids at night.    If youre worried about urine leakage or accidents, you may wear absorbent pads to catch urine. Change the pads often. This helps reduce discomfort. It may also reduce the risk of skin or bladder infections.  Follow-up care  Follow up with your healthcare provider, or as directed. It may take some to find the right treatment for your problem. Your treatment plan may include special therapies or medicines. Certain procedures or surgery may also be options. Be sure to discuss any questions you have with your provider.  When to seek medical advice  Call the healthcare provider right away if any of these occur:    Fever of 100.4 F (38 C) or higher, or as directed by your provider    Bladder pain or fullness    Abdominal swelling    Nausea or vomiting    Back pain    Weakness, dizziness or fainting  Date Last Reviewed: 10/1/2017    4747-2543 [employer]. [address] [place] [address]. All rights reserved. This information is not intended as a substitute for professional medical care. Always follow your healthcare professional's instructions.     Patient Education   Your Health Risk Assessment indicates you feel you are not in good emotional health.    Recreation   Recreation is not limited to sports and team events. It includes any activity that provides relaxation, interest, enjoyment, and exercise. Recreation provides an outlet for physical, mental, and social energy. It can give a sense of worth and  achievement. It can help you stay healthy.    Mental Exercise and Social Involvement  Mental and emotional health is as important as physical health. Keep in touch with friends and family. Stay as active as possible. Continue to learn and challenge yourself.   Things you can do to stay mentally active are:    Learn something new, like a foreign language or musical instrument.     Play SCRABBLE or do crossword puzzles. If you cannot find people to play these games with you at home, you can play them with others on your computer through the Internet.     Join a games club--anything from card games to chess or checkers or lawn bowling.     Start a new hobby.     Go back to school.     Volunteer.     Read.     Keep up with world events.         Advance Directive  Patients advance directive was discussed and I am comfortable with the patients wishes.  Patient Education   Personalized Prevention Plan  You are due for the preventive services outlined below.  Your care team is available to assist you in scheduling these services.  If you have already completed any of these items, please share that information with your care team to update in your medical record.  Health Maintenance   Topic Date Due   ? MEDICARE ANNUAL WELLNESS VISIT  08/01/2020   ? FALL RISK ASSESSMENT  10/01/2021   ? COLORECTAL CANCER SCREENING  10/12/2021   ? MAMMOGRAM  10/14/2021   ? DXA SCAN  10/14/2021   ? LIPID  06/11/2023   ? ADVANCE CARE PLANNING  08/01/2024   ? Pneumococcal Vaccine: 65+ Years (1 of 1 - PPSV23) 10/21/2024   ? TD 18+ HE  06/07/2028   ? HEPATITIS C SCREENING  Completed   ? INFLUENZA VACCINE RULE BASED  Completed   ? ZOSTER VACCINES  Completed   ? Pneumococcal Vaccine: Pediatrics (0 to 5 Years) and At-Risk Patients (6 to 64 Years)  Aged Out   ? HEPATITIS B VACCINES  Aged Out

## 2021-08-11 ENCOUNTER — INFUSION THERAPY VISIT (OUTPATIENT)
Dept: INFUSION THERAPY | Facility: CLINIC | Age: 71
End: 2021-08-11
Payer: MEDICARE

## 2021-08-11 DIAGNOSIS — Z20.9 EXPOSURE TO BAT WITHOUT KNOWN BITE: Primary | ICD-10-CM

## 2021-08-11 PROCEDURE — 250N000011 HC RX IP 250 OP 636: Performed by: EMERGENCY MEDICINE

## 2021-08-11 PROCEDURE — 90675 RABIES VACCINE IM: CPT | Performed by: EMERGENCY MEDICINE

## 2021-08-11 PROCEDURE — 90471 IMMUNIZATION ADMIN: CPT | Performed by: EMERGENCY MEDICINE

## 2021-08-11 RX ORDER — HYDROXYCHLOROQUINE SULFATE 200 MG/1
TABLET, FILM COATED ORAL
Qty: 180 TABLET | Refills: 0 | OUTPATIENT
Start: 2021-08-11

## 2021-08-11 RX ORDER — ALBUTEROL SULFATE 90 UG/1
1-2 AEROSOL, METERED RESPIRATORY (INHALATION)
Status: CANCELLED
Start: 2021-08-25

## 2021-08-11 RX ORDER — DIPHENHYDRAMINE HYDROCHLORIDE 50 MG/ML
50 INJECTION INTRAMUSCULAR; INTRAVENOUS
Status: CANCELLED
Start: 2021-08-25

## 2021-08-11 RX ORDER — METHYLPREDNISOLONE SODIUM SUCCINATE 125 MG/2ML
125 INJECTION, POWDER, LYOPHILIZED, FOR SOLUTION INTRAMUSCULAR; INTRAVENOUS
Status: CANCELLED
Start: 2021-08-25

## 2021-08-11 RX ORDER — EPINEPHRINE 1 MG/ML
0.3 INJECTION, SOLUTION INTRAMUSCULAR; SUBCUTANEOUS EVERY 5 MIN PRN
Status: CANCELLED | OUTPATIENT
Start: 2021-08-25

## 2021-08-11 RX ORDER — NALOXONE HYDROCHLORIDE 0.4 MG/ML
0.2 INJECTION, SOLUTION INTRAMUSCULAR; INTRAVENOUS; SUBCUTANEOUS
Status: CANCELLED | OUTPATIENT
Start: 2021-08-25

## 2021-08-11 RX ORDER — MEPERIDINE HYDROCHLORIDE 50 MG/ML
25 INJECTION INTRAMUSCULAR; INTRAVENOUS; SUBCUTANEOUS EVERY 30 MIN PRN
Status: CANCELLED | OUTPATIENT
Start: 2021-08-25

## 2021-08-11 RX ORDER — ALBUTEROL SULFATE 0.83 MG/ML
2.5 SOLUTION RESPIRATORY (INHALATION)
Status: CANCELLED | OUTPATIENT
Start: 2021-08-25

## 2021-08-11 RX ADMIN — RABIES VACCINE 1 ML: KIT at 14:23

## 2021-08-11 NOTE — TELEPHONE ENCOUNTER
Per Dr Vinson- he does not have an opinion on how many rabies vaccines she should get as he does not manage this type of illness. Pt should hold HCQ for one week after last rabies vaccine due to possible interaction. Pt notified and verbalized understanding. Pt has appt on 8/25 with Dr Vinson

## 2021-08-12 ENCOUNTER — HOSPITAL ENCOUNTER (OUTPATIENT)
Dept: PHYSICAL THERAPY | Facility: REHABILITATION | Age: 71
End: 2021-08-12
Payer: COMMERCIAL

## 2021-08-12 DIAGNOSIS — M79.18 LEFT BUTTOCK PAIN: ICD-10-CM

## 2021-08-12 DIAGNOSIS — M53.3 PAIN OF LEFT SACROILIAC JOINT: ICD-10-CM

## 2021-08-12 DIAGNOSIS — M54.50 CHRONIC BILATERAL LOW BACK PAIN WITHOUT SCIATICA: Primary | ICD-10-CM

## 2021-08-12 DIAGNOSIS — M53.3 SI (SACROILIAC) JOINT DYSFUNCTION: ICD-10-CM

## 2021-08-12 DIAGNOSIS — G89.29 CHRONIC BILATERAL LOW BACK PAIN WITHOUT SCIATICA: Primary | ICD-10-CM

## 2021-08-12 PROCEDURE — 97140 MANUAL THERAPY 1/> REGIONS: CPT | Mod: GP | Performed by: PHYSICAL THERAPIST

## 2021-08-17 ENCOUNTER — ANCILLARY PROCEDURE (OUTPATIENT)
Dept: PHYSICAL MEDICINE AND REHAB | Facility: CLINIC | Age: 71
End: 2021-08-17
Attending: NURSE PRACTITIONER
Payer: COMMERCIAL

## 2021-08-17 ENCOUNTER — MYC MEDICAL ADVICE (OUTPATIENT)
Dept: INTERNAL MEDICINE | Facility: CLINIC | Age: 71
End: 2021-08-17

## 2021-08-17 VITALS
BODY MASS INDEX: 29.94 KG/M2 | OXYGEN SATURATION: 98 % | WEIGHT: 169 LBS | SYSTOLIC BLOOD PRESSURE: 141 MMHG | DIASTOLIC BLOOD PRESSURE: 63 MMHG | HEART RATE: 80 BPM | TEMPERATURE: 97.8 F

## 2021-08-17 DIAGNOSIS — G89.29 CHRONIC BILATERAL LOW BACK PAIN WITHOUT SCIATICA: ICD-10-CM

## 2021-08-17 DIAGNOSIS — M79.18 LEFT BUTTOCK PAIN: ICD-10-CM

## 2021-08-17 DIAGNOSIS — M53.3 PAIN OF LEFT SACROILIAC JOINT: ICD-10-CM

## 2021-08-17 DIAGNOSIS — M54.50 CHRONIC BILATERAL LOW BACK PAIN WITHOUT SCIATICA: ICD-10-CM

## 2021-08-17 DIAGNOSIS — M53.3 SI (SACROILIAC) JOINT DYSFUNCTION: ICD-10-CM

## 2021-08-17 DIAGNOSIS — M54.16 LEFT LUMBAR RADICULOPATHY: ICD-10-CM

## 2021-08-17 PROCEDURE — 99207 PR NO CHARGE LOS: CPT | Mod: 25 | Performed by: PAIN MEDICINE

## 2021-08-17 RX ORDER — HYDROCODONE BITARTRATE AND ACETAMINOPHEN 5; 325 MG/1; MG/1
1 TABLET ORAL EVERY 8 HOURS PRN
Qty: 18 TABLET | Refills: 0 | Status: SHIPPED | OUTPATIENT
Start: 2021-08-17 | End: 2021-12-02

## 2021-08-17 ASSESSMENT — PAIN SCALES - GENERAL: PAINLEVEL: SEVERE PAIN (6)

## 2021-08-17 NOTE — PROGRESS NOTES
Claudia Wise is here today for a left sacroiliac joint injection.  The patient is not able to have the injection today because she has recently had a rabies vaccine for a bat exposure.  The patient will be rescheduled for her injection with Dr. Gonzalez for 2 weeks after her 5th (and final) rabies vaccine that is scheduled for 8/25/21 with the understanding that she will also need to be symptom-free of infection at that time.    The patient verbalized understanding of the plan and was encouraged to contact [facility] if she had any questions or concerns prior to her next appointment.      The patient had a co-pay for her appointment today and elected to forward that to her next scheduled appointment [facility] the [facility] as there was no charge for today.

## 2021-08-17 NOTE — PATIENT INSTRUCTIONS
Please reschedule your left SI joint injection with Dr. Gonzalez for 2 weeks after your last rabies vaccine.  To safely proceed with the injection, you will also need to be symptom-free of infection.    Contact the [facility] care navigation team if you have any questions or concerns in the meantime.

## 2021-08-20 ENCOUNTER — HOSPITAL ENCOUNTER (OUTPATIENT)
Dept: PHYSICAL THERAPY | Facility: REHABILITATION | Age: 71
End: 2021-08-20
Payer: COMMERCIAL

## 2021-08-20 DIAGNOSIS — G89.29 CHRONIC BILATERAL LOW BACK PAIN WITHOUT SCIATICA: Primary | ICD-10-CM

## 2021-08-20 DIAGNOSIS — M53.3 PAIN OF LEFT SACROILIAC JOINT: ICD-10-CM

## 2021-08-20 DIAGNOSIS — M79.18 LEFT BUTTOCK PAIN: ICD-10-CM

## 2021-08-20 DIAGNOSIS — M54.50 CHRONIC BILATERAL LOW BACK PAIN WITHOUT SCIATICA: Primary | ICD-10-CM

## 2021-08-20 PROCEDURE — 97140 MANUAL THERAPY 1/> REGIONS: CPT | Mod: GP | Performed by: PHYSICAL THERAPIST

## 2021-08-20 PROCEDURE — 97110 THERAPEUTIC EXERCISES: CPT | Mod: GP | Performed by: PHYSICAL THERAPIST

## 2021-08-23 ENCOUNTER — LAB (OUTPATIENT)
Dept: LAB | Facility: CLINIC | Age: 71
End: 2021-08-23
Payer: COMMERCIAL

## 2021-08-23 DIAGNOSIS — M06.09 SERONEGATIVE RHEUMATOID ARTHRITIS OF MULTIPLE SITES (H): ICD-10-CM

## 2021-08-23 LAB
ALBUMIN SERPL-MCNC: 4 G/DL (ref 3.5–5)
ALT SERPL W P-5'-P-CCNC: 21 U/L (ref 0–45)
CREAT SERPL-MCNC: 1.23 MG/DL (ref 0.6–1.1)
ERYTHROCYTE [DISTWIDTH] IN BLOOD BY AUTOMATED COUNT: 14.7 % (ref 10–15)
GFR SERPL CREATININE-BSD FRML MDRD: 44 ML/MIN/1.73M2
HCT VFR BLD AUTO: 33.6 % (ref 35–47)
HGB BLD-MCNC: 11.1 G/DL (ref 11.7–15.7)
MCH RBC QN AUTO: 32 PG (ref 26.5–33)
MCHC RBC AUTO-ENTMCNC: 33 G/DL (ref 31.5–36.5)
MCV RBC AUTO: 97 FL (ref 78–100)
PLATELET # BLD AUTO: 148 10E3/UL (ref 150–450)
RBC # BLD AUTO: 3.47 10E6/UL (ref 3.8–5.2)
WBC # BLD AUTO: 5.5 10E3/UL (ref 4–11)

## 2021-08-23 PROCEDURE — 82040 ASSAY OF SERUM ALBUMIN: CPT

## 2021-08-23 PROCEDURE — 82565 ASSAY OF CREATININE: CPT

## 2021-08-23 PROCEDURE — 85027 COMPLETE CBC AUTOMATED: CPT

## 2021-08-23 PROCEDURE — 36415 COLL VENOUS BLD VENIPUNCTURE: CPT

## 2021-08-23 PROCEDURE — 84460 ALANINE AMINO (ALT) (SGPT): CPT

## 2021-08-25 ENCOUNTER — OFFICE VISIT (OUTPATIENT)
Dept: RHEUMATOLOGY | Facility: CLINIC | Age: 71
End: 2021-08-25
Payer: COMMERCIAL

## 2021-08-25 ENCOUNTER — INFUSION THERAPY VISIT (OUTPATIENT)
Dept: INFUSION THERAPY | Facility: CLINIC | Age: 71
End: 2021-08-25
Payer: MEDICARE

## 2021-08-25 VITALS
HEIGHT: 63 IN | WEIGHT: 169.5 LBS | DIASTOLIC BLOOD PRESSURE: 72 MMHG | HEART RATE: 76 BPM | SYSTOLIC BLOOD PRESSURE: 126 MMHG | BODY MASS INDEX: 30.03 KG/M2

## 2021-08-25 DIAGNOSIS — Z20.9 EXPOSURE TO BAT WITHOUT KNOWN BITE: Primary | ICD-10-CM

## 2021-08-25 DIAGNOSIS — M17.0 BILATERAL PRIMARY OSTEOARTHRITIS OF KNEE: ICD-10-CM

## 2021-08-25 DIAGNOSIS — Z79.899 HIGH RISK MEDICATION USE: ICD-10-CM

## 2021-08-25 DIAGNOSIS — M06.09 SERONEGATIVE RHEUMATOID ARTHRITIS OF MULTIPLE SITES (H): Primary | ICD-10-CM

## 2021-08-25 DIAGNOSIS — N18.32 STAGE 3B CHRONIC KIDNEY DISEASE (H): ICD-10-CM

## 2021-08-25 PROCEDURE — 250N000011 HC RX IP 250 OP 636: Performed by: EMERGENCY MEDICINE

## 2021-08-25 PROCEDURE — 90675 RABIES VACCINE IM: CPT | Performed by: EMERGENCY MEDICINE

## 2021-08-25 PROCEDURE — 90471 IMMUNIZATION ADMIN: CPT | Performed by: EMERGENCY MEDICINE

## 2021-08-25 PROCEDURE — 99214 OFFICE O/P EST MOD 30 MIN: CPT | Performed by: INTERNAL MEDICINE

## 2021-08-25 RX ORDER — ALBUTEROL SULFATE 90 UG/1
1-2 AEROSOL, METERED RESPIRATORY (INHALATION)
Status: CANCELLED
Start: 2021-08-25

## 2021-08-25 RX ORDER — ALBUTEROL SULFATE 0.83 MG/ML
2.5 SOLUTION RESPIRATORY (INHALATION)
Status: CANCELLED | OUTPATIENT
Start: 2021-08-25

## 2021-08-25 RX ORDER — HYDROXYCHLOROQUINE SULFATE 200 MG/1
200 TABLET, FILM COATED ORAL 2 TIMES DAILY
Qty: 180 TABLET | Refills: 0 | Status: SHIPPED | OUTPATIENT
Start: 2021-08-25 | End: 2021-12-01

## 2021-08-25 RX ORDER — DIPHENHYDRAMINE HYDROCHLORIDE 50 MG/ML
50 INJECTION INTRAMUSCULAR; INTRAVENOUS
Status: CANCELLED
Start: 2021-08-25

## 2021-08-25 RX ORDER — NALOXONE HYDROCHLORIDE 0.4 MG/ML
0.2 INJECTION, SOLUTION INTRAMUSCULAR; INTRAVENOUS; SUBCUTANEOUS
Status: CANCELLED | OUTPATIENT
Start: 2021-08-25

## 2021-08-25 RX ORDER — EPINEPHRINE 1 MG/ML
0.3 INJECTION, SOLUTION INTRAMUSCULAR; SUBCUTANEOUS EVERY 5 MIN PRN
Status: CANCELLED | OUTPATIENT
Start: 2021-08-25

## 2021-08-25 RX ORDER — METHYLPREDNISOLONE SODIUM SUCCINATE 125 MG/2ML
125 INJECTION, POWDER, LYOPHILIZED, FOR SOLUTION INTRAMUSCULAR; INTRAVENOUS
Status: CANCELLED
Start: 2021-08-25

## 2021-08-25 RX ORDER — MEPERIDINE HYDROCHLORIDE 50 MG/ML
25 INJECTION INTRAMUSCULAR; INTRAVENOUS; SUBCUTANEOUS EVERY 30 MIN PRN
Status: CANCELLED | OUTPATIENT
Start: 2021-08-25

## 2021-08-25 RX ADMIN — RABIES VACCINE 1 ML: KIT at 14:03

## 2021-08-25 ASSESSMENT — MIFFLIN-ST. JEOR: SCORE: 1252.98

## 2021-08-25 NOTE — PROGRESS NOTES
"      Rheumatology follow-up visit note     Claudia is a 70 year old female presents today for follow-up.    Claudia was seen today for recheck.    Diagnoses and all orders for this visit:    Seronegative rheumatoid arthritis of multiple sites (H)  -     hydroxychloroquine (PLAQUENIL) 200 MG tablet; Take 1 tablet (200 mg) by mouth 2 times daily  -     methotrexate 2.5 MG tablet; Take 4 tablets (10 mg) by mouth every 7 days    Bilateral primary osteoarthritis of knee    High risk medication use    Stage 3b chronic kidney disease            Follow up in 3 months.    HPI    Claudia Wise is a 70 year old female is here for follow-up of seronegative Rheumatoid Arthritis, osteoarthritis with his various manifestations, renal impairment and osteoarthritis.  She has tolerated the current combination well.  There has been no flareup of her joint symptoms vis-à-vis rheumatoid arthritis.  As she has pain off and on in her knees.  She manages this well with over-the-counter measures and corticosteroid injections intermittently.  Recently was bitten by a bat and is completing series of rabies vaccination.  She wants to have corticosteroid injections but will defer because of this reason.  She is aware not to take nonsteroidals because of renal impairment.  Reminded of eye examination.  Overall disease activity:  stable. Limitation on activities as noted in the MDHAQ scanned in the EMR.  Further historical information, including ROS as noted in the multidimensional health assessment questionnaire scanned in the EMR and in the assessment and plan section.        DETAILED EXAMINATION  08/25/21  :    Vitals:    08/25/21 1112   BP: 126/72   Pulse: 76   Weight: 76.9 kg (169 lb 8 oz)   Height: 1.6 m (5' 3\")     Alert oriented. Head including the face is examined for malar rash, heliotropes, scarring, lupus pernio. Eyes examined for redness such as in episcleritis/scleritis, periorbital lesions.   Neck/ Face examined for parotid " gland swelling, range of motion of neck.  Left upper and lower and right upper and lower extremities examined for tenderness, swelling, warmth of the appendicular joints, range of motion, edema, rash.  Some of the important findings included: she does not have evidence of synovitis in the palpable joints of the upper extremities.  No significant deformities of the digits.  + [place] nodes.  Range of motion of the shoulders  show full abduction.  No JLT effusion or warmth of the knees.  she does not have dactylitis of the digits.     Patient Active Problem List    Diagnosis Date Noted     Exposure to bat without known bite 07/28/2021     Priority: Medium     Venous (peripheral) insufficiency 07/01/2021     Priority: Medium     Other specified hypothyroidism      Priority: Medium     Created by Conversion  Replacement Utility updated for latest IMO load         Chronic kidney disease, stage 3 01/18/2021     Priority: Medium     Senile osteoporosis 06/02/2020     Priority: Medium     Compression fracture of L2 vertebra with routine healing 06/02/2020     Priority: Medium     Small bowel obstruction (H) 02/10/2020     Priority: Medium     Bilateral primary osteoarthritis of knee 05/23/2019     Priority: Medium     Anemia in chronic kidney disease      Priority: Medium     Created by Conversion         Gout      Priority: Medium     Created by Conversion         Hyperlipemia      Priority: Medium     Created by Conversion         High risk medication use 08/25/2016     Priority: Medium     Herpes Simplex Type II      Priority: Medium     Created by Conversion  [facility] Annotation: Nov 13 2007  4:56PM - Oma Reddy: By pt's   history.    Allergy to valtrex.  She came to me on prn acyclovir.  Replacement Utility updated for latest IMO load         Anxiety      Priority: Medium     Created by Conversion  Replacement Utility updated for latest IMO load         Hypertension      Priority: Medium     Created by  Conversion  Replacement Utility updated for latest IMO load         Endometriosis      Priority: Medium     Created by Conversion  [facility] Annotation: Nov 13 2007  4:58PM - Katie Meyers: s/p RANULFO LONGORIA   1987.  Replacement Utility updated for latest IMO load         Seronegative rheumatoid arthritis of multiple sites (H) 03/01/2016     Priority: Medium     Prediabetes      Priority: Medium     Created by Conversion         Past Surgical History:   Procedure Laterality Date     COLON SURGERY  1986     COLPORRHAPHY N/A 7/7/2016    Procedure: ANTERIOR REPAIR WITH MESH;  Surgeon: Zac Stone MD;  Location: [facility] OR;  Service:      HC UGI ENDOSCOPY W PLACEMENT GASTROSTOMY TUBE PERCUT N/A 2/7/2020    Procedure: Percutaneous Endoscopic Gastromstomy Tube Placement;  Surgeon: Zion Schwarz MD;  Location: [facility] Main OR;  Service: General     HYSTERECTOMY       LAPAROTOMY EXPLORATORY N/A 1/23/2020    Procedure: EXPLORATORY LAPARATOMY, EXTENSIVE LYSIS OF ADHESIONS, SMALL BOWEL RESECTION;  Surgeon: Claudia Jeronimo MD;  Location: [facility] Main OR;  Service: General     OOPHORECTOMY        Past Medical History:   Diagnosis Date     Anemia      Anxiety      Chronic kidney disease      Diabetes mellitus, type II (H)     prediabetes     Disease of thyroid gland     hypothyroid     Family history of myocardial infarction      Gout      High cholesterol      Hypertension      Inverted nipple     right     Macular edema      Osteoarthritis 2012     Rheumatoid arthritis (H) 2012     Allergies   Allergen Reactions     Bupropion Hcl [Bupropion] Nausea     Erythromycin Base [Erythromycin] Nausea     Paxil [Paroxetine] Diarrhea     Tetracyclines Itching     Valacyclovir Nausea     Current Outpatient Medications   Medication Sig Dispense Refill     acetaminophen (TYLENOL) 500 MG tablet Take 500 mg by mouth every 6 hours as needed        acyclovir (ZOVIRAX) 400 MG tablet [ACYCLOVIR (ZOVIRAX) 400 MG TABLET] Take 1  tablet (400 mg total) by mouth every 8 (eight) hours as needed. 60 tablet 1     allopurinoL (ZYLOPRIM) 100 MG tablet [ALLOPURINOL (ZYLOPRIM) 100 MG TABLET] Take 1 tablet (100 mg total) by mouth 2 (two) times a day with meals. 180 tablet 3     amLODIPine (NORVASC) 10 MG tablet [AMLODIPINE (NORVASC) 10 MG TABLET] Take 1 tablet (10 mg total) by mouth daily. 90 tablet 3     atorvastatin (LIPITOR) 10 MG tablet [ATORVASTATIN (LIPITOR) 10 MG TABLET] Take 1 tablet (10 mg total) by mouth at bedtime. 90 tablet 3     carboxymethylcellulose (REFRESH PLUS) 0.5 % Dpet ophthalmic dropperette [CARBOXYMETHYLCELLULOSE (REFRESH PLUS) 0.5 % DPET OPHTHALMIC DROPPERETTE] Administer 1-2 drops to both eyes 4 (four) times a day as needed.        cyanocobalamin, vitamin B-12, 1,000 mcg Subl [CYANOCOBALAMIN, VITAMIN B-12, 1,000 MCG SUBL] Place 1 tablet (1,000 mcg total) under the tongue daily.  0     denosumab (PROLIA) 60 MG/ML SOSY injection Inject 60 mg Subcutaneous       fluticasone propionate (FLONASE) 50 mcg/actuation nasal spray [FLUTICASONE PROPIONATE (FLONASE) 50 MCG/ACTUATION NASAL SPRAY] Use 1 spray(s) in each nostril once daily 48 g 3     HYDROcodone-acetaminophen (NORCO) 5-325 MG tablet Take 1 tablet by mouth every 8 hours as needed 18 tablet 0     levothyroxine (SYNTHROID, LEVOTHROID) 50 MCG tablet [LEVOTHYROXINE (SYNTHROID, LEVOTHROID) 50 MCG TABLET] TAKE 1 TABLET BY MOUTH ONCE DAILY IN THE MORNING 90 tablet 3     lidocaine (LIDODERM) 5 % patch Place 1 patch onto the skin every 24 hours To prevent lidocaine toxicity, patient should be patch free for 12 hrs daily. 14 patch 1     LORazepam (ATIVAN) 0.5 MG tablet [LORAZEPAM (ATIVAN) 0.5 MG TABLET] TAKE 1 TABLET BY MOUTH EVERY 8 HOURS AS NEEDED FOR ANXIETY 30 tablet 0     losartan (COZAAR) 50 MG tablet [LOSARTAN (COZAAR) 50 MG TABLET] Take 1 tablet (50 mg total) by mouth daily. 90 tablet 1     melatonin 3 mg Tab tablet [MELATONIN 3 MG TAB TABLET] Take 1 tablet (3 mg total) by mouth  at bedtime as needed.  0     metoprolol tartrate (LOPRESSOR) 50 MG tablet [METOPROLOL TARTRATE (LOPRESSOR) 50 MG TABLET] Take 1 tablet (50 mg total) by mouth 2 (two) times a day. 180 tablet 3     PARoxetine (PAXIL) 10 MG tablet [PAROXETINE (PAXIL) 10 MG TABLET] Take 1 tablet (10 mg total) by mouth daily. 30 tablet 2     RESTASIS 0.05 % ophthalmic emulsion [RESTASIS 0.05 % OPHTHALMIC EMULSION] INSTILL 1 DROP INTO EACH EYE TWICE DAILY  11     sodium bicarbonate 650 MG tablet [SODIUM BICARBONATE 650 MG TABLET] Take 2 tablets (1,300 mg total) by mouth 2 (two) times a day. 100 tablet 3     DULoxetine (CYMBALTA) 20 MG capsule [DULOXETINE (CYMBALTA) 20 MG CAPSULE] Take 20 mg by mouth daily. (Patient not taking: Reported on 8/17/2021)       hydrOXYchloroQUINE (PLAQUENIL) 200 mg tablet [HYDROXYCHLOROQUINE (PLAQUENIL) 200 MG TABLET] Take 1 tablet (200 mg total) by mouth 2 (two) times a day. (Patient not taking: Reported on 8/25/2021) 180 tablet 0     family history includes Breast Cancer in her maternal aunt; Cancer in her cousin, father, maternal aunt, maternal grandmother, and sister; Coronary Artery Disease (age of onset: 49.00) in her mother.     Social Connections:      Frequency of Communication with Friends and Family:      Frequency of Social Gatherings with Friends and Family:      Attends [employer] Services:      Active Member of Clubs or Organizations:      Attends Club or Organization Meetings:      Marital Status:           WBC Count   Date Value Ref Range Status   08/23/2021 5.5 4.0 - 11.0 10e3/uL Final     RBC Count   Date Value Ref Range Status   08/23/2021 3.47 (L) 3.80 - 5.20 10e6/uL Final     Hemoglobin   Date Value Ref Range Status   08/23/2021 11.1 (L) 11.7 - 15.7 g/dL Final     Hematocrit   Date Value Ref Range Status   08/23/2021 33.6 (L) 35.0 - 47.0 % Final     MCV   Date Value Ref Range Status   08/23/2021 97 78 - 100 fL Final     MCH   Date Value Ref Range Status   08/23/2021 32.0 26.5 - 33.0 pg  Final     Platelet Count   Date Value Ref Range Status   08/23/2021 148 (L) 150 - 450 10e3/uL Final     % Lymphocytes   Date Value Ref Range Status   02/28/2020 13 (L) 20 - 40 % Final   01/29/2020 7 (L) 20 - 40 % Final     AST   Date Value Ref Range Status   10/01/2020 21 0 - 40 U/L Final     ALT   Date Value Ref Range Status   08/23/2021 21 0 - 45 U/L Final     Albumin   Date Value Ref Range Status   08/23/2021 4.0 3.5 - 5.0 g/dL Final     Alkaline Phosphatase   Date Value Ref Range Status   10/01/2020 63 45 - 120 U/L Final     Creatinine   Date Value Ref Range Status   08/23/2021 1.23 (H) 0.60 - 1.10 mg/dL Final     GFR Estimate   Date Value Ref Range Status   08/23/2021 44 (L) >60 mL/min/1.73m2 Final     Comment:     As of July 11, 2021, eGFR is calculated by the CKD-EPI creatinine equation, without race adjustment. eGFR can be influenced by muscle mass, exercise, and diet. The reported eGFR is an estimation only and is only applicable if the renal function is stable.   05/11/2021 38 (L) >60 mL/min/1.73m2 Final     GFR Estimate If Black   Date Value Ref Range Status   05/11/2021 46 (L) >60 mL/min/1.73m2 Final

## 2021-08-27 ENCOUNTER — HOSPITAL ENCOUNTER (OUTPATIENT)
Dept: PHYSICAL THERAPY | Facility: REHABILITATION | Age: 71
End: 2021-08-27
Payer: COMMERCIAL

## 2021-08-27 DIAGNOSIS — G89.29 CHRONIC BILATERAL LOW BACK PAIN WITHOUT SCIATICA: Primary | ICD-10-CM

## 2021-08-27 DIAGNOSIS — M53.3 SI (SACROILIAC) JOINT DYSFUNCTION: ICD-10-CM

## 2021-08-27 DIAGNOSIS — M53.3 PAIN OF LEFT SACROILIAC JOINT: ICD-10-CM

## 2021-08-27 DIAGNOSIS — M54.50 CHRONIC BILATERAL LOW BACK PAIN WITHOUT SCIATICA: Primary | ICD-10-CM

## 2021-08-27 DIAGNOSIS — M79.18 LEFT BUTTOCK PAIN: ICD-10-CM

## 2021-08-27 PROCEDURE — 97110 THERAPEUTIC EXERCISES: CPT | Mod: GP | Performed by: PHYSICAL THERAPIST

## 2021-08-27 PROCEDURE — 97140 MANUAL THERAPY 1/> REGIONS: CPT | Mod: GP | Performed by: PHYSICAL THERAPIST

## 2021-09-08 ENCOUNTER — HOSPITAL ENCOUNTER (OUTPATIENT)
Dept: PHYSICAL THERAPY | Facility: REHABILITATION | Age: 71
End: 2021-09-08
Payer: COMMERCIAL

## 2021-09-08 DIAGNOSIS — G89.29 CHRONIC BILATERAL LOW BACK PAIN WITHOUT SCIATICA: Primary | ICD-10-CM

## 2021-09-08 DIAGNOSIS — M53.3 SI (SACROILIAC) JOINT DYSFUNCTION: ICD-10-CM

## 2021-09-08 DIAGNOSIS — M79.18 LEFT BUTTOCK PAIN: ICD-10-CM

## 2021-09-08 DIAGNOSIS — M53.3 PAIN OF LEFT SACROILIAC JOINT: ICD-10-CM

## 2021-09-08 DIAGNOSIS — M54.50 CHRONIC BILATERAL LOW BACK PAIN WITHOUT SCIATICA: Primary | ICD-10-CM

## 2021-09-08 PROCEDURE — 97110 THERAPEUTIC EXERCISES: CPT | Mod: GP | Performed by: PHYSICAL THERAPIST

## 2021-09-08 PROCEDURE — 97140 MANUAL THERAPY 1/> REGIONS: CPT | Mod: GP | Performed by: PHYSICAL THERAPIST

## 2021-09-10 ENCOUNTER — ANCILLARY PROCEDURE (OUTPATIENT)
Dept: PHYSICAL MEDICINE AND REHAB | Facility: CLINIC | Age: 71
End: 2021-09-10
Attending: NURSE PRACTITIONER
Payer: COMMERCIAL

## 2021-09-10 VITALS — SYSTOLIC BLOOD PRESSURE: 130 MMHG | DIASTOLIC BLOOD PRESSURE: 56 MMHG | HEART RATE: 68 BPM | OXYGEN SATURATION: 98 %

## 2021-09-10 PROCEDURE — 27096 INJECT SACROILIAC JOINT: CPT | Mod: LT | Performed by: PAIN MEDICINE

## 2021-09-10 RX ORDER — LIDOCAINE HYDROCHLORIDE 10 MG/ML
INJECTION, SOLUTION EPIDURAL; INFILTRATION; INTRACAUDAL; PERINEURAL
Status: COMPLETED | OUTPATIENT
Start: 2021-09-10 | End: 2021-09-10

## 2021-09-10 RX ORDER — METHYLPREDNISOLONE ACETATE 40 MG/ML
INJECTION, SUSPENSION INTRA-ARTICULAR; INTRALESIONAL; INTRAMUSCULAR; SOFT TISSUE
Status: COMPLETED | OUTPATIENT
Start: 2021-09-10 | End: 2021-09-10

## 2021-09-10 RX ORDER — ROPIVACAINE HYDROCHLORIDE 5 MG/ML
INJECTION, SOLUTION EPIDURAL; INFILTRATION; PERINEURAL
Status: COMPLETED | OUTPATIENT
Start: 2021-09-10 | End: 2021-09-10

## 2021-09-10 RX ADMIN — LIDOCAINE HYDROCHLORIDE 1 ML: 10 INJECTION, SOLUTION EPIDURAL; INFILTRATION; INTRACAUDAL; PERINEURAL at 11:42

## 2021-09-10 RX ADMIN — ROPIVACAINE HYDROCHLORIDE 2.5 ML: 5 INJECTION, SOLUTION EPIDURAL; INFILTRATION; PERINEURAL at 11:43

## 2021-09-10 RX ADMIN — METHYLPREDNISOLONE ACETATE 20 MG: 40 INJECTION, SUSPENSION INTRA-ARTICULAR; INTRALESIONAL; INTRAMUSCULAR; SOFT TISSUE at 11:42

## 2021-09-10 ASSESSMENT — PAIN SCALES - GENERAL: PAINLEVEL: MILD PAIN (3)

## 2021-09-10 NOTE — PATIENT INSTRUCTIONS
Please follow up two weeks post procedure with Loree to evaluate your plan of care.       DISCHARGE INSTRUCTIONS    During office hours (8:00 a.m.- 4:00 p.m.) questions or concerns may be answered  by calling [facility] Navigation Nurses at  [phone].  Messages received after hours will be returned the following business day.      In the case of an emergency, please dial 911 or seek assistance at the nearest Emergency Room/Urgent Care facility.     All Patients:    ? You may experience an increase in your symptoms for the first 2 days (It may take anywhere between 2 days- 2 weeks for the steroid to have maximum effect).    ? You may use ice on the injection site, as frequently as 20 minutes each hour if needed.    ? You may take your pain medicine.    ? You may continue taking your regular medication after your injection. If you have had a Medial Branch Block you may resume pain medication once your pain diary is completed.    ? You may shower. No swimming, tub bath or hot tub for 48 hours.  You may remove your bandaid/bandage as soon as you are home.    ? You may resume light activities, as tolerated.    ? Resume your usual diet as tolerated.    ? It is strongly advised that you do not drive for 1-3 hours post injection.    ? If you have had oral sedation:  Do not drive for 8 hours post injection.      ? If you have had IV sedation:  Do not drive for 24 hours post injection.  Do not operate hazardous machinery or make important personal/business decisions for 24 hours.      POSSIBLE STEROID SIDE EFFECTS (If steroid/cortisone was used for your procedure)    -If you experience these symptoms, it should only last for a short period      Swelling of the legs                Skin redness (flushing)       Mouth (oral) irritation     Blood sugar (glucose) levels              Sweats                      Mood changes    Headache    Sleeplessness    Weakened immune system for up to 14 days, which could increase the  risk of chhaya the COVID-19 virus and/or experiencing more severe symptoms of the disease, if exposed.    Decreased effectiveness of the flu vaccine if given within 2 weeks of the steroid.         POSSIBLE PROCEDURE SIDE EFFECTS  -Call the Sp[facility] Center if you are concerned    Increased Pain             Increased numbness/tingling        Nausea/Vomiting            Bruising/bleeding at site        Redness or swelling                                                Difficulty walking        Weakness             Fever greater than 100.5    *In the event of a severe headache after an epidural steroid injection that is relieved by lying down, please call the [facility] to speak with a clinical staff member*

## 2021-09-17 ENCOUNTER — HOSPITAL ENCOUNTER (OUTPATIENT)
Dept: PHYSICAL THERAPY | Facility: REHABILITATION | Age: 71
End: 2021-09-17
Payer: COMMERCIAL

## 2021-09-17 DIAGNOSIS — M53.3 SI (SACROILIAC) JOINT DYSFUNCTION: ICD-10-CM

## 2021-09-17 DIAGNOSIS — M54.50 CHRONIC BILATERAL LOW BACK PAIN WITHOUT SCIATICA: Primary | ICD-10-CM

## 2021-09-17 DIAGNOSIS — M53.3 PAIN OF LEFT SACROILIAC JOINT: ICD-10-CM

## 2021-09-17 DIAGNOSIS — G89.29 CHRONIC BILATERAL LOW BACK PAIN WITHOUT SCIATICA: Primary | ICD-10-CM

## 2021-09-17 DIAGNOSIS — M79.18 LEFT BUTTOCK PAIN: ICD-10-CM

## 2021-09-17 PROCEDURE — 97110 THERAPEUTIC EXERCISES: CPT | Mod: GP | Performed by: PHYSICAL THERAPIST

## 2021-09-17 PROCEDURE — 97140 MANUAL THERAPY 1/> REGIONS: CPT | Mod: GP | Performed by: PHYSICAL THERAPIST

## 2021-09-23 ENCOUNTER — HOSPITAL ENCOUNTER (OUTPATIENT)
Dept: PHYSICAL THERAPY | Facility: REHABILITATION | Age: 71
End: 2021-09-23
Payer: COMMERCIAL

## 2021-09-23 DIAGNOSIS — M79.18 LEFT BUTTOCK PAIN: ICD-10-CM

## 2021-09-23 DIAGNOSIS — G89.29 CHRONIC BILATERAL LOW BACK PAIN WITHOUT SCIATICA: Primary | ICD-10-CM

## 2021-09-23 DIAGNOSIS — M53.3 SI (SACROILIAC) JOINT DYSFUNCTION: ICD-10-CM

## 2021-09-23 DIAGNOSIS — S32.020A COMPRESSION FRACTURE OF L2 VERTEBRA, INITIAL ENCOUNTER (H): ICD-10-CM

## 2021-09-23 DIAGNOSIS — M54.50 LUMBAR SPINE PAINFUL ON MOVEMENT: ICD-10-CM

## 2021-09-23 DIAGNOSIS — M53.3 PAIN OF LEFT SACROILIAC JOINT: ICD-10-CM

## 2021-09-23 DIAGNOSIS — M54.50 CHRONIC BILATERAL LOW BACK PAIN WITHOUT SCIATICA: Primary | ICD-10-CM

## 2021-09-23 PROCEDURE — 97140 MANUAL THERAPY 1/> REGIONS: CPT | Mod: GP | Performed by: PHYSICAL THERAPIST

## 2021-09-24 ENCOUNTER — OFFICE VISIT (OUTPATIENT)
Dept: PHYSICAL MEDICINE AND REHAB | Facility: CLINIC | Age: 71
End: 2021-09-24
Payer: COMMERCIAL

## 2021-09-24 ENCOUNTER — IMMUNIZATION (OUTPATIENT)
Dept: NURSING | Facility: CLINIC | Age: 71
End: 2021-09-24
Payer: COMMERCIAL

## 2021-09-24 VITALS — DIASTOLIC BLOOD PRESSURE: 63 MMHG | HEART RATE: 71 BPM | OXYGEN SATURATION: 93 % | SYSTOLIC BLOOD PRESSURE: 140 MMHG

## 2021-09-24 DIAGNOSIS — M79.18 LEFT BUTTOCK PAIN: ICD-10-CM

## 2021-09-24 DIAGNOSIS — M53.3 SI (SACROILIAC) JOINT DYSFUNCTION: ICD-10-CM

## 2021-09-24 DIAGNOSIS — M54.50 CHRONIC BILATERAL LOW BACK PAIN WITHOUT SCIATICA: Primary | ICD-10-CM

## 2021-09-24 DIAGNOSIS — M53.3 PAIN OF LEFT SACROILIAC JOINT: ICD-10-CM

## 2021-09-24 DIAGNOSIS — G89.29 CHRONIC BILATERAL LOW BACK PAIN WITHOUT SCIATICA: Primary | ICD-10-CM

## 2021-09-24 PROCEDURE — 91301 PR COVID VAC MODERNA 100 MCG/0.5 ML IM: CPT

## 2021-09-24 PROCEDURE — 99213 OFFICE O/P EST LOW 20 MIN: CPT | Performed by: NURSE PRACTITIONER

## 2021-09-24 PROCEDURE — 0013A PR COVID VAC MODERNA 100 MCG/0.5 ML IM: CPT

## 2021-09-24 RX ORDER — LIDOCAINE 50 MG/G
1 PATCH TOPICAL EVERY 24 HOURS
Qty: 14 PATCH | Refills: 1 | Status: SHIPPED | OUTPATIENT
Start: 2021-09-24 | End: 2024-08-06

## 2021-09-24 ASSESSMENT — PAIN SCALES - GENERAL: PAINLEVEL: MILD PAIN (3)

## 2021-09-24 NOTE — PROGRESS NOTES
Assessment:     Diagnoses and all orders for this visit:  Chronic bilateral low back pain without sciatica  Left buttock pain  -     lidocaine (LIDODERM) 5 % patch; Place 1 patch onto the skin every 24 hours To prevent lidocaine toxicity, patient should be patch free for 12 hrs daily.  Pain of left sacroiliac joint  -     lidocaine (LIDODERM) 5 % patch; Place 1 patch onto the skin every 24 hours To prevent lidocaine toxicity, patient should be patch free for 12 hrs daily.  SI (sacroiliac) joint dysfunction     Claudia Wise is a 70 year old y.o. female with past medical history significant for anxiety, rheumatoid arthritis managed by Dr. Vinson, diabetes myelitis, hypertension, hyperlipidemia, chronic kidney disease, thyroid disorder, gout, osteopenia, recent small bowel obstruction with surgical intervention who presents today for follow-up regarding:    -Bilateral low back pain with over 50% relief with radiofrequency ablation.    -Left buttock pain with over 50% relief with SI joint steroid injection.    -History L2 compression fracture.     Plan:     A shared decision making plan was used. The patient's values and choices were respected. Prior medical records were reviewed today. The following represents what was discussed and decided upon by the provider and the patient.        -DIAGNOSTIC TESTS: Images were personally reviewed and interpreted.   --Lumbar spine x-ray 8/24/2020 shows stable L2 compression deformity.  --Lumbar spine x-ray 7/1/2020 shows stable L2 compression deformity, grade 1 L3-4 spondylolisthesis.  --Lumbar spine MRI 5/19/2020 with 20% acute/subacute compression fracture L2.  There is mild bilateral lateral recess stenosis L3-4 and L4-5.  Bilateral synovial cysts L4-5 with moderate facet arthropathy.    -INTERVENTIONS: No further injection recommendations at this time as patient is doing well.    -MEDICATIONS: Refilled lidocaine patch as she does find benefit with this  modality.  Discussed side effects of medications and proper use. Patient verbalized understanding.    -We did discuss patient seeing behavioral health therapy which she would be potentially interested in but she reports that October is a very busy month so she wants to hold off.  She thought learning some breathing techniques and other modalities discussing her chronic pain may be helpful.  She is going to try meditation on her own in the meantime.  Advised patient to either call or MyChart message if she would like a referral to behavioral health at any time.    -PHYSICAL THERAPY: Encourage patient continue with home exercises from recent physical therapy sessions which he does have further sessions scheduled for.  And does find benefit with this modality.  Discussed the importance of core strengthening, ROM, stretching exercises with the patient and how each of these entities is important in decreasing pain.  Explained to the patient that the purpose of physical therapy is to teach the patient a home exercise program.  These exercises need to be performed every day in order to decrease pain and prevent future occurrences of pain.        -PATIENT EDUCATION:  Total time of 26 minutes spent with the patient, reviewing the chart, placing orders, and documenting.   -Today we also discussed the issues related to the current COVID-19 pandemic, the pros and cons of the current treatment plan, the CDC guidelines such as social distancing, washing the hands, and covering the cough.    -FOLLOW UP: Follow-up as needed  Advised to contact clinic if symptoms worsen or change.    Subjective:     Claudia Wise is a 70 year old female who presents today for follow-up regarding ongoing chronic bilateral low back pain that is tolerable.  Previously more bothersome left buttock pain did improve at least to 50% relief with recent SI joint steroid injection.  She reports that prior to the injection pain is about a 6/10, after the  injection was a 3/10 and now 2 weeks post and ejection she is currently at a 2/10 and overall feels like symptoms are much more tolerable and manageable which she is happy with.    Patient currently denies any radiating lower extremity pain, denies numbness or tingling sensations, denies recent trips or falls or balance changes, denies bowel or bladder loss control.    Treatment to Date: No prior spinal surgery or spinal injection.  Physical therapy [facility] sessions last 8/28/2020 compression fracture/spine pain.  Physical therapy x1 session 8/2/2021 chronic LBP/sacroiliac joint dysfunction.     Left L2, L3, L4 MBB 51/2/2021 with positive response.  Left L2, L3, L4 MBB 6/4/2021 with positive response.  Bilateral L2, L3, L4 RFA 7/2/2021 with 50% relief LBP 4 weeks postinjection.  Preprocedure pain 6/10, post 2/10.  Left SI joint steroid injection 8/17/2021 with over 50% relief.  Preprocedure pain 6/10, post 3/10.     Medications:  Plaquenil 4 seronegative rheumatoid arthritis  Tylenol  Hydrocodone managed by Dr. Erasto Guzmán 25 mg bedtime however patient only tried this medication for a couple of days with no benefit therefore she discontinued on her own.    Patient Active Problem List   Diagnosis     Herpes Simplex Type II     Anxiety     Anemia in chronic kidney disease     Hyperlipemia     Gout     Hypertension     Prediabetes     Endometriosis     Other specified hypothyroidism     Seronegative rheumatoid arthritis of multiple sites (H)     High risk medication use     Bilateral primary osteoarthritis of knee     Small bowel obstruction (H)     Senile osteoporosis     Compression fracture of L2 vertebra with routine healing     Chronic kidney disease, stage 3     Venous (peripheral) insufficiency     Exposure to bat without known bite       Current Outpatient Medications   Medication     acetaminophen (TYLENOL) 500 MG tablet     HYDROcodone-acetaminophen (NORCO) 5-325 MG tablet     lidocaine (LIDODERM) 5  % patch     acyclovir (ZOVIRAX) 400 MG tablet     allopurinoL (ZYLOPRIM) 100 MG tablet     amLODIPine (NORVASC) 10 MG tablet     atorvastatin (LIPITOR) 10 MG tablet     carboxymethylcellulose (REFRESH PLUS) 0.5 % Dpet ophthalmic dropperette     cyanocobalamin, vitamin B-12, 1,000 mcg Subl     denosumab (PROLIA) 60 MG/ML SOSY injection     DULoxetine (CYMBALTA) 20 MG capsule     fluticasone propionate (FLONASE) 50 mcg/actuation nasal spray     hydroxychloroquine (PLAQUENIL) 200 MG tablet     levothyroxine (SYNTHROID, LEVOTHROID) 50 MCG tablet     LORazepam (ATIVAN) 0.5 MG tablet     losartan (COZAAR) 50 MG tablet     melatonin 3 mg Tab tablet     methotrexate 2.5 MG tablet     metoprolol tartrate (LOPRESSOR) 50 MG tablet     PARoxetine (PAXIL) 10 MG tablet     RESTASIS 0.05 % ophthalmic emulsion     sodium bicarbonate 650 MG tablet     No current facility-administered medications for this visit.       Allergies   Allergen Reactions     Bupropion Hcl [Bupropion] Nausea     Erythromycin Base [Erythromycin] Nausea     Paxil [Paroxetine] Diarrhea     Tetracyclines Itching     Valacyclovir Nausea       Past Medical History:   Diagnosis Date     Anemia      Anxiety      Chronic kidney disease      Diabetes mellitus, type II (H)     prediabetes     Disease of thyroid gland     hypothyroid     Family history of myocardial infarction      Gout      High cholesterol      Hypertension      Inverted nipple     right     Macular edema      Osteoarthritis 2012     Rheumatoid arthritis (H) 2012        Review of Systems  ROS:  Specifically negative for bowel/bladder dysfunction, balance changes, headache, dizziness, foot drop, fevers, chills, appetite changes, nausea/vomiting, unexplained weight loss. Otherwise 13 systems reviewed are negative. Please see the patient's intake questionnaire from today for details.    Reviewed Social, Family, Past Medical and Past Surgical history with patient, no significant changes noted since prior  visit.     Objective:     BP (!) 140/63 (BP Location: Right arm, Patient Position: Sitting)   Pulse 71   SpO2 93%     PHYSICAL EXAMINATION:    --CONSTITUTIONAL: Well developed, well nourished, healthy appearing individual.  --PSYCHIATRIC: Appropriate mood and affect. No difficulty interacting due to temper, social withdrawal, or memory issues.  --SKIN: Lumbar region is dry and intact.   --RESPIRATORY: Normal rhythm and effort. No abnormal accessory muscle breathing patterns noted.   --MUSCULOSKELETAL:  Normal lumbar lordosis noted, no lateral shift.  --GROSS MOTOR: Easily arises from a seated position. Gait is non-antalgic  --LUMBAR SPINE:  Inspection reveals no evidence of deformity.     RESULTS:   Imaging: Lumbar spine imaging was reviewed today. The images were shown to the patient and the findings were explained using a spine model.      Xr Lumbar Spine 2 Or 3 Vws  Result Date: 8/25/2020  INDICATION: Evaluate L2 compression fracture. COMPARISON: 7/10/2020.   Five lumbar-type vertebral bodies. Stable moderate L2 compression deformity. Segmental kyphosis about the fracture from the superior L1 endplate through the inferior L3 endplate measures 21 degrees, which is unchanged. Vertebral body heights are otherwise maintained. Mild loss of disc space height L3-L4 through L5-S1. Mild lower lumbar facet arthropathy.        XR LUMBAR SPINE 2 OR 3 VWS  LOCATION: [facility]  DATE/TIME: 7/1/2020   INDICATION: Low back pain compression fracture L2 evaluate for stability  COMPARISON: 06/08/2020  IMPRESSION:   Very shallow left apex curvature of the lumbar spine. Grade 1 retrolisthesis of L3 on L4. The bones appear diffusely demineralized, suggestive of osteoporosis. Stable appearance of the L2 compression deformity. No new compression deformities.   Atherosclerotic calcification of the abdominal aorta. Degenerative changes of the sacroiliac joints.        XR LUMBAR SPINE 2 OR 3 VWS  LOCATION: [facility]  DATE/TIME: 6/8/2020   INDICATION: Low back pain evaluate L2 compression fracture.  COMPARISON: MR 05/19/2020.  IMPRESSION:   Five lumbar type vertebral bodies presumed. Subacute/chronic appearing compression is identified at the L2, with approximately 30-40% loss of superior body height, and fragmentation anterior superior corner similar to previous MR appearance. Sclerosis   associated with the compressed superior endplate at this level. Degenerative changes elsewhere in the lumbar spine stable. 2 mm posterior subluxation L3 upon L4 is more prominent, possibly due to positioning. If there is concern for instability flexion   and extension views could be helpful to assess this level. Otherwise satisfactory height and alignment lumbar spine. Leftward lumbar curve apex L4. Satisfactory appearance to the sacrum. Vascular calcification. Degenerative changes both SI joints.   Obscuration of the right hemisacrum due to bowel gas and stool.  Consider flexion/extension views to assess possible L3-L4 instability as clinically indicated.        MR LUMBAR SPINE WO CONTRAST  [place] [facility]  DATE/TIME: 5/19/2020   INDICATION: Back pain or radiculopathy, > 6 wks Low back pain with left radicular pain  COMPARISON: Correlation with abdomen and pelvis CT 02/28/2020  TECHNIQUE: Without IV contrast  FINDINGS:   Nomenclature is based on 5 lumbar type vertebral bodies. The conus ends at distal L1. Mild levocurvature of the lumbar spine. 1 mm retrolisthesis from L3-L4 to L5-S1. 20% superior endplate compression fracture of L2 with associated moderate marrow edema.   Small amount of prevertebral soft tissue edema from L1 to L3. The rest of the vertebral body heights are maintained. Nonpathologic marrow signal. No MRI evidence for pars defects. Mild symmetric atrophy of the posterior paraspinal musculature. Normal diameter of the distal abdominal aorta. The visualized bony pelvis and proximal femora are grossly  within normal limits.  T12-L1: Mild intervertebral disc height loss. Loss of the normal T2 signal within the disc. Small broad-based disc bulge with superimposed left paracentral disc protrusion. Mild bilateral facet arthropathy. No spinal canal narrowing. No neuroforaminal narrowing.  L1-L2: Mild intervertebral disc height loss. Loss of the normal T2 signal within the disc. Small broad-based disc bulge. Mild bilateral facet arthropathy. No spinal canal narrowing. No neuroforaminal narrowing.  L2-L3: Mild intervertebral disc height loss. Loss of the normal T2 signal within the disc. Small broad-based disc bulge. Mild bilateral facet arthropathy. No spinal canal narrowing. No neuroforaminal narrowing.   L3-L4: Normal intervertebral disc height. Loss of the normal T2 signal within the disc. Small broad-based disc bulge. Moderate bilateral facet arthropathy. Small amount of fluid within the facet joints. Mild narrowing of the lateral recesses. No spinal canal narrowing. Mild right neuroforaminal narrowing. Mild left neuroforaminal narrowing   L4-L5: Normal intervertebral disc height. Loss of the normal T2 signal within the disc. Small broad-based disc bulge. Moderate bilateral facet arthropathy. Small amount of fluid within the facet joints. Mild narrowing of the lateral recesses. No spinal canal narrowing. Mild right neuroforaminal narrowing. No left neuroforaminal narrowing. 2-3 mm bilateral lateral synovial cyst.  L5-S1: Normal intervertebral disc height. Loss of the normal T2 signal within the disc. Small broad-based disc bulge. Mild to moderate bilateral facet arthropathy. No spinal canal narrowing. No right neuroforaminal narrowing. No left neuroforaminal narrowing.  IMPRESSION:   1.  20% acute/subacute superior endplate compression fracture of L2.   2.  Mild narrowing of the lateral recesses at L3-L4 and L4-L5.  3.  No high-grade spinal canal.  4.  Mild bilateral neuroforaminal narrowing at L3-L4. Mild right  neuroforaminal narrowing at L4-L5.  5.  Moderate facet arthropathy at L3-L4 and L4-L5 with small amount of fluid within the facet joints.

## 2021-09-24 NOTE — PATIENT INSTRUCTIONS
~Please call our [facility] Nurse Navigation line [phone] with any questions or concerns about your treatment plan, if symptoms worsen and you would like to be seen urgently, or if you have problems controlling bladder and bowel function.      Importance of specialized Physical Therapy: Discussed the importance of core strengthening, ROM, stretching exercises with the patient and how each of these entities is important in decreasing pain.  Explained to the patient that the purpose of physical therapy is to teach the patient a home exercise program individualized to them and their specific health concerns.  These exercises need to be performed every day in order to decrease pain and prevent future occurrences of pain.          We discussed referral to Behavioral Health Therapy for a behavioralhealth assessment [facility]. They will call you to schedule.   In this appointment, you will discuss with the provider the way your emotional well-being, stressors, life circumstances, and pain interact. You willalso discuss options for ongoing care of your behavioral health.     Learning how to reduce stress, worry less, and improve your mood can help you feel better physically.   Individual psychotherapy sessions aretailored to address your specific needs.     The body accepts sensory input. The input travels to the brain where the brain based on previous experience, education or culture comesup with a response. Since your brain is very powerful you have the opportunity to learn to adjust the response. Research has demonstrated over and over  that people who are active participants in mental health/behavioralhealth approaches have better outcomes.    Psychotherapy with the [facility] is focused on helping you manage the emotional and psychological aspects of your spinecondition and pain. Spine conditions and related pain affect a number of areas of a person s life. Psychotherapy helps  address the impact of your pain on your life and can give you new tools to deal with it.

## 2021-09-24 NOTE — LETTER
9/24/2021         RE: Claudia Wise   [address] [facility] [address] [address]        Dear Colleague,    Thank you for referring your patient, Claudia Wise, to the [facility] [place]. Please see a copy of my visit note below.      Assessment:     Diagnoses and all orders for this visit:  Chronic bilateral low back pain without sciatica  Left buttock pain  -     lidocaine (LIDODERM) 5 % patch; Place 1 patch onto the skin every 24 hours To prevent lidocaine toxicity, patient should be patch free for 12 hrs daily.  Pain of left sacroiliac joint  -     lidocaine (LIDODERM) 5 % patch; Place 1 patch onto the skin every 24 hours To prevent lidocaine toxicity, patient should be patch free for 12 hrs daily.  SI (sacroiliac) joint dysfunction     Claudia Wise is a 70 year old y.o. female with past medical history significant for anxiety, rheumatoid arthritis managed by Dr. Vinson, diabetes myelitis, hypertension, hyperlipidemia, chronic kidney disease, thyroid disorder, gout, osteopenia, recent small bowel obstruction with surgical intervention who presents today for follow-up regarding:    -Bilateral low back pain with over 50% relief with radiofrequency ablation.    -Left buttock pain with over 50% relief with SI joint steroid injection.    -History L2 compression fracture.     Plan:     A shared decision making plan was used. The patient's values and choices were respected. Prior medical records were reviewed today. The following represents what was discussed and decided upon by the provider and the patient.        -DIAGNOSTIC TESTS: Images were personally reviewed and interpreted.   --Lumbar spine x-ray 8/24/2020 shows stable L2 compression deformity.  --Lumbar spine x-ray 7/1/2020 shows stable L2 compression deformity, grade 1 L3-4 spondylolisthesis.  --Lumbar spine MRI 5/19/2020 with 20% acute/subacute compression fracture L2.  There is mild bilateral lateral recess stenosis L3-4 and L4-5.   Bilateral synovial cysts L4-5 with moderate facet arthropathy.    -INTERVENTIONS: No further injection recommendations at this time as patient is doing well.    -MEDICATIONS: Refilled lidocaine patch as she does find benefit with this modality.  Discussed side effects of medications and proper use. Patient verbalized understanding.    -We did discuss patient seeing behavioral health therapy which she would be potentially interested in but she reports that October is a very busy month so she wants to hold off.  She thought learning some breathing techniques and other modalities discussing her chronic pain may be helpful.  She is going to try meditation on her own in the meantime.  Advised patient to either call or [employer] message if she would like a referral to behavioral health at any time.    -PHYSICAL THERAPY: Encourage patient continue with home exercises from recent physical therapy sessions which he does have further sessions scheduled for.  And does find benefit with this modality.  Discussed the importance of core strengthening, ROM, stretching exercises with the patient and how each of these entities is important in decreasing pain.  Explained to the patient that the purpose of physical therapy is to teach the patient a home exercise program.  These exercises need to be performed every day in order to decrease pain and prevent future occurrences of pain.        -PATIENT EDUCATION:  Total time of 26 minutes spent with the patient, reviewing the chart, placing orders, and documenting.   -Today we also discussed the issues related to the current COVID-19 pandemic, the pros and cons of the current treatment plan, the CDC guidelines such as social distancing, washing the hands, and covering the cough.    -FOLLOW UP: Follow-up as needed  Advised to contact clinic if symptoms worsen or change.    Subjective:     Claudia Wise is a 70 year old female who presents today for follow-up regarding ongoing chronic  bilateral low back pain that is tolerable.  Previously more bothersome left buttock pain did improve at least to 50% relief with recent SI joint steroid injection.  She reports that prior to the injection pain is about a 6/10, after the injection was a 3/10 and now 2 weeks post and ejection she is currently at a 2/10 and overall feels like symptoms are much more tolerable and manageable which she is happy with.    Patient currently denies any radiating lower extremity pain, denies numbness or tingling sensations, denies recent trips or falls or balance changes, denies bowel or bladder loss control.    Treatment to Date: No prior spinal surgery or spinal injection.  Physical therapy [facility] x7 sessions last 8/28/2020 compression fracture/spine pain.  Physical therapy x1 session 8/2/2021 chronic LBP/sacroiliac joint dysfunction.     Left L2, L3, L4 MBB 51/2/2021 with positive response.  Left L2, L3, L4 MBB 6/4/2021 with positive response.  Bilateral L2, L3, L4 RFA 7/2/2021 with 50% relief LBP 4 weeks postinjection.  Preprocedure pain 6/10, post 2/10.  Left SI joint steroid injection 8/17/2021 with over 50% relief.  Preprocedure pain 6/10, post 3/10.     Medications:  Plaquenil 4 seronegative rheumatoid arthritis  Tylenol  Hydrocodone managed by Dr. Erasto Guzmán 25 mg bedtime however patient only tried this medication for a couple of days with no benefit therefore she discontinued on her own.    Patient Active Problem List   Diagnosis     Herpes Simplex Type II     Anxiety     Anemia in chronic kidney disease     Hyperlipemia     Gout     Hypertension     Prediabetes     Endometriosis     Other specified hypothyroidism     Seronegative rheumatoid arthritis of multiple sites (H)     High risk medication use     Bilateral primary osteoarthritis of knee     Small bowel obstruction (H)     Senile osteoporosis     Compression fracture of L2 vertebra with routine healing     Chronic kidney disease, stage 3     Venous  (peripheral) insufficiency     Exposure to bat without known bite       Current Outpatient Medications   Medication     acetaminophen (TYLENOL) 500 MG tablet     HYDROcodone-acetaminophen (NORCO) 5-325 MG tablet     lidocaine (LIDODERM) 5 % patch     acyclovir (ZOVIRAX) 400 MG tablet     allopurinoL (ZYLOPRIM) 100 MG tablet     amLODIPine (NORVASC) 10 MG tablet     atorvastatin (LIPITOR) 10 MG tablet     carboxymethylcellulose (REFRESH PLUS) 0.5 % Dpet ophthalmic dropperette     cyanocobalamin, vitamin B-12, 1,000 mcg Subl     denosumab (PROLIA) 60 MG/ML SOSY injection     DULoxetine (CYMBALTA) 20 MG capsule     fluticasone propionate (FLONASE) 50 mcg/actuation nasal spray     hydroxychloroquine (PLAQUENIL) 200 MG tablet     levothyroxine (SYNTHROID, LEVOTHROID) 50 MCG tablet     LORazepam (ATIVAN) 0.5 MG tablet     losartan (COZAAR) 50 MG tablet     melatonin 3 mg Tab tablet     methotrexate 2.5 MG tablet     metoprolol tartrate (LOPRESSOR) 50 MG tablet     PARoxetine (PAXIL) 10 MG tablet     RESTASIS 0.05 % ophthalmic emulsion     sodium bicarbonate 650 MG tablet     No current facility-administered medications for this visit.       Allergies   Allergen Reactions     Bupropion Hcl [Bupropion] Nausea     Erythromycin Base [Erythromycin] Nausea     Paxil [Paroxetine] Diarrhea     Tetracyclines Itching     Valacyclovir Nausea       Past Medical History:   Diagnosis Date     Anemia      Anxiety      Chronic kidney disease      Diabetes mellitus, type II (H)     prediabetes     Disease of thyroid gland     hypothyroid     Family history of myocardial infarction      Gout      High cholesterol      Hypertension      Inverted nipple     right     Macular edema      Osteoarthritis 2012     Rheumatoid arthritis (H) 2012        Review of Systems  ROS:  Specifically negative for bowel/bladder dysfunction, balance changes, headache, dizziness, foot drop, fevers, chills, appetite changes, nausea/vomiting, unexplained weight  loss. Otherwise 13 systems reviewed are negative. Please see the patient's intake questionnaire from today for details.    Reviewed Social, Family, Past Medical and Past Surgical history with patient, no significant changes noted since prior visit.     Objective:     BP (!) 140/63 (BP Location: Right arm, Patient Position: Sitting)   Pulse 71   SpO2 93%     PHYSICAL EXAMINATION:    --CONSTITUTIONAL: Well developed, well nourished, healthy appearing individual.  --PSYCHIATRIC: Appropriate mood and affect. No difficulty interacting due to temper, social withdrawal, or memory issues.  --SKIN: Lumbar region is dry and intact.   --RESPIRATORY: Normal rhythm and effort. No abnormal accessory muscle breathing patterns noted.   --MUSCULOSKELETAL:  Normal lumbar lordosis noted, no lateral shift.  --GROSS MOTOR: Easily arises from a seated position. Gait is non-antalgic  --LUMBAR SPINE:  Inspection reveals no evidence of deformity.     RESULTS:   Imaging: Lumbar spine imaging was reviewed today. The images were shown to the patient and the findings were explained using a spine model.      Xr Lumbar Spine 2 Or 3 Vws  Result Date: 8/25/2020  INDICATION: Evaluate L2 compression fracture. COMPARISON: 7/10/2020.   Five lumbar-type vertebral bodies. Stable moderate L2 compression deformity. Segmental kyphosis about the fracture from the superior L1 endplate through the inferior L3 endplate measures 21 degrees, which is unchanged. Vertebral body heights are otherwise maintained. Mild loss of disc space height L3-L4 through L5-S1. Mild lower lumbar facet arthropathy.        XR LUMBAR SPINE 2 OR 3 VWS  LOCATION: [facility]  DATE/TIME: 7/1/2020   INDICATION: Low back pain compression fracture L2 evaluate for stability  COMPARISON: 06/08/2020  IMPRESSION:   Very shallow left apex curvature of the lumbar spine. Grade 1 retrolisthesis of L3 on L4. The bones appear diffusely demineralized, suggestive of osteoporosis.  Stable appearance of the L2 compression deformity. No new compression deformities.   Atherosclerotic calcification of the abdominal aorta. Degenerative changes of the sacroiliac joints.        XR LUMBAR SPINE 2 OR 3 VWS  LOCATION: [facility]  DATE/TIME: 6/8/2020   INDICATION: Low back pain evaluate L2 compression fracture.  COMPARISON: MR 05/19/2020.  IMPRESSION:   Five lumbar type vertebral bodies presumed. Subacute/chronic appearing compression is identified at the L2, with approximately 30-40% loss of superior body height, and fragmentation anterior superior corner similar to previous MR appearance. Sclerosis   associated with the compressed superior endplate at this level. Degenerative changes elsewhere in the lumbar spine stable. 2 mm posterior subluxation L3 upon L4 is more prominent, possibly due to positioning. If there is concern for instability flexion   and extension views could be helpful to assess this level. Otherwise satisfactory height and alignment lumbar spine. Leftward lumbar curve apex L4. Satisfactory appearance to the sacrum. Vascular calcification. Degenerative changes both SI joints.   Obscuration of the right hemisacrum due to bowel gas and stool.  Consider flexion/extension views to assess possible L3-L4 instability as clinically indicated.        MR LUMBAR SPINE WO CONTRAST  LOCATION: [facility]  DATE/TIME: 5/19/2020   INDICATION: Back pain or radiculopathy, > 6 wks Low back pain with left radicular pain  COMPARISON: Correlation with abdomen and pelvis CT 02/28/2020  TECHNIQUE: Without IV contrast  FINDINGS:   Nomenclature is based on 5 lumbar type vertebral bodies. The conus ends at distal L1. Mild levocurvature of the lumbar spine. 1 mm retrolisthesis from L3-L4 to L5-S1. 20% superior endplate compression fracture of L2 with associated moderate marrow edema.   Small amount of prevertebral soft tissue edema from L1 to L3. The rest of the vertebral body heights are  maintained. Nonpathologic marrow signal. No MRI evidence for pars defects. Mild symmetric atrophy of the posterior paraspinal musculature. Normal diameter of the distal abdominal aorta. The visualized bony pelvis and proximal femora are grossly within normal limits.  T12-L1: Mild intervertebral disc height loss. Loss of the normal T2 signal within the disc. Small broad-based disc bulge with superimposed left paracentral disc protrusion. Mild bilateral facet arthropathy. No spinal canal narrowing. No neuroforaminal narrowing.  L1-L2: Mild intervertebral disc height loss. Loss of the normal T2 signal within the disc. Small broad-based disc bulge. Mild bilateral facet arthropathy. No spinal canal narrowing. No neuroforaminal narrowing.  L2-L3: Mild intervertebral disc height loss. Loss of the normal T2 signal within the disc. Small broad-based disc bulge. Mild bilateral facet arthropathy. No spinal canal narrowing. No neuroforaminal narrowing.   L3-L4: Normal intervertebral disc height. Loss of the normal T2 signal within the disc. Small broad-based disc bulge. Moderate bilateral facet arthropathy. Small amount of fluid within the facet joints. Mild narrowing of the lateral recesses. No spinal canal narrowing. Mild right neuroforaminal narrowing. Mild left neuroforaminal narrowing   L4-L5: Normal intervertebral disc height. Loss of the normal T2 signal within the disc. Small broad-based disc bulge. Moderate bilateral facet arthropathy. Small amount of fluid within the facet joints. Mild narrowing of the lateral recesses. No spinal canal narrowing. Mild right neuroforaminal narrowing. No left neuroforaminal narrowing. 2-3 mm bilateral lateral synovial cyst.  L5-S1: Normal intervertebral disc height. Loss of the normal T2 signal within the disc. Small broad-based disc bulge. Mild to moderate bilateral facet arthropathy. No spinal canal narrowing. No right neuroforaminal narrowing. No left neuroforaminal  narrowing.  IMPRESSION:   1.  20% acute/subacute superior endplate compression fracture of L2.   2.  Mild narrowing of the lateral recesses at L3-L4 and L4-L5.  3.  No high-grade spinal canal.  4.  Mild bilateral neuroforaminal narrowing at L3-L4. Mild right neuroforaminal narrowing at L4-L5.  5.  Moderate facet arthropathy at L3-L4 and L4-L5 with small amount of fluid within the facet joints.                                        Again, thank you for allowing me to participate in the care of your patient.        Sincerely,        Loree Rivas, CNP

## 2021-10-07 ENCOUNTER — HOSPITAL ENCOUNTER (OUTPATIENT)
Dept: PHYSICAL THERAPY | Facility: REHABILITATION | Age: 71
End: 2021-10-07
Payer: COMMERCIAL

## 2021-10-07 DIAGNOSIS — M54.50 CHRONIC BILATERAL LOW BACK PAIN WITHOUT SCIATICA: Primary | ICD-10-CM

## 2021-10-07 DIAGNOSIS — M79.18 LEFT BUTTOCK PAIN: ICD-10-CM

## 2021-10-07 DIAGNOSIS — G89.29 CHRONIC BILATERAL LOW BACK PAIN WITHOUT SCIATICA: Primary | ICD-10-CM

## 2021-10-07 DIAGNOSIS — M53.3 PAIN OF LEFT SACROILIAC JOINT: ICD-10-CM

## 2021-10-07 DIAGNOSIS — M53.3 SI (SACROILIAC) JOINT DYSFUNCTION: ICD-10-CM

## 2021-10-07 PROCEDURE — 97110 THERAPEUTIC EXERCISES: CPT | Mod: GP | Performed by: PHYSICAL THERAPIST

## 2021-10-14 ENCOUNTER — HOSPITAL ENCOUNTER (OUTPATIENT)
Dept: PHYSICAL THERAPY | Facility: REHABILITATION | Age: 71
End: 2021-10-14
Payer: COMMERCIAL

## 2021-10-14 DIAGNOSIS — M54.50 CHRONIC BILATERAL LOW BACK PAIN WITHOUT SCIATICA: Primary | ICD-10-CM

## 2021-10-14 DIAGNOSIS — M79.18 LEFT BUTTOCK PAIN: ICD-10-CM

## 2021-10-14 DIAGNOSIS — G89.29 CHRONIC BILATERAL LOW BACK PAIN WITHOUT SCIATICA: Primary | ICD-10-CM

## 2021-10-14 DIAGNOSIS — S32.020A COMPRESSION FRACTURE OF L2 VERTEBRA, INITIAL ENCOUNTER (H): ICD-10-CM

## 2021-10-14 DIAGNOSIS — M53.3 SI (SACROILIAC) JOINT DYSFUNCTION: ICD-10-CM

## 2021-10-14 DIAGNOSIS — M53.3 PAIN OF LEFT SACROILIAC JOINT: ICD-10-CM

## 2021-10-14 PROCEDURE — 97110 THERAPEUTIC EXERCISES: CPT | Mod: GP | Performed by: PHYSICAL THERAPIST

## 2021-10-28 ENCOUNTER — HOSPITAL ENCOUNTER (OUTPATIENT)
Dept: PHYSICAL THERAPY | Facility: REHABILITATION | Age: 71
End: 2021-10-28
Payer: COMMERCIAL

## 2021-10-28 DIAGNOSIS — M79.18 LEFT BUTTOCK PAIN: ICD-10-CM

## 2021-10-28 DIAGNOSIS — M53.3 SI (SACROILIAC) JOINT DYSFUNCTION: ICD-10-CM

## 2021-10-28 DIAGNOSIS — M53.3 PAIN OF LEFT SACROILIAC JOINT: ICD-10-CM

## 2021-10-28 DIAGNOSIS — G89.29 CHRONIC BILATERAL LOW BACK PAIN WITHOUT SCIATICA: Primary | ICD-10-CM

## 2021-10-28 DIAGNOSIS — M54.50 CHRONIC BILATERAL LOW BACK PAIN WITHOUT SCIATICA: Primary | ICD-10-CM

## 2021-10-28 PROCEDURE — 97110 THERAPEUTIC EXERCISES: CPT | Mod: GP | Performed by: PHYSICAL THERAPIST

## 2021-10-29 NOTE — PROGRESS NOTES
OUTPATIENT PHYSICAL THERAPY  PLAN OF TREATMENT FOR OUTPATIENT REHABILITATION AND PROGRESS NOTE           Patient's Last Name, First Name, Claudia Rush Date of Birth  1950   Provider's Name  [facility] Medical Record No.  [id number]    Onset Date  7/29/21 Start of Care Date  8/2/21   Type:     _X_PT   ___OT   ___SLP Medical Diagnosis  Chronic bilateral low back pain without sciatica, Left buttock pain, Pain of left sacroiliac joint, SI (sacroiliac) joint dysfunction   PT Diagnosis  Decreased activity tolerance Plan of Treatment  Frequency/Duration: 1x/week  Certification date from 10/28/21 to 12/26/21     Goal Identifier HEP   Goal Description Patient will be independent with HEP and self management of symptoms   Target Date 10/31/21   Date Met      Progress (detail required for progress note):       Goal Identifier standing    Goal Description Patient will stand for 20 minutes with pain 3/10 or less for meal prep   Target Date 10/31/21   Date Met      Progress (detail required for progress note): She can now stand for 10 minutes for meal prep and dishes     Goal Identifier walking   Goal Description Patient will walk for 15 minutes without pain   Target Date 10/31/21   Date Met      Progress (detail required for progress note): Able to walk approximately 200 yards          Beginning/End Dates of Progress Note Reporting Period:  8/2/21 to 10/28/21    Progress Toward Goals:   Progress this reporting period: noted above    Client Self (Subjective) Report for Progress Note Reporting Period: 65-70% overall improvement since starting physical therapy.   She feels like she is walking better.  She is still gatting fatigued and SOB.  Pain was 2-3/10 in left low back earlier this morning.  0-1/10 pain now.      Objective Measurements: Pain 0-3/10          I CERTIFY THE NEED FOR THESE SERVICES FURNISHED UNDER        THIS PLAN OF TREATMENT AND WHILE UNDER MY CARE      (Physician co-signature of this document indicates review and certification of the therapy plan).                Referring Provider: JANY Monique PT

## 2021-11-04 DIAGNOSIS — I10 HTN (HYPERTENSION): ICD-10-CM

## 2021-11-04 DIAGNOSIS — J31.0 RHINITIS, UNSPECIFIED TYPE: ICD-10-CM

## 2021-11-06 NOTE — TELEPHONE ENCOUNTER
"Routing refill request to provider for review/approval because:  Labs out of range:  creat  Labs not current:  potassium  BP elevated    losartan  Last Written Prescription Date:  5/8/21  Last Fill Quantity: 90,  # refills: 1     Flonase  Last Written Prescription Date:  5/26/20  Last Fill Quantity: 48g,  # refills: 3    Last office visit provider:  7/1/21    Requested Prescriptions   Pending Prescriptions Disp Refills     losartan (COZAAR) 50 MG tablet [Pharmacy Med Name: Losartan Potassium 50 MG Oral Tablet] 90 tablet 0     Sig: Take 1 tablet by mouth once daily       Angiotensin-II Receptors Failed - 11/4/2021  8:09 AM        Failed - Last blood pressure under 140/90 in past 12 months     BP Readings from Last 3 Encounters:   09/24/21 (!) 140/63   09/10/21 130/56   08/25/21 126/72                 Failed - Normal serum creatinine on file in past 12 months     Recent Labs   Lab Test 08/23/21  1112   CR 1.23*       Ok to refill medication if creatinine is low          Failed - Normal serum potassium on file in past 12 months     Recent Labs   Lab Test 10/01/20  1604   POTASSIUM 4.4                    Passed - Recent (12 mo) or future (30 days) visit within the authorizing provider's specialty     Patient has had an office visit with the authorizing provider or a provider within the authorizing providers department within the previous 12 mos or has a future within next 30 days. See \"Patient Info\" tab in inbasket, or \"Choose Columns\" in Meds & Orders section of the refill encounter.              Passed - Medication is active on med list        Passed - Patient is age 18 or older        Passed - No active pregnancy on record        Passed - No positive pregnancy test in past 12 months           fluticasone (FLONASE) 50 MCG/ACT nasal spray [Pharmacy Med Name: Fluticasone Propionate 50 MCG/ACT Nasal Suspension] 16 g 0     Sig: Use 1 spray(s) in each nostril once daily       Nasal Allergy Protocol Passed - 11/4/2021  8:09 " "AM        Passed - Patient is age 12 or older        Passed - Recent (12 mo) or future (30 days) visit within the authorizing provider's specialty     Patient has had an office visit with the authorizing provider or a provider within the authorizing providers department within the previous 12 mos or has a future within next 30 days. See \"Patient Info\" tab in inbasket, or \"Choose Columns\" in Meds & Orders section of the refill encounter.              Passed - Medication is active on med list             Abby Palomares RN 11/06/21 10:07 AM  "

## 2021-11-08 ENCOUNTER — MYC REFILL (OUTPATIENT)
Dept: INTERNAL MEDICINE | Facility: CLINIC | Age: 71
End: 2021-11-08

## 2021-11-08 DIAGNOSIS — I10 HTN (HYPERTENSION): ICD-10-CM

## 2021-11-08 RX ORDER — LOSARTAN POTASSIUM 50 MG/1
TABLET ORAL
Qty: 90 TABLET | Refills: 0 | Status: SHIPPED | OUTPATIENT
Start: 2021-11-08 | End: 2022-01-21

## 2021-11-08 RX ORDER — FLUTICASONE PROPIONATE 50 MCG
SPRAY, SUSPENSION (ML) NASAL
Qty: 16 G | Refills: 0 | Status: SHIPPED | OUTPATIENT
Start: 2021-11-08 | End: 2022-05-17

## 2021-11-10 RX ORDER — LOSARTAN POTASSIUM 50 MG/1
50 TABLET ORAL DAILY
Qty: 90 TABLET | Refills: 1 | OUTPATIENT
Start: 2021-11-10

## 2021-11-16 ENCOUNTER — TRANSFERRED RECORDS (OUTPATIENT)
Dept: HEALTH INFORMATION MANAGEMENT | Facility: CLINIC | Age: 71
End: 2021-11-16
Payer: COMMERCIAL

## 2021-11-16 LAB — RETINOPATHY: NEGATIVE

## 2021-11-18 ENCOUNTER — HOSPITAL ENCOUNTER (OUTPATIENT)
Dept: PHYSICAL THERAPY | Facility: REHABILITATION | Age: 71
End: 2021-11-18
Payer: COMMERCIAL

## 2021-11-18 DIAGNOSIS — M54.50 CHRONIC BILATERAL LOW BACK PAIN WITHOUT SCIATICA: Primary | ICD-10-CM

## 2021-11-18 DIAGNOSIS — G89.29 CHRONIC BILATERAL LOW BACK PAIN WITHOUT SCIATICA: Primary | ICD-10-CM

## 2021-11-18 PROCEDURE — 97110 THERAPEUTIC EXERCISES: CPT | Mod: GP | Performed by: PHYSICAL THERAPIST

## 2021-11-18 PROCEDURE — 97140 MANUAL THERAPY 1/> REGIONS: CPT | Mod: GP | Performed by: PHYSICAL THERAPIST

## 2021-11-29 ENCOUNTER — LAB (OUTPATIENT)
Dept: LAB | Facility: CLINIC | Age: 71
End: 2021-11-29

## 2021-11-29 ENCOUNTER — HOSPITAL ENCOUNTER (OUTPATIENT)
Dept: PHYSICAL THERAPY | Facility: REHABILITATION | Age: 71
End: 2021-11-29
Payer: COMMERCIAL

## 2021-11-29 DIAGNOSIS — G89.29 CHRONIC BILATERAL LOW BACK PAIN WITHOUT SCIATICA: Primary | ICD-10-CM

## 2021-11-29 DIAGNOSIS — M54.50 CHRONIC BILATERAL LOW BACK PAIN WITHOUT SCIATICA: Primary | ICD-10-CM

## 2021-11-29 DIAGNOSIS — M06.09 SERONEGATIVE RHEUMATOID ARTHRITIS OF MULTIPLE SITES (H): ICD-10-CM

## 2021-11-29 DIAGNOSIS — M53.3 PAIN OF LEFT SACROILIAC JOINT: ICD-10-CM

## 2021-11-29 DIAGNOSIS — M79.18 LEFT BUTTOCK PAIN: ICD-10-CM

## 2021-11-29 LAB
ALBUMIN SERPL-MCNC: 4.3 G/DL (ref 3.5–5)
ALT SERPL W P-5'-P-CCNC: 19 U/L (ref 0–45)
CREAT SERPL-MCNC: 1.19 MG/DL (ref 0.6–1.1)
ERYTHROCYTE [DISTWIDTH] IN BLOOD BY AUTOMATED COUNT: 14 % (ref 10–15)
GFR SERPL CREATININE-BSD FRML MDRD: 46 ML/MIN/1.73M2
HCT VFR BLD AUTO: 32.6 % (ref 35–47)
HGB BLD-MCNC: 10.8 G/DL (ref 11.7–15.7)
MCH RBC QN AUTO: 31.5 PG (ref 26.5–33)
MCHC RBC AUTO-ENTMCNC: 33.1 G/DL (ref 31.5–36.5)
MCV RBC AUTO: 95 FL (ref 78–100)
PLATELET # BLD AUTO: 167 10E3/UL (ref 150–450)
RBC # BLD AUTO: 3.43 10E6/UL (ref 3.8–5.2)
WBC # BLD AUTO: 6 10E3/UL (ref 4–11)

## 2021-11-29 PROCEDURE — 85027 COMPLETE CBC AUTOMATED: CPT

## 2021-11-29 PROCEDURE — 97110 THERAPEUTIC EXERCISES: CPT | Mod: GP | Performed by: PHYSICAL THERAPIST

## 2021-11-29 PROCEDURE — 84460 ALANINE AMINO (ALT) (SGPT): CPT

## 2021-11-29 PROCEDURE — 82040 ASSAY OF SERUM ALBUMIN: CPT

## 2021-11-29 PROCEDURE — 97140 MANUAL THERAPY 1/> REGIONS: CPT | Mod: GP | Performed by: PHYSICAL THERAPIST

## 2021-11-29 PROCEDURE — 36415 COLL VENOUS BLD VENIPUNCTURE: CPT

## 2021-11-29 PROCEDURE — 82565 ASSAY OF CREATININE: CPT

## 2021-12-01 ENCOUNTER — OFFICE VISIT (OUTPATIENT)
Dept: RHEUMATOLOGY | Facility: CLINIC | Age: 71
End: 2021-12-01
Payer: COMMERCIAL

## 2021-12-01 VITALS
WEIGHT: 172.9 LBS | HEART RATE: 76 BPM | BODY MASS INDEX: 30.63 KG/M2 | DIASTOLIC BLOOD PRESSURE: 80 MMHG | SYSTOLIC BLOOD PRESSURE: 130 MMHG

## 2021-12-01 DIAGNOSIS — M06.09 SERONEGATIVE RHEUMATOID ARTHRITIS OF MULTIPLE SITES (H): Primary | ICD-10-CM

## 2021-12-01 DIAGNOSIS — Z79.899 HIGH RISK MEDICATION USE: ICD-10-CM

## 2021-12-01 DIAGNOSIS — N18.32 STAGE 3B CHRONIC KIDNEY DISEASE (H): ICD-10-CM

## 2021-12-01 DIAGNOSIS — M17.0 BILATERAL PRIMARY OSTEOARTHRITIS OF KNEE: ICD-10-CM

## 2021-12-01 PROCEDURE — 20610 DRAIN/INJ JOINT/BURSA W/O US: CPT | Mod: 50 | Performed by: INTERNAL MEDICINE

## 2021-12-01 PROCEDURE — 99214 OFFICE O/P EST MOD 30 MIN: CPT | Mod: 25 | Performed by: INTERNAL MEDICINE

## 2021-12-01 RX ORDER — TRIAMCINOLONE ACETONIDE 40 MG/ML
40 INJECTION, SUSPENSION INTRA-ARTICULAR; INTRAMUSCULAR ONCE
Status: COMPLETED | OUTPATIENT
Start: 2021-12-01 | End: 2021-12-01

## 2021-12-01 RX ORDER — HYDROXYCHLOROQUINE SULFATE 200 MG/1
200 TABLET, FILM COATED ORAL 2 TIMES DAILY
Qty: 180 TABLET | Refills: 0 | Status: SHIPPED | OUTPATIENT
Start: 2021-12-01 | End: 2022-02-24

## 2021-12-01 RX ADMIN — TRIAMCINOLONE ACETONIDE 40 MG: 40 INJECTION, SUSPENSION INTRA-ARTICULAR; INTRAMUSCULAR at 14:59

## 2021-12-01 RX ADMIN — TRIAMCINOLONE ACETONIDE 40 MG: 40 INJECTION, SUSPENSION INTRA-ARTICULAR; INTRAMUSCULAR at 15:00

## 2021-12-01 NOTE — PROGRESS NOTES
Rheumatology follow-up visit note     Claudia is a 71 year old female presents today for follow-up.    Claudia was seen today for recheck.    Diagnoses and all orders for this visit:    Seronegative rheumatoid arthritis of multiple sites (H)  -     hydroxychloroquine (PLAQUENIL) 200 MG tablet; Take 1 tablet (200 mg) by mouth 2 times daily  -     methotrexate 2.5 MG tablet; Take 4 tablets (10 mg) by mouth every 7 days    Bilateral primary osteoarthritis of knee  -     triamcinolone (KENALOG-40) injection 40 mg  -     triamcinolone (KENALOG-40) injection 40 mg  -     ASPIRATION/INJECTION MAJOR JOINT    High risk medication use    Stage 3b chronic kidney disease (H)        However seronegative rheumatoid arthritis is under good control with current regimen of hydroxychloroquine, methotrexate, she has worsening pain in her knees, she wonders if she can get corticosteroid injections, after pros and cons are outlined 40 mg of Kenalog injected anterolateral approach each of the knees.    Follow up in 3 months.    HPI    Claudia Wise is a 71 year old female is here for follow-up of seronegative Rheumatoid Arthritis, osteoarthritis with his various manifestations, renal impairment and osteoarthritis.  She has tolerated the current combination well.  There has been no flareup of her joint symptoms vis-à-vis rheumatoid arthritis.  As she has pain off and on in her knees.  More recently the symptoms have gotten worse to the point where she has difficult time ambulating going up and down the steps. She manages this well with over-the-counter measures and corticosteroid injections intermittently during the time she was undergoing rabies vaccination after a bat bite she held her methotrexate. She wants to have corticosteroid injections but will defer because of this reason.  She is aware not to take nonsteroidals because of renal impairment.  Reminded of eye examination.  Overall disease activity:  stable. Limitation on  activities as noted in the MDHAQ scanned in the EMR.  Further historical information, including ROS as noted in the multidimensional health assessment questionnaire scanned in the EMR and in the assessment and plan section.      DETAILED EXAMINATION  12/01/21  :    Vitals:    12/01/21 1403   BP: 130/80   Pulse: 76   Weight: 78.4 kg (172 lb 14.4 oz)     Alert oriented. Head including the face is examined for malar rash, heliotropes, scarring, lupus pernio. Eyes examined for redness such as in episcleritis/scleritis, periorbital lesions.   Neck/ Face examined for parotid gland swelling, range of motion of neck.  Left upper and lower and right upper and lower extremities examined for tenderness, swelling, warmth of the appendicular joints, range of motion, edema, rash.  Some of the important findings included: she does not have evidence of synovitis in the palpable joints of the upper extremities.  No significant deformities of the digits.  + Heberden nodes.  Range of motion of the shoulders  show full abduction.   JLT but no effusion or warmth of the knees.  she does not have dactylitis of the digits.     Patient Active Problem List    Diagnosis Date Noted     Exposure to bat without known bite 07/28/2021     Priority: Medium     Venous (peripheral) insufficiency 07/01/2021     Priority: Medium     Other specified hypothyroidism      Priority: Medium     Created by Conversion  Replacement Utility updated for latest IMO load         Chronic kidney disease, stage 3 (H) 01/18/2021     Priority: Medium     Senile osteoporosis 06/02/2020     Priority: Medium     Compression fracture of L2 vertebra with routine healing 06/02/2020     Priority: Medium     Small bowel obstruction (H) 02/10/2020     Priority: Medium     Bilateral primary osteoarthritis of knee 05/23/2019     Priority: Medium     Anemia in chronic kidney disease      Priority: Medium     Created by Conversion         Gout      Priority: Medium     Created by  Conversion         Hyperlipemia      Priority: Medium     Created by Conversion         High risk medication use 08/25/2016     Priority: Medium     Herpes Simplex Type II      Priority: Medium     Created by Conversion  [facility] Annotation: Nov 13 2007  4:56PM - Oma Reddy: By pt's   history.    Allergy to valtrex.  She came to me on prn acyclovir.  Replacement Utility updated for latest IMO load         Anxiety      Priority: Medium     Created by Conversion  Replacement Utility updated for latest IMO load         Hypertension      Priority: Medium     Created by Conversion  Replacement Utility updated for latest IMO load         Endometriosis      Priority: Medium     Created by Conversion  [facility] Annotation: Nov 13 2007  4:58PM - Katie Meyers: s/p VON, BSO   1987.  Replacement Utility updated for latest IMO load         Seronegative rheumatoid arthritis of multiple sites (H) 03/01/2016     Priority: Medium     Prediabetes      Priority: Medium     Created by Conversion         Past Surgical History:   Procedure Laterality Date     COLON SURGERY  1986     COLPORRHAPHY N/A 7/7/2016    Procedure: ANTERIOR REPAIR WITH MESH;  Surgeon: Zac Stone MD;  Location: [facility];  Service:       UGI ENDOSCOPY W PLACEMENT GASTROSTOMY TUBE PERCUT N/A 2/7/2020    Procedure: Percutaneous Endoscopic Gastromstomy Tube Placement;  Surgeon: Zion Schwarz MD;  Location: [facility] OR;  Service: General     HYSTERECTOMY       LAPAROTOMY EXPLORATORY N/A 1/23/2020    Procedure: EXPLORATORY LAPARATOMY, EXTENSIVE LYSIS OF ADHESIONS, SMALL BOWEL RESECTION;  Surgeon: Claudia Jeronimo MD;  Location: [facility] OR;  Service: General     OOPHORECTOMY        Past Medical History:   Diagnosis Date     Anemia      Anxiety      Chronic kidney disease      Diabetes mellitus, type II (H)     prediabetes     Disease of thyroid gland     hypothyroid     Family history of myocardial infarction      Gout       High cholesterol      Hypertension      Inverted nipple     right     Macular edema      Osteoarthritis 2012     Rheumatoid arthritis (H) 2012     Allergies   Allergen Reactions     Bupropion Hcl [Bupropion] Nausea     Erythromycin Base [Erythromycin] Nausea     Paxil [Paroxetine] Diarrhea     Tetracyclines Itching     Valacyclovir Nausea     Current Outpatient Medications   Medication Sig Dispense Refill     acetaminophen (TYLENOL) 500 MG tablet Take 500 mg by mouth every 6 hours as needed        acyclovir (ZOVIRAX) 400 MG tablet [ACYCLOVIR (ZOVIRAX) 400 MG TABLET] Take 1 tablet (400 mg total) by mouth every 8 (eight) hours as needed. 60 tablet 1     allopurinoL (ZYLOPRIM) 100 MG tablet [ALLOPURINOL (ZYLOPRIM) 100 MG TABLET] Take 1 tablet (100 mg total) by mouth 2 (two) times a day with meals. 180 tablet 3     amLODIPine (NORVASC) 10 MG tablet [AMLODIPINE (NORVASC) 10 MG TABLET] Take 1 tablet (10 mg total) by mouth daily. 90 tablet 3     atorvastatin (LIPITOR) 10 MG tablet TAKE 1 TABLET BY MOUTH AT BEDTIME 90 tablet 3     carboxymethylcellulose (REFRESH PLUS) 0.5 % Dpet ophthalmic dropperette [CARBOXYMETHYLCELLULOSE (REFRESH PLUS) 0.5 % DPET OPHTHALMIC DROPPERETTE] Administer 1-2 drops to both eyes 4 (four) times a day as needed.        cyanocobalamin, vitamin B-12, 1,000 mcg Subl [CYANOCOBALAMIN, VITAMIN B-12, 1,000 MCG SUBL] Place 1 tablet (1,000 mcg total) under the tongue daily.  0     denosumab (PROLIA) 60 MG/ML SOSY injection Inject 60 mg Subcutaneous       DULoxetine (CYMBALTA) 20 MG capsule Take 1 capsule (20 mg) by mouth daily 90 capsule 3     fluticasone (FLONASE) 50 MCG/ACT nasal spray Use 1 spray(s) in each nostril once daily 16 g 0     HYDROcodone-acetaminophen (NORCO) 5-325 MG tablet Take 1 tablet by mouth every 8 hours as needed 18 tablet 0     hydroxychloroquine (PLAQUENIL) 200 MG tablet Take 1 tablet (200 mg) by mouth 2 times daily 180 tablet 0     levothyroxine (SYNTHROID, LEVOTHROID) 50 MCG  tablet [LEVOTHYROXINE (SYNTHROID, LEVOTHROID) 50 MCG TABLET] TAKE 1 TABLET BY MOUTH ONCE DAILY IN THE MORNING 90 tablet 3     lidocaine (LIDODERM) 5 % patch Place 1 patch onto the skin every 24 hours To prevent lidocaine toxicity, patient should be patch free for 12 hrs daily. 14 patch 1     LORazepam (ATIVAN) 0.5 MG tablet [LORAZEPAM (ATIVAN) 0.5 MG TABLET] TAKE 1 TABLET BY MOUTH EVERY 8 HOURS AS NEEDED FOR ANXIETY 30 tablet 0     losartan (COZAAR) 50 MG tablet Take 1 tablet by mouth once daily 90 tablet 0     melatonin 3 mg Tab tablet [MELATONIN 3 MG TAB TABLET] Take 1 tablet (3 mg total) by mouth at bedtime as needed.  0     methotrexate 2.5 MG tablet Take 4 tablets (10 mg) by mouth every 7 days 48 tablet 0     metoprolol tartrate (LOPRESSOR) 50 MG tablet [METOPROLOL TARTRATE (LOPRESSOR) 50 MG TABLET] Take 1 tablet (50 mg total) by mouth 2 (two) times a day. 180 tablet 3     RESTASIS 0.05 % ophthalmic emulsion [RESTASIS 0.05 % OPHTHALMIC EMULSION] INSTILL 1 DROP INTO EACH EYE TWICE DAILY  11     sodium bicarbonate 650 MG tablet [SODIUM BICARBONATE 650 MG TABLET] Take 2 tablets (1,300 mg total) by mouth 2 (two) times a day. 100 tablet 3     family history includes Breast Cancer in her maternal aunt; Cancer in her cousin, father, maternal aunt, maternal grandmother, and sister; Coronary Artery Disease (age of onset: 49.00) in her mother.  Social Connections: Not on file          WBC Count   Date Value Ref Range Status   11/29/2021 6.0 4.0 - 11.0 10e3/uL Final     RBC Count   Date Value Ref Range Status   11/29/2021 3.43 (L) 3.80 - 5.20 10e6/uL Final     Hemoglobin   Date Value Ref Range Status   11/29/2021 10.8 (L) 11.7 - 15.7 g/dL Final     Hematocrit   Date Value Ref Range Status   11/29/2021 32.6 (L) 35.0 - 47.0 % Final     MCV   Date Value Ref Range Status   11/29/2021 95 78 - 100 fL Final     MCH   Date Value Ref Range Status   11/29/2021 31.5 26.5 - 33.0 pg Final     Platelet Count   Date Value Ref Range  Status   11/29/2021 167 150 - 450 10e3/uL Final     % Lymphocytes   Date Value Ref Range Status   02/28/2020 13 (L) 20 - 40 % Final   01/29/2020 7 (L) 20 - 40 % Final     AST   Date Value Ref Range Status   10/01/2020 21 0 - 40 U/L Final     ALT   Date Value Ref Range Status   11/29/2021 19 0 - 45 U/L Final     Albumin   Date Value Ref Range Status   11/29/2021 4.3 3.5 - 5.0 g/dL Final     Alkaline Phosphatase   Date Value Ref Range Status   10/01/2020 63 45 - 120 U/L Final     Creatinine   Date Value Ref Range Status   11/29/2021 1.19 (H) 0.60 - 1.10 mg/dL Final     GFR Estimate   Date Value Ref Range Status   11/29/2021 46 (L) >60 mL/min/1.73m2 Final     Comment:     As of July 11, 2021, eGFR is calculated by the CKD-EPI creatinine equation, without race adjustment. eGFR can be influenced by muscle mass, exercise, and diet. The reported eGFR is an estimation only and is only applicable if the renal function is stable.   05/11/2021 38 (L) >60 mL/min/1.73m2 Final     GFR Estimate If Black   Date Value Ref Range Status   05/11/2021 46 (L) >60 mL/min/1.73m2 Final

## 2021-12-06 ENCOUNTER — HOSPITAL ENCOUNTER (OUTPATIENT)
Dept: PHYSICAL THERAPY | Facility: REHABILITATION | Age: 71
End: 2021-12-06
Payer: COMMERCIAL

## 2021-12-06 DIAGNOSIS — M53.3 SI (SACROILIAC) JOINT DYSFUNCTION: ICD-10-CM

## 2021-12-06 DIAGNOSIS — G89.29 CHRONIC BILATERAL LOW BACK PAIN WITHOUT SCIATICA: Primary | ICD-10-CM

## 2021-12-06 DIAGNOSIS — M79.18 LEFT BUTTOCK PAIN: ICD-10-CM

## 2021-12-06 DIAGNOSIS — M54.50 CHRONIC BILATERAL LOW BACK PAIN WITHOUT SCIATICA: Primary | ICD-10-CM

## 2021-12-06 DIAGNOSIS — M53.3 PAIN OF LEFT SACROILIAC JOINT: ICD-10-CM

## 2021-12-06 PROCEDURE — 97140 MANUAL THERAPY 1/> REGIONS: CPT | Mod: GP | Performed by: PHYSICAL THERAPIST

## 2021-12-06 PROCEDURE — 97110 THERAPEUTIC EXERCISES: CPT | Mod: GP | Performed by: PHYSICAL THERAPIST

## 2021-12-15 ENCOUNTER — OFFICE VISIT (OUTPATIENT)
Dept: NEUROLOGY | Facility: CLINIC | Age: 71
End: 2021-12-15
Attending: INTERNAL MEDICINE
Payer: COMMERCIAL

## 2021-12-15 VITALS
DIASTOLIC BLOOD PRESSURE: 61 MMHG | HEIGHT: 64 IN | WEIGHT: 172.21 LBS | HEART RATE: 70 BPM | SYSTOLIC BLOOD PRESSURE: 123 MMHG | BODY MASS INDEX: 29.4 KG/M2

## 2021-12-15 DIAGNOSIS — G20.C PRIMARY PARKINSONISM (H): Primary | ICD-10-CM

## 2021-12-15 PROCEDURE — 99204 OFFICE O/P NEW MOD 45 MIN: CPT | Performed by: PSYCHIATRY & NEUROLOGY

## 2021-12-15 RX ORDER — CARBIDOPA AND LEVODOPA 25; 100 MG/1; MG/1
1 TABLET ORAL 3 TIMES DAILY
Qty: 90 TABLET | Refills: 1 | Status: SHIPPED | OUTPATIENT
Start: 2021-12-15 | End: 2022-02-16

## 2021-12-15 ASSESSMENT — MIFFLIN-ST. JEOR: SCORE: 1276.14

## 2021-12-15 NOTE — PROGRESS NOTES
NEUROLOGY OUTPATIENT CONSULT NOTE   Dec 15, 2021     CHIEF COMPLAINT/REASON FOR VISIT/REASON FOR CONSULT  Patient presents with:  New Patient  Tremors    REASON FOR CONSULTATION- Tremors    REFERRAL SOURCE  Dr. Isabel Maurer   Dr. Isabel Maurer    HISTORY OF PRESENT ILLNESS  Claudia Wise is a 71 year old female seen today for evaluation of tremors.  Reports that the tremors have been there since February 2020.  Reports that the tremors are mainly in the left upper extremity.  Notices that they are present at rest.  They wake her up from sleep.  They can be present with activity as well.  Denies any gait difficulty though does have back issues and left leg weakness from the back issues which could affect her walking.  Reports no masklike facies.  No other new medications.  Has not been on any antipsychotics in the past.  Does have a diagnosis of depression anxiety and is on Cymbalta.  No tremors on the right side.  Denies any cognitive issues.  No rigidity.  Does have family history of Parkinson's disease in her aunt.    Previous history is reviewed and this is unchanged.    PAST MEDICAL/SURGICAL HISTORY  Past Medical History:   Diagnosis Date     Anemia      Anxiety      Chronic kidney disease      Diabetes mellitus, type II (H)     prediabetes     Disease of thyroid gland     hypothyroid     Family history of myocardial infarction      Gout      High cholesterol      Hypertension      Inverted nipple     right     Macular edema      Osteoarthritis 2012     Rheumatoid arthritis (H) 2012     Patient Active Problem List   Diagnosis     Herpes Simplex Type II     Anxiety     Anemia in chronic kidney disease     Hyperlipemia     Gout     Hypertension     Prediabetes     Endometriosis     Other specified hypothyroidism     Seronegative rheumatoid arthritis of multiple sites (H)     High risk medication use     Bilateral primary osteoarthritis of knee     Small bowel obstruction (H)     Senile osteoporosis      Compression fracture of L2 vertebra with routine healing     Chronic kidney disease, stage 3 (H)     Venous (peripheral) insufficiency     Exposure to bat without known bite   Significant for high cholesterol, high blood pressure, diabetes, arthritis, depression, osteoporosis    FAMILY HISTORY  Family History   Problem Relation Age of Onset     Breast Cancer Maternal Aunt      Cancer Maternal Aunt      Cancer Father      Cancer Sister      Cancer Maternal Grandmother      Cancer Cousin      Coronary Artery Disease Mother 49.00   Family history reviewed and negative for neurological conditions except for Parkinson's disease in her aunt.    SOCIAL HISTORY  Social History     Tobacco Use     Smoking status: Former Smoker     Quit date: 1995     Years since quittin.4     Smokeless tobacco: Never Used   Substance Use Topics     Alcohol use: Yes     Comment: Alcoholic Drinks/day: occasional couple times a year     Drug use: No       SYSTEMS REVIEW  Twelve-system ROS was done and other than the HPI this was negative except for neck pain, back pain, arm and leg pain, joint pain, weakness paralysis, difficulty walking, balance coordination problems, movement disorder, bladder symptoms, anxiety, depression, weight gain.    MEDICATIONS  acetaminophen (TYLENOL) 500 MG tablet, Take 500 mg by mouth every 6 hours as needed   acyclovir (ZOVIRAX) 400 MG tablet, [ACYCLOVIR (ZOVIRAX) 400 MG TABLET] Take 1 tablet (400 mg total) by mouth every 8 (eight) hours as needed.  allopurinoL (ZYLOPRIM) 100 MG tablet, [ALLOPURINOL (ZYLOPRIM) 100 MG TABLET] Take 1 tablet (100 mg total) by mouth 2 (two) times a day with meals.  amLODIPine (NORVASC) 10 MG tablet, [AMLODIPINE (NORVASC) 10 MG TABLET] Take 1 tablet (10 mg total) by mouth daily.  atorvastatin (LIPITOR) 10 MG tablet, TAKE 1 TABLET BY MOUTH AT BEDTIME  carboxymethylcellulose (REFRESH PLUS) 0.5 % Dpet ophthalmic dropperette, [CARBOXYMETHYLCELLULOSE (REFRESH PLUS) 0.5 % DPET  "OPHTHALMIC DROPPERETTE] Administer 1-2 drops to both eyes 4 (four) times a day as needed.   cyanocobalamin, vitamin B-12, 1,000 mcg Subl, [CYANOCOBALAMIN, VITAMIN B-12, 1,000 MCG SUBL] Place 1 tablet (1,000 mcg total) under the tongue daily.  denosumab (PROLIA) 60 MG/ML SOSY injection, Inject 60 mg Subcutaneous  DULoxetine (CYMBALTA) 20 MG capsule, Take 1 capsule (20 mg) by mouth daily  fluticasone (FLONASE) 50 MCG/ACT nasal spray, Use 1 spray(s) in each nostril once daily  HYDROcodone-acetaminophen (NORCO) 5-325 MG tablet, Take 1 tablet by mouth every 8 hours as needed  hydroxychloroquine (PLAQUENIL) 200 MG tablet, Take 1 tablet (200 mg) by mouth 2 times daily  levothyroxine (SYNTHROID/LEVOTHROID) 50 MCG tablet, TAKE 1 TABLET BY MOUTH ONCE DAILY IN THE MORNING  lidocaine (LIDODERM) 5 % patch, Place 1 patch onto the skin every 24 hours To prevent lidocaine toxicity, patient should be patch free for 12 hrs daily.  LORazepam (ATIVAN) 0.5 MG tablet, [LORAZEPAM (ATIVAN) 0.5 MG TABLET] TAKE 1 TABLET BY MOUTH EVERY 8 HOURS AS NEEDED FOR ANXIETY  losartan (COZAAR) 50 MG tablet, Take 1 tablet by mouth once daily  melatonin 3 mg Tab tablet, [MELATONIN 3 MG TAB TABLET] Take 1 tablet (3 mg total) by mouth at bedtime as needed.  methotrexate 2.5 MG tablet, Take 4 tablets (10 mg) by mouth every 7 days  metoprolol tartrate (LOPRESSOR) 50 MG tablet, [METOPROLOL TARTRATE (LOPRESSOR) 50 MG TABLET] Take 1 tablet (50 mg total) by mouth 2 (two) times a day.  RESTASIS 0.05 % ophthalmic emulsion, [RESTASIS 0.05 % OPHTHALMIC EMULSION] INSTILL 1 DROP INTO EACH EYE TWICE DAILY  sodium bicarbonate 650 MG tablet, [SODIUM BICARBONATE 650 MG TABLET] Take 2 tablets (1,300 mg total) by mouth 2 (two) times a day.    No current facility-administered medications on file prior to visit.       PHYSICAL EXAMINATION  VITALS: /61 (BP Location: Left arm, Patient Position: Sitting)   Pulse 70   Ht 1.626 m (5' 4\")   Wt 78.1 kg (172 lb 3.4 oz)   " BMI 29.56 kg/m    GENERAL: Healthy appearing, alert, no acute distress, normal habitus.  CARDIOVASCULAR: Extremities warm and well perfused. Pulses present.   EYES: Funduscopic exam limited.  NEUROLOGICAL:  Patient is awake and grossly oriented to self, place and time.  Attention span is normal.  Memory is grossly intact.  Language is fluent and follows commands appropriately.  Appropriate fund of knowledge. Cranial nerves 2-12 are intact. There is no pronator drift.  Motor exam shows 5/5 strength in all extremities.  Tone is symmetric bilaterally in upper and lower extremities.  Resting tremor is noted in the left upper extremity.  No cogwheeling noted.  Reflexes are symmetric and 1+ in upper extremities and lower extremities. Sensory exam is grossly intact to light touch, pin prick and vibration.  Finger to nose and heel to shin is without dysmetria.  Romberg is negative.  Gait is normal except shows decreased left arm swing with resting tremor and the patient is able to do tandem walk and walk on toes and heels with some difficulty.    DIAGNOSTICS  RELEVANT LABS  Component      Latest Ref Rng & Units 10/1/2020   Sodium      136 - 145 mmol/L 137   Potassium      3.5 - 5.0 mmol/L 4.4   Chloride      98 - 107 mmol/L 110 (H)   Carbon Dioxide      22 - 31 mmol/L 14 (L)   Anion Gap      5 - 18 mmol/L 13   Glucose      70 - 125 mg/dL 114   Urea Nitrogen      8 - 28 mg/dL 30 (H)   Creatinine      0.60 - 1.10 mg/dL 1.18 (H)   GFR Estimate If Black      >60 mL/min/1.73m2 55 (L)   GFR Estimate      >60 mL/min/1.73m2 45 (L)   Bilirubin Total      0.0 - 1.0 mg/dL 0.8   Calcium      8.5 - 10.5 mg/dL 8.7   Protein Total      6.0 - 8.0 g/dL 6.8   Albumin      3.5 - 5.0 g/dL 4.3   Alkaline Phosphatase      45 - 120 U/L 63   AST      0 - 40 U/L 21   ALT      0 - 45 U/L 18   WBC      4.0 - 11.0 thou/uL 7.3   RBC Count      3.80 - 5.40 mill/uL 3.12 (L)   Hemoglobin      12.0 - 16.0 g/dL 10.4 (L)   Hematocrit      35.0 - 47.0 % 31.2  (L)   MCV      80 - 100 fL 100   MCH      27.0 - 34.0 pg 33.4   MCHC      32.0 - 36.0 g/dL 33.4   RDW      11.0 - 14.5 % 14.5   Platelet Count      140 - 440 thou/uL 151   Mean Platelet Volume      7.0 - 10.0 fL 8.7   TSH      0.30 - 5.00 uIU/mL 1.56   Uric Acid      2.0 - 7.5 mg/dL 4.1     OUTSIDE RECORDS  Outside referral notes and chart notes were reviewed and pertinent information has been summarized (in addition to the HPI):-Patient does have a diagnosis of anxiety.  Has been having some tremors.  Is on multiple antidepressant/antianxiety medications.  Also has some back pain issues.  Is also on narcotics.    IMPRESSION/REPORT/PLAN  Primary parkinsonism (H)  History of anxiety/depression    This is a 71 year old female with new onset of left upper extremity resting tremor.  Examination further shows masklike facies, decreased arm swing on the left side, left resting tremor.  No cogwheeling.  Patient symptoms are suggestive of Parkinson's disease.  I will check blood work to look for other causes.  We will start her on Sinemet.  Discussed signs and symptoms of Parkinson's disease and what to watch out for.  Encourage exercise to help prevent the disease progression.  Pathophysiology of Parkinson's disease was discussed with the patient.  There is some family history of Parkinson's disease.  She does notice that the tremor wakes her up during sleep and I will check a ferritin to rule out restless leg as well.    I can see her back in 2 months.    -     Vitamin B12; Future  -     TSH with free T4 reflex; Future  -     Methylmalonic Acid; Future  -     Comprehensive metabolic panel; Future  -     Hemoglobin A1c; Future  -     carbidopa-levodopa (SINEMET)  MG tablet; Take 1 tablet by mouth 3 times daily Take at least 30 min before meals or 2 hours after meals. Do not mix with food.  -     Ferritin; Future    Return in about 2 months (around 2/15/2022) for In-Clinic Visit, After testing.    Over 50 minutes were  spent coordinating the care for the patient on the day of the encounter.  This includes previsit, during visit and post visit activities as documented above.  Counseling patient.  New diagnosis Parkinson's disease.  Checking blood work.  Prescription management.  (Activities include but not inclusive of reviewing chart, reviewing outside records, reviewing labs and imaging study results as well as the images, patient visit time including getting history and exam,  use if applicable, review of test results with the patient and coming up with a plan in a shared model, counseling patient and family, education and answering patient questions, EMR , EMR diagnosis entry and problem list management, medication reconciliation and prescription management if applicable, paperwork if applicable, printing documents and documentation of the visit activities.)  Billing:-3 data points, 4 problem points, 3 risk points      Logan Smart MD  Neurologist  [facility] Neurology [facility]  Tel:- [phone]    This note was dictated using voice recognition software.  Any grammatical or context distortions are unintentional and inherent to the software.

## 2021-12-15 NOTE — NURSING NOTE
Chief Complaint   Patient presents with     New Patient     Wilmar Johnson MA on 12/15/2021 at 9:32 AM

## 2021-12-15 NOTE — PATIENT INSTRUCTIONS
Patient Education     Parkinson Disease: Tips for Taking Medicines  Parkinson symptoms are much easier to manage with a good medicine routine. In particular, the timing of when medicines are taken can affect what activities are possible later on. You should also be aware that as the disease progresses your medicines may not work as well or as long as they did before. Your healthcare provider may prescribe different medicines. Or, you may need to take the same medicines more often.     Take medicines at the same time each day.   Timing is important  Parkinson medicines can be a big help. But they may only work well for a certain amount of time before symptoms return. For best results, take medicines at the same time each day. It can also help to keep a medicine diary. Write down how long it takes for them to work, and how long it takes before symptoms return. If you have a smart phone, there are apps that can help keep track of your symptoms. This makes it easier to plan activities for times you ll feel your best. It also helps your healthcare provider adjust your medicine when you're having problems.      Staying on track  Below are tips for taking medicines. If you have questions about your medicines, talk with your healthcare provider or pharmacist:    Use a pillbox or divided tray to keep track of medicines.    Take all your medicines. Don t take one type and skip another.    Don't take levodopa at the same time you eat protein (such as meat or beans). Ask your healthcare provider which times are best.    Talk with your provider if you notice your medicines aren t working as well.    Keep a list of your medicines and bring it to provider s appointments.    Plan ahead. Refill prescriptions before they run out. Take medicines with you when you travel.    Tell your provider if you have problems swallowing pills.     Never change your dosage or stop taking medicine without talking to your healthcare  provider.  Coping with side effects  You may have some side effects when you start taking medicines. This does not mean you should stop taking them. Instead, talk with your healthcare provider. Tell him or her about any side effects, such as nausea, lightheadedness, leg swelling, and sleep problems. Also tell your provider if any medicine causes confusion, hallucinations, or involuntary movements. Some people have an unusual desire to gamez or engage in other obsessive behaviors. Your provider may adjust the amount of medicine you take. Or, you may be given another type.  [employer] last reviewed this educational content on 2/1/2018 2000-2021 [employer]. All rights reserved. This information is not intended as a substitute for professional medical care. Always follow your healthcare professional's instructions.           Patient Education     Common Symptoms of Parkinson Disease    You have Parkinson disease. This disease is caused by a loss of a chemical in your brain needed to help control movement and balance. For reasons that are not clear, cells that make this brain chemical stop working. This causes symptoms. This sheet tells you more about symptoms of Parkinson disease.  How symptoms may affect you  Parkinson disease symptoms vary for each person. You may have many severe symptoms. Or you may have only a few mild ones. Your symptoms may involve only one side of your body. Or they may involve both sides of your body. Also, your symptoms may change over time. And you may have different symptoms at different stages. Your symptoms may also get worse as your disease progresses.  Symptoms that affect movement and balance  These can include:    Tremor (shaking). This is a very common symptom. Most often, a hand or arm shakes on one or both sides of the body. Tremor may also affect other areas of the body, such as a leg, a foot, or the chin. Shaking may lessen when the affected part is used. It may  "worsen when at rest.    Rigidity. This refers to having stiff or tight muscles. This happens because the muscles don t get the signal to relax. Rigidity may cause muscle pain and cramping. It may also cause a stooped posture.    Problems with balance. This can affect how well you stand and move. This can also increase your risk of falls.  Other symptoms  Other symptoms of Parkinson disease include speaking too softly and in a monotone, writing that gets shaky and smaller across the page, and trouble swallowing. They also include constipation, oily skin, and changes in blood pressure. Memory loss and other problems with thinking can occur later in the disease progression. Bradykinesia--or slow movement--can also occur. This can cause problems with actions such as getting out of chairs and beds. Walking may be limited to short, shuffling steps. You may feel \"frozen,\" or unable to move. Blinking, facial expressions, swinging of your arms when walking, and other \"unconscious movements\" are also slowed down. Some people may have problems with their urination and others may also feel depressed.  [employer] last reviewed this educational content on 2/1/2018 2000-2021 [employer] All rights reserved. This information is not intended as a substitute for professional medical care. Always follow your healthcare professional's instructions.           Patient Education     Parkinson Disease: Managing Day to Day    As Parkinson disease progresses, you may need to make some changes in your daily routine. For best results, plan activities for times you ll feel your best. Leave plenty of time to complete tasks. And take breaks when you need them. If more help is needed, your healthcare provider may refer you to an occupational therapist. This is a professional who can help you practice tasks of daily living.  Dressing  To make dressing easier:    Sit down to dress. This helps prevent falls.    Choose clothes that are " easy to put on and take off. Elastic waistbands and clothes that close in the front are good choices.    Add paper clips to zipper pulls. This makes them easier to grasp.    Choose shoes that are easy to wear. Wear shoes with hook and loop straps. Women should not wear high heels.  Bathing and grooming  Loss of muscle control can make bathing and grooming a challenge. Try these tips:    Install safety items in the bathroom, such as grab bars, nonslip bathmats, and raised toilet seats.    Sit down to brush your teeth, shave, or dry your hair. This helps reduce the risk of falls.    Use liquid soap with a pump. Bar soap can be hard to hold.    Wear an absorbent robe to dry off if using towels is hard.  Eating and drinking  Try these tips at mealtime:    Choose foods that are easy to prepare and eat. Fresh fruits and vegetables make great snacks.    Try large-handled forks, spoons, and knives if it s hard to  utensils. Spill-proof cups can help with drinking.    Tell your healthcare provider if you have any problems swallowing.    Take small bites and small sips to prevent breathing food into your lungs.   Constipation  Constipation is a very common problem. The tips below can help:    Drink plenty of water.    Eat foods that are high in fiber, such as fruits, vegetables, and whole grains. A fiber supplement can also help.    Get regular exercise.    Talk with your healthcare provider about laxatives or stool softeners.  Communicating  Over time, your voice may grow softer and less distinct. Your handwriting is also likely to become small and cramped. These tips can help you cope:    Breathe deeply before starting your sentences. Focus on speaking slowly and loudly. If needed, your healthcare provider may refer you to a speech therapist.    Add a voice amplifier to your phone. This helps you be heard.    Try typing instead of writing. If this is a problem, consider using voice-activated software for  computers.    Use foam  on pens and pencils. These can make them easier to hold.  Sleeping  Many people with Parkinson have trouble sleeping. They may also move in their sleep and strike their partners. Tell your healthcare provider if you re having sleep problems or recurrent nightmares. Medicine can often help you sleep better. Check with your healthcare provider before using over-the-counter medicines to help you sleep.   Getting out of bed  You may feel very stiff and slow in the morning. It often helps to take medicines before you get out of bed. Ask your healthcare provider if you can use dissolvable pills or chew pills with water. This can help medicine work faster. Above all, remember to be patient and take your time.  Having sex  Having Parkinson doesn t mean you can t have a good sex life. Plan sex for the times of day when you ll feel your best. Talk with your partner about your feelings. Your healthcare provider can also find ways to help if you re having problems with sexual function.  If you have any of the following, call your healthcare provider:    Your symptoms suddenly get worse.    You have severe constipation.    You have trouble sleeping.    You have problems chewing or swallowing.    You often  freeze  (are unable to move your feet) or begin having falls.  [employer] last reviewed this educational content on 3/1/2018    6885-2474 [employer]. All rights reserved. This information is not intended as a substitute for professional medical care. Always follow your healthcare professional's instructions.           Patient Education     Understanding Parkinson Disease    Parkinson disease is a condition that affects control over your movements. It s caused by a lack of dopamine, a chemical that helps the nerve cells in your brain communicate with each other. When dopamine is missing from certain areas of the brain, the messages that tell your body how to move are lost or distorted.  This can lead to symptoms such as shaking, stiffness, and slow movement. There s no cure for Parkinson disease. But proper treatment can help ease symptoms and allow you to live a full, active life.  Changes in the brain  Dopamine is produced in a small area of the brain called the substantia nigra. For reasons that aren t yet clear, the nerve cells in this region that make dopamine begin to die. This means less dopamine is available to help control your movements. When healthy, the substantia nigra makes enough dopamine to help control your body s movements.  Symptoms of Parkinson disease  Parkinson symptoms often appear gradually. Some may take years to develop. Others you may not have at all. Below are the most common:    Shaking (resting tremor). This can affect the hands, arms, and legs. Most often, the shaking is worse on one side of the body. It usually lessens when the arm or leg (limb) is used.    Slow movement (bradykinesia). This can affect the whole body. People may walk with short, shuffling steps. They can also feel  frozen  and unable to move.    Stiffness (rigidity). This occurs when muscles don t relax. It can cause muscle aches and stooped posture.    Other symptoms. This includes balance problems, small handwriting, soft voice volume, constipation, reduced or  flat  facial expression, and sleep problems. Memory loss or other problems with thinking may also occur later in the progression of the disease.  How is Parkinson diagnosed?  There is no single test for Parkinson disease. The diagnosis is based on your symptoms, health history, and a physical exam. You may also have tests to help rule out other problems. These may include blood tests to look for diseases that cause similar symptoms. They can also include brain-imaging tests, such as an MRI of the brain  Parkinsonism is the name for a group of brain conditions that all have symptoms similar to Parkinson disease. However, the causes of these  symptoms are different. In some cases, Parkinson-type symptoms may result from strokes or head injury. They can also be caused by medicines or other diseases that affect the brain. In general, these conditions can t be treated as well using the medicines that help people with Parkinson disease.  [employer] last reviewed this educational content on 2/1/2018 2000-2021 [employer]. All rights reserved. This information is not intended as a substitute for professional medical care. Always follow your healthcare professional's instructions.           Patient Education     Getting Around with Parkinson Disease    Parkinson disease affects muscle control and coordination. This often makes your movements slower and less automatic. You may take small, shuffling steps and tend to lean forward. You may also feel  frozen  and unable to move at times, as if your feet feel stuck to the floor. The tips below can help with common movements. If needed, your healthcare provider may also advise using a cane or walker. Remove any throw rugs or clutter to reduce your risk of falls.  Getting out of bed    Turn onto your side with your knees bent.    Move your feet off the edge of the bed, using your arms to help you sit up.    Sit at the edge of the bed with your feet on the floor and knees apart.    Push down with your hands and rock forward to stand up.    Stay standing for at least 1 minute before trying to take your first step. If you feel dizzy or lightheaded, sit back down on the bed.  Walking and turning    Raise your legs from the knee and take high, long steps. Let your heel fall first with each step. Swing your arms as if you re marching.    Imagine that you re stepping over a series of lines on the floor. This can be especially helpful if you feel frozen.    To turn, walk in a half [place] instead of trying to stop and turn in place.  Getting up and sitting down    To get up, scoot your hips to the edge of the  chair. Keep your feet flat on the floor with knees apart.    Put your hands on the armrests.    Rock forward and push down on the armrests to stand. It may help to rock back and forth to build momentum.    To sit down, back up to the chair as close as you can.    Lean forward and bend your knees.    Use the armrests to lower yourself onto the chair.  [employer] last reviewed this educational content on 3/1/2018    [id number] [employer]. All rights reserved. This information is not intended as a substitute for professional medical care. Always follow your healthcare professional's instructions.           Patient Education     Exercise for Parkinson's Disease  These exercises can help strengthen your muscles and keep them loose and flexible. Ask your healthcare provider whether they re right for you. Your healthcare provider or physical therapist may also suggest other exercises.  Do the exercises once a day at first, then build up to several times a day. Exercise slowly, and rest if you feel pain.  Body twist     Follow these steps:    Sit in a chair, facing forward. Place your hands on your shoulders.    Turn your head and body to the side as far as possible, as if you were trying to look behind you.    Return to starting position, then turn to the other side.    Repeat 10 times.  Seated march     Follow these steps:    Sit in a chair, facing forward.    Slowly lift one knee as high as you can, then lower your foot to the floor.    Do the same with your other leg.    Repeat 10 times with each leg.  Back stretch     Follow these steps:    Stand or sit with your back straight.    Hold your arms in front of you. Put your hands and elbows together, hands pointing toward the ceiling.    Move your arms apart as far as possible, pushing your shoulder blades together.    Slowly move your hands back together.    Repeat 10 times.  [employer] last reviewed this educational content on 2/1/2018 2000-2021 [employer]. All rights reserved. This information is not intended as a substitute for professional medical care. Always follow your healthcare professional's instructions.

## 2021-12-15 NOTE — LETTER
12/15/2021         RE: Claudia Wise   [address] [facility] [place] [address]        Dear Colleague,    Thank you for referring your patient, Claudia Wise, to the [facility] [place]. Please see a copy of my visit note below.    NEUROLOGY OUTPATIENT CONSULT NOTE   Dec 15, 2021     CHIEF COMPLAINT/REASON FOR VISIT/REASON FOR CONSULT  Patient presents with:  New Patient  Tremors    REASON FOR CONSULTATION- Tremors    REFERRAL SOURCE  Dr. Isabel Maurer  [facility] Dr. Isabel Maurer    HISTORY OF PRESENT ILLNESS  Claudia Wise is a 71 year old female seen today for evaluation of tremors.  Reports that the tremors have been there since February 2020.  Reports that the tremors are mainly in the left upper extremity.  Notices that they are present at rest.  They wake her up from sleep.  They can be present with activity as well.  Denies any gait difficulty though does have back issues and left leg weakness from the back issues which could affect her walking.  Reports no masklike facies.  No other new medications.  Has not been on any antipsychotics in the past.  Does have a diagnosis of depression anxiety and is on Cymbalta.  No tremors on the right side.  Denies any cognitive issues.  No rigidity.  Does have family history of Parkinson's disease in her aunt.    Previous history is reviewed and this is unchanged.    PAST MEDICAL/SURGICAL HISTORY  Past Medical History:   Diagnosis Date     Anemia      Anxiety      Chronic kidney disease      Diabetes mellitus, type II (H)     prediabetes     Disease of thyroid gland     hypothyroid     Family history of myocardial infarction      Gout      High cholesterol      Hypertension      Inverted nipple     right     Macular edema      Osteoarthritis 2012     Rheumatoid arthritis (H) 2012     Patient Active Problem List   Diagnosis     Herpes Simplex Type II     Anxiety     Anemia in chronic kidney disease     Hyperlipemia     Gout     Hypertension      Prediabetes     Endometriosis     Other specified hypothyroidism     Seronegative rheumatoid arthritis of multiple sites (H)     High risk medication use     Bilateral primary osteoarthritis of knee     Small bowel obstruction (H)     Senile osteoporosis     Compression fracture of L2 vertebra with routine healing     Chronic kidney disease, stage 3 (H)     Venous (peripheral) insufficiency     Exposure to bat without known bite   Significant for high cholesterol, high blood pressure, diabetes, arthritis, depression, osteoporosis    FAMILY HISTORY  Family History   Problem Relation Age of Onset     Breast Cancer Maternal Aunt      Cancer Maternal Aunt      Cancer Father      Cancer Sister      Cancer Maternal Grandmother      Cancer Cousin      Coronary Artery Disease Mother 49.00   Family history reviewed and negative for neurological conditions except for Parkinson's disease in her aunt.    SOCIAL HISTORY  Social History     Tobacco Use     Smoking status: Former Smoker     Quit date: 1995     Years since quittin.4     Smokeless tobacco: Never Used   Substance Use Topics     Alcohol use: Yes     Comment: Alcoholic Drinks/day: occasional couple times a year     Drug use: No       SYSTEMS REVIEW  Twelve-system ROS was done and other than the HPI this was negative except for neck pain, back pain, arm and leg pain, joint pain, weakness paralysis, difficulty walking, balance coordination problems, movement disorder, bladder symptoms, anxiety, depression, weight gain.    MEDICATIONS  acetaminophen (TYLENOL) 500 MG tablet, Take 500 mg by mouth every 6 hours as needed   acyclovir (ZOVIRAX) 400 MG tablet, [ACYCLOVIR (ZOVIRAX) 400 MG TABLET] Take 1 tablet (400 mg total) by mouth every 8 (eight) hours as needed.  allopurinoL (ZYLOPRIM) 100 MG tablet, [ALLOPURINOL (ZYLOPRIM) 100 MG TABLET] Take 1 tablet (100 mg total) by mouth 2 (two) times a day with meals.  amLODIPine (NORVASC) 10 MG tablet, [AMLODIPINE (NORVASC)  10 MG TABLET] Take 1 tablet (10 mg total) by mouth daily.  atorvastatin (LIPITOR) 10 MG tablet, TAKE 1 TABLET BY MOUTH AT BEDTIME  carboxymethylcellulose (REFRESH PLUS) 0.5 % Dpet ophthalmic dropperette, [CARBOXYMETHYLCELLULOSE (REFRESH PLUS) 0.5 % DPET OPHTHALMIC DROPPERETTE] Administer 1-2 drops to both eyes 4 (four) times a day as needed.   cyanocobalamin, vitamin B-12, 1,000 mcg Subl, [CYANOCOBALAMIN, VITAMIN B-12, 1,000 MCG SUBL] Place 1 tablet (1,000 mcg total) under the tongue daily.  denosumab (PROLIA) 60 MG/ML SOSY injection, Inject 60 mg Subcutaneous  DULoxetine (CYMBALTA) 20 MG capsule, Take 1 capsule (20 mg) by mouth daily  fluticasone (FLONASE) 50 MCG/ACT nasal spray, Use 1 spray(s) in each nostril once daily  HYDROcodone-acetaminophen (NORCO) 5-325 MG tablet, Take 1 tablet by mouth every 8 hours as needed  hydroxychloroquine (PLAQUENIL) 200 MG tablet, Take 1 tablet (200 mg) by mouth 2 times daily  levothyroxine (SYNTHROID/LEVOTHROID) 50 MCG tablet, TAKE 1 TABLET BY MOUTH ONCE DAILY IN THE MORNING  lidocaine (LIDODERM) 5 % patch, Place 1 patch onto the skin every 24 hours To prevent lidocaine toxicity, patient should be patch free for 12 hrs daily.  LORazepam (ATIVAN) 0.5 MG tablet, [LORAZEPAM (ATIVAN) 0.5 MG TABLET] TAKE 1 TABLET BY MOUTH EVERY 8 HOURS AS NEEDED FOR ANXIETY  losartan (COZAAR) 50 MG tablet, Take 1 tablet by mouth once daily  melatonin 3 mg Tab tablet, [MELATONIN 3 MG TAB TABLET] Take 1 tablet (3 mg total) by mouth at bedtime as needed.  methotrexate 2.5 MG tablet, Take 4 tablets (10 mg) by mouth every 7 days  metoprolol tartrate (LOPRESSOR) 50 MG tablet, [METOPROLOL TARTRATE (LOPRESSOR) 50 MG TABLET] Take 1 tablet (50 mg total) by mouth 2 (two) times a day.  RESTASIS 0.05 % ophthalmic emulsion, [RESTASIS 0.05 % OPHTHALMIC EMULSION] INSTILL 1 DROP INTO EACH EYE TWICE DAILY  sodium bicarbonate 650 MG tablet, [SODIUM BICARBONATE 650 MG TABLET] Take 2 tablets (1,300 mg total) by mouth 2  "(two) times a day.    No current facility-administered medications on file prior to visit.       PHYSICAL EXAMINATION  VITALS: /61 (BP Location: Left arm, Patient Position: Sitting)   Pulse 70   Ht 1.626 m (5' 4\")   Wt 78.1 kg (172 lb 3.4 oz)   BMI 29.56 kg/m    GENERAL: Healthy appearing, alert, no acute distress, normal habitus.  CARDIOVASCULAR: Extremities warm and well perfused. Pulses present.   EYES: Funduscopic exam limited.  NEUROLOGICAL:  Patient is awake and grossly oriented to self, place and time.  Attention span is normal.  Memory is grossly intact.  Language is fluent and follows commands appropriately.  Appropriate fund of knowledge. Cranial nerves 2-12 are intact. There is no pronator drift.  Motor exam shows 5/5 strength in all extremities.  Tone is symmetric bilaterally in upper and lower extremities.  Resting tremor is noted in the left upper extremity.  No cogwheeling noted.  Reflexes are symmetric and 1+ in upper extremities and lower extremities. Sensory exam is grossly intact to light touch, pin prick and vibration.  Finger to nose and heel to shin is without dysmetria.  Romberg is negative.  Gait is normal except shows decreased left arm swing with resting tremor and the patient is able to do tandem walk and walk on toes and heels with some difficulty.    DIAGNOSTICS  RELEVANT LABS  Component      Latest Ref Rng & Units 10/1/2020   Sodium      136 - 145 mmol/L 137   Potassium      3.5 - 5.0 mmol/L 4.4   Chloride      98 - 107 mmol/L 110 (H)   Carbon Dioxide      22 - 31 mmol/L 14 (L)   Anion Gap      5 - 18 mmol/L 13   Glucose      70 - 125 mg/dL 114   Urea Nitrogen      8 - 28 mg/dL 30 (H)   Creatinine      0.60 - 1.10 mg/dL 1.18 (H)   GFR Estimate If Black      >60 mL/min/1.73m2 55 (L)   GFR Estimate      >60 mL/min/1.73m2 45 (L)   Bilirubin Total      0.0 - 1.0 mg/dL 0.8   Calcium      8.5 - 10.5 mg/dL 8.7   Protein Total      6.0 - 8.0 g/dL 6.8   Albumin      3.5 - 5.0 g/dL 4.3 "   Alkaline Phosphatase      45 - 120 U/L 63   AST      0 - 40 U/L 21   ALT      0 - 45 U/L 18   WBC      4.0 - 11.0 thou/uL 7.3   RBC Count      3.80 - 5.40 mill/uL 3.12 (L)   Hemoglobin      12.0 - 16.0 g/dL 10.4 (L)   Hematocrit      35.0 - 47.0 % 31.2 (L)   MCV      80 - 100 fL 100   MCH      27.0 - 34.0 pg 33.4   MCHC      32.0 - 36.0 g/dL 33.4   RDW      11.0 - 14.5 % 14.5   Platelet Count      140 - 440 thou/uL 151   Mean Platelet Volume      7.0 - 10.0 fL 8.7   TSH      0.30 - 5.00 uIU/mL 1.56   Uric Acid      2.0 - 7.5 mg/dL 4.1     OUTSIDE RECORDS  Outside referral notes and chart notes were reviewed and pertinent information has been summarized (in addition to the HPI):-Patient does have a diagnosis of anxiety.  Has been having some tremors.  Is on multiple antidepressant/antianxiety medications.  Also has some back pain issues.  Is also on narcotics.    IMPRESSION/REPORT/PLAN  Primary parkinsonism (H)  History of anxiety/depression    This is a 71 year old female with new onset of left upper extremity resting tremor.  Examination further shows masklike facies, decreased arm swing on the left side, left resting tremor.  No cogwheeling.  Patient symptoms are suggestive of Parkinson's disease.  I will check blood work to look for other causes.  We will start her on Sinemet.  Discussed signs and symptoms of Parkinson's disease and what to watch out for.  Encourage exercise to help prevent the disease progression.  Pathophysiology of Parkinson's disease was discussed with the patient.  There is some family history of Parkinson's disease.  She does notice that the tremor wakes her up during sleep and I will check a ferritin to rule out restless leg as well.    I can see her back in 2 months.    -     Vitamin B12; Future  -     TSH with free T4 reflex; Future  -     Methylmalonic Acid; Future  -     Comprehensive metabolic panel; Future  -     Hemoglobin A1c; Future  -     carbidopa-levodopa (SINEMET)  MG  tablet; Take 1 tablet by mouth 3 times daily Take at least 30 min before meals or 2 hours after meals. Do not mix with food.  -     Ferritin; Future    Return in about 2 months (around 2/15/2022) for In-Clinic Visit, After testing.    Over 50 minutes were spent coordinating the care for the patient on the day of the encounter.  This includes previsit, during visit and post visit activities as documented above.  Counseling patient.  New diagnosis Parkinson's disease.  Checking blood work.  Prescription management.  (Activities include but not inclusive of reviewing chart, reviewing outside records, reviewing labs and imaging study results as well as the images, patient visit time including getting history and exam,  use if applicable, review of test results with the patient and coming up with a plan in a shared model, counseling patient and family, education and answering patient questions, EMR , EMR diagnosis entry and problem list management, medication reconciliation and prescription management if applicable, paperwork if applicable, printing documents and documentation of the visit activities.)  Billing:-3 data points, 4 problem points, 3 risk points      Logan Smart MD  Neurologist  [facility]  Tel:- [phone]    This note was dictated using voice recognition software.  Any grammatical or context distortions are unintentional and inherent to the software.        Again, thank you for allowing me to participate in the care of your patient.        Sincerely,        Logan Smart MD

## 2021-12-18 ENCOUNTER — HEALTH MAINTENANCE LETTER (OUTPATIENT)
Age: 71
End: 2021-12-18

## 2021-12-20 ENCOUNTER — TELEPHONE (OUTPATIENT)
Dept: INTERNAL MEDICINE | Facility: CLINIC | Age: 71
End: 2021-12-20
Payer: COMMERCIAL

## 2021-12-20 NOTE — TELEPHONE ENCOUNTER
Patient called. She had put in a request for an order for a Prolia injection, she would like to schedule her Prolia injection, and would like to know the status of her request.     Claudia @ [phone]

## 2021-12-21 ENCOUNTER — LAB (OUTPATIENT)
Dept: LAB | Facility: CLINIC | Age: 71
End: 2021-12-21
Payer: COMMERCIAL

## 2021-12-21 DIAGNOSIS — Z92.29 PERSONAL HISTORY OF OTHER DRUG THERAPY: ICD-10-CM

## 2021-12-21 DIAGNOSIS — G20.C PRIMARY PARKINSONISM (H): ICD-10-CM

## 2021-12-21 DIAGNOSIS — M81.0 SENILE OSTEOPOROSIS: Primary | ICD-10-CM

## 2021-12-21 DIAGNOSIS — N18.32 STAGE 3B CHRONIC KIDNEY DISEASE (H): ICD-10-CM

## 2021-12-21 LAB
ALBUMIN SERPL-MCNC: 4 G/DL (ref 3.5–5)
ALP SERPL-CCNC: 55 U/L (ref 45–120)
ALT SERPL W P-5'-P-CCNC: 9 U/L (ref 0–45)
ANION GAP SERPL CALCULATED.3IONS-SCNC: 11 MMOL/L (ref 5–18)
AST SERPL W P-5'-P-CCNC: 30 U/L (ref 0–40)
BILIRUB SERPL-MCNC: 0.7 MG/DL (ref 0–1)
BUN SERPL-MCNC: 30 MG/DL (ref 8–28)
CALCIUM SERPL-MCNC: 9.4 MG/DL (ref 8.5–10.5)
CHLORIDE BLD-SCNC: 110 MMOL/L (ref 98–107)
CO2 SERPL-SCNC: 17 MMOL/L (ref 22–31)
CREAT SERPL-MCNC: 1.21 MG/DL (ref 0.6–1.1)
FERRITIN SERPL-MCNC: 253 NG/ML (ref 10–130)
GFR SERPL CREATININE-BSD FRML MDRD: 47 ML/MIN/1.73M2
GLUCOSE BLD-MCNC: 101 MG/DL (ref 70–125)
HBA1C MFR BLD: 5.7 % (ref 0–5.6)
POTASSIUM BLD-SCNC: 4.3 MMOL/L (ref 3.5–5)
PROT SERPL-MCNC: 6.7 G/DL (ref 6–8)
SODIUM SERPL-SCNC: 138 MMOL/L (ref 136–145)
TSH SERPL DL<=0.005 MIU/L-ACNC: 0.98 UIU/ML (ref 0.3–5)

## 2021-12-21 PROCEDURE — 36415 COLL VENOUS BLD VENIPUNCTURE: CPT

## 2021-12-21 PROCEDURE — 83036 HEMOGLOBIN GLYCOSYLATED A1C: CPT

## 2021-12-21 PROCEDURE — 80053 COMPREHEN METABOLIC PANEL: CPT

## 2021-12-21 PROCEDURE — 83921 ORGANIC ACID SINGLE QUANT: CPT

## 2021-12-21 PROCEDURE — 84443 ASSAY THYROID STIM HORMONE: CPT

## 2021-12-21 PROCEDURE — 82728 ASSAY OF FERRITIN: CPT

## 2021-12-21 PROCEDURE — 82607 VITAMIN B-12: CPT

## 2021-12-21 NOTE — TELEPHONE ENCOUNTER
DONIS for patient stating Farhat is pending review and as soon as I get word on this I will call her to set up her appointment.

## 2021-12-22 ENCOUNTER — OFFICE VISIT (OUTPATIENT)
Dept: PHYSICAL MEDICINE AND REHAB | Facility: CLINIC | Age: 71
End: 2021-12-22
Payer: COMMERCIAL

## 2021-12-22 VITALS — OXYGEN SATURATION: 98 % | DIASTOLIC BLOOD PRESSURE: 61 MMHG | HEART RATE: 68 BPM | SYSTOLIC BLOOD PRESSURE: 133 MMHG

## 2021-12-22 DIAGNOSIS — M54.50 CHRONIC BILATERAL LOW BACK PAIN WITHOUT SCIATICA: Primary | ICD-10-CM

## 2021-12-22 DIAGNOSIS — R29.898 LEFT LEG WEAKNESS: ICD-10-CM

## 2021-12-22 DIAGNOSIS — M79.18 LEFT BUTTOCK PAIN: ICD-10-CM

## 2021-12-22 DIAGNOSIS — G89.29 CHRONIC BILATERAL LOW BACK PAIN WITHOUT SCIATICA: Primary | ICD-10-CM

## 2021-12-22 DIAGNOSIS — M53.3 SI (SACROILIAC) JOINT DYSFUNCTION: ICD-10-CM

## 2021-12-22 DIAGNOSIS — M53.3 PAIN OF LEFT SACROILIAC JOINT: ICD-10-CM

## 2021-12-22 LAB — VIT B12 SERPL-MCNC: >2000 PG/ML (ref 213–816)

## 2021-12-22 PROCEDURE — 99214 OFFICE O/P EST MOD 30 MIN: CPT | Performed by: NURSE PRACTITIONER

## 2021-12-22 ASSESSMENT — PAIN SCALES - GENERAL: PAINLEVEL: MILD PAIN (3)

## 2021-12-22 NOTE — LETTER
12/22/2021         RE: Claudia Wise   [address] [facility] [place] [address]        Dear Colleague,    Thank you for referring your patient, Claudia Wise, to the [facility] [place]. Please see a copy of my visit note below.      Assessment:     Diagnoses and all orders for this visit:  Chronic bilateral low back pain without sciatica  -     XR Lumbar Spine 2/3 Views; Future  -     EMG; Future  Left leg weakness  -     XR Lumbar Spine 2/3 Views; Future  -     EMG; Future  Left buttock pain  Pain of left sacroiliac joint  SI (sacroiliac) joint dysfunction     Claudia Wise is a 71 year old y.o. female with past medical history significant for anxiety, rheumatoid arthritis managed by Dr. Vinson, diabetes myelitis, hypertension, hyperlipidemia, chronic kidney disease, thyroid disorder, gout, osteopenia, recent small bowel obstruction with surgical intervention who presents today for follow-up regarding:    -History of bilateral low back pain that is fairly tolerable at this time.    -Progressive left lower extremity weakness in the last 1 month with left EHL weakness on exam today.    -Patient reports being currently worked up for Parkinson's disease.     Plan:     A shared decision making plan was used. The patient's values and choices were respected. Prior medical records were reviewed today. The following represents what was discussed and decided upon by the provider and the patient.        -DIAGNOSTIC TESTS: Images were personally reviewed and interpreted.   --Ordered left lower extremity EMG to evaluate left lower extremity weakness for possible radiculopathy.  -Ordered lumbar spine x-ray to further evaluate left lower extremity weakness.  --Lumbar spine x-ray 8/24/2020 shows stable L2 compression deformity.  --Lumbar spine x-ray 7/1/2020 shows stable L2 compression deformity, grade 1 L3-4 spondylolisthesis.  --Lumbar spine MRI 5/19/2020 with 20% acute/subacute compression fracture  L2.  There is mild bilateral lateral recess stenosis L3-4 and L4-5.  Bilateral synovial cysts L4-5 with moderate facet arthropathy.    -INTERVENTIONS: No injection recommendations at this time as patient has minimal pain symptoms.      -MEDICATIONS: No medication changes at this time.   Discussed side effects of medications and proper use. Patient verbalized understanding.    -PHYSICAL THERAPY: If EMG unremarkable would recommend revisiting physical therapy for left lower extremity weakness.  Discussed the importance of core strengthening, ROM, stretching exercises with the patient and how each of these entities is important in decreasing pain.  Explained to the patient that the purpose of physical therapy is to teach the patient a home exercise program.  These exercises need to be performed every day in order to decrease pain and prevent future occurrences of pain.        -PATIENT EDUCATION:  Total time of 32 minutes, on the day of service, spent with the patient, reviewing the chart, placing orders, and documenting.   -Today we also discussed the issues related to the current COVID-19 pandemic, the pros and cons of the current treatment plan, the CDC guidelines such as social distancing, washing the hands, and covering the cough.    -FOLLOW UP: Follow-up for EMG with Dr. Clifford. Patient is okay with my chart message for x-ray results.  Advised to contact clinic if symptoms worsen or change.    Subjective:     Claudia Wise is a 71 year old female who presents today for follow-up regarding chronic bilateral low back pain that is very tolerable and minimal at this point. She does report occasional discomfort left low back and left buttock however denies any left lower extremity pain.  She is here today as she has been having worsened weakness in her left lower extremity that is concerning for her and this is new in the last 1 month or so. Currently her pain overall is tolerable at a 3/10, slightly aggravated  with bending standing for long periods of time but again her pain is overall manageable and that is not her biggest concern today.  Patient currently denies right leg pain or weakness.    *Patient does report that she has been working with her primary care provider recently and is being worked up for possible Parkinson's disease as she does have a left hand tremor ongoing for the last 1 year.    Treatment to Date: No prior spinal surgery.  Physical therapy [facility] x[facility] sessions last 8/28/2020 compression fracture/spine pain.  Physical therapy x1 session 8/2/2021 chronic LBP/sacroiliac joint dysfunction.     Left L2, L3, L4 MBB 51/2/2021 with positive response.  Left L2, L3, L4 MBB 6/4/2021 with positive response.  Bilateral L2, L3, L4 RFA 7/2/2021 with 50% relief LBP 4 weeks postinjection.  Preprocedure pain 6/10, post 2/10.  Left SI joint steroid injection 8/17/2021 with over 50% relief.  Preprocedure pain 6/10, post 3/10.     Medications:  Plaquenil 4 seronegative rheumatoid arthritis  Tylenol  Hydrocodone managed by Dr. Erasto Guzmán 25 mg bedtime however patient only tried this medication for a couple of days with no benefit therefore she discontinued on her own.    Patient Active Problem List   Diagnosis     Herpes Simplex Type II     Anxiety     Anemia in chronic kidney disease     Hyperlipemia     Gout     Hypertension     Prediabetes     Endometriosis     Other specified hypothyroidism     Seronegative rheumatoid arthritis of multiple sites (H)     High risk medication use     Bilateral primary osteoarthritis of knee     Small bowel obstruction (H)     Senile osteoporosis     Compression fracture of L2 vertebra with routine healing     Chronic kidney disease, stage 3 (H)     Venous (peripheral) insufficiency     Exposure to bat without known bite       Current Outpatient Medications   Medication     acetaminophen (TYLENOL) 500 MG tablet     carbidopa-levodopa (SINEMET)  MG tablet      HYDROcodone-acetaminophen (NORCO) 5-325 MG tablet     lidocaine (LIDODERM) 5 % patch     acyclovir (ZOVIRAX) 400 MG tablet     allopurinoL (ZYLOPRIM) 100 MG tablet     amLODIPine (NORVASC) 10 MG tablet     atorvastatin (LIPITOR) 10 MG tablet     carboxymethylcellulose (REFRESH PLUS) 0.5 % Dpet ophthalmic dropperette     cyanocobalamin, vitamin B-12, 1,000 mcg Subl     denosumab (PROLIA) 60 MG/ML SOSY injection     DULoxetine (CYMBALTA) 20 MG capsule     fluticasone (FLONASE) 50 MCG/ACT nasal spray     hydroxychloroquine (PLAQUENIL) 200 MG tablet     levothyroxine (SYNTHROID/LEVOTHROID) 50 MCG tablet     LORazepam (ATIVAN) 0.5 MG tablet     losartan (COZAAR) 50 MG tablet     melatonin 3 mg Tab tablet     methotrexate 2.5 MG tablet     metoprolol tartrate (LOPRESSOR) 50 MG tablet     RESTASIS 0.05 % ophthalmic emulsion     sodium bicarbonate 650 MG tablet     No current facility-administered medications for this visit.       Allergies   Allergen Reactions     Bupropion Hcl [Bupropion] Nausea     Erythromycin Base [Erythromycin] Nausea     Paxil [Paroxetine] Diarrhea     Tetracyclines Itching     Valacyclovir Nausea       Past Medical History:   Diagnosis Date     Anemia      Anxiety      Chronic kidney disease      Diabetes mellitus, type II (H)     prediabetes     Disease of thyroid gland     hypothyroid     Family history of myocardial infarction      Gout      High cholesterol      Hypertension      Inverted nipple     right     Macular edema      Osteoarthritis 2012     Rheumatoid arthritis (H) 2012        Review of Systems  ROS:  Specifically negative for bowel/bladder dysfunction, balance changes, headache, dizziness, foot drop, fevers, chills, appetite changes, nausea/vomiting, unexplained weight loss. Otherwise 13 systems reviewed are negative. Please see the patient's intake questionnaire from today for details.    Reviewed Social, Family, Past Medical and Past Surgical history with patient, no significant  changes noted since prior visit.     Objective:     /61 (BP Location: Right arm, Patient Position: Sitting)   Pulse 68   SpO2 98%     PHYSICAL EXAMINATION:    --CONSTITUTIONAL: Well developed, well nourished, healthy appearing individual.  --PSYCHIATRIC: Appropriate mood and affect. No difficulty interacting due to temper, social withdrawal, or memory issues.  --SKIN: Lumbar region is dry and intact.   --RESPIRATORY: Normal rhythm and effort. No abnormal accessory muscle breathing patterns noted.   --MUSCULOSKELETAL:  Normal lumbar lordosis noted, no lateral shift.  --GROSS MOTOR: Easily arises from a seated position. Gait is non-antalgic  --LUMBAR SPINE:  Inspection reveals no evidence of deformity. Range of motion is not limited in lumbar flexion, extension, lateral rotation. No tenderness to palpation lumbar spine. Straight leg raising in the seated position is negative to radicular pain. Sciatic notch non-tender.   --SACROILIAC JOINT:  One Finger point test negative.  --LOWER EXTREMITY MOTOR TESTING:  Plantar flexion left 5/5, right 5/5   Dorsiflexion left 5/5, right 5/5   Great toe MTP extension left 4/5, right 5/5  Knee flexion left 5/5, right 5/5  Knee extension left 5/5, right 5/5   Hip flexion left 5/5, right 5/5  Hip abduction left 5/5, right 5/5  Hip adduction left 5/5, right 5/5   --HIPS: Full range of motion bilaterally.   --NEUROLOGIC: Bilateral patellar and achilles reflexes are physiologic and symmetric. Sensation to light touch is intact in the bilateral L4, L5, and S1 dermatomes.    RESULTS:   Imaging: Lumbar spine imaging was reviewed today. The images were shown to the patient and the findings were explained using a spine model.      Xr Lumbar Spine 2 Or 3 Vws  Result Date: 8/25/2020  INDICATION: Evaluate L2 compression fracture. COMPARISON: 7/10/2020.   Five lumbar-type vertebral bodies. Stable moderate L2 compression deformity. Segmental kyphosis about the fracture from the superior L1  endplate through the inferior L3 endplate measures 21 degrees, which is unchanged. Vertebral body heights are otherwise maintained. Mild loss of disc space height L3-L4 through L5-S1. Mild lower lumbar facet arthropathy.        XR LUMBAR SPINE 2 OR 3 S  LOCATION: [facility]  DATE/TIME: 7/1/2020   INDICATION: Low back pain compression fracture L2 evaluate for stability  COMPARISON: 06/08/2020  IMPRESSION:   Very shallow left apex curvature of the lumbar spine. Grade 1 retrolisthesis of L3 on L4. The bones appear diffusely demineralized, suggestive of osteoporosis. Stable appearance of the L2 compression deformity. No new compression deformities.   Atherosclerotic calcification of the abdominal aorta. Degenerative changes of the sacroiliac joints.        XR LUMBAR SPINE 2 OR 3 S  LOCATION: [facility]  DATE/TIME: 6/8/2020   INDICATION: Low back pain evaluate L2 compression fracture.  COMPARISON: MR 05/19/2020.  IMPRESSION:   Five lumbar type vertebral bodies presumed. Subacute/chronic appearing compression is identified at the L2, with approximately 30-40% loss of superior body height, and fragmentation anterior superior corner similar to previous MR appearance. Sclerosis   associated with the compressed superior endplate at this level. Degenerative changes elsewhere in the lumbar spine stable. 2 mm posterior subluxation L3 upon L4 is more prominent, possibly due to positioning. If there is concern for instability flexion   and extension views could be helpful to assess this level. Otherwise satisfactory height and alignment lumbar spine. Leftward lumbar curve apex L4. Satisfactory appearance to the sacrum. Vascular calcification. Degenerative changes both SI joints.   Obscuration of the right hemisacrum due to bowel gas and stool.  Consider flexion/extension views to assess possible L3-L4 instability as clinically indicated.        MR LUMBAR SPINE WO CONTRAST  LOCATION: [facility]  DATE/TIME: 5/19/2020   INDICATION: Back pain or radiculopathy, > 6 wks Low back pain with left radicular pain  COMPARISON: Correlation with abdomen and pelvis CT 02/28/2020  TECHNIQUE: Without IV contrast  FINDINGS:   Nomenclature is based on 5 lumbar type vertebral bodies. The conus ends at distal L1. Mild levocurvature of the lumbar spine. 1 mm retrolisthesis from L3-L4 to L5-S1. 20% superior endplate compression fracture of L2 with associated moderate marrow edema.   Small amount of prevertebral soft tissue edema from L1 to L3. The rest of the vertebral body heights are maintained. Nonpathologic marrow signal. No MRI evidence for pars defects. Mild symmetric atrophy of the posterior paraspinal musculature. Normal diameter of the distal abdominal aorta. The visualized bony pelvis and proximal femora are grossly within normal limits.  T12-L1: Mild intervertebral disc height loss. Loss of the normal T2 signal within the disc. Small broad-based disc bulge with superimposed left paracentral disc protrusion. Mild bilateral facet arthropathy. No spinal canal narrowing. No neuroforaminal narrowing.  L1-L2: Mild intervertebral disc height loss. Loss of the normal T2 signal within the disc. Small broad-based disc bulge. Mild bilateral facet arthropathy. No spinal canal narrowing. No neuroforaminal narrowing.  L2-L3: Mild intervertebral disc height loss. Loss of the normal T2 signal within the disc. Small broad-based disc bulge. Mild bilateral facet arthropathy. No spinal canal narrowing. No neuroforaminal narrowing.   L3-L4: Normal intervertebral disc height. Loss of the normal T2 signal within the disc. Small broad-based disc bulge. Moderate bilateral facet arthropathy. Small amount of fluid within the facet joints. Mild narrowing of the lateral recesses. No spinal canal narrowing. Mild right neuroforaminal narrowing. Mild left neuroforaminal narrowing   L4-L5: Normal intervertebral disc height. Loss of the  normal T2 signal within the disc. Small broad-based disc bulge. Moderate bilateral facet arthropathy. Small amount of fluid within the facet joints. Mild narrowing of the lateral recesses. No spinal canal narrowing. Mild right neuroforaminal narrowing. No left neuroforaminal narrowing. 2-3 mm bilateral lateral synovial cyst.  L5-S1: Normal intervertebral disc height. Loss of the normal T2 signal within the disc. Small broad-based disc bulge. Mild to moderate bilateral facet arthropathy. No spinal canal narrowing. No right neuroforaminal narrowing. No left neuroforaminal narrowing.  IMPRESSION:   1.  20% acute/subacute superior endplate compression fracture of L2.   2.  Mild narrowing of the lateral recesses at L3-L4 and L4-L5.  3.  No high-grade spinal canal.  4.  Mild bilateral neuroforaminal narrowing at L3-L4. Mild right neuroforaminal narrowing at L4-L5.  5.  Moderate facet arthropathy at L3-L4 and L4-L5 with small amount of fluid within the facet joints.                           Again, thank you for allowing me to participate in the care of your patient.        Sincerely,        Loree Rivas, CNP

## 2021-12-22 NOTE — PATIENT INSTRUCTIONS
~[facility] scheduling #[phone].  ~Please call our [facility] Nurse Navigation line [phone] with any questions or concerns about your treatment plan, if symptoms worsen and you would like to be seen urgently, or if you have problems controlling bladder and bowel function.      Imaging has been ordered. Radiology will call you to schedule. Please call below if you do not hear from them in the next couple of days.   [facility] Radiology Scheduling  Please call [phone] to schedule your image(s) (select option #1). There are 2 different locations, see below.     [facility]  [address] [address]    [place] [address] [facility] [address]

## 2021-12-22 NOTE — PROGRESS NOTES
Assessment:     Diagnoses and all orders for this visit:  Chronic bilateral low back pain without sciatica  -     XR Lumbar Spine 2/3 Views; Future  -     EMG; Future  Left leg weakness  -     XR Lumbar Spine 2/3 Views; Future  -     EMG; Future  Left buttock pain  Pain of left sacroiliac joint  SI (sacroiliac) joint dysfunction     Claudia Wise is a 71 year old y.o. female with past medical history significant for anxiety, rheumatoid arthritis managed by Dr. Vinson, diabetes myelitis, hypertension, hyperlipidemia, chronic kidney disease, thyroid disorder, gout, osteopenia, recent small bowel obstruction with surgical intervention who presents today for follow-up regarding:    -History of bilateral low back pain that is fairly tolerable at this time.    -Progressive left lower extremity weakness in the last 1 month with left EHL weakness on exam today.    -Patient reports being currently worked up for Parkinson's disease.     Plan:     A shared decision making plan was used. The patient's values and choices were respected. Prior medical records were reviewed today. The following represents what was discussed and decided upon by the provider and the patient.        -DIAGNOSTIC TESTS: Images were personally reviewed and interpreted.   --Ordered left lower extremity EMG to evaluate left lower extremity weakness for possible radiculopathy.  -Ordered lumbar spine x-ray to further evaluate left lower extremity weakness.  --Lumbar spine x-ray 8/24/2020 shows stable L2 compression deformity.  --Lumbar spine x-ray 7/1/2020 shows stable L2 compression deformity, grade 1 L3-4 spondylolisthesis.  --Lumbar spine MRI 5/19/2020 with 20% acute/subacute compression fracture L2.  There is mild bilateral lateral recess stenosis L3-4 and L4-5.  Bilateral synovial cysts L4-5 with moderate facet arthropathy.    -INTERVENTIONS: No injection recommendations at this time as patient has minimal pain symptoms.      -MEDICATIONS: No  medication changes at this time.   Discussed side effects of medications and proper use. Patient verbalized understanding.    -PHYSICAL THERAPY: If EMG unremarkable would recommend revisiting physical therapy for left lower extremity weakness.  Discussed the importance of core strengthening, ROM, stretching exercises with the patient and how each of these entities is important in decreasing pain.  Explained to the patient that the purpose of physical therapy is to teach the patient a home exercise program.  These exercises need to be performed every day in order to decrease pain and prevent future occurrences of pain.        -PATIENT EDUCATION:  Total time of 32 minutes, on the day of service, spent with the patient, reviewing the chart, placing orders, and documenting.   -Today we also discussed the issues related to the current COVID-19 pandemic, the pros and cons of the current treatment plan, the CDC guidelines such as social distancing, washing the hands, and covering the cough.    -FOLLOW UP: Follow-up for EMG with Dr. Clifford. Patient is okay with my chart message for x-ray results.  Advised to contact clinic if symptoms worsen or change.    Subjective:     Claudia Wise is a 71 year old female who presents today for follow-up regarding chronic bilateral low back pain that is very tolerable and minimal at this point. She does report occasional discomfort left low back and left buttock however denies any left lower extremity pain.  She is here today as she has been having worsened weakness in her left lower extremity that is concerning for her and this is new in the last 1 month or so. Currently her pain overall is tolerable at a 3/10, slightly aggravated with bending standing for long periods of time but again her pain is overall manageable and that is not her biggest concern today.  Patient currently denies right leg pain or weakness.    *Patient does report that she has been working with her primary  care provider recently and is being worked up for possible Parkinson's disease as she does have a left hand tremor ongoing for the last 1 year.    Treatment to Date: No prior spinal surgery.  Physical therapy [facility] sessions last 8/28/2020 compression fracture/spine pain.  Physical therapy x1 session 8/2/2021 chronic LBP/sacroiliac joint dysfunction.     Left L2, L3, L4 MBB 51/2/2021 with positive response.  Left L2, L3, L4 MBB 6/4/2021 with positive response.  Bilateral L2, L3, L4 RFA 7/2/2021 with 50% relief LBP 4 weeks postinjection.  Preprocedure pain 6/10, post 2/10.  Left SI joint steroid injection 8/17/2021 with over 50% relief.  Preprocedure pain 6/10, post 3/10.     Medications:  Plaquenil 4 seronegative rheumatoid arthritis  Tylenol  Hydrocodone managed by Dr. Erasto Guzmán 25 mg bedtime however patient only tried this medication for a couple of days with no benefit therefore she discontinued on her own.    Patient Active Problem List   Diagnosis     Herpes Simplex Type II     Anxiety     Anemia in chronic kidney disease     Hyperlipemia     Gout     Hypertension     Prediabetes     Endometriosis     Other specified hypothyroidism     Seronegative rheumatoid arthritis of multiple sites (H)     High risk medication use     Bilateral primary osteoarthritis of knee     Small bowel obstruction (H)     Senile osteoporosis     Compression fracture of L2 vertebra with routine healing     Chronic kidney disease, stage 3 (H)     Venous (peripheral) insufficiency     Exposure to bat without known bite       Current Outpatient Medications   Medication     acetaminophen (TYLENOL) 500 MG tablet     carbidopa-levodopa (SINEMET)  MG tablet     HYDROcodone-acetaminophen (NORCO) 5-325 MG tablet     lidocaine (LIDODERM) 5 % patch     acyclovir (ZOVIRAX) 400 MG tablet     allopurinoL (ZYLOPRIM) 100 MG tablet     amLODIPine (NORVASC) 10 MG tablet     atorvastatin (LIPITOR) 10 MG tablet     carboxymethylcellulose  (REFRESH PLUS) 0.5 % Dpet ophthalmic dropperette     cyanocobalamin, vitamin B-12, 1,000 mcg Subl     denosumab (PROLIA) 60 MG/ML SOSY injection     DULoxetine (CYMBALTA) 20 MG capsule     fluticasone (FLONASE) 50 MCG/ACT nasal spray     hydroxychloroquine (PLAQUENIL) 200 MG tablet     levothyroxine (SYNTHROID/LEVOTHROID) 50 MCG tablet     LORazepam (ATIVAN) 0.5 MG tablet     losartan (COZAAR) 50 MG tablet     melatonin 3 mg Tab tablet     methotrexate 2.5 MG tablet     metoprolol tartrate (LOPRESSOR) 50 MG tablet     RESTASIS 0.05 % ophthalmic emulsion     sodium bicarbonate 650 MG tablet     No current facility-administered medications for this visit.       Allergies   Allergen Reactions     Bupropion Hcl [Bupropion] Nausea     Erythromycin Base [Erythromycin] Nausea     Paxil [Paroxetine] Diarrhea     Tetracyclines Itching     Valacyclovir Nausea       Past Medical History:   Diagnosis Date     Anemia      Anxiety      Chronic kidney disease      Diabetes mellitus, type II (H)     prediabetes     Disease of thyroid gland     hypothyroid     Family history of myocardial infarction      Gout      High cholesterol      Hypertension      Inverted nipple     right     Macular edema      Osteoarthritis 2012     Rheumatoid arthritis (H) 2012        Review of Systems  ROS:  Specifically negative for bowel/bladder dysfunction, balance changes, headache, dizziness, foot drop, fevers, chills, appetite changes, nausea/vomiting, unexplained weight loss. Otherwise 13 systems reviewed are negative. Please see the patient's intake questionnaire from today for details.    Reviewed Social, Family, Past Medical and Past Surgical history with patient, no significant changes noted since prior visit.     Objective:     /61 (BP Location: Right arm, Patient Position: Sitting)   Pulse 68   SpO2 98%     PHYSICAL EXAMINATION:    --CONSTITUTIONAL: Well developed, well nourished, healthy appearing individual.  --PSYCHIATRIC:  Appropriate mood and affect. No difficulty interacting due to temper, social withdrawal, or memory issues.  --SKIN: Lumbar region is dry and intact.   --RESPIRATORY: Normal rhythm and effort. No abnormal accessory muscle breathing patterns noted.   --MUSCULOSKELETAL:  Normal lumbar lordosis noted, no lateral shift.  --GROSS MOTOR: Easily arises from a seated position. Gait is non-antalgic  --LUMBAR SPINE:  Inspection reveals no evidence of deformity. Range of motion is not limited in lumbar flexion, extension, lateral rotation. No tenderness to palpation lumbar spine. Straight leg raising in the seated position is negative to radicular pain. Sciatic notch non-tender.   --SACROILIAC JOINT:  One Finger point test negative.  --LOWER EXTREMITY MOTOR TESTING:  Plantar flexion left 5/5, right 5/5   Dorsiflexion left 5/5, right 5/5   Great toe MTP extension left 4/5, right 5/5  Knee flexion left 5/5, right 5/5  Knee extension left 5/5, right 5/5   Hip flexion left 5/5, right 5/5  Hip abduction left 5/5, right 5/5  Hip adduction left 5/5, right 5/5   --HIPS: Full range of motion bilaterally.   --NEUROLOGIC: Bilateral patellar and achilles reflexes are physiologic and symmetric. Sensation to light touch is intact in the bilateral L4, L5, and S1 dermatomes.    RESULTS:   Imaging: Lumbar spine imaging was reviewed today. The images were shown to the patient and the findings were explained using a spine model.      Xr Lumbar Spine 2 Or 3 Vws  Result Date: 8/25/2020  INDICATION: Evaluate L2 compression fracture. COMPARISON: 7/10/2020.   Five lumbar-type vertebral bodies. Stable moderate L2 compression deformity. Segmental kyphosis about the fracture from the superior L1 endplate through the inferior L3 endplate measures 21 degrees, which is unchanged. Vertebral body heights are otherwise maintained. Mild loss of disc space height L3-L4 through L5-S1. Mild lower lumbar facet arthropathy.        XR LUMBAR SPINE 2 OR 3  [facility]  LOCATION: [facility]  DATE/TIME: 7/1/2020   INDICATION: Low back pain compression fracture L2 evaluate for stability  COMPARISON: 06/08/2020  IMPRESSION:   Very shallow left apex curvature of the lumbar spine. Grade 1 retrolisthesis of L3 on L4. The bones appear diffusely demineralized, suggestive of osteoporosis. Stable appearance of the L2 compression deformity. No new compression deformities.   Atherosclerotic calcification of the abdominal aorta. Degenerative changes of the sacroiliac joints.        XR LUMBAR SPINE 2 OR 3 [facility]  LOCATION: [facility]  DATE/TIME: 6/8/2020   INDICATION: Low back pain evaluate L2 compression fracture.  COMPARISON: MR 05/19/2020.  IMPRESSION:   Five lumbar type vertebral bodies presumed. Subacute/chronic appearing compression is identified at the L2, with approximately 30-40% loss of superior body height, and fragmentation anterior superior corner similar to previous MR appearance. Sclerosis   associated with the compressed superior endplate at this level. Degenerative changes elsewhere in the lumbar spine stable. 2 mm posterior subluxation L3 upon L4 is more prominent, possibly due to positioning. If there is concern for instability flexion   and extension views could be helpful to assess this level. Otherwise satisfactory height and alignment lumbar spine. Leftward lumbar curve apex L4. Satisfactory appearance to the sacrum. Vascular calcification. Degenerative changes both SI joints.   Obscuration of the right hemisacrum due to bowel gas and stool.  Consider flexion/extension views to assess possible L3-L4 instability as clinically indicated.        MR LUMBAR SPINE WO CONTRAST  LOCATION: [facility][1]  DATE/TIME: 5/19/2020   INDICATION: Back pain or radiculopathy, > 6 wks Low back pain with left radicular pain  COMPARISON: Correlation with abdomen and pelvis CT 02/28/2020  TECHNIQUE: Without IV contrast  FINDINGS:   Nomenclature is based on 5  lumbar type vertebral bodies. The conus ends at distal L1. Mild levocurvature of the lumbar spine. 1 mm retrolisthesis from L3-L4 to L5-S1. 20% superior endplate compression fracture of L2 with associated moderate marrow edema.   Small amount of prevertebral soft tissue edema from L1 to L3. The rest of the vertebral body heights are maintained. Nonpathologic marrow signal. No MRI evidence for pars defects. Mild symmetric atrophy of the posterior paraspinal musculature. Normal diameter of the distal abdominal aorta. The visualized bony pelvis and proximal femora are grossly within normal limits.  T12-L1: Mild intervertebral disc height loss. Loss of the normal T2 signal within the disc. Small broad-based disc bulge with superimposed left paracentral disc protrusion. Mild bilateral facet arthropathy. No spinal canal narrowing. No neuroforaminal narrowing.  L1-L2: Mild intervertebral disc height loss. Loss of the normal T2 signal within the disc. Small broad-based disc bulge. Mild bilateral facet arthropathy. No spinal canal narrowing. No neuroforaminal narrowing.  L2-L3: Mild intervertebral disc height loss. Loss of the normal T2 signal within the disc. Small broad-based disc bulge. Mild bilateral facet arthropathy. No spinal canal narrowing. No neuroforaminal narrowing.   L3-L4: Normal intervertebral disc height. Loss of the normal T2 signal within the disc. Small broad-based disc bulge. Moderate bilateral facet arthropathy. Small amount of fluid within the facet joints. Mild narrowing of the lateral recesses. No spinal canal narrowing. Mild right neuroforaminal narrowing. Mild left neuroforaminal narrowing   L4-L5: Normal intervertebral disc height. Loss of the normal T2 signal within the disc. Small broad-based disc bulge. Moderate bilateral facet arthropathy. Small amount of fluid within the facet joints. Mild narrowing of the lateral recesses. No spinal canal narrowing. Mild right neuroforaminal narrowing. No  left neuroforaminal narrowing. 2-3 mm bilateral lateral synovial cyst.  L5-S1: Normal intervertebral disc height. Loss of the normal T2 signal within the disc. Small broad-based disc bulge. Mild to moderate bilateral facet arthropathy. No spinal canal narrowing. No right neuroforaminal narrowing. No left neuroforaminal narrowing.  IMPRESSION:   1.  20% acute/subacute superior endplate compression fracture of L2.   2.  Mild narrowing of the lateral recesses at L3-L4 and L4-L5.  3.  No high-grade spinal canal.  4.  Mild bilateral neuroforaminal narrowing at L3-L4. Mild right neuroforaminal narrowing at L4-L5.  5.  Moderate facet arthropathy at L3-L4 and L4-L5 with small amount of fluid within the facet joints.

## 2021-12-29 ENCOUNTER — HOSPITAL ENCOUNTER (OUTPATIENT)
Dept: RADIOLOGY | Facility: CLINIC | Age: 71
Discharge: HOME | End: 2021-12-29
Attending: NURSE PRACTITIONER | Admitting: NURSE PRACTITIONER
Payer: MEDICARE

## 2021-12-29 DIAGNOSIS — M54.50 CHRONIC BILATERAL LOW BACK PAIN WITHOUT SCIATICA: ICD-10-CM

## 2021-12-29 DIAGNOSIS — G89.29 CHRONIC BILATERAL LOW BACK PAIN WITHOUT SCIATICA: ICD-10-CM

## 2021-12-29 DIAGNOSIS — R29.898 LEFT LEG WEAKNESS: ICD-10-CM

## 2021-12-29 LAB — METHYLMALONATE SERPL-SCNC: 0.24 UMOL/L (ref 0–0.4)

## 2021-12-29 PROCEDURE — 72100 X-RAY EXAM L-S SPINE 2/3 VWS: CPT

## 2022-01-03 ENCOUNTER — OFFICE VISIT (OUTPATIENT)
Dept: PHYSICAL MEDICINE AND REHAB | Facility: CLINIC | Age: 72
End: 2022-01-03
Payer: COMMERCIAL

## 2022-01-03 ENCOUNTER — MYC MEDICAL ADVICE (OUTPATIENT)
Dept: PHYSICAL MEDICINE AND REHAB | Facility: CLINIC | Age: 72
End: 2022-01-03

## 2022-01-03 DIAGNOSIS — R29.898 LEFT LEG WEAKNESS: ICD-10-CM

## 2022-01-03 DIAGNOSIS — G89.29 CHRONIC BILATERAL LOW BACK PAIN WITHOUT SCIATICA: ICD-10-CM

## 2022-01-03 DIAGNOSIS — M54.50 CHRONIC BILATERAL LOW BACK PAIN WITHOUT SCIATICA: ICD-10-CM

## 2022-01-03 PROCEDURE — 95909 NRV CNDJ TST 5-6 STUDIES: CPT | Performed by: PHYSICAL MEDICINE & REHABILITATION

## 2022-01-03 PROCEDURE — 95886 MUSC TEST DONE W/N TEST COMP: CPT | Mod: LT | Performed by: PHYSICAL MEDICINE & REHABILITATION

## 2022-01-03 PROCEDURE — 95885 MUSC TST DONE W/NERV TST LIM: CPT | Mod: 59 | Performed by: PHYSICAL MEDICINE & REHABILITATION

## 2022-01-03 NOTE — PATIENT INSTRUCTIONS
Thank you for choosing the [facility] for your EMG testing.    The ordering provider will receive your final EMG results within the next few days.  Please follow up with your provider for the results and further treatment recommendations.

## 2022-01-03 NOTE — PROGRESS NOTES
Patient presents at the request of Loree Rivas for a left lower extremity EMG.  She has a history of left-sided low back pain along with intermittent weakness of the left thigh/hip with lifting her hip to get into the car.  On exam she has normal sensation to light touch to the bilateral lower extremities, normal strength at the major muscle groups of the bilateral lower extremities, 1+ patellar and 0 Achilles reflexes with downgoing toes.    EMG/NCS  results: Please see scanned full report.    Comment NCS: Essentially normal study  1.  Absent bilateral sural SNAPs can be normal for patient's age.  2.  Normal left peroneal and tibial CMAPs.  3.  Absent bilateral tibial H reflexes.  Can be normal for patient's age.    Comment EMG: Normal study  1.  Normal needle EMG left lower extremity.  Borderline increased insertional activity left medial gastrocnemius muscle.  Normal in the right lower extremity.    Interpretation: Essentially normal study: There is electrodiagnostic evidence of:    1. Absent bilateral sural sensory nerve action potentials and absent bilateral tibial H reflexes.  These findings can be normal for patient's age, but I cannot completely exclude a mild sensory or sensorimotor peripheral polyneuropathy.     2. There is no electrodiagnostic evidence of lumbosacral radiculopathy, lumbosacral plexopathy, or focal neuropathy in the left lower extremity.    The testing was completed in its entirety by the physician.      It was our pleasure caring for your patient today, if there any questions or concerns please do not hesitate to contact us.

## 2022-01-03 NOTE — LETTER
1/3/2022         RE: Claudia Wise   [address] [address]        Dear Colleague,    Thank you for referring your patient, Claudia Wise, to the [facility] [place]. Please see a copy of my visit note below.    Patient presents at the request of Loree Rivas for a left lower extremity EMG.  She has a history of left-sided low back pain along with intermittent weakness of the left thigh/hip with lifting her hip to get into the car.  On exam she has normal sensation to light touch to the bilateral lower extremities, normal strength at the major muscle groups of the bilateral lower extremities, 1+ patellar and 0 Achilles reflexes with downgoing toes.    EMG/NCS  results: Please see scanned full report.    Comment NCS: Essentially normal study  1.  Absent bilateral sural SNAPs can be normal for patient's age.  2.  Normal left peroneal and tibial CMAPs.  3.  Absent bilateral tibial H reflexes.  Can be normal for patient's age.    Comment EMG: Normal study  1.  Normal needle EMG left lower extremity.  Borderline increased insertional activity left medial gastrocnemius muscle.  Normal in the right lower extremity.    Interpretation: Essentially normal study: There is electrodiagnostic evidence of:    1. Absent bilateral sural sensory nerve action potentials and absent bilateral tibial H reflexes.  These findings can be normal for patient's age, but I cannot completely exclude a mild sensory or sensorimotor peripheral polyneuropathy.     2. There is no electrodiagnostic evidence of lumbosacral radiculopathy, lumbosacral plexopathy, or focal neuropathy in the left lower extremity.    The testing was completed in its entirety by the physician.      It was our pleasure caring for your patient today, if there any questions or concerns please do not hesitate to contact us.            Again, thank you for allowing me to participate in the care of your patient.         Sincerely,        Cole Clifford, DO

## 2022-01-04 ENCOUNTER — TELEPHONE (OUTPATIENT)
Dept: NEUROLOGY | Facility: CLINIC | Age: 72
End: 2022-01-04
Payer: COMMERCIAL

## 2022-01-04 NOTE — TELEPHONE ENCOUNTER
[facility] Call Center    Phone Message    May a detailed message be left on voicemail: yes     Reason for Call: Medication Question or concern regarding medication   Prescription Clarification  Name of Medication: carbidopa-levodopa (SINEMET)  MG tablet  Prescribing Provider: Dr. Smart   Pharmacy: NA   What on the order needs clarification? Pt states she is having a hard time taking her medication as directed. She requests call back to discuss.    Please call Pt back to discuss ASAP. Okay to leave VM.      Action Taken: Message routed to:  Other: [facility]    Travel Screening: Not Applicable

## 2022-01-05 NOTE — TELEPHONE ENCOUNTER
[facility]    Phone Message    May a detailed message be left on voicemail: yes     Reason for Call: Other: Caludia calling due to a missed call yesterday. She stated that she is available all day for a call back. Claudia is seeking advise or pointers to help her remember to take her medication correctly.    Action Taken: Message routed to:  [facility] ([facility]    Travel Screening: Not Applicable

## 2022-01-05 NOTE — TELEPHONE ENCOUNTER
RN contacted pt regarding concerns over how to correctly take her medication. Pt encouraged to set an alarm for 2 hours after pt eats or try to take medication 30 minutes prior to meals. Pt also encouraged to obtain a medication set so she can set up her medications for morning, noon, and PM. Pt was agreeable to trying the above and reported she will call back if any concerns should arise.     Tameka Garg RN on 1/5/2022 at 9:36 AM

## 2022-01-06 DIAGNOSIS — M54.50 CHRONIC BILATERAL LOW BACK PAIN WITHOUT SCIATICA: Primary | ICD-10-CM

## 2022-01-06 DIAGNOSIS — M53.3 PAIN OF LEFT SACROILIAC JOINT: ICD-10-CM

## 2022-01-06 DIAGNOSIS — G89.29 CHRONIC BILATERAL LOW BACK PAIN WITHOUT SCIATICA: Primary | ICD-10-CM

## 2022-01-06 DIAGNOSIS — M79.18 LEFT BUTTOCK PAIN: ICD-10-CM

## 2022-01-06 DIAGNOSIS — R29.898 LEFT LEG WEAKNESS: ICD-10-CM

## 2022-01-06 DIAGNOSIS — M53.3 SI (SACROILIAC) JOINT DYSFUNCTION: ICD-10-CM

## 2022-01-07 ENCOUNTER — TRANSFERRED RECORDS (OUTPATIENT)
Dept: HEALTH INFORMATION MANAGEMENT | Facility: CLINIC | Age: 72
End: 2022-01-07
Payer: COMMERCIAL

## 2022-01-07 LAB — RETINOPATHY: NEGATIVE

## 2022-01-17 ENCOUNTER — MYC MEDICAL ADVICE (OUTPATIENT)
Dept: INTERNAL MEDICINE | Facility: CLINIC | Age: 72
End: 2022-01-17
Payer: COMMERCIAL

## 2022-01-17 DIAGNOSIS — N18.32 STAGE 3B CHRONIC KIDNEY DISEASE (H): ICD-10-CM

## 2022-01-17 DIAGNOSIS — M81.0 SENILE OSTEOPOROSIS: Primary | ICD-10-CM

## 2022-01-18 DIAGNOSIS — I10 HTN (HYPERTENSION): ICD-10-CM

## 2022-01-18 NOTE — TELEPHONE ENCOUNTER
Are we giving 4th dose of Covid vaccine? Patient says that per My Chart, she is eligible on Feb 24th. Thanks

## 2022-01-19 DIAGNOSIS — A60.00 GENITAL HERPES SIMPLEX, UNSPECIFIED SITE: Primary | ICD-10-CM

## 2022-01-19 NOTE — TELEPHONE ENCOUNTER
It looks like she had a mix and match with a dose of moderna then pfizer and then moderna. If she would have had 3 doses of pfizer or moderna, that would be considered the primary series for immunocompromised and she would get a booster 5 months after. Since she mixed, we don't do another booster after this.     The 4 doses would be for people with significant immunosuppression and need for 3 doses for primary series, 0 days, 28 days then 28 days after 2nd dose with booster 5 months after the completion of the last dose of the primary series.    I hope that helps!

## 2022-01-20 ENCOUNTER — TELEPHONE (OUTPATIENT)
Dept: INTERNAL MEDICINE | Facility: CLINIC | Age: 72
End: 2022-01-20

## 2022-01-20 ENCOUNTER — ALLIED HEALTH/NURSE VISIT (OUTPATIENT)
Dept: FAMILY MEDICINE | Facility: CLINIC | Age: 72
End: 2022-01-20
Payer: COMMERCIAL

## 2022-01-20 DIAGNOSIS — M81.0 SENILE OSTEOPOROSIS: Primary | ICD-10-CM

## 2022-01-20 PROCEDURE — 99207 PR NO CHARGE NURSE ONLY: CPT

## 2022-01-20 PROCEDURE — 96372 THER/PROPH/DIAG INJ SC/IM: CPT | Performed by: INTERNAL MEDICINE

## 2022-01-20 NOTE — TELEPHONE ENCOUNTER
Thank you. This is very helpful. I was just confused why My Chart is giving this information to the patients. She is on methotrexate and Plaquenil. If she can see message that she is eligible for the 4th dose because she is on RA medications, we will have many patients with the same question. Maybe IT or someone from My Chart can help.

## 2022-01-20 NOTE — PROGRESS NOTES
"Prolia Injection Phone Screen      Screening questions have been asked 2-3 days prior to administration visit for Prolia. If any questions are answered with \"Yes,\" this phone encounter were will routed to ordering provider for further evaluation.     1.  When was the last injection?  6/10/2021    2.  Has insurance for this injection been verified?  Yes    3.  Did you experience any new onset achiness or rashes that lasted for over a month with your previous Prolia injection?   No    4.  Do you have a fever over 101?F or a new deep cough that is unusual for you today? No    5.  Have you started any new medications in the last 6 months that you were told could affect your immune system? These may have been prescribed by oncologist, transplant, rheumatology, or dermatology.   No    6.  In the last 6 months have you have gastric bypass or parathyroid surgery?   No    7.  Do you plan dental work requiring drilling into the bone such as implants/extractions or oral surgery in the next 2-3 months?   No    8. Do you have new insurance since the last injection? No     9. Have you received the Covid-19 vaccine? Yes  If No - Proceed with Prolia injection  If Yes - Date of vaccination 2/26/2021, 1/29/2021, 9/24/2021. Will need to wait until 2 weeks after 2nd dose of Covid-19 vaccine before administering Prolia       Patient informed if symptoms discussed above present prior to their administration appointment, they are to notify clinic immediately.     [place] [facility]        The following steps were completed to comply with the REMS program for Prolia:  1. Ordering provider has previously reviewed information in the Medication Guide and Patient Counseling Chart, including the serious risks of Prolia  and the symptoms of each risk and have been advised to seek prompt medical attention if they have signs or symptoms of any of the serious risks.  2. Provided each patient a copy of the Medication Guide and Patient Brochure.  See MAR " for administration details.   Indication: Prolia  (denosumab) is a prescription medicine used to treat osteoporosis in patients who:   Are at high risk for fracture, meaning patients who have had a fracture related to osteoporosis, or who have multiple risk factors for fracture; Cannot use another osteoporosis medicine or other osteoporosis medicines did not work well.   The timeline for early/late injections would be 4 weeks early and any time after the 6 month ivonne. If a patient receives their injection late, then the subsequent injection would be 6 months from the date that they actually received the injection    Have the screening questions been asked prior to this administration? Yes

## 2022-01-20 NOTE — TELEPHONE ENCOUNTER
This has been approved, ok to proceed.     Thanks,   Jessica Jean   CAM Team    [phone]    Message text

## 2022-01-20 NOTE — TELEPHONE ENCOUNTER
Patient is coming in for Prolia injection today 1/20/22. PA has not been started for 2022. Can we proceed with injection or should we reschedule?

## 2022-01-21 RX ORDER — LOSARTAN POTASSIUM 50 MG/1
TABLET ORAL
Qty: 90 TABLET | Refills: 0 | Status: SHIPPED | OUTPATIENT
Start: 2022-01-21 | End: 2022-04-18

## 2022-01-21 RX ORDER — ACYCLOVIR 400 MG/1
TABLET ORAL
Qty: 60 TABLET | Refills: 0 | Status: SHIPPED | OUTPATIENT
Start: 2022-01-21

## 2022-01-21 NOTE — TELEPHONE ENCOUNTER
"Routing refill request to provider for review/approval because:  Labs out of range:  Creatinine     Last Written Prescription Date:  11/8/21  Last Fill Quantity: 90,  # refills: 0   Last office visit provider:  7/1/21     Requested Prescriptions   Pending Prescriptions Disp Refills     losartan (COZAAR) 50 MG tablet [Pharmacy Med Name: Losartan Potassium 50 MG Oral Tablet] 90 tablet 0     Sig: Take 1 tablet by mouth once daily       Angiotensin-II Receptors Failed - 1/18/2022  5:40 PM        Failed - Normal serum creatinine on file in past 12 months     Recent Labs   Lab Test 12/21/21  1129   CR 1.21*       Ok to refill medication if creatinine is low          Passed - Last blood pressure under 140/90 in past 12 months     BP Readings from Last 3 Encounters:   12/22/21 133/61   12/15/21 123/61   12/01/21 130/80                 Passed - Recent (12 mo) or future (30 days) visit within the authorizing provider's specialty     Patient has had an office visit with the authorizing provider or a provider within the authorizing providers department within the previous 12 mos or has a future within next 30 days. See \"Patient Info\" tab in inbasket, or \"Choose Columns\" in Meds & Orders section of the refill encounter.              Passed - Medication is active on med list        Passed - Patient is age 18 or older        Passed - No active pregnancy on record        Passed - Normal serum potassium on file in past 12 months     Recent Labs   Lab Test 12/21/21  1129   POTASSIUM 4.3                    Passed - No positive pregnancy test in past 12 months             Judit Gamble RN 01/21/22 7:02 AM  "

## 2022-01-21 NOTE — TELEPHONE ENCOUNTER
"Routing refill request to provider for review/approval because:  Labs out of range:  Creatinine  A break in medication    Last Written Prescription Date:  1/18/2018  Last Fill Quantity: 60,  # refills: 1   Last office visit provider:  7/1/21     Requested Prescriptions   Pending Prescriptions Disp Refills     acyclovir (ZOVIRAX) 400 MG tablet [Pharmacy Med Name: Acyclovir 400 MG Oral Tablet] 60 tablet 0     Sig: TAKE ONE TABLET BY MOUTH EVERY 8 HOURS AS NEEDED       Antivirals for Herpes Protocol Failed - 1/19/2022  9:31 AM        Failed - Normal serum creatinine on file in past 12 months     Recent Labs   Lab Test 12/21/21  1129   CR 1.21*       Ok to refill medication if creatinine is low          Passed - Patient is age 12 or older        Passed - Recent (12 mo) or future (30 days) visit within the authorizing provider's specialty     Patient has had an office visit with the authorizing provider or a provider within the authorizing providers department within the previous 12 mos or has a future within next 30 days. See \"Patient Info\" tab in inbasket, or \"Choose Columns\" in Meds & Orders section of the refill encounter.              Passed - Medication is active on med list             David Diggs RN 01/21/22 9:44 AM  "

## 2022-01-27 ENCOUNTER — TELEPHONE (OUTPATIENT)
Dept: NEUROLOGY | Facility: CLINIC | Age: 72
End: 2022-01-27
Payer: COMMERCIAL

## 2022-01-27 NOTE — TELEPHONE ENCOUNTER
Spoke with pt about her concerns with the Sinemet. Pt states that she has been very nauseated when taking the medication. She reports she has been taking it 30 minutes prior to eating. Pt was encouraged to try to eat first and then take the medication 2 hours later as this may be easier on her stomach. Pt reports she will try this. She also had some concerns regarding taking her medication around her colonoscopy appt in February. RN explained that she is able to take her medication the day before her appt and after her appt. Pt was told that she is able to miss her morning dose that day if needed so that she is not nauseous during the appt. Pt had no other concerns or questions at this time.     Tameka Garg RN on 1/27/2022 at 12:07 PM

## 2022-01-27 NOTE — TELEPHONE ENCOUNTER
[facility] Call Center    Phone Message    May a detailed message be left on voicemail: yes     Reason for Call: Other: Request call back to discuss medications     Action Taken: Message routed to:  [facility] ([facility] Neurology    Travel Screening: Not Applicable

## 2022-02-04 ENCOUNTER — TELEPHONE (OUTPATIENT)
Dept: INTERNAL MEDICINE | Facility: CLINIC | Age: 72
End: 2022-02-04
Payer: COMMERCIAL

## 2022-02-09 ENCOUNTER — OFFICE VISIT (OUTPATIENT)
Dept: FAMILY MEDICINE | Facility: CLINIC | Age: 72
End: 2022-02-09
Payer: COMMERCIAL

## 2022-02-09 VITALS
WEIGHT: 171.2 LBS | HEART RATE: 72 BPM | OXYGEN SATURATION: 98 % | HEIGHT: 64 IN | BODY MASS INDEX: 29.23 KG/M2 | DIASTOLIC BLOOD PRESSURE: 60 MMHG | SYSTOLIC BLOOD PRESSURE: 114 MMHG

## 2022-02-09 DIAGNOSIS — M06.09 SERONEGATIVE RHEUMATOID ARTHRITIS OF MULTIPLE SITES (H): ICD-10-CM

## 2022-02-09 DIAGNOSIS — M81.0 SENILE OSTEOPOROSIS: ICD-10-CM

## 2022-02-09 DIAGNOSIS — N18.9 ANEMIA IN CHRONIC KIDNEY DISEASE, UNSPECIFIED CKD STAGE: ICD-10-CM

## 2022-02-09 DIAGNOSIS — F41.1 ANXIETY STATE: ICD-10-CM

## 2022-02-09 DIAGNOSIS — Z00.00 ENCOUNTER FOR ANNUAL WELLNESS EXAM IN MEDICARE PATIENT: ICD-10-CM

## 2022-02-09 DIAGNOSIS — E78.5 HYPERLIPIDEMIA, UNSPECIFIED HYPERLIPIDEMIA TYPE: Primary | ICD-10-CM

## 2022-02-09 DIAGNOSIS — D63.1 ANEMIA IN CHRONIC KIDNEY DISEASE, UNSPECIFIED CKD STAGE: ICD-10-CM

## 2022-02-09 DIAGNOSIS — F51.02 ADJUSTMENT INSOMNIA: ICD-10-CM

## 2022-02-09 DIAGNOSIS — I10 ESSENTIAL HYPERTENSION: ICD-10-CM

## 2022-02-09 DIAGNOSIS — E03.8 OTHER SPECIFIED HYPOTHYROIDISM: ICD-10-CM

## 2022-02-09 DIAGNOSIS — N18.30 STAGE 3 CHRONIC KIDNEY DISEASE, UNSPECIFIED WHETHER STAGE 3A OR 3B CKD (H): ICD-10-CM

## 2022-02-09 DIAGNOSIS — I87.2 VENOUS (PERIPHERAL) INSUFFICIENCY: ICD-10-CM

## 2022-02-09 DIAGNOSIS — Z00.00 HEALTHCARE MAINTENANCE: ICD-10-CM

## 2022-02-09 PROCEDURE — 99397 PER PM REEVAL EST PAT 65+ YR: CPT | Performed by: NURSE PRACTITIONER

## 2022-02-09 RX ORDER — LORAZEPAM 0.5 MG/1
TABLET ORAL
Qty: 30 TABLET | Refills: 0 | Status: SHIPPED | OUTPATIENT
Start: 2022-02-09 | End: 2022-07-20

## 2022-02-09 RX ORDER — POLYETHYLENE GLYCOL 3350, SODIUM CHLORIDE, SODIUM BICARBONATE, POTASSIUM CHLORIDE 420; 11.2; 5.72; 1.48 G/4L; G/4L; G/4L; G/4L
POWDER, FOR SOLUTION ORAL
COMMUNITY
Start: 2021-12-13 | End: 2022-02-09

## 2022-02-09 ASSESSMENT — MIFFLIN-ST. JEOR: SCORE: 1271.56

## 2022-02-09 ASSESSMENT — ACTIVITIES OF DAILY LIVING (ADL): CURRENT_FUNCTION: PREPARING MEALS REQUIRES ASSISTANCE

## 2022-02-09 NOTE — PROGRESS NOTES
"SUBJECTIVE:   Claudia Wise is a 71 year old female who presents for Preventive Visit.      Patient has been advised of split billing requirements and indicates understanding: Yes  Are you in the first 12 months of your Medicare coverage?  No    Healthy Habits:     In general, how would you rate your overall health?  Fair    Frequency of exercise:  2-3 days/week    Duration of exercise:  15-30 minutes    Do you usually eat at least 4 servings of fruit and vegetables a day, include whole grains    & fiber and avoid regularly eating high fat or \"junk\" foods?  No    Taking medications regularly:  No    Barriers to taking medications:  Side effects    Medication side effects:  Other    Ability to successfully perform activities of daily living:  Preparing meals requires assistance    Home Safety:  No safety concerns identified    Hearing Impairment:  No hearing concerns    In the past 6 months, have you been bothered by leaking of urine? Yes    In general, how would you rate your overall mental or emotional health?  Fair      PHQ-2 Total Score: 2    Additional concerns today:  No    Do you feel safe in your environment? Yes    Have you ever done Advance Care Planning? (For example, a Health Directive, POLST, or a discussion with a medical provider or your loved ones about your wishes): Yes, patient states has an Advance Care Planning document and will bring a copy to the clinic.       Fall risk  Fallen 2 or more times in the past year?: (P) No  Any fall with injury in the past year?: (P) No    Cognitive Screening   1) Repeat 3 items (Leader, Season, Table)    2) Clock draw: NORMAL  3) 3 item recall: Recalls 2 objects   Results: NORMAL clock, 1-2 items recalled: COGNITIVE IMPAIRMENT LESS LIKELY    Mini-CogTM Copyright REESE Price. Licensed by the author for use in [employer]; reprinted with permission (harjit@.[facility]). All rights reserved.      Do you have sleep apnea, excessive snoring or daytime " "drowsiness?: no    Reviewed and updated as needed this visit by clinical staff   Allergies              Reviewed and updated as needed this visit by Provider               Social History     Tobacco Use     Smoking status: Former Smoker     Quit date: 1995     Years since quittin.6     Smokeless tobacco: Never Used   Substance Use Topics     Alcohol use: Yes     Comment: Alcoholic Drinks/day: occasional couple times a year         Alcohol Use 2022   Prescreen: >3 drinks/day or >7 drinks/week? No         Current providers sharing in care for this patient include:   Patient Care Team:  Isabel Maurer MD as PCP - General (Internal Medicine)  Isabel Maurer MD as Assigned PCP  Akila Vinson MBBS as Assigned Rheumatology Provider  Logan Smart MD as MD (Neurology)  Cole Clifford DO as Assigned Neuroscience Provider    The following health maintenance items are reviewed in Epic and correct as of today:  Health Maintenance Due   Topic Date Due     MICROALBUMIN  Never done     URINE DRUG SCREEN  Never done     ANNUAL REVIEW OF HM ORDERS  Never done     LIPID  2019     MEDICARE ANNUAL WELLNESS VISIT  10/01/2021     FALL RISK ASSESSMENT  10/01/2021     COLORECTAL CANCER SCREENING  10/17/2021     Lab work is in process  Labs reviewed in [facility]  Mammogram Screening: Mammogram Screening: Recommended mammography every 1-2 years with patient discussion and risk factor consideration        Review of Systems  Constitutional, HEENT, cardiovascular, pulmonary, gi and gu systems are negative, except as otherwise noted.    OBJECTIVE:   /60 (BP Location: Right arm, Patient Position: Sitting, Cuff Size: Adult Large)   Pulse 72   Ht 1.626 m (5' 4\")   Wt 77.7 kg (171 lb 3.2 oz)   SpO2 98%   BMI 29.39 kg/m   Estimated body mass index is 29.56 kg/m  as calculated from the following:    Height as of 12/15/21: 1.626 m (5' 4\").    Weight as of 12/15/21: 78.1 kg (172 lb 3.4 oz).  Physical Exam  GENERAL: " healthy, alert and no distress  NECK: no adenopathy, no asymmetry, masses, or scars and thyroid normal to palpation  RESP: lungs clear to auscultation - no rales, rhonchi or wheezes  CV: regular rate and rhythm, normal S1 S2, no S3 or S4, no murmur, click or rub, no peripheral edema and peripheral pulses strong  ABDOMEN: soft, nontender, no hepatosplenomegaly, no masses and bowel sounds normal  MS: no gross musculoskeletal defects noted, no edema    Diagnostic Test Results:  Labs reviewed in Epic    ASSESSMENT / PLAN:     1. Encounter for annual wellness exam in Medicare patient  Chronic medical issues reviewed    2. Healthcare maintenance  Colonoscopy is scheduled for next week.  She will be due for her mammogram in October.  Up-to-date on immunizations.    3. Adjustment insomnia  Occasional use of Ativan.  Otherwise sleeping well    4. Anxiety state  Has seen tremendous improvement with the Cymbalta.  - LORazepam (ATIVAN) 0.5 MG tablet; [LORAZEPAM (ATIVAN) 0.5 MG TABLET] TAKE 1 TABLET BY MOUTH EVERY 8 HOURS AS NEEDED FOR ANXIETY  Dispense: 30 tablet; Refill: 0    5. Hyperlipidemia, unspecified hyperlipidemia type  On atorvastatin 10 mg daily  - Lipid Profile (Chol, Trig, HDL, LDL calc); Future    6. Other specified hypothyroidism  Stable TSH.  Has been on stable Synthroid 50 mcg    7. Hypertension  Controlled on current regimen    8. Venous (peripheral) insufficiency  Wearing compression stockings that she purchased from [employer].  Had some swelling on higher dose amlodipine    9. Senile osteoporosis  Had her Prolia injection last month    10. Seronegative rheumatoid arthritis of multiple sites (H)  Following with rheumatology    11. Stage 3 chronic kidney disease, unspecified whether stage 3a or 3b CKD (H)  Creatinine stable when checked in December 12. Anemia in chronic kidney disease, unspecified CKD stage  Hemoglobin stable when checked this past November    Patient Instructions   Blood pressure and other  "vital signs look good today.    Weight is up a bit.  Work on diet and exercise.    Lab only appointment to check fasting labs.    Get your colonoscopy done.    Mammogram in October.    Up-to-date on immunizations.    I believe that your anxiety is well controlled now.  Continue on Cymbalta.    Refills of the lorazepam provided today.    Parking permit mailed.    Follow-up in 6 months with PCP      Patient has been advised of split billing requirements and indicates understanding: No    COUNSELING:  Reviewed preventive health counseling, as reflected in patient instructions       Regular exercise       Healthy diet/nutrition       Vision screening    Estimated body mass index is 29.56 kg/m  as calculated from the following:    Height as of 12/15/21: 1.626 m (5' 4\").    Weight as of 12/15/21: 78.1 kg (172 lb 3.4 oz).        She reports that she quit smoking about 26 years ago. She has never used smokeless tobacco.      Appropriate preventive services were discussed with this patient, including applicable screening as appropriate for cardiovascular disease, diabetes, osteopenia/osteoporosis, and glaucoma.  As appropriate for age/gender, discussed screening for colorectal cancer, prostate cancer, breast cancer, and cervical cancer. Checklist reviewing preventive services available has been given to the patient.    Reviewed patients plan of care and provided an AVS. The Intermediate Care Plan ( asthma action plan, low back pain action plan, and migraine action plan) for Claudia meets the Care Plan requirement. This Care Plan has been established and reviewed with the Patient.    Counseling Resources:  ATP IV Guidelines  Pooled Cohorts Equation Calculator  Breast Cancer Risk Calculator  Breast Cancer: Medication to Reduce Risk  FRAX Risk Assessment  ICSI Preventive Guidelines  Dietary Guidelines for Americans, 2010  USDA's MyPlate  ASA Prophylaxis  Lung CA Screening    Larry Cruz, [facility] " KATELYN ELKINS    Identified Health Risks:

## 2022-02-09 NOTE — PATIENT INSTRUCTIONS
Blood pressure and other vital signs look good today.    Weight is up a bit.  Work on diet and exercise.    Lab only appointment to check fasting labs.    Get your colonoscopy done.    Mammogram in October.    Up-to-date on immunizations.    I believe that your anxiety is well controlled now.  Continue on Cymbalta.    Refills of the lorazepam provided today.    Parking permit mailed.    Follow-up in 6 months with PCP

## 2022-02-10 ENCOUNTER — HOSPITAL ENCOUNTER (OUTPATIENT)
Dept: PHYSICAL THERAPY | Facility: REHABILITATION | Age: 72
End: 2022-02-10
Payer: COMMERCIAL

## 2022-02-10 DIAGNOSIS — G89.29 CHRONIC BILATERAL LOW BACK PAIN WITHOUT SCIATICA: Primary | ICD-10-CM

## 2022-02-10 DIAGNOSIS — M53.3 SI (SACROILIAC) JOINT DYSFUNCTION: ICD-10-CM

## 2022-02-10 DIAGNOSIS — M79.18 LEFT BUTTOCK PAIN: ICD-10-CM

## 2022-02-10 DIAGNOSIS — M54.50 CHRONIC BILATERAL LOW BACK PAIN WITHOUT SCIATICA: Primary | ICD-10-CM

## 2022-02-10 DIAGNOSIS — M53.3 PAIN OF LEFT SACROILIAC JOINT: ICD-10-CM

## 2022-02-10 DIAGNOSIS — R29.898 LEFT LEG WEAKNESS: ICD-10-CM

## 2022-02-10 PROCEDURE — 97161 PT EVAL LOW COMPLEX 20 MIN: CPT | Mod: GP | Performed by: PHYSICAL THERAPIST

## 2022-02-10 PROCEDURE — 97140 MANUAL THERAPY 1/> REGIONS: CPT | Mod: GP | Performed by: PHYSICAL THERAPIST

## 2022-02-10 PROCEDURE — 97110 THERAPEUTIC EXERCISES: CPT | Mod: GP | Performed by: PHYSICAL THERAPIST

## 2022-02-11 ENCOUNTER — LAB (OUTPATIENT)
Dept: LAB | Facility: CLINIC | Age: 72
End: 2022-02-11
Payer: COMMERCIAL

## 2022-02-11 DIAGNOSIS — M06.09 SERONEGATIVE RHEUMATOID ARTHRITIS OF MULTIPLE SITES (H): ICD-10-CM

## 2022-02-11 DIAGNOSIS — E78.5 HYPERLIPIDEMIA, UNSPECIFIED HYPERLIPIDEMIA TYPE: ICD-10-CM

## 2022-02-11 LAB
CHOLEST SERPL-MCNC: 147 MG/DL
FASTING STATUS PATIENT QL REPORTED: YES
HDLC SERPL-MCNC: 65 MG/DL
LDLC SERPL CALC-MCNC: 40 MG/DL
TRIGL SERPL-MCNC: 210 MG/DL

## 2022-02-11 PROCEDURE — 36415 COLL VENOUS BLD VENIPUNCTURE: CPT

## 2022-02-11 PROCEDURE — 80061 LIPID PANEL: CPT

## 2022-02-11 NOTE — PROGRESS NOTES
[facility]    OUTPATIENT PHYSICAL THERAPY ORTHOPEDIC EVALUATION  PLAN OF TREATMENT FOR OUTPATIENT REHABILITATION  (COMPLETE FOR INITIAL CLAIMS ONLY)  Patient's Last Name, First Name, M.I.  YOB: 1950  Claudia Wise    Provider s Name:  [facility]   Medical Record No.  [id number]   Start of Care Date:  02/10/22   Onset Date:  01/06/22   Type:     _X__PT   ___OT   ___SLP Medical Diagnosis:  Chronic bilateral low back pain without sciatica, Left leg weakness, Left buttock pain, Pain of left sacroiliac joint, SI (sacroiliac) joint dysfunction     PT Diagnosis:  decreased activity tolerance   Visits from SOC:  1      _________________________________________________________________________________  Plan of Treatment/Functional Goals:  joint mobilization,manual therapy,neuromuscular re-education,ROM,strengthening,stretching,transfer training           Goals  Goal Identifier: HEP  Goal Description: patient will be independent with HEP and self management of symptoms  Target Date: 05/11/22    Goal Identifier: standing  Goal Description: Patient will stand for 30 minutes for meal prep with pain 2/10 or less  Target Date: 05/11/22    Goal Identifier: ambulation  Goal Description: Patient will walk 150 ft without pain or gait deviation for safe household deviation  Target Date: 05/11/22                                                           Therapy Frequency:  1 time/week  Predicted Duration of Therapy Intervention:  90 days    Ricky Guthrie, PT                 I CERTIFY THE NEED FOR THESE SERVICES FURNISHED UNDER        THIS PLAN OF TREATMENT AND WHILE UNDER MY CARE     (Physician co-signature of this document indicates review and certification of the therapy plan).                       Certification Date From:  02/10/22   Certification Date To:   05/11/22    Referring Provider:  Loree Rivas CNP    Initial Assessment        See Epic Evaluation Start of Care Date: 02/10/22                               02/10/22 1200   General Information   Type of Visit Initial OP Ortho PT Evaluation   Start of Care Date 02/10/22   Referring Physician Loree Rivas CNP   Patient/Family Goals Statement walk smoothly without pain   Orders Evaluate and Treat   Date of Order 01/06/22   Certification Required? Yes   Medical Diagnosis Chronic bilateral low back pain without sciatica, Left leg weakness, Left buttock pain, Pain of left sacroiliac joint, SI (sacroiliac) joint dysfunction   Body Part(s)   Body Part(s) Lumbar Spine/SI   Presentation and Etiology   Impairments A. Pain;D. Decreased ROM;E. Decreased flexibility;F. Decreased strength and endurance   Functional Limitations perform activities of daily living;perform desired leisure / sports activities   Symptom Location low back and SI Left > right   Onset date of current episode/exacerbation 01/06/22   Pain rating (0-10 point scale) Best (/10);Worst (/10);Other   Best (/10) 2   Worst (/10) 6   Pain rating comment 4   Fall Risk Screen   Fall screen completed by PT   Have you fallen 2 or more times in the past year? No   Have you fallen and had an injury in the past year? No   Is patient a fall risk? No   Abuse Screen (yes response referral indicated)   Feels Unsafe at Home or Work/School no   Feels Threatened by Someone no   Does Anyone Try to Keep You From Having Contact with Others or Doing Things Outside Your Home? no   Physical Signs of Abuse Present no   System Outcome Measures   Outcome Measures   (ALEJANDRA 40%)   Lumbar Spine/SI Objective Findings   Posture flexed slightly   Flexion ROM mod   Extension ROM mod   Right Side Bending ROM severe loss   Left Side Bending ROM severe loss   Hip Flexion (L2) Strength 4   Hip Abduction Strength 4   Hip Adduction Strength 4   Hip Extension Strength 4   Knee Flexion  Strength 4   Knee Extension (L3) Strength 5   Ankle Dorsiflexion (L4) Strength 5   Hamstring Flexibility mod limitation   Hip Flexor Flexibility mod   SLR -   Planned Therapy Interventions   Planned Therapy Interventions joint mobilization;manual therapy;neuromuscular re-education;ROM;strengthening;stretching;transfer training   Clinical Impression   Criteria for Skilled Therapeutic Interventions Met yes, treatment indicated   PT Diagnosis decreased activity tolerance   Influenced by the following impairments LBP, SI pain, decreased strength, decreased flexibility   Clinical Presentation Stable/Uncomplicated   Clinical Decision Making (Complexity) Low complexity   Therapy Frequency 1 time/week   Predicted Duration of Therapy Intervention (days/wks) 90 days   Risk & Benefits of therapy have been explained Yes   Patient, Family & other staff in agreement with plan of care Yes   Clinical Impression Comments patient returns for PT eval after 2 months trial of independent self management, with left>right low back and SI pain.   ORTHO GOALS   PT Ortho Eval Goals 1;2;3   Ortho Goal 1   Goal Identifier HEP   Goal Description patient will be independent with HEP and self management of symptoms   Target Date 05/11/22   Ortho Goal 2   Goal Identifier standing   Goal Description Patient will stand for 30 minutes for meal prep with pain 2/10 or less   Target Date 05/11/22   Ortho Goal 3   Goal Identifier ambulation   Goal Description Patient will walk 150 ft without pain or gait deviation for safe household deviation   Target Date 05/11/22   Total Evaluation Time   PT Eval, Low Complexity Minutes (53091) 25   Therapy Certification   Certification date from 02/10/22   Certification date to 05/11/22   Medical Diagnosis Chronic bilateral low back pain without sciatica, Left leg weakness, Left buttock pain, Pain of left sacroiliac joint, SI (sacroiliac) joint dysfunction

## 2022-02-15 ENCOUNTER — TRANSFERRED RECORDS (OUTPATIENT)
Dept: HEALTH INFORMATION MANAGEMENT | Facility: CLINIC | Age: 72
End: 2022-02-15
Payer: COMMERCIAL

## 2022-02-16 ENCOUNTER — OFFICE VISIT (OUTPATIENT)
Dept: NEUROLOGY | Facility: CLINIC | Age: 72
End: 2022-02-16
Payer: COMMERCIAL

## 2022-02-16 ENCOUNTER — TELEPHONE (OUTPATIENT)
Dept: RHEUMATOLOGY | Facility: CLINIC | Age: 72
End: 2022-02-16
Payer: COMMERCIAL

## 2022-02-16 VITALS
HEIGHT: 64 IN | DIASTOLIC BLOOD PRESSURE: 59 MMHG | WEIGHT: 173 LBS | BODY MASS INDEX: 29.53 KG/M2 | HEART RATE: 68 BPM | SYSTOLIC BLOOD PRESSURE: 121 MMHG

## 2022-02-16 DIAGNOSIS — G20.C PRIMARY PARKINSONISM (H): Primary | ICD-10-CM

## 2022-02-16 PROCEDURE — 99214 OFFICE O/P EST MOD 30 MIN: CPT | Performed by: PSYCHIATRY & NEUROLOGY

## 2022-02-16 RX ORDER — ROPINIROLE 0.5 MG/1
0.5 TABLET, FILM COATED ORAL 3 TIMES DAILY
Qty: 270 TABLET | Refills: 0 | Status: SHIPPED | OUTPATIENT
Start: 2022-02-16 | End: 2022-04-14

## 2022-02-16 NOTE — PROGRESS NOTES
NEUROLOGY OUTPATIENT PROGRESS NOTE   Feb 16, 2022     CHIEF COMPLAINT/REASON FOR VISIT/REASON FOR CONSULT  Patient presents with:  Parkinson: Follow up   Tremors    REASON FOR CONSULTATION- Tremors    REFERRAL SOURCE  Dr. Isabel Maurer  CC Dr. Isabel Maurer    HISTORY OF PRESENT ILLNESS  Claudia Wise is a 71 year old female seen today for evaluation of tremors.  Reports that the tremors have been there since February 2020.  Reports that the tremors are mainly in the left upper extremity.  Notices that they are present at rest.  They wake her up from sleep.  They can be present with activity as well.  Denies any gait difficulty though does have back issues and left leg weakness from the back issues which could affect her walking.  Reports no masklike facies.  No other new medications.  Has not been on any antipsychotics in the past.  Does have a diagnosis of depression anxiety and is on Cymbalta.  No tremors on the right side.  Denies any cognitive issues.  No rigidity.  Does have family history of Parkinson's disease in her aunt.    2/16/22  Patient returns today.  She could not tolerate the Sinemet.  Had dry heaving.  Did try to take a lower dose.  Did try to take it with crackers but still had dry heaving.  Did not feel any significant benefit.  Tremors were better in the evening time with the medication though the tremors are always better in the evening time.  Reports no other new issues.  Reports that her depression anxiety under good control.  Her  does not think she has masklike facies.  No other new symptoms or concerns.    She is working with her therapist for sacroiliac pain.  She is walking 10 minutes every other day.  Encourage her to do it more often.    Previous history is reviewed and this is unchanged.    PAST MEDICAL/SURGICAL HISTORY  Past Medical History:   Diagnosis Date     Anemia      Anxiety      Chronic kidney disease      Diabetes mellitus, type II (H)     prediabetes      Disease of thyroid gland     hypothyroid     Family history of myocardial infarction      Gout      High cholesterol      Hypertension      Inverted nipple     right     Macular edema      Osteoarthritis 2012     Rheumatoid arthritis (H) 2012     Patient Active Problem List   Diagnosis     Herpes Simplex Type II     Anxiety     Anemia in chronic kidney disease     Hyperlipemia     Gout     Hypertension     Prediabetes     Endometriosis     Other specified hypothyroidism     Seronegative rheumatoid arthritis of multiple sites (H)     High risk medication use     Bilateral primary osteoarthritis of knee     Small bowel obstruction (H)     Senile osteoporosis     Compression fracture of L2 vertebra with routine healing     Chronic kidney disease, stage 3 (H)     Venous (peripheral) insufficiency     Exposure to bat without known bite   Significant for high cholesterol, high blood pressure, diabetes, arthritis, depression, osteoporosis    FAMILY HISTORY  Family History   Problem Relation Age of Onset     Breast Cancer Maternal Aunt      Cancer Maternal Aunt      Cancer Father      Cancer Sister      Cancer Maternal Grandmother      Cancer Cousin      Coronary Artery Disease Mother 49.00   Family history reviewed and negative for neurological conditions except for Parkinson's disease in her aunt.    SOCIAL HISTORY  Social History     Tobacco Use     Smoking status: Former Smoker     Quit date: 1995     Years since quittin.6     Smokeless tobacco: Never Used   Substance Use Topics     Alcohol use: Yes     Comment: Alcoholic Drinks/day: occasional couple times a year     Drug use: No       SYSTEMS REVIEW  Twelve-system ROS was done and other than the HPI this was negative except for neck pain, back pain, arm and leg pain, joint pain, weakness paralysis, difficulty walking, balance coordination problems, movement disorder, bladder symptoms, anxiety, depression, weight gain.  No new concerns or  issues.    MEDICATIONS  acetaminophen (TYLENOL) 500 MG tablet, Take 500 mg by mouth every 6 hours as needed   acyclovir (ZOVIRAX) 400 MG tablet, TAKE ONE TABLET BY MOUTH EVERY 8 HOURS AS NEEDED  allopurinoL (ZYLOPRIM) 100 MG tablet, [ALLOPURINOL (ZYLOPRIM) 100 MG TABLET] Take 1 tablet (100 mg total) by mouth 2 (two) times a day with meals.  amLODIPine (NORVASC) 10 MG tablet, [AMLODIPINE (NORVASC) 10 MG TABLET] Take 1 tablet (10 mg total) by mouth daily.  atorvastatin (LIPITOR) 10 MG tablet, TAKE 1 TABLET BY MOUTH AT BEDTIME  carboxymethylcellulose (REFRESH PLUS) 0.5 % Dpet ophthalmic dropperette, [CARBOXYMETHYLCELLULOSE (REFRESH PLUS) 0.5 % DPET OPHTHALMIC DROPPERETTE] Administer 1-2 drops to both eyes 4 (four) times a day as needed.   cyanocobalamin, vitamin B-12, 1,000 mcg Subl, [CYANOCOBALAMIN, VITAMIN B-12, 1,000 MCG SUBL] Place 1 tablet (1,000 mcg total) under the tongue daily.  denosumab (PROLIA) 60 MG/ML SOSY injection, Inject 60 mg Subcutaneous  DULoxetine (CYMBALTA) 20 MG capsule, Take 1 capsule (20 mg) by mouth daily  fluticasone (FLONASE) 50 MCG/ACT nasal spray, Use 1 spray(s) in each nostril once daily  HYDROcodone-acetaminophen (NORCO) 5-325 MG tablet, Take 1 tablet by mouth every 8 hours as needed  hydroxychloroquine (PLAQUENIL) 200 MG tablet, Take 1 tablet (200 mg) by mouth 2 times daily  levothyroxine (SYNTHROID/LEVOTHROID) 50 MCG tablet, TAKE 1 TABLET BY MOUTH ONCE DAILY IN THE MORNING  lidocaine (LIDODERM) 5 % patch, Place 1 patch onto the skin every 24 hours To prevent lidocaine toxicity, patient should be patch free for 12 hrs daily.  LORazepam (ATIVAN) 0.5 MG tablet, [LORAZEPAM (ATIVAN) 0.5 MG TABLET] TAKE 1 TABLET BY MOUTH EVERY 8 HOURS AS NEEDED FOR ANXIETY  losartan (COZAAR) 50 MG tablet, Take 1 tablet by mouth once daily  melatonin 3 mg Tab tablet, [MELATONIN 3 MG TAB TABLET] Take 1 tablet (3 mg total) by mouth at bedtime as needed.  methotrexate 2.5 MG tablet, Take 4 tablets (10 mg) by  "mouth every 7 days  metoprolol tartrate (LOPRESSOR) 50 MG tablet, [METOPROLOL TARTRATE (LOPRESSOR) 50 MG TABLET] Take 1 tablet (50 mg total) by mouth 2 (two) times a day.  RESTASIS 0.05 % ophthalmic emulsion, [RESTASIS 0.05 % OPHTHALMIC EMULSION] INSTILL 1 DROP INTO EACH EYE TWICE DAILY  sodium bicarbonate 650 MG tablet, [SODIUM BICARBONATE 650 MG TABLET] Take 2 tablets (1,300 mg total) by mouth 2 (two) times a day.    denosumab (PROLIA) injection 60 mg  denosumab (PROLIA) injection 60 mg         PHYSICAL EXAMINATION  VITALS: /59 (BP Location: Right arm, Patient Position: Sitting)   Pulse 68   Ht 1.626 m (5' 4\")   Wt 78.5 kg (173 lb)   BMI 29.70 kg/m    GENERAL: Healthy appearing, alert, no acute distress, normal habitus.  CARDIOVASCULAR: Extremities warm and well perfused. Pulses present.   NEUROLOGICAL:  Patient is awake and grossly oriented to self, place and time.  Attention span is normal.  Memory is grossly intact.  Language is fluent and follows commands appropriately.  Appropriate fund of knowledge. Cranial nerves 2-12 are intact. There is no pronator drift.  Motor exam shows 5/5 strength in all extremities.  Tone is symmetric bilaterally in upper and lower extremities.  Resting tremor is noted in the left upper extremity.  No cogwheeling noted.  (Previously reflexes are symmetric and 1+ in upper extremities and lower extremities. Sensory exam is grossly intact to light touch, pin prick and vibration.)  Finger to nose and heel to shin is without dysmetria.  Romberg is negative.  Gait is normal except shows decreased left arm swing with resting tremor and the patient is able to do tandem walk and walk on toes and heels with some difficulty.  No change in exam today.    DIAGNOSTICS  RELEVANT LABS  Component      Latest Ref Rng & Units 10/1/2020   Sodium      136 - 145 mmol/L 137   Potassium      3.5 - 5.0 mmol/L 4.4   Chloride      98 - 107 mmol/L 110 (H)   Carbon Dioxide      22 - 31 mmol/L 14 (L) "   Anion Gap      5 - 18 mmol/L 13   Glucose      70 - 125 mg/dL 114   Urea Nitrogen      8 - 28 mg/dL 30 (H)   Creatinine      0.60 - 1.10 mg/dL 1.18 (H)   GFR Estimate If Black      >60 mL/min/1.73m2 55 (L)   GFR Estimate      >60 mL/min/1.73m2 45 (L)   Bilirubin Total      0.0 - 1.0 mg/dL 0.8   Calcium      8.5 - 10.5 mg/dL 8.7   Protein Total      6.0 - 8.0 g/dL 6.8   Albumin      3.5 - 5.0 g/dL 4.3   Alkaline Phosphatase      45 - 120 U/L 63   AST      0 - 40 U/L 21   ALT      0 - 45 U/L 18   WBC      4.0 - 11.0 thou/uL 7.3   RBC Count      3.80 - 5.40 mill/uL 3.12 (L)   Hemoglobin      12.0 - 16.0 g/dL 10.4 (L)   Hematocrit      35.0 - 47.0 % 31.2 (L)   MCV      80 - 100 fL 100   MCH      27.0 - 34.0 pg 33.4   MCHC      32.0 - 36.0 g/dL 33.4   RDW      11.0 - 14.5 % 14.5   Platelet Count      140 - 440 thou/uL 151   Mean Platelet Volume      7.0 - 10.0 fL 8.7   TSH      0.30 - 5.00 uIU/mL 1.56   Uric Acid      2.0 - 7.5 mg/dL 4.1     OUTSIDE RECORDS  Outside referral notes and chart notes were reviewed and pertinent information has been summarized (in addition to the HPI):-Patient does have a diagnosis of anxiety.  Has been having some tremors.  Is on multiple antidepressant/antianxiety medications.  Also has some back pain issues.  Is also on narcotics.    LABS  Component      Latest Ref Rng & Units 12/21/2021   Sodium      136 - 145 mmol/L 138   Potassium      3.5 - 5.0 mmol/L 4.3   Chloride      98 - 107 mmol/L 110 (H)   Carbon Dioxide      22 - 31 mmol/L 17 (L)   Anion Gap      5 - 18 mmol/L 11   Urea Nitrogen      8 - 28 mg/dL 30 (H)   Creatinine      0.60 - 1.10 mg/dL 1.21 (H)   Calcium      8.5 - 10.5 mg/dL 9.4   Glucose      70 - 125 mg/dL 101   Alkaline Phosphatase      45 - 120 U/L 55   AST      0 - 40 U/L 30   ALT      0 - 45 U/L 9   Protein Total      6.0 - 8.0 g/dL 6.7   Albumin      3.5 - 5.0 g/dL 4.0   Bilirubin Total      0.0 - 1.0 mg/dL 0.7   GFR Estimate      >60 mL/min/1.73m2 47 (L)   TSH       0.30 - 5.00 uIU/mL 0.98   Hemoglobin A1C      0.0 - 5.6 % 5.7 (H)   Ferritin      10 - 130 ng/mL 253 (H)   Vitamin B12      213 - 816 pg/mL >2,000 (H)   Methylmalonic Acid      0.00 - 0.40 umol/L 0.24       IMPRESSION/REPORT/PLAN  Primary parkinsonism (H)  History of anxiety/depression  Prediabetes  Elevated ferritin  History of chronic kidney disease  Rule out restless leg syndrome  Side effect of medication    This is a 71 year old female with new onset of left upper extremity resting tremor.  Examination further shows masklike facies, decreased arm swing on the left side, left resting tremor.  No cogwheeling.  Patient symptoms are suggestive of Parkinson's disease.  Blood work was overall noncontributory.  She has had side effects with Sinemet.  We will try droperidol to see if it works better.  Discussed side effects.  Monitor for signs and symptoms of Parkinson's disease I would encourage her to be more active active and exercise multiple times a week to help prevent progression of the disease.  There is some history of Parkinson's disease.    She does notice that the tremor wakes her up during sleep.  Possibly she has restless leg syndrome.  The Requip should help.  Ferritin was elevated.    Blood work further shows evidence of chronic kidney disease and prediabetes which she already has.  Ferritin is elevated of unclear cause.  She can follow-up with her primary care doctor if she would like.    I can see her back in 2 months.    -     rOPINIRole (REQUIP) 0.5 MG tablet; Take 1 tablet (0.5 mg) by mouth 3 times daily    Return in about 2 months (around 4/16/2022) for In-Clinic Visit (must).    Over 32 minutes were spent coordinating the care for the patient on the day of the encounter.  This includes previsit, during visit and post visit activities as documented above.  Counseling patient.  Encouraged daily exercise.  Reviewed blood work with her.  refractory problem with side effects to  medication.  (Activities include but not inclusive of reviewing chart, reviewing outside records, reviewing labs and imaging study results as well as the images, patient visit time including getting history and exam,  use if applicable, review of test results with the patient and coming up with a plan in a shared model, counseling patient and family, education and answering patient questions, EMR , EMR diagnosis entry and problem list management, medication reconciliation and prescription management if applicable, paperwork if applicable, printing documents and documentation of the visit activities.)    Logan Smart MD  Neurologist  [facility]  Tel:- [phone]    This note was dictated using voice recognition software.  Any grammatical or context distortions are unintentional and inherent to the software.

## 2022-02-16 NOTE — TELEPHONE ENCOUNTER
Patient scheduled visit on 02/22 for knee injections.She is unsure of what medication Dr. Vinson has used in the past.

## 2022-02-16 NOTE — LETTER
2/16/2022         RE: Claudia Wise   [address] [facility] [address]        Dear Colleague,    Thank you for referring your patient, Claudia Wise, to the [facility] [place]. Please see a copy of my visit note below.    NEUROLOGY OUTPATIENT PROGRESS NOTE   Feb 16, 2022     CHIEF COMPLAINT/REASON FOR VISIT/REASON FOR CONSULT  Patient presents with:  Parkinson: Follow up   Tremors    REASON FOR CONSULTATION- Tremors    REFERRAL SOURCE  Dr. Isabel Maurer  CC Dr. Isabel Maurer    HISTORY OF PRESENT ILLNESS  Claudia Wise is a 71 year old female seen today for evaluation of tremors.  Reports that the tremors have been there since February 2020.  Reports that the tremors are mainly in the left upper extremity.  Notices that they are present at rest.  They wake her up from sleep.  They can be present with activity as well.  Denies any gait difficulty though does have back issues and left leg weakness from the back issues which could affect her walking.  Reports no masklike facies.  No other new medications.  Has not been on any antipsychotics in the past.  Does have a diagnosis of depression anxiety and is on Cymbalta.  No tremors on the right side.  Denies any cognitive issues.  No rigidity.  Does have family history of Parkinson's disease in her aunt.    2/16/22  Patient returns today.  She could not tolerate the Sinemet.  Had dry heaving.  Did try to take a lower dose.  Did try to take it with crackers but still had dry heaving.  Did not feel any significant benefit.  Tremors were better in the evening time with the medication though the tremors are always better in the evening time.  Reports no other new issues.  Reports that her depression anxiety under good control.  Her  does not think she has masklike facies.  No other new symptoms or concerns.    She is working with her therapist for sacroiliac pain.  She is walking 10 minutes every other day.  Encourage her to  do it more often.    Previous history is reviewed and this is unchanged.    PAST MEDICAL/SURGICAL HISTORY  Past Medical History:   Diagnosis Date     Anemia      Anxiety      Chronic kidney disease      Diabetes mellitus, type II (H)     prediabetes     Disease of thyroid gland     hypothyroid     Family history of myocardial infarction      Gout      High cholesterol      Hypertension      Inverted nipple     right     Macular edema      Osteoarthritis 2012     Rheumatoid arthritis (H) 2012     Patient Active Problem List   Diagnosis     Herpes Simplex Type II     Anxiety     Anemia in chronic kidney disease     Hyperlipemia     Gout     Hypertension     Prediabetes     Endometriosis     Other specified hypothyroidism     Seronegative rheumatoid arthritis of multiple sites (H)     High risk medication use     Bilateral primary osteoarthritis of knee     Small bowel obstruction (H)     Senile osteoporosis     Compression fracture of L2 vertebra with routine healing     Chronic kidney disease, stage 3 (H)     Venous (peripheral) insufficiency     Exposure to bat without known bite   Significant for high cholesterol, high blood pressure, diabetes, arthritis, depression, osteoporosis    FAMILY HISTORY  Family History   Problem Relation Age of Onset     Breast Cancer Maternal Aunt      Cancer Maternal Aunt      Cancer Father      Cancer Sister      Cancer Maternal Grandmother      Cancer Cousin      Coronary Artery Disease Mother 49.00   Family history reviewed and negative for neurological conditions except for Parkinson's disease in her aunt.    SOCIAL HISTORY  Social History     Tobacco Use     Smoking status: Former Smoker     Quit date: 1995     Years since quittin.6     Smokeless tobacco: Never Used   Substance Use Topics     Alcohol use: Yes     Comment: Alcoholic Drinks/day: occasional couple times a year     Drug use: No       SYSTEMS REVIEW  Twelve-system ROS was done and other than the HPI this was  negative except for neck pain, back pain, arm and leg pain, joint pain, weakness paralysis, difficulty walking, balance coordination problems, movement disorder, bladder symptoms, anxiety, depression, weight gain.  No new concerns or issues.    MEDICATIONS  acetaminophen (TYLENOL) 500 MG tablet, Take 500 mg by mouth every 6 hours as needed   acyclovir (ZOVIRAX) 400 MG tablet, TAKE ONE TABLET BY MOUTH EVERY 8 HOURS AS NEEDED  allopurinoL (ZYLOPRIM) 100 MG tablet, [ALLOPURINOL (ZYLOPRIM) 100 MG TABLET] Take 1 tablet (100 mg total) by mouth 2 (two) times a day with meals.  amLODIPine (NORVASC) 10 MG tablet, [AMLODIPINE (NORVASC) 10 MG TABLET] Take 1 tablet (10 mg total) by mouth daily.  atorvastatin (LIPITOR) 10 MG tablet, TAKE 1 TABLET BY MOUTH AT BEDTIME  carboxymethylcellulose (REFRESH PLUS) 0.5 % Dpet ophthalmic dropperette, [CARBOXYMETHYLCELLULOSE (REFRESH PLUS) 0.5 % DPET OPHTHALMIC DROPPERETTE] Administer 1-2 drops to both eyes 4 (four) times a day as needed.   cyanocobalamin, vitamin B-12, 1,000 mcg Subl, [CYANOCOBALAMIN, VITAMIN B-12, 1,000 MCG SUBL] Place 1 tablet (1,000 mcg total) under the tongue daily.  denosumab (PROLIA) 60 MG/ML SOSY injection, Inject 60 mg Subcutaneous  DULoxetine (CYMBALTA) 20 MG capsule, Take 1 capsule (20 mg) by mouth daily  fluticasone (FLONASE) 50 MCG/ACT nasal spray, Use 1 spray(s) in each nostril once daily  HYDROcodone-acetaminophen (NORCO) 5-325 MG tablet, Take 1 tablet by mouth every 8 hours as needed  hydroxychloroquine (PLAQUENIL) 200 MG tablet, Take 1 tablet (200 mg) by mouth 2 times daily  levothyroxine (SYNTHROID/LEVOTHROID) 50 MCG tablet, TAKE 1 TABLET BY MOUTH ONCE DAILY IN THE MORNING  lidocaine (LIDODERM) 5 % patch, Place 1 patch onto the skin every 24 hours To prevent lidocaine toxicity, patient should be patch free for 12 hrs daily.  LORazepam (ATIVAN) 0.5 MG tablet, [LORAZEPAM (ATIVAN) 0.5 MG TABLET] TAKE 1 TABLET BY MOUTH EVERY 8 HOURS AS NEEDED FOR  "ANXIETY  losartan (COZAAR) 50 MG tablet, Take 1 tablet by mouth once daily  melatonin 3 mg Tab tablet, [MELATONIN 3 MG TAB TABLET] Take 1 tablet (3 mg total) by mouth at bedtime as needed.  methotrexate 2.5 MG tablet, Take 4 tablets (10 mg) by mouth every 7 days  metoprolol tartrate (LOPRESSOR) 50 MG tablet, [METOPROLOL TARTRATE (LOPRESSOR) 50 MG TABLET] Take 1 tablet (50 mg total) by mouth 2 (two) times a day.  RESTASIS 0.05 % ophthalmic emulsion, [RESTASIS 0.05 % OPHTHALMIC EMULSION] INSTILL 1 DROP INTO EACH EYE TWICE DAILY  sodium bicarbonate 650 MG tablet, [SODIUM BICARBONATE 650 MG TABLET] Take 2 tablets (1,300 mg total) by mouth 2 (two) times a day.    denosumab (PROLIA) injection 60 mg  denosumab (PROLIA) injection 60 mg         PHYSICAL EXAMINATION  VITALS: /59 (BP Location: Right arm, Patient Position: Sitting)   Pulse 68   Ht 1.626 m (5' 4\")   Wt 78.5 kg (173 lb)   BMI 29.70 kg/m    GENERAL: Healthy appearing, alert, no acute distress, normal habitus.  CARDIOVASCULAR: Extremities warm and well perfused. Pulses present.   NEUROLOGICAL:  Patient is awake and grossly oriented to self, place and time.  Attention span is normal.  Memory is grossly intact.  Language is fluent and follows commands appropriately.  Appropriate fund of knowledge. Cranial nerves 2-12 are intact. There is no pronator drift.  Motor exam shows 5/5 strength in all extremities.  Tone is symmetric bilaterally in upper and lower extremities.  Resting tremor is noted in the left upper extremity.  No cogwheeling noted.  (Previously reflexes are symmetric and 1+ in upper extremities and lower extremities. Sensory exam is grossly intact to light touch, pin prick and vibration.)  Finger to nose and heel to shin is without dysmetria.  Romberg is negative.  Gait is normal except shows decreased left arm swing with resting tremor and the patient is able to do tandem walk and walk on toes and heels with some difficulty.  No change in exam " today.    DIAGNOSTICS  RELEVANT LABS  Component      Latest Ref Rng & Units 10/1/2020   Sodium      136 - 145 mmol/L 137   Potassium      3.5 - 5.0 mmol/L 4.4   Chloride      98 - 107 mmol/L 110 (H)   Carbon Dioxide      22 - 31 mmol/L 14 (L)   Anion Gap      5 - 18 mmol/L 13   Glucose      70 - 125 mg/dL 114   Urea Nitrogen      8 - 28 mg/dL 30 (H)   Creatinine      0.60 - 1.10 mg/dL 1.18 (H)   GFR Estimate If Black      >60 mL/min/1.73m2 55 (L)   GFR Estimate      >60 mL/min/1.73m2 45 (L)   Bilirubin Total      0.0 - 1.0 mg/dL 0.8   Calcium      8.5 - 10.5 mg/dL 8.7   Protein Total      6.0 - 8.0 g/dL 6.8   Albumin      3.5 - 5.0 g/dL 4.3   Alkaline Phosphatase      45 - 120 U/L 63   AST      0 - 40 U/L 21   ALT      0 - 45 U/L 18   WBC      4.0 - 11.0 thou/uL 7.3   RBC Count      3.80 - 5.40 mill/uL 3.12 (L)   Hemoglobin      12.0 - 16.0 g/dL 10.4 (L)   Hematocrit      35.0 - 47.0 % 31.2 (L)   MCV      80 - 100 fL 100   MCH      27.0 - 34.0 pg 33.4   MCHC      32.0 - 36.0 g/dL 33.4   RDW      11.0 - 14.5 % 14.5   Platelet Count      140 - 440 thou/uL 151   Mean Platelet Volume      7.0 - 10.0 fL 8.7   TSH      0.30 - 5.00 uIU/mL 1.56   Uric Acid      2.0 - 7.5 mg/dL 4.1     OUTSIDE RECORDS  Outside referral notes and chart notes were reviewed and pertinent information has been summarized (in addition to the HPI):-Patient does have a diagnosis of anxiety.  Has been having some tremors.  Is on multiple antidepressant/antianxiety medications.  Also has some back pain issues.  Is also on narcotics.    LABS  Component      Latest Ref Rng & Units 12/21/2021   Sodium      136 - 145 mmol/L 138   Potassium      3.5 - 5.0 mmol/L 4.3   Chloride      98 - 107 mmol/L 110 (H)   Carbon Dioxide      22 - 31 mmol/L 17 (L)   Anion Gap      5 - 18 mmol/L 11   Urea Nitrogen      8 - 28 mg/dL 30 (H)   Creatinine      0.60 - 1.10 mg/dL 1.21 (H)   Calcium      8.5 - 10.5 mg/dL 9.4   Glucose      70 - 125 mg/dL 101   Alkaline  Phosphatase      45 - 120 U/L 55   AST      0 - 40 U/L 30   ALT      0 - 45 U/L 9   Protein Total      6.0 - 8.0 g/dL 6.7   Albumin      3.5 - 5.0 g/dL 4.0   Bilirubin Total      0.0 - 1.0 mg/dL 0.7   GFR Estimate      >60 mL/min/1.73m2 47 (L)   TSH      0.30 - 5.00 uIU/mL 0.98   Hemoglobin A1C      0.0 - 5.6 % 5.7 (H)   Ferritin      10 - 130 ng/mL 253 (H)   Vitamin B12      213 - 816 pg/mL >2,000 (H)   Methylmalonic Acid      0.00 - 0.40 umol/L 0.24       IMPRESSION/REPORT/PLAN  Primary parkinsonism (H)  History of anxiety/depression  Prediabetes  Elevated ferritin  History of chronic kidney disease  Rule out restless leg syndrome  Side effect of medication    This is a 71 year old female with new onset of left upper extremity resting tremor.  Examination further shows masklike facies, decreased arm swing on the left side, left resting tremor.  No cogwheeling.  Patient symptoms are suggestive of Parkinson's disease.  Blood work was overall noncontributory.  She has had side effects with Sinemet.  We will try droperidol to see if it works better.  Discussed side effects.  Monitor for signs and symptoms of Parkinson's disease I would encourage her to be more active active and exercise multiple times a week to help prevent progression of the disease.  There is some history of Parkinson's disease.    She does notice that the tremor wakes her up during sleep.  Possibly she has restless leg syndrome.  The Requip should help.  Ferritin was elevated.    Blood work further shows evidence of chronic kidney disease and prediabetes which she already has.  Ferritin is elevated of unclear cause.  She can follow-up with her primary care doctor if she would like.    I can see her back in 2 months.    -     rOPINIRole (REQUIP) 0.5 MG tablet; Take 1 tablet (0.5 mg) by mouth 3 times daily    Return in about 2 months (around 4/16/2022) for In-Clinic Visit (must).    Over 32 minutes were spent coordinating the care for the patient on  the day of the encounter.  This includes previsit, during visit and post visit activities as documented above.  Counseling patient.  Encouraged daily exercise.  Reviewed blood work with her.  refractory problem with side effects to medication.  (Activities include but not inclusive of reviewing chart, reviewing outside records, reviewing labs and imaging study results as well as the images, patient visit time including getting history and exam,  use if applicable, review of test results with the patient and coming up with a plan in a shared model, counseling patient and family, education and answering patient questions, EMR , EMR diagnosis entry and problem list management, medication reconciliation and prescription management if applicable, paperwork if applicable, printing documents and documentation of the visit activities.)    Logan Smart MD  Neurologist  [facility] Neu[facility]  Tel:- [phone]    This note was dictated using voice recognition software.  Any grammatical or context distortions are unintentional and inherent to the software.        Again, thank you for allowing me to participate in the care of your patient.        Sincerely,        Logan Smart MD

## 2022-02-16 NOTE — NURSING NOTE
Chief Complaint   Patient presents with     Parkinson     Follow up      Esteban Lara CMA@ on 2/16/2022 at 1:29 PM

## 2022-02-21 ENCOUNTER — HOSPITAL ENCOUNTER (OUTPATIENT)
Dept: PHYSICAL THERAPY | Facility: REHABILITATION | Age: 72
End: 2022-02-21
Payer: COMMERCIAL

## 2022-02-21 ENCOUNTER — LAB (OUTPATIENT)
Dept: LAB | Facility: CLINIC | Age: 72
End: 2022-02-21
Payer: COMMERCIAL

## 2022-02-21 DIAGNOSIS — G89.29 CHRONIC BILATERAL LOW BACK PAIN WITHOUT SCIATICA: Primary | ICD-10-CM

## 2022-02-21 DIAGNOSIS — M54.50 CHRONIC BILATERAL LOW BACK PAIN WITHOUT SCIATICA: Primary | ICD-10-CM

## 2022-02-21 DIAGNOSIS — M06.09 SERONEGATIVE RHEUMATOID ARTHRITIS OF MULTIPLE SITES (H): Primary | ICD-10-CM

## 2022-02-21 DIAGNOSIS — M06.09 SERONEGATIVE RHEUMATOID ARTHRITIS OF MULTIPLE SITES (H): ICD-10-CM

## 2022-02-21 LAB
ALBUMIN SERPL-MCNC: 4.1 G/DL (ref 3.5–5)
ALT SERPL W P-5'-P-CCNC: 11 U/L (ref 0–45)
CREAT SERPL-MCNC: 1.32 MG/DL (ref 0.6–1.1)
ERYTHROCYTE [DISTWIDTH] IN BLOOD BY AUTOMATED COUNT: 15.6 % (ref 10–15)
GFR SERPL CREATININE-BSD FRML MDRD: 43 ML/MIN/1.73M2
HCT VFR BLD AUTO: 30.3 % (ref 35–47)
HGB BLD-MCNC: 10.1 G/DL (ref 11.7–15.7)
MCH RBC QN AUTO: 33 PG (ref 26.5–33)
MCHC RBC AUTO-ENTMCNC: 33.3 G/DL (ref 31.5–36.5)
MCV RBC AUTO: 99 FL (ref 78–100)
PLATELET # BLD AUTO: 187 10E3/UL (ref 150–450)
RBC # BLD AUTO: 3.06 10E6/UL (ref 3.8–5.2)
WBC # BLD AUTO: 6.6 10E3/UL (ref 4–11)

## 2022-02-21 PROCEDURE — 85027 COMPLETE CBC AUTOMATED: CPT

## 2022-02-21 PROCEDURE — 97140 MANUAL THERAPY 1/> REGIONS: CPT | Mod: GP

## 2022-02-21 PROCEDURE — 82565 ASSAY OF CREATININE: CPT

## 2022-02-21 PROCEDURE — 84460 ALANINE AMINO (ALT) (SGPT): CPT

## 2022-02-21 PROCEDURE — 97110 THERAPEUTIC EXERCISES: CPT | Mod: GP

## 2022-02-21 PROCEDURE — 36415 COLL VENOUS BLD VENIPUNCTURE: CPT

## 2022-02-21 PROCEDURE — 82040 ASSAY OF SERUM ALBUMIN: CPT

## 2022-02-21 RX ORDER — FOLIC ACID 1 MG/1
TABLET ORAL
Qty: 30 TABLET | Refills: 3 | Status: SHIPPED | OUTPATIENT
Start: 2022-02-21 | End: 2022-02-24

## 2022-02-24 ENCOUNTER — OFFICE VISIT (OUTPATIENT)
Dept: RHEUMATOLOGY | Facility: CLINIC | Age: 72
End: 2022-02-24
Payer: COMMERCIAL

## 2022-02-24 VITALS
SYSTOLIC BLOOD PRESSURE: 130 MMHG | HEART RATE: 80 BPM | WEIGHT: 173 LBS | BODY MASS INDEX: 29.7 KG/M2 | DIASTOLIC BLOOD PRESSURE: 72 MMHG

## 2022-02-24 DIAGNOSIS — M17.0 BILATERAL PRIMARY OSTEOARTHRITIS OF KNEE: ICD-10-CM

## 2022-02-24 DIAGNOSIS — M06.09 SERONEGATIVE RHEUMATOID ARTHRITIS OF MULTIPLE SITES (H): Primary | ICD-10-CM

## 2022-02-24 DIAGNOSIS — Z79.899 HIGH RISK MEDICATION USE: ICD-10-CM

## 2022-02-24 DIAGNOSIS — N18.32 STAGE 3B CHRONIC KIDNEY DISEASE (H): ICD-10-CM

## 2022-02-24 PROCEDURE — 99214 OFFICE O/P EST MOD 30 MIN: CPT | Mod: 25 | Performed by: INTERNAL MEDICINE

## 2022-02-24 PROCEDURE — 2894A VOIDCORRECT: CPT | Performed by: INTERNAL MEDICINE

## 2022-02-24 PROCEDURE — 20610 DRAIN/INJ JOINT/BURSA W/O US: CPT | Mod: 50 | Performed by: INTERNAL MEDICINE

## 2022-02-24 RX ORDER — FOLIC ACID 1 MG/1
1000 TABLET ORAL DAILY
Qty: 90 TABLET | Refills: 0 | Status: SHIPPED | OUTPATIENT
Start: 2022-02-24 | End: 2023-11-15

## 2022-02-24 RX ORDER — TRIAMCINOLONE ACETONIDE 40 MG/ML
40 INJECTION, SUSPENSION INTRA-ARTICULAR; INTRAMUSCULAR ONCE
Status: COMPLETED | OUTPATIENT
Start: 2022-02-24 | End: 2022-02-24

## 2022-02-24 RX ORDER — HYDROXYCHLOROQUINE SULFATE 200 MG/1
200 TABLET, FILM COATED ORAL 2 TIMES DAILY
Qty: 180 TABLET | Refills: 0 | Status: SHIPPED | OUTPATIENT
Start: 2022-02-24 | End: 2022-08-16

## 2022-02-24 RX ADMIN — TRIAMCINOLONE ACETONIDE 40 MG: 40 INJECTION, SUSPENSION INTRA-ARTICULAR; INTRAMUSCULAR at 14:52

## 2022-02-24 RX ADMIN — TRIAMCINOLONE ACETONIDE 40 MG: 40 INJECTION, SUSPENSION INTRA-ARTICULAR; INTRAMUSCULAR at 14:54

## 2022-02-24 NOTE — PROGRESS NOTES
"      Rheumatology follow-up visit note         Claudia was seen today for recheck.    Diagnoses and all orders for this visit:    Seronegative rheumatoid arthritis of multiple sites (H)  -     hydroxychloroquine (PLAQUENIL) 200 MG tablet; Take 1 tablet (200 mg) by mouth 2 times daily  -     methotrexate 2.5 MG tablet; Take 4 tablets (10 mg) by mouth every 7 days  -     folic acid (FOLVITE) 1 MG tablet; Take 1 tablet (1,000 mcg) by mouth daily    Bilateral primary osteoarthritis of knee  -     triamcinolone (KENALOG-40) injection 40 mg  -     triamcinolone (KENALOG-40) injection 40 mg  -     ASPIRATION/INJECTION MAJOR JOINT    High risk medication use    Stage 3b chronic kidney disease (H)        Her rheumatoid arthritis is well controlled, osteoarthritis most symptomatic of the knees response to corticosteroid injection she is aware not to take nonsteroidals in the interim her knee pain has been\" again she is headed for vacation of the 2years secondary to Covid staying at home,going to [place],  involves quite a bit of walking.  Would like to proceed with local injections in the knees steroids done 40 mg of Kenalog anterolateral approach each knee after pros and cons were outlined.  Recent MTX monitoring labs are within stable/acceptable range.    Follow up in 6 months.  Labs every 3 months.    HPI    Claudia Wise is a 71 year old female is here for follow-up of seronegative Rheumatoid Arthritis, osteoarthritis with his various manifestations, renal impairment and osteoarthritis.  She has tolerated the current combination well.  There has been no flareup of her joint symptoms vis-à-vis rheumatoid arthritis.  As she has pain off and on in her knees.  More recently the symptoms have gotten worse to the point where she has difficult time ambulating going up and down the steps.  3 months ago she had a corticosteroid injection which provided her with good relief till recently.  She manages this well with " over-the-counter measures and corticosteroid injections intermittently.  She is aware not to take nonsteroidals because of renal impairment.  Reminded of eye examination.  Overall disease activity:  stable. Limitation on activities as noted in the MDHAQ scanned in the EMR.        DETAILED EXAMINATION  02/24/22  :    Vitals:    02/24/22 1133   BP: 130/72   Pulse: 80   Weight: 78.5 kg (173 lb)     Alert oriented. Head including the face is examined for malar rash, heliotropes, scarring, lupus pernio. Eyes examined for redness such as in episcleritis/scleritis, periorbital lesions.   Neck/ Face examined for parotid gland swelling, range of motion of neck.  Left upper and lower and right upper and lower extremities examined for tenderness, swelling, warmth of the appendicular joints, range of motion, edema, rash.  Some of the important findings included: she does not have evidence of synovitis in the palpable joints of the upper extremities.  No significant deformities of the digits.  No Heberden nodes.  Range of motion of the shoulders  show full abduction.  JLT++, but no effusion or warmth of the knees.  she does not have dactylitis of the digits.     Patient Active Problem List    Diagnosis Date Noted     Exposure to bat without known bite 07/28/2021     Priority: Medium     Venous (peripheral) insufficiency 07/01/2021     Priority: Medium     Other specified hypothyroidism      Priority: Medium     Created by Conversion  Replacement Utility updated for latest IMO load         Chronic kidney disease, stage 3 (H) 01/18/2021     Priority: Medium     Senile osteoporosis 06/02/2020     Priority: Medium     Compression fracture of L2 vertebra with routine healing 06/02/2020     Priority: Medium     Small bowel obstruction (H) 02/10/2020     Priority: Medium     Bilateral primary osteoarthritis of knee 05/23/2019     Priority: Medium     Anemia in chronic kidney disease      Priority: Medium     Created by Conversion          Gout      Priority: Medium     Created by Conversion         Hyperlipemia      Priority: Medium     Created by Conversion         High risk medication use 08/25/2016     Priority: Medium     Herpes Simplex Type II      Priority: Medium     Created by Conversion  [facility] Annotation: Nov 13 2007  4:56PM - Oma Reddy: By pt's   history.    Allergy to valtrex.  She came to me on prn acyclovir.  Replacement Utility updated for latest IMO load         Anxiety      Priority: Medium     Created by Conversion  Replacement Utility updated for latest IMO load         Hypertension      Priority: Medium     Created by Conversion  Replacement Utility updated for latest IMO load         Endometriosis      Priority: Medium     Created by Conversion  [facility] Annotation: Nov 13 2007  4:58PM - Deng Meyersn: s/p VON, BSO   1987.  Replacement Utility updated for latest IMO load         Seronegative rheumatoid arthritis of multiple sites (H) 03/01/2016     Priority: Medium     Prediabetes      Priority: Medium     Created by Conversion         Past Surgical History:   Procedure Laterality Date     COLON SURGERY  1986     COLPORRHAPHY N/A 7/7/2016    Procedure: ANTERIOR REPAIR WITH MESH;  Surgeon: Zac Stone MD;  Location: [facility];  Service:       UGI ENDOSCOPY W PLACEMENT GASTROSTOMY TUBE PERCUT N/A 2/7/2020    Procedure: Percutaneous Endoscopic Gastromstomy Tube Placement;  Surgeon: Zion Schwarz MD;  Location: [facility] OR;  Service: General     HYSTERECTOMY       LAPAROTOMY EXPLORATORY N/A 1/23/2020    Procedure: EXPLORATORY LAPARATOMY, EXTENSIVE LYSIS OF ADHESIONS, SMALL BOWEL RESECTION;  Surgeon: Claudia Jeronimo MD;  Location: [facility] OR;  Service: General     OOPHORECTOMY        Past Medical History:   Diagnosis Date     Anemia      Anxiety      Chronic kidney disease      Diabetes mellitus, type II (H)     prediabetes     Disease of thyroid gland     hypothyroid     Family  history of myocardial infarction      Gout      High cholesterol      Hypertension      Inverted nipple     right     Macular edema      Osteoarthritis 2012     Rheumatoid arthritis (H) 2012     Allergies   Allergen Reactions     Bupropion Hcl [Bupropion] Nausea     Erythromycin Base [Erythromycin] Nausea     Paxil [Paroxetine] Diarrhea     Tetracyclines Itching     Valacyclovir Nausea     Current Outpatient Medications   Medication Sig Dispense Refill     acetaminophen (TYLENOL) 500 MG tablet Take 500 mg by mouth every 6 hours as needed        acyclovir (ZOVIRAX) 400 MG tablet TAKE ONE TABLET BY MOUTH EVERY 8 HOURS AS NEEDED 60 tablet 0     allopurinoL (ZYLOPRIM) 100 MG tablet [ALLOPURINOL (ZYLOPRIM) 100 MG TABLET] Take 1 tablet (100 mg total) by mouth 2 (two) times a day with meals. 180 tablet 3     amLODIPine (NORVASC) 10 MG tablet [AMLODIPINE (NORVASC) 10 MG TABLET] Take 1 tablet (10 mg total) by mouth daily. 90 tablet 3     atorvastatin (LIPITOR) 10 MG tablet TAKE 1 TABLET BY MOUTH AT BEDTIME 90 tablet 3     carboxymethylcellulose (REFRESH PLUS) 0.5 % Dpet ophthalmic dropperette [CARBOXYMETHYLCELLULOSE (REFRESH PLUS) 0.5 % DPET OPHTHALMIC DROPPERETTE] Administer 1-2 drops to both eyes 4 (four) times a day as needed.        cyanocobalamin, vitamin B-12, 1,000 mcg Subl [CYANOCOBALAMIN, VITAMIN B-12, 1,000 MCG SUBL] Place 1 tablet (1,000 mcg total) under the tongue daily.  0     denosumab (PROLIA) 60 MG/ML SOSY injection Inject 60 mg Subcutaneous       DULoxetine (CYMBALTA) 20 MG capsule Take 1 capsule (20 mg) by mouth daily 90 capsule 3     fluticasone (FLONASE) 50 MCG/ACT nasal spray Use 1 spray(s) in each nostril once daily 16 g 0     folic acid (FOLVITE) 1 MG tablet Take 1 tablet by mouth once daily 30 tablet 3     HYDROcodone-acetaminophen (NORCO) 5-325 MG tablet Take 1 tablet by mouth every 8 hours as needed 18 tablet 0     hydroxychloroquine (PLAQUENIL) 200 MG tablet Take 1 tablet (200 mg) by mouth 2 times  daily 180 tablet 0     levothyroxine (SYNTHROID/LEVOTHROID) 50 MCG tablet TAKE 1 TABLET BY MOUTH ONCE DAILY IN THE MORNING 90 tablet 1     lidocaine (LIDODERM) 5 % patch Place 1 patch onto the skin every 24 hours To prevent lidocaine toxicity, patient should be patch free for 12 hrs daily. 14 patch 1     LORazepam (ATIVAN) 0.5 MG tablet [LORAZEPAM (ATIVAN) 0.5 MG TABLET] TAKE 1 TABLET BY MOUTH EVERY 8 HOURS AS NEEDED FOR ANXIETY 30 tablet 0     losartan (COZAAR) 50 MG tablet Take 1 tablet by mouth once daily 90 tablet 0     melatonin 3 mg Tab tablet [MELATONIN 3 MG TAB TABLET] Take 1 tablet (3 mg total) by mouth at bedtime as needed.  0     methotrexate 2.5 MG tablet Take 4 tablets (10 mg) by mouth every 7 days 48 tablet 0     metoprolol tartrate (LOPRESSOR) 50 MG tablet [METOPROLOL TARTRATE (LOPRESSOR) 50 MG TABLET] Take 1 tablet (50 mg total) by mouth 2 (two) times a day. 180 tablet 3     RESTASIS 0.05 % ophthalmic emulsion [RESTASIS 0.05 % OPHTHALMIC EMULSION] INSTILL 1 DROP INTO EACH EYE TWICE DAILY  11     rOPINIRole (REQUIP) 0.5 MG tablet Take 1 tablet (0.5 mg) by mouth 3 times daily 270 tablet 0     sodium bicarbonate 650 MG tablet [SODIUM BICARBONATE 650 MG TABLET] Take 2 tablets (1,300 mg total) by mouth 2 (two) times a day. 100 tablet 3     family history includes Breast Cancer in her maternal aunt; Cancer in her cousin, father, maternal aunt, maternal grandmother, and sister; Coronary Artery Disease (age of onset: 49.00) in her mother.  Social Connections: Not on file          WBC Count   Date Value Ref Range Status   02/21/2022 6.6 4.0 - 11.0 10e3/uL Final     RBC Count   Date Value Ref Range Status   02/21/2022 3.06 (L) 3.80 - 5.20 10e6/uL Final     Hemoglobin   Date Value Ref Range Status   02/21/2022 10.1 (L) 11.7 - 15.7 g/dL Final     Hematocrit   Date Value Ref Range Status   02/21/2022 30.3 (L) 35.0 - 47.0 % Final     MCV   Date Value Ref Range Status   02/21/2022 99 78 - 100 fL Final     MCH    Date Value Ref Range Status   02/21/2022 33.0 26.5 - 33.0 pg Final     Platelet Count   Date Value Ref Range Status   02/21/2022 187 150 - 450 10e3/uL Final     % Lymphocytes   Date Value Ref Range Status   02/28/2020 13 (L) 20 - 40 % Final   01/29/2020 7 (L) 20 - 40 % Final     AST   Date Value Ref Range Status   12/21/2021 30 0 - 40 U/L Final     ALT   Date Value Ref Range Status   02/21/2022 11 0 - 45 U/L Final     Albumin   Date Value Ref Range Status   02/21/2022 4.1 3.5 - 5.0 g/dL Final     Alkaline Phosphatase   Date Value Ref Range Status   12/21/2021 55 45 - 120 U/L Final     Creatinine   Date Value Ref Range Status   02/21/2022 1.32 (H) 0.60 - 1.10 mg/dL Final     GFR Estimate   Date Value Ref Range Status   02/21/2022 43 (L) >60 mL/min/1.73m2 Final     Comment:     Effective December 21, 2021 eGFRcr in adults is calculated using the 2021 CKD-EPI creatinine equation which includes age and gender (Manuela et al., NEJM, DOI: 10.1056/QZRUzi8144772)   05/11/2021 38 (L) >60 mL/min/1.73m2 Final     GFR Estimate If Black   Date Value Ref Range Status   05/11/2021 46 (L) >60 mL/min/1.73m2 Final

## 2022-03-14 ENCOUNTER — HOSPITAL ENCOUNTER (OUTPATIENT)
Dept: PHYSICAL THERAPY | Facility: REHABILITATION | Age: 72
Discharge: HOME | End: 2022-03-14
Payer: COMMERCIAL

## 2022-03-14 DIAGNOSIS — G89.29 CHRONIC BILATERAL LOW BACK PAIN WITHOUT SCIATICA: Primary | ICD-10-CM

## 2022-03-14 DIAGNOSIS — M54.50 CHRONIC BILATERAL LOW BACK PAIN WITHOUT SCIATICA: Primary | ICD-10-CM

## 2022-03-14 PROCEDURE — 97140 MANUAL THERAPY 1/> REGIONS: CPT | Mod: GP | Performed by: PHYSICAL THERAPIST

## 2022-03-30 ENCOUNTER — HOSPITAL ENCOUNTER (OUTPATIENT)
Dept: PHYSICAL THERAPY | Facility: REHABILITATION | Age: 72
Discharge: HOME | End: 2022-03-30
Payer: COMMERCIAL

## 2022-03-30 DIAGNOSIS — R29.898 LEFT LEG WEAKNESS: ICD-10-CM

## 2022-03-30 DIAGNOSIS — G89.29 CHRONIC BILATERAL LOW BACK PAIN WITHOUT SCIATICA: Primary | ICD-10-CM

## 2022-03-30 DIAGNOSIS — M53.3 PAIN OF LEFT SACROILIAC JOINT: ICD-10-CM

## 2022-03-30 DIAGNOSIS — M79.18 LEFT BUTTOCK PAIN: ICD-10-CM

## 2022-03-30 DIAGNOSIS — M53.3 SI (SACROILIAC) JOINT DYSFUNCTION: ICD-10-CM

## 2022-03-30 DIAGNOSIS — M54.50 CHRONIC BILATERAL LOW BACK PAIN WITHOUT SCIATICA: Primary | ICD-10-CM

## 2022-03-30 PROCEDURE — 97110 THERAPEUTIC EXERCISES: CPT | Mod: GP | Performed by: PHYSICAL THERAPIST

## 2022-04-04 ENCOUNTER — HOSPITAL ENCOUNTER (OUTPATIENT)
Dept: PHYSICAL THERAPY | Facility: REHABILITATION | Age: 72
Discharge: HOME | End: 2022-04-04
Payer: COMMERCIAL

## 2022-04-04 ENCOUNTER — IMMUNIZATION (OUTPATIENT)
Dept: NURSING | Facility: CLINIC | Age: 72
End: 2022-04-04
Payer: COMMERCIAL

## 2022-04-04 DIAGNOSIS — M53.3 SI (SACROILIAC) JOINT DYSFUNCTION: ICD-10-CM

## 2022-04-04 DIAGNOSIS — M54.50 CHRONIC BILATERAL LOW BACK PAIN WITHOUT SCIATICA: Primary | ICD-10-CM

## 2022-04-04 DIAGNOSIS — R29.898 LEFT LEG WEAKNESS: ICD-10-CM

## 2022-04-04 DIAGNOSIS — M79.18 LEFT BUTTOCK PAIN: ICD-10-CM

## 2022-04-04 DIAGNOSIS — M53.3 PAIN OF LEFT SACROILIAC JOINT: ICD-10-CM

## 2022-04-04 DIAGNOSIS — G89.29 CHRONIC BILATERAL LOW BACK PAIN WITHOUT SCIATICA: Primary | ICD-10-CM

## 2022-04-04 PROCEDURE — 91306 COVID-19,PF,MODERNA (18+ YRS BOOSTER .25ML): CPT

## 2022-04-04 PROCEDURE — 97110 THERAPEUTIC EXERCISES: CPT | Mod: GP | Performed by: PHYSICAL THERAPIST

## 2022-04-04 PROCEDURE — 0064A COVID-19,PF,MODERNA (18+ YRS BOOSTER .25ML): CPT

## 2022-04-11 ENCOUNTER — HOSPITAL ENCOUNTER (OUTPATIENT)
Dept: PHYSICAL THERAPY | Facility: REHABILITATION | Age: 72
Discharge: HOME | End: 2022-04-11
Payer: COMMERCIAL

## 2022-04-11 DIAGNOSIS — M53.3 SI (SACROILIAC) JOINT DYSFUNCTION: ICD-10-CM

## 2022-04-11 DIAGNOSIS — M79.18 LEFT BUTTOCK PAIN: ICD-10-CM

## 2022-04-11 DIAGNOSIS — M54.50 CHRONIC BILATERAL LOW BACK PAIN WITHOUT SCIATICA: Primary | ICD-10-CM

## 2022-04-11 DIAGNOSIS — M53.3 PAIN OF LEFT SACROILIAC JOINT: ICD-10-CM

## 2022-04-11 DIAGNOSIS — R29.898 LEFT LEG WEAKNESS: ICD-10-CM

## 2022-04-11 DIAGNOSIS — G89.29 CHRONIC BILATERAL LOW BACK PAIN WITHOUT SCIATICA: Primary | ICD-10-CM

## 2022-04-11 PROCEDURE — 97110 THERAPEUTIC EXERCISES: CPT | Mod: GP | Performed by: PHYSICAL THERAPIST

## 2022-04-14 ENCOUNTER — OFFICE VISIT (OUTPATIENT)
Dept: NEUROLOGY | Facility: CLINIC | Age: 72
End: 2022-04-14
Payer: COMMERCIAL

## 2022-04-14 VITALS
BODY MASS INDEX: 28.17 KG/M2 | SYSTOLIC BLOOD PRESSURE: 107 MMHG | WEIGHT: 165 LBS | HEIGHT: 64 IN | HEART RATE: 70 BPM | DIASTOLIC BLOOD PRESSURE: 50 MMHG

## 2022-04-14 DIAGNOSIS — R47.1 DYSARTHRIA: ICD-10-CM

## 2022-04-14 DIAGNOSIS — G20.C PRIMARY PARKINSONISM (H): Primary | ICD-10-CM

## 2022-04-14 DIAGNOSIS — I10 HTN (HYPERTENSION): ICD-10-CM

## 2022-04-14 PROCEDURE — 99214 OFFICE O/P EST MOD 30 MIN: CPT | Performed by: PSYCHIATRY & NEUROLOGY

## 2022-04-14 RX ORDER — ROPINIROLE 0.5 MG/1
1 TABLET, FILM COATED ORAL 3 TIMES DAILY
Qty: 540 TABLET | Refills: 1 | Status: SHIPPED | OUTPATIENT
Start: 2022-04-14 | End: 2022-06-22

## 2022-04-14 NOTE — NURSING NOTE
Chief Complaint   Patient presents with     RECHECK     Parkinsons      Wilmar Johnson MA on 4/14/2022 at 1:21 PM

## 2022-04-14 NOTE — LETTER
4/14/2022         RE: Claudia Wise   [address] [id number]        Dear Colleague,    Thank you for referring your patient, Claudia Wise, to the [facility] [place]. Please see a copy of my visit note below.    NEUROLOGY OUTPATIENT PROGRESS NOTE   Apr 14, 2022     CHIEF COMPLAINT/REASON FOR VISIT/REASON FOR CONSULT  Patient presents with:  RECHECK: Parkinsons   Tremors    REASON FOR CONSULTATION- Tremors    REFERRAL SOURCE  Dr. Isabel Maurer  CC Dr. Isabel Maurer    HISTORY OF PRESENT ILLNESS  Claudia Wise is a 71 year old female seen today for evaluation of tremors.  Reports that the tremors have been there since February 2020.  Reports that the tremors are mainly in the left upper extremity.  Notices that they are present at rest.  They wake her up from sleep.  They can be present with activity as well.  Denies any gait difficulty though does have back issues and left leg weakness from the back issues which could affect her walking.  Reports no masklike facies.  No other new medications.  Has not been on any antipsychotics in the past.  Does have a diagnosis of depression anxiety and is on Cymbalta.  No tremors on the right side.  Denies any cognitive issues.  No rigidity.  Does have family history of Parkinson's disease in her aunt.    2/16/22  Patient returns today.  She could not tolerate the Sinemet.  Had dry heaving.  Did try to take a lower dose.  Did try to take it with crackers but still had dry heaving.  Did not feel any significant benefit.  Tremors were better in the evening time with the medication though the tremors are always better in the evening time.  Reports no other new issues.  Reports that her depression anxiety under good control.  Her  does not think she has masklike facies.  No other new symptoms or concerns.    She is working with her therapist for sacroiliac pain.  She is walking 10 minutes every other day.  Encourage her to do  it more often.    4/14/22  Patient returns today.  Reports that the medication is somewhat helpful.  She is walking little bit faster.  Is doing physical therapy which is helping.  She forgets to take the medication in the evening time.  Does have some complains of slurred speech as well.  No other new symptoms/complaints.    Previous history is reviewed and this is unchanged.    PAST MEDICAL/SURGICAL HISTORY  Past Medical History:   Diagnosis Date     Anemia      Anxiety      Chronic kidney disease      Diabetes mellitus, type II (H)     prediabetes     Disease of thyroid gland     hypothyroid     Family history of myocardial infarction      Gout      High cholesterol      Hypertension      Inverted nipple     right     Macular edema      Osteoarthritis 2012     Rheumatoid arthritis (H) 2012     Patient Active Problem List   Diagnosis     Herpes Simplex Type II     Anxiety     Anemia in chronic kidney disease     Hyperlipemia     Gout     Hypertension     Prediabetes     Endometriosis     Other specified hypothyroidism     Seronegative rheumatoid arthritis of multiple sites (H)     High risk medication use     Bilateral primary osteoarthritis of knee     Small bowel obstruction (H)     Senile osteoporosis     Compression fracture of L2 vertebra with routine healing     Chronic kidney disease, stage 3 (H)     Venous (peripheral) insufficiency     Exposure to bat without known bite   Significant for high cholesterol, high blood pressure, diabetes, arthritis, depression, osteoporosis    FAMILY HISTORY  Family History   Problem Relation Age of Onset     Breast Cancer Maternal Aunt      Cancer Maternal Aunt      Cancer Father      Cancer Sister      Cancer Maternal Grandmother      Cancer Cousin      Coronary Artery Disease Mother 49.00   Family history reviewed and negative for neurological conditions except for Parkinson's disease in her aunt.    SOCIAL HISTORY  Social History     Tobacco Use     Smoking status:  Former Smoker     Quit date: 1995     Years since quittin.8     Smokeless tobacco: Never Used   Substance Use Topics     Alcohol use: Yes     Comment: Alcoholic Drinks/day: occasional couple times a year     Drug use: No       SYSTEMS REVIEW  Twelve-system ROS was done and other than the HPI this was negative except for neck pain, back pain, arm and leg pain, joint pain, weakness paralysis, difficulty walking, balance coordination problems, movement disorder, bladder symptoms, anxiety, depression, weight gain.  No new issues today.    MEDICATIONS  acetaminophen (TYLENOL) 500 MG tablet, Take 500 mg by mouth every 6 hours as needed   acyclovir (ZOVIRAX) 400 MG tablet, TAKE ONE TABLET BY MOUTH EVERY 8 HOURS AS NEEDED  allopurinoL (ZYLOPRIM) 100 MG tablet, [ALLOPURINOL (ZYLOPRIM) 100 MG TABLET] Take 1 tablet (100 mg total) by mouth 2 (two) times a day with meals.  amLODIPine (NORVASC) 10 MG tablet, [AMLODIPINE (NORVASC) 10 MG TABLET] Take 1 tablet (10 mg total) by mouth daily.  atorvastatin (LIPITOR) 10 MG tablet, TAKE 1 TABLET BY MOUTH AT BEDTIME  carboxymethylcellulose (REFRESH PLUS) 0.5 % Dpet ophthalmic dropperette, [CARBOXYMETHYLCELLULOSE (REFRESH PLUS) 0.5 % DPET OPHTHALMIC DROPPERETTE] Administer 1-2 drops to both eyes 4 (four) times a day as needed.   cyanocobalamin, vitamin B-12, 1,000 mcg Subl, [CYANOCOBALAMIN, VITAMIN B-12, 1,000 MCG SUBL] Place 1 tablet (1,000 mcg total) under the tongue daily.  denosumab (PROLIA) 60 MG/ML SOSY injection, Inject 60 mg Subcutaneous  DULoxetine (CYMBALTA) 20 MG capsule, Take 1 capsule (20 mg) by mouth daily  fluticasone (FLONASE) 50 MCG/ACT nasal spray, Use 1 spray(s) in each nostril once daily  folic acid (FOLVITE) 1 MG tablet, Take 1 tablet (1,000 mcg) by mouth daily  HYDROcodone-acetaminophen (NORCO) 5-325 MG tablet, Take 1 tablet by mouth every 8 hours as needed  hydroxychloroquine (PLAQUENIL) 200 MG tablet, Take 1 tablet (200 mg) by mouth 2 times daily  lidocaine  "(LIDODERM) 5 % patch, Place 1 patch onto the skin every 24 hours To prevent lidocaine toxicity, patient should be patch free for 12 hrs daily.  LORazepam (ATIVAN) 0.5 MG tablet, [LORAZEPAM (ATIVAN) 0.5 MG TABLET] TAKE 1 TABLET BY MOUTH EVERY 8 HOURS AS NEEDED FOR ANXIETY  melatonin 3 mg Tab tablet, [MELATONIN 3 MG TAB TABLET] Take 1 tablet (3 mg total) by mouth at bedtime as needed.  methotrexate 2.5 MG tablet, Take 4 tablets (10 mg) by mouth every 7 days  metoprolol tartrate (LOPRESSOR) 50 MG tablet, [METOPROLOL TARTRATE (LOPRESSOR) 50 MG TABLET] Take 1 tablet (50 mg total) by mouth 2 (two) times a day.  sodium bicarbonate 650 MG tablet, [SODIUM BICARBONATE 650 MG TABLET] Take 2 tablets (1,300 mg total) by mouth 2 (two) times a day.  RESTASIS 0.05 % ophthalmic emulsion, [RESTASIS 0.05 % OPHTHALMIC EMULSION] INSTILL 1 DROP INTO EACH EYE TWICE DAILY (Patient not taking: Reported on 4/14/2022)    denosumab (PROLIA) injection 60 mg  denosumab (PROLIA) injection 60 mg         PHYSICAL EXAMINATION  VITALS: /50 (BP Location: Right arm, Patient Position: Sitting)   Pulse 70   Ht 1.626 m (5' 4\")   Wt 74.8 kg (165 lb)   BMI 28.32 kg/m    GENERAL: Healthy appearing, alert, no acute distress, normal habitus.  CARDIOVASCULAR: Extremities warm and well perfused. Pulses present.   NEUROLOGICAL:  Patient is awake and grossly oriented to self, place and time.  Attention span is normal.  Memory is grossly intact.  Language is fluent and follows commands appropriately.  Appropriate fund of knowledge. Cranial nerves 2-12 are intact. There is no pronator drift.  Motor exam shows 5/5 strength in all extremities.  Tone is symmetric bilaterally in upper and lower extremities.  Resting tremor is noted in the left upper extremity.  No cogwheeling noted.  (Previously reflexes are symmetric and 1+ in upper extremities and lower extremities. Sensory exam is grossly intact to light touch, pin prick and vibration.)  Finger to nose and " heel to shin is without dysmetria.  Romberg is negative.  Gait is normal except shows decreased left arm swing with resting tremor and the patient is able to do tandem walk and walk on toes and heels with some difficulty.  Trying concentric circles shows very mild tremor.  No significant change in exam today.    DIAGNOSTICS  RELEVANT LABS  Component      Latest Ref Rng & Units 10/1/2020   Sodium      136 - 145 mmol/L 137   Potassium      3.5 - 5.0 mmol/L 4.4   Chloride      98 - 107 mmol/L 110 (H)   Carbon Dioxide      22 - 31 mmol/L 14 (L)   Anion Gap      5 - 18 mmol/L 13   Glucose      70 - 125 mg/dL 114   Urea Nitrogen      8 - 28 mg/dL 30 (H)   Creatinine      0.60 - 1.10 mg/dL 1.18 (H)   GFR Estimate If Black      >60 mL/min/1.73m2 55 (L)   GFR Estimate      >60 mL/min/1.73m2 45 (L)   Bilirubin Total      0.0 - 1.0 mg/dL 0.8   Calcium      8.5 - 10.5 mg/dL 8.7   Protein Total      6.0 - 8.0 g/dL 6.8   Albumin      3.5 - 5.0 g/dL 4.3   Alkaline Phosphatase      45 - 120 U/L 63   AST      0 - 40 U/L 21   ALT      0 - 45 U/L 18   WBC      4.0 - 11.0 thou/uL 7.3   RBC Count      3.80 - 5.40 mill/uL 3.12 (L)   Hemoglobin      12.0 - 16.0 g/dL 10.4 (L)   Hematocrit      35.0 - 47.0 % 31.2 (L)   MCV      80 - 100 fL 100   MCH      27.0 - 34.0 pg 33.4   MCHC      32.0 - 36.0 g/dL 33.4   RDW      11.0 - 14.5 % 14.5   Platelet Count      140 - 440 thou/uL 151   Mean Platelet Volume      7.0 - 10.0 fL 8.7   TSH      0.30 - 5.00 uIU/mL 1.56   Uric Acid      2.0 - 7.5 mg/dL 4.1     OUTSIDE RECORDS  Outside referral notes and chart notes were reviewed and pertinent information has been summarized (in addition to the HPI):-Patient does have a diagnosis of anxiety.  Has been having some tremors.  Is on multiple antidepressant/antianxiety medications.  Also has some back pain issues.  Is also on narcotics.    LABS  Component      Latest Ref Rng & Units 12/21/2021   Sodium      136 - 145 mmol/L 138   Potassium      3.5 - 5.0  mmol/L 4.3   Chloride      98 - 107 mmol/L 110 (H)   Carbon Dioxide      22 - 31 mmol/L 17 (L)   Anion Gap      5 - 18 mmol/L 11   Urea Nitrogen      8 - 28 mg/dL 30 (H)   Creatinine      0.60 - 1.10 mg/dL 1.21 (H)   Calcium      8.5 - 10.5 mg/dL 9.4   Glucose      70 - 125 mg/dL 101   Alkaline Phosphatase      45 - 120 U/L 55   AST      0 - 40 U/L 30   ALT      0 - 45 U/L 9   Protein Total      6.0 - 8.0 g/dL 6.7   Albumin      3.5 - 5.0 g/dL 4.0   Bilirubin Total      0.0 - 1.0 mg/dL 0.7   GFR Estimate      >60 mL/min/1.73m2 47 (L)   TSH      0.30 - 5.00 uIU/mL 0.98   Hemoglobin A1C      0.0 - 5.6 % 5.7 (H)   Ferritin      10 - 130 ng/mL 253 (H)   Vitamin B12      213 - 816 pg/mL >2,000 (H)   Methylmalonic Acid      0.00 - 0.40 umol/L 0.24       IMPRESSION/REPORT/PLAN  Primary parkinsonism (H)  History of anxiety/depression  Prediabetes  Elevated ferritin  History of chronic kidney disease  Rule out restless leg syndrome  Side effect of medication  Dysarthria    This is a 71 year old female with new onset of left upper extremity resting tremor.  Examination further shows masklike facies, decreased arm swing on the left side, left resting tremor.  No cogwheeling.  Patient symptoms are suggestive of Parkinson's disease.  Blood work was overall noncontributory.  She has had side effects with Sinemet.  Requip was helped somewhat and we will increase the dose further to see if it provides more benefit.  Discussed side effects.  Monitor for signs and symptoms of Parkinson's disease I would encourage her to be more active active and exercise multiple times a week to help prevent progression of the disease.  There is some family history of Parkinson's disease.    She further complains of some slurred speech and we will set her up with speech therapy for more evaluation and treatment.  Could be related to parkinsonism.    She does notice that the tremor wakes her up during sleep.  Possibly she has restless leg syndrome.   The Requip should help.  Ferritin was elevated.    Blood work further shows evidence of chronic kidney disease and prediabetes which she already has.  Ferritin is elevated of unclear cause.  She can follow-up with her primary care doctor if she would like.    I can see her back in 2 months.    -     Speech Therapy Referral; Future  -     rOPINIRole (REQUIP) 0.5 MG tablet; Take 2 tablets (1 mg) by mouth 3 times daily      Return in about 2 months (around 6/14/2022) for In-Clinic Visit (must), After testing.    Over 33 minutes were spent coordinating the care for the patient on the day of the encounter.  This includes previsit, during visit and post visit activities as documented above.  Counseling patient.  Prescription management.  New problem addressed.  (Activities include but not inclusive of reviewing chart, reviewing outside records, reviewing labs and imaging study results as well as the images, patient visit time including getting history and exam,  use if applicable, review of test results with the patient and coming up with a plan in a shared model, counseling patient and family, education and answering patient questions, EMR , EMR diagnosis entry and problem list management, medication reconciliation and prescription management if applicable, paperwork if applicable, printing documents and documentation of the visit activities.)    Logan Smart MD  Neurologist  [facility]  Tel:- [phone]    This note was dictated using voice recognition software.  Any grammatical or context distortions are unintentional and inherent to the software.        Again, thank you for allowing me to participate in the care of your patient.        Sincerely,        Logan Smart MD

## 2022-04-17 NOTE — TELEPHONE ENCOUNTER
"Routing refill request to provider for review/approval because:  Labs out of range:      Last Written Prescription Date:    losartan (COZAAR) 50 MG tablet 90 tablet 0 1/21/2022  No   Sig: Take 1 tablet by mouth once daily   Sent to pharmacy as: Losartan Potassium 50 MG Oral Tablet (COZAAR)   Class: E-Prescribe     Last office visit provider:  7/1/21     Requested Prescriptions   Pending Prescriptions Disp Refills     losartan (COZAAR) 50 MG tablet [Pharmacy Med Name: Losartan Potassium 50 MG Oral Tablet] 90 tablet 0     Sig: Take 1 tablet by mouth once daily       Angiotensin-II Receptors Failed - 4/14/2022  9:27 AM        Failed - Normal serum creatinine on file in past 12 months     Recent Labs   Lab Test 02/21/22  1253   CR 1.32*       Ok to refill medication if creatinine is low          Passed - Last blood pressure under 140/90 in past 12 months     BP Readings from Last 3 Encounters:   04/14/22 107/50   02/24/22 130/72   02/16/22 121/59                 Passed - Recent (12 mo) or future (30 days) visit within the authorizing provider's specialty     Patient has had an office visit with the authorizing provider or a provider within the authorizing providers department within the previous 12 mos or has a future within next 30 days. See \"Patient Info\" tab in inbasket, or \"Choose Columns\" in Meds & Orders section of the refill encounter.              Passed - Medication is active on med list        Passed - Patient is age 18 or older        Passed - No active pregnancy on record        Passed - Normal serum potassium on file in past 12 months     Recent Labs   Lab Test 12/21/21  1129   POTASSIUM 4.3                    Passed - No positive pregnancy test in past 12 months             Shayna Sears RN 04/17/22 10:03 AM  "

## 2022-04-18 RX ORDER — LOSARTAN POTASSIUM 50 MG/1
TABLET ORAL
Qty: 90 TABLET | Refills: 0 | Status: SHIPPED | OUTPATIENT
Start: 2022-04-18 | End: 2022-07-20

## 2022-05-13 DIAGNOSIS — Z92.29 PERSONAL HISTORY OF OTHER DRUG THERAPY: ICD-10-CM

## 2022-05-13 DIAGNOSIS — I10 ESSENTIAL HYPERTENSION: ICD-10-CM

## 2022-05-13 DIAGNOSIS — N18.32 STAGE 3B CHRONIC KIDNEY DISEASE (H): ICD-10-CM

## 2022-05-13 DIAGNOSIS — M81.0 SENILE OSTEOPOROSIS: ICD-10-CM

## 2022-05-15 RX ORDER — LEVOTHYROXINE SODIUM 50 UG/1
TABLET ORAL
Qty: 90 TABLET | Refills: 1 | Status: SHIPPED | OUTPATIENT
Start: 2022-05-15 | End: 2022-12-02

## 2022-05-15 NOTE — TELEPHONE ENCOUNTER
"Routing refill request to provider for review/approval because:  Labs out of range:      Last Written Prescription Date:  4/21/21  Last Fill Quantity: 90,  # refills: 3   Last office visit provider:  2/9/22     Requested Prescriptions   Pending Prescriptions Disp Refills     levothyroxine (SYNTHROID/LEVOTHROID) 50 MCG tablet [Pharmacy Med Name: Levothyroxine Sodium 50 MCG Oral Tablet] 90 tablet 0     Sig: TAKE 1 TABLET BY MOUTH ONCE DAILY IN THE MORNING       Thyroid Protocol Passed - 5/13/2022  8:19 AM        Passed - Patient is 12 years or older        Passed - Recent (12 mo) or future (30 days) visit within the authorizing provider's specialty     Patient has had an office visit with the authorizing provider or a provider within the authorizing providers department within the previous 12 mos or has a future within next 30 days. See \"Patient Info\" tab in inbasket, or \"Choose Columns\" in Meds & Orders section of the refill encounter.              Passed - Medication is active on med list        Passed - Normal TSH on file in past 12 months     Recent Labs   Lab Test 12/21/21  1129   TSH 0.98              Passed - No active pregnancy on record     If patient is pregnant or has had a positive pregnancy test, please check TSH.          Passed - No positive pregnancy test in past 12 months     If patient is pregnant or has had a positive pregnancy test, please check TSH.             amLODIPine (NORVASC) 10 MG tablet [Pharmacy Med Name: amLODIPine Besylate 10 MG Oral Tablet] 90 tablet 0     Sig: Take 1 tablet by mouth once daily       Calcium Channel Blockers Protocol  Failed - 5/13/2022  8:19 AM        Failed - Normal serum creatinine on file in past 12 months     Recent Labs   Lab Test 02/21/22  1253   CR 1.32*       Ok to refill medication if creatinine is low          Passed - Blood pressure under 140/90 in past 12 months     BP Readings from Last 3 Encounters:   04/14/22 107/50   02/24/22 130/72   02/16/22 121/59 " "                Passed - Recent (12 mo) or future (30 days) visit within the authorizing provider's specialty     Patient has had an office visit with the authorizing provider or a provider within the authorizing providers department within the previous 12 mos or has a future within next 30 days. See \"Patient Info\" tab in inbasket, or \"Choose Columns\" in Meds & Orders section of the refill encounter.              Passed - Medication is active on med list        Passed - Patient is age 18 or older        Passed - No active pregnancy on record        Passed - No positive pregnancy test in past 12 months             Miracle Soler, RN 05/15/22 12:45 PM  "

## 2022-05-16 DIAGNOSIS — J31.0 RHINITIS, UNSPECIFIED TYPE: ICD-10-CM

## 2022-05-16 RX ORDER — AMLODIPINE BESYLATE 10 MG/1
TABLET ORAL
Qty: 90 TABLET | Refills: 0 | Status: SHIPPED | OUTPATIENT
Start: 2022-05-16 | End: 2022-08-29

## 2022-05-17 RX ORDER — FLUTICASONE PROPIONATE 50 MCG
SPRAY, SUSPENSION (ML) NASAL
Qty: 16 G | Refills: 0 | Status: SHIPPED | OUTPATIENT
Start: 2022-05-17 | End: 2022-09-21

## 2022-05-17 NOTE — TELEPHONE ENCOUNTER
"Last Written Prescription Date:  11/8/21  Last Fill Quantity: 16,  # refills: 0   Last office visit provider:  2/9/22     Requested Prescriptions   Pending Prescriptions Disp Refills     fluticasone (FLONASE) 50 MCG/ACT nasal spray [Pharmacy Med Name: Fluticasone Propionate 50 MCG/ACT Nasal Suspension] 16 g 0     Sig: Use 1 spray(s) in each nostril once daily       Nasal Allergy Protocol Passed - 5/16/2022 12:59 PM        Passed - Patient is age 12 or older        Passed - Recent (12 mo) or future (30 days) visit within the authorizing provider's specialty     Patient has had an office visit with the authorizing provider or a provider within the authorizing providers department within the previous 12 mos or has a future within next 30 days. See \"Patient Info\" tab in inbasket, or \"Choose Columns\" in Meds & Orders section of the refill encounter.              Passed - Medication is active on med list             Miracle Soler RN 05/17/22 6:25 PM  "

## 2022-05-20 ENCOUNTER — TRANSFERRED RECORDS (OUTPATIENT)
Dept: HEALTH INFORMATION MANAGEMENT | Facility: CLINIC | Age: 72
End: 2022-05-20
Payer: COMMERCIAL

## 2022-05-20 LAB — RETINOPATHY: NEGATIVE

## 2022-05-31 ENCOUNTER — LAB (OUTPATIENT)
Dept: LAB | Facility: CLINIC | Age: 72
End: 2022-05-31
Payer: COMMERCIAL

## 2022-05-31 DIAGNOSIS — M06.09 SERONEGATIVE RHEUMATOID ARTHRITIS OF MULTIPLE SITES (H): ICD-10-CM

## 2022-05-31 DIAGNOSIS — I10 ESSENTIAL HYPERTENSION: ICD-10-CM

## 2022-05-31 LAB
ALBUMIN SERPL-MCNC: 3.9 G/DL (ref 3.5–5)
ALT SERPL W P-5'-P-CCNC: 16 U/L (ref 0–45)
CREAT SERPL-MCNC: 1.19 MG/DL (ref 0.6–1.1)
ERYTHROCYTE [DISTWIDTH] IN BLOOD BY AUTOMATED COUNT: 13.8 % (ref 10–15)
GFR SERPL CREATININE-BSD FRML MDRD: 48 ML/MIN/1.73M2
HCT VFR BLD AUTO: 31.4 % (ref 35–47)
HGB BLD-MCNC: 10.2 G/DL (ref 11.7–15.7)
MCH RBC QN AUTO: 32.9 PG (ref 26.5–33)
MCHC RBC AUTO-ENTMCNC: 32.5 G/DL (ref 31.5–36.5)
MCV RBC AUTO: 101 FL (ref 78–100)
PLATELET # BLD AUTO: 158 10E3/UL (ref 150–450)
RBC # BLD AUTO: 3.1 10E6/UL (ref 3.8–5.2)
WBC # BLD AUTO: 6.9 10E3/UL (ref 4–11)

## 2022-05-31 PROCEDURE — 82565 ASSAY OF CREATININE: CPT

## 2022-05-31 PROCEDURE — 85027 COMPLETE CBC AUTOMATED: CPT

## 2022-05-31 PROCEDURE — 84460 ALANINE AMINO (ALT) (SGPT): CPT

## 2022-05-31 PROCEDURE — 82040 ASSAY OF SERUM ALBUMIN: CPT

## 2022-05-31 PROCEDURE — 36415 COLL VENOUS BLD VENIPUNCTURE: CPT

## 2022-06-01 RX ORDER — METOPROLOL TARTRATE 50 MG
TABLET ORAL
Qty: 180 TABLET | Refills: 2 | Status: SHIPPED | OUTPATIENT
Start: 2022-06-01 | End: 2023-02-20

## 2022-06-22 ENCOUNTER — OFFICE VISIT (OUTPATIENT)
Dept: NEUROLOGY | Facility: CLINIC | Age: 72
End: 2022-06-22
Payer: COMMERCIAL

## 2022-06-22 VITALS
HEIGHT: 64 IN | BODY MASS INDEX: 29.84 KG/M2 | HEART RATE: 69 BPM | SYSTOLIC BLOOD PRESSURE: 122 MMHG | DIASTOLIC BLOOD PRESSURE: 60 MMHG | WEIGHT: 174.8 LBS

## 2022-06-22 DIAGNOSIS — G20.C PRIMARY PARKINSONISM (H): Primary | ICD-10-CM

## 2022-06-22 PROCEDURE — 99214 OFFICE O/P EST MOD 30 MIN: CPT | Performed by: PSYCHIATRY & NEUROLOGY

## 2022-06-22 RX ORDER — BENZTROPINE MESYLATE 0.5 MG/1
0.5 TABLET ORAL 2 TIMES DAILY
Qty: 180 TABLET | Refills: 1 | Status: SHIPPED | OUTPATIENT
Start: 2022-06-22 | End: 2022-08-24

## 2022-06-22 RX ORDER — BENZTROPINE MESYLATE 0.5 MG/1
0.5 TABLET ORAL 2 TIMES DAILY
Qty: 180 UNITS | Refills: 1 | Status: SHIPPED | OUTPATIENT
Start: 2022-06-22 | End: 2022-06-22

## 2022-06-22 NOTE — PROGRESS NOTES
NEUROLOGY OUTPATIENT PROGRESS NOTE   Jun 22, 2022     CHIEF COMPLAINT/REASON FOR VISIT/REASON FOR CONSULT  Patient presents with:  Parkinson: Follow up- stop taking ropinirole  Tremors    REASON FOR CONSULTATION- Tremors    REFERRAL SOURCE  Dr. Isabel Maurer   Dr. Isabel Maurer    HISTORY OF PRESENT ILLNESS  Claudia Wise is a 71 year old female seen today for evaluation of tremors.  Reports that the tremors have been there since February 2020.  Reports that the tremors are mainly in the left upper extremity.  Notices that they are present at rest.  They wake her up from sleep.  They can be present with activity as well.  Denies any gait difficulty though does have back issues and left leg weakness from the back issues which could affect her walking.  Reports no masklike facies.  No other new medications.  Has not been on any antipsychotics in the past.  Does have a diagnosis of depression anxiety and is on Cymbalta.  No tremors on the right side.  Denies any cognitive issues.  No rigidity.  Does have family history of Parkinson's disease in her aunt.    2/16/22  Patient returns today.  She could not tolerate the Sinemet.  Had dry heaving.  Did try to take a lower dose.  Did try to take it with crackers but still had dry heaving.  Did not feel any significant benefit.  Tremors were better in the evening time with the medication though the tremors are always better in the evening time.  Reports no other new issues.  Reports that her depression anxiety under good control.  Her  does not think she has masklike facies.  No other new symptoms or concerns.    She is working with her therapist for sacroiliac pain.  She is walking 10 minutes every other day.  Encourage her to do it more often.    4/14/22  Patient returns today.  Reports that the medication is somewhat helpful.  She is walking little bit faster.  Is doing physical therapy which is helping.  She forgets to take the medication in the evening  time.  Does have some complains of slurred speech as well.  No other new symptoms/complaints.    6/22/22  Patient returns today.  Reports that she had visual hallucinations with the Requip.  She would see people at night.  They would not talk to her.  There were no auditory hallucinations just that she would see people.  Stopping the Requip these hallucinations are gone away.  Is sleeping well at night.  Reports no jerking or any other activity.  Previously she had reported some dysarthria which has now improved.  She denies that she ever had dysarthria.  Has not seen the speech specialist.    Previous history is reviewed and this is unchanged.    PAST MEDICAL/SURGICAL HISTORY  Past Medical History:   Diagnosis Date     Anemia      Anxiety      Chronic kidney disease      Diabetes mellitus, type II (H)     prediabetes     Disease of thyroid gland     hypothyroid     Family history of myocardial infarction      Gout      High cholesterol      Hypertension      Inverted nipple     right     Macular edema      Osteoarthritis 2012     Rheumatoid arthritis (H) 2012     Patient Active Problem List   Diagnosis     Herpes Simplex Type II     Anxiety     Anemia in chronic kidney disease     Hyperlipemia     Gout     Hypertension     Prediabetes     Endometriosis     Other specified hypothyroidism     Seronegative rheumatoid arthritis of multiple sites (H)     High risk medication use     Bilateral primary osteoarthritis of knee     Small bowel obstruction (H)     Senile osteoporosis     Compression fracture of L2 vertebra with routine healing     Chronic kidney disease, stage 3 (H)     Venous (peripheral) insufficiency     Exposure to bat without known bite   Significant for high cholesterol, high blood pressure, diabetes, arthritis, depression, osteoporosis    FAMILY HISTORY  Family History   Problem Relation Age of Onset     Breast Cancer Maternal Aunt      Cancer Maternal Aunt      Cancer Father      Cancer Sister       Cancer Maternal Grandmother      Cancer Cousin      Coronary Artery Disease Mother 49.00   Family history reviewed and negative for neurological conditions except for Parkinson's disease in her aunt.    SOCIAL HISTORY  Social History     Tobacco Use     Smoking status: Former Smoker     Quit date: 1995     Years since quittin.9     Smokeless tobacco: Never Used   Substance Use Topics     Alcohol use: Yes     Comment: Alcoholic Drinks/day: occasional couple times a year     Drug use: No       SYSTEMS REVIEW  Twelve-system ROS was done and other than the HPI this was negative except for neck pain, back pain, arm and leg pain, joint pain, weakness paralysis, difficulty walking, balance coordination problems, movement disorder, bladder symptoms, anxiety, depression, weight gain.  No new concerns or issues today.    MEDICATIONS  acetaminophen (TYLENOL) 500 MG tablet, Take 500 mg by mouth every 6 hours as needed   acyclovir (ZOVIRAX) 400 MG tablet, TAKE ONE TABLET BY MOUTH EVERY 8 HOURS AS NEEDED  allopurinoL (ZYLOPRIM) 100 MG tablet, [ALLOPURINOL (ZYLOPRIM) 100 MG TABLET] Take 1 tablet (100 mg total) by mouth 2 (two) times a day with meals.  amLODIPine (NORVASC) 10 MG tablet, Take 1 tablet by mouth once daily  atorvastatin (LIPITOR) 10 MG tablet, TAKE 1 TABLET BY MOUTH AT BEDTIME  carboxymethylcellulose (REFRESH PLUS) 0.5 % Dpet ophthalmic dropperette, [CARBOXYMETHYLCELLULOSE (REFRESH PLUS) 0.5 % DPET OPHTHALMIC DROPPERETTE] Administer 1-2 drops to both eyes 4 (four) times a day as needed.   cyanocobalamin, vitamin B-12, 1,000 mcg Subl, [CYANOCOBALAMIN, VITAMIN B-12, 1,000 MCG SUBL] Place 1 tablet (1,000 mcg total) under the tongue daily.  denosumab (PROLIA) 60 MG/ML SOSY injection, Inject 60 mg Subcutaneous  DULoxetine (CYMBALTA) 20 MG capsule, Take 1 capsule (20 mg) by mouth daily  fluticasone (FLONASE) 50 MCG/ACT nasal spray, Use 1 spray(s) in each nostril once daily  hydroxychloroquine (PLAQUENIL) 200 MG  "tablet, Take 1 tablet (200 mg) by mouth 2 times daily  levothyroxine (SYNTHROID/LEVOTHROID) 50 MCG tablet, TAKE 1 TABLET BY MOUTH ONCE DAILY IN THE MORNING  lidocaine (LIDODERM) 5 % patch, Place 1 patch onto the skin every 24 hours To prevent lidocaine toxicity, patient should be patch free for 12 hrs daily.  LORazepam (ATIVAN) 0.5 MG tablet, [LORAZEPAM (ATIVAN) 0.5 MG TABLET] TAKE 1 TABLET BY MOUTH EVERY 8 HOURS AS NEEDED FOR ANXIETY  losartan (COZAAR) 50 MG tablet, Take 1 tablet by mouth once daily  methotrexate 2.5 MG tablet, Take 4 tablets (10 mg) by mouth every 7 days  metoprolol tartrate (LOPRESSOR) 50 MG tablet, Take 1 tablet by mouth twice daily  sodium bicarbonate 650 MG tablet, [SODIUM BICARBONATE 650 MG TABLET] Take 2 tablets (1,300 mg total) by mouth 2 (two) times a day.  folic acid (FOLVITE) 1 MG tablet, Take 1 tablet (1,000 mcg) by mouth daily  HYDROcodone-acetaminophen (NORCO) 5-325 MG tablet, Take 1 tablet by mouth every 8 hours as needed (Patient not taking: Reported on 6/22/2022)  melatonin 3 mg Tab tablet, [MELATONIN 3 MG TAB TABLET] Take 1 tablet (3 mg total) by mouth at bedtime as needed. (Patient not taking: Reported on 6/22/2022)  RESTASIS 0.05 % ophthalmic emulsion, [RESTASIS 0.05 % OPHTHALMIC EMULSION] INSTILL 1 DROP INTO EACH EYE TWICE DAILY (Patient not taking: No sig reported)    denosumab (PROLIA) injection 60 mg  denosumab (PROLIA) injection 60 mg         PHYSICAL EXAMINATION  VITALS: /60 (BP Location: Right arm, Patient Position: Sitting)   Pulse 69   Ht 1.626 m (5' 4\")   Wt 79.3 kg (174 lb 12.8 oz)   BMI 30.00 kg/m    GENERAL: Healthy appearing, alert, no acute distress, normal habitus.  CARDIOVASCULAR: Extremities warm and well perfused. Pulses present.   NEUROLOGICAL:  Patient is awake and grossly oriented to self, place and time.  Attention span is normal.  Memory is grossly intact.  Language is fluent and follows commands appropriately.  Appropriate fund of knowledge. " Cranial nerves 2-12 are intact. There is no pronator drift.  Motor exam shows 5/5 strength in all extremities.  Tone is symmetric bilaterally in upper and lower extremities.  Resting tremor is noted in the left upper extremity.  No cogwheeling noted.  (Previously reflexes are symmetric and 1+ in upper extremities and lower extremities. Sensory exam is grossly intact to light touch, pin prick and vibration.)  Finger to nose and heel to shin is without dysmetria.  Romberg is negative.  Gait is normal except shows decreased left arm swing with resting tremor and the patient is able to do tandem walk and walk on toes and heels with some difficulty.  Previously-trying concentric circles shows very mild tremor.  No significant change in exam today.  Exam is unchanged compared to before.  Continues to have left resting tremor.    DIAGNOSTICS  RELEVANT LABS  Component      Latest Ref Rng & Units 10/1/2020   Sodium      136 - 145 mmol/L 137   Potassium      3.5 - 5.0 mmol/L 4.4   Chloride      98 - 107 mmol/L 110 (H)   Carbon Dioxide      22 - 31 mmol/L 14 (L)   Anion Gap      5 - 18 mmol/L 13   Glucose      70 - 125 mg/dL 114   Urea Nitrogen      8 - 28 mg/dL 30 (H)   Creatinine      0.60 - 1.10 mg/dL 1.18 (H)   GFR Estimate If Black      >60 mL/min/1.73m2 55 (L)   GFR Estimate      >60 mL/min/1.73m2 45 (L)   Bilirubin Total      0.0 - 1.0 mg/dL 0.8   Calcium      8.5 - 10.5 mg/dL 8.7   Protein Total      6.0 - 8.0 g/dL 6.8   Albumin      3.5 - 5.0 g/dL 4.3   Alkaline Phosphatase      45 - 120 U/L 63   AST      0 - 40 U/L 21   ALT      0 - 45 U/L 18   WBC      4.0 - 11.0 thou/uL 7.3   RBC Count      3.80 - 5.40 mill/uL 3.12 (L)   Hemoglobin      12.0 - 16.0 g/dL 10.4 (L)   Hematocrit      35.0 - 47.0 % 31.2 (L)   MCV      80 - 100 fL 100   MCH      27.0 - 34.0 pg 33.4   MCHC      32.0 - 36.0 g/dL 33.4   RDW      11.0 - 14.5 % 14.5   Platelet Count      140 - 440 thou/uL 151   Mean Platelet Volume      7.0 - 10.0 fL 8.7    TSH      0.30 - 5.00 uIU/mL 1.56   Uric Acid      2.0 - 7.5 mg/dL 4.1     OUTSIDE RECORDS  Outside referral notes and chart notes were reviewed and pertinent information has been summarized (in addition to the HPI):-Patient does have a diagnosis of anxiety.  Has been having some tremors.  Is on multiple antidepressant/antianxiety medications.  Also has some back pain issues.  Is also on narcotics.    LABS  Component      Latest Ref Rng & Units 12/21/2021   Sodium      136 - 145 mmol/L 138   Potassium      3.5 - 5.0 mmol/L 4.3   Chloride      98 - 107 mmol/L 110 (H)   Carbon Dioxide      22 - 31 mmol/L 17 (L)   Anion Gap      5 - 18 mmol/L 11   Urea Nitrogen      8 - 28 mg/dL 30 (H)   Creatinine      0.60 - 1.10 mg/dL 1.21 (H)   Calcium      8.5 - 10.5 mg/dL 9.4   Glucose      70 - 125 mg/dL 101   Alkaline Phosphatase      45 - 120 U/L 55   AST      0 - 40 U/L 30   ALT      0 - 45 U/L 9   Protein Total      6.0 - 8.0 g/dL 6.7   Albumin      3.5 - 5.0 g/dL 4.0   Bilirubin Total      0.0 - 1.0 mg/dL 0.7   GFR Estimate      >60 mL/min/1.73m2 47 (L)   TSH      0.30 - 5.00 uIU/mL 0.98   Hemoglobin A1C      0.0 - 5.6 % 5.7 (H)   Ferritin      10 - 130 ng/mL 253 (H)   Vitamin B12      213 - 816 pg/mL >2,000 (H)   Methylmalonic Acid      0.00 - 0.40 umol/L 0.24       IMPRESSION/REPORT/PLAN  Primary parkinsonism (H)  History of anxiety/depression  Prediabetes  Elevated ferritin  History of chronic kidney disease  Rule out restless leg syndrome  Side effect of medication  Dysarthria-not present per patient  Medication side effect    This is a 71 year old female with new onset of left upper extremity resting tremor.  Examination further shows masklike facies, decreased arm swing on the left side, left resting tremor.  No cogwheeling.  Patient symptoms are suggestive of Parkinson's disease.  Blood work was overall noncontributory.  She has had side effects with Sinemet.  Requip caused hallucinations and she stopped the  medications.  We will try Cogentin and see how she does.  Discussed side effects.  Could consider amantadine.    Monitor for signs and symptoms of Parkinson's disease I would encourage her to be more active active and exercise multiple times a week to help prevent progression of the disease.  There is some family history of Parkinson's disease.  Could consider DaTscan.    She further complains of some slurred speech and we will set her up with speech therapy for more evaluation and treatment.  She denies ever having any slurred speech today.    She does notice that the tremor wakes her up during sleep.  Possibly she has restless leg syndrome.  Ferritin was previously elevated.  Currently this is not happening and will monitor.    Blood work further shows evidence of chronic kidney disease and prediabetes which she already has.  Ferritin could be elevated due to the kidney disease.  She can follow-up with primary care if she would like.    I can see her back in 2 months.    -     benztropine (COGENTIN) 0.5 MG tablet; Take 1 tablet (0.5 mg) by mouth 2 times daily    Return in about 2 months (around 8/22/2022) for In-Clinic Visit (must).    Over 32 minutes were spent coordinating the care for the patient on the day of the encounter.  This includes previsit, during visit and post visit activities as documented above.  Counseling patient.  Prescription management.  Medication side effect/refractory problem.  (Activities include but not inclusive of reviewing chart, reviewing outside records, reviewing labs and imaging study results as well as the images, patient visit time including getting history and exam,  use if applicable, review of test results with the patient and coming up with a plan in a shared model, counseling patient and family, education and answering patient questions, EMR , EMR diagnosis entry and problem list management, medication reconciliation and prescription management if  applicable, paperwork if applicable, printing documents and documentation of the visit activities.)    Logan Smart MD  Neurologist  [facility]  Tel:- [phone]    This note was dictated using voice recognition software.  Any grammatical or context distortions are unintentional and inherent to the software.

## 2022-06-22 NOTE — NURSING NOTE
Chief Complaint   Patient presents with     Parkinson     Follow up- stop taking ropinirole     Esteban Lara CMA@ on 6/22/2022 at 12:25 PM

## 2022-06-22 NOTE — LETTER
6/22/2022         RE: Claudia Wise   [address] [facility] [address]        Dear Colleague,    Thank you for referring your patient, Claudia Wise, to the [facility] [place]. Please see a copy of my visit note below.    NEUROLOGY OUTPATIENT PROGRESS NOTE   Jun 22, 2022     CHIEF COMPLAINT/REASON FOR VISIT/REASON FOR CONSULT  Patient presents with:  Parkinson: Follow up- stop taking ropinirole  Tremors    REASON FOR CONSULTATION- Tremors    REFERRAL SOURCE  Dr. Isabel Maurer  CC Dr. Isabel Maurer    HISTORY OF PRESENT ILLNESS  Claudia Wise is a 71 year old female seen today for evaluation of tremors.  Reports that the tremors have been there since February 2020.  Reports that the tremors are mainly in the left upper extremity.  Notices that they are present at rest.  They wake her up from sleep.  They can be present with activity as well.  Denies any gait difficulty though does have back issues and left leg weakness from the back issues which could affect her walking.  Reports no masklike facies.  No other new medications.  Has not been on any antipsychotics in the past.  Does have a diagnosis of depression anxiety and is on Cymbalta.  No tremors on the right side.  Denies any cognitive issues.  No rigidity.  Does have family history of Parkinson's disease in her aunt.    2/16/22  Patient returns today.  She could not tolerate the Sinemet.  Had dry heaving.  Did try to take a lower dose.  Did try to take it with crackers but still had dry heaving.  Did not feel any significant benefit.  Tremors were better in the evening time with the medication though the tremors are always better in the evening time.  Reports no other new issues.  Reports that her depression anxiety under good control.  Her  does not think she has masklike facies.  No other new symptoms or concerns.    She is working with her therapist for sacroiliac pain.  She is walking 10 minutes every other  day.  Encourage her to do it more often.    4/14/22  Patient returns today.  Reports that the medication is somewhat helpful.  She is walking little bit faster.  Is doing physical therapy which is helping.  She forgets to take the medication in the evening time.  Does have some complains of slurred speech as well.  No other new symptoms/complaints.    6/22/22  Patient returns today.  Reports that she had visual hallucinations with the Requip.  She would see people at night.  They would not talk to her.  There were no auditory hallucinations just that she would see people.  Stopping the Requip these hallucinations are gone away.  Is sleeping well at night.  Reports no jerking or any other activity.  Previously she had reported some dysarthria which has now improved.  She denies that she ever had dysarthria.  Has not seen the speech specialist.    Previous history is reviewed and this is unchanged.    PAST MEDICAL/SURGICAL HISTORY  Past Medical History:   Diagnosis Date     Anemia      Anxiety      Chronic kidney disease      Diabetes mellitus, type II (H)     prediabetes     Disease of thyroid gland     hypothyroid     Family history of myocardial infarction      Gout      High cholesterol      Hypertension      Inverted nipple     right     Macular edema      Osteoarthritis 2012     Rheumatoid arthritis (H) 2012     Patient Active Problem List   Diagnosis     Herpes Simplex Type II     Anxiety     Anemia in chronic kidney disease     Hyperlipemia     Gout     Hypertension     Prediabetes     Endometriosis     Other specified hypothyroidism     Seronegative rheumatoid arthritis of multiple sites (H)     High risk medication use     Bilateral primary osteoarthritis of knee     Small bowel obstruction (H)     Senile osteoporosis     Compression fracture of L2 vertebra with routine healing     Chronic kidney disease, stage 3 (H)     Venous (peripheral) insufficiency     Exposure to bat without known bite   Significant  for high cholesterol, high blood pressure, diabetes, arthritis, depression, osteoporosis    FAMILY HISTORY  Family History   Problem Relation Age of Onset     Breast Cancer Maternal Aunt      Cancer Maternal Aunt      Cancer Father      Cancer Sister      Cancer Maternal Grandmother      Cancer Cousin      Coronary Artery Disease Mother 49.00   Family history reviewed and negative for neurological conditions except for Parkinson's disease in her aunt.    SOCIAL HISTORY  Social History     Tobacco Use     Smoking status: Former Smoker     Quit date: 1995     Years since quittin.9     Smokeless tobacco: Never Used   Substance Use Topics     Alcohol use: Yes     Comment: Alcoholic Drinks/day: occasional couple times a year     Drug use: No       SYSTEMS REVIEW  Twelve-system ROS was done and other than the HPI this was negative except for neck pain, back pain, arm and leg pain, joint pain, weakness paralysis, difficulty walking, balance coordination problems, movement disorder, bladder symptoms, anxiety, depression, weight gain.  No new concerns or issues today.    MEDICATIONS  acetaminophen (TYLENOL) 500 MG tablet, Take 500 mg by mouth every 6 hours as needed   acyclovir (ZOVIRAX) 400 MG tablet, TAKE ONE TABLET BY MOUTH EVERY 8 HOURS AS NEEDED  allopurinoL (ZYLOPRIM) 100 MG tablet, [ALLOPURINOL (ZYLOPRIM) 100 MG TABLET] Take 1 tablet (100 mg total) by mouth 2 (two) times a day with meals.  amLODIPine (NORVASC) 10 MG tablet, Take 1 tablet by mouth once daily  atorvastatin (LIPITOR) 10 MG tablet, TAKE 1 TABLET BY MOUTH AT BEDTIME  carboxymethylcellulose (REFRESH PLUS) 0.5 % Dpet ophthalmic dropperette, [CARBOXYMETHYLCELLULOSE (REFRESH PLUS) 0.5 % DPET OPHTHALMIC DROPPERETTE] Administer 1-2 drops to both eyes 4 (four) times a day as needed.   cyanocobalamin, vitamin B-12, 1,000 mcg Subl, [CYANOCOBALAMIN, VITAMIN B-12, 1,000 MCG SUBL] Place 1 tablet (1,000 mcg total) under the tongue daily.  denosumab (PROLIA) 60  "MG/ML SOSY injection, Inject 60 mg Subcutaneous  DULoxetine (CYMBALTA) 20 MG capsule, Take 1 capsule (20 mg) by mouth daily  fluticasone (FLONASE) 50 MCG/ACT nasal spray, Use 1 spray(s) in each nostril once daily  hydroxychloroquine (PLAQUENIL) 200 MG tablet, Take 1 tablet (200 mg) by mouth 2 times daily  levothyroxine (SYNTHROID/LEVOTHROID) 50 MCG tablet, TAKE 1 TABLET BY MOUTH ONCE DAILY IN THE MORNING  lidocaine (LIDODERM) 5 % patch, Place 1 patch onto the skin every 24 hours To prevent lidocaine toxicity, patient should be patch free for 12 hrs daily.  LORazepam (ATIVAN) 0.5 MG tablet, [LORAZEPAM (ATIVAN) 0.5 MG TABLET] TAKE 1 TABLET BY MOUTH EVERY 8 HOURS AS NEEDED FOR ANXIETY  losartan (COZAAR) 50 MG tablet, Take 1 tablet by mouth once daily  methotrexate 2.5 MG tablet, Take 4 tablets (10 mg) by mouth every 7 days  metoprolol tartrate (LOPRESSOR) 50 MG tablet, Take 1 tablet by mouth twice daily  sodium bicarbonate 650 MG tablet, [SODIUM BICARBONATE 650 MG TABLET] Take 2 tablets (1,300 mg total) by mouth 2 (two) times a day.  folic acid (FOLVITE) 1 MG tablet, Take 1 tablet (1,000 mcg) by mouth daily  HYDROcodone-acetaminophen (NORCO) 5-325 MG tablet, Take 1 tablet by mouth every 8 hours as needed (Patient not taking: Reported on 6/22/2022)  melatonin 3 mg Tab tablet, [MELATONIN 3 MG TAB TABLET] Take 1 tablet (3 mg total) by mouth at bedtime as needed. (Patient not taking: Reported on 6/22/2022)  RESTASIS 0.05 % ophthalmic emulsion, [RESTASIS 0.05 % OPHTHALMIC EMULSION] INSTILL 1 DROP INTO EACH EYE TWICE DAILY (Patient not taking: No sig reported)    denosumab (PROLIA) injection 60 mg  denosumab (PROLIA) injection 60 mg         PHYSICAL EXAMINATION  VITALS: /60 (BP Location: Right arm, Patient Position: Sitting)   Pulse 69   Ht 1.626 m (5' 4\")   Wt 79.3 kg (174 lb 12.8 oz)   BMI 30.00 kg/m    GENERAL: Healthy appearing, alert, no acute distress, normal habitus.  CARDIOVASCULAR: Extremities warm and " well perfused. Pulses present.   NEUROLOGICAL:  Patient is awake and grossly oriented to self, place and time.  Attention span is normal.  Memory is grossly intact.  Language is fluent and follows commands appropriately.  Appropriate fund of knowledge. Cranial nerves 2-12 are intact. There is no pronator drift.  Motor exam shows 5/5 strength in all extremities.  Tone is symmetric bilaterally in upper and lower extremities.  Resting tremor is noted in the left upper extremity.  No cogwheeling noted.  (Previously reflexes are symmetric and 1+ in upper extremities and lower extremities. Sensory exam is grossly intact to light touch, pin prick and vibration.)  Finger to nose and heel to shin is without dysmetria.  Romberg is negative.  Gait is normal except shows decreased left arm swing with resting tremor and the patient is able to do tandem walk and walk on toes and heels with some difficulty.  Previously-trying concentric circles shows very mild tremor.  No significant change in exam today.  Exam is unchanged compared to before.  Continues to have left resting tremor.    DIAGNOSTICS  RELEVANT LABS  Component      Latest Ref Rng & Units 10/1/2020   Sodium      136 - 145 mmol/L 137   Potassium      3.5 - 5.0 mmol/L 4.4   Chloride      98 - 107 mmol/L 110 (H)   Carbon Dioxide      22 - 31 mmol/L 14 (L)   Anion Gap      5 - 18 mmol/L 13   Glucose      70 - 125 mg/dL 114   Urea Nitrogen      8 - 28 mg/dL 30 (H)   Creatinine      0.60 - 1.10 mg/dL 1.18 (H)   GFR Estimate If Black      >60 mL/min/1.73m2 55 (L)   GFR Estimate      >60 mL/min/1.73m2 45 (L)   Bilirubin Total      0.0 - 1.0 mg/dL 0.8   Calcium      8.5 - 10.5 mg/dL 8.7   Protein Total      6.0 - 8.0 g/dL 6.8   Albumin      3.5 - 5.0 g/dL 4.3   Alkaline Phosphatase      45 - 120 U/L 63   AST      0 - 40 U/L 21   ALT      0 - 45 U/L 18   WBC      4.0 - 11.0 thou/uL 7.3   RBC Count      3.80 - 5.40 mill/uL 3.12 (L)   Hemoglobin      12.0 - 16.0 g/dL 10.4 (L)    Hematocrit      35.0 - 47.0 % 31.2 (L)   MCV      80 - 100 fL 100   MCH      27.0 - 34.0 pg 33.4   MCHC      32.0 - 36.0 g/dL 33.4   RDW      11.0 - 14.5 % 14.5   Platelet Count      140 - 440 thou/uL 151   Mean Platelet Volume      7.0 - 10.0 fL 8.7   TSH      0.30 - 5.00 uIU/mL 1.56   Uric Acid      2.0 - 7.5 mg/dL 4.1     OUTSIDE RECORDS  Outside referral notes and chart notes were reviewed and pertinent information has been summarized (in addition to the HPI):-Patient does have a diagnosis of anxiety.  Has been having some tremors.  Is on multiple antidepressant/antianxiety medications.  Also has some back pain issues.  Is also on narcotics.    LABS  Component      Latest Ref Rng & Units 12/21/2021   Sodium      136 - 145 mmol/L 138   Potassium      3.5 - 5.0 mmol/L 4.3   Chloride      98 - 107 mmol/L 110 (H)   Carbon Dioxide      22 - 31 mmol/L 17 (L)   Anion Gap      5 - 18 mmol/L 11   Urea Nitrogen      8 - 28 mg/dL 30 (H)   Creatinine      0.60 - 1.10 mg/dL 1.21 (H)   Calcium      8.5 - 10.5 mg/dL 9.4   Glucose      70 - 125 mg/dL 101   Alkaline Phosphatase      45 - 120 U/L 55   AST      0 - 40 U/L 30   ALT      0 - 45 U/L 9   Protein Total      6.0 - 8.0 g/dL 6.7   Albumin      3.5 - 5.0 g/dL 4.0   Bilirubin Total      0.0 - 1.0 mg/dL 0.7   GFR Estimate      >60 mL/min/1.73m2 47 (L)   TSH      0.30 - 5.00 uIU/mL 0.98   Hemoglobin A1C      0.0 - 5.6 % 5.7 (H)   Ferritin      10 - 130 ng/mL 253 (H)   Vitamin B12      213 - 816 pg/mL >2,000 (H)   Methylmalonic Acid      0.00 - 0.40 umol/L 0.24       IMPRESSION/REPORT/PLAN  Primary parkinsonism (H)  History of anxiety/depression  Prediabetes  Elevated ferritin  History of chronic kidney disease  Rule out restless leg syndrome  Side effect of medication  Dysarthria-not present per patient  Medication side effect    This is a 71 year old female with new onset of left upper extremity resting tremor.  Examination further shows masklike facies, decreased arm  swing on the left side, left resting tremor.  No cogwheeling.  Patient symptoms are suggestive of Parkinson's disease.  Blood work was overall noncontributory.  She has had side effects with Sinemet.  Requip caused hallucinations and she stopped the medications.  We will try Cogentin and see how she does.  Discussed side effects.  Could consider amantadine.    Monitor for signs and symptoms of Parkinson's disease I would encourage her to be more active active and exercise multiple times a week to help prevent progression of the disease.  There is some family history of Parkinson's disease.  Could consider DaTscan.    She further complains of some slurred speech and we will set her up with speech therapy for more evaluation and treatment.  She denies ever having any slurred speech today.    She does notice that the tremor wakes her up during sleep.  Possibly she has restless leg syndrome.  Ferritin was previously elevated.  Currently this is not happening and will monitor.    Blood work further shows evidence of chronic kidney disease and prediabetes which she already has.  Ferritin could be elevated due to the kidney disease.  She can follow-up with primary care if she would like.    I can see her back in 2 months.    -     benztropine (COGENTIN) 0.5 MG tablet; Take 1 tablet (0.5 mg) by mouth 2 times daily    Return in about 2 months (around 8/22/2022) for In-Clinic Visit (must).    Over 32 minutes were spent coordinating the care for the patient on the day of the encounter.  This includes previsit, during visit and post visit activities as documented above.  Counseling patient.  Prescription management.  Medication side effect/refractory problem.  (Activities include but not inclusive of reviewing chart, reviewing outside records, reviewing labs and imaging study results as well as the images, patient visit time including getting history and exam,  use if applicable, review of test results with the  patient and coming up with a plan in a shared model, counseling patient and family, education and answering patient questions, EMR , EMR diagnosis entry and problem list management, medication reconciliation and prescription management if applicable, paperwork if applicable, printing documents and documentation of the visit activities.)    Logan Smart MD  Neurologist  [facility]  Tel:- [phone]    This note was dictated using voice recognition software.  Any grammatical or context distortions are unintentional and inherent to the software.        Again, thank you for allowing me to participate in the care of your patient.        Sincerely,        Logan Smart MD

## 2022-06-23 ENCOUNTER — TELEPHONE (OUTPATIENT)
Dept: NEUROLOGY | Facility: CLINIC | Age: 72
End: 2022-06-23

## 2022-06-23 NOTE — TELEPHONE ENCOUNTER
Prior Authorization Approval    Authorization Effective Date: 5/24/2022  Authorization Expiration Date: 6/23/2023  Medication: Benztropine Mesylate 0.5mg  Approved Dose/Quantity:   Reference #:     Insurance Company: [employer] - Phone [phone] Fax [phone]  Expected CoPay:       CoPay Card Available:      Foundation Assistance Needed:    Which Pharmacy is filling the prescription (Not needed for infusion/clinic administered): [facility] [address] [place] [address] NO. FRONTAGE  Pharmacy Notified: Yes  Patient Notified: No

## 2022-06-23 NOTE — TELEPHONE ENCOUNTER
Prior Authorization Retail Medication Request    Medication/Dose: Benztropine Mesylate 0.5mg  ICD code (if different than what is on RX):    Previously Tried and Failed:    Rationale:      Insurance Name:    Insurance ID:        Pharmacy Information (if different than what is on RX)  Name:    Phone:

## 2022-06-27 DIAGNOSIS — M06.09 SERONEGATIVE RHEUMATOID ARTHRITIS OF MULTIPLE SITES (H): ICD-10-CM

## 2022-06-27 RX ORDER — METHOTREXATE 2.5 MG/1
TABLET ORAL
Qty: 48 TABLET | Refills: 0 | Status: SHIPPED | OUTPATIENT
Start: 2022-06-27 | End: 2023-02-15

## 2022-07-06 ENCOUNTER — MYC REFILL (OUTPATIENT)
Dept: INTERNAL MEDICINE | Facility: CLINIC | Age: 72
End: 2022-07-06

## 2022-07-06 DIAGNOSIS — M10.9 GOUT, UNSPECIFIED CAUSE, UNSPECIFIED CHRONICITY, UNSPECIFIED SITE: ICD-10-CM

## 2022-07-07 RX ORDER — ALLOPURINOL 100 MG/1
100 TABLET ORAL 2 TIMES DAILY WITH MEALS
Qty: 180 TABLET | Refills: 3 | Status: SHIPPED | OUTPATIENT
Start: 2022-07-07 | End: 2024-02-02

## 2022-07-07 NOTE — TELEPHONE ENCOUNTER
"Routing refill request to provider for review/approval because:  Labs out of range:  CBC, CR  Labs not current: URIC      Last Written Prescription Date:  12/12/2020  Last Fill Quantity: 180,  # refills: 3   Last office visit provider:  2/9/2022       Requested Prescriptions   Pending Prescriptions Disp Refills     allopurinol (ZYLOPRIM) 100 MG tablet 180 tablet 3     Sig: Take 1 tablet (100 mg) by mouth 2 times daily (with meals)       Gout Agents Protocol Failed - 7/6/2022 11:39 AM        Failed - Has Uric Acid on file in past 12 months and value is less than 6     Recent Labs   Lab Test 10/01/20  1604   URIC 4.1     If level is 6mg/dL or greater, ok to refill one time and refer to provider.           Failed - Normal serum creatinine on file in the past 12 months     Recent Labs   Lab Test 05/31/22  1130   CR 1.19*       Ok to refill medication if creatinine is low          Passed - CBC on file in past 12 months     Recent Labs   Lab Test 05/31/22  1130   WBC 6.9   RBC 3.10*   HGB 10.2*   HCT 31.4*                    Passed - ALT on file in past 12 months     Recent Labs   Lab Test 05/31/22  1130   ALT 16             Passed - Recent (12 mo) or future (30 days) visit within the authorizing provider's specialty     Patient has had an office visit with the authorizing provider or a provider within the authorizing providers department within the previous 12 mos or has a future within next 30 days. See \"Patient Info\" tab in inbasket, or \"Choose Columns\" in Meds & Orders section of the refill encounter.              Passed - Medication is active on med list        Passed - Patient is age 18 or older        Passed - No active pregnancy on record        Passed - No positive pregnancy test in past year             Julia Hernandez RN 07/07/22 3:46 PM  " 5

## 2022-07-20 ENCOUNTER — OFFICE VISIT (OUTPATIENT)
Dept: INTERNAL MEDICINE | Facility: CLINIC | Age: 72
End: 2022-07-20
Payer: COMMERCIAL

## 2022-07-20 VITALS
BODY MASS INDEX: 30.05 KG/M2 | OXYGEN SATURATION: 99 % | DIASTOLIC BLOOD PRESSURE: 70 MMHG | HEART RATE: 65 BPM | SYSTOLIC BLOOD PRESSURE: 122 MMHG | WEIGHT: 176 LBS | HEIGHT: 64 IN

## 2022-07-20 DIAGNOSIS — E03.8 OTHER SPECIFIED HYPOTHYROIDISM: ICD-10-CM

## 2022-07-20 DIAGNOSIS — I10 PRIMARY HYPERTENSION: ICD-10-CM

## 2022-07-20 DIAGNOSIS — N18.30 STAGE 3 CHRONIC KIDNEY DISEASE, UNSPECIFIED WHETHER STAGE 3A OR 3B CKD (H): ICD-10-CM

## 2022-07-20 DIAGNOSIS — F41.1 ANXIETY STATE: ICD-10-CM

## 2022-07-20 DIAGNOSIS — M81.0 SENILE OSTEOPOROSIS: Primary | ICD-10-CM

## 2022-07-20 DIAGNOSIS — F51.02 ADJUSTMENT INSOMNIA: ICD-10-CM

## 2022-07-20 DIAGNOSIS — R73.09 OTHER ABNORMAL GLUCOSE: ICD-10-CM

## 2022-07-20 DIAGNOSIS — G20.A1 PARKINSON DISEASE (H): ICD-10-CM

## 2022-07-20 PROCEDURE — 99214 OFFICE O/P EST MOD 30 MIN: CPT | Performed by: INTERNAL MEDICINE

## 2022-07-20 RX ORDER — LOSARTAN POTASSIUM 50 MG/1
50 TABLET ORAL DAILY
Qty: 90 TABLET | Refills: 3 | Status: SHIPPED | OUTPATIENT
Start: 2022-07-20 | End: 2023-04-25

## 2022-07-20 RX ORDER — LORAZEPAM 0.5 MG/1
0.5 TABLET ORAL DAILY PRN
Qty: 30 TABLET | Refills: 0 | Status: SHIPPED | OUTPATIENT
Start: 2022-07-20 | End: 2024-03-01

## 2022-07-20 ASSESSMENT — ANXIETY QUESTIONNAIRES
GAD7 TOTAL SCORE: 7
7. FEELING AFRAID AS IF SOMETHING AWFUL MIGHT HAPPEN: NOT AT ALL
8. IF YOU CHECKED OFF ANY PROBLEMS, HOW DIFFICULT HAVE THESE MADE IT FOR YOU TO DO YOUR WORK, TAKE CARE OF THINGS AT HOME, OR GET ALONG WITH OTHER PEOPLE?: SOMEWHAT DIFFICULT

## 2022-07-20 NOTE — PROGRESS NOTES
"  (M81.0) Senile osteoporosis  (primary encounter diagnosis)  Comment: Due for Prolia # 5 today, but had tooth extraction done 2 days ago. We will postpone Prolia for 10 days.  Plan: DX Hip/Pelvis/Spine, Ionized Calcium, Vitamin D        Deficiency                (F51.02) Adjustment insomnia  Comment: stable, using lorazepam 2-3x/month.  Plan: LORazepam (ATIVAN) 0.5 MG tablet            (F41.1) Anxiety state  Comment:   Plan: LORazepam (ATIVAN) 0.5 MG tablet              (G20) Parkinson disease (H)  Comment: seeing Neurology, on benztropine      (N18.30) Stage 3 chronic kidney disease, unspecified whether stage 3a or 3b CKD (H)  Comment: stable, last Cr 1.19.      (I10) Primary hypertension  Comment: Comment: stable on metoprolol, amlodipine, losartan.  Plan: losartan (COZAAR) 50 MG tablet            (E03.8) Other specified hypothyroidism  Comment: on levothyroxine  Plan: TSH            (R73.09) Prediabetes  Comment: last HgbA1c 5.7  Plan: Hemoglobin A1c            Patient was educated on safety of Prolia utilizing Patient Counseling Chart for Healthcare Providers, as outlined by the Prolia REMS progam.     Return in about 6 months (around 1/20/2023) for AWV, Prolia.    Patient Instructions   Prolia 5th in 10 days when tooth extraction site is healed.  Prolia 6th in 6 months with AWV.    DXA in 10/2022.   Phone number to schedule [phone].    Daily calcium need is 9616-3617 mg a day from the diet and supplements.  Calcium citrate is easier to digest.  Vitamin D 2000 IU daily recommended.    Risk of rebound vertebral fractures is higher when Prolia suddenly stopped or dose was missed.     Prolia and Covid vaccine should always be  for at least 7 days.    When you go to the lab in August, ask them to  Dr Vinson's and my orders.          /70 (BP Location: Right arm, Patient Position: Sitting)   Pulse 65   Ht 1.626 m (5' 4\")   Wt 79.8 kg (176 lb)   SpO2 99%   BMI 30.21 kg/m        Did you " experience any problems with previous Prolia injection? no  Any medication change in the last 6 months? no  Did you take prednisone or other immunosupressant drugs in the last 6 months   (chemo, transplant, rheum, dermatology conditions)? no  Did you have any serious infection in the last 6 months?no  Any recent hospitalizations?no  Do you plan any dental work in the next 2-3 months?no  How much calcium do you take daily from the diet and supplements?1200 mg  How much vit D do you take daily? 2000 IU  Last DXA?10/2020,  Reviewed and discussed      Patient is here today for the 5th Prolia injection. Patient tolerated previous injections well.   We discussed calcium and vit D daily needs today.   I reviewed the lab results:  Component      Latest Ref Rng & Units 12/21/2021   Sodium      136 - 145 mmol/L 138   Potassium      3.5 - 5.0 mmol/L 4.3   Chloride      98 - 107 mmol/L 110 (H)   Carbon Dioxide      22 - 31 mmol/L 17 (L)   Anion Gap      5 - 18 mmol/L 11   Urea Nitrogen      8 - 28 mg/dL 30 (H)   Creatinine      0.60 - 1.10 mg/dL 1.21 (H)   Calcium      8.5 - 10.5 mg/dL 9.4   Glucose      70 - 125 mg/dL 101   Alkaline Phosphatase      45 - 120 U/L 55   AST      0 - 40 U/L 30   ALT      0 - 45 U/L 9   Protein Total      6.0 - 8.0 g/dL 6.7   Albumin      3.5 - 5.0 g/dL 4.0   Bilirubin Total      0.0 - 1.0 mg/dL 0.7   GFR Estimate      >60 mL/min/1.73m2 47 (L)       We discussed high risk of rebound vertebral fractures when Prolia suddenly stopped.    Next Prolia injection will be in 6 months.           This note has been dictated using voice recognition software. Any grammatical or context distortions are unintentional and inherent to the software      Patient Active Problem List   Diagnosis     Herpes Simplex Type II     Anxiety     Anemia in chronic kidney disease     Hyperlipemia     Gout     Hypertension     Prediabetes     Endometriosis     Other specified hypothyroidism     Seronegative rheumatoid arthritis  of multiple sites (H)     High risk medication use     Bilateral primary osteoarthritis of knee     Small bowel obstruction (H)     Senile osteoporosis     Compression fracture of L2 vertebra with routine healing     Chronic kidney disease, stage 3 (H)     Venous (peripheral) insufficiency     Exposure to bat without known bite     Parkinson disease (H)       Current Outpatient Medications   Medication     acetaminophen (TYLENOL) 500 MG tablet     acyclovir (ZOVIRAX) 400 MG tablet     allopurinol (ZYLOPRIM) 100 MG tablet     amLODIPine (NORVASC) 10 MG tablet     atorvastatin (LIPITOR) 10 MG tablet     benztropine (COGENTIN) 0.5 MG tablet     carboxymethylcellulose (REFRESH PLUS) 0.5 % Dpet ophthalmic dropperette     cyanocobalamin, vitamin B-12, 1,000 mcg Subl     DULoxetine (CYMBALTA) 20 MG capsule     fluticasone (FLONASE) 50 MCG/ACT nasal spray     hydroxychloroquine (PLAQUENIL) 200 MG tablet     levothyroxine (SYNTHROID/LEVOTHROID) 50 MCG tablet     lidocaine (LIDODERM) 5 % patch     LORazepam (ATIVAN) 0.5 MG tablet     losartan (COZAAR) 50 MG tablet     methotrexate sodium 2.5 MG TABS     metoprolol tartrate (LOPRESSOR) 50 MG tablet     sodium bicarbonate 650 MG tablet     denosumab (PROLIA) 60 MG/ML SOSY injection     folic acid (FOLVITE) 1 MG tablet     Current Facility-Administered Medications   Medication     denosumab (PROLIA) injection 60 mg     denosumab (PROLIA) injection 60 mg

## 2022-07-20 NOTE — PATIENT INSTRUCTIONS
Prolia 5th in 10 days when tooth extraction site is healed.  Prolia 6th in 6 months with AWV.    DXA in 10/2022.   Phone number to schedule [phone].    Daily calcium need is 1945-7229 mg a day from the diet and supplements.  Calcium citrate is easier to digest.  Vitamin D 2000 IU daily recommended.    Risk of rebound vertebral fractures is higher when Prolia suddenly stopped or dose was missed.     Prolia and Covid vaccine should always be  for at least 7 days.    When you go to the lab in August, ask them to  Dr Vinson's and my orders.

## 2022-07-29 ENCOUNTER — DOCUMENTATION ONLY (OUTPATIENT)
Dept: LAB | Facility: CLINIC | Age: 72
End: 2022-07-29

## 2022-07-30 ENCOUNTER — HEALTH MAINTENANCE LETTER (OUTPATIENT)
Age: 72
End: 2022-07-30

## 2022-08-01 ENCOUNTER — ALLIED HEALTH/NURSE VISIT (OUTPATIENT)
Dept: FAMILY MEDICINE | Facility: CLINIC | Age: 72
End: 2022-08-01
Payer: COMMERCIAL

## 2022-08-01 DIAGNOSIS — M81.0 SENILE OSTEOPOROSIS: Primary | ICD-10-CM

## 2022-08-01 PROCEDURE — 96372 THER/PROPH/DIAG INJ SC/IM: CPT | Performed by: INTERNAL MEDICINE

## 2022-08-01 PROCEDURE — 99207 PR NO CHARGE NURSE ONLY: CPT

## 2022-08-01 NOTE — PROGRESS NOTES
1. Did you experience any problems with previous Prolia injection? no  2. Do you feel sick today?(fever, RS, GI,  issues)? no  3. Any medication changes in the last 6 months? no  4. Did you take prednisone or other immunosuppressant drugs in the last 6 months?(chemo, transplant, rheu, dermatology conditions)? no  5. Did you have any serious infection in the last 6 months? (pancreatitis) no  6. Any recent hospitalizations/ surgeries (especially gastric bypass, thyroid, parathyroid)? no  7. Do you plan any dental work?(especially implants and extractions) in the next 2-3 months? no  8. Did you have any fractures in the last year? no    Indication: Prolia  (denosumab) is a prescription medicine used to treat osteoporosis in patients who:     Are at high risk for fracture, meaning patients who have had a fracture related to osteoporosis, or who have multiple risk factors for fracture     Cannot use another osteoporosis medicine or other osteoporosis medicines did not work well   The timeline for early/late injections would be 4 weeks early and any time after the 6 month ivonne. If a patient receives their injection late, then the subsequent injection would be 6 months from the date that they actually received the injection    1.  When was the last injection?  1/20/22  2.  Did they check with their insurance for this calendar year?  yes  3.  Is there an order in the chart?  yes  4.  Has the patient had dental work involving the bone in the past month or will have work in the next 6 months?  Yes had extraction a few weeks ago but it is all healed and closed. Ok per Dr Maurer  5.  Did you have the patient wait 15 minutes after the injection?  yes  6.  Remember to use .injection under the medication notes    The following steps were completed to comply with the REMS program for Prolia:    Reviewed information in the Medication Guide and Patient Counseling Chart, including the serious risks of Prolia  and the symptoms of  each risk.    Advised patient to seek prompt medical attention if they have signs or symptoms of any of the serious risks.  Provided each patient a copy of the Medication Guide and Patient Brochure.  Clinic Administered Medication Documentation

## 2022-08-04 ENCOUNTER — IMMUNIZATION (OUTPATIENT)
Dept: NURSING | Facility: CLINIC | Age: 72
End: 2022-08-04
Payer: COMMERCIAL

## 2022-08-04 PROCEDURE — 0064A COVID-19,PF,MODERNA (18+ YRS BOOSTER .25ML): CPT

## 2022-08-04 PROCEDURE — 91306 COVID-19,PF,MODERNA (18+ YRS BOOSTER .25ML): CPT

## 2022-08-08 ENCOUNTER — DOCUMENTATION ONLY (OUTPATIENT)
Dept: LAB | Facility: CLINIC | Age: 72
End: 2022-08-08

## 2022-08-08 DIAGNOSIS — M06.09 SERONEGATIVE RHEUMATOID ARTHRITIS OF MULTIPLE SITES (H): Primary | ICD-10-CM

## 2022-08-12 ENCOUNTER — LAB (OUTPATIENT)
Dept: LAB | Facility: CLINIC | Age: 72
End: 2022-08-12
Payer: COMMERCIAL

## 2022-08-12 DIAGNOSIS — E03.8 OTHER SPECIFIED HYPOTHYROIDISM: ICD-10-CM

## 2022-08-12 DIAGNOSIS — M06.09 SERONEGATIVE RHEUMATOID ARTHRITIS OF MULTIPLE SITES (H): ICD-10-CM

## 2022-08-12 DIAGNOSIS — M81.0 SENILE OSTEOPOROSIS: ICD-10-CM

## 2022-08-12 DIAGNOSIS — R73.09 OTHER ABNORMAL GLUCOSE: ICD-10-CM

## 2022-08-12 LAB
ALBUMIN SERPL BCG-MCNC: 4.4 G/DL (ref 3.5–5.2)
ALT SERPL W P-5'-P-CCNC: 14 U/L (ref 10–35)
CA-I BLD-MCNC: 5.1 MG/DL (ref 4.4–5.2)
CREAT SERPL-MCNC: 1.28 MG/DL (ref 0.51–0.95)
DEPRECATED CALCIDIOL+CALCIFEROL SERPL-MC: 56 UG/L (ref 20–75)
ERYTHROCYTE [DISTWIDTH] IN BLOOD BY AUTOMATED COUNT: 14.2 % (ref 10–15)
GFR SERPL CREATININE-BSD FRML MDRD: 44 ML/MIN/1.73M2
HBA1C MFR BLD: 5.6 % (ref 0–5.6)
HCT VFR BLD AUTO: 33.8 % (ref 35–47)
HGB BLD-MCNC: 11 G/DL (ref 11.7–15.7)
MCH RBC QN AUTO: 31.5 PG (ref 26.5–33)
MCHC RBC AUTO-ENTMCNC: 32.5 G/DL (ref 31.5–36.5)
MCV RBC AUTO: 97 FL (ref 78–100)
PLATELET # BLD AUTO: 154 10E3/UL (ref 150–450)
RBC # BLD AUTO: 3.49 10E6/UL (ref 3.8–5.2)
TSH SERPL DL<=0.005 MIU/L-ACNC: 2.73 UIU/ML (ref 0.3–4.2)
WBC # BLD AUTO: 4.7 10E3/UL (ref 4–11)

## 2022-08-12 PROCEDURE — 82040 ASSAY OF SERUM ALBUMIN: CPT

## 2022-08-12 PROCEDURE — 85027 COMPLETE CBC AUTOMATED: CPT

## 2022-08-12 PROCEDURE — 82330 ASSAY OF CALCIUM: CPT

## 2022-08-12 PROCEDURE — 83036 HEMOGLOBIN GLYCOSYLATED A1C: CPT

## 2022-08-12 PROCEDURE — 82565 ASSAY OF CREATININE: CPT

## 2022-08-12 PROCEDURE — 36415 COLL VENOUS BLD VENIPUNCTURE: CPT

## 2022-08-12 PROCEDURE — 84460 ALANINE AMINO (ALT) (SGPT): CPT

## 2022-08-12 PROCEDURE — 82306 VITAMIN D 25 HYDROXY: CPT

## 2022-08-12 PROCEDURE — 84443 ASSAY THYROID STIM HORMONE: CPT

## 2022-08-15 ENCOUNTER — OFFICE VISIT (OUTPATIENT)
Dept: RHEUMATOLOGY | Facility: CLINIC | Age: 72
End: 2022-08-15
Payer: COMMERCIAL

## 2022-08-15 VITALS — SYSTOLIC BLOOD PRESSURE: 120 MMHG | HEART RATE: 68 BPM | DIASTOLIC BLOOD PRESSURE: 58 MMHG

## 2022-08-15 DIAGNOSIS — Z79.899 HIGH RISK MEDICATION USE: ICD-10-CM

## 2022-08-15 DIAGNOSIS — M06.09 SERONEGATIVE RHEUMATOID ARTHRITIS OF MULTIPLE SITES (H): Primary | ICD-10-CM

## 2022-08-15 DIAGNOSIS — N18.32 STAGE 3B CHRONIC KIDNEY DISEASE (H): ICD-10-CM

## 2022-08-15 DIAGNOSIS — M17.0 BILATERAL PRIMARY OSTEOARTHRITIS OF KNEE: ICD-10-CM

## 2022-08-15 PROCEDURE — 20610 DRAIN/INJ JOINT/BURSA W/O US: CPT | Mod: 50 | Performed by: INTERNAL MEDICINE

## 2022-08-15 PROCEDURE — 99214 OFFICE O/P EST MOD 30 MIN: CPT | Mod: 25 | Performed by: INTERNAL MEDICINE

## 2022-08-15 RX ORDER — TRIAMCINOLONE ACETONIDE 40 MG/ML
40 INJECTION, SUSPENSION INTRA-ARTICULAR; INTRAMUSCULAR ONCE
Status: COMPLETED | OUTPATIENT
Start: 2022-08-15 | End: 2022-08-15

## 2022-08-15 RX ADMIN — TRIAMCINOLONE ACETONIDE 40 MG: 40 INJECTION, SUSPENSION INTRA-ARTICULAR; INTRAMUSCULAR at 16:41

## 2022-08-15 RX ADMIN — TRIAMCINOLONE ACETONIDE 40 MG: 40 INJECTION, SUSPENSION INTRA-ARTICULAR; INTRAMUSCULAR at 16:39

## 2022-08-15 NOTE — PROGRESS NOTES
Rheumatology follow-up visit note     Claudia is a 71 year old female presents today for follow-up.    Claudia was seen today for recheck.    Diagnoses and all orders for this visit:    Seronegative rheumatoid arthritis of multiple sites (H)  -     triamcinolone (KENALOG-40) injection 40 mg  -     triamcinolone (KENALOG-40) injection 40 mg  -     ASPIRATION/INJECTION MAJOR JOINT    Bilateral primary osteoarthritis of knee  -     triamcinolone (KENALOG-40) injection 40 mg  -     triamcinolone (KENALOG-40) injection 40 mg  -     ASPIRATION/INJECTION MAJOR JOINT    High risk medication use    Stage 3b chronic kidney disease (H)            After pros and cons were discussed including risk of infection, bleeding, skin changes including thinning, pigmentary alteration and scarring to name a few, left knee, right knee injected as noted in the orders section. This was done with nontouch technique.  The patient tolerated the procedure well and had a brisk Marcaine effect.  The postinjection care was discussed.      Follow up in 6 months.    HPI    Claudia Wise is a 71 year old female is here for follow-up of seronegative Rheumatoid Arthritis, osteoarthritis with his various manifestations, renal impairment and osteoarthritis.  She has tolerated the current combination well.  She is noted worsening pain.  This is in her knees.  This is bilateral.  She has not observed swelling. More recently the symptoms have gotten worse to the point where she has difficult time ambulating going up and down the steps.  The previous corticosteroid injections in the knees provided her with good relief till recently.  She manages this well with over-the-counter measures and corticosteroid injections intermittently.  She is aware not to take nonsteroidals because of renal impairment.  Reminded of eye examination.  Overall disease activity:  stable. Limitation on activities as noted in the MDHAQ scanned in the EMR.          DETAILED  EXAMINATION  08/15/22  :    Vitals:    08/15/22 1343   BP: 120/58   BP Location: Right arm   Patient Position: Sitting   Cuff Size: Adult Large   Pulse: 68     Alert oriented. Head including the face is examined for malar rash, heliotropes, scarring, lupus pernio. Eyes examined for redness such as in episcleritis/scleritis, periorbital lesions.   Neck/ Face examined for parotid gland swelling, range of motion of neck.  Left upper and lower and right upper and lower extremities examined for tenderness, swelling, warmth of the appendicular joints, range of motion, edema, rash.  Some of the important findings included: she does not have evidence of synovitis in the palpable joints of the upper extremities.  No significant deformities of the digits.  small [place]berden nodes.  Range of motion of the shoulders  show full abduction.  No JLT effusion or warmth of the knees.  she does not have dactylitis of the digits.     Patient Active Problem List    Diagnosis Date Noted     Parkinson disease (H) 07/20/2022     Priority: Medium     Exposure to bat without known bite 07/28/2021     Priority: Medium     Venous (peripheral) insufficiency 07/01/2021     Priority: Medium     Other specified hypothyroidism      Priority: Medium     Created by Conversion  Replacement Utility updated for latest IMO load         Chronic kidney disease, stage 3 (H) 01/18/2021     Priority: Medium     Senile osteoporosis 06/02/2020     Priority: Medium     Compression fracture of L2 vertebra with routine healing 06/02/2020     Priority: Medium     Small bowel obstruction (H) 02/10/2020     Priority: Medium     Bilateral primary osteoarthritis of knee 05/23/2019     Priority: Medium     Anemia in chronic kidney disease      Priority: Medium     Created by Conversion         Gout      Priority: Medium     Created by Conversion         Hyperlipemia      Priority: Medium     Created by Conversion         High risk medication use 08/25/2016     Priority:  Medium     Herpes Simplex Type II      Priority: Medium     Created by Conversion  [facility] Annotation: Nov 13 2007  4:56PM - Oma Reddy: By pt's   history.    Allergy to valtrex.  She came to me on prn acyclovir.  Replacement Utility updated for latest IMO load         Anxiety      Priority: Medium     Created by Conversion  Replacement Utility updated for latest IMO load         Hypertension      Priority: Medium     Created by Conversion  Replacement Utility updated for latest IMO load         Endometriosis      Priority: Medium     Created by Conversion  [facility] Annotation: Nov 13 2007  4:58PM - Katie Meyers: s/p RANULFO LONGORIA   1987.  Replacement Utility updated for latest IMO load         Seronegative rheumatoid arthritis of multiple sites (H) 03/01/2016     Priority: Medium     Prediabetes      Priority: Medium     Created by Conversion         Past Surgical History:   Procedure Laterality Date     COLON SURGERY  1986     COLPORRHAPHY N/A 7/7/2016    Procedure: ANTERIOR REPAIR WITH MESH;  Surgeon: Zac Stone MD;  Location: [facility];  Service:       UGI ENDOSCOPY W PLACEMENT GASTROSTOMY TUBE PERCUT N/A 2/7/2020    Procedure: Percutaneous Endoscopic Gastromstomy Tube Placement;  Surgeon: Zion Schwarz MD;  Location: [facility] OR;  Service: General     HYSTERECTOMY       LAPAROTOMY EXPLORATORY N/A 1/23/2020    Procedure: EXPLORATORY LAPARATOMY, EXTENSIVE LYSIS OF ADHESIONS, SMALL BOWEL RESECTION;  Surgeon: Claudia Jeronimo MD;  Location: [facility] OR;  Service: General     OOPHORECTOMY        Past Medical History:   Diagnosis Date     Anemia      Anxiety      Chronic kidney disease      Diabetes mellitus, type II (H)     prediabetes     Disease of thyroid gland     hypothyroid     Family history of myocardial infarction      Gout      High cholesterol      Hypertension      Inverted nipple     right     Macular edema      Osteoarthritis 2012     Rheumatoid arthritis (H)  2012     Allergies   Allergen Reactions     Bupropion Hcl [Bupropion] Nausea     Erythromycin Base [Erythromycin] Nausea     Paxil [Paroxetine] Diarrhea     Tetracyclines Itching     Valacyclovir Nausea     Current Outpatient Medications   Medication Sig Dispense Refill     acetaminophen (TYLENOL) 500 MG tablet Take 500 mg by mouth every 6 hours as needed        acyclovir (ZOVIRAX) 400 MG tablet TAKE ONE TABLET BY MOUTH EVERY 8 HOURS AS NEEDED 60 tablet 0     allopurinol (ZYLOPRIM) 100 MG tablet Take 1 tablet (100 mg) by mouth 2 times daily (with meals) 180 tablet 3     amLODIPine (NORVASC) 10 MG tablet Take 1 tablet by mouth once daily 90 tablet 0     atorvastatin (LIPITOR) 10 MG tablet TAKE 1 TABLET BY MOUTH AT BEDTIME 90 tablet 3     benztropine (COGENTIN) 0.5 MG tablet Take 1 tablet (0.5 mg) by mouth 2 times daily 180 tablet 1     carboxymethylcellulose (REFRESH PLUS) 0.5 % Dpet ophthalmic dropperette [CARBOXYMETHYLCELLULOSE (REFRESH PLUS) 0.5 % DPET OPHTHALMIC DROPPERETTE] Administer 1-2 drops to both eyes 4 (four) times a day as needed.        cyanocobalamin, vitamin B-12, 1,000 mcg Subl [CYANOCOBALAMIN, VITAMIN B-12, 1,000 MCG SUBL] Place 1 tablet (1,000 mcg total) under the tongue daily.  0     denosumab (PROLIA) 60 MG/ML SOSY injection Inject 60 mg Subcutaneous       DULoxetine (CYMBALTA) 20 MG capsule Take 1 capsule (20 mg) by mouth daily 90 capsule 3     fluticasone (FLONASE) 50 MCG/ACT nasal spray Use 1 spray(s) in each nostril once daily 16 g 0     folic acid (FOLVITE) 1 MG tablet Take 1 tablet (1,000 mcg) by mouth daily 90 tablet 0     hydroxychloroquine (PLAQUENIL) 200 MG tablet Take 1 tablet (200 mg) by mouth 2 times daily 180 tablet 0     levothyroxine (SYNTHROID/LEVOTHROID) 50 MCG tablet TAKE 1 TABLET BY MOUTH ONCE DAILY IN THE MORNING 90 tablet 1     lidocaine (LIDODERM) 5 % patch Place 1 patch onto the skin every 24 hours To prevent lidocaine toxicity, patient should be patch free for 12 hrs  daily. 14 patch 1     LORazepam (ATIVAN) 0.5 MG tablet Take 1 tablet (0.5 mg) by mouth daily as needed for anxiety 30 tablet 0     losartan (COZAAR) 50 MG tablet Take 1 tablet (50 mg) by mouth daily 90 tablet 3     methotrexate sodium 2.5 MG TABS TAKE 4 TABLETS BY MOUTH ONCE A WEEK 48 tablet 0     metoprolol tartrate (LOPRESSOR) 50 MG tablet Take 1 tablet by mouth twice daily 180 tablet 2     sodium bicarbonate 650 MG tablet [SODIUM BICARBONATE 650 MG TABLET] Take 2 tablets (1,300 mg total) by mouth 2 (two) times a day. 100 tablet 3     family history includes Breast Cancer in her maternal aunt; Cancer in her cousin, father, maternal aunt, maternal grandmother, and sister; Coronary Artery Disease (age of onset: 49.00) in her mother.  Social Connections: Not on file          WBC Count   Date Value Ref Range Status   08/12/2022 4.7 4.0 - 11.0 10e3/uL Final     RBC Count   Date Value Ref Range Status   08/12/2022 3.49 (L) 3.80 - 5.20 10e6/uL Final     Hemoglobin   Date Value Ref Range Status   08/12/2022 11.0 (L) 11.7 - 15.7 g/dL Final     Hematocrit   Date Value Ref Range Status   08/12/2022 33.8 (L) 35.0 - 47.0 % Final     MCV   Date Value Ref Range Status   08/12/2022 97 78 - 100 fL Final     MCH   Date Value Ref Range Status   08/12/2022 31.5 26.5 - 33.0 pg Final     Platelet Count   Date Value Ref Range Status   08/12/2022 154 150 - 450 10e3/uL Final     % Lymphocytes   Date Value Ref Range Status   02/28/2020 13 (L) 20 - 40 % Final   01/29/2020 7 (L) 20 - 40 % Final     AST   Date Value Ref Range Status   12/21/2021 30 0 - 40 U/L Final     ALT   Date Value Ref Range Status   08/12/2022 14 10 - 35 U/L Final     Albumin   Date Value Ref Range Status   08/12/2022 4.4 3.5 - 5.2 g/dL Final   05/31/2022 3.9 3.5 - 5.0 g/dL Final     Alkaline Phosphatase   Date Value Ref Range Status   12/21/2021 55 45 - 120 U/L Final     Creatinine   Date Value Ref Range Status   08/12/2022 1.28 (H) 0.51 - 0.95 mg/dL Final     GFR  Estimate   Date Value Ref Range Status   08/12/2022 44 (L) >60 mL/min/1.73m2 Final     Comment:     Effective December 21, 2021 eGFRcr in adults is calculated using the 2021 CKD-EPI creatinine equation which includes age and gender (Manuela et al., NEJM, DOI: 10.1056/NEPDyv2405790)   05/11/2021 38 (L) >60 mL/min/1.73m2 Final     GFR Estimate If Black   Date Value Ref Range Status   05/11/2021 46 (L) >60 mL/min/1.73m2 Final

## 2022-08-16 DIAGNOSIS — M06.09 SERONEGATIVE RHEUMATOID ARTHRITIS OF MULTIPLE SITES (H): ICD-10-CM

## 2022-08-16 RX ORDER — HYDROXYCHLOROQUINE SULFATE 200 MG/1
TABLET, FILM COATED ORAL
Qty: 180 TABLET | Refills: 0 | Status: SHIPPED | OUTPATIENT
Start: 2022-08-16 | End: 2023-02-15

## 2022-08-24 ENCOUNTER — OFFICE VISIT (OUTPATIENT)
Dept: NEUROLOGY | Facility: CLINIC | Age: 72
End: 2022-08-24
Payer: COMMERCIAL

## 2022-08-24 VITALS
DIASTOLIC BLOOD PRESSURE: 66 MMHG | SYSTOLIC BLOOD PRESSURE: 138 MMHG | BODY MASS INDEX: 28.17 KG/M2 | HEIGHT: 64 IN | WEIGHT: 165 LBS | HEART RATE: 66 BPM

## 2022-08-24 DIAGNOSIS — T88.7XXA MEDICATION SIDE EFFECTS: ICD-10-CM

## 2022-08-24 DIAGNOSIS — R47.1 DYSARTHRIA: Primary | ICD-10-CM

## 2022-08-24 DIAGNOSIS — G20.C PRIMARY PARKINSONISM (H): ICD-10-CM

## 2022-08-24 PROCEDURE — 99214 OFFICE O/P EST MOD 30 MIN: CPT | Performed by: PSYCHIATRY & NEUROLOGY

## 2022-08-24 RX ORDER — BENZTROPINE MESYLATE 0.5 MG/1
1 TABLET ORAL 2 TIMES DAILY
Qty: 360 TABLET | Refills: 1 | Status: SHIPPED | OUTPATIENT
Start: 2022-08-24 | End: 2023-01-23

## 2022-08-24 NOTE — NURSING NOTE
Chief Complaint   Patient presents with     Tremors     Follow up      Esteban Lara CMA@ on 8/24/2022 at 1:07 PM

## 2022-08-24 NOTE — PROGRESS NOTES
NEUROLOGY OUTPATIENT PROGRESS NOTE   Aug 24, 2022     CHIEF COMPLAINT/REASON FOR VISIT/REASON FOR CONSULT  Patient presents with:  Tremors: Follow up   Tremors    REASON FOR CONSULTATION- Tremors    REFERRAL SOURCE  Dr. Isabel Maurer  CC Dr. Isabel Maurer    HISTORY OF PRESENT ILLNESS  Claudia Wise is a 71 year old female seen today for evaluation of tremors.  Reports that the tremors have been there since February 2020.  Reports that the tremors are mainly in the left upper extremity.  Notices that they are present at rest.  They wake her up from sleep.  They can be present with activity as well.  Denies any gait difficulty though does have back issues and left leg weakness from the back issues which could affect her walking.  Reports no masklike facies.  No other new medications.  Has not been on any antipsychotics in the past.  Does have a diagnosis of depression anxiety and is on Cymbalta.  No tremors on the right side.  Denies any cognitive issues.  No rigidity.  Does have family history of Parkinson's disease in her aunt.    2/16/22  Patient returns today.  She could not tolerate the Sinemet.  Had dry heaving.  Did try to take a lower dose.  Did try to take it with crackers but still had dry heaving.  Did not feel any significant benefit.  Tremors were better in the evening time with the medication though the tremors are always better in the evening time.  Reports no other new issues.  Reports that her depression anxiety under good control.  Her  does not think she has masklike facies.  No other new symptoms or concerns.    She is working with her therapist for sacroiliac pain.  She is walking 10 minutes every other day.  Encourage her to do it more often.    4/14/22  Patient returns today.  Reports that the medication is somewhat helpful.  She is walking little bit faster.  Is doing physical therapy which is helping.  She forgets to take the medication in the evening time.  Does have some  complains of slurred speech as well.  No other new symptoms/complaints.    6/22/22  Patient returns today.  Reports that she had visual hallucinations with the Requip.  She would see people at night.  They would not talk to her.  There were no auditory hallucinations just that she would see people.  Stopping the Requip these hallucinations are gone away.  Is sleeping well at night.  Reports no jerking or any other activity.  Previously she had reported some dysarthria which has now improved.  She denies that she ever had dysarthria.  Has not seen the speech specialist.    8/24/22  Patient returns today.  She did try the Cogentin has not noticed any benefit.  Has not had any side effects either.  She continues to have the tremors.  Feels that anxiety makes them worse.  No issues with hallucinations or any abnormal dreams.  Denies any other new concerns.  She is using Nordic sticks to help her walk.  Did see her primary care doctor for chronic kidney disease.  She is also following up with a rheumatologist.    Previous history is reviewed and this is unchanged.    PAST MEDICAL/SURGICAL HISTORY  Past Medical History:   Diagnosis Date     Anemia      Anxiety      Chronic kidney disease      Diabetes mellitus, type II (H)     prediabetes     Disease of thyroid gland     hypothyroid     Family history of myocardial infarction      Gout      High cholesterol      Hypertension      Inverted nipple     right     Macular edema      Osteoarthritis 2012     Rheumatoid arthritis (H) 2012     Patient Active Problem List   Diagnosis     Herpes Simplex Type II     Anxiety     Anemia in chronic kidney disease     Hyperlipemia     Gout     Hypertension     Prediabetes     Endometriosis     Other specified hypothyroidism     Seronegative rheumatoid arthritis of multiple sites (H)     High risk medication use     Bilateral primary osteoarthritis of knee     Small bowel obstruction (H)     Senile osteoporosis     Compression fracture of  L2 vertebra with routine healing     Chronic kidney disease, stage 3 (H)     Venous (peripheral) insufficiency     Exposure to bat without known bite     Parkinson disease (H)   Significant for high cholesterol, high blood pressure, diabetes, arthritis, depression, osteoporosis    FAMILY HISTORY  Family History   Problem Relation Age of Onset     Breast Cancer Maternal Aunt      Cancer Maternal Aunt      Cancer Father      Cancer Sister      Cancer Maternal Grandmother      Cancer Cousin      Coronary Artery Disease Mother 49.00   Family history reviewed and negative for neurological conditions except for Parkinson's disease in her aunt.    SOCIAL HISTORY  Social History     Tobacco Use     Smoking status: Former Smoker     Quit date: 1995     Years since quittin.1     Smokeless tobacco: Never Used   Substance Use Topics     Alcohol use: Yes     Comment: Alcoholic Drinks/day: occasional couple times a year     Drug use: No       SYSTEMS REVIEW  Twelve-system ROS was done and other than the HPI this was negative except for neck pain, back pain, arm and leg pain, joint pain, weakness paralysis, difficulty walking, balance coordination problems, movement disorder, bladder symptoms, anxiety, depression, weight gain.  No new concerns or issues reported today.    MEDICATIONS  acetaminophen (TYLENOL) 500 MG tablet, Take 500 mg by mouth every 6 hours as needed   acyclovir (ZOVIRAX) 400 MG tablet, TAKE ONE TABLET BY MOUTH EVERY 8 HOURS AS NEEDED  allopurinol (ZYLOPRIM) 100 MG tablet, Take 1 tablet (100 mg) by mouth 2 times daily (with meals)  amLODIPine (NORVASC) 10 MG tablet, Take 1 tablet by mouth once daily  atorvastatin (LIPITOR) 10 MG tablet, TAKE 1 TABLET BY MOUTH AT BEDTIME  carboxymethylcellulose (REFRESH PLUS) 0.5 % Dpet ophthalmic dropperette, [CARBOXYMETHYLCELLULOSE (REFRESH PLUS) 0.5 % DPET OPHTHALMIC DROPPERETTE] Administer 1-2 drops to both eyes 4 (four) times a day as needed.   cyanocobalamin, vitamin  "B-12, 1,000 mcg Subl, [CYANOCOBALAMIN, VITAMIN B-12, 1,000 MCG SUBL] Place 1 tablet (1,000 mcg total) under the tongue daily.  DULoxetine (CYMBALTA) 20 MG capsule, Take 1 capsule (20 mg) by mouth daily  fluticasone (FLONASE) 50 MCG/ACT nasal spray, Use 1 spray(s) in each nostril once daily  folic acid (FOLVITE) 1 MG tablet, Take 1 tablet (1,000 mcg) by mouth daily  hydroxychloroquine (PLAQUENIL) 200 MG tablet, Take 1 tablet by mouth twice daily  levothyroxine (SYNTHROID/LEVOTHROID) 50 MCG tablet, TAKE 1 TABLET BY MOUTH ONCE DAILY IN THE MORNING  lidocaine (LIDODERM) 5 % patch, Place 1 patch onto the skin every 24 hours To prevent lidocaine toxicity, patient should be patch free for 12 hrs daily.  LORazepam (ATIVAN) 0.5 MG tablet, Take 1 tablet (0.5 mg) by mouth daily as needed for anxiety  losartan (COZAAR) 50 MG tablet, Take 1 tablet (50 mg) by mouth daily  methotrexate sodium 2.5 MG TABS, TAKE 4 TABLETS BY MOUTH ONCE A WEEK  metoprolol tartrate (LOPRESSOR) 50 MG tablet, Take 1 tablet by mouth twice daily  sodium bicarbonate 650 MG tablet, [SODIUM BICARBONATE 650 MG TABLET] Take 2 tablets (1,300 mg total) by mouth 2 (two) times a day.  denosumab (PROLIA) 60 MG/ML SOSY injection, Inject 60 mg Subcutaneous    denosumab (PROLIA) injection 60 mg  denosumab (PROLIA) injection 60 mg         PHYSICAL EXAMINATION  VITALS: /66 (BP Location: Left arm, Patient Position: Sitting)   Pulse 66   Ht 1.626 m (5' 4\")   Wt 74.8 kg (165 lb)   BMI 28.32 kg/m    GENERAL: Healthy appearing, alert, no acute distress, normal habitus.  CARDIOVASCULAR: Extremities warm and well perfused. Pulses present.   NEUROLOGICAL:  Patient is awake and grossly oriented to self, place and time.  Attention span is normal.  Memory is grossly intact.  Language is fluent and follows commands appropriately.  Appropriate fund of knowledge. Cranial nerves 2-12 are intact. There is no pronator drift.  Motor exam shows 5/5 strength in all extremities.  " Tone is symmetric bilaterally in upper and lower extremities.  Resting tremor is noted in the left upper extremity.  No cogwheeling noted.  (Previously reflexes are symmetric and 1+ in upper extremities and lower extremities. Sensory exam is grossly intact to light touch, pin prick and vibration.)  Finger to nose and heel to shin is without dysmetria.  Romberg is negative.  Gait is normal except shows decreased left arm swing with resting tremor and the patient is able to do tandem walk and walk on toes and heels with some difficulty.  Previously-trying concentric circles shows very mild tremor.  No significant change in exam today.  Exam similar to before.  Continues to have left resting tremor-minimal today.    DIAGNOSTICS  RELEVANT LABS  Component      Latest Ref Rng & Units 10/1/2020   Sodium      136 - 145 mmol/L 137   Potassium      3.5 - 5.0 mmol/L 4.4   Chloride      98 - 107 mmol/L 110 (H)   Carbon Dioxide      22 - 31 mmol/L 14 (L)   Anion Gap      5 - 18 mmol/L 13   Glucose      70 - 125 mg/dL 114   Urea Nitrogen      8 - 28 mg/dL 30 (H)   Creatinine      0.60 - 1.10 mg/dL 1.18 (H)   GFR Estimate If Black      >60 mL/min/1.73m2 55 (L)   GFR Estimate      >60 mL/min/1.73m2 45 (L)   Bilirubin Total      0.0 - 1.0 mg/dL 0.8   Calcium      8.5 - 10.5 mg/dL 8.7   Protein Total      6.0 - 8.0 g/dL 6.8   Albumin      3.5 - 5.0 g/dL 4.3   Alkaline Phosphatase      45 - 120 U/L 63   AST      0 - 40 U/L 21   ALT      0 - 45 U/L 18   WBC      4.0 - 11.0 thou/uL 7.3   RBC Count      3.80 - 5.40 mill/uL 3.12 (L)   Hemoglobin      12.0 - 16.0 g/dL 10.4 (L)   Hematocrit      35.0 - 47.0 % 31.2 (L)   MCV      80 - 100 fL 100   MCH      27.0 - 34.0 pg 33.4   MCHC      32.0 - 36.0 g/dL 33.4   RDW      11.0 - 14.5 % 14.5   Platelet Count      140 - 440 thou/uL 151   Mean Platelet Volume      7.0 - 10.0 fL 8.7   TSH      0.30 - 5.00 uIU/mL 1.56   Uric Acid      2.0 - 7.5 mg/dL 4.1     OUTSIDE RECORDS  Outside referral notes  and chart notes were reviewed and pertinent information has been summarized (in addition to the HPI):-Patient does have a diagnosis of anxiety.  Has been having some tremors.  Is on multiple antidepressant/antianxiety medications.  Also has some back pain issues.  Is also on narcotics.    LABS  Component      Latest Ref Rng & Units 12/21/2021   Sodium      136 - 145 mmol/L 138   Potassium      3.5 - 5.0 mmol/L 4.3   Chloride      98 - 107 mmol/L 110 (H)   Carbon Dioxide      22 - 31 mmol/L 17 (L)   Anion Gap      5 - 18 mmol/L 11   Urea Nitrogen      8 - 28 mg/dL 30 (H)   Creatinine      0.60 - 1.10 mg/dL 1.21 (H)   Calcium      8.5 - 10.5 mg/dL 9.4   Glucose      70 - 125 mg/dL 101   Alkaline Phosphatase      45 - 120 U/L 55   AST      0 - 40 U/L 30   ALT      0 - 45 U/L 9   Protein Total      6.0 - 8.0 g/dL 6.7   Albumin      3.5 - 5.0 g/dL 4.0   Bilirubin Total      0.0 - 1.0 mg/dL 0.7   GFR Estimate      >60 mL/min/1.73m2 47 (L)   TSH      0.30 - 5.00 uIU/mL 0.98   Hemoglobin A1C      0.0 - 5.6 % 5.7 (H)   Ferritin      10 - 130 ng/mL 253 (H)   Vitamin B12      213 - 816 pg/mL >2,000 (H)   Methylmalonic Acid      0.00 - 0.40 umol/L 0.24       IMPRESSION/REPORT/PLAN  Primary parkinsonism (H)  History of anxiety/depression  Prediabetes  Elevated ferritin  History of chronic kidney disease  Rule out restless leg syndrome  Side effect of medication  Dysarthria-not present per patient  Medication side effect    This is a 71 year old female with new onset of left upper extremity resting tremor.  Examination further shows masklike facies, decreased arm swing on the left side, left resting tremor.  No cogwheeling.  Patient symptoms are suggestive of Parkinson's disease.  Blood work was overall noncontributory.  She has had side effects with Sinemet.  Requip caused hallucinations and she stopped the medications.  Low-dose Cogentin has not helped and will increase the dose further.  Discussed side effects.  Could consider  amantadine.    Monitor for signs and symptoms of Parkinson's disease I would encourage her to be more active active and exercise multiple times a week to help prevent progression of the disease.  Recommended exercise again.  There is some family history of Parkinson's disease.  Could consider DaTscan.    She further had complained of of some slurred speech and we will set her up with speech therapy for more evaluation and treatment.  She denies ever having any slurred speech.    She does notice that the tremor wakes her up during sleep.  Possibly she has restless leg syndrome.  Ferritin was previously elevated.  Currently this is not happening and will monitor.    Blood work further shows evidence of chronic kidney disease and prediabetes which she already has.  Ferritin could be elevated due to the kidney disease.  She is following up with primary care doctor regarding this.    I can see her back in 2 months.    -     benztropine (COGENTIN) 0.5 MG tablet; Take 2 tablets (1 mg) by mouth 2 times daily    Return in about 2 months (around 10/24/2022) for In-Clinic Visit (must).    Over 30 minutes were spent coordinating the care for the patient on the day of the encounter.  This includes previsit, during visit and post visit activities as documented above.  Counseling patient.  Prescription management.  Medication side effects.  Refractory problem.  (Activities include but not inclusive of reviewing chart, reviewing outside records, reviewing labs and imaging study results as well as the images, patient visit time including getting history and exam,  use if applicable, review of test results with the patient and coming up with a plan in a shared model, counseling patient and family, education and answering patient questions, EMR , EMR diagnosis entry and problem list management, medication reconciliation and prescription management if applicable, paperwork if applicable, printing documents and  documentation of the visit activities.)    Logan Smart MD  Neurologist  [facility]  Tel:- [phone]    This note was dictated using voice recognition software.  Any grammatical or context distortions are unintentional and inherent to the software.

## 2022-08-24 NOTE — LETTER
8/24/2022         RE: Claudia Wise   [address] [facility] [address]        Dear Colleague,    Thank you for referring your patient, Claudia Wise, to the [facility] [place]. Please see a copy of my visit note below.    NEUROLOGY OUTPATIENT PROGRESS NOTE   Aug 24, 2022     CHIEF COMPLAINT/REASON FOR VISIT/REASON FOR CONSULT  Patient presents with:  Tremors: Follow up   Tremors    REASON FOR CONSULTATION- Tremors    REFERRAL SOURCE  Dr. Isabel Maurer  CC Dr. Isabel Maurer    HISTORY OF PRESENT ILLNESS  Claudia Wise is a 71 year old female seen today for evaluation of tremors.  Reports that the tremors have been there since February 2020.  Reports that the tremors are mainly in the left upper extremity.  Notices that they are present at rest.  They wake her up from sleep.  They can be present with activity as well.  Denies any gait difficulty though does have back issues and left leg weakness from the back issues which could affect her walking.  Reports no masklike facies.  No other new medications.  Has not been on any antipsychotics in the past.  Does have a diagnosis of depression anxiety and is on Cymbalta.  No tremors on the right side.  Denies any cognitive issues.  No rigidity.  Does have family history of Parkinson's disease in her aunt.    2/16/22  Patient returns today.  She could not tolerate the Sinemet.  Had dry heaving.  Did try to take a lower dose.  Did try to take it with crackers but still had dry heaving.  Did not feel any significant benefit.  Tremors were better in the evening time with the medication though the tremors are always better in the evening time.  Reports no other new issues.  Reports that her depression anxiety under good control.  Her  does not think she has masklike facies.  No other new symptoms or concerns.    She is working with her therapist for sacroiliac pain.  She is walking 10 minutes every other day.  Encourage her to do  it more often.    4/14/22  Patient returns today.  Reports that the medication is somewhat helpful.  She is walking little bit faster.  Is doing physical therapy which is helping.  She forgets to take the medication in the evening time.  Does have some complains of slurred speech as well.  No other new symptoms/complaints.    6/22/22  Patient returns today.  Reports that she had visual hallucinations with the Requip.  She would see people at night.  They would not talk to her.  There were no auditory hallucinations just that she would see people.  Stopping the Requip these hallucinations are gone away.  Is sleeping well at night.  Reports no jerking or any other activity.  Previously she had reported some dysarthria which has now improved.  She denies that she ever had dysarthria.  Has not seen the speech specialist.    8/24/22  Patient returns today.  She did try the Cogentin has not noticed any benefit.  Has not had any side effects either.  She continues to have the tremors.  Feels that anxiety makes them worse.  No issues with hallucinations or any abnormal dreams.  Denies any other new concerns.  She is using Nordic sticks to help her walk.  Did see her primary care doctor for chronic kidney disease.  She is also following up with a rheumatologist.    Previous history is reviewed and this is unchanged.    PAST MEDICAL/SURGICAL HISTORY  Past Medical History:   Diagnosis Date     Anemia      Anxiety      Chronic kidney disease      Diabetes mellitus, type II (H)     prediabetes     Disease of thyroid gland     hypothyroid     Family history of myocardial infarction      Gout      High cholesterol      Hypertension      Inverted nipple     right     Macular edema      Osteoarthritis 2012     Rheumatoid arthritis (H) 2012     Patient Active Problem List   Diagnosis     Herpes Simplex Type II     Anxiety     Anemia in chronic kidney disease     Hyperlipemia     Gout     Hypertension     Prediabetes     Endometriosis      Other specified hypothyroidism     Seronegative rheumatoid arthritis of multiple sites (H)     High risk medication use     Bilateral primary osteoarthritis of knee     Small bowel obstruction (H)     Senile osteoporosis     Compression fracture of L2 vertebra with routine healing     Chronic kidney disease, stage 3 (H)     Venous (peripheral) insufficiency     Exposure to bat without known bite     Parkinson disease (H)   Significant for high cholesterol, high blood pressure, diabetes, arthritis, depression, osteoporosis    FAMILY HISTORY  Family History   Problem Relation Age of Onset     Breast Cancer Maternal Aunt      Cancer Maternal Aunt      Cancer Father      Cancer Sister      Cancer Maternal Grandmother      Cancer Cousin      Coronary Artery Disease Mother 49.00   Family history reviewed and negative for neurological conditions except for Parkinson's disease in her aunt.    SOCIAL HISTORY  Social History     Tobacco Use     Smoking status: Former Smoker     Quit date: 1995     Years since quittin.1     Smokeless tobacco: Never Used   Substance Use Topics     Alcohol use: Yes     Comment: Alcoholic Drinks/day: occasional couple times a year     Drug use: No       SYSTEMS REVIEW  Twelve-system ROS was done and other than the HPI this was negative except for neck pain, back pain, arm and leg pain, joint pain, weakness paralysis, difficulty walking, balance coordination problems, movement disorder, bladder symptoms, anxiety, depression, weight gain.  No new concerns or issues reported today.    MEDICATIONS  acetaminophen (TYLENOL) 500 MG tablet, Take 500 mg by mouth every 6 hours as needed   acyclovir (ZOVIRAX) 400 MG tablet, TAKE ONE TABLET BY MOUTH EVERY 8 HOURS AS NEEDED  allopurinol (ZYLOPRIM) 100 MG tablet, Take 1 tablet (100 mg) by mouth 2 times daily (with meals)  amLODIPine (NORVASC) 10 MG tablet, Take 1 tablet by mouth once daily  atorvastatin (LIPITOR) 10 MG tablet, TAKE 1 TABLET BY  "MOUTH AT BEDTIME  carboxymethylcellulose (REFRESH PLUS) 0.5 % Dpet ophthalmic dropperette, [CARBOXYMETHYLCELLULOSE (REFRESH PLUS) 0.5 % DPET OPHTHALMIC DROPPERETTE] Administer 1-2 drops to both eyes 4 (four) times a day as needed.   cyanocobalamin, vitamin B-12, 1,000 mcg Subl, [CYANOCOBALAMIN, VITAMIN B-12, 1,000 MCG SUBL] Place 1 tablet (1,000 mcg total) under the tongue daily.  DULoxetine (CYMBALTA) 20 MG capsule, Take 1 capsule (20 mg) by mouth daily  fluticasone (FLONASE) 50 MCG/ACT nasal spray, Use 1 spray(s) in each nostril once daily  folic acid (FOLVITE) 1 MG tablet, Take 1 tablet (1,000 mcg) by mouth daily  hydroxychloroquine (PLAQUENIL) 200 MG tablet, Take 1 tablet by mouth twice daily  levothyroxine (SYNTHROID/LEVOTHROID) 50 MCG tablet, TAKE 1 TABLET BY MOUTH ONCE DAILY IN THE MORNING  lidocaine (LIDODERM) 5 % patch, Place 1 patch onto the skin every 24 hours To prevent lidocaine toxicity, patient should be patch free for 12 hrs daily.  LORazepam (ATIVAN) 0.5 MG tablet, Take 1 tablet (0.5 mg) by mouth daily as needed for anxiety  losartan (COZAAR) 50 MG tablet, Take 1 tablet (50 mg) by mouth daily  methotrexate sodium 2.5 MG TABS, TAKE 4 TABLETS BY MOUTH ONCE A WEEK  metoprolol tartrate (LOPRESSOR) 50 MG tablet, Take 1 tablet by mouth twice daily  sodium bicarbonate 650 MG tablet, [SODIUM BICARBONATE 650 MG TABLET] Take 2 tablets (1,300 mg total) by mouth 2 (two) times a day.  denosumab (PROLIA) 60 MG/ML SOSY injection, Inject 60 mg Subcutaneous    denosumab (PROLIA) injection 60 mg  denosumab (PROLIA) injection 60 mg         PHYSICAL EXAMINATION  VITALS: /66 (BP Location: Left arm, Patient Position: Sitting)   Pulse 66   Ht 1.626 m (5' 4\")   Wt 74.8 kg (165 lb)   BMI 28.32 kg/m    GENERAL: Healthy appearing, alert, no acute distress, normal habitus.  CARDIOVASCULAR: Extremities warm and well perfused. Pulses present.   NEUROLOGICAL:  Patient is awake and grossly oriented to self, place and " time.  Attention span is normal.  Memory is grossly intact.  Language is fluent and follows commands appropriately.  Appropriate fund of knowledge. Cranial nerves 2-12 are intact. There is no pronator drift.  Motor exam shows 5/5 strength in all extremities.  Tone is symmetric bilaterally in upper and lower extremities.  Resting tremor is noted in the left upper extremity.  No cogwheeling noted.  (Previously reflexes are symmetric and 1+ in upper extremities and lower extremities. Sensory exam is grossly intact to light touch, pin prick and vibration.)  Finger to nose and heel to shin is without dysmetria.  Romberg is negative.  Gait is normal except shows decreased left arm swing with resting tremor and the patient is able to do tandem walk and walk on toes and heels with some difficulty.  Previously-trying concentric circles shows very mild tremor.  No significant change in exam today.  Exam similar to before.  Continues to have left resting tremor-minimal today.    DIAGNOSTICS  RELEVANT LABS  Component      Latest Ref Rng & Units 10/1/2020   Sodium      136 - 145 mmol/L 137   Potassium      3.5 - 5.0 mmol/L 4.4   Chloride      98 - 107 mmol/L 110 (H)   Carbon Dioxide      22 - 31 mmol/L 14 (L)   Anion Gap      5 - 18 mmol/L 13   Glucose      70 - 125 mg/dL 114   Urea Nitrogen      8 - 28 mg/dL 30 (H)   Creatinine      0.60 - 1.10 mg/dL 1.18 (H)   GFR Estimate If Black      >60 mL/min/1.73m2 55 (L)   GFR Estimate      >60 mL/min/1.73m2 45 (L)   Bilirubin Total      0.0 - 1.0 mg/dL 0.8   Calcium      8.5 - 10.5 mg/dL 8.7   Protein Total      6.0 - 8.0 g/dL 6.8   Albumin      3.5 - 5.0 g/dL 4.3   Alkaline Phosphatase      45 - 120 U/L 63   AST      0 - 40 U/L 21   ALT      0 - 45 U/L 18   WBC      4.0 - 11.0 thou/uL 7.3   RBC Count      3.80 - 5.40 mill/uL 3.12 (L)   Hemoglobin      12.0 - 16.0 g/dL 10.4 (L)   Hematocrit      35.0 - 47.0 % 31.2 (L)   MCV      80 - 100 fL 100   MCH      27.0 - 34.0 pg 33.4   MCHC       32.0 - 36.0 g/dL 33.4   RDW      11.0 - 14.5 % 14.5   Platelet Count      140 - 440 thou/uL 151   Mean Platelet Volume      7.0 - 10.0 fL 8.7   TSH      0.30 - 5.00 uIU/mL 1.56   Uric Acid      2.0 - 7.5 mg/dL 4.1     OUTSIDE RECORDS  Outside referral notes and chart notes were reviewed and pertinent information has been summarized (in addition to the HPI):-Patient does have a diagnosis of anxiety.  Has been having some tremors.  Is on multiple antidepressant/antianxiety medications.  Also has some back pain issues.  Is also on narcotics.    LABS  Component      Latest Ref Rng & Units 12/21/2021   Sodium      136 - 145 mmol/L 138   Potassium      3.5 - 5.0 mmol/L 4.3   Chloride      98 - 107 mmol/L 110 (H)   Carbon Dioxide      22 - 31 mmol/L 17 (L)   Anion Gap      5 - 18 mmol/L 11   Urea Nitrogen      8 - 28 mg/dL 30 (H)   Creatinine      0.60 - 1.10 mg/dL 1.21 (H)   Calcium      8.5 - 10.5 mg/dL 9.4   Glucose      70 - 125 mg/dL 101   Alkaline Phosphatase      45 - 120 U/L 55   AST      0 - 40 U/L 30   ALT      0 - 45 U/L 9   Protein Total      6.0 - 8.0 g/dL 6.7   Albumin      3.5 - 5.0 g/dL 4.0   Bilirubin Total      0.0 - 1.0 mg/dL 0.7   GFR Estimate      >60 mL/min/1.73m2 47 (L)   TSH      0.30 - 5.00 uIU/mL 0.98   Hemoglobin A1C      0.0 - 5.6 % 5.7 (H)   Ferritin      10 - 130 ng/mL 253 (H)   Vitamin B12      213 - 816 pg/mL >2,000 (H)   Methylmalonic Acid      0.00 - 0.40 umol/L 0.24       IMPRESSION/REPORT/PLAN  Primary parkinsonism (H)  History of anxiety/depression  Prediabetes  Elevated ferritin  History of chronic kidney disease  Rule out restless leg syndrome  Side effect of medication  Dysarthria-not present per patient  Medication side effect    This is a 71 year old female with new onset of left upper extremity resting tremor.  Examination further shows masklike facies, decreased arm swing on the left side, left resting tremor.  No cogwheeling.  Patient symptoms are suggestive of Parkinson's  disease.  Blood work was overall noncontributory.  She has had side effects with Sinemet.  Requip caused hallucinations and she stopped the medications.  Low-dose Cogentin has not helped and will increase the dose further.  Discussed side effects.  Could consider amantadine.    Monitor for signs and symptoms of Parkinson's disease I would encourage her to be more active active and exercise multiple times a week to help prevent progression of the disease.  Recommended exercise again.  There is some family history of Parkinson's disease.  Could consider DaTscan.    She further had complained of of some slurred speech and we will set her up with speech therapy for more evaluation and treatment.  She denies ever having any slurred speech.    She does notice that the tremor wakes her up during sleep.  Possibly she has restless leg syndrome.  Ferritin was previously elevated.  Currently this is not happening and will monitor.    Blood work further shows evidence of chronic kidney disease and prediabetes which she already has.  Ferritin could be elevated due to the kidney disease.  She is following up with primary care doctor regarding this.    I can see her back in 2 months.    -     benztropine (COGENTIN) 0.5 MG tablet; Take 2 tablets (1 mg) by mouth 2 times daily    Return in about 2 months (around 10/24/2022) for In-Clinic Visit (must).    Over 30 minutes were spent coordinating the care for the patient on the day of the encounter.  This includes previsit, during visit and post visit activities as documented above.  Counseling patient.  Prescription management.  Medication side effects.  Refractory problem.  (Activities include but not inclusive of reviewing chart, reviewing outside records, reviewing labs and imaging study results as well as the images, patient visit time including getting history and exam,  use if applicable, review of test results with the patient and coming up with a plan in a shared  model, counseling patient and family, education and answering patient questions, EMR , EMR diagnosis entry and problem list management, medication reconciliation and prescription management if applicable, paperwork if applicable, printing documents and documentation of the visit activities.)    Logan Smart MD  Neurologist  [facility] Neurology [facility]  Tel:- [phone]    This note was dictated using voice recognition software.  Any grammatical or context distortions are unintentional and inherent to the software.        Again, thank you for allowing me to participate in the care of your patient.        Sincerely,        Logan Smart MD

## 2022-08-29 DIAGNOSIS — I10 ESSENTIAL HYPERTENSION: ICD-10-CM

## 2022-08-30 RX ORDER — AMLODIPINE BESYLATE 10 MG/1
10 TABLET ORAL DAILY
Qty: 90 TABLET | Refills: 3 | Status: SHIPPED | OUTPATIENT
Start: 2022-08-30 | End: 2023-08-23

## 2022-08-30 NOTE — TELEPHONE ENCOUNTER
"Routing refill request to provider for review/approval because:  Labs out of range:  Cr    Last Written Prescription Date:  05/16/22  Last Fill Quantity: 90,  # refills: 0   Last office visit provider:  07/20/22     Requested Prescriptions   Pending Prescriptions Disp Refills     amLODIPine (NORVASC) 10 MG tablet 90 tablet 0     Sig: Take 1 tablet (10 mg) by mouth daily       Calcium Channel Blockers Protocol  Failed - 8/29/2022  3:05 PM        Failed - Normal serum creatinine on file in past 12 months     Recent Labs   Lab Test 08/12/22  1150   CR 1.28*       Ok to refill medication if creatinine is low          Passed - Blood pressure under 140/90 in past 12 months     BP Readings from Last 3 Encounters:   08/24/22 138/66   08/15/22 120/58   07/20/22 122/70                 Passed - Recent (12 mo) or future (30 days) visit within the authorizing provider's specialty     Patient has had an office visit with the authorizing provider or a provider within the authorizing providers department within the previous 12 mos or has a future within next 30 days. See \"Patient Info\" tab in inbasket, or \"Choose Columns\" in Meds & Orders section of the refill encounter.              Passed - Medication is active on med list        Passed - Patient is age 18 or older        Passed - No active pregnancy on record        Passed - No positive pregnancy test in past 12 months             Zheng Hernandez RN 08/29/22 7:57 PM  "

## 2022-09-19 DIAGNOSIS — J31.0 RHINITIS, UNSPECIFIED TYPE: ICD-10-CM

## 2022-09-21 RX ORDER — FLUTICASONE PROPIONATE 50 MCG
SPRAY, SUSPENSION (ML) NASAL
Qty: 48 G | Refills: 3 | Status: SHIPPED | OUTPATIENT
Start: 2022-09-21 | End: 2024-02-23

## 2022-09-21 NOTE — TELEPHONE ENCOUNTER
"Last Written Prescription Date:  5/17/22  Last Fill Quantity: 16 g,  # refills: 0   Last office visit provider:  7/20/22     Requested Prescriptions   Pending Prescriptions Disp Refills     fluticasone (FLONASE) 50 MCG/ACT nasal spray [Pharmacy Med Name: Fluticasone Propionate 50 MCG/ACT Nasal Suspension] 16 g 0     Sig: Use 1 spray(s) in each nostril once daily       Nasal Allergy Protocol Passed - 9/21/2022  9:48 AM        Passed - Patient is age 12 or older        Passed - Recent (12 mo) or future (30 days) visit within the authorizing provider's specialty     Patient has had an office visit with the authorizing provider or a provider within the authorizing providers department within the previous 12 mos or has a future within next 30 days. See \"Patient Info\" tab in inbasket, or \"Choose Columns\" in Meds & Orders section of the refill encounter.              Passed - Medication is active on med list             David Diggs RN 09/21/22 9:48 AM  "

## 2022-10-09 ENCOUNTER — HEALTH MAINTENANCE LETTER (OUTPATIENT)
Age: 72
End: 2022-10-09

## 2022-11-15 ENCOUNTER — LAB (OUTPATIENT)
Dept: LAB | Facility: CLINIC | Age: 72
End: 2022-11-15
Payer: COMMERCIAL

## 2022-11-15 DIAGNOSIS — M06.09 SERONEGATIVE RHEUMATOID ARTHRITIS OF MULTIPLE SITES (H): ICD-10-CM

## 2022-11-15 LAB
ALBUMIN SERPL BCG-MCNC: 4.3 G/DL (ref 3.5–5.2)
ALT SERPL W P-5'-P-CCNC: 15 U/L (ref 10–35)
CREAT SERPL-MCNC: 1.34 MG/DL (ref 0.51–0.95)
ERYTHROCYTE [DISTWIDTH] IN BLOOD BY AUTOMATED COUNT: 14.7 % (ref 10–15)
GFR SERPL CREATININE-BSD FRML MDRD: 42 ML/MIN/1.73M2
HCT VFR BLD AUTO: 33.1 % (ref 35–47)
HGB BLD-MCNC: 10.7 G/DL (ref 11.7–15.7)
MCH RBC QN AUTO: 31.5 PG (ref 26.5–33)
MCHC RBC AUTO-ENTMCNC: 32.3 G/DL (ref 31.5–36.5)
MCV RBC AUTO: 97 FL (ref 78–100)
PLATELET # BLD AUTO: 166 10E3/UL (ref 150–450)
RBC # BLD AUTO: 3.4 10E6/UL (ref 3.8–5.2)
WBC # BLD AUTO: 7.6 10E3/UL (ref 4–11)

## 2022-11-15 PROCEDURE — 84460 ALANINE AMINO (ALT) (SGPT): CPT

## 2022-11-15 PROCEDURE — 82040 ASSAY OF SERUM ALBUMIN: CPT

## 2022-11-15 PROCEDURE — 36415 COLL VENOUS BLD VENIPUNCTURE: CPT

## 2022-11-15 PROCEDURE — 82565 ASSAY OF CREATININE: CPT

## 2022-11-15 PROCEDURE — 85027 COMPLETE CBC AUTOMATED: CPT

## 2022-11-22 DIAGNOSIS — N18.32 STAGE 3B CHRONIC KIDNEY DISEASE (H): ICD-10-CM

## 2022-11-23 RX ORDER — SODIUM BICARBONATE 650 MG/1
TABLET ORAL
Qty: 100 TABLET | Refills: 0 | Status: SHIPPED | OUTPATIENT
Start: 2022-11-23 | End: 2023-06-07

## 2022-11-24 NOTE — TELEPHONE ENCOUNTER
Routing refill request to provider for review/approval because:  Drug not on the [facility] refill protocol     Last Written Prescription Date:  6/10/21  Last Fill Quantity: 100,  # refills: 3   Last office visit provider:  7/20/22     Requested Prescriptions   Pending Prescriptions Disp Refills     sodium bicarbonate 650 MG tablet [Pharmacy Med Name: Sodium Bicarbonate 650 MG Oral Tablet] 100 tablet 0     Sig: TAKE 2 TABLETS BY MOUTH 2 TIMES A  DAY       There is no refill protocol information for this order          Miracle Soler RN 11/23/22 7:13 PM

## 2022-12-02 ENCOUNTER — TRANSFERRED RECORDS (OUTPATIENT)
Dept: HEALTH INFORMATION MANAGEMENT | Facility: CLINIC | Age: 72
End: 2022-12-02

## 2022-12-02 LAB — RETINOPATHY: NEGATIVE

## 2023-01-03 DIAGNOSIS — N18.32 STAGE 3B CHRONIC KIDNEY DISEASE (H): ICD-10-CM

## 2023-01-03 DIAGNOSIS — M81.0 SENILE OSTEOPOROSIS: Primary | ICD-10-CM

## 2023-01-09 ENCOUNTER — IMMUNIZATION (OUTPATIENT)
Dept: NURSING | Facility: CLINIC | Age: 73
End: 2023-01-09
Payer: COMMERCIAL

## 2023-01-09 PROCEDURE — 91313 COVID-19 VACCINE BIVALENT BOOSTER 18+ (MODERNA): CPT

## 2023-01-09 PROCEDURE — 0134A COVID-19 VACCINE BIVALENT BOOSTER 18+ (MODERNA): CPT

## 2023-01-23 ENCOUNTER — OFFICE VISIT (OUTPATIENT)
Dept: NEUROLOGY | Facility: CLINIC | Age: 73
End: 2023-01-23
Payer: COMMERCIAL

## 2023-01-23 VITALS
SYSTOLIC BLOOD PRESSURE: 116 MMHG | BODY MASS INDEX: 29.73 KG/M2 | WEIGHT: 173.2 LBS | DIASTOLIC BLOOD PRESSURE: 69 MMHG | HEART RATE: 66 BPM

## 2023-01-23 DIAGNOSIS — R47.1 DYSARTHRIA: Primary | ICD-10-CM

## 2023-01-23 DIAGNOSIS — T88.7XXA MEDICATION SIDE EFFECTS: ICD-10-CM

## 2023-01-23 DIAGNOSIS — G20.C PRIMARY PARKINSONISM (H): ICD-10-CM

## 2023-01-23 PROCEDURE — 99214 OFFICE O/P EST MOD 30 MIN: CPT | Performed by: PSYCHIATRY & NEUROLOGY

## 2023-01-23 RX ORDER — BENZTROPINE MESYLATE 0.5 MG/1
1.5 TABLET ORAL 2 TIMES DAILY
Qty: 540 TABLET | Refills: 1 | Status: SHIPPED | OUTPATIENT
Start: 2023-01-23 | End: 2023-07-17

## 2023-01-23 NOTE — PROGRESS NOTES
NEUROLOGY OUTPATIENT PROGRESS NOTE   Jan 23, 2023     CHIEF COMPLAINT/REASON FOR VISIT/REASON FOR CONSULT  Patient presents with:  Tremors: L hand tremors; took benztropine 2x this AM  Tremors    REASON FOR CONSULTATION- Tremors    REFERRAL SOURCE  Dr. Isabel Maurer  CC Dr. Isabel Maurer    HISTORY OF PRESENT ILLNESS  Claudia Wise is a 72 year old female seen today for evaluation of tremors.  Reports that the tremors have been there since February 2020.  Reports that the tremors are mainly in the left upper extremity.  Notices that they are present at rest.  They wake her up from sleep.  They can be present with activity as well.  Denies any gait difficulty though does have back issues and left leg weakness from the back issues which could affect her walking.  Reports no masklike facies.  No other new medications.  Has not been on any antipsychotics in the past.  Does have a diagnosis of depression anxiety and is on Cymbalta.  No tremors on the right side.  Denies any cognitive issues.  No rigidity.  Does have family history of Parkinson's disease in her aunt.    2/16/22  Patient returns today.  She could not tolerate the Sinemet.  Had dry heaving.  Did try to take a lower dose.  Did try to take it with crackers but still had dry heaving.  Did not feel any significant benefit.  Tremors were better in the evening time with the medication though the tremors are always better in the evening time.  Reports no other new issues.  Reports that her depression anxiety under good control.  Her  does not think she has masklike facies.  No other new symptoms or concerns.    She is working with her therapist for sacroiliac pain.  She is walking 10 minutes every other day.  Encourage her to do it more often.    4/14/22  Patient returns today.  Reports that the medication is somewhat helpful.  She is walking little bit faster.  Is doing physical therapy which is helping.  She forgets to take the medication in the  evening time.  Does have some complains of slurred speech as well.  No other new symptoms/complaints.    6/22/22  Patient returns today.  Reports that she had visual hallucinations with the Requip.  She would see people at night.  They would not talk to her.  There were no auditory hallucinations just that she would see people.  Stopping the Requip these hallucinations are gone away.  Is sleeping well at night.  Reports no jerking or any other activity.  Previously she had reported some dysarthria which has now improved.  She denies that she ever had dysarthria.  Has not seen the speech specialist.    8/24/22  Patient returns today.  She did try the Cogentin has not noticed any benefit.  Has not had any side effects either.  She continues to have the tremors.  Feels that anxiety makes them worse.  No issues with hallucinations or any abnormal dreams.  Denies any other new concerns.  She is using Nordic sticks to help her walk.  Did see her primary care doctor for chronic kidney disease.  She is also following up with a rheumatologist.    1/23/23  Patient returns today.  She has tried the Cogentin and feels that it is providing some benefit though things are worse at the end times.  We reviewed why things were worse at certain times and seems like it might be more related to anxiety and stress.  She also reports more shakes when is cold outside.  We discussed about possibly shivering.  Denies any other side effects of the Cogentin.  Is interested in trying a higher dose.  Does not feel the dysarthria is bothering her.  Has not seen speech therapy.    Previous history is reviewed and this is unchanged.    PAST MEDICAL/SURGICAL HISTORY  Past Medical History:   Diagnosis Date     Anemia      Anxiety      Chronic kidney disease      Diabetes mellitus, type II (H)     prediabetes     Disease of thyroid gland     hypothyroid     Family history of myocardial infarction      Gout      High cholesterol      Hypertension       Inverted nipple     right     Macular edema      Osteoarthritis 2012     Rheumatoid arthritis (H) 2012     Patient Active Problem List   Diagnosis     Herpes Simplex Type II     Anxiety     Anemia in chronic kidney disease     Hyperlipemia     Gout     Hypertension     Prediabetes     Endometriosis     Other specified hypothyroidism     Seronegative rheumatoid arthritis of multiple sites (H)     High risk medication use     Bilateral primary osteoarthritis of knee     Small bowel obstruction (H)     Senile osteoporosis     Compression fracture of L2 vertebra with routine healing     Chronic kidney disease, stage 3 (H)     Venous (peripheral) insufficiency     Exposure to bat without known bite     Parkinson disease (H)   Significant for high cholesterol, high blood pressure, diabetes, arthritis, depression, osteoporosis    FAMILY HISTORY  Family History   Problem Relation Age of Onset     Breast Cancer Maternal Aunt      Cancer Maternal Aunt      Cancer Father      Cancer Sister      Cancer Maternal Grandmother      Cancer Cousin      Coronary Artery Disease Mother 49.00   Family history reviewed and negative for neurological conditions except for Parkinson's disease in her aunt.    SOCIAL HISTORY  Social History     Tobacco Use     Smoking status: Former     Types: Cigarettes     Quit date: 1995     Years since quittin.5     Smokeless tobacco: Never   Substance Use Topics     Alcohol use: Yes     Comment: Alcoholic Drinks/day: occasional couple times a year     Drug use: No       SYSTEMS REVIEW  Twelve-system ROS was done and other than the HPI this was negative except for neck pain, back pain, arm and leg pain, joint pain, weakness paralysis, difficulty walking, balance coordination problems, movement disorder, bladder symptoms, anxiety, depression, weight gain.  No new concerns/issues    MEDICATIONS  acetaminophen (TYLENOL) 500 MG tablet, Take 500 mg by mouth every 6 hours as needed   acyclovir (ZOVIRAX)  400 MG tablet, TAKE ONE TABLET BY MOUTH EVERY 8 HOURS AS NEEDED  allopurinol (ZYLOPRIM) 100 MG tablet, Take 1 tablet (100 mg) by mouth 2 times daily (with meals)  amLODIPine (NORVASC) 10 MG tablet, Take 1 tablet (10 mg) by mouth daily  atorvastatin (LIPITOR) 10 MG tablet, TAKE 1 TABLET BY MOUTH AT BEDTIME  carboxymethylcellulose (REFRESH PLUS) 0.5 % Dpet ophthalmic dropperette, [CARBOXYMETHYLCELLULOSE (REFRESH PLUS) 0.5 % DPET OPHTHALMIC DROPPERETTE] Administer 1-2 drops to both eyes 4 (four) times a day as needed.   cyanocobalamin, vitamin B-12, 1,000 mcg Subl, [CYANOCOBALAMIN, VITAMIN B-12, 1,000 MCG SUBL] Place 1 tablet (1,000 mcg total) under the tongue daily.  DULoxetine (CYMBALTA) 20 MG capsule, Take 1 capsule (20 mg) by mouth daily  fluticasone (FLONASE) 50 MCG/ACT nasal spray, Use 1 spray(s) in each nostril once daily  hydroxychloroquine (PLAQUENIL) 200 MG tablet, Take 1 tablet by mouth twice daily  levothyroxine (SYNTHROID/LEVOTHROID) 50 MCG tablet, Take 1 tablet (50 mcg) by mouth daily 30 minutes before breakfast.  lidocaine (LIDODERM) 5 % patch, Place 1 patch onto the skin every 24 hours To prevent lidocaine toxicity, patient should be patch free for 12 hrs daily.  LORazepam (ATIVAN) 0.5 MG tablet, Take 1 tablet (0.5 mg) by mouth daily as needed for anxiety  losartan (COZAAR) 50 MG tablet, Take 1 tablet (50 mg) by mouth daily  methotrexate sodium 2.5 MG TABS, TAKE 4 TABLETS BY MOUTH ONCE A WEEK  metoprolol tartrate (LOPRESSOR) 50 MG tablet, Take 1 tablet by mouth twice daily  sodium bicarbonate 650 MG tablet, TAKE 2 TABLETS BY MOUTH 2 TIMES A  DAY  denosumab (PROLIA) 60 MG/ML SOSY injection, Inject 60 mg Subcutaneous  folic acid (FOLVITE) 1 MG tablet, Take 1 tablet (1,000 mcg) by mouth daily    denosumab (PROLIA) injection 60 mg  denosumab (PROLIA) injection 60 mg         PHYSICAL EXAMINATION  VITALS: /69   Pulse 66   Wt 78.6 kg (173 lb 3.2 oz)   BMI 29.73 kg/m    GENERAL: Healthy appearing,  alert, no acute distress, normal habitus.  CARDIOVASCULAR: Extremities warm and well perfused. Pulses present.   NEUROLOGICAL:  Patient is awake and grossly oriented to self, place and time.  Attention span is normal.  Memory is grossly intact.  Language is fluent and follows commands appropriately.  Appropriate fund of knowledge. Cranial nerves 2-12 are intact. There is no pronator drift.  Motor exam shows 5/5 strength in all extremities.  Tone is symmetric bilaterally in upper and lower extremities.  Resting tremor is noted in the left upper extremity.  No cogwheeling noted.  (Previously reflexes are symmetric and 1+ in upper extremities and lower extremities. Sensory exam is grossly intact to light touch, pin prick and vibration.)  Finger to nose and heel to shin is without dysmetria.  Romberg is negative.  Gait is normal except shows decreased left arm swing with resting tremor and the patient is able to do tandem walk and walk on toes and heels with some difficulty.  Previously-trying concentric circles shows very mild tremor.  No significant change in exam today.  Exam looks slightly better.  Continues to have a left resting tremor though less prominent.    DIAGNOSTICS  RELEVANT LABS  Component      Latest Ref Rng & Units 10/1/2020   Sodium      136 - 145 mmol/L 137   Potassium      3.5 - 5.0 mmol/L 4.4   Chloride      98 - 107 mmol/L 110 (H)   Carbon Dioxide      22 - 31 mmol/L 14 (L)   Anion Gap      5 - 18 mmol/L 13   Glucose      70 - 125 mg/dL 114   Urea Nitrogen      8 - 28 mg/dL 30 (H)   Creatinine      0.60 - 1.10 mg/dL 1.18 (H)   GFR Estimate If Black      >60 mL/min/1.73m2 55 (L)   GFR Estimate      >60 mL/min/1.73m2 45 (L)   Bilirubin Total      0.0 - 1.0 mg/dL 0.8   Calcium      8.5 - 10.5 mg/dL 8.7   Protein Total      6.0 - 8.0 g/dL 6.8   Albumin      3.5 - 5.0 g/dL 4.3   Alkaline Phosphatase      45 - 120 U/L 63   AST      0 - 40 U/L 21   ALT      0 - 45 U/L 18   WBC      4.0 - 11.0 thou/uL 7.3    RBC Count      3.80 - 5.40 mill/uL 3.12 (L)   Hemoglobin      12.0 - 16.0 g/dL 10.4 (L)   Hematocrit      35.0 - 47.0 % 31.2 (L)   MCV      80 - 100 fL 100   MCH      27.0 - 34.0 pg 33.4   MCHC      32.0 - 36.0 g/dL 33.4   RDW      11.0 - 14.5 % 14.5   Platelet Count      140 - 440 thou/uL 151   Mean Platelet Volume      7.0 - 10.0 fL 8.7   TSH      0.30 - 5.00 uIU/mL 1.56   Uric Acid      2.0 - 7.5 mg/dL 4.1     OUTSIDE RECORDS  Outside referral notes and chart notes were reviewed and pertinent information has been summarized (in addition to the HPI):-Patient does have a diagnosis of anxiety.  Has been having some tremors.  Is on multiple antidepressant/antianxiety medications.  Also has some back pain issues.  Is also on narcotics.    LABS  Component      Latest Ref Rng & Units 12/21/2021   Sodium      136 - 145 mmol/L 138   Potassium      3.5 - 5.0 mmol/L 4.3   Chloride      98 - 107 mmol/L 110 (H)   Carbon Dioxide      22 - 31 mmol/L 17 (L)   Anion Gap      5 - 18 mmol/L 11   Urea Nitrogen      8 - 28 mg/dL 30 (H)   Creatinine      0.60 - 1.10 mg/dL 1.21 (H)   Calcium      8.5 - 10.5 mg/dL 9.4   Glucose      70 - 125 mg/dL 101   Alkaline Phosphatase      45 - 120 U/L 55   AST      0 - 40 U/L 30   ALT      0 - 45 U/L 9   Protein Total      6.0 - 8.0 g/dL 6.7   Albumin      3.5 - 5.0 g/dL 4.0   Bilirubin Total      0.0 - 1.0 mg/dL 0.7   GFR Estimate      >60 mL/min/1.73m2 47 (L)   TSH      0.30 - 5.00 uIU/mL 0.98   Hemoglobin A1C      0.0 - 5.6 % 5.7 (H)   Ferritin      10 - 130 ng/mL 253 (H)   Vitamin B12      213 - 816 pg/mL >2,000 (H)   Methylmalonic Acid      0.00 - 0.40 umol/L 0.24       IMPRESSION/REPORT/PLAN  Primary parkinsonism (H)  History of anxiety/depression  Prediabetes  Elevated ferritin  History of chronic kidney disease  Rule out restless leg syndrome  Side effect of medication  Dysarthria-not present per patient  Medication side effect    This is a 72 year old female with new onset of left  upper extremity resting tremor.  Examination further shows masklike facies, decreased arm swing on the left side, left resting tremor.  No cogwheeling.  Patient symptoms are suggestive of Parkinson's disease.  Blood work was overall noncontributory.  She has had side effects with Sinemet.  Requip caused hallucinations and she stopped the medications.  Low-dose Cogentin has not helped (patient perspective) and will try to increase the dose further.  She does look better on exam to me.  Discussed side effects.  Could consider amantadine.    Symptoms are worse at the time of anxiety/stress and when it is cold.  Discussed that these are normal triggers for tremors.  This is not from the Parkinson's.  She controlled the underlying cause.    Recommend exercise and healthy lifestyle.  List of exercises were given to her.  Could consider DaTscan if the diagnosis remains unclear.    She further had complained of of some slurred speech and we will set her up with speech therapy for more evaluation and treatment.  She denies ever having any slurred speech.  The symptoms to hold off for right now.    She does notice that the tremor wakes her up during sleep.  Possibly she has restless leg syndrome.  Ferritin was previously elevated.  Currently this is not happening and will monitor.    Blood work further shows evidence of chronic kidney disease and prediabetes which she already has.  Ferritin could be elevated due to the kidney disease.  She is following up with primary care doctor regarding this.    Return in 3 months.       -     benztropine (COGENTIN) 0.5 MG tablet; Take 3 tablets (1.5 mg) by mouth 2 times daily      Return in about 3 months (around 4/23/2023) for In-Clinic Visit (must), After testing.    Over 30 minutes were spent coordinating the care for the patient on the day of the encounter.  This includes previsit, during visit and post visit activities as documented above.  Counseling patient.  Prescription  management.  Refractory problem.  Medication side effects.  (Activities include but not inclusive of reviewing chart, reviewing outside records, reviewing labs and imaging study results as well as the images, patient visit time including getting history and exam,  use if applicable, review of test results with the patient and coming up with a plan in a shared model, counseling patient and family, education and answering patient questions, EMR , EMR diagnosis entry and problem list management, medication reconciliation and prescription management if applicable, paperwork if applicable, printing documents and documentation of the visit activities.)    Logan Smart MD  Neurologist  [facility]  Tel:- [phone]    This note was dictated using voice recognition software.  Any grammatical or context distortions are unintentional and inherent to the software.

## 2023-01-23 NOTE — NURSING NOTE
"Claudia Wise is a 72 year old female who presents for:  Chief Complaint   Patient presents with     Tremors     L hand tremors; took benztropine 2x this AM        Initial Vitals:  /69   Pulse 66   Wt 78.6 kg (173 lb 3.2 oz)   BMI 29.73 kg/m   Estimated body mass index is 29.73 kg/m  as calculated from the following:    Height as of 8/24/22: 1.626 m (5' 4\").    Weight as of this encounter: 78.6 kg (173 lb 3.2 oz).. Body surface area is 1.88 meters squared. BP completed using cuff size: wrist cuff    Esteban JIMY Rehman  "

## 2023-01-23 NOTE — LETTER
1/23/2023         RE: Claudia Wise   [address] [facility] [address]        Dear Colleague,    Thank you for referring your patient, Claudia Wise, to the [facility] [place]. Please see a copy of my visit note below.    NEUROLOGY OUTPATIENT PROGRESS NOTE   Jan 23, 2023     CHIEF COMPLAINT/REASON FOR VISIT/REASON FOR CONSULT  Patient presents with:  Tremors: L hand tremors; took benztropine 2x this AM  Tremors    REASON FOR CONSULTATION- Tremors    REFERRAL SOURCE  Dr. Isabel Maurer  CC Dr. Isabel Maurer    HISTORY OF PRESENT ILLNESS  Claudia Wise is a 72 year old female seen today for evaluation of tremors.  Reports that the tremors have been there since February 2020.  Reports that the tremors are mainly in the left upper extremity.  Notices that they are present at rest.  They wake her up from sleep.  They can be present with activity as well.  Denies any gait difficulty though does have back issues and left leg weakness from the back issues which could affect her walking.  Reports no masklike facies.  No other new medications.  Has not been on any antipsychotics in the past.  Does have a diagnosis of depression anxiety and is on Cymbalta.  No tremors on the right side.  Denies any cognitive issues.  No rigidity.  Does have family history of Parkinson's disease in her aunt.    2/16/22  Patient returns today.  She could not tolerate the Sinemet.  Had dry heaving.  Did try to take a lower dose.  Did try to take it with crackers but still had dry heaving.  Did not feel any significant benefit.  Tremors were better in the evening time with the medication though the tremors are always better in the evening time.  Reports no other new issues.  Reports that her depression anxiety under good control.  Her  does not think she has masklike facies.  No other new symptoms or concerns.    She is working with her therapist for sacroiliac pain.  She is walking 10 minutes every  other day.  Encourage her to do it more often.    4/14/22  Patient returns today.  Reports that the medication is somewhat helpful.  She is walking little bit faster.  Is doing physical therapy which is helping.  She forgets to take the medication in the evening time.  Does have some complains of slurred speech as well.  No other new symptoms/complaints.    6/22/22  Patient returns today.  Reports that she had visual hallucinations with the Requip.  She would see people at night.  They would not talk to her.  There were no auditory hallucinations just that she would see people.  Stopping the Requip these hallucinations are gone away.  Is sleeping well at night.  Reports no jerking or any other activity.  Previously she had reported some dysarthria which has now improved.  She denies that she ever had dysarthria.  Has not seen the speech specialist.    8/24/22  Patient returns today.  She did try the Cogentin has not noticed any benefit.  Has not had any side effects either.  She continues to have the tremors.  Feels that anxiety makes them worse.  No issues with hallucinations or any abnormal dreams.  Denies any other new concerns.  She is using Nordic sticks to help her walk.  Did see her primary care doctor for chronic kidney disease.  She is also following up with a rheumatologist.    1/23/23  Patient returns today.  She has tried the Cogentin and feels that it is providing some benefit though things are worse at the end times.  We reviewed why things were worse at certain times and seems like it might be more related to anxiety and stress.  She also reports more shakes when is cold outside.  We discussed about possibly shivering.  Denies any other side effects of the Cogentin.  Is interested in trying a higher dose.  Does not feel the dysarthria is bothering her.  Has not seen speech therapy.    Previous history is reviewed and this is unchanged.    PAST MEDICAL/SURGICAL HISTORY  Past Medical History:    Diagnosis Date     Anemia      Anxiety      Chronic kidney disease      Diabetes mellitus, type II (H)     prediabetes     Disease of thyroid gland     hypothyroid     Family history of myocardial infarction      Gout      High cholesterol      Hypertension      Inverted nipple     right     Macular edema      Osteoarthritis 2012     Rheumatoid arthritis (H) 2012     Patient Active Problem List   Diagnosis     Herpes Simplex Type II     Anxiety     Anemia in chronic kidney disease     Hyperlipemia     Gout     Hypertension     Prediabetes     Endometriosis     Other specified hypothyroidism     Seronegative rheumatoid arthritis of multiple sites (H)     High risk medication use     Bilateral primary osteoarthritis of knee     Small bowel obstruction (H)     Senile osteoporosis     Compression fracture of L2 vertebra with routine healing     Chronic kidney disease, stage 3 (H)     Venous (peripheral) insufficiency     Exposure to bat without known bite     Parkinson disease (H)   Significant for high cholesterol, high blood pressure, diabetes, arthritis, depression, osteoporosis    FAMILY HISTORY  Family History   Problem Relation Age of Onset     Breast Cancer Maternal Aunt      Cancer Maternal Aunt      Cancer Father      Cancer Sister      Cancer Maternal Grandmother      Cancer Cousin      Coronary Artery Disease Mother 49.00   Family history reviewed and negative for neurological conditions except for Parkinson's disease in her aunt.    SOCIAL HISTORY  Social History     Tobacco Use     Smoking status: Former     Types: Cigarettes     Quit date: 1995     Years since quittin.5     Smokeless tobacco: Never   Substance Use Topics     Alcohol use: Yes     Comment: Alcoholic Drinks/day: occasional couple times a year     Drug use: No       SYSTEMS REVIEW  Twelve-system ROS was done and other than the HPI this was negative except for neck pain, back pain, arm and leg pain, joint pain, weakness paralysis,  difficulty walking, balance coordination problems, movement disorder, bladder symptoms, anxiety, depression, weight gain.  No new concerns/issues    MEDICATIONS  acetaminophen (TYLENOL) 500 MG tablet, Take 500 mg by mouth every 6 hours as needed   acyclovir (ZOVIRAX) 400 MG tablet, TAKE ONE TABLET BY MOUTH EVERY 8 HOURS AS NEEDED  allopurinol (ZYLOPRIM) 100 MG tablet, Take 1 tablet (100 mg) by mouth 2 times daily (with meals)  amLODIPine (NORVASC) 10 MG tablet, Take 1 tablet (10 mg) by mouth daily  atorvastatin (LIPITOR) 10 MG tablet, TAKE 1 TABLET BY MOUTH AT BEDTIME  carboxymethylcellulose (REFRESH PLUS) 0.5 % Dpet ophthalmic dropperette, [CARBOXYMETHYLCELLULOSE (REFRESH PLUS) 0.5 % DPET OPHTHALMIC DROPPERETTE] Administer 1-2 drops to both eyes 4 (four) times a day as needed.   cyanocobalamin, vitamin B-12, 1,000 mcg Subl, [CYANOCOBALAMIN, VITAMIN B-12, 1,000 MCG SUBL] Place 1 tablet (1,000 mcg total) under the tongue daily.  DULoxetine (CYMBALTA) 20 MG capsule, Take 1 capsule (20 mg) by mouth daily  fluticasone (FLONASE) 50 MCG/ACT nasal spray, Use 1 spray(s) in each nostril once daily  hydroxychloroquine (PLAQUENIL) 200 MG tablet, Take 1 tablet by mouth twice daily  levothyroxine (SYNTHROID/LEVOTHROID) 50 MCG tablet, Take 1 tablet (50 mcg) by mouth daily 30 minutes before breakfast.  lidocaine (LIDODERM) 5 % patch, Place 1 patch onto the skin every 24 hours To prevent lidocaine toxicity, patient should be patch free for 12 hrs daily.  LORazepam (ATIVAN) 0.5 MG tablet, Take 1 tablet (0.5 mg) by mouth daily as needed for anxiety  losartan (COZAAR) 50 MG tablet, Take 1 tablet (50 mg) by mouth daily  methotrexate sodium 2.5 MG TABS, TAKE 4 TABLETS BY MOUTH ONCE A WEEK  metoprolol tartrate (LOPRESSOR) 50 MG tablet, Take 1 tablet by mouth twice daily  sodium bicarbonate 650 MG tablet, TAKE 2 TABLETS BY MOUTH 2 TIMES A  DAY  denosumab (PROLIA) 60 MG/ML SOSY injection, Inject 60 mg Subcutaneous  folic acid (FOLVITE) 1  MG tablet, Take 1 tablet (1,000 mcg) by mouth daily    denosumab (PROLIA) injection 60 mg  denosumab (PROLIA) injection 60 mg         PHYSICAL EXAMINATION  VITALS: /69   Pulse 66   Wt 78.6 kg (173 lb 3.2 oz)   BMI 29.73 kg/m    GENERAL: Healthy appearing, alert, no acute distress, normal habitus.  CARDIOVASCULAR: Extremities warm and well perfused. Pulses present.   NEUROLOGICAL:  Patient is awake and grossly oriented to self, place and time.  Attention span is normal.  Memory is grossly intact.  Language is fluent and follows commands appropriately.  Appropriate fund of knowledge. Cranial nerves 2-12 are intact. There is no pronator drift.  Motor exam shows 5/5 strength in all extremities.  Tone is symmetric bilaterally in upper and lower extremities.  Resting tremor is noted in the left upper extremity.  No cogwheeling noted.  (Previously reflexes are symmetric and 1+ in upper extremities and lower extremities. Sensory exam is grossly intact to light touch, pin prick and vibration.)  Finger to nose and heel to shin is without dysmetria.  Romberg is negative.  Gait is normal except shows decreased left arm swing with resting tremor and the patient is able to do tandem walk and walk on toes and heels with some difficulty.  Previously-trying concentric circles shows very mild tremor.  No significant change in exam today.  Exam looks slightly better.  Continues to have a left resting tremor though less prominent.    DIAGNOSTICS  RELEVANT LABS  Component      Latest Ref Rng & Units 10/1/2020   Sodium      136 - 145 mmol/L 137   Potassium      3.5 - 5.0 mmol/L 4.4   Chloride      98 - 107 mmol/L 110 (H)   Carbon Dioxide      22 - 31 mmol/L 14 (L)   Anion Gap      5 - 18 mmol/L 13   Glucose      70 - 125 mg/dL 114   Urea Nitrogen      8 - 28 mg/dL 30 (H)   Creatinine      0.60 - 1.10 mg/dL 1.18 (H)   GFR Estimate If Black      >60 mL/min/1.73m2 55 (L)   GFR Estimate      >60 mL/min/1.73m2 45 (L)   Bilirubin  Total      0.0 - 1.0 mg/dL 0.8   Calcium      8.5 - 10.5 mg/dL 8.7   Protein Total      6.0 - 8.0 g/dL 6.8   Albumin      3.5 - 5.0 g/dL 4.3   Alkaline Phosphatase      45 - 120 U/L 63   AST      0 - 40 U/L 21   ALT      0 - 45 U/L 18   WBC      4.0 - 11.0 thou/uL 7.3   RBC Count      3.80 - 5.40 mill/uL 3.12 (L)   Hemoglobin      12.0 - 16.0 g/dL 10.4 (L)   Hematocrit      35.0 - 47.0 % 31.2 (L)   MCV      80 - 100 fL 100   MCH      27.0 - 34.0 pg 33.4   MCHC      32.0 - 36.0 g/dL 33.4   RDW      11.0 - 14.5 % 14.5   Platelet Count      140 - 440 thou/uL 151   Mean Platelet Volume      7.0 - 10.0 fL 8.7   TSH      0.30 - 5.00 uIU/mL 1.56   Uric Acid      2.0 - 7.5 mg/dL 4.1     OUTSIDE RECORDS  Outside referral notes and chart notes were reviewed and pertinent information has been summarized (in addition to the HPI):-Patient does have a diagnosis of anxiety.  Has been having some tremors.  Is on multiple antidepressant/antianxiety medications.  Also has some back pain issues.  Is also on narcotics.    LABS  Component      Latest Ref Rng & Units 12/21/2021   Sodium      136 - 145 mmol/L 138   Potassium      3.5 - 5.0 mmol/L 4.3   Chloride      98 - 107 mmol/L 110 (H)   Carbon Dioxide      22 - 31 mmol/L 17 (L)   Anion Gap      5 - 18 mmol/L 11   Urea Nitrogen      8 - 28 mg/dL 30 (H)   Creatinine      0.60 - 1.10 mg/dL 1.21 (H)   Calcium      8.5 - 10.5 mg/dL 9.4   Glucose      70 - 125 mg/dL 101   Alkaline Phosphatase      45 - 120 U/L 55   AST      0 - 40 U/L 30   ALT      0 - 45 U/L 9   Protein Total      6.0 - 8.0 g/dL 6.7   Albumin      3.5 - 5.0 g/dL 4.0   Bilirubin Total      0.0 - 1.0 mg/dL 0.7   GFR Estimate      >60 mL/min/1.73m2 47 (L)   TSH      0.30 - 5.00 uIU/mL 0.98   Hemoglobin A1C      0.0 - 5.6 % 5.7 (H)   Ferritin      10 - 130 ng/mL 253 (H)   Vitamin B12      213 - 816 pg/mL >2,000 (H)   Methylmalonic Acid      0.00 - 0.40 umol/L 0.24       IMPRESSION/REPORT/PLAN  Primary parkinsonism  (H)  History of anxiety/depression  Prediabetes  Elevated ferritin  History of chronic kidney disease  Rule out restless leg syndrome  Side effect of medication  Dysarthria-not present per patient  Medication side effect    This is a 72 year old female with new onset of left upper extremity resting tremor.  Examination further shows masklike facies, decreased arm swing on the left side, left resting tremor.  No cogwheeling.  Patient symptoms are suggestive of Parkinson's disease.  Blood work was overall noncontributory.  She has had side effects with Sinemet.  Requip caused hallucinations and she stopped the medications.  Low-dose Cogentin has not helped (patient perspective) and will try to increase the dose further.  She does look better on exam to me.  Discussed side effects.  Could consider amantadine.    Symptoms are worse at the time of anxiety/stress and when it is cold.  Discussed that these are normal triggers for tremors.  This is not from the Parkinson's.  She controlled the underlying cause.    Recommend exercise and healthy lifestyle.  List of exercises were given to her.  Could consider DaTscan if the diagnosis remains unclear.    She further had complained of of some slurred speech and we will set her up with speech therapy for more evaluation and treatment.  She denies ever having any slurred speech.  The symptoms to hold off for right now.    She does notice that the tremor wakes her up during sleep.  Possibly she has restless leg syndrome.  Ferritin was previously elevated.  Currently this is not happening and will monitor.    Blood work further shows evidence of chronic kidney disease and prediabetes which she already has.  Ferritin could be elevated due to the kidney disease.  She is following up with primary care doctor regarding this.    Return in 3 months.       -     benztropine (COGENTIN) 0.5 MG tablet; Take 3 tablets (1.5 mg) by mouth 2 times daily      Return in about 3 months (around  4/23/2023) for In-Clinic Visit (must), After testing.    Over 30 minutes were spent coordinating the care for the patient on the day of the encounter.  This includes previsit, during visit and post visit activities as documented above.  Counseling patient.  Prescription management.  Refractory problem.  Medication side effects.  (Activities include but not inclusive of reviewing chart, reviewing outside records, reviewing labs and imaging study results as well as the images, patient visit time including getting history and exam,  use if applicable, review of test results with the patient and coming up with a plan in a shared model, counseling patient and family, education and answering patient questions, EMR , EMR diagnosis entry and problem list management, medication reconciliation and prescription management if applicable, paperwork if applicable, printing documents and documentation of the visit activities.)    Logan Smart MD  Neurologist  [facility] Neurology [facility]  Tel:- [phone]    This note was dictated using voice recognition software.  Any grammatical or context distortions are unintentional and inherent to the software.        Again, thank you for allowing me to participate in the care of your patient.        Sincerely,        Logan Smart MD

## 2023-01-24 ENCOUNTER — OFFICE VISIT (OUTPATIENT)
Dept: PHYSICAL MEDICINE AND REHAB | Facility: CLINIC | Age: 73
End: 2023-01-24
Payer: COMMERCIAL

## 2023-01-24 VITALS — HEART RATE: 67 BPM | DIASTOLIC BLOOD PRESSURE: 60 MMHG | SYSTOLIC BLOOD PRESSURE: 121 MMHG | OXYGEN SATURATION: 98 %

## 2023-01-24 DIAGNOSIS — M53.3 SI (SACROILIAC) JOINT DYSFUNCTION: ICD-10-CM

## 2023-01-24 DIAGNOSIS — G89.29 CHRONIC BILATERAL LOW BACK PAIN WITHOUT SCIATICA: Primary | ICD-10-CM

## 2023-01-24 DIAGNOSIS — M54.50 CHRONIC BILATERAL LOW BACK PAIN WITHOUT SCIATICA: Primary | ICD-10-CM

## 2023-01-24 DIAGNOSIS — M79.18 LEFT BUTTOCK PAIN: ICD-10-CM

## 2023-01-24 DIAGNOSIS — Z87.81 HISTORY OF VERTEBRAL COMPRESSION FRACTURE: ICD-10-CM

## 2023-01-24 DIAGNOSIS — M53.3 PAIN OF LEFT SACROILIAC JOINT: ICD-10-CM

## 2023-01-24 PROCEDURE — 99214 OFFICE O/P EST MOD 30 MIN: CPT | Performed by: NURSE PRACTITIONER

## 2023-01-24 ASSESSMENT — PAIN SCALES - GENERAL: PAINLEVEL: MODERATE PAIN (4)

## 2023-01-24 NOTE — PROGRESS NOTES
Assessment:     Diagnoses and all orders for this visit:  Chronic bilateral low back pain without sciatica  Left buttock pain  -     PAIN Joint Injection Sacroiliac Joint Left; Future  Pain of left sacroiliac joint  -     PAIN Joint Injection Sacroiliac Joint Left; Future  SI (sacroiliac) joint dysfunction  -     PAIN Joint Injection Sacroiliac Joint Left; Future  History of vertebral compression fracture     Claudia Wise is a 72 year old y.o. female with past medical history significant for anxiety, rheumatoid arthritis managed by Dr. Vinson, diabetes myelitis, hypertension, hyperlipidemia, chronic kidney disease, thyroid disorder, gout, osteopenia, small bowel obstruction with surgical intervention who presents today for follow-up regarding:    -Chronic recurrent left low back pain and left buttock region localizing to the sacroiliac joint, increased pain with SI provocative maneuvers on exam today.  Improvements previously with sacroiliac joint steroid injection.    -History of L2 compression fracture.     Plan:     A shared decision making plan was used. The patient's values and choices were respected. Prior medical records were reviewed today. The following represents what was discussed and decided upon by the provider and the patient.        -DIAGNOSTIC TESTS: Images were personally reviewed and interpreted.   -Consider updated imaging if symptoms or not improving with SI joint steroid injection.  -- Left lower extremity EMG 1/3/2022 with mild sensory or sensorimotor peripheral polyneuropathy.  No radiculopathy noted.  --Lumbar spine x-ray 8/24/2020 shows stable L2 compression deformity.  --Lumbar spine x-ray 7/1/2020 shows stable L2 compression deformity, grade 1 L3-4 spondylolisthesis.  --Lumbar spine MRI 5/19/2020 with 20% acute/subacute compression fracture L2.  There is mild bilateral lateral recess stenosis L3-4 and L4-5.  Bilateral synovial cysts L4-5 with moderate facet  arthropathy.    -INTERVENTIONS: Order placed for repeat left sacroiliac joint steroid injection.  Patient does have pain localizing to the sacroiliac joint and had positive response with previous injection for over 3 months.    -MEDICATIONS: No changes in medications today  Discussed side effects of medications and proper use. Patient verbalized understanding.    -PHYSICAL THERAPY: Advised patient continue with home exercises from prior physical therapy sessions which she is diligent about doing  Discussed the importance of core strengthening, ROM, stretching exercises with the patient and how each of these entities is important in decreasing pain.  Explained to the patient that the purpose of physical therapy is to teach the patient a home exercise program.  These exercises need to be performed every day in order to decrease pain and prevent future occurrences of pain.        -PATIENT EDUCATION:  Total time of 32 minutes, on the day of service, spent with the patient, reviewing the chart, placing orders, and documenting.   -Today we also discussed the issues related to the current COVID-19 pandemic, the pros and cons of the current treatment plan, the CDC guidelines such as social distancing, washing the hands, and covering the cough.    -FOLLOW UP: Follow-up for injection then 2 weeks postinjection  Advised to contact clinic if symptoms worsen or change.    Subjective:     Claudia Wise is a 72 year old female who presents today for follow-up regarding recurrent low back pain on the left that radiates to the left upper buttock region that she reports has been worsening in the last couple of months.  Currently her pain is a constant 4/10 up to an 8 at its worst, 1 at its best aggravated in general with standing and walking.  Does improve with sitting and laying down.  Minimal pain on the right low back and patient denies lower extremity pain at this time.  Denies numbness or tingling sensations.  Denies lower  extremity weakness.  Denies recent trips or falls.  Patient does have chronic balance problems as well    *Patient does report that she has been working with her primary care provider recently and is being worked up for possible Parkinson's disease as she does have a left hand tremor ongoing for the last 1 year.     Treatment to Date: No prior spinal surgery.  Physical therapy [facility] sessions last 8/28/2020 compression fracture/spine pain.  Physical therapy x1 session 8/2/2021 chronic LBP/sacroiliac joint dysfunction.     Left L2, L3, L4 MBB 51/2/2021 with positive response.  Left L2, L3, L4 MBB 6/4/2021 with positive response.  Bilateral L2, L3, L4 RFA 7/2/2021 with 50% relief LBP 4 weeks postinjection.  Preprocedure pain 6/10, post 2/10.  Left SI joint steroid injection 8/17/2021 with over 60% relief for over 6 months, patient found the substantial.  Preprocedure pain 6/10, post 3/10.     Medications:  Plaquenil 4 seronegative rheumatoid arthritis  Tylenol  Hydrocodone managed by Dr. Erasto Guzmán 25 mg bedtime however patient only tried this medication for a couple of days with no benefit therefore she discontinued on her own.    Patient Active Problem List   Diagnosis     Herpes Simplex Type II     Anxiety     Anemia in chronic kidney disease     Hyperlipemia     Gout     Hypertension     Prediabetes     Endometriosis     Other specified hypothyroidism     Seronegative rheumatoid arthritis of multiple sites (H)     High risk medication use     Bilateral primary osteoarthritis of knee     Small bowel obstruction (H)     Senile osteoporosis     Compression fracture of L2 vertebra with routine healing     Chronic kidney disease, stage 3 (H)     Venous (peripheral) insufficiency     Exposure to bat without known bite     Parkinson disease (H)       Current Outpatient Medications   Medication     acetaminophen (TYLENOL) 500 MG tablet     DULoxetine (CYMBALTA) 20 MG capsule     lidocaine (LIDODERM) 5 % patch      acyclovir (ZOVIRAX) 400 MG tablet     allopurinol (ZYLOPRIM) 100 MG tablet     amLODIPine (NORVASC) 10 MG tablet     atorvastatin (LIPITOR) 10 MG tablet     benztropine (COGENTIN) 0.5 MG tablet     carboxymethylcellulose (REFRESH PLUS) 0.5 % Dpet ophthalmic dropperette     cyanocobalamin, vitamin B-12, 1,000 mcg Subl     denosumab (PROLIA) 60 MG/ML SOSY injection     fluticasone (FLONASE) 50 MCG/ACT nasal spray     folic acid (FOLVITE) 1 MG tablet     hydroxychloroquine (PLAQUENIL) 200 MG tablet     levothyroxine (SYNTHROID/LEVOTHROID) 50 MCG tablet     LORazepam (ATIVAN) 0.5 MG tablet     losartan (COZAAR) 50 MG tablet     methotrexate sodium 2.5 MG TABS     metoprolol tartrate (LOPRESSOR) 50 MG tablet     sodium bicarbonate 650 MG tablet     Current Facility-Administered Medications   Medication     denosumab (PROLIA) injection 60 mg     denosumab (PROLIA) injection 60 mg       Allergies   Allergen Reactions     Bupropion Hcl [Bupropion] Nausea     Erythromycin Base [Erythromycin] Nausea     Paxil [Paroxetine] Diarrhea     Tetracyclines Itching     Valacyclovir Nausea       Past Medical History:   Diagnosis Date     Anemia      Anxiety      Chronic kidney disease      Diabetes mellitus, type II (H)     prediabetes     Disease of thyroid gland     hypothyroid     Family history of myocardial infarction      Gout      High cholesterol      Hypertension      Inverted nipple     right     Macular edema      Osteoarthritis 2012     Rheumatoid arthritis (H) 2012        Review of Systems  ROS:  Specifically negative for bowel/bladder dysfunction, balance changes, headache, dizziness, foot drop, fevers, chills, appetite changes, nausea/vomiting, unexplained weight loss. Otherwise 13 systems reviewed are negative. Please see the patient's intake questionnaire from today for details.    Reviewed Social, Family, Past Medical and Past Surgical history with patient, no significant changes noted since prior visit.      Objective:     /60 (BP Location: Right arm, Patient Position: Sitting)   Pulse 67   SpO2 98%     PHYSICAL EXAMINATION:    --CONSTITUTIONAL: Well developed, well nourished, healthy appearing individual.  --PSYCHIATRIC: Appropriate mood and affect. No difficulty interacting due to temper, social withdrawal, or memory issues.  --SKIN: Lumbar region is dry and intact.   --RESPIRATORY: Normal rhythm and effort. No abnormal accessory muscle breathing patterns noted.   --MUSCULOSKELETAL:  Normal lumbar lordosis noted, no lateral shift.  --GROSS MOTOR: Easily arises from a seated position. Gait is non-antalgic  --LUMBAR SPINE:  Inspection reveals no evidence of deformity. Range of motion is not limited in lumbar flexion, extension, lateral rotation.  Mild generalized tenderness to palpation lumbar spine, no significant pinpoint tenderness noted. Straight leg raising in the supine position is negative to radicular pain. Sciatic notch non-tender on the right, tender on the left.   --SACROILIAC JOINT: Positive left distraction.  Positive left Alyssa's with reproduction of pain to affected extremity.  Positive left Gaenslen's Test with reproduction of pain to affected extremity.   --NEUROLOGIC: Bilateral patellar and achilles reflexes are physiologic and symmetric. Sensation to light touch is intact in the bilateral L4, L5, and S1 dermatomes.    RESULTS:   Imaging: Spine imaging was reviewed today. The images were shown to the patient and the findings were explained using a spine model.      Xr Lumbar Spine 2 Or 3 Vws  Result Date: 8/25/2020  INDICATION: Evaluate L2 compression fracture. COMPARISON: 7/10/2020.   Five lumbar-type vertebral bodies. Stable moderate L2 compression deformity. Segmental kyphosis about the fracture from the superior L1 endplate through the inferior L3 endplate measures 21 degrees, which is unchanged. Vertebral body heights are otherwise maintained. Mild loss of disc space height L3-L4  through L5-S1. Mild lower lumbar facet arthropathy.        XR LUMBAR SPINE 2 OR 3 VWS  LOCATION: [facility]  DATE/TIME: 7/1/2020   INDICATION: Low back pain compression fracture L2 evaluate for stability  COMPARISON: 06/08/2020  IMPRESSION:   Very shallow left apex curvature of the lumbar spine. Grade 1 retrolisthesis of L3 on L4. The bones appear diffusely demineralized, suggestive of osteoporosis. Stable appearance of the L2 compression deformity. No new compression deformities.   Atherosclerotic calcification of the abdominal aorta. Degenerative changes of the sacroiliac joints.        XR LUMBAR SPINE 2 OR 3 VWS  LOCATION: [facility]  DATE/TIME: 6/8/2020   INDICATION: Low back pain evaluate L2 compression fracture.  COMPARISON: MR 05/19/2020.  IMPRESSION:   Five lumbar type vertebral bodies presumed. Subacute/chronic appearing compression is identified at the L2, with approximately 30-40% loss of superior body height, and fragmentation anterior superior corner similar to previous MR appearance. Sclerosis   associated with the compressed superior endplate at this level. Degenerative changes elsewhere in the lumbar spine stable. 2 mm posterior subluxation L3 upon L4 is more prominent, possibly due to positioning. If there is concern for instability flexion   and extension views could be helpful to assess this level. Otherwise satisfactory height and alignment lumbar spine. Leftward lumbar curve apex L4. Satisfactory appearance to the sacrum. Vascular calcification. Degenerative changes both SI joints.   Obscuration of the right hemisacrum due to bowel gas and stool.  Consider flexion/extension views to assess possible L3-L4 instability as clinically indicated.        MR LUMBAR SPINE WO CONTRAST  LOCATION: [facility]  DATE/TIME: 5/19/2020   INDICATION: Back pain or radiculopathy, > 6 wks Low back pain with left radicular pain  COMPARISON: Correlation with abdomen and pelvis CT  02/28/2020  TECHNIQUE: Without IV contrast  FINDINGS:   Nomenclature is based on 5 lumbar type vertebral bodies. The conus ends at distal L1. Mild levocurvature of the lumbar spine. 1 mm retrolisthesis from L3-L4 to L5-S1. 20% superior endplate compression fracture of L2 with associated moderate marrow edema.   Small amount of prevertebral soft tissue edema from L1 to L3. The rest of the vertebral body heights are maintained. Nonpathologic marrow signal. No MRI evidence for pars defects. Mild symmetric atrophy of the posterior paraspinal musculature. Normal diameter of the distal abdominal aorta. The visualized bony pelvis and proximal femora are grossly within normal limits.  T12-L1: Mild intervertebral disc height loss. Loss of the normal T2 signal within the disc. Small broad-based disc bulge with superimposed left paracentral disc protrusion. Mild bilateral facet arthropathy. No spinal canal narrowing. No neuroforaminal narrowing.  L1-L2: Mild intervertebral disc height loss. Loss of the normal T2 signal within the disc. Small broad-based disc bulge. Mild bilateral facet arthropathy. No spinal canal narrowing. No neuroforaminal narrowing.  L2-L3: Mild intervertebral disc height loss. Loss of the normal T2 signal within the disc. Small broad-based disc bulge. Mild bilateral facet arthropathy. No spinal canal narrowing. No neuroforaminal narrowing.   L3-L4: Normal intervertebral disc height. Loss of the normal T2 signal within the disc. Small broad-based disc bulge. Moderate bilateral facet arthropathy. Small amount of fluid within the facet joints. Mild narrowing of the lateral recesses. No spinal canal narrowing. Mild right neuroforaminal narrowing. Mild left neuroforaminal narrowing   L4-L5: Normal intervertebral disc height. Loss of the normal T2 signal within the disc. Small broad-based disc bulge. Moderate bilateral facet arthropathy. Small amount of fluid within the facet joints. Mild narrowing of the  lateral recesses. No spinal canal narrowing. Mild right neuroforaminal narrowing. No left neuroforaminal narrowing. 2-3 mm bilateral lateral synovial cyst.  L5-S1: Normal intervertebral disc height. Loss of the normal T2 signal within the disc. Small broad-based disc bulge. Mild to moderate bilateral facet arthropathy. No spinal canal narrowing. No right neuroforaminal narrowing. No left neuroforaminal narrowing.  IMPRESSION:   1.  20% acute/subacute superior endplate compression fracture of L2.   2.  Mild narrowing of the lateral recesses at L3-L4 and L4-L5.  3.  No high-grade spinal canal.  4.  Mild bilateral neuroforaminal narrowing at L3-L4. Mild right neuroforaminal narrowing at L4-L5.  5.  Moderate facet arthropathy at L3-L4 and L4-L5 with small amount of fluid within the facet joints.

## 2023-01-24 NOTE — LETTER
1/24/2023         RE: Claudia Wise   [address] [facility] [address]        Dear Colleague,    Thank you for referring your patient, Claudia Wise, to the [facility]. Please see a copy of my visit note below.      Assessment:     Diagnoses and all orders for this visit:  Chronic bilateral low back pain without sciatica  Left buttock pain  -     PAIN Joint Injection Sacroiliac Joint Left; Future  Pain of left sacroiliac joint  -     PAIN Joint Injection Sacroiliac Joint Left; Future  SI (sacroiliac) joint dysfunction  -     PAIN Joint Injection Sacroiliac Joint Left; Future  History of vertebral compression fracture     Claudia Wise is a 72 year old y.o. female with past medical history significant for anxiety, rheumatoid arthritis managed by Dr. Vinson, diabetes myelitis, hypertension, hyperlipidemia, chronic kidney disease, thyroid disorder, gout, osteopenia, small bowel obstruction with surgical intervention who presents today for follow-up regarding:    -Chronic recurrent left low back pain and left buttock region localizing to the sacroiliac joint, increased pain with SI provocative maneuvers on exam today.  Improvements previously with sacroiliac joint steroid injection.    -History of L2 compression fracture.     Plan:     A shared decision making plan was used. The patient's values and choices were respected. Prior medical records were reviewed today. The following represents what was discussed and decided upon by the provider and the patient.        -DIAGNOSTIC TESTS: Images were personally reviewed and interpreted.   -Consider updated imaging if symptoms or not improving with SI joint steroid injection.  -- Left lower extremity EMG 1/3/2022 with mild sensory or sensorimotor peripheral polyneuropathy.  No radiculopathy noted.  --Lumbar spine x-ray 8/24/2020 shows stable L2 compression deformity.  --Lumbar spine x-ray 7/1/2020 shows stable L2 compression  deformity, grade 1 L3-4 spondylolisthesis.  --Lumbar spine MRI 5/19/2020 with 20% acute/subacute compression fracture L2.  There is mild bilateral lateral recess stenosis L3-4 and L4-5.  Bilateral synovial cysts L4-5 with moderate facet arthropathy.    -INTERVENTIONS: Order placed for repeat left sacroiliac joint steroid injection.  Patient does have pain localizing to the sacroiliac joint and had positive response with previous injection for over 3 months.    -MEDICATIONS: No changes in medications today  Discussed side effects of medications and proper use. Patient verbalized understanding.    -PHYSICAL THERAPY: Advised patient continue with home exercises from prior physical therapy sessions which she is diligent about doing  Discussed the importance of core strengthening, ROM, stretching exercises with the patient and how each of these entities is important in decreasing pain.  Explained to the patient that the purpose of physical therapy is to teach the patient a home exercise program.  These exercises need to be performed every day in order to decrease pain and prevent future occurrences of pain.        -PATIENT EDUCATION:  Total time of 32 minutes, on the day of service, spent with the patient, reviewing the chart, placing orders, and documenting.   -Today we also discussed the issues related to the current COVID-19 pandemic, the pros and cons of the current treatment plan, the CDC guidelines such as social distancing, washing the hands, and covering the cough.    -FOLLOW UP: Follow-up for injection then 2 weeks postinjection  Advised to contact clinic if symptoms worsen or change.    Subjective:     Claudia Wise is a 72 year old female who presents today for follow-up regarding recurrent low back pain on the left that radiates to the left upper buttock region that she reports has been worsening in the last couple of months.  Currently her pain is a constant 4/10 up to an 8 at its worst, 1 at its best  aggravated in general with standing and walking.  Does improve with sitting and laying down.  Minimal pain on the right low back and patient denies lower extremity pain at this time.  Denies numbness or tingling sensations.  Denies lower extremity weakness.  Denies recent trips or falls.  Patient does have chronic balance problems as well    *Patient does report that she has been working with her primary care provider recently and is being worked up for possible Parkinson's disease as she does have a left hand tremor ongoing for the last 1 year.     Treatment to Date: No prior spinal surgery.  Physical therapy [facility] x7 sessions last 8/28/2020 compression fracture/spine pain.  Physical therapy x1 session 8/2/2021 chronic LBP/sacroiliac joint dysfunction.     Left L2, L3, L4 MBB 51/2/2021 with positive response.  Left L2, L3, L4 MBB 6/4/2021 with positive response.  Bilateral L2, L3, L4 RFA 7/2/2021 with 50% relief LBP 4 weeks postinjection.  Preprocedure pain 6/10, post 2/10.  Left SI joint steroid injection 8/17/2021 with over 60% relief for over 6 months, patient found the substantial.  Preprocedure pain 6/10, post 3/10.     Medications:  Plaquenil 4 seronegative rheumatoid arthritis  Tylenol  Hydrocodone managed by Dr. Erasto Guzmán 25 mg bedtime however patient only tried this medication for a couple of days with no benefit therefore she discontinued on her own.    Patient Active Problem List   Diagnosis     Herpes Simplex Type II     Anxiety     Anemia in chronic kidney disease     Hyperlipemia     Gout     Hypertension     Prediabetes     Endometriosis     Other specified hypothyroidism     Seronegative rheumatoid arthritis of multiple sites (H)     High risk medication use     Bilateral primary osteoarthritis of knee     Small bowel obstruction (H)     Senile osteoporosis     Compression fracture of L2 vertebra with routine healing     Chronic kidney disease, stage 3 (H)     Venous (peripheral)  insufficiency     Exposure to bat without known bite     Parkinson disease (H)       Current Outpatient Medications   Medication     acetaminophen (TYLENOL) 500 MG tablet     DULoxetine (CYMBALTA) 20 MG capsule     lidocaine (LIDODERM) 5 % patch     acyclovir (ZOVIRAX) 400 MG tablet     allopurinol (ZYLOPRIM) 100 MG tablet     amLODIPine (NORVASC) 10 MG tablet     atorvastatin (LIPITOR) 10 MG tablet     benztropine (COGENTIN) 0.5 MG tablet     carboxymethylcellulose (REFRESH PLUS) 0.5 % Dpet ophthalmic dropperette     cyanocobalamin, vitamin B-12, 1,000 mcg Subl     denosumab (PROLIA) 60 MG/ML SOSY injection     fluticasone (FLONASE) 50 MCG/ACT nasal spray     folic acid (FOLVITE) 1 MG tablet     hydroxychloroquine (PLAQUENIL) 200 MG tablet     levothyroxine (SYNTHROID/LEVOTHROID) 50 MCG tablet     LORazepam (ATIVAN) 0.5 MG tablet     losartan (COZAAR) 50 MG tablet     methotrexate sodium 2.5 MG TABS     metoprolol tartrate (LOPRESSOR) 50 MG tablet     sodium bicarbonate 650 MG tablet     Current Facility-Administered Medications   Medication     denosumab (PROLIA) injection 60 mg     denosumab (PROLIA) injection 60 mg       Allergies   Allergen Reactions     Bupropion Hcl [Bupropion] Nausea     Erythromycin Base [Erythromycin] Nausea     Paxil [Paroxetine] Diarrhea     Tetracyclines Itching     Valacyclovir Nausea       Past Medical History:   Diagnosis Date     Anemia      Anxiety      Chronic kidney disease      Diabetes mellitus, type II (H)     prediabetes     Disease of thyroid gland     hypothyroid     Family history of myocardial infarction      Gout      High cholesterol      Hypertension      Inverted nipple     right     Macular edema      Osteoarthritis 2012     Rheumatoid arthritis (H) 2012        Review of Systems  ROS:  Specifically negative for bowel/bladder dysfunction, balance changes, headache, dizziness, foot drop, fevers, chills, appetite changes, nausea/vomiting, unexplained weight loss.  Otherwise 13 systems reviewed are negative. Please see the patient's intake questionnaire from today for details.    Reviewed Social, Family, Past Medical and Past Surgical history with patient, no significant changes noted since prior visit.     Objective:     /60 (BP Location: Right arm, Patient Position: Sitting)   Pulse 67   SpO2 98%     PHYSICAL EXAMINATION:    --CONSTITUTIONAL: Well developed, well nourished, healthy appearing individual.  --PSYCHIATRIC: Appropriate mood and affect. No difficulty interacting due to temper, social withdrawal, or memory issues.  --SKIN: Lumbar region is dry and intact.   --RESPIRATORY: Normal rhythm and effort. No abnormal accessory muscle breathing patterns noted.   --MUSCULOSKELETAL:  Normal lumbar lordosis noted, no lateral shift.  --GROSS MOTOR: Easily arises from a seated position. Gait is non-antalgic  --LUMBAR SPINE:  Inspection reveals no evidence of deformity. Range of motion is not limited in lumbar flexion, extension, lateral rotation.  Mild generalized tenderness to palpation lumbar spine, no significant pinpoint tenderness noted. Straight leg raising in the supine position is negative to radicular pain. Sciatic notch non-tender on the right, tender on the left.   --SACROILIAC JOINT: Positive left distraction.  Positive left Alyssa's with reproduction of pain to affected extremity.  Positive left Gaenslen's Test with reproduction of pain to affected extremity.   --NEUROLOGIC: Bilateral patellar and achilles reflexes are physiologic and symmetric. Sensation to light touch is intact in the bilateral L4, L5, and S1 dermatomes.    RESULTS:   Imaging: Spine imaging was reviewed today. The images were shown to the patient and the findings were explained using a spine model.      Xr Lumbar Spine 2 Or 3 Vws  Result Date: 8/25/2020  INDICATION: Evaluate L2 compression fracture. COMPARISON: 7/10/2020.   Five lumbar-type vertebral bodies. Stable moderate L2 compression  deformity. Segmental kyphosis about the fracture from the superior L1 endplate through the inferior L3 endplate measures 21 degrees, which is unchanged. Vertebral body heights are otherwise maintained. Mild loss of disc space height L3-L4 through L5-S1. Mild lower lumbar facet arthropathy.        XR LUMBAR SPINE 2 OR 3 S  LOCATION: [facility]  DATE/TIME: 7/1/2020   INDICATION: Low back pain compression fracture L2 evaluate for stability  COMPARISON: 06/08/2020  IMPRESSION:   Very shallow left apex curvature of the lumbar spine. Grade 1 retrolisthesis of L3 on L4. The bones appear diffusely demineralized, suggestive of osteoporosis. Stable appearance of the L2 compression deformity. No new compression deformities.   Atherosclerotic calcification of the abdominal aorta. Degenerative changes of the sacroiliac joints.        XR LUMBAR SPINE 2 OR 3 [facility]  LOCATION: [facility]  DATE/TIME: 6/8/2020   INDICATION: Low back pain evaluate L2 compression fracture.  COMPARISON: MR 05/19/2020.  IMPRESSION:   Five lumbar type vertebral bodies presumed. Subacute/chronic appearing compression is identified at the L2, with approximately 30-40% loss of superior body height, and fragmentation anterior superior corner similar to previous MR appearance. Sclerosis   associated with the compressed superior endplate at this level. Degenerative changes elsewhere in the lumbar spine stable. 2 mm posterior subluxation L3 upon L4 is more prominent, possibly due to positioning. If there is concern for instability flexion   and extension views could be helpful to assess this level. Otherwise satisfactory height and alignment lumbar spine. Leftward lumbar curve apex L4. Satisfactory appearance to the sacrum. Vascular calcification. Degenerative changes both SI joints.   Obscuration of the right hemisacrum due to bowel gas and stool.  Consider flexion/extension views to assess possible L3-L4 instability as clinically  indicated.        MR LUMBAR SPINE WO CONTRAST  LOCATION: [facility]  DATE/TIME: 5/19/2020   INDICATION: Back pain or radiculopathy, > 6 wks Low back pain with left radicular pain  COMPARISON: Correlation with abdomen and pelvis CT 02/28/2020  TECHNIQUE: Without IV contrast  FINDINGS:   Nomenclature is based on 5 lumbar type vertebral bodies. The conus ends at distal L1. Mild levocurvature of the lumbar spine. 1 mm retrolisthesis from L3-L4 to L5-S1. 20% superior endplate compression fracture of L2 with associated moderate marrow edema.   Small amount of prevertebral soft tissue edema from L1 to L3. The rest of the vertebral body heights are maintained. Nonpathologic marrow signal. No MRI evidence for pars defects. Mild symmetric atrophy of the posterior paraspinal musculature. Normal diameter of the distal abdominal aorta. The visualized bony pelvis and proximal femora are grossly within normal limits.  T12-L1: Mild intervertebral disc height loss. Loss of the normal T2 signal within the disc. Small broad-based disc bulge with superimposed left paracentral disc protrusion. Mild bilateral facet arthropathy. No spinal canal narrowing. No neuroforaminal narrowing.  L1-L2: Mild intervertebral disc height loss. Loss of the normal T2 signal within the disc. Small broad-based disc bulge. Mild bilateral facet arthropathy. No spinal canal narrowing. No neuroforaminal narrowing.  L2-L3: Mild intervertebral disc height loss. Loss of the normal T2 signal within the disc. Small broad-based disc bulge. Mild bilateral facet arthropathy. No spinal canal narrowing. No neuroforaminal narrowing.   L3-L4: Normal intervertebral disc height. Loss of the normal T2 signal within the disc. Small broad-based disc bulge. Moderate bilateral facet arthropathy. Small amount of fluid within the facet joints. Mild narrowing of the lateral recesses. No spinal canal narrowing. Mild right neuroforaminal narrowing. Mild left neuroforaminal  narrowing   L4-L5: Normal intervertebral disc height. Loss of the normal T2 signal within the disc. Small broad-based disc bulge. Moderate bilateral facet arthropathy. Small amount of fluid within the facet joints. Mild narrowing of the lateral recesses. No spinal canal narrowing. Mild right neuroforaminal narrowing. No left neuroforaminal narrowing. 2-3 mm bilateral lateral synovial cyst.  L5-S1: Normal intervertebral disc height. Loss of the normal T2 signal within the disc. Small broad-based disc bulge. Mild to moderate bilateral facet arthropathy. No spinal canal narrowing. No right neuroforaminal narrowing. No left neuroforaminal narrowing.  IMPRESSION:   1.  20% acute/subacute superior endplate compression fracture of L2.   2.  Mild narrowing of the lateral recesses at L3-L4 and L4-L5.  3.  No high-grade spinal canal.  4.  Mild bilateral neuroforaminal narrowing at L3-L4. Mild right neuroforaminal narrowing at L4-L5.  5.  Moderate facet arthropathy at L3-L4 and L4-L5 with small amount of fluid within the facet joints.                                           Again, thank you for allowing me to participate in the care of your patient.        Sincerely,        Loree Rivas, CNP

## 2023-01-24 NOTE — PATIENT INSTRUCTIONS
~Please call our [facility] Nurse Navigation line [phone] with any questions or concerns about your treatment plan, if symptoms worsen and you would like to be seen urgently, or if you have problems controlling bladder and bowel function.       Importance of specialized Physical Therapy: Discussed the importance of core strengthening, ROM, stretching exercises with the patient and how each of these entities is important in decreasing pain.  Explained to the patient that the purpose of physical therapy is to teach the patient a home exercise program individualized to them and their specific health concerns.  These exercises need to be performed every day in order to decrease pain and prevent future occurrences of pain.           An injection has been ordered today to potentially help with your pain symptoms. These injections do not fix what is going on in your back, therefore they typically do not take away the pain completely, however they can many times help improve symptoms. Injections should always be completed along with other modalities such as physical therapy for the best long term outcomes. If injections alone are done, then pain will likely return.     [facility] Injection Requirements    A  is required for all fluoroscopically-guided injections.  Injection appointments may be cancelled if there are signs/symptoms of an active infection or if the patient is being actively treated with antibiotics for a diagnosed infection.  Patients may have their steroid injection cancelled if they have had another steroid injection within 2 weeks.  Diabetic patients will have their blood glucose levels checked the day of their injection and the appointment will be rescheduled if the blood glucose level is 300 or higher.  Patients with allergies to cortisone, local anesthetics, iodine, or contrast dye should contact the Sp[facility] Center to further discuss these  considerations.  Patients scheduled for medial branch block diagnostic injections should refrain from taking pain medication the day of the procedure.  The medial branch block injection appointment will be rescheduled if the patient's pain rating is not 5/10 or greater at the time of the procedure.  Patients taking warfarin/Coumadin will have their INR checked the day of the procedure and the procedure may be rescheduled if the INR is greater than 3.0.  Please contact the [facility] Center [phone]) if you are taking any prescription blood-thinning medications (warfarin, Plavix, Lovenox, Eliquis, Brilinta, Effient, etc.) as special dosing adjustments may need to be made depending on the type of injection you are scheduled to receive.  It is recommended that you delay having your steroid injection if you have received a flu shot or shingles vaccine within 2 weeks.

## 2023-02-02 ENCOUNTER — OFFICE VISIT (OUTPATIENT)
Dept: INTERNAL MEDICINE | Facility: CLINIC | Age: 73
End: 2023-02-02
Payer: COMMERCIAL

## 2023-02-02 VITALS
WEIGHT: 171.4 LBS | OXYGEN SATURATION: 99 % | SYSTOLIC BLOOD PRESSURE: 130 MMHG | RESPIRATION RATE: 16 BRPM | HEIGHT: 64 IN | TEMPERATURE: 97.3 F | DIASTOLIC BLOOD PRESSURE: 61 MMHG | BODY MASS INDEX: 29.26 KG/M2 | HEART RATE: 61 BPM

## 2023-02-02 DIAGNOSIS — Z00.00 ENCOUNTER FOR MEDICARE ANNUAL WELLNESS EXAM: Primary | ICD-10-CM

## 2023-02-02 DIAGNOSIS — F41.1 ANXIETY STATE: ICD-10-CM

## 2023-02-02 DIAGNOSIS — Z12.31 VISIT FOR SCREENING MAMMOGRAM: ICD-10-CM

## 2023-02-02 DIAGNOSIS — R73.09 OTHER ABNORMAL GLUCOSE: ICD-10-CM

## 2023-02-02 DIAGNOSIS — I10 ESSENTIAL HYPERTENSION: ICD-10-CM

## 2023-02-02 DIAGNOSIS — Z87.19 HISTORY OF SMALL BOWEL OBSTRUCTION: ICD-10-CM

## 2023-02-02 DIAGNOSIS — F51.01 PRIMARY INSOMNIA: ICD-10-CM

## 2023-02-02 DIAGNOSIS — M06.09 SERONEGATIVE RHEUMATOID ARTHRITIS OF MULTIPLE SITES (H): ICD-10-CM

## 2023-02-02 DIAGNOSIS — N18.32 STAGE 3B CHRONIC KIDNEY DISEASE (H): ICD-10-CM

## 2023-02-02 DIAGNOSIS — G20.A1 PARKINSON DISEASE (H): ICD-10-CM

## 2023-02-02 DIAGNOSIS — M10.9 GOUT, UNSPECIFIED CAUSE, UNSPECIFIED CHRONICITY, UNSPECIFIED SITE: ICD-10-CM

## 2023-02-02 DIAGNOSIS — E78.2 MIXED HYPERLIPIDEMIA: ICD-10-CM

## 2023-02-02 DIAGNOSIS — M81.0 SENILE OSTEOPOROSIS: ICD-10-CM

## 2023-02-02 DIAGNOSIS — E03.8 OTHER SPECIFIED HYPOTHYROIDISM: ICD-10-CM

## 2023-02-02 PROBLEM — Z20.9 EXPOSURE TO BAT WITHOUT KNOWN BITE: Status: RESOLVED | Noted: 2021-07-28 | Resolved: 2023-02-02

## 2023-02-02 PROBLEM — K56.609 SMALL BOWEL OBSTRUCTION (H): Status: RESOLVED | Noted: 2020-02-10 | Resolved: 2023-02-02

## 2023-02-02 LAB
ALBUMIN SERPL BCG-MCNC: 4.4 G/DL (ref 3.5–5.2)
ALBUMIN UR-MCNC: NEGATIVE MG/DL
ALP SERPL-CCNC: 61 U/L (ref 35–104)
ALT SERPL W P-5'-P-CCNC: 15 U/L (ref 10–35)
ANION GAP SERPL CALCULATED.3IONS-SCNC: 11 MMOL/L (ref 7–15)
APPEARANCE UR: CLEAR
AST SERPL W P-5'-P-CCNC: 26 U/L (ref 10–35)
BACTERIA #/AREA URNS HPF: ABNORMAL /HPF
BILIRUB SERPL-MCNC: 0.7 MG/DL
BILIRUB UR QL STRIP: NEGATIVE
BUN SERPL-MCNC: 24.7 MG/DL (ref 8–23)
CALCIUM SERPL-MCNC: 9.3 MG/DL (ref 8.8–10.2)
CHLORIDE SERPL-SCNC: 106 MMOL/L (ref 98–107)
CHOLEST SERPL-MCNC: 154 MG/DL
COLOR UR AUTO: YELLOW
CREAT SERPL-MCNC: 1.4 MG/DL (ref 0.51–0.95)
CREAT UR-MCNC: 113 MG/DL
DEPRECATED CALCIDIOL+CALCIFEROL SERPL-MC: 48 UG/L (ref 20–75)
DEPRECATED HCO3 PLAS-SCNC: 20 MMOL/L (ref 22–29)
ERYTHROCYTE [DISTWIDTH] IN BLOOD BY AUTOMATED COUNT: 14.6 % (ref 10–15)
GFR SERPL CREATININE-BSD FRML MDRD: 40 ML/MIN/1.73M2
GLUCOSE SERPL-MCNC: 145 MG/DL (ref 70–99)
GLUCOSE UR STRIP-MCNC: NEGATIVE MG/DL
HBA1C MFR BLD: 6 % (ref 0–5.6)
HCT VFR BLD AUTO: 33.3 % (ref 35–47)
HDLC SERPL-MCNC: 58 MG/DL
HGB BLD-MCNC: 10.9 G/DL (ref 11.7–15.7)
HGB UR QL STRIP: ABNORMAL
HYALINE CASTS #/AREA URNS LPF: ABNORMAL /LPF
KETONES UR STRIP-MCNC: NEGATIVE MG/DL
LDLC SERPL CALC-MCNC: 54 MG/DL
LEUKOCYTE ESTERASE UR QL STRIP: ABNORMAL
MCH RBC QN AUTO: 31.1 PG (ref 26.5–33)
MCHC RBC AUTO-ENTMCNC: 32.7 G/DL (ref 31.5–36.5)
MCV RBC AUTO: 95 FL (ref 78–100)
MICROALBUMIN UR-MCNC: 20.1 MG/L
MICROALBUMIN/CREAT UR: 17.79 MG/G CR (ref 0–25)
NITRATE UR QL: NEGATIVE
NONHDLC SERPL-MCNC: 96 MG/DL
PH UR STRIP: 5.5 [PH] (ref 5–8)
PLATELET # BLD AUTO: 149 10E3/UL (ref 150–450)
POTASSIUM SERPL-SCNC: 4.5 MMOL/L (ref 3.4–5.3)
PROT SERPL-MCNC: 6.5 G/DL (ref 6.4–8.3)
RBC # BLD AUTO: 3.5 10E6/UL (ref 3.8–5.2)
RBC #/AREA URNS AUTO: ABNORMAL /HPF
SODIUM SERPL-SCNC: 137 MMOL/L (ref 136–145)
SP GR UR STRIP: 1.02 (ref 1–1.03)
SQUAMOUS #/AREA URNS AUTO: ABNORMAL /LPF
TRIGL SERPL-MCNC: 209 MG/DL
TSH SERPL DL<=0.005 MIU/L-ACNC: 1.53 UIU/ML (ref 0.3–4.2)
URATE SERPL-MCNC: 5.4 MG/DL (ref 2.4–5.7)
UROBILINOGEN UR STRIP-ACNC: 0.2 E.U./DL
WBC # BLD AUTO: 6.8 10E3/UL (ref 4–11)
WBC #/AREA URNS AUTO: ABNORMAL /HPF

## 2023-02-02 PROCEDURE — 82043 UR ALBUMIN QUANTITATIVE: CPT | Performed by: INTERNAL MEDICINE

## 2023-02-02 PROCEDURE — 80061 LIPID PANEL: CPT | Performed by: INTERNAL MEDICINE

## 2023-02-02 PROCEDURE — 85027 COMPLETE CBC AUTOMATED: CPT | Performed by: INTERNAL MEDICINE

## 2023-02-02 PROCEDURE — 99214 OFFICE O/P EST MOD 30 MIN: CPT | Mod: 25 | Performed by: INTERNAL MEDICINE

## 2023-02-02 PROCEDURE — 84550 ASSAY OF BLOOD/URIC ACID: CPT | Performed by: INTERNAL MEDICINE

## 2023-02-02 PROCEDURE — G0439 PPPS, SUBSEQ VISIT: HCPCS | Performed by: INTERNAL MEDICINE

## 2023-02-02 PROCEDURE — 82570 ASSAY OF URINE CREATININE: CPT | Performed by: INTERNAL MEDICINE

## 2023-02-02 PROCEDURE — 36415 COLL VENOUS BLD VENIPUNCTURE: CPT | Performed by: INTERNAL MEDICINE

## 2023-02-02 PROCEDURE — 81001 URINALYSIS AUTO W/SCOPE: CPT | Performed by: INTERNAL MEDICINE

## 2023-02-02 PROCEDURE — 83036 HEMOGLOBIN GLYCOSYLATED A1C: CPT | Performed by: INTERNAL MEDICINE

## 2023-02-02 PROCEDURE — 96372 THER/PROPH/DIAG INJ SC/IM: CPT | Performed by: INTERNAL MEDICINE

## 2023-02-02 PROCEDURE — 80053 COMPREHEN METABOLIC PANEL: CPT | Performed by: INTERNAL MEDICINE

## 2023-02-02 PROCEDURE — 84443 ASSAY THYROID STIM HORMONE: CPT | Performed by: INTERNAL MEDICINE

## 2023-02-02 PROCEDURE — 82306 VITAMIN D 25 HYDROXY: CPT | Performed by: INTERNAL MEDICINE

## 2023-02-02 ASSESSMENT — ENCOUNTER SYMPTOMS
NAUSEA: 0
ABDOMINAL PAIN: 0
MYALGIAS: 1
CONSTIPATION: 0
PALPITATIONS: 0
NERVOUS/ANXIOUS: 1
HEMATURIA: 0
HEADACHES: 0
PARESTHESIAS: 0
DIZZINESS: 0
JOINT SWELLING: 1
HEARTBURN: 0
FREQUENCY: 0
SORE THROAT: 0
FEVER: 0
WEAKNESS: 1
COUGH: 0
SHORTNESS OF BREATH: 1
EYE PAIN: 0
ARTHRALGIAS: 1
DYSURIA: 0
CHILLS: 0
BREAST MASS: 0
DIARRHEA: 0
HEMATOCHEZIA: 0

## 2023-02-02 ASSESSMENT — PATIENT HEALTH QUESTIONNAIRE - PHQ9
10. IF YOU CHECKED OFF ANY PROBLEMS, HOW DIFFICULT HAVE THESE PROBLEMS MADE IT FOR YOU TO DO YOUR WORK, TAKE CARE OF THINGS AT HOME, OR GET ALONG WITH OTHER PEOPLE: NOT DIFFICULT AT ALL
SUM OF ALL RESPONSES TO PHQ QUESTIONS 1-9: 12
SUM OF ALL RESPONSES TO PHQ QUESTIONS 1-9: 12

## 2023-02-02 ASSESSMENT — ACTIVITIES OF DAILY LIVING (ADL)
CURRENT_FUNCTION: HOUSEWORK REQUIRES ASSISTANCE
CURRENT_FUNCTION: PREPARING MEALS REQUIRES ASSISTANCE

## 2023-02-02 NOTE — PROGRESS NOTES
SUBJECTIVE:   Claudia is a 72 year old who presents for Preventive Visit.    Patient has been advised of split billing requirements and indicates understanding: Yes  Are you in the first 12 months of your Medicare coverage?  No    HPI    Have you ever done Advance Care Planning? (For example, a Health Directive, POLST, or a discussion with a medical provider or your loved ones about your wishes): Yes, advance care planning is on file.       Fall risk  Fallen 2 or more times in the past year?: No  Any fall with injury in the past year?: No    Cognitive Screening   1) Repeat 3 items (Leader, Season, Table)    2) Clock draw: NORMAL  3) 3 item recall: Recalls 3 objects  Results: NORMAL clock, 1-2 items recalled: COGNITIVE IMPAIRMENT LESS LIKELY    Mini-CogTM Copyright S [employer]. Licensed by the author for use in [facility] [employer]; reprinted with permission (harjit@[facility]). All rights reserved.      Do you have sleep apnea, excessive snoring or daytime drowsiness?: no    Reviewed and updated as needed this visit by clinical staff   Tobacco  Allergies  Meds              Reviewed and updated as needed this visit by Provider                 Social History     Tobacco Use     Smoking status: Former     Types: Cigarettes     Quit date: 1995     Years since quittin.5     Smokeless tobacco: Never   Substance Use Topics     Alcohol use: Yes     Comment: Alcoholic Drinks/day: occasional couple times a year     If you drink alcohol do you typically have >3 drinks per day or >7 drinks per week? No    Alcohol Use 2023   Prescreen: >3 drinks/day or >7 drinks/week? No               Current providers sharing in care for this patient include:   Patient Care Team:  Isabel Maurer MD as PCP - General (Internal Medicine)  Isabel Maurer MD as Assigned PCP  Akila Vinson MBBS as Assigned Rheumatology Provider  Logan Smart MD as MD (Neurology)  Logan Smart MD as Assigned Neuroscience Provider  Katie Alfaro  "TAHIRA GOLDMAN as Physician Assistant (Dermatology)    The following health maintenance items are reviewed in Epic and correct as of today:  Health Maintenance   Topic Date Due     MICROALBUMIN  Never done     URINE DRUG SCREEN  Never done     ANNUAL REVIEW OF HM ORDERS  Never done     BMP  12/21/2022     LIPID  02/11/2023     MAMMO SCREENING  03/23/2023     HEMOGLOBIN  11/15/2023     MEDICARE ANNUAL WELLNESS VISIT  02/02/2024     FALL RISK ASSESSMENT  02/02/2024     ADVANCE CARE PLANNING  02/09/2027     COLORECTAL CANCER SCREENING  02/15/2027     DTAP/TDAP/TD IMMUNIZATION (3 - Td or Tdap) 06/07/2028     DEXA  10/01/2035     HEPATITIS C SCREENING  Completed     PHQ-2 (once per calendar year)  Completed     INFLUENZA VACCINE  Completed     Pneumococcal Vaccine: 65+ Years  Completed     URINALYSIS  Completed     ZOSTER IMMUNIZATION  Completed     COVID-19 Vaccine  Completed     IPV IMMUNIZATION  Aged Out     MENINGITIS IMMUNIZATION  Aged Out               Review of Systems      OBJECTIVE:   /61   Pulse 61   Temp 97.3  F (36.3  C)   Resp 16   Ht 1.613 m (5' 3.5\")   Wt 77.7 kg (171 lb 6.4 oz)   SpO2 99%   BMI 29.89 kg/m   Estimated body mass index is 29.89 kg/m  as calculated from the following:    Height as of this encounter: 1.613 m (5' 3.5\").    Weight as of this encounter: 77.7 kg (171 lb 6.4 oz).  Physical Exam  General Appearance: Alert, cooperative, no distress, appears stated age.  Head: Normocephalic, without obvious abnormality, atraumatic  Eyes: PERRL, conjunctiva/corneas clear, EOM's intact  Ears: Normal TM's and external ear canals, both ears  Nose: Nares normal, septum midline,mucosa normal, no drainage  Throat: Lips, mucosa, and tongue normal; teeth and gums normal  Neck: Supple, symmetrical, trachea midline, no adenopathy;  thyroid: not enlarged, symmetric, no tenderness/mass/nodules; no carotid bruit or JVD  Back: Symmetric, no curvature, ROM normal, no CVA tenderness.  Lungs: Clear to " auscultation bilaterally, respirations unlabored.  Breasts: No breast masses, tenderness, asymmetry, or nipple discharge.  Heart: Regular rate and rhythm, S1 and S2 normal, no murmur, rub, or gallop.  Abdomen: Soft, non-tender, bowel sounds active all four quadrants,  no masses, no organomegaly.  Extremities: Extremities normal, atraumatic, no cyanosis or edema.  Skin: Skin color, texture, turgor normal, no rashes or lesions.  Lymph nodes: Cervical, supraclavicular, and axillary nodes normal.  Neurologic: No focal neurological findings.          ASSESSMENT / PLAN:   (Z00.00) Encounter for Medicare annual wellness exam  (primary encounter diagnosis)      (M06.09) Seronegative rheumatoid arthritis of multiple sites (H)  Comment: stable on methotrexate and Plaquenil, seeing Dr Vinson.      (M81.0) Senile osteoporosis  Comment: Due for Prolia #6 today.  Due for DXA scan. She has h/o vertebral fracture.  The following steps were completed to comply with the REMS program for Prolia:    Reviewed information in the Medication Guide and Patient Counseling Chart, including the serious risks of Prolia  and the symptoms of each risk.    Advised patient to seek prompt medical attention if they have signs or symptoms of any of the serious risks.    Provided each patient a copy of the Medication Guide and Patient Brochure  Plan: Vitamin D Deficiency, DX Hip/Pelvis/Spine            (R73.09) Prediabetes  Comment:   Plan: Hemoglobin A1c            (G20) Parkinson disease (H)  Comment: stable, seeing Neurology, dose of Cogentin was recently increased and she is tolerating it well.      (N18.32) Stage 3b chronic kidney disease (H)  Comment: stable  Component      Latest Ref Rng & Units 11/15/2022   Creatinine      0.51 - 0.95 mg/dL 1.34 (H)     Plan: Adult Nephrology  Referral            (Z87.19) History of small bowel obstruction  Comment: no recent problems      (E03.8) Other specified hypothyroidism  Comment: on  "levothyroxine  Plan: TSH with free T4 reflex            (I10) Hypertension  Comment: stable on amlodipine, losartan  Plan: CBC with platelets, Comprehensive metabolic         panel, UA reflex to Microscopic and Culture            (E78.2) Mixed hyperlipidemia  Comment: on Lipitor  Plan: Lipid panel reflex to direct LDL Fasting            (M10.9) Gout, unspecified cause, unspecified chronicity, unspecified site  Comment: on allopurinol, sometimes forgets the second dose  Plan: Uric acid            (F51.01) Primary insomnia  Comment: taking lorazepam very rarely, only on th stressful days      (Z12.31) Visit for screening mammogram  Comment:   Plan: MA Screen Bilateral w/Heriberto            (F41.1) Anxiety  Comment: stable on duloxetine      Patient has been advised of split billing requirements and indicates understanding: Yes      COUNSELING:  Reviewed preventive health counseling, as reflected in patient instructions       Regular exercise       Healthy diet/nutrition      BMI:   Estimated body mass index is 29.89 kg/m  as calculated from the following:    Height as of this encounter: 1.613 m (5' 3.5\").    Weight as of this encounter: 77.7 kg (171 lb 6.4 oz).         She reports that she quit smoking about 27 years ago. Her smoking use included cigarettes. She has never used smokeless tobacco.      Appropriate preventive services were discussed with this patient, including applicable screening as appropriate for cardiovascular disease, diabetes, osteopenia/osteoporosis, and glaucoma.  As appropriate for age/gender, discussed screening for colorectal cancer, prostate cancer, breast cancer, and cervical cancer. Checklist reviewing preventive services available has been given to the patient.    Reviewed patients plan of care and provided an AVS. The Basic Care Plan (routine screening as documented in Health Maintenance) for Claudia meets the Care Plan requirement. This Care Plan has been established and reviewed with the " Patient.      Isabel Maurer MD  [facility]    Identified Health Risks:

## 2023-02-02 NOTE — PATIENT INSTRUCTIONS
"Prolia 6th today.  Labs today.  Prolia 7th in 6 months with me.    DXA due now .   Phone number to schedule [phone].  To schedule mammogram [phone].    Daily calcium need is 9553-2985 mg a day from the diet and supplements.  Calcium citrate is easier to digest.  Vitamin D 2000 IU daily recommended.    Risk of rebound vertebral fractures is higher when Prolia suddenly stopped or dose was missed.      Prolia and Covid vaccine should be  for at least a week.   Patient Education   Personalized Prevention Plan  You are due for the preventive services outlined below.  Your care team is available to assist you in scheduling these services.  If you have already completed any of these items, please share that information with your care team to update in your medical record.  Health Maintenance Due   Topic Date Due    Kidney Microalbumin Urine Test  Never done    URINE DRUG SCREEN  Never done    ANNUAL REVIEW OF HM ORDERS  Never done    Basic Metabolic Panel  12/21/2022    Cholesterol Lab  02/11/2023       Depression and Suicide in Older Adults    Nearly 2 million older Americans have some type of depression. Some of them even take their own lives. Yet depression among older adults is often ignored. Learn the warning signs. You may help spare a loved one needless pain. You may also save a life.   What is depression?  Depression is a common and serious illness that affects the way you think and feel. It is not a normal part of aging, nor is it a sign of weakness, a character flaw, or something you can snap out of. Most people with depression need treatment to get better. The most common symptom is a feeling of deep sadness. People who are depressed also may seem tired and listless. And nothing seems to give them pleasure. It s normal to grieve or be sad sometimes. But sadness lessens or passes with time. Depression rarely goes away or improves on its own. A person with clinical depression can't \"snap out of " "it.\" Other symptoms of depression are:   Sleeping more or less than normal  Eating more or less than normal  Having headaches, stomachaches, or other pains that don t go away  Feeling nervous,  empty,  or worthless  Crying a great deal  Thinking or talking about suicide or death  Loss of interest in activities previously enjoyed  Social isolation  Feeling confused or forgetful  What causes it?  The causes of depression aren t fully known. But it is thought to result from a complex blend of these factors:   Biochemistry. Certain chemicals in the brain play a role.  Genes. Depression does run in families.  Life stress. Life stresses can also trigger depression in some people. Older adults often face many stressors, such as death of friends or a spouse, health problems, and financial concerns.  Chronic conditions. This includes conditions such as diabetes, heart disease, or cancer. These can cause symptoms of depression. Medicine side effects can cause changes in thoughts and behaviors.  How you can help  Often, depressed people may not want to ask for help. When they do, they may be ignored. Or, they may receive the wrong treatment. You can help by showing parents and older friends love and support. If they seem depressed, don t lecture the person, ignore the symptoms, or discount the symptoms as a  normal  part of aging -which they are not. Get involved, listen, and show interest and support.   Help them understand that depression is a treatable illness. Tell them you can help them find the right treatment. Offer to go to their healthcare provider's appointment with them for support when the symptoms are discussed. With their approval, contact a local mental health center, social service agency, or hospital about services.   You can be an advocate for him or her at healthcare appointments. Many older adults have chronic illnesses that can cause symptoms of depression. Medicine side effects can change thoughts and " behaviors. You can help make sure that the healthcare provider looks at all of these factors. He or she should refer your family member or friend to a mental healthcare provider when needed. in some cases, untreated depression can lead to a misdiagnosis. A person may be diagnosed with a brain disorder such as dementia. If the healthcare provider does not take the issue of depression seriously, help your family member or friend to find another provider.   Don't be afraid to ask  If you think an older person you care about could be suicidal, ask,  Have you thought about suicide?  Most people will tell you the truth. If they say  yes,  they may already have a plan for how and when they will attempt it. Find out as much as you can. The more detailed the plan, and the easier it is to carry out, the more danger the person is in right now. Tell the person you are there for them and do not want them to harm him or herself. Don't wait to get help for the person. Call the person's healthcare provider, local hospital, or emergency services.   To learn more  National Suicide Prevention Lifeline (crisis hotline) 785-287-HIMR (427-219-0952)  National [place] of Mental Yzofaf797-395-0524xrx.[facility] Hospitalh.nih.gov  National [place] on Mental Fxleclm739-684-6395uqz.[facility]una.org  Mental Health Tpdhrms531-290-3125yav.[facility].org  National Suicide Dtuwimr211-JGUNVPP (586-792-3684)    Call 911  Never leave the person alone. A person who is actively suicidal needs psychiatric care right away. They will need constant supervision. Never leave the person out of sight. Call 911 or the national 24-hour suicide crisis hotline at 941-325-CJEB (561-122-2258). You can also take the person to the closest emergency room.   Authorly last reviewed this educational content on 5/1/2020 2000-2021 [employer]. All rights reserved. This information is not intended as a substitute for professional medical care. Always follow your healthcare  professional's instructions.

## 2023-02-08 ENCOUNTER — RADIOLOGY INJECTION OFFICE VISIT (OUTPATIENT)
Dept: PHYSICAL MEDICINE AND REHAB | Facility: CLINIC | Age: 73
End: 2023-02-08
Attending: NURSE PRACTITIONER
Payer: COMMERCIAL

## 2023-02-08 VITALS
RESPIRATION RATE: 16 BRPM | SYSTOLIC BLOOD PRESSURE: 112 MMHG | OXYGEN SATURATION: 96 % | DIASTOLIC BLOOD PRESSURE: 62 MMHG | TEMPERATURE: 97.8 F | HEART RATE: 73 BPM

## 2023-02-08 DIAGNOSIS — M53.3 PAIN OF LEFT SACROILIAC JOINT: ICD-10-CM

## 2023-02-08 DIAGNOSIS — M79.18 LEFT BUTTOCK PAIN: ICD-10-CM

## 2023-02-08 DIAGNOSIS — M53.3 SI (SACROILIAC) JOINT DYSFUNCTION: ICD-10-CM

## 2023-02-08 PROCEDURE — 27096 INJECT SACROILIAC JOINT: CPT | Mod: LT | Performed by: PAIN MEDICINE

## 2023-02-08 RX ORDER — LIDOCAINE HYDROCHLORIDE 10 MG/ML
INJECTION, SOLUTION EPIDURAL; INFILTRATION; INTRACAUDAL; PERINEURAL
Status: COMPLETED | OUTPATIENT
Start: 2023-02-08 | End: 2023-02-08

## 2023-02-08 RX ORDER — METHYLPREDNISOLONE ACETATE 40 MG/ML
INJECTION, SUSPENSION INTRA-ARTICULAR; INTRALESIONAL; INTRAMUSCULAR; SOFT TISSUE
Status: COMPLETED | OUTPATIENT
Start: 2023-02-08 | End: 2023-02-08

## 2023-02-08 RX ORDER — ROPIVACAINE HYDROCHLORIDE 5 MG/ML
INJECTION, SOLUTION EPIDURAL; INFILTRATION; PERINEURAL
Status: COMPLETED | OUTPATIENT
Start: 2023-02-08 | End: 2023-02-08

## 2023-02-08 RX ADMIN — METHYLPREDNISOLONE ACETATE 20 MG: 40 INJECTION, SUSPENSION INTRA-ARTICULAR; INTRALESIONAL; INTRAMUSCULAR; SOFT TISSUE at 10:54

## 2023-02-08 RX ADMIN — LIDOCAINE HYDROCHLORIDE 1 ML: 10 INJECTION, SOLUTION EPIDURAL; INFILTRATION; INTRACAUDAL; PERINEURAL at 10:53

## 2023-02-08 RX ADMIN — ROPIVACAINE HYDROCHLORIDE 2.5 ML: 5 INJECTION, SOLUTION EPIDURAL; INFILTRATION; PERINEURAL at 10:53

## 2023-02-08 ASSESSMENT — PAIN SCALES - GENERAL
PAINLEVEL: NO PAIN (1)
PAINLEVEL: SEVERE PAIN (7)

## 2023-02-08 NOTE — PATIENT INSTRUCTIONS
DISCHARGE INSTRUCTIONS    During office hours (8:00 a.m.- 4:00 p.m.) questions or concerns may be answered  by calling [facility] Navigation Nurses at  [phone].  Messages received after hours will be returned the following business day.      In the case of an emergency, please dial 911 or seek assistance at the nearest Emergency Room/Urgent Care facility.     All Patients:    You may experience an increase in your symptoms for the first 2 days (It may take anywhere between 2 days- 2 weeks for the steroid to have maximum effect).    You may use ice on the injection site, as frequently as 20 minutes each hour if needed.    You may take your pain medicine.    You may continue taking your regular medication after your injection. If you have had a Medial Branch Block you may resume pain medication once your pain diary is completed.    You may shower. No swimming, tub bath or hot tub for 48 hours.  You may remove your bandaid/bandage as soon as you are home.    You may resume light activities, as tolerated.    Resume your usual diet as tolerated.    It is strongly advised that you do not drive for 1-3 hours post injection.    If you have had oral sedation:  Do not drive for 8 hours post injection.      If you have had IV sedation:  Do not drive for 24 hours post injection.  Do not operate hazardous machinery or make important personal/business decisions for 24 hours.      POSSIBLE STEROID SIDE EFFECTS (If steroid/cortisone was used for your procedure)    -If you experience these symptoms, it should only last for a short period    Swelling of the legs              Skin redness (flushing)     Mouth (oral) irritation   Blood sugar (glucose) levels            Sweats                    Mood changes  Headache  Sleeplessness  Weakened immune system for up to 14 days, which could increase the risk of chhaya the COVID-19 virus and/or experiencing more severe symptoms of the disease, if exposed.  Decreased  effectiveness of the flu vaccine if given within 2 weeks of the steroid.         POSSIBLE PROCEDURE SIDE EFFECTS  -Call the Sp[facility] Center if you are concerned  Increased Pain           Increased numbness/tingling      Nausea/Vomiting          Bruising/bleeding at site      Redness or swelling                                              Difficulty walking      Weakness           Fever greater than 100.5    *In the event of a severe headache after an epidural steroid injection that is relieved by lying down, please call the [facility] to speak with a clinical staff member*

## 2023-02-13 ENCOUNTER — TELEPHONE (OUTPATIENT)
Dept: NEPHROLOGY | Facility: CLINIC | Age: 73
End: 2023-02-13

## 2023-02-13 NOTE — TELEPHONE ENCOUNTER
[facility] Call Center    Phone Message    May a detailed message be left on voicemail: yes     Reason for Call: Order(s): Other:   Reason for requested: lab orders  Date needed: 05/15/2023  Provider name:       Action Taken: Message routed to:  Other: neph    Travel Screening: Not Applicable

## 2023-02-15 ENCOUNTER — HOSPITAL ENCOUNTER (OUTPATIENT)
Dept: GENERAL RADIOLOGY | Facility: HOSPITAL | Age: 73
Discharge: HOME | End: 2023-02-15
Attending: INTERNAL MEDICINE | Admitting: INTERNAL MEDICINE
Payer: MEDICARE

## 2023-02-15 ENCOUNTER — OFFICE VISIT (OUTPATIENT)
Dept: RHEUMATOLOGY | Facility: CLINIC | Age: 73
End: 2023-02-15
Payer: COMMERCIAL

## 2023-02-15 VITALS
HEART RATE: 64 BPM | WEIGHT: 171 LBS | SYSTOLIC BLOOD PRESSURE: 130 MMHG | BODY MASS INDEX: 29.82 KG/M2 | DIASTOLIC BLOOD PRESSURE: 70 MMHG

## 2023-02-15 DIAGNOSIS — M17.0 BILATERAL PRIMARY OSTEOARTHRITIS OF KNEE: ICD-10-CM

## 2023-02-15 DIAGNOSIS — N18.32 STAGE 3B CHRONIC KIDNEY DISEASE (H): ICD-10-CM

## 2023-02-15 DIAGNOSIS — Z79.899 HIGH RISK MEDICATION USE: ICD-10-CM

## 2023-02-15 DIAGNOSIS — M06.09 SERONEGATIVE RHEUMATOID ARTHRITIS OF MULTIPLE SITES (H): Primary | ICD-10-CM

## 2023-02-15 DIAGNOSIS — M06.09 SERONEGATIVE RHEUMATOID ARTHRITIS OF MULTIPLE SITES (H): ICD-10-CM

## 2023-02-15 PROCEDURE — 99214 OFFICE O/P EST MOD 30 MIN: CPT | Mod: 25 | Performed by: INTERNAL MEDICINE

## 2023-02-15 PROCEDURE — 20610 DRAIN/INJ JOINT/BURSA W/O US: CPT | Mod: 50 | Performed by: INTERNAL MEDICINE

## 2023-02-15 PROCEDURE — 73560 X-RAY EXAM OF KNEE 1 OR 2: CPT | Mod: 50

## 2023-02-15 RX ORDER — HYDROXYCHLOROQUINE SULFATE 200 MG/1
200 TABLET, FILM COATED ORAL 2 TIMES DAILY
Qty: 180 TABLET | Refills: 0 | Status: SHIPPED | OUTPATIENT
Start: 2023-02-15 | End: 2023-08-15

## 2023-02-15 RX ORDER — TRIAMCINOLONE ACETONIDE 40 MG/ML
40 INJECTION, SUSPENSION INTRA-ARTICULAR; INTRAMUSCULAR ONCE
Status: COMPLETED | OUTPATIENT
Start: 2023-02-15 | End: 2023-02-15

## 2023-02-15 RX ORDER — METHOTREXATE 2.5 MG/1
TABLET ORAL
Qty: 48 TABLET | Refills: 0 | Status: SHIPPED | OUTPATIENT
Start: 2023-02-15 | End: 2023-08-15

## 2023-02-15 RX ADMIN — TRIAMCINOLONE ACETONIDE 40 MG: 40 INJECTION, SUSPENSION INTRA-ARTICULAR; INTRAMUSCULAR at 12:17

## 2023-02-15 RX ADMIN — TRIAMCINOLONE ACETONIDE 40 MG: 40 INJECTION, SUSPENSION INTRA-ARTICULAR; INTRAMUSCULAR at 12:18

## 2023-02-15 NOTE — PROGRESS NOTES
Rheumatology follow-up visit note     Claudia is a 72 year old female presents today for follow-up.    Claudia was seen today for recheck.    Diagnoses and all orders for this visit:    Seronegative rheumatoid arthritis of multiple sites (H)  -     XR Knee AP/Lat Standing Bilateral; Future  -     methotrexate sodium 2.5 MG TABS; TAKE 4 TABLETS BY MOUTH ONCE A WEEK Strength: 2.5 mg  -     hydroxychloroquine (PLAQUENIL) 200 MG tablet; Take 1 tablet (200 mg) by mouth 2 times daily  -     triamcinolone (KENALOG-40) injection 40 mg  -     triamcinolone (KENALOG-40) injection 40 mg  -     ASPIRATION/INJECTION MAJOR JOINT    Bilateral primary osteoarthritis of knee  -     XR Knee AP/Lat Standing Bilateral; Future  -     triamcinolone (KENALOG-40) injection 40 mg  -     triamcinolone (KENALOG-40) injection 40 mg  -     ASPIRATION/INJECTION MAJOR JOINT    High risk medication use    Stage 3b chronic kidney disease (H)            After pros and cons were discussed including risk of infection, bleeding, skin changes including thinning, pigmentary alteration and scarring to name a few, left knee, right knee injected as noted in the orders section. This was done with nontouch technique.  The patient tolerated the procedure well and had a brisk Marcaine effect.  The postinjection care was discussed.      Follow up in 6 months.    HPI    Claudia Wise is a 72 year old female is here for follow-up of   seronegative Rheumatoid Arthritis, osteoarthritis with his various manifestations, renal impairment and osteoarthritis.  She has tolerated the current regimen well.  She is noted worsening pain.  This is in her knees.  This is bilateral.  She has not observed swelling. More recently the symptoms have gotten worse to the point where she has difficult time ambulating going up and down the steps.  The previous corticosteroid injections in the knees provided her with good relief for approximately 3 months.  She manages this well  with over-the-counter measures and corticosteroid injections intermittently.  She is aware not to take nonsteroidals because of renal impairment.  Reminded of eye examination.  Overall disease activity:  stable. Limitation on activities as noted in the MDHAQ scanned in the EMR.         DETAILED EXAMINATION  02/15/23  :    Vitals:    02/15/23 1035   Weight: 77.6 kg (171 lb)     Alert oriented. Head including the face is examined for malar rash, heliotropes, scarring, lupus pernio. Eyes examined for redness such as in episcleritis/scleritis, periorbital lesions.   Neck/ Face examined for parotid gland swelling, range of motion of neck.  Left upper and lower and right upper and lower extremities examined for tenderness, swelling, warmth of the appendicular joints, range of motion, edema, rash.  Some of the important findings included: she does not have evidence of synovitis in the palpable joints of the upper extremities.  No significant deformities of the digits.  + Heberden nodes.  Range of motion of the shoulders  show full abduction.  Marked JLT without effusion or warmth of the knees.  she does not have dactylitis of the digits.     Patient Active Problem List    Diagnosis Date Noted     History of small bowel obstruction 02/02/2023     Priority: Medium     Primary insomnia 02/02/2023     Priority: Medium     Parkinson disease (H) 07/20/2022     Priority: Medium     Venous (peripheral) insufficiency 07/01/2021     Priority: Medium     Other specified hypothyroidism      Priority: Medium     Created by Conversion  Replacement Utility updated for latest IMO load         Chronic kidney disease, stage 3 (H) 01/18/2021     Priority: Medium     Senile osteoporosis 06/02/2020     Priority: Medium     Compression fracture of L2 vertebra with routine healing 06/02/2020     Priority: Medium     Bilateral primary osteoarthritis of knee 05/23/2019     Priority: Medium     Anemia in chronic kidney disease      Priority: Medium      Created by Conversion         Gout      Priority: Medium     Created by Conversion         Hyperlipemia      Priority: Medium     Created by Conversion         High risk medication use 08/25/2016     Priority: Medium     Herpes Simplex Type II      Priority: Medium     Created by Conversion  [facility] Annotation: Nov 13 2007  4:56PM - Oma Reddy: By pt's   history.    Allergy to valtrex.  She came to me on prn acyclovir.  Replacement Utility updated for latest IMO load         Anxiety      Priority: Medium     Created by Conversion  Replacement Utility updated for latest IMO load         Hypertension      Priority: Medium     Created by Conversion  Replacement Utility updated for latest IMO load         Endometriosis      Priority: Medium     Created by Conversion  [facility] Annotation: Nov 13 2007  4:58PM - Deng Meyersn: s/p VON, BSO   1987.  Replacement Utility updated for latest IMO load         Seronegative rheumatoid arthritis of multiple sites (H) 03/01/2016     Priority: Medium     Prediabetes      Priority: Medium     Created by Conversion         Past Surgical History:   Procedure Laterality Date     COLON SURGERY  1986     COLPORRHAPHY N/A 7/7/2016    Procedure: ANTERIOR REPAIR WITH MESH;  Surgeon: Zac Stone MD;  Location: [facility];  Service:      HC UGI ENDOSCOPY W PLACEMENT GASTROSTOMY TUBE PERCUT N/A 2/7/2020    Procedure: Percutaneous Endoscopic Gastromstomy Tube Placement;  Surgeon: Zion Schwarz MD;  Location: [facility] OR;  Service: General     HYSTERECTOMY       LAPAROTOMY EXPLORATORY N/A 1/23/2020    Procedure: EXPLORATORY LAPARATOMY, EXTENSIVE LYSIS OF ADHESIONS, SMALL BOWEL RESECTION;  Surgeon: Claudia Jeronimo MD;  Location: [facility] OR;  Service: General     OOPHORECTOMY        Past Medical History:   Diagnosis Date     Anemia      Anxiety      Chronic kidney disease      Diabetes mellitus, type II (H)     prediabetes     Disease of thyroid gland      hypothyroid     Family history of myocardial infarction      Gout      High cholesterol      Hypertension      Inverted nipple     right     Macular edema      Osteoarthritis 2012     Rheumatoid arthritis (H) 2012     Allergies   Allergen Reactions     Bupropion Hcl [Bupropion] Nausea     Erythromycin Base [Erythromycin] Nausea     Paxil [Paroxetine] Diarrhea     Tetracyclines Itching     Valacyclovir Nausea     Current Outpatient Medications   Medication Sig Dispense Refill     acetaminophen (TYLENOL) 500 MG tablet Take 500 mg by mouth every 6 hours as needed        acyclovir (ZOVIRAX) 400 MG tablet TAKE ONE TABLET BY MOUTH EVERY 8 HOURS AS NEEDED 60 tablet 0     allopurinol (ZYLOPRIM) 100 MG tablet Take 1 tablet (100 mg) by mouth 2 times daily (with meals) 180 tablet 3     amLODIPine (NORVASC) 10 MG tablet Take 1 tablet (10 mg) by mouth daily 90 tablet 3     atorvastatin (LIPITOR) 10 MG tablet TAKE 1 TABLET BY MOUTH AT BEDTIME 90 tablet 3     benztropine (COGENTIN) 0.5 MG tablet Take 3 tablets (1.5 mg) by mouth 2 times daily 540 tablet 1     carboxymethylcellulose (REFRESH PLUS) 0.5 % Dpet ophthalmic dropperette [CARBOXYMETHYLCELLULOSE (REFRESH PLUS) 0.5 % DPET OPHTHALMIC DROPPERETTE] Administer 1-2 drops to both eyes 4 (four) times a day as needed.        cyanocobalamin, vitamin B-12, 1,000 mcg Subl [CYANOCOBALAMIN, VITAMIN B-12, 1,000 MCG SUBL] Place 1 tablet (1,000 mcg total) under the tongue daily.  0     DULoxetine (CYMBALTA) 20 MG capsule Take 1 capsule (20 mg) by mouth daily 90 capsule 3     fluticasone (FLONASE) 50 MCG/ACT nasal spray Use 1 spray(s) in each nostril once daily 48 g 3     folic acid (FOLVITE) 1 MG tablet Take 1 tablet (1,000 mcg) by mouth daily 90 tablet 0     hydroxychloroquine (PLAQUENIL) 200 MG tablet Take 1 tablet by mouth twice daily 180 tablet 0     levothyroxine (SYNTHROID/LEVOTHROID) 50 MCG tablet Take 1 tablet (50 mcg) by mouth daily 30 minutes before breakfast. 90 tablet 2      lidocaine (LIDODERM) 5 % patch Place 1 patch onto the skin every 24 hours To prevent lidocaine toxicity, patient should be patch free for 12 hrs daily. 14 patch 1     LORazepam (ATIVAN) 0.5 MG tablet Take 1 tablet (0.5 mg) by mouth daily as needed for anxiety 30 tablet 0     losartan (COZAAR) 50 MG tablet Take 1 tablet (50 mg) by mouth daily 90 tablet 3     methotrexate sodium 2.5 MG TABS TAKE 4 TABLETS BY MOUTH ONCE A WEEK 48 tablet 0     metoprolol tartrate (LOPRESSOR) 50 MG tablet Take 1 tablet by mouth twice daily 180 tablet 2     sodium bicarbonate 650 MG tablet TAKE 2 TABLETS BY MOUTH 2 TIMES A   tablet 0     denosumab (PROLIA) 60 MG/ML SOSY injection Inject 60 mg Subcutaneous       family history includes Breast Cancer in her maternal aunt; Cancer in her cousin, father, maternal aunt, maternal grandmother, and sister; Coronary Artery Disease (age of onset: 49.00) in her mother.  Social Connections: Not on file          WBC Count   Date Value Ref Range Status   02/02/2023 6.8 4.0 - 11.0 10e3/uL Final     RBC Count   Date Value Ref Range Status   02/02/2023 3.50 (L) 3.80 - 5.20 10e6/uL Final     Hemoglobin   Date Value Ref Range Status   02/02/2023 10.9 (L) 11.7 - 15.7 g/dL Final     Hematocrit   Date Value Ref Range Status   02/02/2023 33.3 (L) 35.0 - 47.0 % Final     MCV   Date Value Ref Range Status   02/02/2023 95 78 - 100 fL Final     MCH   Date Value Ref Range Status   02/02/2023 31.1 26.5 - 33.0 pg Final     Platelet Count   Date Value Ref Range Status   02/02/2023 149 (L) 150 - 450 10e3/uL Final     % Lymphocytes   Date Value Ref Range Status   02/28/2020 13 (L) 20 - 40 % Final   01/29/2020 7 (L) 20 - 40 % Final     AST   Date Value Ref Range Status   02/02/2023 26 10 - 35 U/L Final     ALT   Date Value Ref Range Status   02/02/2023 15 10 - 35 U/L Final     Albumin   Date Value Ref Range Status   02/02/2023 4.4 3.5 - 5.2 g/dL Final   05/31/2022 3.9 3.5 - 5.0 g/dL Final     Alkaline Phosphatase    Date Value Ref Range Status   02/02/2023 61 35 - 104 U/L Final     Creatinine   Date Value Ref Range Status   02/02/2023 1.40 (H) 0.51 - 0.95 mg/dL Final     GFR Estimate   Date Value Ref Range Status   02/02/2023 40 (L) >60 mL/min/1.73m2 Final     Comment:     eGFR calculated using 2021 CKD-EPI equation.   05/11/2021 38 (L) >60 mL/min/1.73m2 Final     GFR Estimate If Black   Date Value Ref Range Status   05/11/2021 46 (L) >60 mL/min/1.73m2 Final     Creatinine Urine mg/dL   Date Value Ref Range Status   02/02/2023 113.0 mg/dL Final     Comment:     The reference ranges have not been established in urine creatinine. The results should be integrated into the clinical context for interpretation.

## 2023-02-16 ENCOUNTER — HOSPITAL ENCOUNTER (OUTPATIENT)
Dept: MAMMOGRAPHY | Facility: CLINIC | Age: 73
Discharge: HOME | End: 2023-02-16
Attending: INTERNAL MEDICINE | Admitting: INTERNAL MEDICINE
Payer: MEDICARE

## 2023-02-16 ENCOUNTER — ANCILLARY PROCEDURE (OUTPATIENT)
Dept: BONE DENSITY | Facility: CLINIC | Age: 73
End: 2023-02-16
Attending: INTERNAL MEDICINE
Payer: COMMERCIAL

## 2023-02-16 DIAGNOSIS — Z12.31 VISIT FOR SCREENING MAMMOGRAM: ICD-10-CM

## 2023-02-16 DIAGNOSIS — M81.0 SENILE OSTEOPOROSIS: ICD-10-CM

## 2023-02-16 PROCEDURE — 77067 SCR MAMMO BI INCL CAD: CPT

## 2023-02-16 PROCEDURE — 77080 DXA BONE DENSITY AXIAL: CPT | Mod: TC | Performed by: RADIOLOGY

## 2023-02-20 DIAGNOSIS — I10 ESSENTIAL HYPERTENSION: ICD-10-CM

## 2023-02-20 RX ORDER — METOPROLOL TARTRATE 50 MG
TABLET ORAL
Qty: 180 TABLET | Refills: 3 | Status: SHIPPED | OUTPATIENT
Start: 2023-02-20 | End: 2024-03-01

## 2023-02-20 NOTE — TELEPHONE ENCOUNTER
"Last Written Prescription Date:  6/1/22  Last Fill Quantity: 180,  # refills: 2   Last office visit provider:  2/2/23    Requested Prescriptions   Pending Prescriptions Disp Refills     metoprolol tartrate (LOPRESSOR) 50 MG tablet [Pharmacy Med Name: Metoprolol Tartrate 50 MG Oral Tablet] 180 tablet 0     Sig: Take 1 tablet by mouth twice daily       Beta-Blockers Protocol Passed - 2/20/2023  2:05 PM        Passed - Blood pressure under 140/90 in past 12 months     BP Readings from Last 3 Encounters:   02/15/23 130/70   02/08/23 112/62   02/02/23 130/61                 Passed - Patient is age 6 or older        Passed - Recent (12 mo) or future (30 days) visit within the authorizing provider's specialty     Patient has had an office visit with the authorizing provider or a provider within the authorizing providers department within the previous 12 mos or has a future within next 30 days. See \"Patient Info\" tab in inbasket, or \"Choose Columns\" in Meds & Orders section of the refill encounter.              Passed - Medication is active on med list             Daniella Krishna RN 02/20/23 2:05 PM  "

## 2023-02-22 NOTE — ADDENDUM NOTE
Encounter addended by: Ricky Guthrie, PT on: 2/22/2023 2:06 PM   Actions taken: Clinical Note Signed, Flowsheet accepted, Episode resolved

## 2023-02-22 NOTE — ADDENDUM NOTE
Encounter addended by: Ricky Guthrie, PT on: 2/22/2023 2:08 PM   Actions taken: Episode resolved, Clinical Note Signed, Flowsheet accepted

## 2023-02-22 NOTE — PROGRESS NOTES
[facility] Rehabilitation Service    Outpatient Physical Therapy Discharge Note  Patient: Claudia Wise  : 1950    Beginning/End Dates of Reporting Period:  2/10/22 to 22    Referring Provider: Dr. Isabel Maurer    Therapy Diagnosis: decreased activity tolerance     Client Self Report: She was sore on after last week for 2 days afterward.  She had some increased pain with the stairs also.  She is feeling good now, and she was able to take a longer walk over the weekend. She feels like she has the tools to continue to improve strength on her own.80-85% overall improvement since starting physical therapy.    Objective Measurements:  Objective Measure: ALEJANDRA 40% at evaluation  Details: ALEJANDRA today                                  Outcome Measures (most recent score):      Goals:  Goal Identifier HEP   Goal Description patient will be independent with HEP and self management of symptoms   Target Date 22   Date Met      Progress (detail required for progress note):       Goal Identifier standing   Goal Description Patient will stand for 30 minutes for meal prep with pain 2/10 or less   Target Date 22   Date Met      Progress (detail required for progress note): improving, she reports being able to be up for 15 minutes without pain     Goal Identifier ambulation   Goal Description Patient will walk 150 ft without pain or gait deviation for safe household deviation   Target Date 22   Date Met  22   Progress (detail required for progress note):       Goal Identifier     Goal Description     Target Date     Date Met      Progress (detail required for progress note):       Goal Identifier     Goal Description     Target Date     Date Met      Progress (detail required for progress note):       Goal Identifier     Goal Description     Target Date     Date Met      Progress (detail required for progress  note):       Goal Identifier     Goal Description     Target Date     Date Met      Progress (detail required for progress note):       Goal Identifier     Goal Description     Target Date     Date Met      Progress (detail required for progress note):         Plan:  Discharge from therapy.    Discharge:    Reason for Discharge: Patient chooses to discontinue therapy.    Equipment Issued: NA    Discharge Plan: Patient to continue home program.

## 2023-02-22 NOTE — PROGRESS NOTES
[facility] Rehabilitation Service    Outpatient Physical Therapy Discharge Note  Patient: Claudia Wise  : 1950    Beginning/End Dates of Reporting Period:  21 to 21    Referring Provider: JANY Romero Diagnosis: decreased activity tolerance     Client Self Report: She is feeling pretty good  She did a tour through an art [employer] and only had to sit one time during the hour long tour.  She picked up her leg and moved it into the car without thinking about it and had no pain.    Objective Measurements:                                        Outcome Measures (most recent score):      Goals:  Goal Identifier HEP   Goal Description Patient will be independent with HEP and self management of symptoms   Target Date 10/31/21   Date Met      Progress (detail required for progress note):       Goal Identifier standing    Goal Description Patient will stand for 20 minutes with pain 3/10 or less for meal prep   Target Date 10/31/21   Date Met      Progress (detail required for progress note): She can now stand for 15 minutes for meal prep and dishes     Goal Identifier walking   Goal Description Patient will walk for 15 minutes without pain   Target Date 10/31/21   Date Met      Progress (detail required for progress note): Able to walk approximately 200 yards      Goal Identifier     Goal Description     Target Date     Date Met      Progress (detail required for progress note):       Goal Identifier     Goal Description     Target Date     Date Met      Progress (detail required for progress note):       Goal Identifier     Goal Description     Target Date     Date Met      Progress (detail required for progress note):       Goal Identifier     Goal Description     Target Date     Date Met      Progress (detail required for progress note):       Goal Identifier     Goal Description     Target Date      Date Met      Progress (detail required for progress note):         Plan:  Discharge from therapy.    Discharge:    Reason for Discharge: Patient has failed to schedule further appointments.

## 2023-02-28 ENCOUNTER — OFFICE VISIT (OUTPATIENT)
Dept: DERMATOLOGY | Facility: CLINIC | Age: 73
End: 2023-02-28
Payer: COMMERCIAL

## 2023-02-28 DIAGNOSIS — D48.5 NEOPLASM OF UNCERTAIN BEHAVIOR OF SKIN: ICD-10-CM

## 2023-02-28 DIAGNOSIS — L82.1 SEBORRHEIC KERATOSIS: Primary | ICD-10-CM

## 2023-02-28 PROCEDURE — 88305 TISSUE EXAM BY PATHOLOGIST: CPT | Performed by: DERMATOLOGY

## 2023-02-28 PROCEDURE — 11102 TANGNTL BX SKIN SINGLE LES: CPT | Performed by: PHYSICIAN ASSISTANT

## 2023-02-28 PROCEDURE — 99213 OFFICE O/P EST LOW 20 MIN: CPT | Mod: 25 | Performed by: PHYSICIAN ASSISTANT

## 2023-02-28 ASSESSMENT — PAIN SCALES - GENERAL: PAINLEVEL: NO PAIN (0)

## 2023-02-28 NOTE — LETTER
2/28/2023         RE: Claudia Wise   [address] [id number]        Dear Colleague,    Thank you for referring your patient, Claudia Wise, to the [facility]. Please see a copy of my visit note below.    HPI:   Chief complaints: Claudia Wise is a pleasant 72 year old female who presents for evaluation of two spots on the face. She has a spot on the left temple which was treated with LN2 4 years ago. Recently it came back. It is intermittently tender. She also has a spot on the right cheek and one on the scalp she would like checked. No history of skin cancer.       PHYSICAL EXAM:    There were no vitals taken for this visit.  Skin exam performed as follows: Type 2 skin. Mood appropriate  Alert and Oriented X 3. Well developed, well nourished in no distress.  General appearance: Normal  Head including face: Normal  Eyes: conjunctiva and lids: Normal  Mouth: Lips, teeth, gums: Normal  Neck: Normal  Cardiovascular: Exam of peripheral vascular system by observation for swelling, varicosities, edema: Normal  Right upper extremity: Normal  Left upper extremity: Normal  Right lower extremity: Normal  Left lower extremity: Normal  Skin: Scalp and body hair: See below    4 mm pink hyperkeratotic papule on the left temple  Keratotic papule on the right cheek, vertex scalp    ASSESSMENT/PLAN:     1. R/o SCC vs hypertrophic AK on the left temple. Shave biopsy performed.  Area cleaned and anesthetized with 1% lidocaine with epinephrine.  Dermablade used to remove the lesion and sent to pathology. Bleeding was cauterized. Pt tolerated procedure well with no complications.   2. Seborrheic keratoses on the right cheek, vertex scalp - advised benign no treatment needed.           Follow-up: pending path  CC:   Scribed By: Katie Alfaro, MS, PA-C          Again, thank you for allowing me to participate in the care of your patient.        Sincerely,        Katie Alfaro,  TAHIRA

## 2023-02-28 NOTE — PATIENT INSTRUCTIONS
Wound Care Instructions     FOR SUPERFICIAL WOUNDS     [facility]  [phone]                 AFTER 24 HOURS YOU SHOULD REMOVE THE BANDAGE AND BEGIN DAILY DRESSING CHANGES AS FOLLOWS:     1) Remove Dressing.     2) Clean and dry the area with tap water using a Q-tip or sterile gauze pad.     3) Apply Vaseline, Aquaphor, Polysporin ointment or Bacitracin ointment over entire wound.  Do NOT use Neosporin ointment.     4) Cover the wound with a band-aid, or a sterile non-stick gauze pad and micropore paper tape    REPEAT THESE INSTRUCTIONS AT LEAST ONCE A DAY UNTIL THE WOUND HAS COMPLETELY HEALED.    It is an old wives tale that a wound heals better when it is exposed to air and allowed to dry out. The wound will heal faster with a better cosmetic result if it is kept moist with ointment and covered with a bandage.    **Do not let the wound dry out.**    Supplies Needed:      *Cotton tipped applicators (Q-tips)    *Vaseline, Aquaphor, Polysporin or Bacitracin Ointment (NOT NEOSPORIN)    *Band-aids or non-stick gauze pads and micropore paper tape.      PATIENT INFORMATION:    During the healing process you will notice a number of changes. All wounds develop a small halo of redness surrounding the wound.  This means healing is occurring. Severe itching with extensive redness usually indicates sensitivity to the ointment or bandage tape used to dress the wound.  You should call our office if this develops.      Swelling  and/or discoloration around your surgical site is common, particularly when performed around the eye.    All wounds normally drain.  The larger the wound the more drainage there will be.  After 7-10 days, you will notice the wound beginning to shrink and new skin will begin to grow.  The wound is healed when you can see skin has formed over the entire area.  A healed wound has a healthy, shiny look to the surface and is red to dark pink in color to normalize.  Wounds may  take approximately 4-6 weeks to heal.  Larger wounds may take 6-8 weeks.  After the wound is healed you may discontinue dressing changes.    You may experience a sensation of tightness as your wound heals. This is normal and will gradually subside.    Your healed wound may be sensitive to temperature changes. This sensitivity improves with time, but if you re having a lot of discomfort, try to avoid temperature extremes.    Patients frequently experience itching after their wound appears to have healed because of the continue healing under the skin.  Plain Vaseline will help relieve the itching.      POSSIBLE COMPLICATIONS    BLEEDING:    Leave the bandage in place.  Use tightly rolled up gauze or a cloth to apply direct pressure over the bandage for 30  minutes.  Reapply pressure for an additional 30 minutes if necessary  Use additional gauze and tape to maintain pressure once the bleeding has stopped.

## 2023-02-28 NOTE — PROGRESS NOTES
HPI:   Chief complaints: Claudia Wise is a pleasant 72 year old female who presents for evaluation of two spots on the face. She has a spot on the left temple which was treated with LN2 4 years ago. Recently it came back. It is intermittently tender. She also has a spot on the right cheek and one on the scalp she would like checked. No history of skin cancer.       PHYSICAL EXAM:    There were no vitals taken for this visit.  Skin exam performed as follows: Type 2 skin. Mood appropriate  Alert and Oriented X 3. Well developed, well nourished in no distress.  General appearance: Normal  Head including face: Normal  Eyes: conjunctiva and lids: Normal  Mouth: Lips, teeth, gums: Normal  Neck: Normal  Cardiovascular: Exam of peripheral vascular system by observation for swelling, varicosities, edema: Normal  Right upper extremity: Normal  Left upper extremity: Normal  Right lower extremity: Normal  Left lower extremity: Normal  Skin: Scalp and body hair: See below    4 mm pink hyperkeratotic papule on the left temple  Keratotic papule on the right cheek, vertex scalp    ASSESSMENT/PLAN:     1. R/o SCC vs hypertrophic AK on the left temple. Shave biopsy performed.  Area cleaned and anesthetized with 1% lidocaine with epinephrine.  Dermablade used to remove the lesion and sent to pathology. Bleeding was cauterized. Pt tolerated procedure well with no complications.   2. Seborrheic keratoses on the right cheek, vertex scalp - advised benign no treatment needed.           Follow-up: pending path  CC:   Scribed By: Katie Alfaro, MS, PARIGO

## 2023-03-02 ENCOUNTER — TELEPHONE (OUTPATIENT)
Dept: DERMATOLOGY | Facility: CLINIC | Age: 73
End: 2023-03-02

## 2023-03-02 ENCOUNTER — OFFICE VISIT (OUTPATIENT)
Dept: PHYSICAL MEDICINE AND REHAB | Facility: CLINIC | Age: 73
End: 2023-03-02
Payer: COMMERCIAL

## 2023-03-02 VITALS — SYSTOLIC BLOOD PRESSURE: 122 MMHG | DIASTOLIC BLOOD PRESSURE: 68 MMHG

## 2023-03-02 DIAGNOSIS — M53.3 SI (SACROILIAC) JOINT DYSFUNCTION: ICD-10-CM

## 2023-03-02 DIAGNOSIS — Z87.81 HISTORY OF VERTEBRAL COMPRESSION FRACTURE: ICD-10-CM

## 2023-03-02 DIAGNOSIS — M54.50 CHRONIC BILATERAL LOW BACK PAIN WITHOUT SCIATICA: Primary | ICD-10-CM

## 2023-03-02 DIAGNOSIS — M79.18 LEFT BUTTOCK PAIN: ICD-10-CM

## 2023-03-02 DIAGNOSIS — M53.3 PAIN OF LEFT SACROILIAC JOINT: ICD-10-CM

## 2023-03-02 DIAGNOSIS — G89.29 CHRONIC BILATERAL LOW BACK PAIN WITHOUT SCIATICA: Primary | ICD-10-CM

## 2023-03-02 DIAGNOSIS — G89.29 COPING WITH CHRONIC PAIN: ICD-10-CM

## 2023-03-02 LAB
PATH REPORT.COMMENTS IMP SPEC: ABNORMAL
PATH REPORT.COMMENTS IMP SPEC: YES
PATH REPORT.FINAL DX SPEC: ABNORMAL
PATH REPORT.GROSS SPEC: ABNORMAL
PATH REPORT.MICROSCOPIC SPEC OTHER STN: ABNORMAL
PATH REPORT.RELEVANT HX SPEC: ABNORMAL

## 2023-03-02 PROCEDURE — 99214 OFFICE O/P EST MOD 30 MIN: CPT | Performed by: NURSE PRACTITIONER

## 2023-03-02 ASSESSMENT — PAIN SCALES - GENERAL: PAINLEVEL: MILD PAIN (3)

## 2023-03-02 NOTE — PROGRESS NOTES
Assessment:     Diagnoses and all orders for this visit:  Chronic bilateral low back pain without sciatica  -     Adult Mental Health  Referral; Future  Left buttock pain  Pain of left sacroiliac joint  SI (sacroiliac) joint dysfunction  History of vertebral compression fracture  Coping with chronic pain     Claudia Wise is a 72 year old y.o. female with past medical history significant for  anxiety, rheumatoid arthritis managed by Dr. Vinson, diabetes myelitis, hypertension, hyperlipidemia, chronic kidney disease, thyroid disorder, gout, osteopenia, small bowel obstruction with surgical intervention who presents today for follow-up regarding:    -Chronic left low back pain with 90% relief with left sacroiliac joint steroid injection.    -Coping with chronic low back pain, patient is hoping for meditation techniques to help.     Plan:     A shared decision making plan was used. The patient's values and choices were respected. Prior medical records were reviewed today. The following represents what was discussed and decided upon by the provider and the patient.        -DIAGNOSTIC TESTS: Images were personally reviewed and interpreted.   -- Left lower extremity EMG 1/3/2022 with mild sensory or sensorimotor peripheral polyneuropathy.  No radiculopathy noted.  --Lumbar spine x-ray 8/24/2020 shows stable L2 compression deformity.  --Lumbar spine x-ray 7/1/2020 shows stable L2 compression deformity, grade 1 L3-4 spondylolisthesis.  --Lumbar spine MRI 5/19/2020 with 20% acute/subacute compression fracture L2.  There is mild bilateral lateral recess stenosis L3-4 and L4-5.  Bilateral synovial cysts L4-5 with moderate facet arthropathy.    -INTERVENTIONS: Discussed that we can repeat injection down the road if needed, previously she received at least 6 months relief with SI joint steroid injection.    -Referral to [facility] for chronic low back pain as patient would like to learn  some meditation techniques in managing her ongoing symptoms as well.    -MEDICATIONS: No changes in medications today  Discussed side effects of medications and proper use. Patient verbalized understanding.    -PHYSICAL THERAPY: Did advise patient to continue with home exercises from prior physical therapy sessions, she does have all of the sheets at home from her prior sessions and does continue with her home exercise that she reports.  Discussed the importance of core strengthening, ROM, stretching exercises with the patient and how each of these entities is important in decreasing pain.  Explained to the patient that the purpose of physical therapy is to teach the patient a home exercise program.  These exercises need to be performed every day in order to decrease pain and prevent future occurrences of pain.        -PATIENT EDUCATION:  Total time of 32 minutes, on the day of service, spent with the patient, reviewing the chart, placing orders, and documenting.   -Today we also discussed the issues related to the current COVID-19 pandemic, the pros and cons of the current treatment plan, the CDC guidelines such as social distancing, washing the hands, and covering the cough.    -FOLLOW UP: Follow-up as needed here at the spine center as well as with behavioral health therapy for psychotherapy  Advised to contact clinic if symptoms worsen or change.    Subjective:     Claudia Wise is a 72 year old female who presents today for follow-up regarding chronic left low back pain that radiates in the left buttock in which she received 90% relief with recent left SI joint steroid injection and she is feeling a lot better and very happy with the results.  Currently her pain is a 2/10, 0 at its best.  She does report that bending is aggravating but she is so happy that she is able to walk better with less pain.  Denies any new symptoms today.    Patient is hoping for some help with finding some meditation type  techniques in dealing with her ongoing symptoms that have been recurrent off and on for years.    *Patient does report that she has been working with her primary care provider recently and is being worked up for possible Parkinson's disease as she does have a left hand tremor ongoing for the last 1 year.     Treatment to Date: No prior spinal surgery.  [facility] last 8/28/2020 compression fracture/spine pain.  Physical therapy x1 session 8/2/2021 chronic LBP/sacroiliac joint dysfunction.     Left L2, L3, L4 MBB 51/2/2021 with positive response.  Left L2, L3, L4 MBB 6/4/2021 with positive response.  Bilateral L2, L3, L4 RFA 7/2/2021 with 50% relief LBP 4 weeks postinjection.   Left SI joint steroid injection 8/17/2021 with over 60% relief for over 6 months, patient found this substantial.  Preprocedure pain 6/10, post 3/10.  Left SI joint steroid injection 2/8/2023 with 90% relief.  Preprocedure pain 7/10, post 1/10.     Medications:  Plaquenil 4 seronegative rheumatoid arthritis  Tylenol  Hydrocodone managed by Dr. Erasto Guzmán 25 mg bedtime however patient only tried this medication for a couple of days with no benefit therefore she discontinued on her own.    Patient Active Problem List   Diagnosis     Herpes Simplex Type II     Anxiety     Anemia in chronic kidney disease     Hyperlipemia     Gout     Hypertension     Prediabetes     Endometriosis     Other specified hypothyroidism     Seronegative rheumatoid arthritis of multiple sites (H)     High risk medication use     Bilateral primary osteoarthritis of knee     Senile osteoporosis     Compression fracture of L2 vertebra with routine healing     Chronic kidney disease, stage 3 (H)     Venous (peripheral) insufficiency     Parkinson disease (H)     History of small bowel obstruction     Primary insomnia       Current Outpatient Medications   Medication     acetaminophen (TYLENOL) 500 MG tablet     acyclovir (ZOVIRAX) 400 MG  tablet     allopurinol (ZYLOPRIM) 100 MG tablet     amLODIPine (NORVASC) 10 MG tablet     atorvastatin (LIPITOR) 10 MG tablet     benztropine (COGENTIN) 0.5 MG tablet     carboxymethylcellulose (REFRESH PLUS) 0.5 % Dpet ophthalmic dropperette     cyanocobalamin, vitamin B-12, 1,000 mcg Subl     denosumab (PROLIA) 60 MG/ML SOSY injection     DULoxetine (CYMBALTA) 20 MG capsule     fluticasone (FLONASE) 50 MCG/ACT nasal spray     folic acid (FOLVITE) 1 MG tablet     hydroxychloroquine (PLAQUENIL) 200 MG tablet     levothyroxine (SYNTHROID/LEVOTHROID) 50 MCG tablet     lidocaine (LIDODERM) 5 % patch     LORazepam (ATIVAN) 0.5 MG tablet     losartan (COZAAR) 50 MG tablet     methotrexate sodium 2.5 MG TABS     metoprolol tartrate (LOPRESSOR) 50 MG tablet     sodium bicarbonate 650 MG tablet     Current Facility-Administered Medications   Medication     denosumab (PROLIA) injection 60 mg       Allergies   Allergen Reactions     Bupropion Hcl [Bupropion] Nausea     Erythromycin Base [Erythromycin] Nausea     Paxil [Paroxetine] Diarrhea     Tetracyclines Itching     Valacyclovir Nausea       Past Medical History:   Diagnosis Date     Anemia      Anxiety      Chronic kidney disease      Diabetes mellitus, type II (H)     prediabetes     Disease of thyroid gland     hypothyroid     Family history of myocardial infarction      Gout      High cholesterol      Hypertension      Inverted nipple     right     Macular edema      Osteoarthritis 01/01/2012     Rheumatoid arthritis (H) 01/01/2012        Review of Systems  ROS:  Specifically negative for bowel/bladder dysfunction, balance changes, headache, dizziness, foot drop, fevers, chills, appetite changes, nausea/vomiting, unexplained weight loss. Otherwise 13 systems reviewed are negative. Please see the patient's intake questionnaire from today for details.    Reviewed Social, Family, Past Medical and Past Surgical history with patient, no significant changes noted since prior  visit.     Objective:     /68 (BP Location: Right arm, Patient Position: Sitting)     PHYSICAL EXAMINATION:    --CONSTITUTIONAL: Well developed, well nourished, healthy appearing individual.  --PSYCHIATRIC: Appropriate mood and affect. No difficulty interacting due to temper, social withdrawal, or memory issues.  --SKIN: Lumbar region is dry and intact.   --RESPIRATORY: Normal rhythm and effort. No abnormal accessory muscle breathing patterns noted.   --MUSCULOSKELETAL:  Normal lumbar lordosis noted, no lateral shift.  --GROSS MOTOR: Easily arises from a seated position.     RESULTS:   Imaging: Spine imaging was reviewed today. The images were shown to the patient and the findings were explained using a spine model.      Xr Lumbar Spine 2 Or 3 Vws  Result Date: 8/25/2020  INDICATION: Evaluate L2 compression fracture. COMPARISON: 7/10/2020.   Five lumbar-type vertebral bodies. Stable moderate L2 compression deformity. Segmental kyphosis about the fracture from the superior L1 endplate through the inferior L3 endplate measures 21 degrees, which is unchanged. Vertebral body heights are otherwise maintained. Mild loss of disc space height L3-L4 through L5-S1. Mild lower lumbar facet arthropathy.        XR LUMBAR SPINE 2 OR 3 VWS  LOCATION: [facility]  DATE/TIME: 7/1/2020   INDICATION: Low back pain compression fracture L2 evaluate for stability  COMPARISON: 06/08/2020  IMPRESSION:   Very shallow left apex curvature of the lumbar spine. Grade 1 retrolisthesis of L3 on L4. The bones appear diffusely demineralized, suggestive of osteoporosis. Stable appearance of the L2 compression deformity. No new compression deformities.   Atherosclerotic calcification of the abdominal aorta. Degenerative changes of the sacroiliac joints.        XR LUMBAR SPINE 2 OR 3 VWS  LOCATION: [facility]  DATE/TIME: 6/8/2020   INDICATION: Low back pain evaluate L2 compression fracture.  COMPARISON: MR  05/19/2020.  IMPRESSION:   Five lumbar type vertebral bodies presumed. Subacute/chronic appearing compression is identified at the L2, with approximately 30-40% loss of superior body height, and fragmentation anterior superior corner similar to previous MR appearance. Sclerosis   associated with the compressed superior endplate at this level. Degenerative changes elsewhere in the lumbar spine stable. 2 mm posterior subluxation L3 upon L4 is more prominent, possibly due to positioning. If there is concern for instability flexion   and extension views could be helpful to assess this level. Otherwise satisfactory height and alignment lumbar spine. Leftward lumbar curve apex L4. Satisfactory appearance to the sacrum. Vascular calcification. Degenerative changes both SI joints.   Obscuration of the right hemisacrum due to bowel gas and stool.  Consider flexion/extension views to assess possible L3-L4 instability as clinically indicated.        MR LUMBAR SPINE WO CONTRAST  LOCATION: [facility]  DATE/TIME: 5/19/2020   INDICATION: Back pain or radiculopathy, > 6 wks Low back pain with left radicular pain  COMPARISON: Correlation with abdomen and pelvis CT 02/28/2020  TECHNIQUE: Without IV contrast  FINDINGS:   Nomenclature is based on 5 lumbar type vertebral bodies. The conus ends at distal L1. Mild levocurvature of the lumbar spine. 1 mm retrolisthesis from L3-L4 to L5-S1. 20% superior endplate compression fracture of L2 with associated moderate marrow edema.   Small amount of prevertebral soft tissue edema from L1 to L3. The rest of the vertebral body heights are maintained. Nonpathologic marrow signal. No MRI evidence for pars defects. Mild symmetric atrophy of the posterior paraspinal musculature. Normal diameter of the distal abdominal aorta. The visualized bony pelvis and proximal femora are grossly within normal limits.  T12-L1: Mild intervertebral disc height loss. Loss of the normal T2 signal within the  disc. Small broad-based disc bulge with superimposed left paracentral disc protrusion. Mild bilateral facet arthropathy. No spinal canal narrowing. No neuroforaminal narrowing.  L1-L2: Mild intervertebral disc height loss. Loss of the normal T2 signal within the disc. Small broad-based disc bulge. Mild bilateral facet arthropathy. No spinal canal narrowing. No neuroforaminal narrowing.  L2-L3: Mild intervertebral disc height loss. Loss of the normal T2 signal within the disc. Small broad-based disc bulge. Mild bilateral facet arthropathy. No spinal canal narrowing. No neuroforaminal narrowing.   L3-L4: Normal intervertebral disc height. Loss of the normal T2 signal within the disc. Small broad-based disc bulge. Moderate bilateral facet arthropathy. Small amount of fluid within the facet joints. Mild narrowing of the lateral recesses. No spinal canal narrowing. Mild right neuroforaminal narrowing. Mild left neuroforaminal narrowing   L4-L5: Normal intervertebral disc height. Loss of the normal T2 signal within the disc. Small broad-based disc bulge. Moderate bilateral facet arthropathy. Small amount of fluid within the facet joints. Mild narrowing of the lateral recesses. No spinal canal narrowing. Mild right neuroforaminal narrowing. No left neuroforaminal narrowing. 2-3 mm bilateral lateral synovial cyst.  L5-S1: Normal intervertebral disc height. Loss of the normal T2 signal within the disc. Small broad-based disc bulge. Mild to moderate bilateral facet arthropathy. No spinal canal narrowing. No right neuroforaminal narrowing. No left neuroforaminal narrowing.  IMPRESSION:   1.  20% acute/subacute superior endplate compression fracture of L2.   2.  Mild narrowing of the lateral recesses at L3-L4 and L4-L5.  3.  No high-grade spinal canal.  4.  Mild bilateral neuroforaminal narrowing at L3-L4. Mild right neuroforaminal narrowing at L4-L5.  5.  Moderate facet arthropathy at L3-L4 and L4-L5 with small amount of fluid  within the facet joints.

## 2023-03-02 NOTE — LETTER
3/2/2023         RE: Claudia Wise   [address] [facility] [address]        Dear Colleague,    Thank you for referring your patient, Claudia Wise, to the [facility]. Please see a copy of my visit note below.      Assessment:     Diagnoses and all orders for this visit:  Chronic bilateral low back pain without sciatica  -     [facility]  Referral; Future  Left buttock pain  Pain of left sacroiliac joint  SI (sacroiliac) joint dysfunction  History of vertebral compression fracture  Coping with chronic pain     Claudia Wise is a 72 year old y.o. female with past medical history significant for  anxiety, rheumatoid arthritis managed by Dr. Vinson, diabetes myelitis, hypertension, hyperlipidemia, chronic kidney disease, thyroid disorder, gout, osteopenia, small bowel obstruction with surgical intervention who presents today for follow-up regarding:    -Chronic left low back pain with 90% relief with left sacroiliac joint steroid injection.    -Coping with chronic low back pain, patient is hoping for meditation techniques to help.     Plan:     A shared decision making plan was used. The patient's values and choices were respected. Prior medical records were reviewed today. The following represents what was discussed and decided upon by the provider and the patient.        -DIAGNOSTIC TESTS: Images were personally reviewed and interpreted.   -- Left lower extremity EMG 1/3/2022 with mild sensory or sensorimotor peripheral polyneuropathy.  No radiculopathy noted.  --Lumbar spine x-ray 8/24/2020 shows stable L2 compression deformity.  --Lumbar spine x-ray 7/1/2020 shows stable L2 compression deformity, grade 1 L3-4 spondylolisthesis.  --Lumbar spine MRI 5/19/2020 with 20% acute/subacute compression fracture L2.  There is mild bilateral lateral recess stenosis L3-4 and L4-5.  Bilateral synovial cysts L4-5 with moderate facet arthropathy.    -INTERVENTIONS:  Discussed that we can repeat injection down the road if needed, previously she received at least 6 months relief with SI joint steroid injection.    -Referral to [facility] for chronic low back pain as patient would like to learn some meditation techniques in managing her ongoing symptoms as well.    -MEDICATIONS: No changes in medications today  Discussed side effects of medications and proper use. Patient verbalized understanding.    -PHYSICAL THERAPY: Did advise patient to continue with home exercises from prior physical therapy sessions, she does have all of the sheets at home from her prior sessions and does continue with her home exercise that she reports.  Discussed the importance of core strengthening, ROM, stretching exercises with the patient and how each of these entities is important in decreasing pain.  Explained to the patient that the purpose of physical therapy is to teach the patient a home exercise program.  These exercises need to be performed every day in order to decrease pain and prevent future occurrences of pain.        -PATIENT EDUCATION:  Total time of 32 minutes, on the day of service, spent with the patient, reviewing the chart, placing orders, and documenting.   -Today we also discussed the issues related to the current COVID-19 pandemic, the pros and cons of the current treatment plan, the CDC guidelines such as social distancing, washing the hands, and covering the cough.    -FOLLOW UP: Follow-up as needed here at the spine center as well as with behavioral health therapy for psychotherapy  Advised to contact clinic if symptoms worsen or change.    Subjective:     Claudia Wise is a 72 year old female who presents today for follow-up regarding chronic left low back pain that radiates in the left buttock in which she received 90% relief with recent left SI joint steroid injection and she is feeling a lot better and very happy with the results.  Currently her  pain is a 2/10, 0 at its best.  She does report that bending is aggravating but she is so happy that she is able to walk better with less pain.  Denies any new symptoms today.    Patient is hoping for some help with finding some meditation type techniques in dealing with her ongoing symptoms that have been recurrent off and on for years.    *Patient does report that she has been working with her primary care provider recently and is being worked up for possible Parkinson's disease as she does have a left hand tremor ongoing for the last 1 year.     Treatment to Date: No prior spinal surgery.  Physical therapy [facility] sessions last 8/28/2020 compression fracture/spine pain.  Physical therapy x1 session 8/2/2021 chronic LBP/sacroiliac joint dysfunction.     Left L2, L3, L4 MBB 51/2/2021 with positive response.  Left L2, L3, L4 MBB 6/4/2021 with positive response.  Bilateral L2, L3, L4 RFA 7/2/2021 with 50% relief LBP 4 weeks postinjection.   Left SI joint steroid injection 8/17/2021 with over 60% relief for over 6 months, patient found this substantial.  Preprocedure pain 6/10, post 3/10.  Left SI joint steroid injection 2/8/2023 with 90% relief.  Preprocedure pain 7/10, post 1/10.     Medications:  Plaquenil 4 seronegative rheumatoid arthritis  Tylenol  Hydrocodone managed by Dr. Erasto Guzmán 25 mg bedtime however patient only tried this medication for a couple of days with no benefit therefore she discontinued on her own.    Patient Active Problem List   Diagnosis     Herpes Simplex Type II     Anxiety     Anemia in chronic kidney disease     Hyperlipemia     Gout     Hypertension     Prediabetes     Endometriosis     Other specified hypothyroidism     Seronegative rheumatoid arthritis of multiple sites (H)     High risk medication use     Bilateral primary osteoarthritis of knee     Senile osteoporosis     Compression fracture of L2 vertebra with routine healing     Chronic kidney disease, stage 3 (H)      Venous (peripheral) insufficiency     Parkinson disease (H)     History of small bowel obstruction     Primary insomnia       Current Outpatient Medications   Medication     acetaminophen (TYLENOL) 500 MG tablet     acyclovir (ZOVIRAX) 400 MG tablet     allopurinol (ZYLOPRIM) 100 MG tablet     amLODIPine (NORVASC) 10 MG tablet     atorvastatin (LIPITOR) 10 MG tablet     benztropine (COGENTIN) 0.5 MG tablet     carboxymethylcellulose (REFRESH PLUS) 0.5 % Dpet ophthalmic dropperette     cyanocobalamin, vitamin B-12, 1,000 mcg Subl     denosumab (PROLIA) 60 MG/ML SOSY injection     DULoxetine (CYMBALTA) 20 MG capsule     fluticasone (FLONASE) 50 MCG/ACT nasal spray     folic acid (FOLVITE) 1 MG tablet     hydroxychloroquine (PLAQUENIL) 200 MG tablet     levothyroxine (SYNTHROID/LEVOTHROID) 50 MCG tablet     lidocaine (LIDODERM) 5 % patch     LORazepam (ATIVAN) 0.5 MG tablet     losartan (COZAAR) 50 MG tablet     methotrexate sodium 2.5 MG TABS     metoprolol tartrate (LOPRESSOR) 50 MG tablet     sodium bicarbonate 650 MG tablet     Current Facility-Administered Medications   Medication     denosumab (PROLIA) injection 60 mg       Allergies   Allergen Reactions     Bupropion Hcl [Bupropion] Nausea     Erythromycin Base [Erythromycin] Nausea     Paxil [Paroxetine] Diarrhea     Tetracyclines Itching     Valacyclovir Nausea       Past Medical History:   Diagnosis Date     Anemia      Anxiety      Chronic kidney disease      Diabetes mellitus, type II (H)     prediabetes     Disease of thyroid gland     hypothyroid     Family history of myocardial infarction      Gout      High cholesterol      Hypertension      Inverted nipple     right     Macular edema      Osteoarthritis 01/01/2012     Rheumatoid arthritis (H) 01/01/2012        Review of Systems  ROS:  Specifically negative for bowel/bladder dysfunction, balance changes, headache, dizziness, foot drop, fevers, chills, appetite changes, nausea/vomiting, unexplained  weight loss. Otherwise 13 systems reviewed are negative. Please see the patient's intake questionnaire from today for details.    Reviewed Social, Family, Past Medical and Past Surgical history with patient, no significant changes noted since prior visit.     Objective:     /68 (BP Location: Right arm, Patient Position: Sitting)     PHYSICAL EXAMINATION:    --CONSTITUTIONAL: Well developed, well nourished, healthy appearing individual.  --PSYCHIATRIC: Appropriate mood and affect. No difficulty interacting due to temper, social withdrawal, or memory issues.  --SKIN: Lumbar region is dry and intact.   --RESPIRATORY: Normal rhythm and effort. No abnormal accessory muscle breathing patterns noted.   --MUSCULOSKELETAL:  Normal lumbar lordosis noted, no lateral shift.  --GROSS MOTOR: Easily arises from a seated position.     RESULTS:   Imaging: Spine imaging was reviewed today. The images were shown to the patient and the findings were explained using a spine model.      Xr Lumbar Spine 2 Or 3 Vws  Result Date: 8/25/2020  INDICATION: Evaluate L2 compression fracture. COMPARISON: 7/10/2020.   Five lumbar-type vertebral bodies. Stable moderate L2 compression deformity. Segmental kyphosis about the fracture from the superior L1 endplate through the inferior L3 endplate measures 21 degrees, which is unchanged. Vertebral body heights are otherwise maintained. Mild loss of disc space height L3-L4 through L5-S1. Mild lower lumbar facet arthropathy.        XR LUMBAR SPINE 2 OR 3 VWS  LOCATION: [facility]  DATE/TIME: 7/1/2020   INDICATION: Low back pain compression fracture L2 evaluate for stability  COMPARISON: 06/08/2020  IMPRESSION:   Very shallow left apex curvature of the lumbar spine. Grade 1 retrolisthesis of L3 on L4. The bones appear diffusely demineralized, suggestive of osteoporosis. Stable appearance of the L2 compression deformity. No new compression deformities.   Atherosclerotic calcification  of the abdominal aorta. Degenerative changes of the sacroiliac joints.        XR LUMBAR SPINE 2 OR 3 VWS  LOCATION: [facility]  DATE/TIME: 6/8/2020   INDICATION: Low back pain evaluate L2 compression fracture.  COMPARISON: MR 05/19/2020.  IMPRESSION:   Five lumbar type vertebral bodies presumed. Subacute/chronic appearing compression is identified at the L2, with approximately 30-40% loss of superior body height, and fragmentation anterior superior corner similar to previous MR appearance. Sclerosis   associated with the compressed superior endplate at this level. Degenerative changes elsewhere in the lumbar spine stable. 2 mm posterior subluxation L3 upon L4 is more prominent, possibly due to positioning. If there is concern for instability flexion   and extension views could be helpful to assess this level. Otherwise satisfactory height and alignment lumbar spine. Leftward lumbar curve apex L4. Satisfactory appearance to the sacrum. Vascular calcification. Degenerative changes both SI joints.   Obscuration of the right hemisacrum due to bowel gas and stool.  Consider flexion/extension views to assess possible L3-L4 instability as clinically indicated.        MR LUMBAR SPINE WO CONTRAST  LOCATION: [facility]  DATE/TIME: 5/19/2020   INDICATION: Back pain or radiculopathy, > 6 wks Low back pain with left radicular pain  COMPARISON: Correlation with abdomen and pelvis CT 02/28/2020  TECHNIQUE: Without IV contrast  FINDINGS:   Nomenclature is based on 5 lumbar type vertebral bodies. The conus ends at distal L1. Mild levocurvature of the lumbar spine. 1 mm retrolisthesis from L3-L4 to L5-S1. 20% superior endplate compression fracture of L2 with associated moderate marrow edema.   Small amount of prevertebral soft tissue edema from L1 to L3. The rest of the vertebral body heights are maintained. Nonpathologic marrow signal. No MRI evidence for pars defects. Mild symmetric atrophy of the posterior  paraspinal musculature. Normal diameter of the distal abdominal aorta. The visualized bony pelvis and proximal femora are grossly within normal limits.  T12-L1: Mild intervertebral disc height loss. Loss of the normal T2 signal within the disc. Small broad-based disc bulge with superimposed left paracentral disc protrusion. Mild bilateral facet arthropathy. No spinal canal narrowing. No neuroforaminal narrowing.  L1-L2: Mild intervertebral disc height loss. Loss of the normal T2 signal within the disc. Small broad-based disc bulge. Mild bilateral facet arthropathy. No spinal canal narrowing. No neuroforaminal narrowing.  L2-L3: Mild intervertebral disc height loss. Loss of the normal T2 signal within the disc. Small broad-based disc bulge. Mild bilateral facet arthropathy. No spinal canal narrowing. No neuroforaminal narrowing.   L3-L4: Normal intervertebral disc height. Loss of the normal T2 signal within the disc. Small broad-based disc bulge. Moderate bilateral facet arthropathy. Small amount of fluid within the facet joints. Mild narrowing of the lateral recesses. No spinal canal narrowing. Mild right neuroforaminal narrowing. Mild left neuroforaminal narrowing   L4-L5: Normal intervertebral disc height. Loss of the normal T2 signal within the disc. Small broad-based disc bulge. Moderate bilateral facet arthropathy. Small amount of fluid within the facet joints. Mild narrowing of the lateral recesses. No spinal canal narrowing. Mild right neuroforaminal narrowing. No left neuroforaminal narrowing. 2-3 mm bilateral lateral synovial cyst.  L5-S1: Normal intervertebral disc height. Loss of the normal T2 signal within the disc. Small broad-based disc bulge. Mild to moderate bilateral facet arthropathy. No spinal canal narrowing. No right neuroforaminal narrowing. No left neuroforaminal narrowing.  IMPRESSION:   1.  20% acute/subacute superior endplate compression fracture of L2.   2.  Mild narrowing of the lateral  recesses at L3-L4 and L4-L5.  3.  No high-grade spinal canal.  4.  Mild bilateral neuroforaminal narrowing at L3-L4. Mild right neuroforaminal narrowing at L4-L5.  5.  Moderate facet arthropathy at L3-L4 and L4-L5 with small amount of fluid within the facet joints.                Again, thank you for allowing me to participate in the care of your patient.        Sincerely,        Loree Rivas, CNP

## 2023-03-02 NOTE — TELEPHONE ENCOUNTER
Pt states she is returning a call about some test results, please call Pt back to discuss, thanks!

## 2023-03-02 NOTE — PATIENT INSTRUCTIONS
~Please call our [facility] Nurse Navigation line [phone] with any questions or concerns about your treatment plan, if symptoms worsen and you would like to be seen urgently, or if you have problems controlling bladder and bowel function.  ~Please note that any My Chart messages may take multiple days for a response due to the high volume of patients seen in clinic.   Anything sent Thursday night or after will be answered the following week when able, as Loree Rivas CNP does not work in clinic on Fridays.        Importance of specialized Physical Therapy: Discussed the importance of core strengthening, ROM, stretching exercises with the patient and how each of these entities is important in decreasing pain.  Explained to the patient that the purpose of physical therapy is to teach the patient a home exercise program individualized to them and their specific health concerns.  These exercises need to be performed every day in order to decrease pain and prevent future occurrences of pain.

## 2023-03-02 NOTE — TELEPHONE ENCOUNTER
[facility] Call Center    Phone Message    May a detailed message be left on voicemail: yes     Reason for Call: Other: Pt returning call about test results. Please call back at [phone]. Thanks!     Action Taken: Message routed to:  [facility] DERM    Travel Screening: Not Applicable

## 2023-03-02 NOTE — TELEPHONE ENCOUNTER
Left message for pt to call [phone] and ask to speak with a dermatology nurse.    Darling LIGHT RN  [facility] Lizeth [facility]  [phone]

## 2023-03-02 NOTE — TELEPHONE ENCOUNTER
----- Message from Katie Alfaro PA-C sent at 3/2/2023 10:11 AM CST -----  Left temple SCIS please schedule [facility]

## 2023-03-03 NOTE — TELEPHONE ENCOUNTER
Called patient:  Patient notified and educated on test results   Mohs procedure explained- all questions answered  appointment scheduled- mohs packet mailed.     Thank you,  Cierra SWARTZ RN  Dermatology   [phone]

## 2023-04-20 ENCOUNTER — OFFICE VISIT (OUTPATIENT)
Dept: DERMATOLOGY | Facility: CLINIC | Age: 73
End: 2023-04-20
Payer: COMMERCIAL

## 2023-04-20 DIAGNOSIS — L81.4 LENTIGO: ICD-10-CM

## 2023-04-20 DIAGNOSIS — D18.01 ANGIOMA OF SKIN: ICD-10-CM

## 2023-04-20 DIAGNOSIS — L82.1 SEBORRHEIC KERATOSIS: ICD-10-CM

## 2023-04-20 DIAGNOSIS — D04.30 SQUAMOUS CELL CARCINOMA IN SITU OF SKIN OF FACE: Primary | ICD-10-CM

## 2023-04-20 DIAGNOSIS — D23.9 DERMAL NEVUS: ICD-10-CM

## 2023-04-20 PROCEDURE — 17311 MOHS 1 STAGE H/N/HF/G: CPT | Performed by: DERMATOLOGY

## 2023-04-20 PROCEDURE — 99213 OFFICE O/P EST LOW 20 MIN: CPT | Mod: 25 | Performed by: DERMATOLOGY

## 2023-04-20 NOTE — PROGRESS NOTES
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  Today she notes spots on chest and r cheek.   .   Patient states this has been present for a while.  Patient reports the following symptoms:  rough.  Patient reports the following previous treatments none.  These treatments did not work.  Patient reports the following modifying factors none.  Associated symptoms: none.  Patient has no other skin complaints today.  Remainder of the HPI, Meds, PMH, Allergies, FH, and SH was reviewed in chart.      Past Medical History:   Diagnosis Date     Anemia      Anxiety      Chronic kidney disease      Diabetes mellitus, type II (H)     prediabetes     Disease of thyroid gland     hypothyroid     Family history of myocardial infarction      Gout      High cholesterol      Hypertension      Inverted nipple     right     Macular edema      Osteoarthritis 01/01/2012     Rheumatoid arthritis (H) 01/01/2012       Past Surgical History:   Procedure Laterality Date     COLON SURGERY  1986     COLPORRHAPHY N/A 7/7/2016    Procedure: ANTERIOR REPAIR WITH MESH;  Surgeon: Zac Stone MD;  Location: [facility];  Service:       UGI ENDOSCOPY W PLACEMENT GASTROSTOMY TUBE PERCUT N/A 2/7/2020    Procedure: Percutaneous Endoscopic Gastromstomy Tube Placement;  Surgeon: Zion Schwarz MD;  Location: [facility] OR;  Service: General     HYSTERECTOMY       LAPAROTOMY EXPLORATORY N/A 1/23/2020    Procedure: EXPLORATORY LAPARATOMY, EXTENSIVE LYSIS OF ADHESIONS, SMALL BOWEL RESECTION;  Surgeon: Claudia Jeronimo MD;  Location: [facility] OR;  Service: General     OOPHORECTOMY          Family History   Problem Relation Age of Onset     Breast Cancer Maternal Aunt      Cancer Maternal Aunt      Cancer Father      Cancer Sister      Cancer Maternal Grandmother      Cancer Cousin      Coronary Artery Disease  Mother 49.00       Social History     Socioeconomic History     Marital status:      Spouse name: Not on file     Number of children: Not on file     Years of education: Not on file     Highest education level: Not on file   Occupational History     Not on file   Tobacco Use     Smoking status: Former     Types: Cigarettes     Quit date: 1995     Years since quittin.8     Smokeless tobacco: Never   Vaping Use     Vaping status: Not on file   Substance and Sexual Activity     Alcohol use: Yes     Comment: Alcoholic Drinks/day: occasional couple times a year     Drug use: No     Sexual activity: Not on file   Other Topics Concern     Not on file   Social History Narrative     Not on file     Social Determinants of Health     Financial Resource Strain: Not on file   Food Insecurity: Not on file   Transportation Needs: Not on file   Physical Activity: Not on file   Stress: Not on file   Social Connections: Not on file   Intimate Partner Violence: Not on file   Housing Stability: Not on file       Outpatient Encounter Medications as of 2023   Medication Sig Dispense Refill     acetaminophen (TYLENOL) 500 MG tablet Take 500 mg by mouth every 6 hours as needed        acyclovir (ZOVIRAX) 400 MG tablet TAKE ONE TABLET BY MOUTH EVERY 8 HOURS AS NEEDED 60 tablet 0     allopurinol (ZYLOPRIM) 100 MG tablet Take 1 tablet (100 mg) by mouth 2 times daily (with meals) 180 tablet 3     amLODIPine (NORVASC) 10 MG tablet Take 1 tablet (10 mg) by mouth daily 90 tablet 3     atorvastatin (LIPITOR) 10 MG tablet TAKE 1 TABLET BY MOUTH AT BEDTIME 90 tablet 3     benztropine (COGENTIN) 0.5 MG tablet Take 3 tablets (1.5 mg) by mouth 2 times daily 540 tablet 1     carboxymethylcellulose (REFRESH PLUS) 0.5 % Dpet ophthalmic dropperette [CARBOXYMETHYLCELLULOSE (REFRESH PLUS) 0.5 % DPET OPHTHALMIC DROPPERETTE] Administer 1-2 drops to both eyes 4 (four) times a day as needed.        cyanocobalamin, vitamin B-12, 1,000 mcg Subl  [CYANOCOBALAMIN, VITAMIN B-12, 1,000 MCG SUBL] Place 1 tablet (1,000 mcg total) under the tongue daily.  0     denosumab (PROLIA) 60 MG/ML SOSY injection Inject 60 mg Subcutaneous       DULoxetine (CYMBALTA) 20 MG capsule Take 1 capsule (20 mg) by mouth daily 90 capsule 3     fluticasone (FLONASE) 50 MCG/ACT nasal spray Use 1 spray(s) in each nostril once daily 48 g 3     folic acid (FOLVITE) 1 MG tablet Take 1 tablet (1,000 mcg) by mouth daily 90 tablet 0     hydroxychloroquine (PLAQUENIL) 200 MG tablet Take 1 tablet (200 mg) by mouth 2 times daily 180 tablet 0     levothyroxine (SYNTHROID/LEVOTHROID) 50 MCG tablet Take 1 tablet (50 mcg) by mouth daily 30 minutes before breakfast. 90 tablet 2     lidocaine (LIDODERM) 5 % patch Place 1 patch onto the skin every 24 hours To prevent lidocaine toxicity, patient should be patch free for 12 hrs daily. 14 patch 1     LORazepam (ATIVAN) 0.5 MG tablet Take 1 tablet (0.5 mg) by mouth daily as needed for anxiety 30 tablet 0     losartan (COZAAR) 50 MG tablet Take 1 tablet (50 mg) by mouth daily 90 tablet 3     methotrexate sodium 2.5 MG TABS TAKE 4 TABLETS BY MOUTH ONCE A WEEK Strength: 2.5 mg 48 tablet 0     metoprolol tartrate (LOPRESSOR) 50 MG tablet Take 1 tablet by mouth twice daily 180 tablet 3     sodium bicarbonate 650 MG tablet TAKE 2 TABLETS BY MOUTH 2 TIMES A   tablet 0     Facility-Administered Encounter Medications as of 4/20/2023   Medication Dose Route Frequency Provider Last Rate Last Admin     denosumab (PROLIA) injection 60 mg  60 mg Subcutaneous Q6 Months Isabel Maurer MD   60 mg at 02/02/23 1019             O:   NAD, WDWN, Alert & Oriented, Mood & Affect wnl, Vitals stable   Here today alone   General appearance normal   Vitals stable   Alert, oriented and in no acute distress     L brow 4mm scaly papule   R cheek stuck on papule   Stuck on papules and brown macules on trunk and ext   Red papules on trunk  Flesh colored papules on  trunk         Eyes: Conjunctivae/lids:Normal     ENT: Lips, buccal mucosa, tongue: normal    MSK:Normal    Cardiovascular: peripheral edema none    Pulm: Breathing Normal    Lymph Nodes: No Head and Neck Lymphadenopathy     Neuro/Psych: Orientation:Alert and Orientedx3 ; Mood/Affect:normal       A/P:  1. Seborrheic keratosis, lentigo, angioma, dermal nevus  2. L brow squamous cell carcinoma in situ   It was a pleasure speaking to Claudia Wise today.  Previous clinic notes and pertinent laboratory tests were reviewed prior to Claudia Wise's visit.  Nature of benign skin lesions dicussed with patient.  Signs and Symptoms of skin cancer discussed with patient.  Patient encouraged to perform monthly skin exams.  UV precautions reviewed with patient.  Risks of non-melanoma skin cancer discussed with patient   Return to clinic 6 months  PROCEDURE NOTE  L brow squamous cell carcinoma in situ   MOHS:   Location    The rationale for Mohs surgery was discussed with the patient and consent was obtained.  The risks and benefits as well as alternatives to therapy were discussed, in detail.  Specifically, the risks of infection, scarring, bleeding, prolonged wound healing, incomplete removal, allergy to anesthesia, nerve injury and recurrence were addressed.  Indication for Mohs was Location. Prior to the procedure, the treatment site was clearly identified and, if available, confirmed with previous photos and confirmed by the patient   All components of the Universal Protocol/PAUSE rule were completed.  The Mohs surgeon operated in two distinct and integrated capacities as the surgeon and pathologist.      The area was prepped with Betasept.  A rim of normal appearing skin was marked circumferentially around the lesion.  The area was infiltrated with local anesthesia.  The tumor was first debulked to remove all clinically apparent tumor.  An incision following the standard Mohs approach was done and the specimen was  oriented,mapped and placed in 1 block(s).  Each specimen was then chromacoded and processed in the Mohs laboratory using standard Mohs technique and submitted for frozen section histology.  Frozen section analysis showed no residual tumor but CLEAR MARGINS.      The tumor was excised using standard Mohs technique in 1 stages(s).  CLEAR MARGINS OBTAINED and Final defect size was 1cm.     We discussed the options for wound management in full with the patient including risks/benefits/ possible outcomes.        REPAIR SECOND INTENT: We discussed the options for wound management in full with the patient including risks/benefits/possible outcomes. Decision made to allow the wound to heal by second intention. Cautery was used for for hemostasis. EBL minimal; complications none; wound care routine.  The patient was discharged in good condition and will return in one month or prn for wound evaluation.

## 2023-04-20 NOTE — LETTER
4/20/2023         RE: Claudia Wise   [address] [facility] [address]        Dear Colleague,    Thank you for referring your patient, Claudia Wise, to the [facility]. Please see a copy of my visit note below.    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  Today she notes spots on chest and r cheek.   .   Patient states this has been present for a while.  Patient reports the following symptoms:  rough.  Patient reports the following previous treatments none.  These treatments did not work.  Patient reports the following modifying factors none.  Associated symptoms: none.  Patient has no other skin complaints today.  Remainder of the HPI, Meds, PMH, Allergies, FH, and SH was reviewed in chart.      Past Medical History:   Diagnosis Date     Anemia      Anxiety      Chronic kidney disease      Diabetes mellitus, type II (H)     prediabetes     Disease of thyroid gland     hypothyroid     Family history of myocardial infarction      Gout      High cholesterol      Hypertension      Inverted nipple     right     Macular edema      Osteoarthritis 01/01/2012     Rheumatoid arthritis (H) 01/01/2012       Past Surgical History:   Procedure Laterality Date     COLON SURGERY  1986     COLPORRHAPHY N/A 7/7/2016    Procedure: ANTERIOR REPAIR WITH MESH;  Surgeon: Zac Stone MD;  Location: [facility];  Service:      HC UGI ENDOSCOPY W PLACEMENT GASTROSTOMY TUBE PERCUT N/A 2/7/2020    Procedure: Percutaneous Endoscopic Gastromstomy Tube Placement;  Surgeon: Zion Schwarz MD;  Location: [facility];  Service: General     HYSTERECTOMY       LAPAROTOMY EXPLORATORY N/A 1/23/2020    Procedure: EXPLORATORY LAPARATOMY, EXTENSIVE LYSIS OF  ADHESIONS, SMALL BOWEL RESECTION;  Surgeon: Claudia Jeronimo MD;  Location: [facility] OR;  Service: General     OOPHORECTOMY          Family History   Problem Relation Age of Onset     Breast Cancer Maternal Aunt      Cancer Maternal Aunt      Cancer Father      Cancer Sister      Cancer Maternal Grandmother      Cancer Cousin      Coronary Artery Disease Mother 49.00       Social History     Socioeconomic History     Marital status:      Spouse name: Not on file     Number of children: Not on file     Years of education: Not on file     Highest education level: Not on file   Occupational History     Not on file   Tobacco Use     Smoking status: Former     Types: Cigarettes     Quit date: 1995     Years since quittin.8     Smokeless tobacco: Never   Vaping Use     Vaping status: Not on file   Substance and Sexual Activity     Alcohol use: Yes     Comment: Alcoholic Drinks/day: occasional couple times a year     Drug use: No     Sexual activity: Not on file   Other Topics Concern     Not on file   Social History Narrative     Not on file     Social Determinants of Health     Financial Resource Strain: Not on file   Food Insecurity: Not on file   Transportation Needs: Not on file   Physical Activity: Not on file   Stress: Not on file   Social Connections: Not on file   Intimate Partner Violence: Not on file   Housing Stability: Not on file       Outpatient Encounter Medications as of 2023   Medication Sig Dispense Refill     acetaminophen (TYLENOL) 500 MG tablet Take 500 mg by mouth every 6 hours as needed        acyclovir (ZOVIRAX) 400 MG tablet TAKE ONE TABLET BY MOUTH EVERY 8 HOURS AS NEEDED 60 tablet 0     allopurinol (ZYLOPRIM) 100 MG tablet Take 1 tablet (100 mg) by mouth 2 times daily (with meals) 180 tablet 3     amLODIPine (NORVASC) 10 MG tablet Take 1 tablet (10 mg) by mouth daily 90 tablet 3     atorvastatin (LIPITOR) 10 MG tablet TAKE 1 TABLET BY MOUTH AT BEDTIME 90 tablet 3      benztropine (COGENTIN) 0.5 MG tablet Take 3 tablets (1.5 mg) by mouth 2 times daily 540 tablet 1     carboxymethylcellulose (REFRESH PLUS) 0.5 % Dpet ophthalmic dropperette [CARBOXYMETHYLCELLULOSE (REFRESH PLUS) 0.5 % DPET OPHTHALMIC DROPPERETTE] Administer 1-2 drops to both eyes 4 (four) times a day as needed.        cyanocobalamin, vitamin B-12, 1,000 mcg Subl [CYANOCOBALAMIN, VITAMIN B-12, 1,000 MCG SUBL] Place 1 tablet (1,000 mcg total) under the tongue daily.  0     denosumab (PROLIA) 60 MG/ML SOSY injection Inject 60 mg Subcutaneous       DULoxetine (CYMBALTA) 20 MG capsule Take 1 capsule (20 mg) by mouth daily 90 capsule 3     fluticasone (FLONASE) 50 MCG/ACT nasal spray Use 1 spray(s) in each nostril once daily 48 g 3     folic acid (FOLVITE) 1 MG tablet Take 1 tablet (1,000 mcg) by mouth daily 90 tablet 0     hydroxychloroquine (PLAQUENIL) 200 MG tablet Take 1 tablet (200 mg) by mouth 2 times daily 180 tablet 0     levothyroxine (SYNTHROID/LEVOTHROID) 50 MCG tablet Take 1 tablet (50 mcg) by mouth daily 30 minutes before breakfast. 90 tablet 2     lidocaine (LIDODERM) 5 % patch Place 1 patch onto the skin every 24 hours To prevent lidocaine toxicity, patient should be patch free for 12 hrs daily. 14 patch 1     LORazepam (ATIVAN) 0.5 MG tablet Take 1 tablet (0.5 mg) by mouth daily as needed for anxiety 30 tablet 0     losartan (COZAAR) 50 MG tablet Take 1 tablet (50 mg) by mouth daily 90 tablet 3     methotrexate sodium 2.5 MG TABS TAKE 4 TABLETS BY MOUTH ONCE A WEEK Strength: 2.5 mg 48 tablet 0     metoprolol tartrate (LOPRESSOR) 50 MG tablet Take 1 tablet by mouth twice daily 180 tablet 3     sodium bicarbonate 650 MG tablet TAKE 2 TABLETS BY MOUTH 2 TIMES A   tablet 0     Facility-Administered Encounter Medications as of 4/20/2023   Medication Dose Route Frequency Provider Last Rate Last Admin     denosumab (PROLIA) injection 60 mg  60 mg Subcutaneous Q6 Months Isabel Maurer MD   60 mg at  02/02/23 1019             O:   NAD, WDWN, Alert & Oriented, Mood & Affect wnl, Vitals stable   Here today alone   General appearance normal   Vitals stable   Alert, oriented and in no acute distress     L brow 4mm scaly papule   R cheek stuck on papule   Stuck on papules and brown macules on trunk and ext   Red papules on trunk  Flesh colored papules on trunk         Eyes: Conjunctivae/lids:Normal     ENT: Lips, buccal mucosa, tongue: normal    MSK:Normal    Cardiovascular: peripheral edema none    Pulm: Breathing Normal    Lymph Nodes: No Head and Neck Lymphadenopathy     Neuro/Psych: Orientation:Alert and Orientedx3 ; Mood/Affect:normal       A/P:  1. Seborrheic keratosis, lentigo, angioma, dermal nevus  2. L brow squamous cell carcinoma in situ   It was a pleasure speaking to Claudia Wise today.  Previous clinic notes and pertinent laboratory tests were reviewed prior to Claudia Wise's visit.  Nature of benign skin lesions dicussed with patient.  Signs and Symptoms of skin cancer discussed with patient.  Patient encouraged to perform monthly skin exams.  UV precautions reviewed with patient.  Risks of non-melanoma skin cancer discussed with patient   Return to clinic 6 months  PROCEDURE NOTE  L brow squamous cell carcinoma in situ   MOHS:   Location    The rationale for Mohs surgery was discussed with the patient and consent was obtained.  The risks and benefits as well as alternatives to therapy were discussed, in detail.  Specifically, the risks of infection, scarring, bleeding, prolonged wound healing, incomplete removal, allergy to anesthesia, nerve injury and recurrence were addressed.  Indication for Mohs was Location. Prior to the procedure, the treatment site was clearly identified and, if available, confirmed with previous photos and confirmed by the patient   All components of the Universal Protocol/PAUSE rule were completed.  The Mohs surgeon operated in two distinct and integrated capacities  as the surgeon and pathologist.      The area was prepped with Betasept.  A rim of normal appearing skin was marked circumferentially around the lesion.  The area was infiltrated with local anesthesia.  The tumor was first debulked to remove all clinically apparent tumor.  An incision following the standard Mohs approach was done and the specimen was oriented,mapped and placed in 1 block(s).  Each specimen was then chromacoded and processed in the Mohs laboratory using standard Mohs technique and submitted for frozen section histology.  Frozen section analysis showed no residual tumor but CLEAR MARGINS.      The tumor was excised using standard Mohs technique in 1 stages(s).  CLEAR MARGINS OBTAINED and Final defect size was 1cm.     We discussed the options for wound management in full with the patient including risks/benefits/ possible outcomes.        REPAIR SECOND INTENT: We discussed the options for wound management in full with the patient including risks/benefits/possible outcomes. Decision made to allow the wound to heal by second intention. Cautery was used for for hemostasis. EBL minimal; complications none; wound care routine.  The patient was discharged in good condition and will return in one month or prn for wound evaluation.        Again, thank you for allowing me to participate in the care of your patient.        Sincerely,        Ricky Kilgore MD

## 2023-04-20 NOTE — PATIENT INSTRUCTIONS
Open Wound Care     for ______________        No strenuous activity for 48 hours    Take Tylenol as needed for discomfort.                                                .         Do not drink alcoholic beverages for 48 hours.    Keep the pressure bandage in place for 24 hours. If the bandage becomes blood tinged or loose, reinforce it with gauze and tape.        (Refer to the reverse side of this page for management of bleeding).    Remove bandage in 24 hours and begin wound care as follows:     Clean area with tap water using a Q tip or gauze pad, (shower / bathe normally)  Dry wound with Q tip or gauze pad  Apply Aquaphor, Vaseline, Polysporin or Bacitracin Ointment with a Q tip  Do NOT use Neosporin Ointment *  Cover the wound with a band-aid or nonstick gauze pad and paper tape.  Repeat wound care once a day until wound is completely healed.    It is an old wives tale that a wound heals better when it is exposed to air and allowed to dry out. The wound will heal faster with a better cosmetic result if it is kept moist with ointment and covered with a bandage.  Do not let the wound dry out.      Supplies Needed:                Qtips or gauze pads                Polysporin or Bacitracin Ointment                Bandaids or nonstick gauze pads and paper tape    Wound care kits and brown paper tape are available for purchase at   the pharmacy.    BLEEDING:    Use tightly rolled up gauze or cloth to apply direct pressure over the bandage for 20   minutes.  Reapply pressure for an additional 20 minutes if necessary  Call the office or go to the nearest emergency room if pressure fails to stop the bleeding.  Use additional gauze and tape to maintain pressure once the bleeding has stopped.  Begin wound care 24 hours after surgery as directed.                  WOUND HEALING    One week after surgery a pink / red halo will form around the outside of the wound.   This is new skin.  The center of the wound will appear  yellowish white and produce some drainage.  The pink halo will slowly migrate in toward the center of the wound until the wound is covered with new shiny pink skin.  There will be no more drainage when the wound is completely healed.  It will take six months to one year for the redness to fade.  The scar may be itchy, tight and sensitive to extreme temperatures for a year after the surgery.  Massaging the area several times a day for several minutes after the wound is completely healed will help the scar soften and normalize faster. Begin massage only after healing is complete.      In case of emergency call: Dr Kilgore: [phone]    [facility]: [phone]    [facility]:[phone]

## 2023-04-24 ENCOUNTER — OFFICE VISIT (OUTPATIENT)
Dept: NEUROLOGY | Facility: CLINIC | Age: 73
End: 2023-04-24
Payer: COMMERCIAL

## 2023-04-24 VITALS
WEIGHT: 168 LBS | HEART RATE: 76 BPM | SYSTOLIC BLOOD PRESSURE: 126 MMHG | BODY MASS INDEX: 29.29 KG/M2 | DIASTOLIC BLOOD PRESSURE: 63 MMHG | RESPIRATION RATE: 16 BRPM

## 2023-04-24 DIAGNOSIS — R44.3 HALLUCINATIONS: ICD-10-CM

## 2023-04-24 DIAGNOSIS — T88.7XXA MEDICATION SIDE EFFECTS: ICD-10-CM

## 2023-04-24 DIAGNOSIS — I10 PRIMARY HYPERTENSION: ICD-10-CM

## 2023-04-24 DIAGNOSIS — R47.1 DYSARTHRIA: ICD-10-CM

## 2023-04-24 DIAGNOSIS — G20.C PRIMARY PARKINSONISM (H): Primary | ICD-10-CM

## 2023-04-24 PROCEDURE — 99214 OFFICE O/P EST MOD 30 MIN: CPT | Performed by: PSYCHIATRY & NEUROLOGY

## 2023-04-24 ASSESSMENT — MONTREAL COGNITIVE ASSESSMENT (MOCA)
13. ORIENTATION SUBSCORE: 6
11. FOR EACH PAIR OF WORDS, WHAT CATEGORY DO THEY BELONG TO (OUT OF 2): 2
6. READ LIST OF DIGITS [FORWARD/BACKWARD]: 2
4. NAME EACH OF THE THREE ANIMALS SHOWN: 3
VISUOSPATIAL/EXECUTIVE SUBSCORE: 3
12. MEMORY INDEX SCORE: 3
WHAT LEVEL OF EDUCATION WAS ATTAINED: 0
8. SERIAL SUBTRACTION OF 7S: 3
9. REPEAT EACH SENTENCE: 2
7. [VIGILENCE] TAP WHEN HEARING DESIGNATED LETTER: 0
WHAT IS THE TOTAL SCORE (OUT OF 30): 24
10. [FLUENCY] NAME WORDS STARTING WITH DESIGNATED LETTER: 0

## 2023-04-24 NOTE — NURSING NOTE
SISSY COGNITIVE ASSESSMENT (MOCA)  Version 7.1 Original Version  VISUOSPATIAL/EXECUTIVE               COPY CUBE      [  0  ]                                [  0  ] DRAW CLOCK (Ten past eleven)  (3 points)    [ 1   ]                    [ 1   ]               [ 1   ]       Contour            Numbers     Hands POINTS                  3 / 5   NAMING    [1   ]                                                                        [ 1   ]                                             [ 1   ]  Ligrisel Corea                                Camel                    3 / 3   MEMORY Read list of words, subject must repeat them. Do 2 trials, even if 1st trial is successful. Do a recall after 5 minutes  FACE VELVET Taoist ADIN RED No Points    1st          2nd         ATTENTION Read list of digits (1 digit/sec) Subject has to repeat in the forward order       [  1  ]   2  1  8  5  4                                [ 1   ] 7 4 2                         2 /2   Read list of letters. The subject must tap with his hand at each letter A. No points if > 2 errors.  [   0 ] F B A C M N A A J K L B A F A K D E A A A J A M O F A A B              0/1   Serial 7 subtraction starting at 100          [ 1   ] 93         [  1  ] 86          [   1 ] 79          [  1  ] 72         [  1  ] 65   4 or 5 correct subtractions: 3 points,  2 or 3 correct: 2 points,  1correct: 1 point,   0 correct: 0 points           3 /3   LANGUAGE Repeat: I only know that Norm is the one to help today. [   1  ]                                      The cat always hid under the couch when dogs were in the room. [ 1  ]              2 /2   Fluency: Name maximum number of words in one minute that begin with the letter F                                                                                                                    [ 0   ] _10__ (N > 11 words)              0 /1   ABSTRACTION Similarity  between e.g. banana-orange=fruit                                                                   [   1 ] train-bicycle                      [  1  ] watch-ruler             2 /2   DELAYED  RECALL Has to recall words  WITH NO CUE FACE  [  0  ] VELVET  [   0 ] Anglican  [  1  ]  ADIN  [ 1   ] RED  [ 1   ] Points for UNCUED recall only           3 /5           OPTIONAL Category cue           Multiple choice cue          ORIENTATION  [ 1   ] Date     [  1  ] Month       [ 1   ] Year      [  1  ] Day      [  1  ] Place        [   1 ] City         6 /6   TOTAL  Normal > 26/30 Add 1 point if < 12 years education      24 /30

## 2023-04-24 NOTE — PROGRESS NOTES
NEUROLOGY OUTPATIENT PROGRESS NOTE   Apr 24, 2023     CHIEF COMPLAINT/REASON FOR VISIT/REASON FOR CONSULT  Patient presents with:  Follow Up  Tremors    REASON FOR CONSULTATION- Tremors    REFERRAL SOURCE  Dr. Isabel Maurer   Dr. Isabel Maurer    HISTORY OF PRESENT ILLNESS  Claudia Wise is a 72 year old female seen today for evaluation of tremors.  Reports that the tremors have been there since February 2020.  Reports that the tremors are mainly in the left upper extremity.  Notices that they are present at rest.  They wake her up from sleep.  They can be present with activity as well.  Denies any gait difficulty though does have back issues and left leg weakness from the back issues which could affect her walking.  Reports no masklike facies.  No other new medications.  Has not been on any antipsychotics in the past.  Does have a diagnosis of depression anxiety and is on Cymbalta.  No tremors on the right side.  Denies any cognitive issues.  No rigidity.  Does have family history of Parkinson's disease in her aunt.    2/16/22  Patient returns today.  She could not tolerate the Sinemet.  Had dry heaving.  Did try to take a lower dose.  Did try to take it with crackers but still had dry heaving.  Did not feel any significant benefit.  Tremors were better in the evening time with the medication though the tremors are always better in the evening time.  Reports no other new issues.  Reports that her depression anxiety under good control.  Her  does not think she has masklike facies.  No other new symptoms or concerns.    She is working with her therapist for sacroiliac pain.  She is walking 10 minutes every other day.  Encourage her to do it more often.    4/14/22  Patient returns today.  Reports that the medication is somewhat helpful.  She is walking little bit faster.  Is doing physical therapy which is helping.  She forgets to take the medication in the evening time.  Does have some complains of  slurred speech as well.  No other new symptoms/complaints.    6/22/22  Patient returns today.  Reports that she had visual hallucinations with the Requip.  She would see people at night.  They would not talk to her.  There were no auditory hallucinations just that she would see people.  Stopping the Requip these hallucinations are gone away.  Is sleeping well at night.  Reports no jerking or any other activity.  Previously she had reported some dysarthria which has now improved.  She denies that she ever had dysarthria.  Has not seen the speech specialist.    8/24/22  Patient returns today.  She did try the Cogentin has not noticed any benefit.  Has not had any side effects either.  She continues to have the tremors.  Feels that anxiety makes them worse.  No issues with hallucinations or any abnormal dreams.  Denies any other new concerns.  She is using Nordic sticks to help her walk.  Did see her primary care doctor for chronic kidney disease.  She is also following up with a rheumatologist.    1/23/23  Patient returns today.  She has tried the Cogentin and feels that it is providing some benefit though things are worse at the end times.  We reviewed why things were worse at certain times and seems like it might be more related to anxiety and stress.  She also reports more shakes when is cold outside.  We discussed about possibly shivering.  Denies any other side effects of the Cogentin.  Is interested in trying a higher dose.  Does not feel the dysarthria is bothering her.  Has not seen speech therapy.    4/24/23  Patient returns today.  She is tried the higher dose of Cogentin and finds some benefit.  Does complain of some wearing off though is taking the medication at noon and then sometimes at bedtime.  Does report some hallucinations where she is seeing a squirrel at nighttime.  Does complain of some memory problems as well.  Does complain of dry mouth though dry mouth spray is helping.  No other new  neurological symptoms.  Is going to see an occupational therapist for her pain.  Wants to see a physical therapist for Parkinson's disease.    Previous history is reviewed and this is unchanged.    PAST MEDICAL/SURGICAL HISTORY  Past Medical History:   Diagnosis Date     Anemia      Anxiety      Chronic kidney disease      Diabetes mellitus, type II (H)     prediabetes     Disease of thyroid gland     hypothyroid     Family history of myocardial infarction      Gout      High cholesterol      Hypertension      Inverted nipple     right     Macular edema      Osteoarthritis 2012     Rheumatoid arthritis (H) 2012     Patient Active Problem List   Diagnosis     Herpes Simplex Type II     Anxiety     Anemia in chronic kidney disease     Hyperlipemia     Gout     Hypertension     Prediabetes     Endometriosis     Other specified hypothyroidism     Seronegative rheumatoid arthritis of multiple sites (H)     High risk medication use     Bilateral primary osteoarthritis of knee     Senile osteoporosis     Compression fracture of L2 vertebra with routine healing     Chronic kidney disease, stage 3 (H)     Venous (peripheral) insufficiency     Parkinson disease (H)     History of small bowel obstruction     Primary insomnia   Significant for high cholesterol, high blood pressure, diabetes, arthritis, depression, osteoporosis    FAMILY HISTORY  Family History   Problem Relation Age of Onset     Breast Cancer Maternal Aunt      Cancer Maternal Aunt      Cancer Father      Cancer Sister      Cancer Maternal Grandmother      Cancer Cousin      Coronary Artery Disease Mother 49.00   Family history reviewed and negative for neurological conditions except for Parkinson's disease in her aunt.    SOCIAL HISTORY  Social History     Tobacco Use     Smoking status: Former     Types: Cigarettes     Quit date: 1995     Years since quittin.8     Smokeless tobacco: Never   Substance Use Topics     Alcohol use: Yes      Comment: Alcoholic Drinks/day: occasional couple times a year     Drug use: No       SYSTEMS REVIEW  Twelve-system ROS was done and other than the HPI this was negative except for neck pain, back pain, arm and leg pain, joint pain, weakness paralysis, difficulty walking, balance coordination problems, movement disorder, bladder symptoms, anxiety, depression, weight gain.  No new concerns/symptoms.    MEDICATIONS  acetaminophen (TYLENOL) 500 MG tablet, Take 500 mg by mouth every 6 hours as needed   acyclovir (ZOVIRAX) 400 MG tablet, TAKE ONE TABLET BY MOUTH EVERY 8 HOURS AS NEEDED  allopurinol (ZYLOPRIM) 100 MG tablet, Take 1 tablet (100 mg) by mouth 2 times daily (with meals)  amLODIPine (NORVASC) 10 MG tablet, Take 1 tablet (10 mg) by mouth daily  atorvastatin (LIPITOR) 10 MG tablet, TAKE 1 TABLET BY MOUTH AT BEDTIME  benztropine (COGENTIN) 0.5 MG tablet, Take 3 tablets (1.5 mg) by mouth 2 times daily  carboxymethylcellulose (REFRESH PLUS) 0.5 % Dpet ophthalmic dropperette, [CARBOXYMETHYLCELLULOSE (REFRESH PLUS) 0.5 % DPET OPHTHALMIC DROPPERETTE] Administer 1-2 drops to both eyes 4 (four) times a day as needed.   cyanocobalamin, vitamin B-12, 1,000 mcg Subl, [CYANOCOBALAMIN, VITAMIN B-12, 1,000 MCG SUBL] Place 1 tablet (1,000 mcg total) under the tongue daily.  DULoxetine (CYMBALTA) 20 MG capsule, Take 1 capsule (20 mg) by mouth daily  fluticasone (FLONASE) 50 MCG/ACT nasal spray, Use 1 spray(s) in each nostril once daily  hydroxychloroquine (PLAQUENIL) 200 MG tablet, Take 1 tablet (200 mg) by mouth 2 times daily  levothyroxine (SYNTHROID/LEVOTHROID) 50 MCG tablet, Take 1 tablet (50 mcg) by mouth daily 30 minutes before breakfast.  lidocaine (LIDODERM) 5 % patch, Place 1 patch onto the skin every 24 hours To prevent lidocaine toxicity, patient should be patch free for 12 hrs daily.  LORazepam (ATIVAN) 0.5 MG tablet, Take 1 tablet (0.5 mg) by mouth daily as needed for anxiety  losartan (COZAAR) 50 MG tablet, Take 1  tablet (50 mg) by mouth daily  methotrexate sodium 2.5 MG TABS, TAKE 4 TABLETS BY MOUTH ONCE A WEEK Strength: 2.5 mg  metoprolol tartrate (LOPRESSOR) 50 MG tablet, Take 1 tablet by mouth twice daily  sodium bicarbonate 650 MG tablet, TAKE 2 TABLETS BY MOUTH 2 TIMES A  DAY  denosumab (PROLIA) 60 MG/ML SOSY injection, Inject 60 mg Subcutaneous  folic acid (FOLVITE) 1 MG tablet, Take 1 tablet (1,000 mcg) by mouth daily    denosumab (PROLIA) injection 60 mg         PHYSICAL EXAMINATION  VITALS: /63   Pulse 76   Resp 16   Wt 76.2 kg (168 lb)   BMI 29.29 kg/m    GENERAL: Healthy appearing, alert, no acute distress, normal habitus.  CARDIOVASCULAR: Extremities warm and well perfused. Pulses present.   NEUROLOGICAL:  Patient is awake and grossly oriented to self, place and time.  Attention span is normal.  Memory is grossly intact; MoCA 24.  Language is fluent and follows commands appropriately.  Appropriate fund of knowledge. Cranial nerves 2-12 are intact. There is no pronator drift.  Motor exam shows 5/5 strength in all extremities.  Tone is symmetric bilaterally in upper and lower extremities.  Resting tremor is noted in the left upper extremity.  No cogwheeling noted.  (Previously reflexes are symmetric and 1+ in upper extremities and lower extremities. Sensory exam is grossly intact to light touch, pin prick and vibration.)  Finger to nose and heel to shin is without dysmetria.  Romberg is negative.  Gait is normal except shows decreased left arm swing with resting tremor and the patient is able to do tandem walk and walk on toes and heels with some difficulty.  Previously-trying concentric circles shows very mild tremor.  No significant change in exam today.  Exam similar to before.  Continues to have left resting tremor.    DIAGNOSTICS  RELEVANT LABS  Component      Latest Ref Rng & Units 10/1/2020   Sodium      136 - 145 mmol/L 137   Potassium      3.5 - 5.0 mmol/L 4.4   Chloride      98 - 107 mmol/L 110  (H)   Carbon Dioxide      22 - 31 mmol/L 14 (L)   Anion Gap      5 - 18 mmol/L 13   Glucose      70 - 125 mg/dL 114   Urea Nitrogen      8 - 28 mg/dL 30 (H)   Creatinine      0.60 - 1.10 mg/dL 1.18 (H)   GFR Estimate If Black      >60 mL/min/1.73m2 55 (L)   GFR Estimate      >60 mL/min/1.73m2 45 (L)   Bilirubin Total      0.0 - 1.0 mg/dL 0.8   Calcium      8.5 - 10.5 mg/dL 8.7   Protein Total      6.0 - 8.0 g/dL 6.8   Albumin      3.5 - 5.0 g/dL 4.3   Alkaline Phosphatase      45 - 120 U/L 63   AST      0 - 40 U/L 21   ALT      0 - 45 U/L 18   WBC      4.0 - 11.0 thou/uL 7.3   RBC Count      3.80 - 5.40 mill/uL 3.12 (L)   Hemoglobin      12.0 - 16.0 g/dL 10.4 (L)   Hematocrit      35.0 - 47.0 % 31.2 (L)   MCV      80 - 100 fL 100   MCH      27.0 - 34.0 pg 33.4   MCHC      32.0 - 36.0 g/dL 33.4   RDW      11.0 - 14.5 % 14.5   Platelet Count      140 - 440 thou/uL 151   Mean Platelet Volume      7.0 - 10.0 fL 8.7   TSH      0.30 - 5.00 uIU/mL 1.56   Uric Acid      2.0 - 7.5 mg/dL 4.1     OUTSIDE RECORDS  Outside referral notes and chart notes were reviewed and pertinent information has been summarized (in addition to the HPI):-Patient does have a diagnosis of anxiety.  Has been having some tremors.  Is on multiple antidepressant/antianxiety medications.  Also has some back pain issues.  Is also on narcotics.    LABS  Component      Latest Ref Rng & Units 12/21/2021   Sodium      136 - 145 mmol/L 138   Potassium      3.5 - 5.0 mmol/L 4.3   Chloride      98 - 107 mmol/L 110 (H)   Carbon Dioxide      22 - 31 mmol/L 17 (L)   Anion Gap      5 - 18 mmol/L 11   Urea Nitrogen      8 - 28 mg/dL 30 (H)   Creatinine      0.60 - 1.10 mg/dL 1.21 (H)   Calcium      8.5 - 10.5 mg/dL 9.4   Glucose      70 - 125 mg/dL 101   Alkaline Phosphatase      45 - 120 U/L 55   AST      0 - 40 U/L 30   ALT      0 - 45 U/L 9   Protein Total      6.0 - 8.0 g/dL 6.7   Albumin      3.5 - 5.0 g/dL 4.0   Bilirubin Total      0.0 - 1.0 mg/dL 0.7   GFR  Estimate      >60 mL/min/1.73m2 47 (L)   TSH      0.30 - 5.00 uIU/mL 0.98   Hemoglobin A1C      0.0 - 5.6 % 5.7 (H)   Ferritin      10 - 130 ng/mL 253 (H)   Vitamin B12      213 - 816 pg/mL >2,000 (H)   Methylmalonic Acid      0.00 - 0.40 umol/L 0.24       IMPRESSION/REPORT/PLAN  Primary parkinsonism (H)  History of anxiety/depression  Prediabetes  Elevated ferritin  History of chronic kidney disease  Rule out restless leg syndrome  Side effect of medication  Dysarthria-not present per patient  Medication side effect  Hallucinations  Cognitive decline    This is a 72 year old female with new onset of left upper extremity resting tremor.  Examination further shows masklike facies, decreased arm swing on the left side, left resting tremor.  No cogwheeling.  Patient symptoms are suggestive of Parkinson's disease.  Blood work was overall noncontributory.  She has had side effects with Sinemet.  Requip caused hallucinations and she stopped the medications.  Low-dose Cogentin has not helped (patient perspective) and will try to increase the dose further.  Higher dose she is noticing more benefit.  No side effects except for dry mouth.  She still has hallucinations.  These might be more related to the Parkinson disease.  Could consider amantadine.    Cogentin is less effective as she is not taking it regularly and even interval.  Encouraged her to do this.    With her having hallucinations and parkinsonism potentially she has Lewy body dementia. MOCA 24 today. Will monitor for right now.  Could consider an MRI/neuropsych testing.    Previously-symptoms are worse at the time of anxiety/stress and when it is cold.  Discussed that these are normal triggers for tremors.  This is not from the Parkinson's.  She controlled the underlying cause.    Recommend exercise and healthy lifestyle.  List of exercises were given to her.  Could consider DaTscan if the diagnosis remains unclear.  We will set her up with physical  therapy.    She further had complained of of some slurred speech and we will set her up with speech therapy for more evaluation and treatment.  She denies ever having any slurred speech.  The symptoms to hold off for right now.    Previously-she does notice that the tremor wakes her up during sleep.  Possibly she has restless leg syndrome.  Ferritin was previously elevated.  Currently this is not happening and will monitor.    Previously-blood work further shows evidence of chronic kidney disease and prediabetes which she already has.  Ferritin could be elevated due to the kidney disease.  She is following up with primary care doctor regarding this.    Return back in 6 months.     -     benztropine (COGENTIN) 0.5 MG tablet; Take 3 tablets (1.5 mg) by mouth 2 times daily-encouraged her to take it on even basis.  -     Physical therapy    Return in about 6 months (around 10/24/2023) for In-Clinic Visit (must).    Over 32 minutes were spent coordinating the care for the patient on the day of the encounter.  This includes previsit, during visit and post visit activities as documented above.  Counseled patient. prescription management.  Refractory problem.  New symptoms.  (Activities include but not inclusive of reviewing chart, reviewing outside records, reviewing labs and imaging study results as well as the images, patient visit time including getting history and exam,  use if applicable, review of test results with the patient and coming up with a plan in a shared model, counseling patient and family, education and answering patient questions, EMR , EMR diagnosis entry and problem list management, medication reconciliation and prescription management if applicable, paperwork if applicable, printing documents and documentation of the visit activities.)    Logan Smart MD  Neurologist  [facility]  Tel:- [phone]    This note was dictated using voice  recognition software.  Any grammatical or context distortions are unintentional and inherent to the software.

## 2023-04-24 NOTE — LETTER
4/24/2023         RE: Claudia Wise   [address] [place] [address]        Dear Colleague,    Thank you for referring your patient, Claudia Wise, to the [facility] [place]. Please see a copy of my visit note below.    NEUROLOGY OUTPATIENT PROGRESS NOTE   Apr 24, 2023     CHIEF COMPLAINT/REASON FOR VISIT/REASON FOR CONSULT  Patient presents with:  Follow Up  Tremors    REASON FOR CONSULTATION- Tremors    REFERRAL SOURCE  Dr. Isabel Maurer  CC Dr. Isabel Maurer    HISTORY OF PRESENT ILLNESS  Claudia Wise is a 72 year old female seen today for evaluation of tremors.  Reports that the tremors have been there since February 2020.  Reports that the tremors are mainly in the left upper extremity.  Notices that they are present at rest.  They wake her up from sleep.  They can be present with activity as well.  Denies any gait difficulty though does have back issues and left leg weakness from the back issues which could affect her walking.  Reports no masklike facies.  No other new medications.  Has not been on any antipsychotics in the past.  Does have a diagnosis of depression anxiety and is on Cymbalta.  No tremors on the right side.  Denies any cognitive issues.  No rigidity.  Does have family history of Parkinson's disease in her aunt.    2/16/22  Patient returns today.  She could not tolerate the Sinemet.  Had dry heaving.  Did try to take a lower dose.  Did try to take it with crackers but still had dry heaving.  Did not feel any significant benefit.  Tremors were better in the evening time with the medication though the tremors are always better in the evening time.  Reports no other new issues.  Reports that her depression anxiety under good control.  Her  does not think she has masklike facies.  No other new symptoms or concerns.    She is working with her therapist for sacroiliac pain.  She is walking 10 minutes every other day.  Encourage her to do it more  often.    4/14/22  Patient returns today.  Reports that the medication is somewhat helpful.  She is walking little bit faster.  Is doing physical therapy which is helping.  She forgets to take the medication in the evening time.  Does have some complains of slurred speech as well.  No other new symptoms/complaints.    6/22/22  Patient returns today.  Reports that she had visual hallucinations with the Requip.  She would see people at night.  They would not talk to her.  There were no auditory hallucinations just that she would see people.  Stopping the Requip these hallucinations are gone away.  Is sleeping well at night.  Reports no jerking or any other activity.  Previously she had reported some dysarthria which has now improved.  She denies that she ever had dysarthria.  Has not seen the speech specialist.    8/24/22  Patient returns today.  She did try the Cogentin has not noticed any benefit.  Has not had any side effects either.  She continues to have the tremors.  Feels that anxiety makes them worse.  No issues with hallucinations or any abnormal dreams.  Denies any other new concerns.  She is using Nordic sticks to help her walk.  Did see her primary care doctor for chronic kidney disease.  She is also following up with a rheumatologist.    1/23/23  Patient returns today.  She has tried the Cogentin and feels that it is providing some benefit though things are worse at the end times.  We reviewed why things were worse at certain times and seems like it might be more related to anxiety and stress.  She also reports more shakes when is cold outside.  We discussed about possibly shivering.  Denies any other side effects of the Cogentin.  Is interested in trying a higher dose.  Does not feel the dysarthria is bothering her.  Has not seen speech therapy.    4/24/23  Patient returns today.  She is tried the higher dose of Cogentin and finds some benefit.  Does complain of some wearing off though is taking the  medication at noon and then sometimes at bedtime.  Does report some hallucinations where she is seeing a squirrel at nighttime.  Does complain of some memory problems as well.  Does complain of dry mouth though dry mouth spray is helping.  No other new neurological symptoms.  Is going to see an occupational therapist for her pain.  Wants to see a physical therapist for Parkinson's disease.    Previous history is reviewed and this is unchanged.    PAST MEDICAL/SURGICAL HISTORY  Past Medical History:   Diagnosis Date     Anemia      Anxiety      Chronic kidney disease      Diabetes mellitus, type II (H)     prediabetes     Disease of thyroid gland     hypothyroid     Family history of myocardial infarction      Gout      High cholesterol      Hypertension      Inverted nipple     right     Macular edema      Osteoarthritis 01/01/2012     Rheumatoid arthritis (H) 01/01/2012     Patient Active Problem List   Diagnosis     Herpes Simplex Type II     Anxiety     Anemia in chronic kidney disease     Hyperlipemia     Gout     Hypertension     Prediabetes     Endometriosis     Other specified hypothyroidism     Seronegative rheumatoid arthritis of multiple sites (H)     High risk medication use     Bilateral primary osteoarthritis of knee     Senile osteoporosis     Compression fracture of L2 vertebra with routine healing     Chronic kidney disease, stage 3 (H)     Venous (peripheral) insufficiency     Parkinson disease (H)     History of small bowel obstruction     Primary insomnia   Significant for high cholesterol, high blood pressure, diabetes, arthritis, depression, osteoporosis    FAMILY HISTORY  Family History   Problem Relation Age of Onset     Breast Cancer Maternal Aunt      Cancer Maternal Aunt      Cancer Father      Cancer Sister      Cancer Maternal Grandmother      Cancer Cousin      Coronary Artery Disease Mother 49.00   Family history reviewed and negative for neurological conditions except for Parkinson's  disease in her aunt.    SOCIAL HISTORY  Social History     Tobacco Use     Smoking status: Former     Types: Cigarettes     Quit date: 1995     Years since quittin.8     Smokeless tobacco: Never   Substance Use Topics     Alcohol use: Yes     Comment: Alcoholic Drinks/day: occasional couple times a year     Drug use: No       SYSTEMS REVIEW  Twelve-system ROS was done and other than the HPI this was negative except for neck pain, back pain, arm and leg pain, joint pain, weakness paralysis, difficulty walking, balance coordination problems, movement disorder, bladder symptoms, anxiety, depression, weight gain.  No new concerns/symptoms.    MEDICATIONS  acetaminophen (TYLENOL) 500 MG tablet, Take 500 mg by mouth every 6 hours as needed   acyclovir (ZOVIRAX) 400 MG tablet, TAKE ONE TABLET BY MOUTH EVERY 8 HOURS AS NEEDED  allopurinol (ZYLOPRIM) 100 MG tablet, Take 1 tablet (100 mg) by mouth 2 times daily (with meals)  amLODIPine (NORVASC) 10 MG tablet, Take 1 tablet (10 mg) by mouth daily  atorvastatin (LIPITOR) 10 MG tablet, TAKE 1 TABLET BY MOUTH AT BEDTIME  benztropine (COGENTIN) 0.5 MG tablet, Take 3 tablets (1.5 mg) by mouth 2 times daily  carboxymethylcellulose (REFRESH PLUS) 0.5 % Dpet ophthalmic dropperette, [CARBOXYMETHYLCELLULOSE (REFRESH PLUS) 0.5 % DPET OPHTHALMIC DROPPERETTE] Administer 1-2 drops to both eyes 4 (four) times a day as needed.   cyanocobalamin, vitamin B-12, 1,000 mcg Subl, [CYANOCOBALAMIN, VITAMIN B-12, 1,000 MCG SUBL] Place 1 tablet (1,000 mcg total) under the tongue daily.  DULoxetine (CYMBALTA) 20 MG capsule, Take 1 capsule (20 mg) by mouth daily  fluticasone (FLONASE) 50 MCG/ACT nasal spray, Use 1 spray(s) in each nostril once daily  hydroxychloroquine (PLAQUENIL) 200 MG tablet, Take 1 tablet (200 mg) by mouth 2 times daily  levothyroxine (SYNTHROID/LEVOTHROID) 50 MCG tablet, Take 1 tablet (50 mcg) by mouth daily 30 minutes before breakfast.  lidocaine (LIDODERM) 5 % patch,  Place 1 patch onto the skin every 24 hours To prevent lidocaine toxicity, patient should be patch free for 12 hrs daily.  LORazepam (ATIVAN) 0.5 MG tablet, Take 1 tablet (0.5 mg) by mouth daily as needed for anxiety  losartan (COZAAR) 50 MG tablet, Take 1 tablet (50 mg) by mouth daily  methotrexate sodium 2.5 MG TABS, TAKE 4 TABLETS BY MOUTH ONCE A WEEK Strength: 2.5 mg  metoprolol tartrate (LOPRESSOR) 50 MG tablet, Take 1 tablet by mouth twice daily  sodium bicarbonate 650 MG tablet, TAKE 2 TABLETS BY MOUTH 2 TIMES A  DAY  denosumab (PROLIA) 60 MG/ML SOSY injection, Inject 60 mg Subcutaneous  folic acid (FOLVITE) 1 MG tablet, Take 1 tablet (1,000 mcg) by mouth daily    denosumab (PROLIA) injection 60 mg         PHYSICAL EXAMINATION  VITALS: /63   Pulse 76   Resp 16   Wt 76.2 kg (168 lb)   BMI 29.29 kg/m    GENERAL: Healthy appearing, alert, no acute distress, normal habitus.  CARDIOVASCULAR: Extremities warm and well perfused. Pulses present.   NEUROLOGICAL:  Patient is awake and grossly oriented to self, place and time.  Attention span is normal.  Memory is grossly intact; MoCA 24.  Language is fluent and follows commands appropriately.  Appropriate fund of knowledge. Cranial nerves 2-12 are intact. There is no pronator drift.  Motor exam shows 5/5 strength in all extremities.  Tone is symmetric bilaterally in upper and lower extremities.  Resting tremor is noted in the left upper extremity.  No cogwheeling noted.  (Previously reflexes are symmetric and 1+ in upper extremities and lower extremities. Sensory exam is grossly intact to light touch, pin prick and vibration.)  Finger to nose and heel to shin is without dysmetria.  Romberg is negative.  Gait is normal except shows decreased left arm swing with resting tremor and the patient is able to do tandem walk and walk on toes and heels with some difficulty.  Previously-trying concentric circles shows very mild tremor.  No significant change in exam  today.  Exam similar to before.  Continues to have left resting tremor.    DIAGNOSTICS  RELEVANT LABS  Component      Latest Ref Rng & Units 10/1/2020   Sodium      136 - 145 mmol/L 137   Potassium      3.5 - 5.0 mmol/L 4.4   Chloride      98 - 107 mmol/L 110 (H)   Carbon Dioxide      22 - 31 mmol/L 14 (L)   Anion Gap      5 - 18 mmol/L 13   Glucose      70 - 125 mg/dL 114   Urea Nitrogen      8 - 28 mg/dL 30 (H)   Creatinine      0.60 - 1.10 mg/dL 1.18 (H)   GFR Estimate If Black      >60 mL/min/1.73m2 55 (L)   GFR Estimate      >60 mL/min/1.73m2 45 (L)   Bilirubin Total      0.0 - 1.0 mg/dL 0.8   Calcium      8.5 - 10.5 mg/dL 8.7   Protein Total      6.0 - 8.0 g/dL 6.8   Albumin      3.5 - 5.0 g/dL 4.3   Alkaline Phosphatase      45 - 120 U/L 63   AST      0 - 40 U/L 21   ALT      0 - 45 U/L 18   WBC      4.0 - 11.0 thou/uL 7.3   RBC Count      3.80 - 5.40 mill/uL 3.12 (L)   Hemoglobin      12.0 - 16.0 g/dL 10.4 (L)   Hematocrit      35.0 - 47.0 % 31.2 (L)   MCV      80 - 100 fL 100   MCH      27.0 - 34.0 pg 33.4   MCHC      32.0 - 36.0 g/dL 33.4   RDW      11.0 - 14.5 % 14.5   Platelet Count      140 - 440 thou/uL 151   Mean Platelet Volume      7.0 - 10.0 fL 8.7   TSH      0.30 - 5.00 uIU/mL 1.56   Uric Acid      2.0 - 7.5 mg/dL 4.1     OUTSIDE RECORDS  Outside referral notes and chart notes were reviewed and pertinent information has been summarized (in addition to the HPI):-Patient does have a diagnosis of anxiety.  Has been having some tremors.  Is on multiple antidepressant/antianxiety medications.  Also has some back pain issues.  Is also on narcotics.    LABS  Component      Latest Ref Rng & Units 12/21/2021   Sodium      136 - 145 mmol/L 138   Potassium      3.5 - 5.0 mmol/L 4.3   Chloride      98 - 107 mmol/L 110 (H)   Carbon Dioxide      22 - 31 mmol/L 17 (L)   Anion Gap      5 - 18 mmol/L 11   Urea Nitrogen      8 - 28 mg/dL 30 (H)   Creatinine      0.60 - 1.10 mg/dL 1.21 (H)   Calcium      8.5 - 10.5  mg/dL 9.4   Glucose      70 - 125 mg/dL 101   Alkaline Phosphatase      45 - 120 U/L 55   AST      0 - 40 U/L 30   ALT      0 - 45 U/L 9   Protein Total      6.0 - 8.0 g/dL 6.7   Albumin      3.5 - 5.0 g/dL 4.0   Bilirubin Total      0.0 - 1.0 mg/dL 0.7   GFR Estimate      >60 mL/min/1.73m2 47 (L)   TSH      0.30 - 5.00 uIU/mL 0.98   Hemoglobin A1C      0.0 - 5.6 % 5.7 (H)   Ferritin      10 - 130 ng/mL 253 (H)   Vitamin B12      213 - 816 pg/mL >2,000 (H)   Methylmalonic Acid      0.00 - 0.40 umol/L 0.24       IMPRESSION/REPORT/PLAN  Primary parkinsonism (H)  History of anxiety/depression  Prediabetes  Elevated ferritin  History of chronic kidney disease  Rule out restless leg syndrome  Side effect of medication  Dysarthria-not present per patient  Medication side effect  Hallucinations  Cognitive decline    This is a 72 year old female with new onset of left upper extremity resting tremor.  Examination further shows masklike facies, decreased arm swing on the left side, left resting tremor.  No cogwheeling.  Patient symptoms are suggestive of Parkinson's disease.  Blood work was overall noncontributory.  She has had side effects with Sinemet.  Requip caused hallucinations and she stopped the medications.  Low-dose Cogentin has not helped (patient perspective) and will try to increase the dose further.  Higher dose she is noticing more benefit.  No side effects except for dry mouth.  She still has hallucinations.  These might be more related to the Parkinson disease.  Could consider amantadine.    Cogentin is less effective as she is not taking it regularly and even interval.  Encouraged her to do this.    With her having hallucinations and parkinsonism potentially she has Lewy body dementia. MOCA 24 today. Will monitor for right now.  Could consider an MRI/neuropsych testing.    Previously-symptoms are worse at the time of anxiety/stress and when it is cold.  Discussed that these are normal triggers for tremors.   This is not from the Parkinson's.  She controlled the underlying cause.    Recommend exercise and healthy lifestyle.  List of exercises were given to her.  Could consider DaTscan if the diagnosis remains unclear.  We will set her up with physical therapy.    She further had complained of of some slurred speech and we will set her up with speech therapy for more evaluation and treatment.  She denies ever having any slurred speech.  The symptoms to hold off for right now.    Previously-she does notice that the tremor wakes her up during sleep.  Possibly she has restless leg syndrome.  Ferritin was previously elevated.  Currently this is not happening and will monitor.    Previously-blood work further shows evidence of chronic kidney disease and prediabetes which she already has.  Ferritin could be elevated due to the kidney disease.  She is following up with primary care doctor regarding this.    Return back in 6 months.     -     benztropine (COGENTIN) 0.5 MG tablet; Take 3 tablets (1.5 mg) by mouth 2 times daily-encouraged her to take it on even basis.  -     Physical therapy    Return in about 6 months (around 10/24/2023) for In-Clinic Visit (must).    Over 32 minutes were spent coordinating the care for the patient on the day of the encounter.  This includes previsit, during visit and post visit activities as documented above.  Counseled patient. prescription management.  Refractory problem.  New symptoms.  (Activities include but not inclusive of reviewing chart, reviewing outside records, reviewing labs and imaging study results as well as the images, patient visit time including getting history and exam,  use if applicable, review of test results with the patient and coming up with a plan in a shared model, counseling patient and family, education and answering patient questions, EMR , EMR diagnosis entry and problem list management, medication reconciliation and prescription management if  applicable, paperwork if applicable, printing documents and documentation of the visit activities.)    Logan Smart MD  Neurologist  [facility]  Tel:- [phone]    This note was dictated using voice recognition software.  Any grammatical or context distortions are unintentional and inherent to the software.        Again, thank you for allowing me to participate in the care of your patient.        Sincerely,        Logan Smart MD

## 2023-04-25 RX ORDER — LOSARTAN POTASSIUM 50 MG/1
TABLET ORAL
Qty: 90 TABLET | Refills: 3 | Status: SHIPPED | OUTPATIENT
Start: 2023-04-25 | End: 2024-04-05

## 2023-04-25 NOTE — TELEPHONE ENCOUNTER
"Routing refill request to provider for review/approval because:  Labs not current:  creatinine  Early refill request    Last Written Prescription Date:  7/20/22  Last Fill Quantity: 90,  # refills: 3   Last office visit provider:   2/2/23    Requested Prescriptions   Pending Prescriptions Disp Refills     losartan (COZAAR) 50 MG tablet [Pharmacy Med Name: Losartan Potassium 50 MG Oral Tablet] 90 tablet 0     Sig: Take 1 tablet by mouth once daily       Angiotensin-II Receptors Failed - 4/25/2023  2:07 PM        Failed - Normal serum creatinine on file in past 12 months     Recent Labs   Lab Test 02/02/23  1036   CR 1.40*       Ok to refill medication if creatinine is low          Passed - Last blood pressure under 140/90 in past 12 months     BP Readings from Last 3 Encounters:   04/24/23 126/63   03/02/23 122/68   02/15/23 130/70                 Passed - Recent (12 mo) or future (30 days) visit within the authorizing provider's specialty     Patient has had an office visit with the authorizing provider or a provider within the authorizing providers department within the previous 12 mos or has a future within next 30 days. See \"Patient Info\" tab in inbasket, or \"Choose Columns\" in Meds & Orders section of the refill encounter.              Passed - Medication is active on med list        Passed - Patient is age 18 or older        Passed - No active pregnancy on record        Passed - Normal serum potassium on file in past 12 months     Recent Labs   Lab Test 02/02/23  1036   POTASSIUM 4.5                    Passed - No positive pregnancy test in past 12 months             ALLEN VELA RN 04/25/23 2:07 PM  "

## 2023-05-09 ENCOUNTER — HOSPITAL ENCOUNTER (OUTPATIENT)
Dept: PHYSICAL THERAPY | Facility: REHABILITATION | Age: 73
Discharge: HOME | End: 2023-05-09
Attending: PSYCHIATRY & NEUROLOGY
Payer: COMMERCIAL

## 2023-05-09 DIAGNOSIS — G20.C PRIMARY PARKINSONISM (H): Primary | ICD-10-CM

## 2023-05-09 PROCEDURE — 97161 PT EVAL LOW COMPLEX 20 MIN: CPT | Mod: GP | Performed by: PHYSICAL THERAPIST

## 2023-05-09 PROCEDURE — 97535 SELF CARE MNGMENT TRAINING: CPT | Mod: GP | Performed by: PHYSICAL THERAPIST

## 2023-05-09 NOTE — PROGRESS NOTES
[facility]    OUTPATIENT PHYSICAL THERAPY ORTHOPEDIC EVALUATION  PLAN OF TREATMENT FOR OUTPATIENT REHABILITATION  (COMPLETE FOR INITIAL CLAIMS ONLY)  Patient's Last Name, First Name, M.I.  YOB: 1950  FrandyClaudia PENA    Provider s Name:  [facility]   Medical Record No.  [id number]   Start of Care Date:  05/09/23   Onset Date:  04/24/23 (order date)   Type:     _X__PT   ___OT   ___SLP Medical Diagnosis:  Primary Parkinsonism     PT Diagnosis:  Decreased balance, decreased LE strength, mild bradykinesia   Visits from SOC:  1      _________________________________________________________________________________  Plan of Treatment/Functional Goals:  balance training, gait training, manual therapy, neuromuscular re-education, ROM, strengthening, stretching           Goals     Goal Description: Patient will be able to perform > 12 sit to stands on 30 second sit to stand test, for decreased falls risk in 8 weeks:          Goal Description: Patient will improved Minibest to > 23 for improved balance and dec falls risk in 12 weeks:          Goal Description: Patient will demonstrate understanding of HEP to help with self management of PD in 12 weeks:        Therapy Frequency:  2 times/Week  Predicted Duration of Therapy Intervention:  up to 16 visits over 12 weeks:    Helio Lea, PT                 I CERTIFY THE NEED FOR THESE SERVICES FURNISHED UNDER        THIS PLAN OF TREATMENT AND WHILE UNDER MY CARE     (Physician co-signature of this document indicates review and certification of the therapy plan).                     Certification Date From:  05/09/23   Certification Date To:  08/01/23    Referring Provider:  Logan Smart MD    Initial Assessment        See Epic Evaluation Start of Care Date: 05/09/23 05/09/23 1000   General Information    Type of Visit Initial OP Ortho PT Evaluation   Start of Care Date 05/09/23   Referring Physician Logan Smart MD   Orders Evaluate and Treat   Certification Required? Yes   Medical Diagnosis Primary parkinsonism (H)   Surgical/Medical history reviewed Yes   Precautions/Limitations no known precautions/limitations   Presentation and Etiology   Pertinent history of current problem (include personal factors and/or comorbidities that impact the POC) see note   Onset date of current episode/exacerbation 04/24/23  (order date)   Fall Risk Screen   Fall screen completed by PT   Have you fallen 2 or more times in the past year? No   Have you fallen and had an injury in the past year? No   Is patient a fall risk? No   Abuse Screen (yes response referral indicated)   Feels Unsafe at Home or Work/School no   Feels Threatened by Someone no   Does Anyone Try to Keep You From Having Contact with Others or Doing Things Outside Your Home? no   Physical Signs of Abuse Present no   Patient needs abuse support services and resources No   Planned Therapy Interventions   Planned Therapy Interventions balance training;gait training;manual therapy;neuromuscular re-education;ROM;strengthening;stretching   Clinical Impression   Criteria for Skilled Therapeutic Interventions Met yes, treatment indicated   PT Diagnosis Decreased balance, decreased LE strength, mild bradykinesia   Clinical Presentation Stable/Uncomplicated   Clinical Decision Making (Complexity) Low complexity   Therapy Frequency 2 times/Week   Predicted Duration of Therapy Intervention (days/wks) up to 16 visits over 12 weeks:   Risk & Benefits of therapy have been explained Yes   Patient, Family & other staff in agreement with plan of care Yes   Clinical Impression Comments Patient is a 72 year old female seen in PT for initial evaluation of decreased balance with h/o primary parkinsonism. The patient reports noticing mild balance decreases, but does not stop her from  doing any daily tasks. She has started using nordic poles for outdoor walks. With examination, the patient demonstrates decrease in gait speed, LE strength and balance with Minibest, gait speed, 5X and 30 second sit to stands. The patient will likely benefit from skilled PT to improve her mobility, strength, balance, function, and long term management of symptoms.   Education Assessment   Preferred Learning Style Listening;Demonstration;Reading;Pictures/video   Barriers to Learning No barriers   ORTHO GOALS   PT Ortho Eval Goals 1;2;3   Ortho Goal 1   Goal Description Patient will be able to perform > 12 sit to stands on 30 second sit to stand test, for decreased falls risk in 8 weeks:   Ortho Goal 2   Goal Description Patient will improved Minibest to > 23 for improved balance and dec falls risk in 12 weeks:   Ortho Goal 3   Goal Description Patient will demonstrate understanding of HEP to help with self management of PD in 12 weeks:   Total Evaluation Time   PT Eval, Low Complexity Minutes (32652) 35   Therapy Certification   Certification date from 05/09/23   Certification date to 08/01/23   Medical Diagnosis Primary Parkinsonism         Helio Lea, PT

## 2023-05-15 ENCOUNTER — LAB (OUTPATIENT)
Dept: LAB | Facility: CLINIC | Age: 73
End: 2023-05-15
Payer: COMMERCIAL

## 2023-05-15 DIAGNOSIS — M06.09 SERONEGATIVE RHEUMATOID ARTHRITIS OF MULTIPLE SITES (H): ICD-10-CM

## 2023-05-15 LAB
ALBUMIN SERPL BCG-MCNC: 4.4 G/DL (ref 3.5–5.2)
ALT SERPL W P-5'-P-CCNC: 17 U/L (ref 10–35)
CREAT SERPL-MCNC: 1.38 MG/DL (ref 0.51–0.95)
ERYTHROCYTE [DISTWIDTH] IN BLOOD BY AUTOMATED COUNT: 15.1 % (ref 10–15)
GFR SERPL CREATININE-BSD FRML MDRD: 40 ML/MIN/1.73M2
HCT VFR BLD AUTO: 32.3 % (ref 35–47)
HGB BLD-MCNC: 10.5 G/DL (ref 11.7–15.7)
MCH RBC QN AUTO: 32.4 PG (ref 26.5–33)
MCHC RBC AUTO-ENTMCNC: 32.5 G/DL (ref 31.5–36.5)
MCV RBC AUTO: 100 FL (ref 78–100)
PLATELET # BLD AUTO: 169 10E3/UL (ref 150–450)
RBC # BLD AUTO: 3.24 10E6/UL (ref 3.8–5.2)
WBC # BLD AUTO: 6.3 10E3/UL (ref 4–11)

## 2023-05-15 PROCEDURE — 84460 ALANINE AMINO (ALT) (SGPT): CPT

## 2023-05-15 PROCEDURE — 82040 ASSAY OF SERUM ALBUMIN: CPT

## 2023-05-15 PROCEDURE — 82565 ASSAY OF CREATININE: CPT

## 2023-05-15 PROCEDURE — 36415 COLL VENOUS BLD VENIPUNCTURE: CPT

## 2023-05-15 PROCEDURE — 85027 COMPLETE CBC AUTOMATED: CPT

## 2023-05-18 ENCOUNTER — DOCUMENTATION ONLY (OUTPATIENT)
Dept: LAB | Facility: CLINIC | Age: 73
End: 2023-05-18

## 2023-05-22 ENCOUNTER — DOCUMENTATION ONLY (OUTPATIENT)
Dept: BEHAVIORAL HEALTH | Facility: CLINIC | Age: 73
End: 2023-05-22
Payer: COMMERCIAL

## 2023-05-23 ENCOUNTER — TELEPHONE (OUTPATIENT)
Dept: PHYSICAL THERAPY | Facility: REHABILITATION | Age: 73
End: 2023-05-23

## 2023-05-23 DIAGNOSIS — N18.32 STAGE 3B CHRONIC KIDNEY DISEASE (H): Primary | ICD-10-CM

## 2023-05-23 NOTE — TELEPHONE ENCOUNTER
PT called and informed patient about missed appointment today. This was their 1st NS. Informed of next PT appointment and NS policy.    Helio GARCIA, PT

## 2023-05-23 NOTE — PROGRESS NOTES
Patient was a no show for her first video visit with this provider this morning.  Attempted to reach her by phone, left message referring her to Behavioral Health scheduling.

## 2023-05-30 ENCOUNTER — THERAPY VISIT (OUTPATIENT)
Dept: PHYSICAL THERAPY | Facility: REHABILITATION | Age: 73
End: 2023-05-30
Payer: COMMERCIAL

## 2023-05-30 ENCOUNTER — LAB (OUTPATIENT)
Dept: LAB | Facility: CLINIC | Age: 73
End: 2023-05-30
Payer: COMMERCIAL

## 2023-05-30 ENCOUNTER — TELEPHONE (OUTPATIENT)
Dept: NEUROLOGY | Facility: CLINIC | Age: 73
End: 2023-05-30

## 2023-05-30 DIAGNOSIS — N18.32 STAGE 3B CHRONIC KIDNEY DISEASE (H): ICD-10-CM

## 2023-05-30 DIAGNOSIS — G20.C PRIMARY PARKINSONISM (H): Primary | ICD-10-CM

## 2023-05-30 LAB
ALBUMIN SERPL BCG-MCNC: 4.3 G/DL (ref 3.5–5.2)
ALBUMIN UR-MCNC: NEGATIVE MG/DL
ANION GAP SERPL CALCULATED.3IONS-SCNC: 12 MMOL/L (ref 7–15)
APPEARANCE UR: CLEAR
BACTERIA #/AREA URNS HPF: ABNORMAL /HPF
BILIRUB UR QL STRIP: NEGATIVE
BUN SERPL-MCNC: 21.5 MG/DL (ref 8–23)
CALCIUM SERPL-MCNC: 9.4 MG/DL (ref 8.8–10.2)
CHLORIDE SERPL-SCNC: 104 MMOL/L (ref 98–107)
COLOR UR AUTO: YELLOW
CREAT SERPL-MCNC: 1.35 MG/DL (ref 0.51–0.95)
CREAT UR-MCNC: 152 MG/DL
DEPRECATED HCO3 PLAS-SCNC: 20 MMOL/L (ref 22–29)
GFR SERPL CREATININE-BSD FRML MDRD: 41 ML/MIN/1.73M2
GLUCOSE SERPL-MCNC: 144 MG/DL (ref 70–99)
GLUCOSE UR STRIP-MCNC: NEGATIVE MG/DL
HGB BLD-MCNC: 10.8 G/DL (ref 11.7–15.7)
HGB UR QL STRIP: ABNORMAL
KETONES UR STRIP-MCNC: NEGATIVE MG/DL
LEUKOCYTE ESTERASE UR QL STRIP: ABNORMAL
MICROALBUMIN UR-MCNC: 19.9 MG/L
MICROALBUMIN/CREAT UR: 13.09 MG/G CR (ref 0–25)
NITRATE UR QL: NEGATIVE
PH UR STRIP: 5.5 [PH] (ref 5–8)
PHOSPHATE SERPL-MCNC: 3.2 MG/DL (ref 2.5–4.5)
POTASSIUM SERPL-SCNC: 4.4 MMOL/L (ref 3.4–5.3)
RBC #/AREA URNS AUTO: ABNORMAL /HPF
SODIUM SERPL-SCNC: 136 MMOL/L (ref 136–145)
SP GR UR STRIP: 1.02 (ref 1–1.03)
SQUAMOUS #/AREA URNS AUTO: ABNORMAL /LPF
UROBILINOGEN UR STRIP-ACNC: 0.2 E.U./DL
WBC #/AREA URNS AUTO: ABNORMAL /HPF

## 2023-05-30 PROCEDURE — 85018 HEMOGLOBIN: CPT

## 2023-05-30 PROCEDURE — 82043 UR ALBUMIN QUANTITATIVE: CPT

## 2023-05-30 PROCEDURE — 97110 THERAPEUTIC EXERCISES: CPT | Mod: GP | Performed by: PHYSICAL THERAPIST

## 2023-05-30 PROCEDURE — 36415 COLL VENOUS BLD VENIPUNCTURE: CPT

## 2023-05-30 PROCEDURE — 82570 ASSAY OF URINE CREATININE: CPT

## 2023-05-30 PROCEDURE — 97112 NEUROMUSCULAR REEDUCATION: CPT | Mod: GP | Performed by: PHYSICAL THERAPIST

## 2023-05-30 PROCEDURE — 80069 RENAL FUNCTION PANEL: CPT

## 2023-05-30 PROCEDURE — 81001 URINALYSIS AUTO W/SCOPE: CPT

## 2023-05-30 NOTE — TELEPHONE ENCOUNTER
Per 4/24/23 OV note:   Low-dose Cogentin has not helped (patient perspective) and will try to increase the dose further.  Higher dose she is noticing more benefit.  No side effects except for dry . She still has hallucinations.  These might be more related to the Parkinson disease.  Could consider amantadine.     With her having hallucinations and parkinsonism potentially she has Lewy body dementia. MOCA 24 today. Will monitor for right now.  Could consider an MRI/neuropsych testing.     RN attempted to call pt back to discuss.  [facility]     Najma ZAMORA RN, BSN  [facility]

## 2023-05-30 NOTE — TELEPHONE ENCOUNTER
Patient stopped into the clinic to report that she has been having some hallucinations.  She thought that there were mice in her bed, she said she thought she saw people but her  said that she hasnt. She mentions that she thought that it was because of the medication Benztropine. She states that she has been on this medication for some time. She also seemed confused on her timeline with when started the medication and stopping it a few days ago and when these hallucinations were happening. She states that she lives at home with her .   She is wondering if she should continue the medication or not. Or what may be causing these hallucinations.   Routing to provider and RN for further evaluations.

## 2023-05-31 NOTE — TELEPHONE ENCOUNTER
She could stop the Cogentin if she would like.  I do not think it is the Cogentin causing the hallucinations.  If needed she can make an appointment.  I cannot say for sure what is causing the hallucinations based on the message.

## 2023-06-06 ENCOUNTER — TELEPHONE (OUTPATIENT)
Dept: PHYSICAL THERAPY | Facility: REHABILITATION | Age: 73
End: 2023-06-06

## 2023-06-06 NOTE — TELEPHONE ENCOUNTER
BEN De Luna left a message for Claudia regarding today's NS (NS#2) Pt was informed of our NS policy and of her next appointment on 6/8.  Carolyn Nagel PTA,CLT

## 2023-06-07 ENCOUNTER — OFFICE VISIT (OUTPATIENT)
Dept: NEPHROLOGY | Facility: CLINIC | Age: 73
End: 2023-06-07
Payer: COMMERCIAL

## 2023-06-07 VITALS
WEIGHT: 162.9 LBS | DIASTOLIC BLOOD PRESSURE: 72 MMHG | OXYGEN SATURATION: 97 % | BODY MASS INDEX: 27.81 KG/M2 | HEIGHT: 64 IN | HEART RATE: 61 BPM | SYSTOLIC BLOOD PRESSURE: 117 MMHG

## 2023-06-07 DIAGNOSIS — E55.9 VITAMIN D DEFICIENCY: Primary | ICD-10-CM

## 2023-06-07 DIAGNOSIS — N18.32 STAGE 3B CHRONIC KIDNEY DISEASE (H): ICD-10-CM

## 2023-06-07 PROCEDURE — 99205 OFFICE O/P NEW HI 60 MIN: CPT | Performed by: INTERNAL MEDICINE

## 2023-06-07 RX ORDER — SODIUM BICARBONATE 650 MG/1
1300 TABLET ORAL 2 TIMES DAILY
Qty: 100 TABLET | Refills: 3 | Status: SHIPPED | OUTPATIENT
Start: 2023-06-07 | End: 2023-11-30

## 2023-06-07 ASSESSMENT — PAIN SCALES - GENERAL: PAINLEVEL: NO PAIN (0)

## 2023-06-07 NOTE — NURSING NOTE
"Chief Complaint   Patient presents with     New Patient     Stage 3b chronic kidney disease        Vitals:    06/07/23 1004   BP: 117/72   BP Location: Right arm   Patient Position: Sitting   Cuff Size: Adult Regular   Pulse: 61   SpO2: 97%   Weight: 73.9 kg (162 lb 14.4 oz)   Height: 1.613 m (5' 3.5\")       Body mass index is 28.4 kg/m .    CHUCKIE GoncalvesF  "

## 2023-06-07 NOTE — PATIENT INSTRUCTIONS
It was a pleasure taking care of you today.  I've included a brief summary of our discussion and care plan from today's visit below.  Please review this information with your primary care provider.    My recommendations are summarized as follows:  -Keep the amount of sodium in your diet at 2.4 g/day (also see instructions attached in that regard)  -Keep a Blood Pressure diary by taking your blood pressure twice a day as instructed (also see instructions attached in that regard).Please make sure that you are using a validated blood pressure device by checking that it is the case at: https://www.validatebp.org/  -Avoid all NSAID's. Examples include Ibuprofen (Advil, Motrin), naprosyn (Aleve), celebrex among others. Acetaminophen (Tylenol) is ok with maximum dose in 24 hours of 3200 mg.  -Do not exceed one alcoholic drink per day.  -If for any reason, you are at risk of volume depletion/dehydration (high grade fevers, severe vomiting or diarrhea) stop losartan till you get better  -Do a renal ultrasound (you need to call to make the appointment)    - Return to Nephrology Clinic in 3 months to review your progress.     To schedule imaging please call [phone]. To schedule your lab appointment at [address] lab call [phone]    Who do I call with any questions after my visit?  Please be in touch if there are any further questions that arise following today's visit.  There are multiple ways to contact your nephrology care team.      During business hours, you may reach your Nephrology RN Care Coordinator, Luisa Curry at . To schedule or reschedule an appointment, please call [phone].    You can always send a secure message through Proximus.  Proximus messages are answered by your nurse or doctor typically within 24 hours.  Please allow extra time on weekends and holidays.      For urgent/emergent questions after business hours, you may reach the on-call Nephrology Fellow by  contacting the [facility]  at [phone].     How will I get the results of any tests ordered?    You will receive all of your results.  If you have signed up for stylemarkst, any tests ordered at your visit will be available to you after your physician reviews them.  Typically this takes 1-2 weeks.  If there are urgent results that require a change in your care plan, your physician or nurse will call you to discuss the next steps.      What is Gobble?  Gobble is a secure way for you to access all of your healthcare records from the [facility].  It is a web based computer program, so you can sign on to it from any location.  It also allows you to send secure messages to your care team.  I recommend signing up for Gobble access if you have not already done so and are comfortable with using a computer.      How do I schedule a follow-up visit?  If you did not schedule a follow-up visit today, please call [phone] to schedule a follow-up office visit.      Sincerely,    Keturah Vaughn MD  [facility]  Division of Nephrology and Hypertension

## 2023-06-07 NOTE — PROGRESS NOTES
Nephrology Clinic    Claudia Wise MRN:[id number] YOB: 1950  Date of Service: 06/07/2023  Primary care provider: Isabel Maurer  Requesting physician: Isabel Maurer      REASON FOR CONSULT: CKD stage 3b    HISTORY OF PRESENT ILLNESS:   Claudia Wise is a 72 year old female who presents for evaluation of CKD stage 3b  The past medical history is significant for hypertension for more than 10 years on losartan 50 mg once daily, amlodipine 10 mg daily and metoprolol 50 mg twice daily , Parkinson's disease since 2020 on benztropine 1.5 mg daily , anxiety/depression on duloxetine, pre-diabetes, thyroid disorder, gout on allopurinol 100 mg twice daily, osteopenia, small bowel obstruction with surgical intervention in 2021, seronegative rheumatoid arthritis diagnosed in 2012 on methotrexate 10 mg once weekly and hydroxychloroquine 200 mg twice daily, osteoporosis on denosumab and CKD stage 3b.  From a renal standpoint she seems to have CKD since at least 2020 with a baseline creatinine level between 1.2 and 1.4 mg/dL. Her creatinine level is 1.35 mg/dL on 5/30.  The UACR is 13.09 mg/g Cr on 5/30  Renal imaging was last done in 2020 and the CT scan of the abdomen and pelvis done in January 2020 shows two tiny nonobstructing right renal calculi, the largest measuring 0.3 cm and no other abnormality at the kidney level.  The patient denies any dysuria, any pollakiuria, any nocturia, any LE edema, any dyspnea on exertion .  The following portions of the patient's history were reviewed and updated as appropriate: allergies, current medications, past family history, past medical history, past social history, past surgical history and problem list.    PAST MEDICAL HISTORY:  Past Medical History:   Diagnosis Date     Anemia      Anxiety      Chronic kidney disease      Diabetes mellitus, type II (H)     prediabetes     Disease of thyroid gland     hypothyroid     Family history of myocardial infarction       Gout      High cholesterol      Hypertension      Inverted nipple     right     Macular edema      Osteoarthritis 01/01/2012     Rheumatoid arthritis (H) 01/01/2012     PAST SURGICAL HISTORY:  Past Surgical History:   Procedure Laterality Date     COLON SURGERY  1986     COLPORRHAPHY N/A 7/7/2016    Procedure: ANTERIOR REPAIR WITH MESH;  Surgeon: Zac Stone MD;  Location: [facility];  Service:      HC UGI ENDOSCOPY W PLACEMENT GASTROSTOMY TUBE PERCUT N/A 2/7/2020    Procedure: Percutaneous Endoscopic Gastromstomy Tube Placement;  Surgeon: Zion Schwarz MD;  Location: [facility] OR;  Service: General     HYSTERECTOMY       LAPAROTOMY EXPLORATORY N/A 1/23/2020    Procedure: EXPLORATORY LAPARATOMY, EXTENSIVE LYSIS OF ADHESIONS, SMALL BOWEL RESECTION;  Surgeon: Claudia Jeronimo MD;  Location: [facility];  Service: General     OOPHORECTOMY       MEDICATIONS:  Prescription Medications as of 6/7/2023       Rx Number Disp Refills Start End Last Dispensed Date Next Fill Date Owning Pharmacy    acetaminophen (TYLENOL) 500 MG tablet    1/7/2017        Sig: Take 500 mg by mouth every 6 hours as needed     Class: Historical    Route: Oral    acyclovir (ZOVIRAX) 400 MG tablet  60 tablet 0 1/21/2022    [place] [id number] [address]    Sig: TAKE ONE TABLET BY MOUTH EVERY 8 HOURS AS NEEDED    Class: E-Prescribe    allopurinol (ZYLOPRIM) 100 MG tablet  180 tablet 3 7/7/2022    [place] [id number]    Sig: Take 1 tablet (100 mg) by mouth 2 times daily (with meals)    Class: E-Prescribe    Route: Oral    amLODIPine (NORVASC) 10 MG tablet  90 tablet 3 8/30/2022    [place] [id number]    Sig: Take 1 tablet (10 mg) by mouth daily    Class: E-Prescribe    Route: Oral    atorvastatin (LIPITOR) 10 MG tablet  90 tablet 3 11/24/2021    [place] [id number]    Sig: TAKE 1  TABLET BY MOUTH AT BEDTIME    Class: E-Prescribe    benztropine (COGENTIN) 0.5 MG tablet  540 tablet 1 1/23/2023    [place] [address] [address]    Sig: Take 3 tablets (1.5 mg) by mouth 2 times daily    Class: E-Prescribe    Route: Oral    Prior authorization: Approved    carboxymethylcellulose (REFRESH PLUS) 0.5 % Dpet ophthalmic dropperette    1/21/2020        Sig: [CARBOXYMETHYLCELLULOSE (REFRESH PLUS) 0.5 % DPET OPHTHALMIC DROPPERETTE] Administer 1-2 drops to both eyes 4 (four) times a day as needed.     Class: Historical    Route: Both Eyes    cyanocobalamin, vitamin B-12, 1,000 mcg Subl   0 3/12/2020        Sig: [CYANOCOBALAMIN, VITAMIN B-12, 1,000 MCG SUBL] Place 1 tablet (1,000 mcg total) under the tongue daily.    Class: No Print Out    Route: Sublingual    denosumab (PROLIA) 60 MG/ML SOSY injection    7/2/2021 6/26/2022   [place] [address] [address]    Sig: Inject 60 mg Subcutaneous    Class: Historical    Route: Subcutaneous    DULoxetine (CYMBALTA) 20 MG capsule  90 capsule 3 10/1/2021    [place] [address] [address]    Sig: Take 1 capsule (20 mg) by mouth daily    Class: E-Prescribe    Route: Oral    fluticasone (FLONASE) 50 MCG/ACT nasal spray  48 g 3 9/21/2022    [place] [address] [address]    Sig: Use 1 spray(s) in each nostril once daily    Class: E-Prescribe    folic acid (FOLVITE) 1 MG tablet  90 tablet 0 2/24/2022 2/15/2023   [place] [address] [address]    Sig: Take 1 tablet (1,000 mcg) by mouth daily    Class: E-Prescribe    Route: Oral    hydroxychloroquine (PLAQUENIL) 200 MG tablet  180 tablet 0 2/15/2023    [place] [address] [address]    Sig: Take 1 tablet (200 mg) by mouth 2 times daily    Class: E-Prescribe    Route: Oral    levothyroxine (SYNTHROID/LEVOTHROID) 50 MCG tablet  90 tablet 2 12/2/2022    [facility] [address]  [place] [address] [address]    Sig: Take 1 tablet (50 mcg) by mouth daily 30 minutes before breakfast.    Class: E-Prescribe    Route: Oral    Renewals     Renewal provider: Isabel Maurer MD          lidocaine (LIDODERM) 5 % patch  14 patch 1 9/24/2021    [place] [address] [address]    Sig: Place 1 patch onto the skin every 24 hours To prevent lidocaine toxicity, patient should be patch free for 12 hrs daily.    Class: E-Prescribe    Route: Transdermal    No prior authorization was found for this prescription.    Found prior authorization for another prescription for the same medication: Approved    LORazepam (ATIVAN) 0.5 MG tablet  30 tablet 0 7/20/2022    [place] [address] [address]    Sig: Take 1 tablet (0.5 mg) by mouth daily as needed for anxiety    Class: E-Prescribe    Route: Oral    Prior authorization: Approved    losartan (COZAAR) 50 MG tablet  90 tablet 3 4/25/2023    [place] [address] [address]    Sig: Take 1 tablet by mouth once daily    Class: E-Prescribe    methotrexate sodium 2.5 MG TABS  48 tablet 0 2/15/2023    [place] [address] [address]    Sig: TAKE 4 TABLETS BY MOUTH ONCE A WEEK Strength: 2.5 mg    Class: E-Prescribe    Earliest Fill Date: 2/15/2023    metoprolol tartrate (LOPRESSOR) 50 MG tablet  180 tablet 3 2/20/2023    [place] [address] [address]    Sig: Take 1 tablet by mouth twice daily    Class: E-Prescribe    sodium bicarbonate 650 MG tablet  100 tablet 0 11/23/2022    [place] [address] [address]    Sig: TAKE 2 TABLETS BY MOUTH 2 TIMES A  DAY    Class: E-Prescribe      Clinic-Administered Medications as of 6/7/2023       Dose Frequency Start End    denosumab (PROLIA) injection 60 mg 60 mg EVERY 6 MONTHS 1/3/2023 12/29/2023    Route: Subcutaneous         ALLERGIES:    Allergies   Allergen Reactions      Bupropion Hcl [Bupropion] Nausea     Erythromycin Base [Erythromycin] Nausea     Paxil [Paroxetine] Diarrhea     Tetracyclines & Related Itching     Valacyclovir Nausea     REVIEW OF SYSTEMS:  Review Of Systems  Skin: negative for, pigmentation, acne, rash, scaling, itching, bruising  Eyes: negative for, visual blurring, double vision, glaucoma, cataracts, eye pain, color blindness, glasses  Ears/Nose/Throat: negative for, nasal congestion, purulent rhinorrhea, sneezing, postnasal drainage, hearing loss, deafness, tinnitus, vertigo  Respiratory: No shortness of breath, dyspnea on exertion, cough, or hemoptysis  Cardiovascular: negative for, palpitations, tachycardia, irregular heart beat, chest pain, exertional chest pain or pressure and paroxysmal nocturnal dyspnea  Gastrointestinal: negative for, poor appetite, dysphagia, nausea, vomiting, heartburn, dyspepsia, reflux and hematemesis  Genitourinary: negative for, nocturia, dysuria, frequency, urgency, hesitancy, hematuria, retention, decreased urinary stream and incontinence  Musculoskeletal: negative for, fracture, back pain, neck pain, arthritis, joint pain, joint swelling and joint stiffness  Neurologic: negative for, headaches, syncope, stroke, seizures, paralysis, local weakness, numbness or tingling of hands and numbness or tingling of feet    A comprehensive review of systems was performed and found to be negative except as described here or above.  SOCIAL HISTORY:   Social History     Socioeconomic History     Marital status:      Spouse name: Not on file     Number of children: Not on file     Years of education: Not on file     Highest education level: Not on file   Occupational History     Not on file   Tobacco Use     Smoking status: Former     Types: Cigarettes     Quit date: 1995     Years since quittin.9     Smokeless tobacco: Never   Vaping Use     Vaping status: Not on file   Substance and Sexual Activity     Alcohol use: Yes      Comment: Alcoholic Drinks/day: occasional couple times a year     Drug use: No     Sexual activity: Not on file   Other Topics Concern     Not on file   Social History Narrative     Not on file     Social Determinants of Health     Financial Resource Strain: Not on file   Food Insecurity: Not on file   Transportation Needs: Not on file   Physical Activity: Not on file   Stress: Not on file   Social Connections: Not on file   Intimate Partner Violence: Not on file   Housing Stability: Not on file     FAMILY MEDICAL HISTORY:   Family History   Problem Relation Age of Onset     Breast Cancer Maternal Aunt      Cancer Maternal Aunt      Cancer Father      Cancer Sister      Cancer Maternal Grandmother      Cancer Cousin      Coronary Artery Disease Mother 49.00     PHYSICAL EXAM:   There were no vitals taken for this visit.  GENERAL APPEARANCE: alert and no distress  EYES: nonicteric  HENT: mouth without ulcers or lesions  NECK: supple, no adenopathy  RESP: lungs clear to auscultation   CV: regular rhythm, normal rate, no rub  ABDOMEN: soft, nontender, normal bowel sounds, no HSM   Extremities: no clubbing, cyanosis, or edema  MS: no evidence of inflammation in joints, no muscle tenderness  SKIN: no rash  NEURO: mentation intact and speech normal  PSYCH: affect normal/bright   LABS:   Recent Results (from the past 672 hour(s))   Albumin level    Collection Time: 05/15/23 11:14 AM   Result Value Ref Range    Albumin 4.4 3.5 - 5.2 g/dL   ALT    Collection Time: 05/15/23 11:14 AM   Result Value Ref Range    ALT 17 10 - 35 U/L   Creatinine    Collection Time: 05/15/23 11:14 AM   Result Value Ref Range    Creatinine 1.38 (H) 0.51 - 0.95 mg/dL    GFR Estimate 40 (L) >60 mL/min/1.73m2   CBC with platelets    Collection Time: 05/15/23 11:14 AM   Result Value Ref Range    WBC Count 6.3 4.0 - 11.0 10e3/uL    RBC Count 3.24 (L) 3.80 - 5.20 10e6/uL    Hemoglobin 10.5 (L) 11.7 - 15.7 g/dL    Hematocrit 32.3 (L) 35.0 - 47.0 %    MCV  100 78 - 100 fL    MCH 32.4 26.5 - 33.0 pg    MCHC 32.5 31.5 - 36.5 g/dL    RDW 15.1 (H) 10.0 - 15.0 %    Platelet Count 169 150 - 450 10e3/uL   Renal panel    Collection Time: 05/30/23 11:17 AM   Result Value Ref Range    Sodium 136 136 - 145 mmol/L    Potassium 4.4 3.4 - 5.3 mmol/L    Chloride 104 98 - 107 mmol/L    Carbon Dioxide (CO2) 20 (L) 22 - 29 mmol/L    Anion Gap 12 7 - 15 mmol/L    Glucose 144 (H) 70 - 99 mg/dL    Urea Nitrogen 21.5 8.0 - 23.0 mg/dL    Creatinine 1.35 (H) 0.51 - 0.95 mg/dL    GFR Estimate 41 (L) >60 mL/min/1.73m2    Calcium 9.4 8.8 - 10.2 mg/dL    Albumin 4.3 3.5 - 5.2 g/dL    Phosphorus 3.2 2.5 - 4.5 mg/dL   Hemoglobin    Collection Time: 05/30/23 11:17 AM   Result Value Ref Range    Hemoglobin 10.8 (L) 11.7 - 15.7 g/dL   Albumin Random Urine Quantitative with Creat Ratio    Collection Time: 05/30/23 11:41 AM   Result Value Ref Range    Creatinine Urine mg/dL 152.0 mg/dL    Albumin Urine mg/L 19.9 mg/L    Albumin Urine mg/g Cr 13.09 0.00 - 25.00 mg/g Cr   UA with Microscopic    Collection Time: 05/30/23 11:41 AM   Result Value Ref Range    Color Urine Yellow Colorless, Straw, Light Yellow, Yellow    Appearance Urine Clear Clear    Glucose Urine Negative Negative mg/dL    Bilirubin Urine Negative Negative    Ketones Urine Negative Negative mg/dL    Specific Gravity Urine 1.025 1.005 - 1.030    Blood Urine Trace (A) Negative    pH Urine 5.5 5.0 - 8.0    Protein Albumin Urine Negative Negative mg/dL    Urobilinogen Urine 0.2 0.2, 1.0 E.U./dL    Nitrite Urine Negative Negative    Leukocyte Esterase Urine Small (A) Negative   Urine Microscopic Exam    Collection Time: 05/30/23 11:41 AM   Result Value Ref Range    Bacteria Urine Few (A) None Seen /HPF    RBC Urine 0-2 0-2 /HPF /HPF    WBC Urine 0-5 0-5 /HPF /HPF    Squamous Epithelials Urine Few (A) None Seen /LPF     CMP  Recent Labs   Lab Test 05/30/23  1117 05/15/23  1114 02/02/23  1036 11/15/22  1110 08/12/22  1150 02/21/22  1253  12/21/21  1129 08/23/21  1112 05/11/21  1126 01/13/21  1102 11/17/20  1103 10/01/20  1604 08/14/20  1057 06/12/20  1131 03/10/20  0749 03/10/20  0554 03/06/20  1152 03/06/20  0547 03/03/20  1200 03/03/20  0529 03/02/20  1132 03/02/20  0542     --  137  --   --   --  138  --   --   --   --  137  --  136  --  139  --  138  --  138  --  140   POTASSIUM 4.4  --  4.5  --   --   --  4.3  --   --   --   --  4.4  --  4.6  --  4.3  --  4.2  --  4.0  --  4.2   CHLORIDE 104  --  106  --   --   --  110*  --   --   --   --  110*  --  107  --  105  --  102  --  104  --  106   CO2 20*  --  20*  --   --   --  17*  --   --   --   --  14*  --  17*  --  23  --  28  --  25  --  23   ANIONGAP 12  --  11  --   --   --  11  --   --   --   --  13  --  12  --   --   --   --   --   --   --   --    *  --  145*  --   --   --  101  --   --   --   --  114  --  109   < > 172*   < > 146*   < > 156*   < > 135*   BUN 21.5  --  24.7*  --   --   --  30*  --   --   --   --  30*  --  34*  --  30*  --  31*  --  29*  --  28   CR 1.35* 1.38* 1.40* 1.34* 1.28*   < > 1.21*   < > 1.36* 1.41* 1.34* 1.18*   < > 1.38*  1.36*  --  1.33*  --  1.18*  --  1.11*  --  1.14*   GFRESTIMATED 41* 40* 40* 42* 44*   < > 47*   < > 38* 37* 39* 45*   < > 38*  38*  --  39*  --  45*  --  49*  --  47*   MARKBLACK  --   --   --   --   --   --   --   --  46* 45* 47* 55*   < > 46*  47*  --  48*  --  55*  --  59*  --  57*   KATJA 9.4  --  9.3  --   --   --  9.4  --   --   --   --  8.7  --  9.4  9.7  --  8.4*  --  8.6  --  7.9*  --  8.1*   MAG  --   --   --   --   --   --   --   --   --   --   --   --   --   --   --  2.0  --  2.3  --  1.7*  --  1.7*   PHOS 3.2  --   --   --   --   --   --   --   --   --   --   --   --  4.2  --  4.3  --  4.5  --  4.0  --  4.0   PROTTOTAL  --   --  6.5  --   --   --  6.7  --   --   --   --  6.8  --  7.2  --  5.7*  --   --   --  5.0*  --  5.3*   ALBUMIN 4.3 4.4 4.4 4.3 4.4   < > 4.0   < > 4.3 4.2 4.4 4.3   < > 4.3  --  2.7*  --   --   --   2.2*  --  2.3*   BILITOTAL  --   --  0.7  --   --   --  0.7  --   --   --   --  0.8  --  0.3  --  0.3  --   --   --  0.3  --  0.3   ALKPHOS  --   --  61  --   --   --  55  --   --   --   --  63  --  64  --  75  --   --   --  73  --  84   AST  --   --  26  --   --   --  30  --   --   --   --  21  --  16  --  15  --   --   --  17  --  19   ALT  --  17 15 15 14   < > 9   < > 25 20 15 18   < > 16  --   --   --   --   --   --   --   --     < > = values in this interval not displayed.     CBC  Recent Labs   Lab Test 05/30/23  1117 05/15/23  1114 02/02/23  1036 11/15/22  1110 08/12/22  1150   HGB 10.8* 10.5* 10.9* 10.7* 11.0*   WBC  --  6.3 6.8 7.6 4.7   RBC  --  3.24* 3.50* 3.40* 3.49*   HCT  --  32.3* 33.3* 33.1* 33.8*   MCV  --  100 95 97 97   MCH  --  32.4 31.1 31.5 31.5   MCHC  --  32.5 32.7 32.3 32.5   RDW  --  15.1* 14.6 14.7 14.2   PLT  --  169 149* 166 154     INR  Recent Labs   Lab Test 03/10/20  0554 03/03/20  0529 02/25/20  0636 02/18/20  0553   INR 0.98 0.94 1.02 1.01     ABG  Recent Labs   Lab Test 06/12/20  1131   PH 7.31*      URINE STUDIES  Recent Labs   Lab Test 05/30/23  1141 02/02/23  1036 06/10/21  1215 10/01/20  1604 01/25/20  1516 01/21/20  0410 08/01/19  1712   COLOR Yellow Yellow Yellow Yellow Princess*   < > Yellow   APPEARANCE Clear Clear Clear Clear Hazy*   < > Clear   URINEGLC Negative Negative Negative Negative Negative   < > Negative   URINEBILI Negative Negative Negative Negative Negative   < > Negative   URINEKETONE Negative Negative Negative Negative Negative   < > Negative   SG 1.025 1.025 1.020 1.020 1.005   < > 1.015   UBLD Trace* Trace* Negative Negative Small*   < > Trace*   URINEPH 5.5 5.5 5.0 5.5 6.0   < > 5.5   PROTEIN Negative Negative Negative Negative 100 mg/dL*   < > Negative   UROBILINOGEN 0.2 0.2 0.2 E.U./dL 0.2 E.U./dL <2.0 E.U./dL   < > 0.2 E.U./dL   NITRITE Negative Negative Negative Negative Negative   < > Negative   LEUKEST Small* Small* Negative Negative Moderate*   < >  Trace*   RBCU 0-2 None Seen  --   --  10-25*  --  0-2   WBCU 0-5 0-5  --   --  10-25*  --  0-5    < > = values in this interval not displayed.     No lab results found.    ASSESSMENT AND PLAN:   #CKD stage 3b  eGFR around 40-45 mL/min  UACR on 5/30 is 13 mg/g Cr  A CT scan of the abdomen and pelvis done in February 2020 shows non obstructing right renal parenchymal calcifications with no hydronephrosis.  The potential responsible factors include longstanding HTN, remote use of NSAIDs and rheumatoid arthritis. Her kidney function has been stable and the risk of progression is low. The patient was instructed to keep a BP diary and to communicate the results via my Chart. I will also touch base with her rheumatologist to explore the possibility of decreasing the methotrexate dose to 7.5 mg weekly (renal dose).  The patient was also instructed to keep the sodium intake around 2400 mg /day, follow a plant-based diet and to avoid NSAIDs     #HTN  Primary and secondary to CKD. Currently  On metoprolol tartrate 50 mg twice daily and amlodipine 10 mg daily. Management as per above.    #CVD/dyslipidemia  On atorvastatin as indicated for any patient with CKD above the age of 50. LDL is 54 in February 2023     #Blood count  Hemoglobin 10.8, I will order iron studies    #Acid-base status  CO2 level 20 will start sodium bicarbonate supplementation    #Electrolytes  Na 136  K 4.4 no acute issues     #BMD  Ca   9.4       P 3.2    Alb 4.3  Vitamin D level 48 no need for supplementation. Patient already receiving denosumab    #CKD journey/transplant not a candidate at this point    The total time of this encounter amounted to 60 minutes on the day of the encounter. This time included time spent with the patient, reviewing records, ordering tests, and performing post visit documentation.     The patient will return to follow up in 3 months    Keturah Vaughn MD  Division of Renal Disease and Hypertension  June 7, 2023  10:05 AM

## 2023-06-08 ENCOUNTER — THERAPY VISIT (OUTPATIENT)
Dept: PHYSICAL THERAPY | Facility: REHABILITATION | Age: 73
End: 2023-06-08
Payer: COMMERCIAL

## 2023-06-08 DIAGNOSIS — G20.C PRIMARY PARKINSONISM (H): ICD-10-CM

## 2023-06-08 DIAGNOSIS — M62.81 GENERALIZED MUSCLE WEAKNESS: Primary | ICD-10-CM

## 2023-06-08 PROCEDURE — 97110 THERAPEUTIC EXERCISES: CPT | Mod: CQ | Performed by: PHYSICAL THERAPY ASSISTANT

## 2023-06-08 PROCEDURE — 97112 NEUROMUSCULAR REEDUCATION: CPT | Mod: CQ | Performed by: PHYSICAL THERAPY ASSISTANT

## 2023-06-08 PROCEDURE — 97116 GAIT TRAINING THERAPY: CPT | Mod: CQ | Performed by: PHYSICAL THERAPY ASSISTANT

## 2023-06-10 ENCOUNTER — APPOINTMENT (OUTPATIENT)
Dept: CT IMAGING | Facility: CLINIC | Age: 73
End: 2023-06-10
Attending: EMERGENCY MEDICINE
Payer: MEDICARE

## 2023-06-10 ENCOUNTER — HOSPITAL ENCOUNTER (EMERGENCY)
Facility: CLINIC | Age: 73
Discharge: HOME | End: 2023-06-10
Attending: EMERGENCY MEDICINE | Admitting: EMERGENCY MEDICINE
Payer: MEDICARE

## 2023-06-10 VITALS
RESPIRATION RATE: 18 BRPM | TEMPERATURE: 97.7 F | HEART RATE: 75 BPM | SYSTOLIC BLOOD PRESSURE: 134 MMHG | DIASTOLIC BLOOD PRESSURE: 65 MMHG | BODY MASS INDEX: 28.25 KG/M2 | WEIGHT: 162 LBS | OXYGEN SATURATION: 96 %
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
## 2023-06-10 DIAGNOSIS — S01.01XA SCALP LACERATION, INITIAL ENCOUNTER: ICD-10-CM

## 2023-06-10 DIAGNOSIS — S09.90XA CLOSED HEAD INJURY, INITIAL ENCOUNTER: ICD-10-CM

## 2023-06-10 PROCEDURE — 12002 RPR S/N/AX/GEN/TRNK2.6-7.5CM: CPT

## 2023-06-10 PROCEDURE — 99284 EMERGENCY DEPT VISIT MOD MDM: CPT | Mod: 25

## 2023-06-10 PROCEDURE — 72125 CT NECK SPINE W/O DYE: CPT | Mod: MA

## 2023-06-10 PROCEDURE — 70450 CT HEAD/BRAIN W/O DYE: CPT | Mod: MA

## 2023-06-10 RX ORDER — GINSENG 100 MG
CAPSULE ORAL ONCE
Status: DISCONTINUED | OUTPATIENT
Start: 2023-06-10 | End: 2023-06-10 | Stop reason: HOSPADM

## 2023-06-10 ASSESSMENT — ACTIVITIES OF DAILY LIVING (ADL)
ADLS_ACUITY_SCORE: 35
ADLS_ACUITY_SCORE: 35

## 2023-06-10 NOTE — ED PROVIDER NOTES
EMERGENCY DEPARTMENT ENCOUnter      NAME: Claudia Wise  AGE: 72 year old female  YOB: 1950  MRN: [id number]  EVALUATION DATE & TIME: No admission date for patient encounter.    PCP: Isabel Maurer    ED PROVIDER: Nicole Lara MD      Chief Complaint   Patient presents with     Fall         FINAL IMPRESSION:  1. Scalp laceration, initial encounter    2. Closed head injury, initial encounter    3. Fall, initial encounter          ED COURSE & MEDICAL DECISION MAKING:      In summary, the patient is a 72-year-old female that presents to the emergency department for evaluation of injuries from a fall at home.  The patient slipped from a stool striking her head on the stool.  She sustained a laceration that required repair with 6 staples.  She has no evidence of any intracranial or cervical spine injury.    Medical Decision Making    History:    Supplemental history from: Documented in chart, if applicable    External Record(s) reviewed: Documented in chart, if applicable.    Work Up:    Chart documentation includes differential considered and any EKGs or imaging independently interpreted by provider, where specified.    In additional to work up documented, I considered the following work up: Documented in chart, if applicable.    External consultation:    Discussion of management with another provider: Documented in chart, if applicable    Complicating factors:    Care impacted by chronic illness: Chronic Kidney Disease, Diabetes, Hyperlipidemia, Hypertension, Mental Health and Other: Osteoarthritis, RA    Care affected by social determinants of health: N/A    Disposition considerations: Discharge. No recommendations on prescription strength medication(s). See documentation for any additional details.          At the conclusion of the encounter I discussed the results of all of the tests and the disposition. The questions were answered. The patient or family acknowledged understanding and  was agreeable with the care plan.         MEDICATIONS GIVEN IN THE EMERGENCY:  Medications   bacitracin ointment (has no administration in time range)       NEW PRESCRIPTIONS STARTED AT TODAY'S ER VISIT  New Prescriptions    No medications on file          =================================================================    HPI        Claudia Wise is a 72 year old female with a pertinent history of DM2, CKD3, compression fracture of L2, Parkinson disease who presents to this ED by car for evaluation of fall. Patient reports getting up from bed when she slipped and hit her head against a stepping stool next to her bed. She endorses associated laceration to the top of her head. Denies loss of consciousness. Denies back or chest pain. She reports having right-sided neck pain, but notes that it may be due to history of degenerative spine issues. Denies nausea, vomiting, or weakness. Patient denies use of blood thinners. Patient was recently diagnosed with Parkinson's disease. She notes medication allergies are in her chart. Patient denies any additional complaints at this time.     Patient denies tobacco or alcohol use. Patient lives at home with her .     REVIEW OF SYSTEMS     Constitutional:  Denies fever or chills  HENT:  Denies sore throat   Respiratory:  Denies cough or shortness of breath   Cardiovascular:  Denies chest pain or palpitations  GI:  Denies abdominal pain, nausea, or vomiting  Musculoskeletal:  Denies any new extremity pain. Endorses neck pain.    Skin:  Denies rash. Endorses wound.   Neurologic:  Denies headache, focal weakness or sensory changes    All other systems reviewed and are negative      PAST MEDICAL HISTORY:  Past Medical History:   Diagnosis Date     Anemia      Anxiety      Chronic kidney disease      Diabetes mellitus, type II (H)     prediabetes     Disease of thyroid gland     hypothyroid     Family history of myocardial infarction      Gout      High cholesterol       Hypertension      Inverted nipple     right     Macular edema      Osteoarthritis 01/01/2012     Rheumatoid arthritis (H) 01/01/2012       PAST SURGICAL HISTORY:  Past Surgical History:   Procedure Laterality Date     COLON SURGERY  1986     COLPORRHAPHY N/A 7/7/2016    Procedure: ANTERIOR REPAIR WITH MESH;  Surgeon: Zac Stone MD;  Location: [facility];  Service:      HC UGI ENDOSCOPY W PLACEMENT GASTROSTOMY TUBE PERCUT N/A 2/7/2020    Procedure: Percutaneous Endoscopic Gastromstomy Tube Placement;  Surgeon: Zion Schwarz MD;  Location: [facility] OR;  Service: General     HYSTERECTOMY       LAPAROTOMY EXPLORATORY N/A 1/23/2020    Procedure: EXPLORATORY LAPARATOMY, EXTENSIVE LYSIS OF ADHESIONS, SMALL BOWEL RESECTION;  Surgeon: Claudia Jeronimo MD;  Location: [facility];  Service: General     OOPHORECTOMY             CURRENT MEDICATIONS:    acetaminophen (TYLENOL) 500 MG tablet  acyclovir (ZOVIRAX) 400 MG tablet  allopurinol (ZYLOPRIM) 100 MG tablet  amLODIPine (NORVASC) 10 MG tablet  atorvastatin (LIPITOR) 10 MG tablet  benztropine (COGENTIN) 0.5 MG tablet  carboxymethylcellulose (REFRESH PLUS) 0.5 % Dpet ophthalmic dropperette  cyanocobalamin, vitamin B-12, 1,000 mcg Subl  denosumab (PROLIA) 60 MG/ML SOSY injection  DULoxetine (CYMBALTA) 20 MG capsule  fluticasone (FLONASE) 50 MCG/ACT nasal spray  folic acid (FOLVITE) 1 MG tablet  hydroxychloroquine (PLAQUENIL) 200 MG tablet  levothyroxine (SYNTHROID/LEVOTHROID) 50 MCG tablet  lidocaine (LIDODERM) 5 % patch  LORazepam (ATIVAN) 0.5 MG tablet  losartan (COZAAR) 50 MG tablet  methotrexate sodium 2.5 MG TABS  metoprolol tartrate (LOPRESSOR) 50 MG tablet  sodium bicarbonate 650 MG tablet        ALLERGIES:  Allergies   Allergen Reactions     Bupropion Hcl [Bupropion] Nausea     Erythromycin Base [Erythromycin] Nausea     Paxil [Paroxetine] Diarrhea     Tetracyclines & Related Itching     Valacyclovir Nausea       FAMILY HISTORY:  Family  History   Problem Relation Age of Onset     Breast Cancer Maternal Aunt      Cancer Maternal Aunt      Cancer Father      Cancer Sister      Cancer Maternal Grandmother      Cancer Cousin      Coronary Artery Disease Mother 49.00       SOCIAL HISTORY:   Social History     Socioeconomic History     Marital status:      Spouse name: None     Number of children: None     Years of education: None     Highest education level: None   Tobacco Use     Smoking status: Former     Types: Cigarettes     Quit date: 1995     Years since quittin.9     Smokeless tobacco: Never   Substance and Sexual Activity     Alcohol use: Not Currently     Comment: Alcoholic Drinks/day: occasional couple times a year     Drug use: No       VITALS:  Patient Vitals for the past 24 hrs:   BP Temp Temp src Pulse Resp SpO2 Weight   06/10/23 0432 (!) 141/68 97.7  F (36.5  C) Oral 75 18 100 % 73.5 kg (162 lb)       PHYSICAL EXAM    Constitutional:  Well developed, Well nourished,  HENT:  Normocephalic, Atraumatic, Bilateral external ears normal, Oropharynx moist, Nose normal.   Neck:  Normal range of motion, No meningismus, No stridor.   Eyes:  EOMI, Conjunctiva normal, No discharge.   Respiratory:  Normal breath sounds, No respiratory distress, No wheezing, No chest tenderness.   Cardiovascular:  Normal heart rate, Normal rhythm, No murmurs  GI:  Soft, No tenderness, No guarding,   Musculoskeletal:  Neurovascularly intact distally, No edema, No tenderness, No cyanosis, Good range of motion in all major joints.   Integument:  Warm, Dry, No erythema, No rash.  3 cm horizontal laceration right posterior scalp with no bony step-offs or foreign bodies appreciated.  No active bleeding.  Lymphatic:  No lymphadenopathy noted.   Neurologic:  Alert & oriented x 3, Normal motor function, Normal sensory function, No focal deficits noted.   Psychiatric:  Affect normal, Judgment normal, Mood normal.      LAB:  All pertinent labs reviewed and  interpreted.  Results for orders placed or performed during the hospital encounter of 06/10/23   Head CT w/o contrast    Impression    IMPRESSION:  HEAD CT:  1.  No CT evidence for acute traumatic intracranial process. Specifically, no acute hemorrhage or abnormal extra-axial fluid collection.  2.  Age-indeterminate right thalamic and right cerebellar lacunar-type infarctions.  3.  Brain atrophy and presumed chronic microvascular ischemic changes as above.    CERVICAL SPINE CT:  1.  No CT evidence for acute fracture or posttraumatic subluxation.  2.  Degenerative changes, as above.       CT Cervical Spine w/o Contrast    Impression    IMPRESSION:  HEAD CT:  1.  No CT evidence for acute traumatic intracranial process. Specifically, no acute hemorrhage or abnormal extra-axial fluid collection.  2.  Age-indeterminate right thalamic and right cerebellar lacunar-type infarctions.  3.  Brain atrophy and presumed chronic microvascular ischemic changes as above.    CERVICAL SPINE CT:  1.  No CT evidence for acute fracture or posttraumatic subluxation.  2.  Degenerative changes, as above.           RADIOLOGY:  I have independently reviewed and interpreted the above imaging, pending the final radiology read.  Head CT w/o contrast   Preliminary Result   IMPRESSION:   HEAD CT:   1.  No CT evidence for acute traumatic intracranial process. Specifically, no acute hemorrhage or abnormal extra-axial fluid collection.   2.  Age-indeterminate right thalamic and right cerebellar lacunar-type infarctions.   3.  Brain atrophy and presumed chronic microvascular ischemic changes as above.      CERVICAL SPINE CT:   1.  No CT evidence for acute fracture or posttraumatic subluxation.   2.  Degenerative changes, as above.            CT Cervical Spine w/o Contrast   Preliminary Result   IMPRESSION:   HEAD CT:   1.  No CT evidence for acute traumatic intracranial process. Specifically, no acute hemorrhage or abnormal extra-axial fluid  collection.   2.  Age-indeterminate right thalamic and right cerebellar lacunar-type infarctions.   3.  Brain atrophy and presumed chronic microvascular ischemic changes as above.      CERVICAL SPINE CT:   1.  No CT evidence for acute fracture or posttraumatic subluxation.   2.  Degenerative changes, as above.                        PROCEDURES:    PROCEDURE: Laceration Repair   INDICATIONS: Laceration   PROCEDURE PROVIDER: Dr Nicole Lara   SITE: Posterior scalp   TYPE/SIZE: simple, clean and no foreign body visualized  3 cm (total length)   FUNCTIONAL ASSESSMENT: Distal sensation, circulation and motor intact   MEDICATION: 4 mLs of 1% Lidocaine with epinephrine   PREPARATION: irrigation with Normal saline   DEBRIDEMENT: no debridement   CLOSURE:  Superficial layer closed with 6 staples    Total number of sutures/staples placed: 6             I, Domenic Bush, am serving as a scribe to document services personally performed by Dr. Lara based on my observation and the provider's statements to me. INicole MD attest that Domenic Bush is acting in a scribe capacity, has observed my performance of the services and has documented them in accordance with my direction.    Nicole Lara MD  Emergency Medicine  [facility] EMERGENCY ROOM  [address] [facility] [address] [phone]  Dept: [phone]     Nicole Lara MD  06/10/23 0629

## 2023-06-10 NOTE — ED TRIAGE NOTES
Pt here for a fall. Has resultant head injury. Has a 1 step from bed. Was wearing socks and slipped. Hit head with step. Denies  back nor chest pain. States neck pain that is lateral when turning head to the right. Denies nausea, numbness nor weakness.

## 2023-06-10 NOTE — DISCHARGE INSTRUCTIONS
Wash the wound when you are washing your hair, pat dry, and apply antibiotic ointment like bacitracin, Neosporin or triple antibiotic ointment  Staples should be removed in 10 to 14 days at your clinic or urgent care  Tylenol 650 mg every 4 hours as needed for pain  Ibuprofen 600 mg every 6 hours as needed for pain  Return to the emergency department for signs of infection, vomiting, worsening headache or other concerns

## 2023-06-12 DIAGNOSIS — F41.1 ANXIETY STATE: ICD-10-CM

## 2023-06-12 DIAGNOSIS — M06.09 SERONEGATIVE RHEUMATOID ARTHRITIS OF MULTIPLE SITES (H): ICD-10-CM

## 2023-06-12 DIAGNOSIS — E78.5 HYPERLIPIDEMIA: ICD-10-CM

## 2023-06-12 RX ORDER — DULOXETIN HYDROCHLORIDE 20 MG/1
20 CAPSULE, DELAYED RELEASE ORAL DAILY
Qty: 90 CAPSULE | Refills: 3 | Status: SHIPPED | OUTPATIENT
Start: 2023-06-12 | End: 2024-06-28

## 2023-06-12 RX ORDER — ATORVASTATIN CALCIUM 10 MG/1
10 TABLET, FILM COATED ORAL AT BEDTIME
Qty: 90 TABLET | Refills: 3 | Status: SHIPPED | OUTPATIENT
Start: 2023-06-12 | End: 2024-04-03

## 2023-06-13 ENCOUNTER — THERAPY VISIT (OUTPATIENT)
Dept: PHYSICAL THERAPY | Facility: REHABILITATION | Age: 73
End: 2023-06-13
Payer: COMMERCIAL

## 2023-06-13 DIAGNOSIS — G20.C PRIMARY PARKINSONISM (H): ICD-10-CM

## 2023-06-13 DIAGNOSIS — M62.81 GENERALIZED MUSCLE WEAKNESS: Primary | ICD-10-CM

## 2023-06-13 PROCEDURE — 97112 NEUROMUSCULAR REEDUCATION: CPT | Mod: GP | Performed by: PHYSICAL THERAPIST

## 2023-06-13 PROCEDURE — 97110 THERAPEUTIC EXERCISES: CPT | Mod: GP | Performed by: PHYSICAL THERAPIST

## 2023-06-15 ENCOUNTER — HOSPITAL ENCOUNTER (OUTPATIENT)
Dept: ULTRASOUND IMAGING | Facility: CLINIC | Age: 73
Discharge: HOME | End: 2023-06-15
Attending: INTERNAL MEDICINE | Admitting: INTERNAL MEDICINE
Payer: MEDICARE

## 2023-06-15 ENCOUNTER — THERAPY VISIT (OUTPATIENT)
Dept: PHYSICAL THERAPY | Facility: REHABILITATION | Age: 73
End: 2023-06-15
Payer: COMMERCIAL

## 2023-06-15 DIAGNOSIS — M62.81 GENERALIZED MUSCLE WEAKNESS: Primary | ICD-10-CM

## 2023-06-15 DIAGNOSIS — N18.32 STAGE 3B CHRONIC KIDNEY DISEASE (H): ICD-10-CM

## 2023-06-15 DIAGNOSIS — G20.C PRIMARY PARKINSONISM (H): ICD-10-CM

## 2023-06-15 PROCEDURE — 97112 NEUROMUSCULAR REEDUCATION: CPT | Mod: GP | Performed by: PHYSICAL THERAPIST

## 2023-06-15 PROCEDURE — 97110 THERAPEUTIC EXERCISES: CPT | Mod: GP | Performed by: PHYSICAL THERAPIST

## 2023-06-15 PROCEDURE — 76770 US EXAM ABDO BACK WALL COMP: CPT

## 2023-06-20 ENCOUNTER — THERAPY VISIT (OUTPATIENT)
Dept: PHYSICAL THERAPY | Facility: REHABILITATION | Age: 73
End: 2023-06-20
Payer: COMMERCIAL

## 2023-06-20 ENCOUNTER — ALLIED HEALTH/NURSE VISIT (OUTPATIENT)
Dept: FAMILY MEDICINE | Facility: CLINIC | Age: 73
End: 2023-06-20
Payer: COMMERCIAL

## 2023-06-20 DIAGNOSIS — Z48.02 ENCOUNTER FOR REMOVAL OF SUTURES: Primary | ICD-10-CM

## 2023-06-20 DIAGNOSIS — M62.81 GENERALIZED MUSCLE WEAKNESS: Primary | ICD-10-CM

## 2023-06-20 DIAGNOSIS — G20.C PRIMARY PARKINSONISM (H): ICD-10-CM

## 2023-06-20 PROCEDURE — 97110 THERAPEUTIC EXERCISES: CPT | Mod: GP | Performed by: PHYSICAL THERAPIST

## 2023-06-20 PROCEDURE — 97112 NEUROMUSCULAR REEDUCATION: CPT | Mod: GP | Performed by: PHYSICAL THERAPIST

## 2023-06-20 NOTE — PROGRESS NOTES
Claudia Wise presents to the clinic today for removal of staples.  The patient has had the staples in place for 10 days.  There has been no history of infection or drainage.  6 staples are seen located on the posterior scalp.  The wound is healing well with no signs of infection.  Tetanus status is up to date.   All staples were easily removed today.  Routine wound care discussed.  The patient will follow up as needed.

## 2023-06-22 ENCOUNTER — THERAPY VISIT (OUTPATIENT)
Dept: PHYSICAL THERAPY | Facility: REHABILITATION | Age: 73
End: 2023-06-22
Payer: COMMERCIAL

## 2023-06-22 DIAGNOSIS — M62.81 GENERALIZED MUSCLE WEAKNESS: Primary | ICD-10-CM

## 2023-06-22 DIAGNOSIS — G20.C PRIMARY PARKINSONISM (H): ICD-10-CM

## 2023-06-22 PROCEDURE — 97112 NEUROMUSCULAR REEDUCATION: CPT | Mod: GP | Performed by: PHYSICAL THERAPIST

## 2023-06-22 PROCEDURE — 97110 THERAPEUTIC EXERCISES: CPT | Mod: GP | Performed by: PHYSICAL THERAPIST

## 2023-06-27 ENCOUNTER — THERAPY VISIT (OUTPATIENT)
Dept: PHYSICAL THERAPY | Facility: REHABILITATION | Age: 73
End: 2023-06-27
Payer: COMMERCIAL

## 2023-06-27 DIAGNOSIS — M62.81 GENERALIZED MUSCLE WEAKNESS: Primary | ICD-10-CM

## 2023-06-27 DIAGNOSIS — G20.C PRIMARY PARKINSONISM (H): ICD-10-CM

## 2023-06-27 PROCEDURE — 97112 NEUROMUSCULAR REEDUCATION: CPT | Mod: GP | Performed by: PHYSICAL THERAPIST

## 2023-06-27 PROCEDURE — 97110 THERAPEUTIC EXERCISES: CPT | Mod: GP | Performed by: PHYSICAL THERAPIST

## 2023-06-27 NOTE — PROGRESS NOTES
The Mini-BESTest measures dynamic balance, functional mobility, and gait.  It is commonly used in populations who have or have had multiple sclerosis (MS), Parkinson disease (PD), strokes, spinal cord injury (SCI), or cancer.      Balance Assessment: Mini-BEST  (<)   1) Sit <> Stand: (2) Normal  2) Rise to Toes: (1) able to raise up with assistance and hold 2-3 sec;    3) Stand on One Leg: (0) Severe L: 1.8-5 sec R: 2.5-3 seconds    4) Compensatory Stepping Correction-Forward: (1) Moderate multiple steps to correct   5) Compensatory Stepping Correction-Backward: (1) Moderate multiple steps to correct   6) Compensatory Stepping Correction-Lateral: (1) Modereate  L: (1)  R: (1) multiple little steps    7) Eyes Open, Firm Surface: (2) Normal    8) Eyes Closed, Foam Surface: (1) Moderate (20 sec before opening eyes, increased swaying)    9) Incline, Eyes Closed: (2) Normal    10) Change in Gait Speed: (2) Normal    11) Walk with Head Turns-Horizontal: (1) Moderate - slows down and deviates minimally   12) Walk with Pivot Turns: (2) Normal   13) Step Over Obstacles: (1) Moderate, clears with slowing down and changing steps   14) TUG with Dual Tasks: (1) Moderate  TU.45 sec  Cognitive TU.75 sec

## 2023-07-17 DIAGNOSIS — G20.C PRIMARY PARKINSONISM (H): ICD-10-CM

## 2023-07-17 RX ORDER — BENZTROPINE MESYLATE 0.5 MG/1
TABLET ORAL
Qty: 540 TABLET | Refills: 0 | Status: SHIPPED | OUTPATIENT
Start: 2023-07-17 | End: 2023-10-25

## 2023-07-17 NOTE — TELEPHONE ENCOUNTER
Refill request for: Yosef    Directions: Take 3 tablets BID     LOV: 4/24/23  NOV: 10/25/23    90 day supply with 0 refills Medication T'd for review and signature

## 2023-07-24 ENCOUNTER — TELEPHONE (OUTPATIENT)
Dept: NEUROLOGY | Facility: CLINIC | Age: 73
End: 2023-07-24

## 2023-07-27 NOTE — TELEPHONE ENCOUNTER
Central Prior Authorization Team - Phone: [phone]     PA Initiation    Medication: BENZTROPINE MESYLATE 0.5 MG PO TABS  Insurance Company: [employer] - Phone [phone] Fax [phone]  Pharmacy Filling the Rx: [place] [address] [address]  Filling Pharmacy Phone: [phone]  Filling Pharmacy Fax:    Start Date: 7/27/2023

## 2023-07-27 NOTE — TELEPHONE ENCOUNTER
Central Prior Authorization Team - Phone: [phone]     Prior Authorization Approval    Medication: BENZTROPINE MESYLATE 0.5 MG PO TABS  Authorization Effective Date: 6/27/2023  Authorization Expiration Date: 7/26/2024  Approved Dose/Quantity: 540  Reference #:     Insurance Company: [employer] - Phone [phone] Fax [phone]  Expected CoPay:       CoPay Card Available:      Financial Assistance Needed:   Which Pharmacy is filling the prescription: [facility] [facility], [place] [address] [address]  Pharmacy Notified: Yes  Patient Notified: Yes pharmacy will notify when ready

## 2023-08-02 ENCOUNTER — OFFICE VISIT (OUTPATIENT)
Dept: INTERNAL MEDICINE | Facility: CLINIC | Age: 73
End: 2023-08-02
Payer: COMMERCIAL

## 2023-08-02 VITALS
RESPIRATION RATE: 16 BRPM | HEART RATE: 57 BPM | BODY MASS INDEX: 28.19 KG/M2 | TEMPERATURE: 97.7 F | OXYGEN SATURATION: 98 % | DIASTOLIC BLOOD PRESSURE: 60 MMHG | WEIGHT: 165.1 LBS | SYSTOLIC BLOOD PRESSURE: 131 MMHG | HEIGHT: 64 IN

## 2023-08-02 DIAGNOSIS — E78.2 MIXED HYPERLIPIDEMIA: ICD-10-CM

## 2023-08-02 DIAGNOSIS — R73.09 OTHER ABNORMAL GLUCOSE: ICD-10-CM

## 2023-08-02 DIAGNOSIS — N18.9 ANEMIA IN CHRONIC KIDNEY DISEASE, UNSPECIFIED CKD STAGE: ICD-10-CM

## 2023-08-02 DIAGNOSIS — I10 ESSENTIAL HYPERTENSION: ICD-10-CM

## 2023-08-02 DIAGNOSIS — G20.A1 PARKINSON DISEASE (H): ICD-10-CM

## 2023-08-02 DIAGNOSIS — D63.1 ANEMIA IN CHRONIC KIDNEY DISEASE, UNSPECIFIED CKD STAGE: ICD-10-CM

## 2023-08-02 DIAGNOSIS — M81.0 SENILE OSTEOPOROSIS: Primary | ICD-10-CM

## 2023-08-02 DIAGNOSIS — N18.32 STAGE 3B CHRONIC KIDNEY DISEASE (H): ICD-10-CM

## 2023-08-02 DIAGNOSIS — M06.09 SERONEGATIVE RHEUMATOID ARTHRITIS OF MULTIPLE SITES (H): ICD-10-CM

## 2023-08-02 DIAGNOSIS — E55.9 VITAMIN D DEFICIENCY: ICD-10-CM

## 2023-08-02 DIAGNOSIS — E03.8 OTHER SPECIFIED HYPOTHYROIDISM: ICD-10-CM

## 2023-08-02 LAB
ALBUMIN SERPL BCG-MCNC: 4.9 G/DL (ref 3.5–5.2)
ALP SERPL-CCNC: 56 U/L (ref 35–104)
ALT SERPL W P-5'-P-CCNC: 20 U/L (ref 0–50)
ANION GAP SERPL CALCULATED.3IONS-SCNC: 13 MMOL/L (ref 7–15)
AST SERPL W P-5'-P-CCNC: 33 U/L (ref 0–45)
BILIRUB SERPL-MCNC: 0.6 MG/DL
BUN SERPL-MCNC: 32.1 MG/DL (ref 8–23)
CALCIUM SERPL-MCNC: 10.5 MG/DL (ref 8.8–10.2)
CHLORIDE SERPL-SCNC: 103 MMOL/L (ref 98–107)
CREAT SERPL-MCNC: 1.42 MG/DL (ref 0.51–0.95)
DEPRECATED HCO3 PLAS-SCNC: 21 MMOL/L (ref 22–29)
ERYTHROCYTE [DISTWIDTH] IN BLOOD BY AUTOMATED COUNT: 13.3 % (ref 10–15)
FERRITIN SERPL-MCNC: 191 NG/ML (ref 11–328)
GFR SERPL CREATININE-BSD FRML MDRD: 39 ML/MIN/1.73M2
GLUCOSE SERPL-MCNC: 142 MG/DL (ref 70–99)
HBA1C MFR BLD: 5.8 % (ref 0–5.6)
HCT VFR BLD AUTO: 34.9 % (ref 35–47)
HGB BLD-MCNC: 11.5 G/DL (ref 11.7–15.7)
IRON BINDING CAPACITY (ROCHE): 299 UG/DL (ref 240–430)
IRON SATN MFR SERPL: 19 % (ref 15–46)
IRON SERPL-MCNC: 57 UG/DL (ref 37–145)
MCH RBC QN AUTO: 31.3 PG (ref 26.5–33)
MCHC RBC AUTO-ENTMCNC: 33 G/DL (ref 31.5–36.5)
MCV RBC AUTO: 95 FL (ref 78–100)
PLATELET # BLD AUTO: 151 10E3/UL (ref 150–450)
POTASSIUM SERPL-SCNC: 5 MMOL/L (ref 3.4–5.3)
PROT SERPL-MCNC: 7.3 G/DL (ref 6.4–8.3)
RBC # BLD AUTO: 3.68 10E6/UL (ref 3.8–5.2)
SODIUM SERPL-SCNC: 137 MMOL/L (ref 136–145)
TSH SERPL DL<=0.005 MIU/L-ACNC: 2.2 UIU/ML (ref 0.3–4.2)
WBC # BLD AUTO: 5.8 10E3/UL (ref 4–11)

## 2023-08-02 PROCEDURE — 80053 COMPREHEN METABOLIC PANEL: CPT | Performed by: INTERNAL MEDICINE

## 2023-08-02 PROCEDURE — 84443 ASSAY THYROID STIM HORMONE: CPT | Performed by: INTERNAL MEDICINE

## 2023-08-02 PROCEDURE — 82728 ASSAY OF FERRITIN: CPT | Performed by: INTERNAL MEDICINE

## 2023-08-02 PROCEDURE — 99214 OFFICE O/P EST MOD 30 MIN: CPT | Mod: 25 | Performed by: INTERNAL MEDICINE

## 2023-08-02 PROCEDURE — 83540 ASSAY OF IRON: CPT | Performed by: INTERNAL MEDICINE

## 2023-08-02 PROCEDURE — 85027 COMPLETE CBC AUTOMATED: CPT | Performed by: INTERNAL MEDICINE

## 2023-08-02 PROCEDURE — 83036 HEMOGLOBIN GLYCOSYLATED A1C: CPT | Performed by: INTERNAL MEDICINE

## 2023-08-02 PROCEDURE — 36415 COLL VENOUS BLD VENIPUNCTURE: CPT | Performed by: INTERNAL MEDICINE

## 2023-08-02 PROCEDURE — 82306 VITAMIN D 25 HYDROXY: CPT | Performed by: INTERNAL MEDICINE

## 2023-08-02 PROCEDURE — 96372 THER/PROPH/DIAG INJ SC/IM: CPT | Performed by: INTERNAL MEDICINE

## 2023-08-02 PROCEDURE — 83550 IRON BINDING TEST: CPT | Performed by: INTERNAL MEDICINE

## 2023-08-02 NOTE — PATIENT INSTRUCTIONS
Prolia 7th today.  Prolia 8th in 6 months with AWV.    DXA in 2/2025 .   Phone number to schedule [phone].    Daily calcium need is 8983-0854 mg a day from the diet and supplements.  Calcium citrate is easier to digest.  Vitamin D 2000 IU daily recommended.    Risk of rebound vertebral fractures is higher when Prolia suddenly stopped or dose was missed.      Prolia and Covid vaccine should be  for at least a week.

## 2023-08-02 NOTE — PROGRESS NOTES
(M81.0) Senile osteoporosis  (primary encounter diagnosis)  Comment: Prolia #7 today.  DXA 2/2023 reviewed and discussed:  FINDINGS:  Lumbar Spine: L1-L4 (L2): BMD: 1.142 g/cm2. T-score: -0.2. Z-score: 1.5.  RIGHT Hip Total: BMD: 0.889 g/cm2. T-score: -0.9. Z-score: 0.7.  RIGHT Hip Femoral neck: BMD: 0.903 g/cm2. T-score: -1.0. Z-score: 0.9.  LEFT Hip Total: BMD: 0.904 g/cm2. T-score: -0.8. Z-score: 0.8.  LEFT Hip Femoral neck: BMD: 0.861 g/cm2. T-score: -1.3. Z-score: 0.6.    COMPARISON: There has been a 3.8% increase in lumbar spine BMD. There has been a 1.4% increase in bilateral hip BMD.       (R73.09) Prediabetes  Comment: hgbA1c was 6.0 in Feb  Plan: Hemoglobin A1c            (E03.8) Other specified hypothyroidism  Comment: on levothyroxine  Plan: TSH with free T4 reflex            (I10) Hypertension  Comment: stable  Plan: Comprehensive metabolic panel            (E78.2) Mixed hyperlipidemia  Comment: stable on statin, FLP done in Feb  Plan:     (N18.32) Stage 3b chronic kidney disease (H)  Comment: stable, Nephrology started sodium bicarb.  LOCATION: [facility]  DATE: 6/15/2023     INDICATION: Chronic kidney disease. History of nephrolithiasis.  COMPARISON: CT 02/28/2020  TECHNIQUE: Routine Bilateral Renal and Bladder Ultrasound.     FINDINGS:     RIGHT KIDNEY: 8.3 x 5.2 x 4.8 cm. Mild right renal cortical thinning with renal cortex measuring 0.8 cm. Normal cortical echogenicity. No hydronephrosis.      LEFT KIDNEY: 8.0 x 5.6 x 4.6 cm. Normal left renal cortical thickness and echogenicity. No hydronephrosis.      BLADDER: Normal.                                                                      IMPRESSION:  1.  Mild renal cortical atrophy. No hydronephrosis.  Plan: Comprehensive metabolic panel, CBC with         platelets            (N18.9,  D63.1) Anemia in chronic kidney disease, unspecified CKD stage  Comment: Hgb10.8 , 2 m ago, seeing Nephrology  Plan: CBC with platelets     "        (G20) Parkinson disease (H)    Plan: stable on benztropine, seeing Neuro    (M06.09) Seronegative rheumatoid arthritis of multiple sites (H)  Comment: stable on methotrexate and Plaquenil, seeing Dr Vinson. Nephrology suggested decreasing methotrexate dose. She will discuss with Dr Vinson in 2 weeks,      Patient was educated on safety of Prolia utilizing Patient Counseling Chart for Healthcare Providers, as outlined by the Prolia REMS progam.     Return in about 6 months (around 2/2/2024) for AWV, Prolia.    Patient Instructions   Prolia 7th today.  Prolia 8th in 6 months with AWV.    DXA in 2/2025 .   Phone number to schedule [phone].    Daily calcium need is 2752-6853 mg a day from the diet and supplements.  Calcium citrate is easier to digest.  Vitamin D 2000 IU daily recommended.    Risk of rebound vertebral fractures is higher when Prolia suddenly stopped or dose was missed.      Prolia and Covid vaccine should be  for at least a week.           /60   Pulse 57   Temp 97.7  F (36.5  C)   Resp 16   Ht 1.613 m (5' 3.5\")   Wt 74.9 kg (165 lb 1.6 oz)   SpO2 98%   BMI 28.79 kg/m        Did you experience any problems with previous Prolia injection? no  Any medication change in the last 6 months? no  Did you take prednisone or other immunosupressant drugs in the last 6 months   (chemo, transplant, rheum, dermatology conditions)? no  Did you have any serious infection in the last 6 months?no  Any recent hospitalizations?no  Do you plan any dental work in the next 2-3 months?no  How much calcium do you take daily from the diet and supplements?1200 mg  How much vit D do you take daily? 2000 IU  Last DXA? 2/2023, Reviewed and discussed      Patient is here today for the Prolia injection. Patient tolerated previous injections well.   We discussed calcium and vit D daily needs today.   I reviewed the lab results:  Component      Latest Ref Rng 5/30/2023  11:17 AM   Sodium      136 - 145 " mmol/L 136    Potassium      3.4 - 5.3 mmol/L 4.4    Chloride      98 - 107 mmol/L 104    Carbon Dioxide (CO2)      22 - 29 mmol/L 20 (L)    Anion Gap      7 - 15 mmol/L 12    Glucose      70 - 99 mg/dL 144 (H)    Urea Nitrogen      8.0 - 23.0 mg/dL 21.5    Creatinine      0.51 - 0.95 mg/dL 1.35 (H)    GFR Estimate      >60 mL/min/1.73m2 41 (L)    Calcium      8.8 - 10.2 mg/dL 9.4    Albumin      3.5 - 5.2 g/dL 4.3    Phosphorus      2.5 - 4.5 mg/dL 3.2       Legend:  (L) Low  (H) High    ROS: complete ROS negative.  Physical exam:  Constitutional:  oriented to person, place, and time, appears well-nourished. No distress.   HENT:   Head: Normocephalic.   Eyes: Conjunctivae are normal.   Neck: Normal range of motion.   Pulmonary/Chest: Effort normal  Musculoskeletal: Normal range of motion.   Neurological: alert and oriented to person, place, and time.   Psychiatric: normal mood and affect.    We discussed high risk of rebound vertebral fractures when Prolia suddenly stopped.    Next Prolia injection will be in 6 months.           This note has been dictated using voice recognition software. Any grammatical or context distortions are unintentional and inherent to the software      Patient Active Problem List   Diagnosis    Herpes Simplex Type II    Anxiety    Anemia in chronic kidney disease    Hyperlipemia    Gout    Hypertension    Prediabetes    Endometriosis    Other specified hypothyroidism    Seronegative rheumatoid arthritis of multiple sites (H)    High risk medication use    Bilateral primary osteoarthritis of knee    Senile osteoporosis    Compression fracture of L2 vertebra with routine healing    Chronic kidney disease, stage 3 (H)    Venous (peripheral) insufficiency    Parkinson disease (H)    History of small bowel obstruction    Primary insomnia       Current Outpatient Medications   Medication    acetaminophen (TYLENOL) 500 MG tablet    acyclovir (ZOVIRAX) 400 MG tablet    allopurinol (ZYLOPRIM) 100  MG tablet    amLODIPine (NORVASC) 10 MG tablet    atorvastatin (LIPITOR) 10 MG tablet    benztropine (COGENTIN) 0.5 MG tablet    carboxymethylcellulose (REFRESH PLUS) 0.5 % Dpet ophthalmic dropperette    cyanocobalamin, vitamin B-12, 1,000 mcg Subl    DULoxetine (CYMBALTA) 20 MG capsule    fluticasone (FLONASE) 50 MCG/ACT nasal spray    hydroxychloroquine (PLAQUENIL) 200 MG tablet    levothyroxine (SYNTHROID/LEVOTHROID) 50 MCG tablet    lidocaine (LIDODERM) 5 % patch    LORazepam (ATIVAN) 0.5 MG tablet    losartan (COZAAR) 50 MG tablet    methotrexate sodium 2.5 MG TABS    metoprolol tartrate (LOPRESSOR) 50 MG tablet    sodium bicarbonate 650 MG tablet    denosumab (PROLIA) 60 MG/ML SOSY injection    folic acid (FOLVITE) 1 MG tablet     Current Facility-Administered Medications   Medication    denosumab (PROLIA) injection 60 mg

## 2023-08-02 NOTE — PROGRESS NOTES
Answers submitted by the patient for this visit:  Back Pain Visit Questionnaire (Submitted on 8/2/2023)  Your back pain is: chronic  Chronic or Recurring Back Pain Visit Questionnaire (Submitted on 8/2/2023)  Where is your back pain located? : left middle of back, right side of neck, right shoulder  Where does your back pain spread? : left buttocks  Since you noticed your back pain, how has it changed? : always present, but gets better and worse  Does your back pain interfere with your job?: Yes  If yes, which:: cold, heat, Physical Therapy, steroid injection  General Questionnaire (Submitted on 8/2/2023)  Chief Complaint: Chronic problems general questions HPI Form  How many servings of fruits and vegetables do you eat daily?: 2-3  On average, how many sweetened beverages do you drink each day (Examples: soda, juice, sweet tea, etc.  Do NOT count diet or artificially sweetened beverages)?: 1  How many minutes a day do you exercise enough to make your heart beat faster?: 20 to 29  How many days a week do you exercise enough to make your heart beat faster?: 3 or less  How many days per week do you miss taking your medication?: 1  What makes it hard for you to take your medication every day?: remembering to take    Conflicting answers have been found for some questions. Please document the patient's answers manually. ***

## 2023-08-03 LAB — DEPRECATED CALCIDIOL+CALCIFEROL SERPL-MC: 56 UG/L (ref 20–75)

## 2023-08-15 ENCOUNTER — OFFICE VISIT (OUTPATIENT)
Dept: RHEUMATOLOGY | Facility: CLINIC | Age: 73
End: 2023-08-15
Payer: COMMERCIAL

## 2023-08-15 VITALS
DIASTOLIC BLOOD PRESSURE: 72 MMHG | HEART RATE: 69 BPM | OXYGEN SATURATION: 96 % | SYSTOLIC BLOOD PRESSURE: 118 MMHG | WEIGHT: 165.4 LBS | BODY MASS INDEX: 28.84 KG/M2

## 2023-08-15 DIAGNOSIS — M06.09 SERONEGATIVE RHEUMATOID ARTHRITIS OF MULTIPLE SITES (H): Primary | ICD-10-CM

## 2023-08-15 DIAGNOSIS — Z79.899 HIGH RISK MEDICATION USE: ICD-10-CM

## 2023-08-15 DIAGNOSIS — M17.0 BILATERAL PRIMARY OSTEOARTHRITIS OF KNEE: ICD-10-CM

## 2023-08-15 PROCEDURE — 20610 DRAIN/INJ JOINT/BURSA W/O US: CPT | Mod: 50 | Performed by: INTERNAL MEDICINE

## 2023-08-15 PROCEDURE — 99214 OFFICE O/P EST MOD 30 MIN: CPT | Mod: 25 | Performed by: INTERNAL MEDICINE

## 2023-08-15 RX ORDER — TRIAMCINOLONE ACETONIDE 40 MG/ML
40 INJECTION, SUSPENSION INTRA-ARTICULAR; INTRAMUSCULAR ONCE
Status: COMPLETED | OUTPATIENT
Start: 2023-08-15 | End: 2023-08-15

## 2023-08-15 RX ADMIN — TRIAMCINOLONE ACETONIDE 40 MG: 40 INJECTION, SUSPENSION INTRA-ARTICULAR; INTRAMUSCULAR at 14:43

## 2023-08-15 RX ADMIN — TRIAMCINOLONE ACETONIDE 40 MG: 40 INJECTION, SUSPENSION INTRA-ARTICULAR; INTRAMUSCULAR at 14:44

## 2023-08-15 NOTE — PROGRESS NOTES
"      Rheumatology follow-up visit note     Claudia is a 72 year old female presents today for follow-up.    Claudia was seen today for recheck.    Diagnoses and all orders for this visit:    Seronegative rheumatoid arthritis of multiple sites (H)    Bilateral primary osteoarthritis of knee  -     triamcinolone (KENALOG-40) injection 40 mg  -     triamcinolone (KENALOG-40) injection 40 mg  -     ASPIRATION/INJECTION MAJOR JOINT    High risk medication use        She like to stop hydroxychloroquine methotrexate injections already done 3 weeks ago.  Her knee pain has returned.  She wants to proceed with local injection done after pros and cons were outlined with 40 mg of Kenalog each knee anterolateral approach.    Follow up in 3 months.    HPI    Claudia Wise is a 72 year old female is here for follow-up of seronegative Rheumatoid Arthritis, osteoarthritis with his various manifestations, renal impairment and osteoarthritis.  She has tolerated the current regimen well however 3 weeks ago she decided to stop taking methotrexate hydroxychloroquine folic acid as \"I have been taking these for 12 years\".  She wants to see how she does..  She is noted worsening pain.  This is in her knees.  This is bilateral.  She has not observed swelling. More recently the symptoms have gotten worse to the point where she has difficult time ambulating going up and down the steps.  The previous corticosteroid injections in the knees provided her with good relief for approximately 3 months.  She manages this well with over-the-counter measures and corticosteroid injections intermittently.  She is aware not to take nonsteroidals because of renal impairment.  Reminded of eye examination.  Overall disease activity:  stable. Limitation on activities as noted in the MDHAQ scanned in the EMR.           DETAILED EXAMINATION  08/15/23  :    Vitals:    08/15/23 1039   BP: 118/72   Pulse: 69   SpO2: 96%   Weight: 75 kg (165 lb 6.4 oz)     Alert " oriented. Head including the face is examined for malar rash, heliotropes, scarring, lupus pernio. Eyes examined for redness such as in episcleritis/scleritis, periorbital lesions.   Neck/ Face examined for parotid gland swelling, range of motion of neck.  Left upper and lower and right upper and lower extremities examined for tenderness, swelling, warmth of the appendicular joints, range of motion, edema, rash.  Some of the important findings included: she does not have evidence of synovitis in the palpable joints of the upper extremities.  No significant deformities of the digits.  Small Heberden nodes.  Marked joint line tenderness the knees bilaterally without effusion or warmth on either side.     Patient Active Problem List    Diagnosis Date Noted    History of small bowel obstruction 02/02/2023     Priority: Medium    Primary insomnia 02/02/2023     Priority: Medium    Parkinson disease (H) 07/20/2022     Priority: Medium    Venous (peripheral) insufficiency 07/01/2021     Priority: Medium    Other specified hypothyroidism      Priority: Medium     Created by Conversion  Replacement Utility updated for latest IMO load        Chronic kidney disease, stage 3 (H) 01/18/2021     Priority: Medium    Senile osteoporosis 06/02/2020     Priority: Medium    Compression fracture of L2 vertebra with routine healing 06/02/2020     Priority: Medium    Bilateral primary osteoarthritis of knee 05/23/2019     Priority: Medium    Anemia in chronic kidney disease      Priority: Medium     Created by Conversion        Gout      Priority: Medium     Created by Conversion        Hyperlipemia      Priority: Medium     Created by Conversion        High risk medication use 08/25/2016     Priority: Medium    Herpes Simplex Type II      Priority: Medium     Created by Conversion  [facility] Annotation: Nov 13 2007  4:56PM - Oma Reddy: By pt's   history.    Allergy to valtrex.  She came to me on prn acyclovir.  Replacement Utility  updated for latest IMO load        Anxiety      Priority: Medium     Created by Conversion  Replacement Utility updated for latest IMO load        Hypertension      Priority: Medium     Created by Conversion  Replacement Utility updated for latest IMO load        Endometriosis      Priority: Medium     Created by Conversion  [facility] Annotation: Nov 13 2007  4:58PM - Katie Meyers: s/p RANULFO LONGORIA   1987.  Replacement Utility updated for latest IMO load        Seronegative rheumatoid arthritis of multiple sites (H) 03/01/2016     Priority: Medium    Prediabetes      Priority: Medium     Created by Conversion         Past Surgical History:   Procedure Laterality Date    COLON SURGERY  1986    COLPORRHAPHY N/A 7/7/2016    Procedure: ANTERIOR REPAIR WITH MESH;  Surgeon: Zac Stone MD;  Location: [facility] OR;  Service:      UGI ENDOSCOPY W PLACEMENT GASTROSTOMY TUBE PERCUT N/A 2/7/2020    Procedure: Percutaneous Endoscopic Gastromstomy Tube Placement;  Surgeon: Zion Schwarz MD;  Location: [facility] OR;  Service: General    HYSTERECTOMY      LAPAROTOMY EXPLORATORY N/A 1/23/2020    Procedure: EXPLORATORY LAPARATOMY, EXTENSIVE LYSIS OF ADHESIONS, SMALL BOWEL RESECTION;  Surgeon: Claudia Jeronimo MD;  Location: [facility] OR;  Service: General    OOPHORECTOMY        Past Medical History:   Diagnosis Date    Anemia     Anxiety     Chronic kidney disease     Diabetes mellitus, type II (H)     prediabetes    Disease of thyroid gland     hypothyroid    Family history of myocardial infarction     Gout     High cholesterol     Hypertension     Inverted nipple     right    Macular edema     Osteoarthritis 01/01/2012    Rheumatoid arthritis (H) 01/01/2012     Allergies   Allergen Reactions    Bupropion Hcl [Bupropion] Nausea    Erythromycin Base [Erythromycin] Nausea    Paxil [Paroxetine] Diarrhea    Tetracyclines & Related Itching    Valacyclovir Nausea     Current Outpatient Medications    Medication Sig Dispense Refill    acetaminophen (TYLENOL) 500 MG tablet Take 500 mg by mouth every 6 hours as needed       acyclovir (ZOVIRAX) 400 MG tablet TAKE ONE TABLET BY MOUTH EVERY 8 HOURS AS NEEDED 60 tablet 0    allopurinol (ZYLOPRIM) 100 MG tablet Take 1 tablet (100 mg) by mouth 2 times daily (with meals) 180 tablet 3    amLODIPine (NORVASC) 10 MG tablet Take 1 tablet (10 mg) by mouth daily 90 tablet 3    atorvastatin (LIPITOR) 10 MG tablet Take 1 tablet (10 mg) by mouth At Bedtime 90 tablet 3    benztropine (COGENTIN) 0.5 MG tablet TAKE 3 TABLETS BY MOUTH TWICE DAILY 540 tablet 0    carboxymethylcellulose (REFRESH PLUS) 0.5 % Dpet ophthalmic dropperette [CARBOXYMETHYLCELLULOSE (REFRESH PLUS) 0.5 % DPET OPHTHALMIC DROPPERETTE] Administer 1-2 drops to both eyes 4 (four) times a day as needed.       cyanocobalamin, vitamin B-12, 1,000 mcg Subl [CYANOCOBALAMIN, VITAMIN B-12, 1,000 MCG SUBL] Place 1 tablet (1,000 mcg total) under the tongue daily.  0    denosumab (PROLIA) 60 MG/ML SOSY injection Inject 60 mg Subcutaneous      DULoxetine (CYMBALTA) 20 MG capsule Take 1 capsule (20 mg) by mouth daily 90 capsule 3    fluticasone (FLONASE) 50 MCG/ACT nasal spray Use 1 spray(s) in each nostril once daily 48 g 3    folic acid (FOLVITE) 1 MG tablet Take 1 tablet (1,000 mcg) by mouth daily 90 tablet 0    hydroxychloroquine (PLAQUENIL) 200 MG tablet Take 1 tablet (200 mg) by mouth 2 times daily 180 tablet 0    levothyroxine (SYNTHROID/LEVOTHROID) 50 MCG tablet Take 1 tablet (50 mcg) by mouth daily 30 minutes before breakfast. 90 tablet 2    lidocaine (LIDODERM) 5 % patch Place 1 patch onto the skin every 24 hours To prevent lidocaine toxicity, patient should be patch free for 12 hrs daily. 14 patch 1    LORazepam (ATIVAN) 0.5 MG tablet Take 1 tablet (0.5 mg) by mouth daily as needed for anxiety 30 tablet 0    losartan (COZAAR) 50 MG tablet Take 1 tablet by mouth once daily 90 tablet 3    methotrexate sodium 2.5 MG  TABS TAKE 4 TABLETS BY MOUTH ONCE A WEEK Strength: 2.5 mg 48 tablet 0    metoprolol tartrate (LOPRESSOR) 50 MG tablet Take 1 tablet by mouth twice daily 180 tablet 3    sodium bicarbonate 650 MG tablet Take 2 tablets (1,300 mg) by mouth 2 times daily 100 tablet 3     family history includes Breast Cancer in her maternal aunt; Cancer in her cousin, father, maternal aunt, maternal grandmother, and sister; Coronary Artery Disease (age of onset: 49.00) in her mother.  Social Connections: Not on file          WBC Count   Date Value Ref Range Status   08/02/2023 5.8 4.0 - 11.0 10e3/uL Final     RBC Count   Date Value Ref Range Status   08/02/2023 3.68 (L) 3.80 - 5.20 10e6/uL Final     Hemoglobin   Date Value Ref Range Status   08/02/2023 11.5 (L) 11.7 - 15.7 g/dL Final     Hematocrit   Date Value Ref Range Status   08/02/2023 34.9 (L) 35.0 - 47.0 % Final     MCV   Date Value Ref Range Status   08/02/2023 95 78 - 100 fL Final     MCH   Date Value Ref Range Status   08/02/2023 31.3 26.5 - 33.0 pg Final     Platelet Count   Date Value Ref Range Status   08/02/2023 151 150 - 450 10e3/uL Final     % Lymphocytes   Date Value Ref Range Status   02/28/2020 13 (L) 20 - 40 % Final   01/29/2020 7 (L) 20 - 40 % Final     AST   Date Value Ref Range Status   08/02/2023 33 0 - 45 U/L Final     Comment:     Reference intervals for this test were updated on 6/12/2023 to more accurately reflect our healthy population. There may be differences in the flagging of prior results with similar values performed with this method. Interpretation of those prior results can be made in the context of the updated reference intervals.     ALT   Date Value Ref Range Status   08/02/2023 20 0 - 50 U/L Final     Comment:     Reference intervals for this test were updated on 6/12/2023 to more accurately reflect our healthy population. There may be differences in the flagging of prior results with similar values performed with this method. Interpretation of  those prior results can be made in the context of the updated reference intervals.       Albumin   Date Value Ref Range Status   08/02/2023 4.9 3.5 - 5.2 g/dL Final   05/31/2022 3.9 3.5 - 5.0 g/dL Final     Alkaline Phosphatase   Date Value Ref Range Status   08/02/2023 56 35 - 104 U/L Final     Creatinine   Date Value Ref Range Status   08/02/2023 1.42 (H) 0.51 - 0.95 mg/dL Final     GFR Estimate   Date Value Ref Range Status   08/02/2023 39 (L) >60 mL/min/1.73m2 Final   05/11/2021 38 (L) >60 mL/min/1.73m2 Final     GFR Estimate If Black   Date Value Ref Range Status   05/11/2021 46 (L) >60 mL/min/1.73m2 Final     Creatinine Urine mg/dL   Date Value Ref Range Status   05/30/2023 152.0 mg/dL Final     Comment:     The reference ranges have not been established in urine creatinine. The results should be integrated into the clinical context for interpretation.

## 2023-08-22 DIAGNOSIS — I10 ESSENTIAL HYPERTENSION: ICD-10-CM

## 2023-08-23 RX ORDER — AMLODIPINE BESYLATE 10 MG/1
10 TABLET ORAL DAILY
Qty: 90 TABLET | Refills: 3 | Status: SHIPPED | OUTPATIENT
Start: 2023-08-23 | End: 2024-07-26

## 2023-08-23 NOTE — TELEPHONE ENCOUNTER
"Routing refill request to provider for review/approval because:  Labs out of range:  CR    Last Written Prescription Date:  8/30/2022  Last Fill Quantity: 90,  # refills: 3   Last office visit: 8/2/2023          Requested Prescriptions   Pending Prescriptions Disp Refills    amLODIPine (NORVASC) 10 MG tablet [Pharmacy Med Name: amLODIPine Besylate 10 MG Oral Tablet] 90 tablet 0     Sig: Take 1 tablet by mouth once daily       Calcium Channel Blockers Protocol  Failed - 8/22/2023  2:43 PM        Failed - Normal serum creatinine on file in past 12 months     Recent Labs   Lab Test 08/02/23  1145   CR 1.42*       Ok to refill medication if creatinine is low          Passed - Blood pressure under 140/90 in past 12 months     BP Readings from Last 3 Encounters:   08/15/23 118/72   08/02/23 131/60   06/10/23 134/65                 Passed - Recent (12 mo) or future (30 days) visit within the authorizing provider's specialty     Patient has had an office visit with the authorizing provider or a provider within the authorizing providers department within the previous 12 mos or has a future within next 30 days. See \"Patient Info\" tab in inbasket, or \"Choose Columns\" in Meds & Orders section of the refill encounter.              Passed - Medication is active on med list        Passed - Patient is age 18 or older        Passed - No active pregnancy on record        Passed - No positive pregnancy test in past 12 months             Mer Avila RN 08/23/23 6:39 AM  "

## 2023-09-01 DIAGNOSIS — Z92.29 PERSONAL HISTORY OF OTHER DRUG THERAPY: ICD-10-CM

## 2023-09-01 DIAGNOSIS — M81.0 SENILE OSTEOPOROSIS: ICD-10-CM

## 2023-09-01 DIAGNOSIS — N18.32 STAGE 3B CHRONIC KIDNEY DISEASE (H): ICD-10-CM

## 2023-09-01 RX ORDER — LEVOTHYROXINE SODIUM 50 UG/1
TABLET ORAL
Qty: 90 TABLET | Refills: 3 | Status: SHIPPED | OUTPATIENT
Start: 2023-09-01 | End: 2024-08-06

## 2023-09-01 NOTE — TELEPHONE ENCOUNTER
"Last Written Prescription Date:  12/2/22  Last Fill Quantity: 90,  # refills: 2   Last office visit provider:  8/2/23     Requested Prescriptions   Pending Prescriptions Disp Refills    levothyroxine (SYNTHROID/LEVOTHROID) 50 MCG tablet [Pharmacy Med Name: Levothyroxine Sodium 50 MCG Oral Tablet] 90 tablet 0     Sig: TAKE 1 TABLET BY MOUTH ONCE DAILY 30  MINUTES  BEFORE  BREAKFAST       Thyroid Protocol Passed - 9/1/2023  1:44 PM        Passed - Patient is 12 years or older        Passed - Recent (12 mo) or future (30 days) visit within the authorizing provider's specialty     Patient has had an office visit with the authorizing provider or a provider within the authorizing providers department within the previous 12 mos or has a future within next 30 days. See \"Patient Info\" tab in inbasket, or \"Choose Columns\" in Meds & Orders section of the refill encounter.              Passed - Medication is active on med list        Passed - Normal TSH on file in past 12 months     Recent Labs   Lab Test 08/02/23  1145   TSH 2.20              Passed - No active pregnancy on record     If patient is pregnant or has had a positive pregnancy test, please check TSH.          Passed - No positive pregnancy test in past 12 months     If patient is pregnant or has had a positive pregnancy test, please check TSH.               Nelda Chávez RN 09/01/23 3:39 PM  "

## 2023-09-05 ENCOUNTER — TRANSFERRED RECORDS (OUTPATIENT)
Dept: HEALTH INFORMATION MANAGEMENT | Facility: CLINIC | Age: 73
End: 2023-09-05
Payer: COMMERCIAL

## 2023-09-05 LAB
RETINOPATHY: NEGATIVE
RETINOPATHY: NEGATIVE

## 2023-09-07 NOTE — TELEPHONE ENCOUNTER
Central PA team  [phone]  Pool: HE PA MED ([id number])          PA has been initiated.       PA form completed and faxed insurance via Cover My Meds     Key:  [id number]     Medication:  METHOTREXATE     Insurance:  [employer]         Response will be received via fax and may take up to 5-10 business days depending on plan         Resolved.

## 2023-09-14 ENCOUNTER — TELEPHONE (OUTPATIENT)
Dept: RHEUMATOLOGY | Facility: CLINIC | Age: 73
End: 2023-09-14
Payer: COMMERCIAL

## 2023-09-14 DIAGNOSIS — M06.09 SERONEGATIVE RHEUMATOID ARTHRITIS OF MULTIPLE SITES (H): Primary | ICD-10-CM

## 2023-09-14 RX ORDER — HYDROXYCHLOROQUINE SULFATE 200 MG/1
200 TABLET, FILM COATED ORAL 2 TIMES DAILY
Qty: 180 TABLET | Refills: 0 | Status: SHIPPED | OUTPATIENT
Start: 2023-09-14 | End: 2023-11-15

## 2023-09-14 NOTE — TELEPHONE ENCOUNTER
[facility] Call Center    Phone Message    May a detailed message be left on voicemail: yes     Reason for Call: Medication Refill Request    Has the patient contacted the pharmacy for the refill? Yes   Name of medication being requested: methotrexate and hydroxychloroquine  Provider who prescribed the medication:   Pharmacy: [facility]   Date medication is needed: ASAP     Action Taken: Other: Mplw Rheum    Travel Screening: Not Applicable

## 2023-09-14 NOTE — TELEPHONE ENCOUNTER
Pt had stopped HCQ and methotrexate recently- approx. 6 weeks ago. Pt states she is having more joint pains now and would like to go back on these medications. Pt has labs and f/up scheduled in November.      Dr. Vinson ok with pt resuming Methotrexate and HCQ, refills sent to pharmacy.

## 2023-09-26 NOTE — TELEPHONE ENCOUNTER
Refill Approved    Rx renewed per Medication Renewal Policy. Medication was last renewed on 11/17/20, last OV 10/1/20.    Agnes Irene, [facility] Triage/Med Refill 5/8/2021     Requested Prescriptions   Pending Prescriptions Disp Refills     losartan (COZAAR) 50 MG tablet [Pharmacy Med Name: Losartan Potassium 50 MG Oral Tablet] 90 tablet 0     Sig: Take 1 tablet by mouth once daily       Angiotensin Receptor Blocker Protocol Passed - 5/7/2021 11:50 AM        Passed - PCP or prescribing provider visit in past 12 months       Last office visit with prescriber/PCP: 6/17/2020 Isabel Maurer MD OR same dept: 6/17/2020 Isabel Maurer MD OR same specialty: 6/17/2020 Isabel Maurer MD  Last physical: 10/1/2020 Last MTM visit: Visit date not found   Next visit within 3 mo: Visit date not found  Next physical within 3 mo: Visit date not found  Prescriber OR PCP: Isabel Maurer MD  Last diagnosis associated with med order: 1. HTN (hypertension)  - losartan (COZAAR) 50 MG tablet [Pharmacy Med Name: Losartan Potassium 50 MG Oral Tablet]; Take 1 tablet by mouth once daily  Dispense: 90 tablet; Refill: 0    If protocol passes may refill for 12 months if within 3 months of last provider visit (or a total of 15 months).             Passed - Serum potassium within the past 12 months     Lab Results   Component Value Date    Potassium 4.4 10/01/2020             Passed - Blood pressure filed in past 12 months     BP Readings from Last 1 Encounters:   04/12/21 142/62             Passed - Serum creatinine within the past 12 months     Creatinine   Date Value Ref Range Status   01/13/2021 1.41 (H) 0.60 - 1.10 mg/dL Final                            
Quality 111:Pneumonia Vaccination Status For Older Adults: Patient received any pneumococcal conjugate or polysaccharide vaccine on or after their 60th birthday and before the end of the measurement period
Detail Level: Detailed
Quality 110: Preventive Care And Screening: Influenza Immunization: Influenza Immunization not Administered for Documented Reasons.

## 2023-09-27 ENCOUNTER — LAB (OUTPATIENT)
Dept: LAB | Facility: CLINIC | Age: 73
End: 2023-09-27
Payer: COMMERCIAL

## 2023-09-27 DIAGNOSIS — N18.32 STAGE 3B CHRONIC KIDNEY DISEASE (H): ICD-10-CM

## 2023-09-27 LAB
ALBUMIN MFR UR ELPH: 10.5 MG/DL
ALBUMIN SERPL BCG-MCNC: 4.3 G/DL (ref 3.5–5.2)
ALBUMIN UR-MCNC: 30 MG/DL
ALP SERPL-CCNC: 55 U/L (ref 35–104)
ALT SERPL W P-5'-P-CCNC: 17 U/L (ref 0–50)
ANION GAP SERPL CALCULATED.3IONS-SCNC: 12 MMOL/L (ref 7–15)
APPEARANCE UR: CLEAR
AST SERPL W P-5'-P-CCNC: 25 U/L (ref 0–45)
BACTERIA #/AREA URNS HPF: ABNORMAL /HPF
BILIRUB SERPL-MCNC: 0.7 MG/DL
BILIRUB UR QL STRIP: NEGATIVE
BUN SERPL-MCNC: 28.4 MG/DL (ref 8–23)
CALCIUM SERPL-MCNC: 9.3 MG/DL (ref 8.8–10.2)
CHLORIDE SERPL-SCNC: 102 MMOL/L (ref 98–107)
COLOR UR AUTO: YELLOW
CREAT SERPL-MCNC: 1.69 MG/DL (ref 0.51–0.95)
CREAT UR-MCNC: 95.5 MG/DL
CREAT UR-MCNC: 95.5 MG/DL
DEPRECATED HCO3 PLAS-SCNC: 25 MMOL/L (ref 22–29)
EGFRCR SERPLBLD CKD-EPI 2021: 32 ML/MIN/1.73M2
ERYTHROCYTE [DISTWIDTH] IN BLOOD BY AUTOMATED COUNT: 13.3 % (ref 10–15)
FERRITIN SERPL-MCNC: 182 NG/ML (ref 11–328)
GLUCOSE SERPL-MCNC: 118 MG/DL (ref 70–99)
GLUCOSE UR STRIP-MCNC: NEGATIVE MG/DL
HCT VFR BLD AUTO: 34.7 % (ref 35–47)
HGB BLD-MCNC: 11.5 G/DL (ref 11.7–15.7)
HGB UR QL STRIP: NEGATIVE
IRON BINDING CAPACITY (ROCHE): 275 UG/DL (ref 240–430)
IRON SATN MFR SERPL: 29 % (ref 15–46)
IRON SERPL-MCNC: 79 UG/DL (ref 37–145)
KETONES UR STRIP-MCNC: NEGATIVE MG/DL
LEUKOCYTE ESTERASE UR QL STRIP: ABNORMAL
MCH RBC QN AUTO: 30.6 PG (ref 26.5–33)
MCHC RBC AUTO-ENTMCNC: 33.1 G/DL (ref 31.5–36.5)
MCV RBC AUTO: 92 FL (ref 78–100)
MICROALBUMIN UR-MCNC: <12 MG/L
MICROALBUMIN/CREAT UR: NORMAL MG/G{CREAT}
NITRATE UR QL: NEGATIVE
PH UR STRIP: 8.5 [PH] (ref 5–8)
PLATELET # BLD AUTO: 163 10E3/UL (ref 150–450)
POTASSIUM SERPL-SCNC: 3.9 MMOL/L (ref 3.4–5.3)
PROT SERPL-MCNC: 6.7 G/DL (ref 6.4–8.3)
PROT/CREAT 24H UR: 0.11 MG/MG CR (ref 0–0.2)
RBC # BLD AUTO: 3.76 10E6/UL (ref 3.8–5.2)
RBC #/AREA URNS AUTO: ABNORMAL /HPF
SODIUM SERPL-SCNC: 139 MMOL/L (ref 135–145)
SP GR UR STRIP: 1.01 (ref 1–1.03)
SQUAMOUS #/AREA URNS AUTO: ABNORMAL /LPF
UROBILINOGEN UR STRIP-ACNC: 0.2 E.U./DL
WBC # BLD AUTO: 5.9 10E3/UL (ref 4–11)
WBC #/AREA URNS AUTO: ABNORMAL /HPF
WBC CLUMPS #/AREA URNS HPF: PRESENT /HPF

## 2023-09-27 PROCEDURE — 87086 URINE CULTURE/COLONY COUNT: CPT

## 2023-09-27 PROCEDURE — 82570 ASSAY OF URINE CREATININE: CPT

## 2023-09-27 PROCEDURE — 81001 URINALYSIS AUTO W/SCOPE: CPT

## 2023-09-27 PROCEDURE — 82728 ASSAY OF FERRITIN: CPT

## 2023-09-27 PROCEDURE — 84156 ASSAY OF PROTEIN URINE: CPT

## 2023-09-27 PROCEDURE — 83540 ASSAY OF IRON: CPT

## 2023-09-27 PROCEDURE — 83550 IRON BINDING TEST: CPT

## 2023-09-27 PROCEDURE — 80053 COMPREHEN METABOLIC PANEL: CPT

## 2023-09-27 PROCEDURE — 36415 COLL VENOUS BLD VENIPUNCTURE: CPT

## 2023-09-27 PROCEDURE — 82043 UR ALBUMIN QUANTITATIVE: CPT

## 2023-09-27 PROCEDURE — 85027 COMPLETE CBC AUTOMATED: CPT

## 2023-09-28 LAB — BACTERIA SPEC CULT: NO GROWTH

## 2023-10-12 ENCOUNTER — TELEPHONE (OUTPATIENT)
Dept: NEPHROLOGY | Facility: CLINIC | Age: 73
End: 2023-10-12
Payer: COMMERCIAL

## 2023-10-12 NOTE — TELEPHONE ENCOUNTER
[facility] Call Center    Phone Message    May a detailed message be left on voicemail: yes     Reason for Call: The patient called to reschedule her missed appointment. Writer scheduled first available, 3/20/24 and added to wait list. She is asking to be seen sooner. Please review and contact patient. Thank you.    Action Taken: Message routed to:  Other: Neph    Travel Screening: Not Applicable

## 2023-10-25 ENCOUNTER — OFFICE VISIT (OUTPATIENT)
Dept: NEUROLOGY | Facility: CLINIC | Age: 73
End: 2023-10-25
Payer: COMMERCIAL

## 2023-10-25 VITALS
DIASTOLIC BLOOD PRESSURE: 54 MMHG | SYSTOLIC BLOOD PRESSURE: 117 MMHG | BODY MASS INDEX: 28.17 KG/M2 | HEIGHT: 64 IN | WEIGHT: 165 LBS | HEART RATE: 75 BPM

## 2023-10-25 DIAGNOSIS — R47.1 DYSARTHRIA: Primary | ICD-10-CM

## 2023-10-25 DIAGNOSIS — G20.C PRIMARY PARKINSONISM (H): ICD-10-CM

## 2023-10-25 DIAGNOSIS — R44.3 HALLUCINATIONS: ICD-10-CM

## 2023-10-25 DIAGNOSIS — T88.7XXA MEDICATION SIDE EFFECTS: ICD-10-CM

## 2023-10-25 DIAGNOSIS — I63.9 CEREBROVASCULAR ACCIDENT (CVA), UNSPECIFIED MECHANISM (H): ICD-10-CM

## 2023-10-25 PROCEDURE — 99214 OFFICE O/P EST MOD 30 MIN: CPT | Performed by: PSYCHIATRY & NEUROLOGY

## 2023-10-25 RX ORDER — BENZTROPINE MESYLATE 0.5 MG/1
1.5 TABLET ORAL 2 TIMES DAILY
Qty: 540 TABLET | Refills: 0 | Status: SHIPPED | OUTPATIENT
Start: 2023-10-25 | End: 2023-10-25

## 2023-10-25 RX ORDER — BENZTROPINE MESYLATE 0.5 MG/1
1.5 TABLET ORAL 2 TIMES DAILY
Qty: 540 TABLET | Refills: 3 | Status: SHIPPED | OUTPATIENT
Start: 2023-10-25 | End: 2024-08-06

## 2023-10-25 NOTE — PROGRESS NOTES
NEUROLOGY OUTPATIENT PROGRESS NOTE   Oct 25, 2023     CHIEF COMPLAINT/REASON FOR VISIT/REASON FOR CONSULT  Patient presents with:  Primary parkinsonism: 6 month follow up- patient reports hallucinations have improved   Tremors    REASON FOR CONSULTATION- Tremors    REFERRAL SOURCE  Dr. Isabel Maurer  CC Dr. Isabel Maurer    HISTORY OF PRESENT ILLNESS  Claudia Wise is a 72 year old female seen today for evaluation of tremors.  Reports that the tremors have been there since February 2020.  Reports that the tremors are mainly in the left upper extremity.  Notices that they are present at rest.  They wake her up from sleep.  They can be present with activity as well.  Denies any gait difficulty though does have back issues and left leg weakness from the back issues which could affect her walking.  Reports no masklike facies.  No other new medications.  Has not been on any antipsychotics in the past.  Does have a diagnosis of depression anxiety and is on Cymbalta.  No tremors on the right side.  Denies any cognitive issues.  No rigidity.  Does have family history of Parkinson's disease in her aunt.    2/16/22  Patient returns today.  She could not tolerate the Sinemet.  Had dry heaving.  Did try to take a lower dose.  Did try to take it with crackers but still had dry heaving.  Did not feel any significant benefit.  Tremors were better in the evening time with the medication though the tremors are always better in the evening time.  Reports no other new issues.  Reports that her depression anxiety under good control.  Her  does not think she has masklike facies.  No other new symptoms or concerns.    She is working with her therapist for sacroiliac pain.  She is walking 10 minutes every other day.  Encourage her to do it more often.    4/14/22  Patient returns today.  Reports that the medication is somewhat helpful.  She is walking little bit faster.  Is doing physical therapy which is helping.  She forgets  to take the medication in the evening time.  Does have some complains of slurred speech as well.  No other new symptoms/complaints.    6/22/22  Patient returns today.  Reports that she had visual hallucinations with the Requip.  She would see people at night.  They would not talk to her.  There were no auditory hallucinations just that she would see people.  Stopping the Requip these hallucinations are gone away.  Is sleeping well at night.  Reports no jerking or any other activity.  Previously she had reported some dysarthria which has now improved.  She denies that she ever had dysarthria.  Has not seen the speech specialist.    8/24/22  Patient returns today.  She did try the Cogentin has not noticed any benefit.  Has not had any side effects either.  She continues to have the tremors.  Feels that anxiety makes them worse.  No issues with hallucinations or any abnormal dreams.  Denies any other new concerns.  She is using Nordic sticks to help her walk.  Did see her primary care doctor for chronic kidney disease.  She is also following up with a rheumatologist.    1/23/23  Patient returns today.  She has tried the Cogentin and feels that it is providing some benefit though things are worse at the end times.  We reviewed why things were worse at certain times and seems like it might be more related to anxiety and stress.  She also reports more shakes when is cold outside.  We discussed about possibly shivering.  Denies any other side effects of the Cogentin.  Is interested in trying a higher dose.  Does not feel the dysarthria is bothering her.  Has not seen speech therapy.    4/24/23  Patient returns today.  She is tried the higher dose of Cogentin and finds some benefit.  Does complain of some wearing off though is taking the medication at noon and then sometimes at bedtime.  Does report some hallucinations where she is seeing a squirrel at nighttime.  Does complain of some memory problems as well.  Does  complain of dry mouth though dry mouth spray is helping.  No other new neurological symptoms.  Is going to see an occupational therapist for her pain.  Wants to see a physical therapist for Parkinson's disease.    10/25/23  Patient is treated  1.  Continues to have the tremors.  Tremors are worse when she is more anxious or stressed out.  Discussed that this is normal for tremors to get worse.  Otherwise mainly notices the left upper extremity tremor worse with rest.  Cogentin seems to be helping has good days and bad days.  No side effects with the Cogentin.  Tremors overall does not really bother her.  No major hallucinations.  2.  Physical therapy significantly helped with her balance and gait.  3.  Did have a fall and a head CT was done.  Did show incidental age-indeterminate strokes.  No clear symptoms.  Occasionally will get some left leg weakness which could be from other causes.  Physical therapy is helping and wants to continue.  No other new issues.    Previous history is reviewed and this is unchanged.    PAST MEDICAL/SURGICAL HISTORY  Past Medical History:   Diagnosis Date    Anemia     Anxiety     Chronic kidney disease     Diabetes mellitus, type II (H)     prediabetes    Disease of thyroid gland     hypothyroid    Family history of myocardial infarction     Gout     High cholesterol     Hypertension     Inverted nipple     right    Macular edema     Osteoarthritis 01/01/2012    Rheumatoid arthritis (H) 01/01/2012     Patient Active Problem List   Diagnosis    Herpes Simplex Type II    Anxiety    Anemia in chronic kidney disease    Hyperlipemia    Gout    Hypertension    Prediabetes    Endometriosis    Other specified hypothyroidism    Seronegative rheumatoid arthritis of multiple sites (H)    High risk medication use    Bilateral primary osteoarthritis of knee    Senile osteoporosis    Compression fracture of L2 vertebra with routine healing    Chronic kidney disease, stage 3 (H)    Venous  (peripheral) insufficiency    Parkinson disease    History of small bowel obstruction    Primary insomnia   Significant for high cholesterol, high blood pressure, diabetes, arthritis, depression, osteoporosis    FAMILY HISTORY  Family History   Problem Relation Age of Onset    Breast Cancer Maternal Aunt     Cancer Maternal Aunt     Cancer Father     Cancer Sister     Cancer Maternal Grandmother     Cancer Cousin     Coronary Artery Disease Mother 49.00   Family history reviewed and negative for neurological conditions except for Parkinson's disease in her aunt.    SOCIAL HISTORY  Social History     Tobacco Use    Smoking status: Former     Types: Cigarettes     Quit date: 1995     Years since quittin.3    Smokeless tobacco: Never   Substance Use Topics    Alcohol use: Not Currently     Comment: Alcoholic Drinks/day: occasional couple times a year    Drug use: No       SYSTEMS REVIEW  Twelve-system ROS was done and other than the HPI this was negative except for neck pain, back pain, arm and leg pain, joint pain, weakness paralysis, difficulty walking, balance coordination problems, movement disorder, bladder symptoms, anxiety, depression, weight gain.  No new symptoms/issues.    MEDICATIONS  acetaminophen (TYLENOL) 500 MG tablet, Take 500 mg by mouth every 6 hours as needed   acyclovir (ZOVIRAX) 400 MG tablet, TAKE ONE TABLET BY MOUTH EVERY 8 HOURS AS NEEDED  allopurinol (ZYLOPRIM) 100 MG tablet, Take 1 tablet (100 mg) by mouth 2 times daily (with meals)  amLODIPine (NORVASC) 10 MG tablet, Take 1 tablet by mouth once daily  atorvastatin (LIPITOR) 10 MG tablet, Take 1 tablet (10 mg) by mouth At Bedtime  carboxymethylcellulose (REFRESH PLUS) 0.5 % Dpet ophthalmic dropperette, [CARBOXYMETHYLCELLULOSE (REFRESH PLUS) 0.5 % DPET OPHTHALMIC DROPPERETTE] Administer 1-2 drops to both eyes 4 (four) times a day as needed.   cyanocobalamin, vitamin B-12, 1,000 mcg Subl, [CYANOCOBALAMIN, VITAMIN B-12, 1,000 MCG SUBL]  "Place 1 tablet (1,000 mcg total) under the tongue daily.  denosumab (PROLIA) 60 MG/ML SOSY injection, Inject 60 mg Subcutaneous  DULoxetine (CYMBALTA) 20 MG capsule, Take 1 capsule (20 mg) by mouth daily  fluticasone (FLONASE) 50 MCG/ACT nasal spray, Use 1 spray(s) in each nostril once daily  hydroxychloroquine (PLAQUENIL) 200 MG tablet, Take 1 tablet (200 mg) by mouth 2 times daily Annual Plaquenil toxicity eye screening required.  levothyroxine (SYNTHROID/LEVOTHROID) 50 MCG tablet, TAKE 1 TABLET BY MOUTH ONCE DAILY 30  MINUTES  BEFORE  BREAKFAST  lidocaine (LIDODERM) 5 % patch, Place 1 patch onto the skin every 24 hours To prevent lidocaine toxicity, patient should be patch free for 12 hrs daily.  LORazepam (ATIVAN) 0.5 MG tablet, Take 1 tablet (0.5 mg) by mouth daily as needed for anxiety  losartan (COZAAR) 50 MG tablet, Take 1 tablet by mouth once daily  metoprolol tartrate (LOPRESSOR) 50 MG tablet, Take 1 tablet by mouth twice daily  sodium bicarbonate 650 MG tablet, Take 2 tablets (1,300 mg) by mouth 2 times daily  folic acid (FOLVITE) 1 MG tablet, Take 1 tablet (1,000 mcg) by mouth daily  methotrexate 2.5 MG tablet, Take 4 tablets (10 mg) by mouth every 7 days Labs every 8 - 12 weeks for refills.    No current facility-administered medications on file prior to visit.       PHYSICAL EXAMINATION  VITALS: /54 (BP Location: Right arm, Patient Position: Sitting)   Pulse 75   Ht 1.613 m (5' 3.5\")   Wt 74.8 kg (165 lb)   BMI 28.77 kg/m    GENERAL: Healthy appearing, alert, no acute distress, normal habitus.  CARDIOVASCULAR: Extremities warm and well perfused. Pulses present.   NEUROLOGICAL:  Patient is awake and grossly oriented to self, place and time.  Attention span is normal.  Memory is grossly intact; previously MoCA 24.  Language is fluent and follows commands appropriately.  Appropriate fund of knowledge. Cranial nerves 2-12 are intact. There is no pronator drift.  Motor exam shows 5/5 strength in " all extremities.  Tone is symmetric bilaterally in upper and lower extremities.  Resting tremor is noted in the left upper extremity.  No cogwheeling noted.  (Previously reflexes are symmetric and 1+ in upper extremities and lower extremities. Sensory exam is grossly intact to light touch, pin prick and vibration.)  Finger to nose and heel to shin is without dysmetria.  Romberg is negative.  Gait is normal except shows decreased left arm swing with resting tremor and the patient is able to do tandem walk and walk on toes and heels with some difficulty.  Previously-trying concentric circles shows very mild tremor.    Exam stable.  Continues to have left-sided resting tremor.    DIAGNOSTICS  RELEVANT LABS  Component      Latest Ref Rng & Units 10/1/2020   Sodium      136 - 145 mmol/L 137   Potassium      3.5 - 5.0 mmol/L 4.4   Chloride      98 - 107 mmol/L 110 (H)   Carbon Dioxide      22 - 31 mmol/L 14 (L)   Anion Gap      5 - 18 mmol/L 13   Glucose      70 - 125 mg/dL 114   Urea Nitrogen      8 - 28 mg/dL 30 (H)   Creatinine      0.60 - 1.10 mg/dL 1.18 (H)   GFR Estimate If Black      >60 mL/min/1.73m2 55 (L)   GFR Estimate      >60 mL/min/1.73m2 45 (L)   Bilirubin Total      0.0 - 1.0 mg/dL 0.8   Calcium      8.5 - 10.5 mg/dL 8.7   Protein Total      6.0 - 8.0 g/dL 6.8   Albumin      3.5 - 5.0 g/dL 4.3   Alkaline Phosphatase      45 - 120 U/L 63   AST      0 - 40 U/L 21   ALT      0 - 45 U/L 18   WBC      4.0 - 11.0 thou/uL 7.3   RBC Count      3.80 - 5.40 mill/uL 3.12 (L)   Hemoglobin      12.0 - 16.0 g/dL 10.4 (L)   Hematocrit      35.0 - 47.0 % 31.2 (L)   MCV      80 - 100 fL 100   MCH      27.0 - 34.0 pg 33.4   MCHC      32.0 - 36.0 g/dL 33.4   RDW      11.0 - 14.5 % 14.5   Platelet Count      140 - 440 thou/uL 151   Mean Platelet Volume      7.0 - 10.0 fL 8.7   TSH      0.30 - 5.00 uIU/mL 1.56   Uric Acid      2.0 - 7.5 mg/dL 4.1     OUTSIDE RECORDS  Outside referral notes and chart notes were reviewed and  pertinent information has been summarized (in addition to the HPI):-Patient does have a diagnosis of anxiety.  Has been having some tremors.  Is on multiple antidepressant/antianxiety medications.  Also has some back pain issues.  Is also on narcotics.    LABS  Component      Latest Ref Rng & Units 12/21/2021   Sodium      136 - 145 mmol/L 138   Potassium      3.5 - 5.0 mmol/L 4.3   Chloride      98 - 107 mmol/L 110 (H)   Carbon Dioxide      22 - 31 mmol/L 17 (L)   Anion Gap      5 - 18 mmol/L 11   Urea Nitrogen      8 - 28 mg/dL 30 (H)   Creatinine      0.60 - 1.10 mg/dL 1.21 (H)   Calcium      8.5 - 10.5 mg/dL 9.4   Glucose      70 - 125 mg/dL 101   Alkaline Phosphatase      45 - 120 U/L 55   AST      0 - 40 U/L 30   ALT      0 - 45 U/L 9   Protein Total      6.0 - 8.0 g/dL 6.7   Albumin      3.5 - 5.0 g/dL 4.0   Bilirubin Total      0.0 - 1.0 mg/dL 0.7   GFR Estimate      >60 mL/min/1.73m2 47 (L)   TSH      0.30 - 5.00 uIU/mL 0.98   Hemoglobin A1C      0.0 - 5.6 % 5.7 (H)   Ferritin      10 - 130 ng/mL 253 (H)   Vitamin B12      213 - 816 pg/mL >2,000 (H)   Methylmalonic Acid      0.00 - 0.40 umol/L 0.24     HEAD CT:-Images reviewed with the patient.  Areas of stroke shown.  1.  No CT evidence for acute traumatic intracranial process. Specifically, no acute hemorrhage or abnormal extra-axial fluid collection.  2.  Age-indeterminate right thalamic and right cerebellar lacunar-type infarctions.  3.  Brain atrophy and presumed chronic microvascular ischemic changes as above.     CERVICAL SPINE CT:  1.  No CT evidence for acute fracture or posttraumatic subluxation.  2.  Degenerative changes, as above.  3.  Left thyroid lobe low-density nodule measuring 1 cm.    IMPRESSION/REPORT/PLAN  Primary parkinsonism (H)  Memory complaints  History of fall  CT document stroke    This is a 72 year old female with new onset of left upper extremity resting tremor.  Examination further shows masklike facies, decreased arm swing on  the left side, left resting tremor.  No cogwheeling.  Patient symptoms are suggestive of Parkinson's disease.  Blood work was overall noncontributory.  She has had side effects with Sinemet.  Requip caused hallucinations and she stopped the medications.  Cogentin is  currently helping and will continue.  She is overall satisfied with the control of her symptoms and will monitor.  Symptoms are worse at the time of feeling stressed/anxious which is normal.  Recommend exercise and staying active.  We will set her up with physical therapy.    She does have some intermittent hallucinations which have now improved.  Concern for Lewy body dementia.  Cottle was 24 previously.  Will monitor.    She has recently had a fall and head CT did show some age-indeterminate right-sided strokes.  She is asymptomatic from that.  Recommend working with primary care doctor for vascular risk factor management.  She does have some history of smoking.    Return back in 1 year.    -     benztropine (COGENTIN) 0.5 MG tablet; Take 3 tablets (1.5 mg) by mouth 2 times daily  -     Physical Therapy Referral; Future    Return in about 1 year (around 10/25/2024) for In-Clinic Visit (must).    Over 30 minutes were spent coordinating the care for the patient on the day of the encounter.  This includes previsit, during visit and post visit activities as documented above.  Counseling patient.  Reviewing chart.  Prescription management.  New problem.  Reviewing ER notes.  (Activities include but not inclusive of reviewing chart, reviewing outside records, reviewing labs and imaging study results as well as the images, patient visit time including getting history and exam,  use if applicable, review of test results with the patient and coming up with a plan in a shared model, counseling patient and family, education and answering patient questions, EMR , EMR diagnosis entry and problem list management, medication reconciliation and  prescription management if applicable, paperwork if applicable, printing documents and documentation of the visit activities.)    Logan Smart MD  Neurologist  [facility]  Tel:- [phone]    This note was dictated using voice recognition software.  Any grammatical or context distortions are unintentional and inherent to the software.

## 2023-10-25 NOTE — LETTER
10/25/2023         RE: Claudia Wise   [address] [facility] [address]        Dear Colleague,    Thank you for referring your patient, Claudia Wise, to the [facility] [place]. Please see a copy of my visit note below.    NEUROLOGY OUTPATIENT PROGRESS NOTE   Oct 25, 2023     CHIEF COMPLAINT/REASON FOR VISIT/REASON FOR CONSULT  Patient presents with:  Primary parkinsonism: 6 month follow up- patient reports hallucinations have improved   Tremors    REASON FOR CONSULTATION- Tremors    REFERRAL SOURCE  Dr. Isabel Maurer  CC Dr. Isabel Maurer    HISTORY OF PRESENT ILLNESS  Claudia Wise is a 72 year old female seen today for evaluation of tremors.  Reports that the tremors have been there since February 2020.  Reports that the tremors are mainly in the left upper extremity.  Notices that they are present at rest.  They wake her up from sleep.  They can be present with activity as well.  Denies any gait difficulty though does have back issues and left leg weakness from the back issues which could affect her walking.  Reports no masklike facies.  No other new medications.  Has not been on any antipsychotics in the past.  Does have a diagnosis of depression anxiety and is on Cymbalta.  No tremors on the right side.  Denies any cognitive issues.  No rigidity.  Does have family history of Parkinson's disease in her aunt.    2/16/22  Patient returns today.  She could not tolerate the Sinemet.  Had dry heaving.  Did try to take a lower dose.  Did try to take it with crackers but still had dry heaving.  Did not feel any significant benefit.  Tremors were better in the evening time with the medication though the tremors are always better in the evening time.  Reports no other new issues.  Reports that her depression anxiety under good control.  Her  does not think she has masklike facies.  No other new symptoms or concerns.    She is working with her therapist for sacroiliac  pain.  She is walking 10 minutes every other day.  Encourage her to do it more often.    4/14/22  Patient returns today.  Reports that the medication is somewhat helpful.  She is walking little bit faster.  Is doing physical therapy which is helping.  She forgets to take the medication in the evening time.  Does have some complains of slurred speech as well.  No other new symptoms/complaints.    6/22/22  Patient returns today.  Reports that she had visual hallucinations with the Requip.  She would see people at night.  They would not talk to her.  There were no auditory hallucinations just that she would see people.  Stopping the Requip these hallucinations are gone away.  Is sleeping well at night.  Reports no jerking or any other activity.  Previously she had reported some dysarthria which has now improved.  She denies that she ever had dysarthria.  Has not seen the speech specialist.    8/24/22  Patient returns today.  She did try the Cogentin has not noticed any benefit.  Has not had any side effects either.  She continues to have the tremors.  Feels that anxiety makes them worse.  No issues with hallucinations or any abnormal dreams.  Denies any other new concerns.  She is using Nordic sticks to help her walk.  Did see her primary care doctor for chronic kidney disease.  She is also following up with a rheumatologist.    1/23/23  Patient returns today.  She has tried the Cogentin and feels that it is providing some benefit though things are worse at the end times.  We reviewed why things were worse at certain times and seems like it might be more related to anxiety and stress.  She also reports more shakes when is cold outside.  We discussed about possibly shivering.  Denies any other side effects of the Cogentin.  Is interested in trying a higher dose.  Does not feel the dysarthria is bothering her.  Has not seen speech therapy.    4/24/23  Patient returns today.  She is tried the higher dose of Cogentin and  finds some benefit.  Does complain of some wearing off though is taking the medication at noon and then sometimes at bedtime.  Does report some hallucinations where she is seeing a squirrel at nighttime.  Does complain of some memory problems as well.  Does complain of dry mouth though dry mouth spray is helping.  No other new neurological symptoms.  Is going to see an occupational therapist for her pain.  Wants to see a physical therapist for Parkinson's disease.    10/25/23  Patient is treated  1.  Continues to have the tremors.  Tremors are worse when she is more anxious or stressed out.  Discussed that this is normal for tremors to get worse.  Otherwise mainly notices the left upper extremity tremor worse with rest.  Cogentin seems to be helping has good days and bad days.  No side effects with the Cogentin.  Tremors overall does not really bother her.  No major hallucinations.  2.  Physical therapy significantly helped with her balance and gait.  3.  Did have a fall and a head CT was done.  Did show incidental age-indeterminate strokes.  No clear symptoms.  Occasionally will get some left leg weakness which could be from other causes.  Physical therapy is helping and wants to continue.  No other new issues.    Previous history is reviewed and this is unchanged.    PAST MEDICAL/SURGICAL HISTORY  Past Medical History:   Diagnosis Date     Anemia      Anxiety      Chronic kidney disease      Diabetes mellitus, type II (H)     prediabetes     Disease of thyroid gland     hypothyroid     Family history of myocardial infarction      Gout      High cholesterol      Hypertension      Inverted nipple     right     Macular edema      Osteoarthritis 01/01/2012     Rheumatoid arthritis (H) 01/01/2012     Patient Active Problem List   Diagnosis     Herpes Simplex Type II     Anxiety     Anemia in chronic kidney disease     Hyperlipemia     Gout     Hypertension     Prediabetes     Endometriosis     Other specified  hypothyroidism     Seronegative rheumatoid arthritis of multiple sites (H)     High risk medication use     Bilateral primary osteoarthritis of knee     Senile osteoporosis     Compression fracture of L2 vertebra with routine healing     Chronic kidney disease, stage 3 (H)     Venous (peripheral) insufficiency     Parkinson disease     History of small bowel obstruction     Primary insomnia   Significant for high cholesterol, high blood pressure, diabetes, arthritis, depression, osteoporosis    FAMILY HISTORY  Family History   Problem Relation Age of Onset     Breast Cancer Maternal Aunt      Cancer Maternal Aunt      Cancer Father      Cancer Sister      Cancer Maternal Grandmother      Cancer Cousin      Coronary Artery Disease Mother 49.00   Family history reviewed and negative for neurological conditions except for Parkinson's disease in her aunt.    SOCIAL HISTORY  Social History     Tobacco Use     Smoking status: Former     Types: Cigarettes     Quit date: 1995     Years since quittin.3     Smokeless tobacco: Never   Substance Use Topics     Alcohol use: Not Currently     Comment: Alcoholic Drinks/day: occasional couple times a year     Drug use: No       SYSTEMS REVIEW  Twelve-system ROS was done and other than the HPI this was negative except for neck pain, back pain, arm and leg pain, joint pain, weakness paralysis, difficulty walking, balance coordination problems, movement disorder, bladder symptoms, anxiety, depression, weight gain.  No new symptoms/issues.    MEDICATIONS  acetaminophen (TYLENOL) 500 MG tablet, Take 500 mg by mouth every 6 hours as needed   acyclovir (ZOVIRAX) 400 MG tablet, TAKE ONE TABLET BY MOUTH EVERY 8 HOURS AS NEEDED  allopurinol (ZYLOPRIM) 100 MG tablet, Take 1 tablet (100 mg) by mouth 2 times daily (with meals)  amLODIPine (NORVASC) 10 MG tablet, Take 1 tablet by mouth once daily  atorvastatin (LIPITOR) 10 MG tablet, Take 1 tablet (10 mg) by mouth At  "Bedtime  carboxymethylcellulose (REFRESH PLUS) 0.5 % Dpet ophthalmic dropperette, [CARBOXYMETHYLCELLULOSE (REFRESH PLUS) 0.5 % DPET OPHTHALMIC DROPPERETTE] Administer 1-2 drops to both eyes 4 (four) times a day as needed.   cyanocobalamin, vitamin B-12, 1,000 mcg Subl, [CYANOCOBALAMIN, VITAMIN B-12, 1,000 MCG SUBL] Place 1 tablet (1,000 mcg total) under the tongue daily.  denosumab (PROLIA) 60 MG/ML SOSY injection, Inject 60 mg Subcutaneous  DULoxetine (CYMBALTA) 20 MG capsule, Take 1 capsule (20 mg) by mouth daily  fluticasone (FLONASE) 50 MCG/ACT nasal spray, Use 1 spray(s) in each nostril once daily  hydroxychloroquine (PLAQUENIL) 200 MG tablet, Take 1 tablet (200 mg) by mouth 2 times daily Annual Plaquenil toxicity eye screening required.  levothyroxine (SYNTHROID/LEVOTHROID) 50 MCG tablet, TAKE 1 TABLET BY MOUTH ONCE DAILY 30  MINUTES  BEFORE  BREAKFAST  lidocaine (LIDODERM) 5 % patch, Place 1 patch onto the skin every 24 hours To prevent lidocaine toxicity, patient should be patch free for 12 hrs daily.  LORazepam (ATIVAN) 0.5 MG tablet, Take 1 tablet (0.5 mg) by mouth daily as needed for anxiety  losartan (COZAAR) 50 MG tablet, Take 1 tablet by mouth once daily  metoprolol tartrate (LOPRESSOR) 50 MG tablet, Take 1 tablet by mouth twice daily  sodium bicarbonate 650 MG tablet, Take 2 tablets (1,300 mg) by mouth 2 times daily  folic acid (FOLVITE) 1 MG tablet, Take 1 tablet (1,000 mcg) by mouth daily  methotrexate 2.5 MG tablet, Take 4 tablets (10 mg) by mouth every 7 days Labs every 8 - 12 weeks for refills.    No current facility-administered medications on file prior to visit.       PHYSICAL EXAMINATION  VITALS: /54 (BP Location: Right arm, Patient Position: Sitting)   Pulse 75   Ht 1.613 m (5' 3.5\")   Wt 74.8 kg (165 lb)   BMI 28.77 kg/m    GENERAL: Healthy appearing, alert, no acute distress, normal habitus.  CARDIOVASCULAR: Extremities warm and well perfused. Pulses present.   NEUROLOGICAL:  " Patient is awake and grossly oriented to self, place and time.  Attention span is normal.  Memory is grossly intact; previously MoCA 24.  Language is fluent and follows commands appropriately.  Appropriate fund of knowledge. Cranial nerves 2-12 are intact. There is no pronator drift.  Motor exam shows 5/5 strength in all extremities.  Tone is symmetric bilaterally in upper and lower extremities.  Resting tremor is noted in the left upper extremity.  No cogwheeling noted.  (Previously reflexes are symmetric and 1+ in upper extremities and lower extremities. Sensory exam is grossly intact to light touch, pin prick and vibration.)  Finger to nose and heel to shin is without dysmetria.  Romberg is negative.  Gait is normal except shows decreased left arm swing with resting tremor and the patient is able to do tandem walk and walk on toes and heels with some difficulty.  Previously-trying concentric circles shows very mild tremor.    Exam stable.  Continues to have left-sided resting tremor.    DIAGNOSTICS  RELEVANT LABS  Component      Latest Ref Rng & Units 10/1/2020   Sodium      136 - 145 mmol/L 137   Potassium      3.5 - 5.0 mmol/L 4.4   Chloride      98 - 107 mmol/L 110 (H)   Carbon Dioxide      22 - 31 mmol/L 14 (L)   Anion Gap      5 - 18 mmol/L 13   Glucose      70 - 125 mg/dL 114   Urea Nitrogen      8 - 28 mg/dL 30 (H)   Creatinine      0.60 - 1.10 mg/dL 1.18 (H)   GFR Estimate If Black      >60 mL/min/1.73m2 55 (L)   GFR Estimate      >60 mL/min/1.73m2 45 (L)   Bilirubin Total      0.0 - 1.0 mg/dL 0.8   Calcium      8.5 - 10.5 mg/dL 8.7   Protein Total      6.0 - 8.0 g/dL 6.8   Albumin      3.5 - 5.0 g/dL 4.3   Alkaline Phosphatase      45 - 120 U/L 63   AST      0 - 40 U/L 21   ALT      0 - 45 U/L 18   WBC      4.0 - 11.0 thou/uL 7.3   RBC Count      3.80 - 5.40 mill/uL 3.12 (L)   Hemoglobin      12.0 - 16.0 g/dL 10.4 (L)   Hematocrit      35.0 - 47.0 % 31.2 (L)   MCV      80 - 100 fL 100   MCH      27.0 -  34.0 pg 33.4   MCHC      32.0 - 36.0 g/dL 33.4   RDW      11.0 - 14.5 % 14.5   Platelet Count      140 - 440 thou/uL 151   Mean Platelet Volume      7.0 - 10.0 fL 8.7   TSH      0.30 - 5.00 uIU/mL 1.56   Uric Acid      2.0 - 7.5 mg/dL 4.1     OUTSIDE RECORDS  Outside referral notes and chart notes were reviewed and pertinent information has been summarized (in addition to the HPI):-Patient does have a diagnosis of anxiety.  Has been having some tremors.  Is on multiple antidepressant/antianxiety medications.  Also has some back pain issues.  Is also on narcotics.    LABS  Component      Latest Ref Rng & Units 12/21/2021   Sodium      136 - 145 mmol/L 138   Potassium      3.5 - 5.0 mmol/L 4.3   Chloride      98 - 107 mmol/L 110 (H)   Carbon Dioxide      22 - 31 mmol/L 17 (L)   Anion Gap      5 - 18 mmol/L 11   Urea Nitrogen      8 - 28 mg/dL 30 (H)   Creatinine      0.60 - 1.10 mg/dL 1.21 (H)   Calcium      8.5 - 10.5 mg/dL 9.4   Glucose      70 - 125 mg/dL 101   Alkaline Phosphatase      45 - 120 U/L 55   AST      0 - 40 U/L 30   ALT      0 - 45 U/L 9   Protein Total      6.0 - 8.0 g/dL 6.7   Albumin      3.5 - 5.0 g/dL 4.0   Bilirubin Total      0.0 - 1.0 mg/dL 0.7   GFR Estimate      >60 mL/min/1.73m2 47 (L)   TSH      0.30 - 5.00 uIU/mL 0.98   Hemoglobin A1C      0.0 - 5.6 % 5.7 (H)   Ferritin      10 - 130 ng/mL 253 (H)   Vitamin B12      213 - 816 pg/mL >2,000 (H)   Methylmalonic Acid      0.00 - 0.40 umol/L 0.24     HEAD CT:-Images reviewed with the patient.  Areas of stroke shown.  1.  No CT evidence for acute traumatic intracranial process. Specifically, no acute hemorrhage or abnormal extra-axial fluid collection.  2.  Age-indeterminate right thalamic and right cerebellar lacunar-type infarctions.  3.  Brain atrophy and presumed chronic microvascular ischemic changes as above.     CERVICAL SPINE CT:  1.  No CT evidence for acute fracture or posttraumatic subluxation.  2.  Degenerative changes, as above.  3.   Left thyroid lobe low-density nodule measuring 1 cm.    IMPRESSION/REPORT/PLAN  Primary parkinsonism (H)  Memory complaints  History of fall  CT document stroke    This is a 72 year old female with new onset of left upper extremity resting tremor.  Examination further shows masklike facies, decreased arm swing on the left side, left resting tremor.  No cogwheeling.  Patient symptoms are suggestive of Parkinson's disease.  Blood work was overall noncontributory.  She has had side effects with Sinemet.  Requip caused hallucinations and she stopped the medications.  Cogentin is  currently helping and will continue.  She is overall satisfied with the control of her symptoms and will monitor.  Symptoms are worse at the time of feeling stressed/anxious which is normal.  Recommend exercise and staying active.  We will set her up with physical therapy.    She does have some intermittent hallucinations which have now improved.  Concern for Lewy body dementia.  [place] was 24 previously.  Will monitor.    She has recently had a fall and head CT did show some age-indeterminate right-sided strokes.  She is asymptomatic from that.  Recommend working with primary care doctor for vascular risk factor management.  She does have some history of smoking.    Return back in 1 year.    -     benztropine (COGENTIN) 0.5 MG tablet; Take 3 tablets (1.5 mg) by mouth 2 times daily  -     Physical Therapy Referral; Future    Return in about 1 year (around 10/25/2024) for In-Clinic Visit (must).    Over 30 minutes were spent coordinating the care for the patient on the day of the encounter.  This includes previsit, during visit and post visit activities as documented above.  Counseling patient.  Reviewing chart.  Prescription management.  New problem.  Reviewing ER notes.  (Activities include but not inclusive of reviewing chart, reviewing outside records, reviewing labs and imaging study results as well as the images, patient visit time  including getting history and exam,  use if applicable, review of test results with the patient and coming up with a plan in a shared model, counseling patient and family, education and answering patient questions, EMR , EMR diagnosis entry and problem list management, medication reconciliation and prescription management if applicable, paperwork if applicable, printing documents and documentation of the visit activities.)    Logan Smart MD  Neurologist  [facility]  Tel:- [phone]    This note was dictated using voice recognition software.  Any grammatical or context distortions are unintentional and inherent to the software.      Again, thank you for allowing me to participate in the care of your patient.        Sincerely,        Logan Smart MD

## 2023-10-25 NOTE — NURSING NOTE
Chief Complaint   Patient presents with    Primary parkinsonism     6 month follow up- patient reports hallucinations have improved      Jessica Mendosa CMA on 10/25/2023 at 11:00 AM  [facility]

## 2023-11-06 ENCOUNTER — DOCUMENTATION ONLY (OUTPATIENT)
Dept: RHEUMATOLOGY | Facility: CLINIC | Age: 73
End: 2023-11-06
Payer: COMMERCIAL

## 2023-11-06 DIAGNOSIS — M06.09 SERONEGATIVE RHEUMATOID ARTHRITIS OF MULTIPLE SITES (H): Primary | ICD-10-CM

## 2023-11-10 ENCOUNTER — OFFICE VISIT (OUTPATIENT)
Dept: NEPHROLOGY | Facility: CLINIC | Age: 73
End: 2023-11-10
Attending: INTERNAL MEDICINE
Payer: COMMERCIAL

## 2023-11-10 VITALS
BODY MASS INDEX: 28.05 KG/M2 | RESPIRATION RATE: 16 BRPM | HEART RATE: 71 BPM | OXYGEN SATURATION: 98 % | HEIGHT: 64 IN | SYSTOLIC BLOOD PRESSURE: 132 MMHG | DIASTOLIC BLOOD PRESSURE: 72 MMHG | WEIGHT: 164.3 LBS

## 2023-11-10 DIAGNOSIS — N18.32 STAGE 3B CHRONIC KIDNEY DISEASE (H): Primary | ICD-10-CM

## 2023-11-10 PROCEDURE — 99214 OFFICE O/P EST MOD 30 MIN: CPT | Performed by: INTERNAL MEDICINE

## 2023-11-10 ASSESSMENT — PAIN SCALES - GENERAL: PAINLEVEL: NO PAIN (0)

## 2023-11-10 NOTE — PROGRESS NOTES
Nephrology Clinic    Claudia Wise MRN:[id number] YOB: 1950  Date of Service: 11/10/2023  Primary care provider: Isabel Maurer  Requesting physician: Isabel Maurer      REASON FOR CONSULT: CKD stage 3b    HISTORY OF PRESENT ILLNESS:   Claudia Wise is a 73 year old female who first presented for evaluation of CKD stage 3b in June 2023.  The past medical history is significant for hypertension for more than 10 years on losartan 50 mg once daily, amlodipine 10 mg daily and metoprolol 50 mg twice daily , Parkinson's disease since 2020 on benztropine 1.5 mg daily , anxiety/depression on duloxetine, pre-diabetes, thyroid disorder, gout on allopurinol 100 mg twice daily, osteopenia, small bowel obstruction with surgical intervention in 2021, seronegative rheumatoid arthritis diagnosed in 2012 on methotrexate 10 mg once weekly and hydroxychloroquine 200 mg twice daily, osteoporosis on denosumab and CKD stage 3b.  From a renal standpoint she seems to have CKD since at least 2020 with a baseline creatinine level between 1.2 and 1.4 mg/dL. Her creatinine level is 1.35 mg/dL on 5/30. It is 1.69 mg/dL on 9/27/2023. She stopped methotrexate on her own a month ago as she was getting vaccine shots and hasn't restarted it  The uACR is 13.09 mg/g Cr on 5/30 and is negative for any proteinuria on 9/27/2023.  Renal imaging was last done in 2020 and the CT scan of the abdomen and pelvis done in January 2020 shows two tiny nonobstructing right renal calculi, the largest measuring 0.3 cm and no other abnormality at the kidney level.  The patient denies any dysuria, any pollakiuria, any nocturia, any LE edema, any dyspnea on exertion .  The following portions of the patient's history were reviewed and updated as appropriate: allergies, current medications, past family history, past medical history, past social history, past surgical history and problem list.    PAST MEDICAL HISTORY:  Past Medical History:    Diagnosis Date    Anemia     Anxiety     Chronic kidney disease     Diabetes mellitus, type II (H)     prediabetes    Disease of thyroid gland     hypothyroid    Family history of myocardial infarction     Gout     High cholesterol     Hypertension     Inverted nipple     right    Macular edema     Osteoarthritis 01/01/2012    Rheumatoid arthritis (H) 01/01/2012     PAST SURGICAL HISTORY:  Past Surgical History:   Procedure Laterality Date    COLON SURGERY  1986    COLPORRHAPHY N/A 7/7/2016    Procedure: ANTERIOR REPAIR WITH MESH;  Surgeon: Zac Stone MD;  Location: [facility];  Service:      UGI ENDOSCOPY W PLACEMENT GASTROSTOMY TUBE PERCUT N/A 2/7/2020    Procedure: Percutaneous Endoscopic Gastromstomy Tube Placement;  Surgeon: Zion Schwarz MD;  Location: [facility] OR;  Service: General    HYSTERECTOMY      LAPAROTOMY EXPLORATORY N/A 1/23/2020    Procedure: EXPLORATORY LAPARATOMY, EXTENSIVE LYSIS OF ADHESIONS, SMALL BOWEL RESECTION;  Surgeon: Claudia Jeronimo MD;  Location: [facility];  Service: General    OOPHORECTOMY       MEDICATIONS:  Prescription Medications as of 11/10/2023         Rx Number Disp Refills Start End Last Dispensed Date Next Fill Date Owning Pharmacy    acetaminophen (TYLENOL) 500 MG tablet    1/7/2017        Sig: Take 500 mg by mouth every 6 hours as needed     Class: Historical    Route: Oral    acyclovir (ZOVIRAX) 400 MG tablet  60 tablet 0 1/21/2022    [place] [address] [address]    Sig: TAKE ONE TABLET BY MOUTH EVERY 8 HOURS AS NEEDED    Class: E-Prescribe    allopurinol (ZYLOPRIM) 100 MG tablet  180 tablet 3 7/7/2022    [place] [address] [address]    Sig: Take 1 tablet (100 mg) by mouth 2 times daily (with meals)    Class: E-Prescribe    Route: Oral    amLODIPine (NORVASC) 10 MG tablet  90 tablet 3 8/23/2023    [place] [address] [address]    Sig: Take 1 tablet by  mouth once daily    Class: E-Prescribe    Route: Oral    Renewals       Renewal provider: Christian Montalvo MD            atorvastatin (LIPITOR) 10 MG tablet  90 tablet 3 6/12/2023    [place] [address] [address]    Sig: Take 1 tablet (10 mg) by mouth At Bedtime    Class: E-Prescribe    Route: Oral    benztropine (COGENTIN) 0.5 MG tablet  540 tablet 3 10/25/2023    [place] [address] [address]    Sig: Take 3 tablets (1.5 mg) by mouth 2 times daily    Class: E-Prescribe    Route: Oral    No prior authorization was found for this prescription.    Found prior authorization for another prescription for the same medication: Approved    carboxymethylcellulose (REFRESH PLUS) 0.5 % Dpet ophthalmic dropperette    1/21/2020        Sig: [CARBOXYMETHYLCELLULOSE (REFRESH PLUS) 0.5 % DPET OPHTHALMIC DROPPERETTE] Administer 1-2 drops to both eyes 4 (four) times a day as needed.     Class: Historical    Route: Both Eyes    cyanocobalamin, vitamin B-12, 1,000 mcg Subl   0 3/12/2020        Sig: [CYANOCOBALAMIN, VITAMIN B-12, 1,000 MCG SUBL] Place 1 tablet (1,000 mcg total) under the tongue daily.    Class: No Print Out    Route: Sublingual    denosumab (PROLIA) 60 MG/ML SOSY injection    7/2/2021 10/25/2023   [place] [address] [address]    Sig: Inject 60 mg Subcutaneous    Class: Historical    Route: Subcutaneous    DULoxetine (CYMBALTA) 20 MG capsule  90 capsule 3 6/12/2023    [place] [address] [address]    Sig: Take 1 capsule (20 mg) by mouth daily    Class: E-Prescribe    Route: Oral    fluticasone (FLONASE) 50 MCG/ACT nasal spray  48 g 3 9/21/2022    [place] [address] [address]    Sig: Use 1 spray(s) in each nostril once daily    Class: E-Prescribe    folic acid (FOLVITE) 1 MG tablet  90 tablet 0 2/24/2022 2/15/2023   [place] [address] - [place], [place] - [facility] [address]    Sig: Take  1 tablet (1,000 mcg) by mouth daily    Class: E-Prescribe    Route: Oral    hydroxychloroquine (PLAQUENIL) 200 MG tablet  180 tablet 0 9/14/2023    [place] [address] [address]    Sig: Take 1 tablet (200 mg) by mouth 2 times daily Annual Plaquenil toxicity eye screening required.    Class: E-Prescribe    Route: Oral    levothyroxine (SYNTHROID/LEVOTHROID) 50 MCG tablet  90 tablet 3 9/1/2023    [place] [address] [address]    Sig: TAKE 1 TABLET BY MOUTH ONCE DAILY 30  MINUTES  BEFORE  BREAKFAST    Class: E-Prescribe    lidocaine (LIDODERM) 5 % patch  14 patch 1 9/24/2021    [place] [address] [address]    Sig: Place 1 patch onto the skin every 24 hours To prevent lidocaine toxicity, patient should be patch free for 12 hrs daily.    Class: E-Prescribe    Route: Transdermal    No prior authorization was found for this prescription.    Found prior authorization for another prescription for the same medication: Approved    LORazepam (ATIVAN) 0.5 MG tablet  30 tablet 0 7/20/2022    [place] [address] [address]    Sig: Take 1 tablet (0.5 mg) by mouth daily as needed for anxiety    Class: E-Prescribe    Route: Oral    Prior authorization: Approved    losartan (COZAAR) 50 MG tablet  90 tablet 3 4/25/2023    [place] [address] [address]    Sig: Take 1 tablet by mouth once daily    Class: E-Prescribe    methotrexate 2.5 MG tablet  36 tablet 0 9/14/2023    [place] [address] [address]    Sig: Take 4 tablets (10 mg) by mouth every 7 days Labs every 8 - 12 weeks for refills.    Class: E-Prescribe    Route: Oral    No prior authorization was found for this prescription.    Found prior authorization for another prescription for the same medication: Closed - Prior Authorization not required for patient/medication    metoprolol tartrate (LOPRESSOR) 50 MG tablet  180  tablet 3 2/20/2023    [place] [id number]    Sig: Take 1 tablet by mouth twice daily    Class: E-Prescribe    sodium bicarbonate 650 MG tablet  100 tablet 3 6/7/2023    [address]    Sig: Take 2 tablets (1,300 mg) by mouth 2 times daily    Class: E-Prescribe    Route: Oral           ALLERGIES:    Allergies   Allergen Reactions    Bupropion Hcl [Bupropion] Nausea    Erythromycin Base [Erythromycin] Nausea    Paxil [Paroxetine] Diarrhea    Tetracyclines & Related Itching    Valacyclovir Nausea     REVIEW OF SYSTEMS:  Review Of Systems  Skin: negative for, pigmentation, acne, rash, scaling, itching, bruising  Eyes: negative for, visual blurring, double vision, glaucoma, cataracts, eye pain, color blindness, glasses  Ears/Nose/Throat: negative for, nasal congestion, purulent rhinorrhea, sneezing, postnasal drainage, hearing loss, deafness, tinnitus, vertigo  Respiratory: No shortness of breath, dyspnea on exertion, cough, or hemoptysis  Cardiovascular: negative for, palpitations, tachycardia, irregular heart beat, chest pain, exertional chest pain or pressure and paroxysmal nocturnal dyspnea  Gastrointestinal: negative for, poor appetite, dysphagia, nausea, vomiting, heartburn, dyspepsia, reflux and hematemesis  Genitourinary: negative for, nocturia, dysuria, frequency, urgency, hesitancy, hematuria, retention, decreased urinary stream and incontinence  Musculoskeletal: negative for, fracture, back pain, neck pain, arthritis, joint pain, joint swelling and joint stiffness  Neurologic: negative for, headaches, syncope, stroke, seizures, paralysis, local weakness, numbness or tingling of hands and numbness or tingling of feet    A comprehensive review of systems was performed and found to be negative except as described here or above.  SOCIAL HISTORY:   Social History     Socioeconomic History    Marital status:      Spouse name: Not  on file    Number of children: Not on file    Years of education: Not on file    Highest education level: Not on file   Occupational History    Not on file   Tobacco Use    Smoking status: Former     Types: Cigarettes     Quit date: 1995     Years since quittin.3    Smokeless tobacco: Never   Substance and Sexual Activity    Alcohol use: Not Currently     Comment: Alcoholic Drinks/day: occasional couple times a year    Drug use: No    Sexual activity: Not on file   Other Topics Concern    Not on file   Social History Narrative    Not on file     Social Determinants of Health     Financial Resource Strain: Not on file   Food Insecurity: Not on file   Transportation Needs: Not on file   Physical Activity: Not on file   Stress: Not on file   Social Connections: Not on file   Interpersonal Safety: Not on file   Housing Stability: Not on file     FAMILY MEDICAL HISTORY:   Family History   Problem Relation Age of Onset    Breast Cancer Maternal Aunt     Cancer Maternal Aunt     Cancer Father     Cancer Sister     Cancer Maternal Grandmother     Cancer Cousin     Coronary Artery Disease Mother 49.00     PHYSICAL EXAM:   There were no vitals taken for this visit.  GENERAL APPEARANCE: alert and no distress  EYES: nonicteric  HENT: mouth without ulcers or lesions  NECK: supple, no adenopathy  RESP: lungs clear to auscultation   CV: regular rhythm, normal rate, no rub  ABDOMEN: soft, nontender, normal bowel sounds, no HSM   Extremities: no clubbing, cyanosis, or edema  MS: no evidence of inflammation in joints, no muscle tenderness  SKIN: no rash  NEURO: mentation intact and speech normal  PSYCH: affect normal/bright   LABS:   No results found for this or any previous visit (from the past 672 hour(s)).    CMP  Recent Labs   Lab Test 23  1101 23  1145 23  1117 05/15/23  1114 23  1036 22  1253 21  1129 21  1112 21  1126 21  1102 20  1103 10/01/20  1604  08/14/20  1057 06/12/20  1131 03/10/20  0749 03/10/20  0554 03/06/20  1152 03/06/20  0547 03/03/20  1200 03/03/20  0529 03/02/20  1132 03/02/20  0542    137 136  --  137  --  138  --   --   --   --  137  --  136  --  139  --  138  --  138  --  140   POTASSIUM 3.9 5.0 4.4  --  4.5  --  4.3  --   --   --   --  4.4  --  4.6  --  4.3  --  4.2  --  4.0  --  4.2   CHLORIDE 102 103 104  --  106  --  110*  --   --   --   --  110*  --  107  --  105  --  102  --  104  --  106   CO2 25 21* 20*  --  20*  --  17*  --   --   --   --  14*  --  17*  --  23  --  28  --  25  --  23   ANIONGAP 12 13 12  --  11  --  11  --   --   --   --  13  --  12  --   --   --   --   --   --   --   --    * 142* 144*  --  145*  --  101  --   --   --   --  114  --  109   < > 172*   < > 146*   < > 156*   < > 135*   BUN 28.4* 32.1* 21.5  --  24.7*  --  30*  --   --   --   --  30*  --  34*  --  30*  --  31*  --  29*  --  28   CR 1.69* 1.42* 1.35* 1.38* 1.40*   < > 1.21*   < > 1.36* 1.41* 1.34* 1.18*   < > 1.38*  1.36*  --  1.33*  --  1.18*  --  1.11*  --  1.14*   GFRESTIMATED 32* 39* 41* 40* 40*   < > 47*   < > 38* 37* 39* 45*   < > 38*  38*  --  39*  --  45*  --  49*  --  47*   GFRESTBLACK  --   --   --   --   --   --   --   --  46* 45* 47* 55*   < > 46*  47*  --  48*  --  55*  --  59*  --  57*   KATJA 9.3 10.5* 9.4  --  9.3  --  9.4  --   --   --   --  8.7  --  9.4  9.7  --  8.4*  --  8.6  --  7.9*  --  8.1*   MAG  --   --   --   --   --   --   --   --   --   --   --   --   --   --   --  2.0  --  2.3  --  1.7*  --  1.7*   PHOS  --   --  3.2  --   --   --   --   --   --   --   --   --   --  4.2  --  4.3  --  4.5  --  4.0  --  4.0   PROTTOTAL 6.7 7.3  --   --  6.5  --  6.7  --   --   --   --  6.8  --  7.2  --  5.7*  --   --   --  5.0*  --  5.3*   ALBUMIN 4.3 4.9 4.3 4.4 4.4   < > 4.0   < > 4.3 4.2 4.4 4.3   < > 4.3  --  2.7*  --   --   --  2.2*  --  2.3*   BILITOTAL 0.7 0.6  --   --  0.7  --  0.7  --   --   --   --  0.8  --  0.3  --  0.3   --   --   --  0.3  --  0.3   ALKPHOS 55 56  --   --  61  --  55  --   --   --   --  63  --  64  --  75  --   --   --  73  --  84   AST 25 33  --   --  26  --  30  --   --   --   --  21  --  16  --  15  --   --   --  17  --  19   ALT 17 20  --  17 15   < > 9   < > 25 20 15 18   < > 16  --   --   --   --   --   --   --   --     < > = values in this interval not displayed.     CBC  Recent Labs   Lab Test 09/27/23  1101 08/02/23  1145 05/30/23  1117 05/15/23  1114 02/02/23  1036   HGB 11.5* 11.5* 10.8* 10.5* 10.9*   WBC 5.9 5.8  --  6.3 6.8   RBC 3.76* 3.68*  --  3.24* 3.50*   HCT 34.7* 34.9*  --  32.3* 33.3*   MCV 92 95  --  100 95   MCH 30.6 31.3  --  32.4 31.1   MCHC 33.1 33.0  --  32.5 32.7   RDW 13.3 13.3  --  15.1* 14.6    151  --  169 149*     INR  Recent Labs   Lab Test 03/10/20  0554 03/03/20  0529 02/25/20  0636 02/18/20  0553   INR 0.98 0.94 1.02 1.01     ABG  Recent Labs   Lab Test 06/12/20  1131   PH 7.31*      URINE STUDIES  Recent Labs   Lab Test 09/27/23  1101 05/30/23  1141 02/02/23  1036 06/10/21  1215 10/01/20  1604 01/25/20  1516   COLOR Yellow Yellow Yellow Yellow   < > Princess*   APPEARANCE Clear Clear Clear Clear   < > Hazy*   URINEGLC Negative Negative Negative Negative   < > Negative   URINEBILI Negative Negative Negative Negative   < > Negative   URINEKETONE Negative Negative Negative Negative   < > Negative   SG 1.015 1.025 1.025 1.020   < > 1.005   UBLD Negative Trace* Trace* Negative   < > Small*   URINEPH 8.5* 5.5 5.5 5.0   < > 6.0   PROTEIN 30* Negative Negative Negative   < > 100 mg/dL*   UROBILINOGEN 0.2 0.2 0.2 0.2 E.U./dL   < > <2.0 E.U./dL   NITRITE Negative Negative Negative Negative   < > Negative   LEUKEST Large* Small* Small* Negative   < > Moderate*   RBCU 0-2 0-2 None Seen  --   --  10-25*   WBCU 5-10* 0-5 0-5  --   --  10-25*    < > = values in this interval not displayed.     No lab results found.    ASSESSMENT AND PLAN:   #CKD stage 3b  eGFR around 40-45 mL/min  UACR on  5/30 is 13 mg/g Cr  A CT scan of the abdomen and pelvis done in February 2020 shows non obstructing right renal parenchymal calcifications with no hydronephrosis.  The potential responsible factors include longstanding HTN, remote use of NSAIDs and rheumatoid arthritis. Her kidney function had been stable but her last creatinine level is higher than usual and will recheck to confirm. If remains elevated will obtain a kidney ultrasound to rule out a new obstruction.The patient was also instructed to keep the sodium intake around 2400 mg /day, follow a plant-based diet and to avoid NSAIDs     #HTN  Primary and secondary to CKD. Currently on metoprolol tartrate 50 mg twice daily and amlodipine 10 mg daily and well controlled.    #CVD/dyslipidemia  On atorvastatin as indicated for any patient with CKD above the age of 50. LDL is 54 in February 2023     #Blood count  Hemoglobin 10.8 -> 11.5 improved    #Acid-base status  CO2 level 20 -> 25 improved on sodium bicarbonate supplementation    #Electrolytes  Na 136 -> 139  K 4.4 -> 3.9 no acute issues     #BMD  Ca   9.4       P 3.2    Alb 4.3  Vitamin D level 48 no need for supplementation. Patient already receiving denosumab    #CKD journey/transplant not a candidate at this point    The total time of this encounter amounted to 30 minutes on the day of the encounter. This time included time spent with the patient, reviewing records, ordering tests, and performing post visit documentation.     The patient will return to follow up in 6 months    Keturah Vaughn MD  Division of Renal Diseases and Hypertension

## 2023-11-10 NOTE — NURSING NOTE
"Chief Complaint   Patient presents with    RECHECK     Stage 3b chronic kidney disease       Vitals:    11/10/23 1459   BP: 132/72   BP Location: Right arm   Patient Position: Sitting   Cuff Size: Adult Regular   Pulse: 71   Resp: 16   SpO2: 98%   Weight: 74.5 kg (164 lb 4.8 oz)   Height: 1.613 m (5' 3.5\")       Body mass index is 28.65 kg/m .    Juany Arcos, CHUCKIEF  "

## 2023-11-14 ENCOUNTER — LAB (OUTPATIENT)
Dept: LAB | Facility: CLINIC | Age: 73
End: 2023-11-14
Payer: COMMERCIAL

## 2023-11-14 DIAGNOSIS — N18.32 STAGE 3B CHRONIC KIDNEY DISEASE (H): ICD-10-CM

## 2023-11-14 DIAGNOSIS — M06.09 SERONEGATIVE RHEUMATOID ARTHRITIS OF MULTIPLE SITES (H): ICD-10-CM

## 2023-11-14 LAB
ALBUMIN SERPL BCG-MCNC: 4.3 G/DL (ref 3.5–5.2)
ALP SERPL-CCNC: 53 U/L (ref 40–150)
ALT SERPL W P-5'-P-CCNC: 17 U/L (ref 0–50)
ANION GAP SERPL CALCULATED.3IONS-SCNC: 12 MMOL/L (ref 7–15)
AST SERPL W P-5'-P-CCNC: 22 U/L (ref 0–45)
BILIRUB SERPL-MCNC: 0.6 MG/DL
BUN SERPL-MCNC: 27.6 MG/DL (ref 8–23)
CALCIUM SERPL-MCNC: 9.5 MG/DL (ref 8.8–10.2)
CHLORIDE SERPL-SCNC: 104 MMOL/L (ref 98–107)
CREAT SERPL-MCNC: 1.76 MG/DL (ref 0.51–0.95)
DEPRECATED HCO3 PLAS-SCNC: 22 MMOL/L (ref 22–29)
EGFRCR SERPLBLD CKD-EPI 2021: 30 ML/MIN/1.73M2
ERYTHROCYTE [DISTWIDTH] IN BLOOD BY AUTOMATED COUNT: 13.7 % (ref 10–15)
GLUCOSE SERPL-MCNC: 127 MG/DL (ref 70–99)
HCT VFR BLD AUTO: 33.7 % (ref 35–47)
HGB BLD-MCNC: 11.2 G/DL (ref 11.7–15.7)
MCH RBC QN AUTO: 31.1 PG (ref 26.5–33)
MCHC RBC AUTO-ENTMCNC: 33.2 G/DL (ref 31.5–36.5)
MCV RBC AUTO: 94 FL (ref 78–100)
PLATELET # BLD AUTO: 164 10E3/UL (ref 150–450)
POTASSIUM SERPL-SCNC: 4.1 MMOL/L (ref 3.4–5.3)
PROT SERPL-MCNC: 6.6 G/DL (ref 6.4–8.3)
RBC # BLD AUTO: 3.6 10E6/UL (ref 3.8–5.2)
SODIUM SERPL-SCNC: 138 MMOL/L (ref 135–145)
WBC # BLD AUTO: 5.5 10E3/UL (ref 4–11)

## 2023-11-14 PROCEDURE — 85027 COMPLETE CBC AUTOMATED: CPT

## 2023-11-14 PROCEDURE — 80053 COMPREHEN METABOLIC PANEL: CPT

## 2023-11-14 PROCEDURE — 36415 COLL VENOUS BLD VENIPUNCTURE: CPT

## 2023-11-15 ENCOUNTER — OFFICE VISIT (OUTPATIENT)
Dept: RHEUMATOLOGY | Facility: CLINIC | Age: 73
End: 2023-11-15
Payer: COMMERCIAL

## 2023-11-15 VITALS
DIASTOLIC BLOOD PRESSURE: 62 MMHG | WEIGHT: 164.5 LBS | HEART RATE: 67 BPM | BODY MASS INDEX: 28.09 KG/M2 | TEMPERATURE: 97.8 F | OXYGEN SATURATION: 98 % | SYSTOLIC BLOOD PRESSURE: 114 MMHG | RESPIRATION RATE: 18 BRPM | HEIGHT: 64 IN

## 2023-11-15 DIAGNOSIS — Z79.899 HIGH RISK MEDICATION USE: ICD-10-CM

## 2023-11-15 DIAGNOSIS — M17.0 BILATERAL PRIMARY OSTEOARTHRITIS OF KNEE: ICD-10-CM

## 2023-11-15 DIAGNOSIS — M06.09 SERONEGATIVE RHEUMATOID ARTHRITIS OF MULTIPLE SITES (H): Primary | ICD-10-CM

## 2023-11-15 DIAGNOSIS — N18.32 STAGE 3B CHRONIC KIDNEY DISEASE (H): ICD-10-CM

## 2023-11-15 PROCEDURE — 99214 OFFICE O/P EST MOD 30 MIN: CPT | Performed by: INTERNAL MEDICINE

## 2023-11-15 RX ORDER — HYDROXYCHLOROQUINE SULFATE 200 MG/1
200 TABLET, FILM COATED ORAL 2 TIMES DAILY
Qty: 180 TABLET | Refills: 0 | Status: SHIPPED | OUTPATIENT
Start: 2023-11-15 | End: 2024-02-02

## 2023-11-15 ASSESSMENT — PAIN SCALES - GENERAL: PAINLEVEL: NO PAIN (0)

## 2023-11-15 NOTE — PROGRESS NOTES
"      Rheumatology follow-up visit note     Claudia is a 73 year old female presents today for follow-up.    Claudia was seen today for recheck.    Diagnoses and all orders for this visit:    Seronegative rheumatoid arthritis of multiple sites (H)  -     hydroxychloroquine (PLAQUENIL) 200 MG tablet; Take 1 tablet (200 mg) by mouth 2 times daily Annual Plaquenil toxicity eye screening required.    Bilateral primary osteoarthritis of knee    High risk medication use    Stage 3b chronic kidney disease (H)        This patient with seronegative rheumatoid arthritis well-controlled on hydroxychloroquine, residual pain such as bases of the thumbs worse on the right side and likely due to osteoarthritis management principles were outlined.  She is aware to avoid nonsteroidals given renal impairment.  We will meet here in 4 months.    Follow up in 4 months.    HPI    Claudia Wise is a 73 year old female is here for follow-up of  seronegative Rheumatoid Arthritis, osteoarthritis with his various manifestations, renal impairment and osteoarthritis.  On a previous visit she had corticosteroid injections into her knees and found them helpful.  She has been holding off methotrexate.  She is taking hydroxychloroquine.  She held the methotrexate since her COVID and flu shot few weeks ago.  She has noted pain in her right \"wrist\" by which she can fragment first CMC pain with activity or if she were to bump into something.  Overall she rated pain level of 4.5/10.  Able to do most of her day-to-day activities without any or some difficulty.  She has had part of the pain in the lower back which is longstanding.       She is aware not to take nonsteroidals because of renal impairment.  She had eye exam in in October this year and was reassured overall disease activity:  stable. Limitation on activities as noted in the [facility] scanned in the EMR.        DETAILED EXAMINATION  11/15/23  :    Vitals:    11/15/23 1106   BP: 114/62   BP " "Location: Left arm   Patient Position: Sitting   Cuff Size: Adult Regular   Pulse: 67   Resp: 18   Temp: 97.8  F (36.6  C)   TempSrc: Oral   SpO2: 98%   Weight: 74.6 kg (164 lb 8 oz)   Height: 1.626 m (5' 4\")     Alert oriented. Head including the face is examined for malar rash, heliotropes, scarring, lupus pernio. Eyes examined for redness such as in episcleritis/scleritis, periorbital lesions.   Neck/ Face examined for parotid gland swelling, range of motion of neck.  Left upper and lower and right upper and lower extremities examined for tenderness, swelling, warmth of the appendicular joints, range of motion, edema, rash.  Some of the important findings included: she does not have evidence of synovitis in the palpable joints of the upper extremities.  No significant deformities of the digits.  no Heberden nodes.  Range of motion of the shoulders  show full abduction.  No JLT effusion or warmth of the knees.  she does not have dactylitis of the digits.     Patient Active Problem List    Diagnosis Date Noted    History of small bowel obstruction 02/02/2023     Priority: Medium    Primary insomnia 02/02/2023     Priority: Medium    Parkinson disease 07/20/2022     Priority: Medium    Venous (peripheral) insufficiency 07/01/2021     Priority: Medium    Other specified hypothyroidism      Priority: Medium     Created by Conversion  Replacement Utility updated for latest IMO load        Chronic kidney disease, stage 3 (H) 01/18/2021     Priority: Medium    Senile osteoporosis 06/02/2020     Priority: Medium    Compression fracture of L2 vertebra with routine healing 06/02/2020     Priority: Medium    Bilateral primary osteoarthritis of knee 05/23/2019     Priority: Medium    Anemia in chronic kidney disease      Priority: Medium     Created by Conversion        Gout      Priority: Medium     Created by Conversion        Hyperlipemia      Priority: Medium     Created by Conversion        High risk medication use " 08/25/2016     Priority: Medium    Herpes Simplex Type II      Priority: Medium     Created by Conversion  [facility] Annotation: Nov 13 2007  4:56PM - Oma Reddy: By pt's   history.    Allergy to valtrex.  She came to me on prn acyclovir.  Replacement Utility updated for latest IMO load        Anxiety      Priority: Medium     Created by Conversion  Replacement Utility updated for latest IMO load        Hypertension      Priority: Medium     Created by Conversion  Replacement Utility updated for latest IMO load        Endometriosis      Priority: Medium     Created by Conversion  [facility] Annotation: Nov 13 2007  4:58PM - Katie Meyers: s/p VON, BSO   1987.  Replacement Utility updated for latest IMO load        Seronegative rheumatoid arthritis of multiple sites (H) 03/01/2016     Priority: Medium    Prediabetes      Priority: Medium     Created by Conversion         Past Surgical History:   Procedure Laterality Date    COLON SURGERY  1986    COLPORRHAPHY N/A 7/7/2016    Procedure: ANTERIOR REPAIR WITH MESH;  Surgeon: Zac Stone MD;  Location: [facility];  Service:      UGI ENDOSCOPY W PLACEMENT GASTROSTOMY TUBE PERCUT N/A 2/7/2020    Procedure: Percutaneous Endoscopic Gastromstomy Tube Placement;  Surgeon: Zion Schwarz MD;  Location: [facility] OR;  Service: General    HYSTERECTOMY      LAPAROTOMY EXPLORATORY N/A 1/23/2020    Procedure: EXPLORATORY LAPARATOMY, EXTENSIVE LYSIS OF ADHESIONS, SMALL BOWEL RESECTION;  Surgeon: Claudia Jeronimo MD;  Location: [facility] OR;  Service: General    OOPHORECTOMY        Past Medical History:   Diagnosis Date    Anemia     Anxiety     Chronic kidney disease     Diabetes mellitus, type II (H)     prediabetes    Disease of thyroid gland     hypothyroid    Family history of myocardial infarction     Gout     High cholesterol     Hypertension     Inverted nipple     right    Macular edema     Osteoarthritis 01/01/2012    Rheumatoid arthritis  (H) 01/01/2012     Allergies   Allergen Reactions    Bupropion Hcl [Bupropion] Nausea    Erythromycin Base [Erythromycin] Nausea    Paxil [Paroxetine] Diarrhea    Tetracyclines & Related Itching    Valacyclovir Nausea     Current Outpatient Medications   Medication Sig Dispense Refill    acetaminophen (TYLENOL) 500 MG tablet Take 500 mg by mouth every 6 hours as needed       acyclovir (ZOVIRAX) 400 MG tablet TAKE ONE TABLET BY MOUTH EVERY 8 HOURS AS NEEDED 60 tablet 0    allopurinol (ZYLOPRIM) 100 MG tablet Take 1 tablet (100 mg) by mouth 2 times daily (with meals) 180 tablet 3    amLODIPine (NORVASC) 10 MG tablet Take 1 tablet by mouth once daily 90 tablet 3    atorvastatin (LIPITOR) 10 MG tablet Take 1 tablet (10 mg) by mouth At Bedtime 90 tablet 3    benztropine (COGENTIN) 0.5 MG tablet Take 3 tablets (1.5 mg) by mouth 2 times daily 540 tablet 3    carboxymethylcellulose (REFRESH PLUS) 0.5 % Dpet ophthalmic dropperette [CARBOXYMETHYLCELLULOSE (REFRESH PLUS) 0.5 % DPET OPHTHALMIC DROPPERETTE] Administer 1-2 drops to both eyes 4 (four) times a day as needed.       cyanocobalamin, vitamin B-12, 1,000 mcg Subl [CYANOCOBALAMIN, VITAMIN B-12, 1,000 MCG SUBL] Place 1 tablet (1,000 mcg total) under the tongue daily.  0    denosumab (PROLIA) 60 MG/ML SOSY injection Inject 60 mg Subcutaneous      DULoxetine (CYMBALTA) 20 MG capsule Take 1 capsule (20 mg) by mouth daily 90 capsule 3    fluticasone (FLONASE) 50 MCG/ACT nasal spray Use 1 spray(s) in each nostril once daily 48 g 3    folic acid (FOLVITE) 1 MG tablet Take 1 tablet (1,000 mcg) by mouth daily 90 tablet 0    hydroxychloroquine (PLAQUENIL) 200 MG tablet Take 1 tablet (200 mg) by mouth 2 times daily Annual Plaquenil toxicity eye screening required. 180 tablet 0    levothyroxine (SYNTHROID/LEVOTHROID) 50 MCG tablet TAKE 1 TABLET BY MOUTH ONCE DAILY 30  MINUTES  BEFORE  BREAKFAST 90 tablet 3    lidocaine (LIDODERM) 5 % patch Place 1 patch onto the skin every 24 hours  To prevent lidocaine toxicity, patient should be patch free for 12 hrs daily. 14 patch 1    LORazepam (ATIVAN) 0.5 MG tablet Take 1 tablet (0.5 mg) by mouth daily as needed for anxiety 30 tablet 0    losartan (COZAAR) 50 MG tablet Take 1 tablet by mouth once daily 90 tablet 3    methotrexate 2.5 MG tablet Take 4 tablets (10 mg) by mouth every 7 days Labs every 8 - 12 weeks for refills. (Patient not taking: Reported on 11/10/2023) 36 tablet 0    metoprolol tartrate (LOPRESSOR) 50 MG tablet Take 1 tablet by mouth twice daily 180 tablet 3    sodium bicarbonate 650 MG tablet Take 2 tablets (1,300 mg) by mouth 2 times daily 100 tablet 3     family history includes Breast Cancer in her maternal aunt; Cancer in her cousin, father, maternal aunt, maternal grandmother, and sister; Coronary Artery Disease (age of onset: 49.00) in her mother.  Social Connections: Not on file          WBC Count   Date Value Ref Range Status   11/14/2023 5.5 4.0 - 11.0 10e3/uL Final     RBC Count   Date Value Ref Range Status   11/14/2023 3.60 (L) 3.80 - 5.20 10e6/uL Final     Hemoglobin   Date Value Ref Range Status   11/14/2023 11.2 (L) 11.7 - 15.7 g/dL Final     Hematocrit   Date Value Ref Range Status   11/14/2023 33.7 (L) 35.0 - 47.0 % Final     MCV   Date Value Ref Range Status   11/14/2023 94 78 - 100 fL Final     MCH   Date Value Ref Range Status   11/14/2023 31.1 26.5 - 33.0 pg Final     Platelet Count   Date Value Ref Range Status   11/14/2023 164 150 - 450 10e3/uL Final     % Lymphocytes   Date Value Ref Range Status   02/28/2020 13 (L) 20 - 40 % Final   01/29/2020 7 (L) 20 - 40 % Final     AST   Date Value Ref Range Status   11/14/2023 22 0 - 45 U/L Final     Comment:     Reference intervals for this test were updated on 6/12/2023 to more accurately reflect our healthy population. There may be differences in the flagging of prior results with similar values performed with this method. Interpretation of those prior results can be made  in the context of the updated reference intervals.     ALT   Date Value Ref Range Status   11/14/2023 17 0 - 50 U/L Final     Comment:     Reference intervals for this test were updated on 6/12/2023 to more accurately reflect our healthy population. There may be differences in the flagging of prior results with similar values performed with this method. Interpretation of those prior results can be made in the context of the updated reference intervals.       Albumin   Date Value Ref Range Status   11/14/2023 4.3 3.5 - 5.2 g/dL Final   05/31/2022 3.9 3.5 - 5.0 g/dL Final     Alkaline Phosphatase   Date Value Ref Range Status   11/14/2023 53 40 - 150 U/L Final     Comment:     Reference intervals for this test were updated on 11/14/2023 to more accurately reflect our healthy population. There may be differences in the flagging of prior results with similar values performed with this method. Interpretation of those prior results can be made in the context of the updated reference intervals.     Creatinine   Date Value Ref Range Status   11/14/2023 1.76 (H) 0.51 - 0.95 mg/dL Final     GFR Estimate   Date Value Ref Range Status   11/14/2023 30 (L) >60 mL/min/1.73m2 Final   05/11/2021 38 (L) >60 mL/min/1.73m2 Final     GFR Estimate If Black   Date Value Ref Range Status   05/11/2021 46 (L) >60 mL/min/1.73m2 Final     Creatinine Urine mg/dL   Date Value Ref Range Status   09/27/2023 95.5 mg/dL Final     Comment:     The reference ranges have not been established in urine creatinine. The results should be integrated into the clinical context for interpretation.   09/27/2023 95.5 mg/dL Final     Comment:     The reference ranges have not been established in urine creatinine. The results should be integrated into the clinical context for interpretation.

## 2023-11-30 DIAGNOSIS — N18.32 STAGE 3B CHRONIC KIDNEY DISEASE (H): ICD-10-CM

## 2023-11-30 RX ORDER — SODIUM BICARBONATE 650 MG/1
1300 TABLET ORAL 2 TIMES DAILY
Qty: 100 TABLET | Refills: 3 | Status: SHIPPED | OUTPATIENT
Start: 2023-11-30 | End: 2024-06-12

## 2023-11-30 NOTE — TELEPHONE ENCOUNTER
Last Written Prescription Date:  6/7/23  Last Fill Quantity: 100,  # refills: 3   Last office visit: 11/10/2023 ; last virtual visit: Visit date not found with prescribing provider:  Dr Vaughn   Future Office Visit:   Next 5 appointments (look out 90 days)      Feb 02, 2024 11:40 AM  (Arrive by 11:20 AM)  Annual Wellness Visit with Isabel Maurer MD  [facility] ([facility] ) [address] [id number] [phone]                  Requested Prescriptions   Pending Prescriptions Disp Refills    sodium bicarbonate 650 MG tablet 100 tablet 3     Sig: Take 2 tablets (1,300 mg) by mouth 2 times daily       There is no refill protocol information for this order

## 2023-12-05 ENCOUNTER — THERAPY VISIT (OUTPATIENT)
Dept: PHYSICAL THERAPY | Facility: REHABILITATION | Age: 73
End: 2023-12-05
Attending: PSYCHIATRY & NEUROLOGY
Payer: COMMERCIAL

## 2023-12-05 DIAGNOSIS — G20.C PRIMARY PARKINSONISM (H): ICD-10-CM

## 2023-12-05 PROCEDURE — 97162 PT EVAL MOD COMPLEX 30 MIN: CPT | Mod: GP | Performed by: PHYSICAL THERAPIST

## 2023-12-05 NOTE — PROGRESS NOTES
PHYSICAL THERAPY EVALUATION  Type of Visit: Evaluation    See electronic medical record for Abuse and Falls Screening details.    Subjective       Presenting condition or subjective complaint: gait  Date of onset: 10/25/23    Relevant medical history: Anemia; Arthritis; Bladder or bowel problems; Depression; High blood pressure; Incontinence; Kidney disease; Osteoarthritis; Osteoporosis; Overweight; Parkinson s Disease; Progressive neurological deficits; Rheumatoid arthritis; Stroke; Thyroid problems; Vision problems   Dates & types of surgery:      Prior diagnostic imaging/testing results: CT scan; X-ray     Prior therapy history for the same diagnosis, illness or injury: Yes PT at [facility] earlier this year.    Prior Level of Function  Transfers: Independent  Ambulation: Independent  ADL: Independent    Living Environment  Social support: With a significant other or spouse   Type of home: House; Multi-level   Stairs to enter the home: No       Ramp: No   Stairs inside the home: Yes 7 Is there a railing: Yes   Help at home: None  Equipment owned: Straight Cane; Walker with wheels; Commode; Bath bench (walking sticks)     Employment: No    Hobbies/Interests: reading, coloring, wordfinds    Patient goals for therapy:      Pain assessment: Pain denied     Objective      Cognitive Status Examination  Orientation: Oriented to person, place and time   Level of Consciousness: Alert  Follows Commands and Answers Questions: 100% of the time, 75% of the time, Follows single step instructions  Personal Safety and Judgement: Intact  Memory: Intact    POSTURE: Standing Posture: Rounded shoulders, Forward head, Thoracic kyphosis increased  Sitting Posture: Rounded shoulders, Forward head, Thoracic kyphosis increased    STRENGTH:   Pain: - none + mild ++ moderate +++ severe  Strength Scale: 0-5/5 Left Right   Hip Flexion 4 4   Hip Extension 4 4   Hip Abduction 4 4   Hip Adduction 4 4   Ankle DF 5 5   Hip External  Rotation     Knee Flexion 5 5   Knee Extension 5 5       BED MOBILITY: Independent    TRANSFERS: Independent    WHEELCHAIR MOBILITY: NA    GAIT:   Level of [place]: Independent  Assistive Device(s): None  Gait Deviations: Gladis decreased    BALANCE:  APTA sit to stand 9 in 30 seconds        Mini-BESTest: Balance Evaluation Systems Test    [id number] [facility] & [facility]. All rights reserved.    ANTICIPATORY BALANCE  1. SIT TO STAND  Instruction:  Cross your arms across your chest. Try not to use your hands unless you must. Do not let your legs lean  against the back of the chair when you stand. Please stand up now.   X--(2) Normal: Comes to stand without use of hands and stabilizes independently.  (1) Moderate: Comes to stand WITH use of hands on first attempt.  (0) Severe: Unable to stand up from chair without assistance, OR needs several attempts with use of hands.    2. RISE TO TOES  Instruction:  Place your feet shoulder width apart. Place your hands on your hips. Try to rise as high as you can onto your  toes. I will count out loud to 3 seconds. Try to hold this pose for at least 3 seconds. Look straight ahead. Rise now.   (2) Normal: Stable for 3 s with maximum height.  X---(1) Moderate: Heels up, but not full range (smaller than when holding hands), OR noticeable instability for 3 s.  (0) Severe: < 3 s.    3. STAND ON ONE LEG  Instruction:  Look straight ahead. Keep your hands on your hips. Lift your leg off of the ground behind you without touching or  resting your raised leg upon your other standing leg. Stay standing on one leg as long as you can. Look straight ahead. Lift  now.   Left: Time in Seconds Trial 1:_____Trial 2:_____  (2) Normal: 20 s.  (1) Moderate: < 20 s.  X---(0) Severe: Unable.    Right: Time in Seconds Trial 1:_____Trial 2:_____  (2) Normal: 20 s.  (1) Moderate: < 20 s.  X---(0) Severe: Unable    To score each side separately use the trial with the longest time.  To  calculate the sub-score and total score use the side [left or right] with the lowest numerical score [i.e. the worse side].    REACTIVE POSTURAL CONTROL  4. COMPENSATORY STEPPING CORRECTION- FORWARD  Instruction:  Stand with your feet shoulder width apart, arms at your sides. Lean forward against my hands beyond your  forward limits. When I let go, do whatever is necessary, including taking a step, to avoid a fall.   (2) Normal: Recovers independently with a single, large step (second realignment step is allowed).  X---(1) Moderate: More than one step used to recover equilibrium.  (0) Severe: No step, OR would fall if not caught, OR falls spontaneously.    5. COMPENSATORY STEPPING CORRECTION- BACKWARD  Instruction:  Stand with your feet shoulder width apart, arms at your sides. Lean backward against my hands beyond your  backward limits. When I let go, do whatever is necessary, including taking a step, to avoid a fall.   (2) Normal: Recovers independently with a single, large step.  X---(1) Moderate: More than one step used to recover equilibrium.  (0) Severe: No step, OR would fall if not caught, OR falls spontaneously.    6. COMPENSATORY STEPPING CORRECTION- LATERAL  Instruction:  Stand with your feet together, arms down at your sides. Lean into my hand beyond your sideways limit. When I  let go, do whatever is necessary, including taking a step, to avoid a fall.   Left  (2) Normal: Recovers independently with 1 step  (crossover or lateral OK).  X---(1) Moderate: Several steps to recover equilibrium.  (0) Severe: Falls, or cannot step.    Right  (2) Normal: Recovers independently with 1 step  (crossover or lateral OK).  (1) Moderate: Several steps to recover equilibrium.  X---(0) Severe: Falls, or cannot step.    To calculate the sub-score and total score use the side [left or right] with the lowest numerical score [i.e. the worse side].    SENSORY ORIENTATION  7. STANCE (FEET TOGETHER); EYES OPEN, FIRM  SURFACE  Instruction:  Place your hands on your hips. Place your feet together until almost touching. Look straight ahead. Be as stable  and still as possible, until I say stop.   Time in seconds:________  X---(2) Normal: 30 s.  (1) Moderate: < 30 s.  (0) Severe: Unable.    8. STANCE (FEET TOGETHER); EYES CLOSED, FOAM SURFACE  Instruction:  Step onto the foam. Place your hands on your hips. Place your feet together until almost touching. Be as stable  and still as possible, until I say stop. I will start timing when you close your eyes.   Time in seconds:________  (2) Normal: 30 s.  (1) Moderate: < 30 s.  X---(0) Severe: Unable.    9. INCLINE- EYES CLOSED  Instruction:  Step onto the incline ramp. Please stand on the incline ramp with your toes toward the top. Place your feet  shoulder width apart and have your arms down at your sides. I will start timing when you close your eyes.   Time in seconds:________  (2) Normal: Stands independently 30 s and aligns with gravity.  X---(1) Moderate: Stands independently <30 s OR aligns with surface.  (0) Severe: Unable.    DYNAMIC GAIT  10. CHANGE IN GAIT SPEED  Instruction:  Begin walking at your normal speed, when I tell you  fast , walk as fast as you can. When I say  slow , walk very  slowly.   (2) Normal: Significantly changes walking speed without imbalance.  X---(1) Moderate: Unable to change walking speed or signs of imbalance.  (0) Severe: Unable to achieve significant change in walking speed AND signs of imbalance.    11. WALK WITH HEAD TURNS - HORIZONTAL  Instruction:  Begin walking at your normal speed, when I say  right , turn your head and look to the right. When I say  left   turn your head and look to the left. Try to keep yourself walking in a straight line.   (2) Normal: performs head turns with no change in gait speed and good balance.  X---(1) Moderate: performs head turns with reduction in gait speed.  (0) Severe: performs head turns with imbalance.    12.  WALK WITH PIVOT TURNS  Instruction:  Begin walking at your normal speed. When I tell you to  turn and stop , turn as quickly as you can, face the  opposite direction, and stop. After the turn, your feet should be close together.   (2) Normal: Turns with feet close FAST (< 3 steps) with good balance.  (1) Moderate: Turns with feet close SLOW (>4 steps) with good balance.  X---(0) Severe: Cannot turn with feet close at any speed without imbalance.    13. STEP OVER OBSTACLES  Instruction:  Begin walking at your normal speed. When you get to the box, step over it, not around it and keep walking.   (2) Normal: Able to step over box with minimal change of gait speed and with good balance.  X---(1) Moderate: Steps over box but touches box OR displays cautious behavior by slowing gait.  (0) Severe: Unable to step over box OR steps around box.    14. TIMED UP & GO WITH DUAL TASK [3 METER WALK]  Instruction TUG:  When I say  Go , stand up from chair, walk at your normal speed across the tape on the floor, turn around,  and come back to sit in the chair.   Instruction TUG with Dual Task:  Count backwards by threes starting at ___. When I say  Go , stand up from chair, walk at  your normal speed across the tape on the floor, turn around, and come back to sit in the chair. Continue counting backwards  the entire time.   TUG: ________seconds; Dual Task TUG: ________seconds  (2) Normal: No noticeable change in sitting, standing or walking while backward counting when compared to TUG without  Dual Task.  (1) Moderate: Dual Task affects either counting OR walking (>10%) when compared to the TUG without Dual Task.  X---(0) Severe: Stops counting while walking OR stops walking while counting.    When scoring item 14, if subject s gait speed slows more than 10% between the TUG without and with a Dual Task the score  should be decreased by a point.    REACTIVE POSTURAL CONTROL SUB SCORE: 3/ 6  ANTICIPATORY SUB SCORE: 2/ 6  SENSORY  ORIENTATION SUB SCORE: 3/ 6  DYNAMIC GAIT SUB SCORE: 3/10    TOTAL SCORE: _______11/28      Mini-BESTest: The Mini-BESTest assesses reactive postural, anticipatory, sensory orientation, and dynamic gait balance.  Gait assistive device used: none     Patient Score: 11/28  Scores of <17.5/28 have identified people with chronic stroke that have a history of falling according to Latrell et al 2013.   Scores of <20/32 are predictive of increased falls risk for persons with Parkinsons Disease according to Syed et al 2012.    Minimally Detectable Change (MDC) for persons with Parkinsons Disease 5.52pts per Lyssa et al 2011.  Minimally Clinically Significant Difference (MCID) for persons with Balance Disorders: 4pts according to Dwayne et al 2013.        SENSATION:     REFLEXES:   COORDINATION:   MUSCLE TONE:         Assessment & Plan   CLINICAL IMPRESSIONS  Medical Diagnosis: Primary parkinsonism    Treatment Diagnosis: Primary parkinsonism   Impression/Assessment: Patient is a 73 year old female with complaints of difficulty with her balance and gait with history of falls.  The following significant findings have been identified: Decreased ROM/flexibility, Decreased joint mobility, Decreased strength, Impaired balance, Decreased proprioception, Impaired gait, Impaired muscle performance, Decreased activity tolerance, and Impaired posture. These impairments interfere with their ability to perform self care tasks, recreational activities, household chores, household mobility, and community mobility as compared to previous level of function.     Clinical Decision Making (Complexity):  Clinical Presentation: Evolving/Changing  Clinical Presentation Rationale: based on medical and personal factors listed in PT evaluation  Clinical Decision Making (Complexity): Moderate complexity    PLAN OF CARE  Treatment Interventions:  Interventions: Gait Training, Manual Therapy, Neuromuscular Re-education, Therapeutic Activity, Therapeutic  Exercise, Self-Care/Home Management    Long Term Goals     PT Goal 1  Goal Identifier: STS  Goal Description: Patient will demonstrate improved strength and decreased risk of falls by performing >12 sit to stands in 30 seconds.  Rationale: to maximize safety and independence with performance of ADLs and functional tasks;to maximize safety and independence within the home  Goal Progress: initial --- 9 /30 seconds  Target Date: 03/04/24  PT Goal 2  Goal Identifier: Mini Best  Goal Description: Patient will demonstrate improved balance and decreased risk of falls by scoring 23 or more on the Minibest  Rationale: to maximize safety and independence with performance of ADLs and functional tasks;to maximize safety and independence within the home  Goal Progress: initial 11/30  Target Date: 03/04/24  PT Goal 3  Goal Identifier: HEP  Goal Description: Patient will demonstrate understanding and independence with HEP to aid with self management of symptoms  Rationale: to maximize safety and independence with performance of ADLs and functional tasks  Goal Progress: initial  Target Date: 03/04/24      Frequency of Treatment: 2x/week  Duration of Treatment: 90 days    Recommended Referrals to Other Professionals: not at this time  Education Assessment:   Learner/Method: Patient    Risks and benefits of evaluation/treatment have been explained.   Patient/Family/caregiver agrees with Plan of Care.     Evaluation Time:     PT Eval, Moderate Complexity Minutes (33396): 45       Signing Clinician: Ricky Guthrie, PT      [facility]                                                                                   OUTPATIENT PHYSICAL THERAPY      PLAN OF TREATMENT FOR OUTPATIENT REHABILITATION   Patient's Last Name, First Name, Claudia Rush YOB: 1950   Provider's Name   [facility]   Medical Record No.  [id number]     Onset Date: 10/25/23   Start of Care Date: 12/05/23     Medical Diagnosis:  Primary parkinsonism      PT Treatment Diagnosis:  Primary parkinsonism Plan of Treatment  Frequency/Duration: 2x/week/ 90 days    Certification date from 12/05/23 to 03/04/24         See note for plan of treatment details and functional goals     Ricky Guthrie, PT                         I CERTIFY THE NEED FOR THESE SERVICES FURNISHED UNDER        THIS PLAN OF TREATMENT AND WHILE UNDER MY CARE     (Physician attestation of this document indicates review and certification of the therapy plan).              Referring Provider:  Logan Smart    Initial Assessment  See Epic Evaluation- Start of Care Date: 12/05/23

## 2023-12-12 ENCOUNTER — THERAPY VISIT (OUTPATIENT)
Dept: PHYSICAL THERAPY | Facility: REHABILITATION | Age: 73
End: 2023-12-12
Attending: PSYCHIATRY & NEUROLOGY
Payer: COMMERCIAL

## 2023-12-12 DIAGNOSIS — G20.A1 PARKINSON DISEASE (H): Primary | ICD-10-CM

## 2023-12-12 PROCEDURE — 97110 THERAPEUTIC EXERCISES: CPT | Mod: GP | Performed by: PHYSICAL THERAPIST

## 2023-12-12 PROCEDURE — 97112 NEUROMUSCULAR REEDUCATION: CPT | Mod: GP | Performed by: PHYSICAL THERAPIST

## 2023-12-14 ENCOUNTER — THERAPY VISIT (OUTPATIENT)
Dept: PHYSICAL THERAPY | Facility: REHABILITATION | Age: 73
End: 2023-12-14
Payer: COMMERCIAL

## 2023-12-14 DIAGNOSIS — G20.A1 PARKINSON'S DISEASE, UNSPECIFIED WHETHER DYSKINESIA PRESENT, UNSPECIFIED WHETHER MANIFESTATIONS FLUCTUATE (H): Primary | ICD-10-CM

## 2023-12-14 PROCEDURE — 97110 THERAPEUTIC EXERCISES: CPT | Mod: GP | Performed by: PHYSICAL THERAPIST

## 2023-12-14 PROCEDURE — 97112 NEUROMUSCULAR REEDUCATION: CPT | Mod: GP | Performed by: PHYSICAL THERAPIST

## 2023-12-27 ENCOUNTER — THERAPY VISIT (OUTPATIENT)
Dept: PHYSICAL THERAPY | Facility: REHABILITATION | Age: 73
End: 2023-12-27
Payer: COMMERCIAL

## 2023-12-27 DIAGNOSIS — G20.A1 PARKINSON'S DISEASE, UNSPECIFIED WHETHER DYSKINESIA PRESENT, UNSPECIFIED WHETHER MANIFESTATIONS FLUCTUATE (H): Primary | ICD-10-CM

## 2023-12-27 PROCEDURE — 97110 THERAPEUTIC EXERCISES: CPT | Mod: GP | Performed by: PHYSICAL THERAPIST

## 2023-12-27 PROCEDURE — 97112 NEUROMUSCULAR REEDUCATION: CPT | Mod: GP | Performed by: PHYSICAL THERAPIST

## 2023-12-28 ENCOUNTER — OFFICE VISIT (OUTPATIENT)
Dept: FAMILY MEDICINE | Facility: CLINIC | Age: 73
End: 2023-12-28
Payer: COMMERCIAL

## 2023-12-28 VITALS
OXYGEN SATURATION: 96 % | RESPIRATION RATE: 16 BRPM | HEART RATE: 71 BPM | DIASTOLIC BLOOD PRESSURE: 70 MMHG | SYSTOLIC BLOOD PRESSURE: 122 MMHG | WEIGHT: 164 LBS | BODY MASS INDEX: 28.15 KG/M2 | TEMPERATURE: 97 F

## 2023-12-28 DIAGNOSIS — N89.8 VAGINAL ITCHING: ICD-10-CM

## 2023-12-28 DIAGNOSIS — R30.0 DYSURIA: Primary | ICD-10-CM

## 2023-12-28 LAB
ALBUMIN UR-MCNC: NEGATIVE MG/DL
APPEARANCE UR: CLEAR
BACTERIA #/AREA URNS HPF: ABNORMAL /HPF
BILIRUB UR QL STRIP: NEGATIVE
CAOX CRY #/AREA URNS HPF: ABNORMAL /HPF
CLUE CELLS: ABNORMAL
COLOR UR AUTO: YELLOW
GLUCOSE UR STRIP-MCNC: NEGATIVE MG/DL
HGB UR QL STRIP: NEGATIVE
HYALINE CASTS #/AREA URNS LPF: ABNORMAL /LPF
KETONES UR STRIP-MCNC: NEGATIVE MG/DL
LEUKOCYTE ESTERASE UR QL STRIP: ABNORMAL
NITRATE UR QL: NEGATIVE
PH UR STRIP: 5 [PH] (ref 5–8)
RBC #/AREA URNS AUTO: ABNORMAL /HPF
SP GR UR STRIP: 1.02 (ref 1–1.03)
SQUAMOUS #/AREA URNS AUTO: ABNORMAL /LPF
TRICHOMONAS, WET PREP: ABNORMAL
UROBILINOGEN UR STRIP-ACNC: 0.2 E.U./DL
WBC #/AREA URNS AUTO: ABNORMAL /HPF
WBC'S/HIGH POWER FIELD, WET PREP: ABNORMAL
YEAST, WET PREP: ABNORMAL

## 2023-12-28 PROCEDURE — 99213 OFFICE O/P EST LOW 20 MIN: CPT | Performed by: NURSE PRACTITIONER

## 2023-12-28 PROCEDURE — 81001 URINALYSIS AUTO W/SCOPE: CPT | Performed by: NURSE PRACTITIONER

## 2023-12-28 PROCEDURE — 87210 SMEAR WET MOUNT SALINE/INK: CPT | Performed by: NURSE PRACTITIONER

## 2023-12-28 NOTE — PROGRESS NOTES
"  Assessment & Plan     Dysuria  Urine is normal. NO evidence of infection. Discussed with patient that being slightly dehydrated can cause different odor in urine, different foods can cause this as well. If still not improving follow up in clinic for further evaluation.     - UA Macroscopic with reflex to Microscopic and Culture - Clinic Collect  - UA Microscopic with Reflex to Culture    Vaginal itching  Wet prep is normal. No evidence of vaginal infections.    - Wet prep - Clinic Collect             BMI:   Estimated body mass index is 28.15 kg/m  as calculated from the following:    Height as of 11/15/23: 1.626 m (5' 4\").    Weight as of this encounter: 74.4 kg (164 lb).           EDOUARD ALDRIDGE CNP  [facility]   Claudia is a 73 year old, presenting for the following health issues:  Urinary Problem (Urine smells different ; kind of cloudy )      12/28/2023     1:22 PM   Additional Questions   Roomed by Nate X     Mouth is really dry-not abnormal for patient.   Denies urinary frequency. No pain with urination.  Smell with urination and looks cloudy.  No vaginal discharge or odor. The other night reports vaginal itching and then went way. Was worried about possible yeast infection-used cream for yeast. Felt better in the morning.   Wears incontinence pads.     HPI               Review of Systems         Objective    /70 (BP Location: Right arm, Patient Position: Sitting, Cuff Size: Adult Regular)   Pulse 71   Temp 97  F (36.1  C) (Temporal)   Resp 16   Wt 74.4 kg (164 lb)   SpO2 96%   BMI 28.15 kg/m    Body mass index is 28.15 kg/m .  Physical Exam  Constitutional:       Appearance: Normal appearance.   Abdominal:      General: Abdomen is flat.      Tenderness: There is no abdominal tenderness. There is no right CVA tenderness or left CVA tenderness.   Neurological:      General: No focal deficit present.      Mental Status: She is alert and oriented " to person, place, and time.

## 2023-12-29 ENCOUNTER — THERAPY VISIT (OUTPATIENT)
Dept: PHYSICAL THERAPY | Facility: REHABILITATION | Age: 73
End: 2023-12-29
Payer: COMMERCIAL

## 2023-12-29 DIAGNOSIS — G20.A1 PARKINSON'S DISEASE, UNSPECIFIED WHETHER DYSKINESIA PRESENT, UNSPECIFIED WHETHER MANIFESTATIONS FLUCTUATE (H): Primary | ICD-10-CM

## 2023-12-29 DIAGNOSIS — M62.81 GENERALIZED MUSCLE WEAKNESS: ICD-10-CM

## 2023-12-29 PROCEDURE — 97110 THERAPEUTIC EXERCISES: CPT | Mod: GP | Performed by: PHYSICAL THERAPIST

## 2023-12-29 PROCEDURE — 97112 NEUROMUSCULAR REEDUCATION: CPT | Mod: GP | Performed by: PHYSICAL THERAPIST

## 2024-01-03 ENCOUNTER — THERAPY VISIT (OUTPATIENT)
Dept: PHYSICAL THERAPY | Facility: REHABILITATION | Age: 74
End: 2024-01-03
Payer: COMMERCIAL

## 2024-01-03 DIAGNOSIS — M81.0 SENILE OSTEOPOROSIS: Primary | ICD-10-CM

## 2024-01-03 DIAGNOSIS — G20.A2 PARKINSON'S DISEASE WITH FLUCTUATING MANIFESTATIONS, UNSPECIFIED WHETHER DYSKINESIA PRESENT (H): Primary | ICD-10-CM

## 2024-01-03 DIAGNOSIS — N18.32 STAGE 3B CHRONIC KIDNEY DISEASE (H): ICD-10-CM

## 2024-01-03 PROCEDURE — 97530 THERAPEUTIC ACTIVITIES: CPT | Mod: GP | Performed by: PHYSICAL THERAPIST

## 2024-01-03 PROCEDURE — 97110 THERAPEUTIC EXERCISES: CPT | Mod: GP | Performed by: PHYSICAL THERAPIST

## 2024-01-03 PROCEDURE — 97112 NEUROMUSCULAR REEDUCATION: CPT | Mod: GP | Performed by: PHYSICAL THERAPIST

## 2024-01-05 ENCOUNTER — THERAPY VISIT (OUTPATIENT)
Dept: PHYSICAL THERAPY | Facility: REHABILITATION | Age: 74
End: 2024-01-05
Payer: COMMERCIAL

## 2024-01-05 DIAGNOSIS — M62.81 GENERALIZED MUSCLE WEAKNESS: ICD-10-CM

## 2024-01-05 DIAGNOSIS — G20.A2 PARKINSON'S DISEASE WITH FLUCTUATING MANIFESTATIONS, UNSPECIFIED WHETHER DYSKINESIA PRESENT (H): Primary | ICD-10-CM

## 2024-01-05 PROCEDURE — 97110 THERAPEUTIC EXERCISES: CPT | Mod: 59 | Performed by: PHYSICAL THERAPIST

## 2024-01-05 PROCEDURE — 97112 NEUROMUSCULAR REEDUCATION: CPT | Mod: 59 | Performed by: PHYSICAL THERAPIST

## 2024-01-05 PROCEDURE — 97530 THERAPEUTIC ACTIVITIES: CPT | Mod: GP | Performed by: PHYSICAL THERAPIST

## 2024-01-08 ENCOUNTER — THERAPY VISIT (OUTPATIENT)
Dept: PHYSICAL THERAPY | Facility: REHABILITATION | Age: 74
End: 2024-01-08
Payer: COMMERCIAL

## 2024-01-08 DIAGNOSIS — M62.81 GENERALIZED MUSCLE WEAKNESS: ICD-10-CM

## 2024-01-08 DIAGNOSIS — G20.A2 PARKINSON'S DISEASE WITH FLUCTUATING MANIFESTATIONS, UNSPECIFIED WHETHER DYSKINESIA PRESENT (H): Primary | ICD-10-CM

## 2024-01-08 PROCEDURE — 97110 THERAPEUTIC EXERCISES: CPT | Mod: GP | Performed by: PHYSICAL THERAPIST

## 2024-01-08 PROCEDURE — 97112 NEUROMUSCULAR REEDUCATION: CPT | Mod: GP | Performed by: PHYSICAL THERAPIST

## 2024-01-10 ENCOUNTER — THERAPY VISIT (OUTPATIENT)
Dept: PHYSICAL THERAPY | Facility: REHABILITATION | Age: 74
End: 2024-01-10
Payer: COMMERCIAL

## 2024-01-10 DIAGNOSIS — G20.A2 PARKINSON'S DISEASE WITH FLUCTUATING MANIFESTATIONS, UNSPECIFIED WHETHER DYSKINESIA PRESENT (H): Primary | ICD-10-CM

## 2024-01-10 PROCEDURE — 97110 THERAPEUTIC EXERCISES: CPT | Mod: GP | Performed by: PHYSICAL THERAPIST

## 2024-01-10 PROCEDURE — 97112 NEUROMUSCULAR REEDUCATION: CPT | Mod: GP | Performed by: PHYSICAL THERAPIST

## 2024-01-10 PROCEDURE — 97530 THERAPEUTIC ACTIVITIES: CPT | Mod: GP | Performed by: PHYSICAL THERAPIST

## 2024-01-15 ENCOUNTER — THERAPY VISIT (OUTPATIENT)
Dept: PHYSICAL THERAPY | Facility: REHABILITATION | Age: 74
End: 2024-01-15
Payer: COMMERCIAL

## 2024-01-15 DIAGNOSIS — G20.A2 PARKINSON'S DISEASE WITH FLUCTUATING MANIFESTATIONS, UNSPECIFIED WHETHER DYSKINESIA PRESENT (H): Primary | ICD-10-CM

## 2024-01-15 DIAGNOSIS — M62.81 GENERALIZED MUSCLE WEAKNESS: ICD-10-CM

## 2024-01-15 PROCEDURE — 97110 THERAPEUTIC EXERCISES: CPT | Mod: GP | Performed by: PHYSICAL THERAPIST

## 2024-01-15 PROCEDURE — 97750 PHYSICAL PERFORMANCE TEST: CPT | Mod: GP | Performed by: PHYSICAL THERAPIST

## 2024-01-15 NOTE — PROGRESS NOTES
[facility]                                                                                   OUTPATIENT PHYSICAL THERAPY    PLAN OF TREATMENT FOR OUTPATIENT REHABILITATION   Patient's Last Name, First Name, Claudia Rush YOB: 1950   Provider's Name   [facility]   Medical Record No.  [id number]     Onset Date: 10/25/23  Start of Care Date: 12/05/23     Medical Diagnosis:  Primary parkinsonism      PT Treatment Diagnosis:  Primary parkinsonism Plan of Treatment  Frequency/Duration: 2x/week/ 90 days    Certification date from 01/15/24 to 04/14/24         See note for plan of treatment details and functional goals     Laura Canales PT                         I CERTIFY THE NEED FOR THESE SERVICES FURNISHED UNDER        THIS PLAN OF TREATMENT AND WHILE UNDER MY CARE     (Physician attestation of this document indicates review and certification of the therapy plan).              Referring Provider:  Logan Smart    Initial Assessment  See Epic Evaluation- Start of Care Date: 12/05/23             01/15/24 0500   Appointment Info   Signing clinician's name / credentials Laura Canales PT   Total/Authorized Visits re-cert on 1/15/24 for additional visits   Visits Used 10/16-23 visits   Medical Diagnosis Primary parkinsonism   PT Tx Diagnosis Primary parkinsonism   Other pertinent information Claudia is a pleasant 74 y/o female retiree with PD. She reports that she has done PT in the past and it has been helpful. In June, she fell trying to get out of bed, and hit her head.   Quick Adds Certification   Progress Note/Certification   Start of Care Date 12/05/23   Onset of illness/injury or Date of Surgery 10/25/23   Therapy Frequency 2x/week   Predicted Duration 90 days   Certification date from 01/15/24   Certification date to 04/14/24   Progress Note Due Date 04/14/24   Progress Note Completed Date 01/15/24   PT Goal 1    Goal Identifier STS   Goal Description Patient will demonstrate improved strength and decreased risk of falls by performing >12 sit to stands in 30 seconds.   Rationale to maximize safety and independence with performance of ADLs and functional tasks;to maximize safety and independence within the home   Goal Progress pt close to achieving goal.  She was able to perform 12 today. Will continue goal until she is >12.   Target Date 04/14/24   PT Goal 2   Goal Identifier Mini Best   Goal Description Patient will demonstrate improved balance and decreased risk of falls by scoring 23 or more on the Minibest   Rationale to maximize safety and independence with performance of ADLs and functional tasks;to maximize safety and independence within the home   Goal Progress score is 17/28 today demonstrating a big improvement.  She continues to lose her balance with tasks and hesitates but much improved since initial eval.  Will continue goal   Target Date 04/14/24   PT Goal 3   Goal Identifier HEP   Goal Description Patient will demonstrate understanding and independence with HEP to aid with self management of symptoms   Rationale to maximize safety and independence with performance of ADLs and functional tasks   Goal Progress Pt still requires cues for her HEP but is working toward achieving this goal   Target Date 04/14/24   PT Goal 4   Goal Identifier bed mobility   Goal Description Pt will be able to demonstrate normal ability to get in and out of bed with no difficulty as strength improves   Target Date 04/14/24   Subjective Report   Subjective Report I don't do my exercises every day but do the best I can.  My  does comment when he notices my walking isn't right or if I need cues when I do the exercises.   Objective Measure 1   Objective Measure 12/12/23  6 MWT - 5 min 34 seconds 852 ft   Objective Measure 2   Objective Measure APTA: 9 on initial eval,   Details APTA: 12, no hands on 1/15/24   Objective Measure 3    Objective Measure mini-Best;   Objective Measure 4   Objective Measure mini-Best: 11/28 on initial eval   Details mini-Best: 17/28 1/15/24   Objective Measure 5   Objective Measure APTA: was 9 on initial eval   Details APTA: 12 no hands on 1/15/24   Therapeutic Procedure/Exercise   Therapeutic Procedures: strength, endurance, ROM, flexibillity minutes (40737) 23   Ther Proc 1 Nu-step warm up, seat 8, workload 5, 7 min - cues to keep steps at or above 60 steps/min   Ther Proc 4 BIG ex #1 floor to ceiling, hold 10 seconds X 5   Ther Proc 4 - Details increased cues for technique and sequence   Ther Proc 5 BIG ex #2 seated side to side hold 10 seconds X 5 R, L   Ther Proc 5 - Details Increased cues, better than ex 1   Ther Proc 6 standing heel and toe raises X   Skilled Intervention Nu-step and BIG ex #1 and 2, goal review, APTA, questions answered.   Patient Response/Progress tolerated well and verbalized understanding.   Therapeutic Activity   Therapeutic Activities Ther Act 2   Ther Act 1 BIG walking: using yard sticks with PT for reciprocal arm swing and bigger movements x4 min   Ther Act 1 - Details BIG Walking: hallway and gym x 2 laps - 3 minutes   Ther Act 2 BIG STS: 10   Skilled Intervention little carryover with L arm swing  once yard sticks were stopped   Patient Response/Progress not today   Neuromuscular Re-education   Neuro Re-ed 1 BIG ex #3 step forward X 10, B   Neuro Re-ed 1 - Details Effort: 10; cues to look up/straight ahead   Neuro Re-ed 2 BIG ex #4 step sideward X 10, B   Neuro Re-ed 2 - Details cues to get foot back to starting position   Neuro Re-ed 3 BIG ex #5 step backward X 10 B   Neuro Re-ed 3 - Details Able to do with less trunk bend, but pt is confused on this one and needed a lot of cues   Neuro Re-ed 4 BIG ex #6 rock and reach X 10, B   Neuro Re-ed 4 - Details performed in steps - rocking, arm swing with PT A, then independent - this went fairly well   Neuro Re-ed 5 BIG ex #7 twist and  reach x10 B   Neuro Re-ed 5 - Details initiated exercises, she did fairly well - not for HEP yet   Neuro Re-ed 6 - Details increase balance tasks as time allows   Skilled Intervention no time today   Physical Performance Test/measures   Physical Performance Test/Measurement, Minutes (30470) 15   Skilled Intervention mini-best-see score above   Education   Learner/Method Patient   Plan   Updates to plan of care work on getting out of bed, collect LEFS   Plan for next session Nu-step, BIG ex review, balance/proprioception   Comments   Comments Claudia returns for PT follow up for gait and balance with PD. Her back and knee pain continue to vary in pain levels. She felt the knee sleeve was too tight and didn't feel comfortable so will not wear it again in therapy.  Able to review goals and perform a re-cert today. She demonstrated a MDC with her mini-Best score today and is working toward a more normal score.  She does continue to demonstrate risk for falls, decrease multi-tasking and little carryover with some tasks like arm movement with walking and exercising and talking.  Feel she will continue to benefit from physical therapy to address mobility, balance and movement tasks and decrease risk for falls.   Total Session Time   Timed Code Treatment Minutes 38   Total Treatment Time (sum of timed and untimed services) 38

## 2024-01-15 NOTE — PROGRESS NOTES
APTA: 12 no hands  TU.0s  Cognitive TUG: 10.78s but pt stopped counting      Counting backward from 100 by 3's    Mini-Best:   1: 2  2: 1, not able to stand 3 seconds  3: 1 R= 6.05s   L=5.58s  4: 1  5: 0  6: 1  7:2  8: 2, mod+ instability but able to stabilize self  9: 2  10: 1  11: 1   12:1 many extra steps  13: 1,   14: 1          Patient Score:   Scores of <17.5/28 have identified people with chronic stroke that have a history of falling according to Latrell et al 2013.   Scores of <20/32 are predictive of increased falls risk for persons with Parkinsons Disease according to Syed et al 2012.    Minimally Detectable Change (MDC) for persons with Parkinsons Disease 5.52pts per Lyssa et al 2011.  Minimally Clinically Significant Difference (MCID) for persons with Balance Disorders: 4pts according to Dwayne et al 2013.

## 2024-01-22 ENCOUNTER — THERAPY VISIT (OUTPATIENT)
Dept: PHYSICAL THERAPY | Facility: REHABILITATION | Age: 74
End: 2024-01-22
Payer: COMMERCIAL

## 2024-01-22 DIAGNOSIS — G20.A2 PARKINSON'S DISEASE WITH FLUCTUATING MANIFESTATIONS, UNSPECIFIED WHETHER DYSKINESIA PRESENT (H): Primary | ICD-10-CM

## 2024-01-22 PROCEDURE — 97530 THERAPEUTIC ACTIVITIES: CPT | Mod: GP | Performed by: PHYSICAL THERAPIST

## 2024-01-22 PROCEDURE — 97110 THERAPEUTIC EXERCISES: CPT | Mod: GP | Performed by: PHYSICAL THERAPIST

## 2024-01-22 PROCEDURE — 97112 NEUROMUSCULAR REEDUCATION: CPT | Mod: GP | Performed by: PHYSICAL THERAPIST

## 2024-01-24 ENCOUNTER — THERAPY VISIT (OUTPATIENT)
Dept: PHYSICAL THERAPY | Facility: REHABILITATION | Age: 74
End: 2024-01-24
Payer: COMMERCIAL

## 2024-01-24 DIAGNOSIS — G20.A2 PARKINSON'S DISEASE WITH FLUCTUATING MANIFESTATIONS, UNSPECIFIED WHETHER DYSKINESIA PRESENT (H): Primary | ICD-10-CM

## 2024-01-24 PROCEDURE — 97112 NEUROMUSCULAR REEDUCATION: CPT | Mod: GP | Performed by: PHYSICAL THERAPIST

## 2024-01-24 PROCEDURE — 97530 THERAPEUTIC ACTIVITIES: CPT | Mod: GP | Performed by: PHYSICAL THERAPIST

## 2024-01-24 PROCEDURE — 97110 THERAPEUTIC EXERCISES: CPT | Mod: GP | Performed by: PHYSICAL THERAPIST

## 2024-01-26 ENCOUNTER — THERAPY VISIT (OUTPATIENT)
Dept: PHYSICAL THERAPY | Facility: REHABILITATION | Age: 74
End: 2024-01-26
Payer: COMMERCIAL

## 2024-01-26 DIAGNOSIS — M62.81 GENERALIZED MUSCLE WEAKNESS: ICD-10-CM

## 2024-01-26 DIAGNOSIS — G20.A2 PARKINSON'S DISEASE WITH FLUCTUATING MANIFESTATIONS, UNSPECIFIED WHETHER DYSKINESIA PRESENT (H): Primary | ICD-10-CM

## 2024-01-26 PROCEDURE — 97530 THERAPEUTIC ACTIVITIES: CPT | Mod: GP | Performed by: PHYSICAL THERAPIST

## 2024-01-26 PROCEDURE — 97110 THERAPEUTIC EXERCISES: CPT | Mod: GP | Performed by: PHYSICAL THERAPIST

## 2024-01-26 PROCEDURE — 97112 NEUROMUSCULAR REEDUCATION: CPT | Mod: GP | Performed by: PHYSICAL THERAPIST

## 2024-02-02 ENCOUNTER — OFFICE VISIT (OUTPATIENT)
Dept: INTERNAL MEDICINE | Facility: CLINIC | Age: 74
End: 2024-02-02
Payer: COMMERCIAL

## 2024-02-02 VITALS
HEART RATE: 62 BPM | OXYGEN SATURATION: 99 % | TEMPERATURE: 97.1 F | RESPIRATION RATE: 16 BRPM | SYSTOLIC BLOOD PRESSURE: 130 MMHG | WEIGHT: 166.3 LBS | HEIGHT: 64 IN | BODY MASS INDEX: 28.39 KG/M2 | DIASTOLIC BLOOD PRESSURE: 74 MMHG

## 2024-02-02 DIAGNOSIS — Z12.31 VISIT FOR SCREENING MAMMOGRAM: ICD-10-CM

## 2024-02-02 DIAGNOSIS — M81.0 SENILE OSTEOPOROSIS: ICD-10-CM

## 2024-02-02 DIAGNOSIS — G20.A2 PARKINSON'S DISEASE WITH FLUCTUATING MANIFESTATIONS, UNSPECIFIED WHETHER DYSKINESIA PRESENT (H): ICD-10-CM

## 2024-02-02 DIAGNOSIS — M10.9 GOUT, UNSPECIFIED CAUSE, UNSPECIFIED CHRONICITY, UNSPECIFIED SITE: ICD-10-CM

## 2024-02-02 DIAGNOSIS — I10 ESSENTIAL HYPERTENSION: ICD-10-CM

## 2024-02-02 DIAGNOSIS — E03.8 OTHER SPECIFIED HYPOTHYROIDISM: ICD-10-CM

## 2024-02-02 DIAGNOSIS — E78.00 PURE HYPERCHOLESTEROLEMIA: ICD-10-CM

## 2024-02-02 DIAGNOSIS — Z00.00 ENCOUNTER FOR ANNUAL WELLNESS VISIT (AWV) IN MEDICARE PATIENT: Primary | ICD-10-CM

## 2024-02-02 DIAGNOSIS — M06.09 SERONEGATIVE RHEUMATOID ARTHRITIS OF MULTIPLE SITES (H): ICD-10-CM

## 2024-02-02 DIAGNOSIS — N18.32 STAGE 3B CHRONIC KIDNEY DISEASE (H): ICD-10-CM

## 2024-02-02 DIAGNOSIS — R73.09 OTHER ABNORMAL GLUCOSE: ICD-10-CM

## 2024-02-02 LAB
ALBUMIN UR-MCNC: NEGATIVE MG/DL
APPEARANCE UR: CLEAR
BACTERIA #/AREA URNS HPF: ABNORMAL /HPF
BILIRUB UR QL STRIP: NEGATIVE
COLOR UR AUTO: YELLOW
ERYTHROCYTE [DISTWIDTH] IN BLOOD BY AUTOMATED COUNT: 12.9 % (ref 10–15)
GLUCOSE UR STRIP-MCNC: NEGATIVE MG/DL
HBA1C MFR BLD: 6 % (ref 0–5.6)
HCT VFR BLD AUTO: 33.8 % (ref 35–47)
HGB BLD-MCNC: 11.2 G/DL (ref 11.7–15.7)
HGB UR QL STRIP: NEGATIVE
KETONES UR STRIP-MCNC: NEGATIVE MG/DL
LEUKOCYTE ESTERASE UR QL STRIP: ABNORMAL
MCH RBC QN AUTO: 30.6 PG (ref 26.5–33)
MCHC RBC AUTO-ENTMCNC: 33.1 G/DL (ref 31.5–36.5)
MCV RBC AUTO: 92 FL (ref 78–100)
NITRATE UR QL: NEGATIVE
PH UR STRIP: 5.5 [PH] (ref 5–8)
PLATELET # BLD AUTO: 141 10E3/UL (ref 150–450)
RBC # BLD AUTO: 3.66 10E6/UL (ref 3.8–5.2)
RBC #/AREA URNS AUTO: ABNORMAL /HPF
SP GR UR STRIP: 1.02 (ref 1–1.03)
SQUAMOUS #/AREA URNS AUTO: ABNORMAL /LPF
UROBILINOGEN UR STRIP-ACNC: 0.2 E.U./DL
WBC # BLD AUTO: 6.6 10E3/UL (ref 4–11)
WBC #/AREA URNS AUTO: ABNORMAL /HPF

## 2024-02-02 PROCEDURE — 82306 VITAMIN D 25 HYDROXY: CPT | Performed by: INTERNAL MEDICINE

## 2024-02-02 PROCEDURE — 87086 URINE CULTURE/COLONY COUNT: CPT | Mod: GZ | Performed by: INTERNAL MEDICINE

## 2024-02-02 PROCEDURE — 84550 ASSAY OF BLOOD/URIC ACID: CPT | Performed by: INTERNAL MEDICINE

## 2024-02-02 PROCEDURE — G0439 PPPS, SUBSEQ VISIT: HCPCS | Performed by: INTERNAL MEDICINE

## 2024-02-02 PROCEDURE — 83036 HEMOGLOBIN GLYCOSYLATED A1C: CPT | Performed by: INTERNAL MEDICINE

## 2024-02-02 PROCEDURE — 80053 COMPREHEN METABOLIC PANEL: CPT | Performed by: INTERNAL MEDICINE

## 2024-02-02 PROCEDURE — 84443 ASSAY THYROID STIM HORMONE: CPT | Performed by: INTERNAL MEDICINE

## 2024-02-02 PROCEDURE — 81001 URINALYSIS AUTO W/SCOPE: CPT | Performed by: INTERNAL MEDICINE

## 2024-02-02 PROCEDURE — 80061 LIPID PANEL: CPT | Performed by: INTERNAL MEDICINE

## 2024-02-02 PROCEDURE — 36415 COLL VENOUS BLD VENIPUNCTURE: CPT | Performed by: INTERNAL MEDICINE

## 2024-02-02 PROCEDURE — 96372 THER/PROPH/DIAG INJ SC/IM: CPT | Performed by: INTERNAL MEDICINE

## 2024-02-02 PROCEDURE — 85027 COMPLETE CBC AUTOMATED: CPT | Performed by: INTERNAL MEDICINE

## 2024-02-02 PROCEDURE — 99214 OFFICE O/P EST MOD 30 MIN: CPT | Mod: 25 | Performed by: INTERNAL MEDICINE

## 2024-02-02 RX ORDER — OMEGA-3/DHA/EPA/FISH OIL 60 MG-90MG
CAPSULE ORAL
COMMUNITY
End: 2024-07-26

## 2024-02-02 SDOH — HEALTH STABILITY: PHYSICAL HEALTH: ON AVERAGE, HOW MANY DAYS PER WEEK DO YOU ENGAGE IN MODERATE TO STRENUOUS EXERCISE (LIKE A BRISK WALK)?: 2 DAYS

## 2024-02-02 ASSESSMENT — SOCIAL DETERMINANTS OF HEALTH (SDOH): HOW OFTEN DO YOU GET TOGETHER WITH FRIENDS OR RELATIVES?: ONCE A WEEK

## 2024-02-02 NOTE — PROGRESS NOTES
Preventive Care Visit  [facility]  Isabel Maurer MD, Internal Medicine  Feb 2, 2024    Assessment & Plan     Encounter for annual wellness visit (AWV) in Medicare patient      Pure hypercholesterolemia  On atorvastatin.  - Lipid panel reflex to direct LDL Fasting; Future  - Lipid panel reflex to direct LDL Fasting    Senile osteoporosis with history of L2 compression fracture    Prolia # 8 today.  The following steps were completed to comply with the REMS program for Prolia:    Reviewed information in the Medication Guide and Patient Counseling Chart, including the serious risks of Prolia  and the symptoms of each risk.    Advised patient to seek prompt medical attention if they have signs or symptoms of any of the serious risks.    Provided each patient a copy of the Medication Guide and Patient Brochure   - Vitamin D Deficiency; Future  - Vitamin D Deficiency    Prediabetes    - Hemoglobin A1c; Future  - Hemoglobin A1c    Other specified hypothyroidism  On levothyroxine  - TSH with free T4 reflex; Future  - TSH with free T4 reflex    Hypertension  Stable on metoprolol, losartan, amlodipine  - CBC with platelets; Future  - Comprehensive metabolic panel; Future  - UA Macroscopic with reflex to Microscopic and Culture; Future  - CBC with platelets  - Comprehensive metabolic panel    Parkinson's disease with fluctuating manifestations, unspecified whether dyskinesia present  Stable on benzotropine    Seronegative rheumatoid arthritis of multiple sites (H)  Gout, unspecified cause, unspecified chronicity, unspecified site  Patient is seeing Dr Vinson, but stopped allopurinol and Plaquenil few months ago for unknown reason. I will send him the message after lab results available. She will see him in March.  - Uric acid; Future  - Uric acid    Stage 3b chronic kidney disease (H)  Stable,. Seeing Nephrologist in March.    Visit for screening mammogram    - MA Screen Bilateral w/Heriberto;  "Future            BMI  Estimated body mass index is 28.55 kg/m  as calculated from the following:    Height as of this encounter: 1.626 m (5' 4\").    Weight as of this encounter: 75.4 kg (166 lb 4.8 oz).       Counseling  Appropriate preventive services were discussed with this patient, including applicable screening as appropriate for fall prevention, nutrition, physical activity, Tobacco-use cessation, weight loss and cognition.  Checklist reviewing preventive services available has been given to the patient.  Reviewed patient's diet, addressing concerns and/or questions.   She is at risk for lack of exercise and has been provided with information to increase physical activity for the benefit of her well-being.   She is at risk for psychosocial distress and has been provided with information to reduce risk.   Discussed possible causes of fatigue. Updated plan of care.  Patient reported difficulty with activities of daily living were addressed today.Patient reported safety concerns were addressed today.The patient was provided with written information regarding signs of hearing loss.   Information on urinary incontinence and treatment options given to patient.     Patient has been advised of split billing requirements and indicates understanding: Yes        Benny Taylor is a 73 year old, presenting for the following:  Wellness Visit (Mammo 2/16/23 DXA 2/16/23 Colon 2/15/22)        2/2/2024    11:24 AM   Additional Questions   Roomed by Alda         Health Care Directive  Patient does not have a Health Care Directive or Living Will: Patient states has Advance Directive and will bring in a copy to clinic.    HPI              2/2/2024   General Health   How would you rate your overall physical health? (!) POOR   Feel stress (tense, anxious, or unable to sleep) Only a little   (!) STRESS CONCERN      2/2/2024   Nutrition   Diet: Low salt    Low fat/cholesterol    Carbohydrate counting         2/2/2024   Exercise "   Days per week of moderate/strenous exercise 2 days   (!) EXERCISE CONCERN      2/2/2024   Social Factors   Frequency of gathering with friends or relatives Once a week   Worry food won't last until get money to buy more No   Food not last or not have enough money for food? No   Do you have housing?  Yes   Are you worried about losing your housing? No   Lack of transportation? No   Unable to get utilities (heat,electricity)? No         2/2/2024   Fall Risk   Fallen 2 or more times in the past year? No    Yes    Yes   Trouble with walking or balance? (!) YES    Yes   Reason Gait Speed Test Not Completed Patient declines   Reason for decline Patient Declined Today          2/2/2024   Activities of Daily Living- Home Safety   Needs help with the following daily activites Preparing meals    Housework   Safety concerns in the home Throw rugs in the hallway         2/2/2024   Dental   Dentist two times every year? Yes         2/2/2024   Hearing Screening   Hearing concerns? (!) IT'S HARD TO FOLLOW A CONVERSATION IN A NOISY RESTAURANT OR CROWDED ROOM.    (!) TROUBLE UNDERSTANDING SOFT OR WHISPERED SPEECH.         2/2/2024   Driving Risk Screening   Patient/family members have concerns about driving No         2/2/2024   General Alertness/Fatigue Screening   Have you been more tired than usual lately? (!) YES         2/2/2024   Urinary Incontinence Screening   Bothered by leaking urine in past 6 months Yes          No data to display                  Today's PHQ-2 Score:       2/2/2024    11:22 AM   PHQ-2 ( 1999 [employer])   Q1: Little interest or pleasure in doing things 1   Q2: Feeling down, depressed or hopeless 1   PHQ-2 Score 2   Q1: Little interest or pleasure in doing things Several days   Q2: Feeling down, depressed or hopeless Several days   PHQ-2 Score 2           2/2/2024   Substance Use   Alcohol more than 3/day or more than 7/wk No   Do you have a current opioid prescription? No   How severe/bad is pain from 1 to  10? 5/10   Do you use any other substances recreationally? (!) CANNABIS PRODUCTS     Social History     Tobacco Use    Smoking status: Former     Types: Cigarettes     Quit date: 1995     Years since quittin.5     Passive exposure: Never    Smokeless tobacco: Never   Vaping Use    Vaping Use: Never used   Substance Use Topics    Alcohol use: Not Currently     Comment: Alcoholic Drinks/day: occasional couple times a year    Drug use: No            No data to display                       History of abnormal Pap smear:        The ASCVD Risk score (Keila DK, et al., 2019) failed to calculate for the following reasons:    The patient has a prior MI or stroke diagnosis            Reviewed and updated as needed this visit by Provider                      Current providers sharing in care for this patient include:  Patient Care Team:  Isabel Maurer MD as PCP - General (Internal Medicine)  Isabel Maurer MD as Assigned PCP  Akila Vinson MBBS as Assigned Rheumatology Provider  Logan Smart MD as MD (Neurology)  Logan Smart MD as Assigned Neuroscience Provider  Katie Alfaro PA-C as Physician Assistant (Dermatology)  Keturah Vaughn MD as MD (Nephrology)  Ricky Kilgore MD as Assigned Surgical Provider  Keturah Vaughn MD as Assigned Nephrology Provider    The following health maintenance items are reviewed in Epic and correct as of today:  Health Maintenance   Topic Date Due    URINE DRUG SCREEN  Never done    ANNUAL REVIEW OF HM ORDERS  Never done    MEDICARE ANNUAL WELLNESS VISIT  2024    LIPID  2024    TSH W/FREE T4 REFLEX  2024    MICROALBUMIN  2024    BMP  2024    HEMOGLOBIN  2024    FALL RISK ASSESSMENT  2025    MAMMO SCREENING  2025    GLUCOSE  2026    COLORECTAL CANCER SCREENING  02/15/2027    ADVANCE CARE PLANNING  2028    DTAP/TDAP/TD IMMUNIZATION (3 - Td or Tdap) 2028    DEXA  2038    HEPATITIS C SCREENING   "Completed    PHQ-2 (once per calendar year)  Completed    INFLUENZA VACCINE  Completed    Pneumococcal Vaccine: 65+ Years  Completed    URINALYSIS  Completed    ZOSTER IMMUNIZATION  Completed    RSV VACCINE (Pregnancy & 60+)  Completed    COVID-19 Vaccine  Completed    IPV IMMUNIZATION  Aged Out    HPV IMMUNIZATION  Aged Out    MENINGITIS IMMUNIZATION  Aged Out    RSV MONOCLONAL ANTIBODY  Aged Out     Review of Systems       Objective    Exam  /74   Pulse 62   Temp 97.1  F (36.2  C)   Resp 16   Ht 1.626 m (5' 4\")   Wt 75.4 kg (166 lb 4.8 oz)   LMP  (LMP Unknown)   SpO2 99%   BMI 28.55 kg/m     Estimated body mass index is 28.55 kg/m  as calculated from the following:    Height as of this encounter: 1.626 m (5' 4\").    Weight as of this encounter: 75.4 kg (166 lb 4.8 oz).    Physical Exam  General Appearance: Alert, cooperative, no distress, appears stated age.  Head: Normocephalic, without obvious abnormality, atraumatic  Eyes: PERRL, conjunctiva/corneas clear, EOM's intact  Ears: Normal TM's and external ear canals, both ears  Nose: Nares normal, septum midline,mucosa normal, no drainage  Throat: Lips, mucosa, and tongue normal; teeth and gums normal  Neck: Supple, symmetrical, trachea midline, no adenopathy;  thyroid: not enlarged, symmetric, no tenderness/mass/nodules; no carotid bruit or JVD  Back: Symmetric, no curvature, ROM normal, no CVA tenderness.  Lungs: Clear to auscultation bilaterally, respirations unlabored.  Breasts: No breast masses, tenderness, asymmetry, or nipple discharge.  Heart: Regular rate and rhythm, S1 and S2 normal, no murmur, rub, or gallop.  Abdomen: Soft, non-tender, bowel sounds active all four quadrants,  no masses, no organomegaly.  Extremities: Extremities normal, atraumatic, no cyanosis or edema.  Skin: Skin color, texture, turgor normal, no rashes or lesions.  Lymph nodes: Cervical, supraclavicular, and axillary nodes normal.  Neurologic: No focal neurological " findings.          2/2/2024   Mini Cog   Clock Draw Score 2 Normal   3 Item Recall 3 objects recalled   Mini Cog Total Score 5            Signed Electronically by: Isabel Maurer MD

## 2024-02-02 NOTE — PROGRESS NOTES
"Prolia Injection Phone Screen      Screening questions have been asked 2-3 days prior to administration visit for Prolia. If any questions are answered with \"Yes,\" this phone encounter were will routed to ordering provider for further evaluation.     1.  When was the last injection?  6 months ago    2.  Has insurance for this injection been verified?  Yes    3.  Did you experience any new onset achiness or rashes that lasted for over a month with your previous Prolia injection?   No    4.  Do you have a fever over 101?F or a new deep cough that is unusual for you today? No    5.  Have you started any new medications in the last 6 months that you were told could affect your immune system? These may have been prescribed by oncologist, transplant, rheumatology, or dermatology.   No    6.  In the last 6 months have you have gastric bypass or parathyroid surgery?   No    7.  Do you plan dental work requiring drilling into the bone such as implants/extractions or oral surgery in the next 2-3 months?   No    8. Do you have new insurance since the last injection?    9. Have you received the Covid-19 vaccine? No  If No - Proceed with Prolia injection  If Yes - Date of vaccination . Will need to wait until 2 weeks after 2nd dose of Covid-19 vaccine before administering Prolia       Patient informed if symptoms discussed above present prior to their administration appointment, they are to notify clinic immediately.     Karolina Thornton MA           "

## 2024-02-02 NOTE — PATIENT INSTRUCTIONS
When you see Dr Vinson, tell him that you stopped allopurinol and hydrochloroquine in December.    Prolia 8th today. Labs today.  Prolia 9th in 6 months with me.    DXA in 2/2025 .   Phone number to schedule [phone].    Daily calcium need is 8894-5736 mg a day from the diet and supplements.  Calcium citrate is easier to digest.  Vitamin D 2000 IU daily recommended.    Risk of rebound vertebral fractures is higher when Prolia suddenly stopped or dose was missed.      Prolia and Covid vaccine should be  for at least a week.

## 2024-02-03 LAB
ALBUMIN SERPL BCG-MCNC: 4.5 G/DL (ref 3.5–5.2)
ALP SERPL-CCNC: 52 U/L (ref 40–150)
ALT SERPL W P-5'-P-CCNC: 17 U/L (ref 0–50)
ANION GAP SERPL CALCULATED.3IONS-SCNC: 14 MMOL/L (ref 7–15)
AST SERPL W P-5'-P-CCNC: 24 U/L (ref 0–45)
BACTERIA UR CULT: NORMAL
BILIRUB SERPL-MCNC: 0.6 MG/DL
BUN SERPL-MCNC: 23.4 MG/DL (ref 8–23)
CALCIUM SERPL-MCNC: 10.2 MG/DL (ref 8.8–10.2)
CHLORIDE SERPL-SCNC: 104 MMOL/L (ref 98–107)
CHOLEST SERPL-MCNC: 137 MG/DL
CREAT SERPL-MCNC: 1.23 MG/DL (ref 0.51–0.95)
DEPRECATED HCO3 PLAS-SCNC: 22 MMOL/L (ref 22–29)
EGFRCR SERPLBLD CKD-EPI 2021: 46 ML/MIN/1.73M2
FASTING STATUS PATIENT QL REPORTED: NORMAL
GLUCOSE SERPL-MCNC: 134 MG/DL (ref 70–99)
HDLC SERPL-MCNC: 66 MG/DL
LDLC SERPL CALC-MCNC: 46 MG/DL
NONHDLC SERPL-MCNC: 71 MG/DL
POTASSIUM SERPL-SCNC: 4.1 MMOL/L (ref 3.4–5.3)
PROT SERPL-MCNC: 6.9 G/DL (ref 6.4–8.3)
SODIUM SERPL-SCNC: 140 MMOL/L (ref 135–145)
TRIGL SERPL-MCNC: 127 MG/DL
TSH SERPL DL<=0.005 MIU/L-ACNC: 1.29 UIU/ML (ref 0.3–4.2)
URATE SERPL-MCNC: 9.6 MG/DL (ref 2.4–5.7)
VIT D+METAB SERPL-MCNC: 61 NG/ML (ref 20–50)

## 2024-02-07 ENCOUNTER — THERAPY VISIT (OUTPATIENT)
Dept: PHYSICAL THERAPY | Facility: REHABILITATION | Age: 74
End: 2024-02-07
Payer: COMMERCIAL

## 2024-02-07 DIAGNOSIS — G20.A2 PARKINSON'S DISEASE WITH FLUCTUATING MANIFESTATIONS, UNSPECIFIED WHETHER DYSKINESIA PRESENT (H): Primary | ICD-10-CM

## 2024-02-07 DIAGNOSIS — M62.81 GENERALIZED MUSCLE WEAKNESS: ICD-10-CM

## 2024-02-07 PROCEDURE — 97112 NEUROMUSCULAR REEDUCATION: CPT | Mod: GP | Performed by: PHYSICAL THERAPIST

## 2024-02-07 PROCEDURE — 97110 THERAPEUTIC EXERCISES: CPT | Mod: GP | Performed by: PHYSICAL THERAPIST

## 2024-02-16 ENCOUNTER — THERAPY VISIT (OUTPATIENT)
Dept: PHYSICAL THERAPY | Facility: REHABILITATION | Age: 74
End: 2024-02-16
Payer: COMMERCIAL

## 2024-02-16 DIAGNOSIS — M62.81 GENERALIZED MUSCLE WEAKNESS: ICD-10-CM

## 2024-02-16 DIAGNOSIS — G20.A2 PARKINSON'S DISEASE WITH FLUCTUATING MANIFESTATIONS, UNSPECIFIED WHETHER DYSKINESIA PRESENT (H): Primary | ICD-10-CM

## 2024-02-16 PROCEDURE — 97110 THERAPEUTIC EXERCISES: CPT | Mod: GP | Performed by: PHYSICAL THERAPIST

## 2024-02-16 PROCEDURE — 97530 THERAPEUTIC ACTIVITIES: CPT | Mod: GP | Performed by: PHYSICAL THERAPIST

## 2024-02-16 PROCEDURE — 97112 NEUROMUSCULAR REEDUCATION: CPT | Mod: GP | Performed by: PHYSICAL THERAPIST

## 2024-02-21 ENCOUNTER — THERAPY VISIT (OUTPATIENT)
Dept: PHYSICAL THERAPY | Facility: REHABILITATION | Age: 74
End: 2024-02-21
Payer: COMMERCIAL

## 2024-02-21 DIAGNOSIS — G20.A2 PARKINSON'S DISEASE WITH FLUCTUATING MANIFESTATIONS, UNSPECIFIED WHETHER DYSKINESIA PRESENT (H): Primary | ICD-10-CM

## 2024-02-21 DIAGNOSIS — M62.81 GENERALIZED MUSCLE WEAKNESS: ICD-10-CM

## 2024-02-21 PROBLEM — G20.A1 PARKINSON DISEASE (H): Status: RESOLVED | Noted: 2022-07-20 | Resolved: 2024-02-21

## 2024-02-21 PROCEDURE — 97112 NEUROMUSCULAR REEDUCATION: CPT | Mod: GP | Performed by: PHYSICAL THERAPIST

## 2024-02-21 PROCEDURE — 97750 PHYSICAL PERFORMANCE TEST: CPT | Mod: GP | Performed by: PHYSICAL THERAPIST

## 2024-02-21 PROCEDURE — 97110 THERAPEUTIC EXERCISES: CPT | Mod: GP | Performed by: PHYSICAL THERAPIST

## 2024-02-21 NOTE — PROGRESS NOTES
DISCHARGE NOTE:    Reason for Discharge: Patient has met all goals.    Equipment Issued: NA    Discharge Plan: Patient to continue home program.    Referring Provider:  Logan Smart       02/21/24 0500   Appointment Info   Signing clinician's name / credentials Shayna Muller PT   Total/Authorized Visits re-cert on 1/15/24 for additional visits   Visits Used 16 visits   Medical Diagnosis Primary parkinsonism   PT Tx Diagnosis Primary parkinsonism   Other pertinent information Claudia is a pleasant 72 y/o female retiree with PD. She reports that she has done PT in the past and it has been helpful. In June, she fell trying to get out of bed, and hit her head.   Quick Adds Certification   Progress Note/Certification   Start of Care Date 12/05/23   Onset of illness/injury or Date of Surgery 10/25/23   Therapy Frequency 2x/week   Predicted Duration 90 days   Certification date from 01/15/24   Certification date to 04/14/24   Progress Note Due Date 04/14/24   Progress Note Completed Date 01/15/24   PT Goal 1   Goal Identifier STS   Goal Description Patient will demonstrate improved strength and decreased risk of falls by performing >12 sit to stands in 30 seconds.   Rationale to maximize safety and independence with performance of ADLs and functional tasks;to maximize safety and independence within the home   Goal Progress Met   Target Date 04/14/24   Date Met 02/21/24   PT Goal 2   Goal Identifier Mini Best   Goal Description Patient will demonstrate improved balance and decreased risk of falls by scoring 23 or more on the Minibest   Rationale to maximize safety and independence with performance of ADLs and functional tasks;to maximize safety and independence within the home   Goal Progress Improve from 11 to 19/28; but did not achieve 23/28   Target Date 04/14/24   PT Goal 3   Goal Identifier HEP   Goal Description Patient will demonstrate understanding and independence with HEP to aid with self management of  symptoms   Rationale to maximize safety and independence with performance of ADLs and functional tasks   Goal Progress Met   Target Date 24   Date Met 24   PT Goal 4   Goal Identifier bed mobility   Goal Description Pt will be able to demonstrate normal ability to get in and out of bed with no difficulty as strength improves   Target Date 24   Goal Progress Met   Date Met 24   Subjective Report   Subjective Report Pt reports that she has been getting to know to her BIG and Loud exercises.  She does them about 4x/week.  Her  helps her stay on track.  She has not had any falls recently.   Objective Measure 1   Objective Measure 23  6 MWT - 5 min 34 seconds 852 ft   Details 24 6 MWT - 1,144 feet and a full 6 minutes completed   Objective Measure 2   Objective Measure APTA: STS   Details APTA: 14x/30 seconds on 24; 12, no hands on 1/15/24; 9 on initial eval   Objective Measure 3   Objective Measure TU.82 seconds on 24   Details Cog TUG: 10.5 seconds on 24   Objective Measure 4   Objective Measure mini-Best:  on initial eval   Details mini-Best: ;  on 24;  on 1/15/24   Therapeutic Procedure/Exercise   Therapeutic Procedures: strength, endurance, ROM, flexibillity minutes (88988) 12   Ther Proc 1 Nu-step warm up, occupied   Ther Proc 4 BIG ex #1 floor to ceiling, hold 10 seconds: x5   Ther Proc 4 - Details cueing to remember to count to 10 outloud and to keep track of each repetition-pt lead   Ther Proc 5 BIG ex #2 seated side to side hold 10 seconds X 5 R, L   Ther Proc 5 - Details reviewed; pt led   Skilled Intervention see above   Patient Response/Progress Pt did better with recall on the exercises after a brief review   Therapeutic Activity   Therapeutic Activities Ther Act 2   Ther Act 1 - Details Walking with and without yard sticks for L UE movement.  She had little carryover today when not using the yard sticks.  Discussed this and  "she feels she can do it \"if she focuses\"   Ther Act 2 BIG STS: multiple times for review with technique   Skilled Intervention required frequent verbal cueing for arm swing especially on L   Neuromuscular Re-education   Neuromuscular re-ed of mvmt, balance, coord, kinesthetic sense, posture, proprioception minutes (46759) 10   Neuro Re-ed 1 BIG ex #3 step forward   Neuro Re-ed 1 - Details min cues   Neuro Re-ed 2 BIG ex #4 step sideways X 10 B   Neuro Re-ed 2 - Details cues to look sideways   Neuro Re-ed 3 BIG ex #5 step backward X 10 on R, X 4 on L   Neuro Re-ed 3 - Details cues to bring arms forward   Neuro Re-ed 4 BIG ex #6 rock and reach X 10, B   Neuro Re-ed 4 - Details cues throughout especially for arm swing   Neuro Re-ed 5 BIG ex #7 twist and reach   Neuro Re-ed 5 - Details more cueing required especially for foot turn, little recall of exercise   Neuro Re-ed 6 toe tapping on 6\" step X 10 - not today   Neuro Re-ed 6 - Details Not today - marching with high knees: hold 3s X 10, alternating sides   Neuro Re-ed 7 Not today - 1 leg stance, 20 seconds B X 2 each with 1 hand for support, unable to stand if she lets go   Skilled Intervention quick review of  BIG exercises   Physical Performance Test/measures   Physical Performance Test/Measurement, Minutes (83240) 20   Skilled Intervention STS, 6 MWT and miniBest all performed for final reassessment   Patient Response/Progress good;   Education   Learner/Method Patient   Plan   Home program BIG Ex 1-7, BIG STS and walking   Plan for next session [facility] to [facility]   Comments   Comments Claudia returns to PT for her final follow in addressing her gait and balance deficits associated with PD.  Over the course of PT she has learned all 7 of the BIG exercises as well as working on large amplitude movement, walking and balance. She has been limited with her BIG movemement disorder exercises at home. Pt led the exercises today for full review and she was able to complete " them with only a few minor cues.  Upon testing, she is also demonstrating increased walking stamina with an increased 6 minute walk test distance.  She also has increased STS reps and and improved her miniBest test score.  She is now ready for independent sx management and HEP.   Total Session Time   Timed Code Treatment Minutes 42   Total Treatment Time (sum of timed and untimed services) 42     Shayna Muller, PT

## 2024-02-23 DIAGNOSIS — J31.0 RHINITIS, UNSPECIFIED TYPE: ICD-10-CM

## 2024-02-23 RX ORDER — FLUTICASONE PROPIONATE 50 MCG
SPRAY, SUSPENSION (ML) NASAL
Qty: 16 G | Refills: 3 | Status: SHIPPED | OUTPATIENT
Start: 2024-02-23 | End: 2024-07-26

## 2024-03-01 DIAGNOSIS — I10 ESSENTIAL HYPERTENSION: ICD-10-CM

## 2024-03-01 DIAGNOSIS — F41.1 ANXIETY STATE: ICD-10-CM

## 2024-03-01 DIAGNOSIS — F51.02 ADJUSTMENT INSOMNIA: ICD-10-CM

## 2024-03-01 RX ORDER — LORAZEPAM 0.5 MG/1
0.5 TABLET ORAL DAILY PRN
Qty: 30 TABLET | Refills: 0 | Status: SHIPPED | OUTPATIENT
Start: 2024-03-01 | End: 2024-07-26

## 2024-03-01 RX ORDER — METOPROLOL TARTRATE 50 MG
TABLET ORAL
Qty: 180 TABLET | Refills: 0 | Status: SHIPPED | OUTPATIENT
Start: 2024-03-01 | End: 2024-04-03

## 2024-03-05 ENCOUNTER — OFFICE VISIT (OUTPATIENT)
Dept: RHEUMATOLOGY | Facility: CLINIC | Age: 74
End: 2024-03-05
Payer: COMMERCIAL

## 2024-03-05 VITALS
DIASTOLIC BLOOD PRESSURE: 52 MMHG | SYSTOLIC BLOOD PRESSURE: 120 MMHG | OXYGEN SATURATION: 98 % | BODY MASS INDEX: 28.56 KG/M2 | HEART RATE: 65 BPM | WEIGHT: 166.4 LBS

## 2024-03-05 DIAGNOSIS — M17.0 BILATERAL PRIMARY OSTEOARTHRITIS OF KNEE: ICD-10-CM

## 2024-03-05 DIAGNOSIS — M06.09 SERONEGATIVE RHEUMATOID ARTHRITIS OF MULTIPLE SITES (H): Primary | ICD-10-CM

## 2024-03-05 DIAGNOSIS — N18.32 STAGE 3B CHRONIC KIDNEY DISEASE (H): ICD-10-CM

## 2024-03-05 DIAGNOSIS — Z79.899 HIGH RISK MEDICATION USE: ICD-10-CM

## 2024-03-05 PROCEDURE — 99214 OFFICE O/P EST MOD 30 MIN: CPT | Mod: 25 | Performed by: INTERNAL MEDICINE

## 2024-03-05 PROCEDURE — 20610 DRAIN/INJ JOINT/BURSA W/O US: CPT | Mod: 50 | Performed by: INTERNAL MEDICINE

## 2024-03-05 PROCEDURE — 20604 DRAIN/INJ JOINT/BURSA W/US: CPT | Mod: RT | Performed by: INTERNAL MEDICINE

## 2024-03-05 RX ORDER — TRIAMCINOLONE ACETONIDE 40 MG/ML
40 INJECTION, SUSPENSION INTRA-ARTICULAR; INTRAMUSCULAR ONCE
Status: COMPLETED | OUTPATIENT
Start: 2024-03-05 | End: 2024-03-05

## 2024-03-05 RX ORDER — HYDROXYCHLOROQUINE SULFATE 200 MG/1
200 TABLET, FILM COATED ORAL 2 TIMES DAILY
Qty: 180 TABLET | Refills: 0 | Status: SHIPPED | OUTPATIENT
Start: 2024-03-05 | End: 2024-06-03

## 2024-03-05 RX ORDER — TRIAMCINOLONE ACETONIDE 40 MG/ML
20 INJECTION, SUSPENSION INTRA-ARTICULAR; INTRAMUSCULAR ONCE
Status: COMPLETED | OUTPATIENT
Start: 2024-03-05 | End: 2024-03-05

## 2024-03-05 RX ADMIN — TRIAMCINOLONE ACETONIDE 40 MG: 40 INJECTION, SUSPENSION INTRA-ARTICULAR; INTRAMUSCULAR at 11:23

## 2024-03-05 RX ADMIN — TRIAMCINOLONE ACETONIDE 20 MG: 40 INJECTION, SUSPENSION INTRA-ARTICULAR; INTRAMUSCULAR at 10:56

## 2024-03-05 RX ADMIN — TRIAMCINOLONE ACETONIDE 40 MG: 40 INJECTION, SUSPENSION INTRA-ARTICULAR; INTRAMUSCULAR at 11:24

## 2024-03-05 ASSESSMENT — PAIN SCALES - GENERAL: PAINLEVEL: MODERATE PAIN (5)

## 2024-03-05 NOTE — PROGRESS NOTES
DISCHARGE  Reason for Discharge: Patient has met all goals.    Equipment Issued: NA    Discharge Plan: Patient to continue home program.    Referring Provider:  Isabel Maurer

## 2024-03-05 NOTE — PROGRESS NOTES
Rheumatology follow-up visit note     Claudia is a 73 year old female presents today for follow-up.    Claudia was seen today for recheck.    Diagnoses and all orders for this visit:    Seronegative rheumatoid arthritis of multiple sites (H)  -     triamcinolone (KENALOG-40) injection 20 mg  -     NE ARTHROCNT ASPIR&/INJ SMALL JT/BURSAW/US REC RPRT  -     triamcinolone (KENALOG-40) injection 40 mg  -     triamcinolone (KENALOG-40) injection 40 mg  -     ASPIRATION/INJECTION MAJOR JOINT  -     hydroxychloroquine (PLAQUENIL) 200 MG tablet; Take 1 tablet (200 mg) by mouth 2 times daily for 90 days    Bilateral primary osteoarthritis of knee  -     triamcinolone (KENALOG-40) injection 40 mg  -     triamcinolone (KENALOG-40) injection 40 mg  -     ASPIRATION/INJECTION MAJOR JOINT    High risk medication use    Stage 3b chronic kidney disease (H)             While her seronegative rheumatoid arthritis is under good control with hydroxychloroquine she has worsening pain in her knees bases of the thumbs especially the right side.  We discussed options.  She would like to proceed with local injections for a her OA related symptoms.  Fter pros and cons were discussed including risk of infection, bleeding, skin changes including thinning, pigmentary alteration and scarring to name a few, bilateral knees, right 1st CMC injected as noted in the orders section. This was done with nontouch technique.  The patient tolerated the procedure well and had a brisk Marcaine effect.  The postinjection care was discussed.      Follow up in 3 months.    HPI    Claudia Wise is a 73 year old female is here for follow-up of seronegative Rheumatoid Arthritis, osteoarthritis with his various manifestations, renal impairment.  On one of her previous visit she had corticosteroid injections into her knees and found them helpful.  She has been holding off methotrexate.  She is taking hydroxychloroquine.   .  She has noted pain in her right  "\"wrist\" by which she can fragment first CMC pain with activity or if she were to bump into something.  Overall she rated pain level moderately severe to severe.  4.5/10.  Able to do most of her day-to-day activities without any or some difficulty.  She has had part of the pain in the lower back which is longstanding.  She has been diagnosed to have Parkinson's.     DETAILED EXAMINATION  03/05/24  :    Vitals:    03/05/24 1005   BP: 120/52   BP Location: Right arm   Patient Position: Sitting   Cuff Size: Adult Regular   Pulse: 65   SpO2: 98%   Weight: 75.5 kg (166 lb 6.4 oz)     Alert oriented. Head including the face is examined for malar rash, heliotropes, scarring, lupus pernio. Eyes examined for redness such as in episcleritis/scleritis, periorbital lesions.   Neck/ Face examined for parotid gland swelling, range of motion of neck.  Left upper and lower and right upper and lower extremities examined for tenderness, swelling, warmth of the appendicular joints, range of motion, edema, rash.  Some of the important findings included: she does not have evidence of synovitis in the palpable joints of the upper extremities.  No significant deformities of the digits.  + Heberden nodes .  Range of motion of the shoulders, squaring of the first CMC's most tender on the right side.  Show full abduction.  Marked JLT without effusion or warmth of the knees.  she does not have dactylitis of the digits.     Patient Active Problem List    Diagnosis Date Noted    History of small bowel obstruction 02/02/2023     Priority: Medium    Primary insomnia 02/02/2023     Priority: Medium    Venous (peripheral) insufficiency 07/01/2021     Priority: Medium    Other specified hypothyroidism      Priority: Medium     Created by Conversion  Replacement Utility updated for latest IMO load        Chronic kidney disease, stage 3 (H) 01/18/2021     Priority: Medium    Senile osteoporosis 06/02/2020     Priority: Medium    Compression fracture of " L2 vertebra with routine healing 06/02/2020     Priority: Medium    Bilateral primary osteoarthritis of knee 05/23/2019     Priority: Medium    Anemia in chronic kidney disease      Priority: Medium     Created by Conversion        Gout      Priority: Medium     Created by Conversion        Hyperlipemia      Priority: Medium     Created by Conversion        High risk medication use 08/25/2016     Priority: Medium    Herpes Simplex Type II      Priority: Medium     Created by Conversion  [facility] Annotation: Nov 13 2007  4:56PM - Oma Reddy: By pt's   history.    Allergy to valtrex.  She came to me on prn acyclovir.  Replacement Utility updated for latest IMO load        Anxiety      Priority: Medium     Created by Conversion  Replacement Utility updated for latest IMO load        Hypertension      Priority: Medium     Created by Conversion  Replacement Utility updated for latest IMO load        Endometriosis      Priority: Medium     Created by Conversion  [facility] Annotation: Nov 13 2007  4:58PM - Katie Meyers: s/p RANULFO LONGORIA   1987.  Replacement Utility updated for latest IMO load        Seronegative rheumatoid arthritis of multiple sites (H) 03/01/2016     Priority: Medium    Prediabetes      Priority: Medium     Created by Conversion         Past Surgical History:   Procedure Laterality Date    COLON SURGERY  1986    COLPORRHAPHY N/A 7/7/2016    Procedure: ANTERIOR REPAIR WITH MESH;  Surgeon: Zac Stone MD;  Location: [facility];  Service:      UGI ENDOSCOPY W PLACEMENT GASTROSTOMY TUBE PERCUT N/A 2/7/2020    Procedure: Percutaneous Endoscopic Gastromstomy Tube Placement;  Surgeon: Zion Schwarz MD;  Location: [facility] OR;  Service: General    HYSTERECTOMY      LAPAROTOMY EXPLORATORY N/A 1/23/2020    Procedure: EXPLORATORY LAPARATOMY, EXTENSIVE LYSIS OF ADHESIONS, SMALL BOWEL RESECTION;  Surgeon: Claudia Jeronimo MD;  Location: [facility] OR;  Service: General     OOPHORECTOMY        Past Medical History:   Diagnosis Date    Anemia     Anxiety     Chronic kidney disease     Diabetes mellitus, type II (H)     prediabetes    Disease of thyroid gland     hypothyroid    Family history of myocardial infarction     Gout     High cholesterol     Hypertension     Inverted nipple     right    Macular edema     Osteoarthritis 01/01/2012    Rheumatoid arthritis (H) 01/01/2012     Allergies   Allergen Reactions    Bupropion Hcl [Bupropion] Nausea    Erythromycin Base [Erythromycin] Nausea    Paxil [Paroxetine] Diarrhea    Tetracyclines & Related Itching    Valacyclovir Nausea     Current Outpatient Medications   Medication Sig Dispense Refill    acetaminophen (TYLENOL) 500 MG tablet Take 500 mg by mouth every 6 hours as needed       acyclovir (ZOVIRAX) 400 MG tablet TAKE ONE TABLET BY MOUTH EVERY 8 HOURS AS NEEDED 60 tablet 0    amLODIPine (NORVASC) 10 MG tablet Take 1 tablet by mouth once daily 90 tablet 3    atorvastatin (LIPITOR) 10 MG tablet Take 1 tablet (10 mg) by mouth At Bedtime 90 tablet 3    benztropine (COGENTIN) 0.5 MG tablet Take 3 tablets (1.5 mg) by mouth 2 times daily 540 tablet 3    carboxymethylcellulose (REFRESH PLUS) 0.5 % Dpet ophthalmic dropperette [CARBOXYMETHYLCELLULOSE (REFRESH PLUS) 0.5 % DPET OPHTHALMIC DROPPERETTE] Administer 1-2 drops to both eyes 4 (four) times a day as needed.       cyanocobalamin, vitamin B-12, 1,000 mcg Subl [CYANOCOBALAMIN, VITAMIN B-12, 1,000 MCG SUBL] Place 1 tablet (1,000 mcg total) under the tongue daily.  0    DULoxetine (CYMBALTA) 20 MG capsule Take 1 capsule (20 mg) by mouth daily 90 capsule 3    fish oil-omega-3 fatty acids 500 MG capsule       fluticasone (FLONASE) 50 MCG/ACT nasal spray Use 1 spray(s) in each nostril once daily 16 g 3    levothyroxine (SYNTHROID/LEVOTHROID) 50 MCG tablet TAKE 1 TABLET BY MOUTH ONCE DAILY 30  MINUTES  BEFORE  BREAKFAST 90 tablet 3    lidocaine (LIDODERM) 5 % patch Place 1 patch onto the skin  every 24 hours To prevent lidocaine toxicity, patient should be patch free for 12 hrs daily. 14 patch 1    LORazepam (ATIVAN) 0.5 MG tablet TAKE 1 TABLET BY MOUTH ONCE DAILY AS NEEDED FOR ANXIETY 30 tablet 0    losartan (COZAAR) 50 MG tablet Take 1 tablet by mouth once daily 90 tablet 3    metoprolol tartrate (LOPRESSOR) 50 MG tablet Take 1 tablet by mouth twice daily 180 tablet 0    sodium bicarbonate 650 MG tablet Take 2 tablets (1,300 mg) by mouth 2 times daily 100 tablet 3    UNABLE TO FIND MEDICATION NAME: Tumeric       family history includes Breast Cancer in her maternal aunt; Cancer in her cousin, father, maternal aunt, maternal grandmother, and sister; Coronary Artery Disease (age of onset: 49.00) in her mother.  Social Connections: Unknown (2/2/2024)    Social Connection and Isolation Panel [NHANES]     Frequency of Communication with Friends and Family: Not on file     Frequency of Social Gatherings with Friends and Family: Once a week     Attends Judaism Services: Not on file     Active Member of Clubs or Organizations: Not on file     Attends Club or Organization Meetings: Not on file     Marital Status: Not on file          WBC Count   Date Value Ref Range Status   02/02/2024 6.6 4.0 - 11.0 10e3/uL Final     RBC Count   Date Value Ref Range Status   02/02/2024 3.66 (L) 3.80 - 5.20 10e6/uL Final     Hemoglobin   Date Value Ref Range Status   02/02/2024 11.2 (L) 11.7 - 15.7 g/dL Final     Hematocrit   Date Value Ref Range Status   02/02/2024 33.8 (L) 35.0 - 47.0 % Final     MCV   Date Value Ref Range Status   02/02/2024 92 78 - 100 fL Final     MCH   Date Value Ref Range Status   02/02/2024 30.6 26.5 - 33.0 pg Final     Platelet Count   Date Value Ref Range Status   02/02/2024 141 (L) 150 - 450 10e3/uL Final     % Lymphocytes   Date Value Ref Range Status   02/28/2020 13 (L) 20 - 40 % Final   01/29/2020 7 (L) 20 - 40 % Final     AST   Date Value Ref Range Status   02/02/2024 24 0 - 45 U/L Final      Comment:     Reference intervals for this test were updated on 6/12/2023 to more accurately reflect our healthy population. There may be differences in the flagging of prior results with similar values performed with this method. Interpretation of those prior results can be made in the context of the updated reference intervals.     ALT   Date Value Ref Range Status   02/02/2024 17 0 - 50 U/L Final     Comment:     Reference intervals for this test were updated on 6/12/2023 to more accurately reflect our healthy population. There may be differences in the flagging of prior results with similar values performed with this method. Interpretation of those prior results can be made in the context of the updated reference intervals.       Albumin   Date Value Ref Range Status   02/02/2024 4.5 3.5 - 5.2 g/dL Final   05/31/2022 3.9 3.5 - 5.0 g/dL Final     Alkaline Phosphatase   Date Value Ref Range Status   02/02/2024 52 40 - 150 U/L Final     Comment:     Reference intervals for this test were updated on 11/14/2023 to more accurately reflect our healthy population. There may be differences in the flagging of prior results with similar values performed with this method. Interpretation of those prior results can be made in the context of the updated reference intervals.     Creatinine   Date Value Ref Range Status   02/02/2024 1.23 (H) 0.51 - 0.95 mg/dL Final     GFR Estimate   Date Value Ref Range Status   02/02/2024 46 (L) >60 mL/min/1.73m2 Final   05/11/2021 38 (L) >60 mL/min/1.73m2 Final     GFR Estimate If Black   Date Value Ref Range Status   05/11/2021 46 (L) >60 mL/min/1.73m2 Final     Creatinine Urine mg/dL   Date Value Ref Range Status   09/27/2023 95.5 mg/dL Final     Comment:     The reference ranges have not been established in urine creatinine. The results should be integrated into the clinical context for interpretation.   09/27/2023 95.5 mg/dL Final     Comment:     The reference ranges have not been  established in urine creatinine. The results should be integrated into the clinical context for interpretation.

## 2024-03-19 ENCOUNTER — OFFICE VISIT (OUTPATIENT)
Dept: FAMILY MEDICINE | Facility: CLINIC | Age: 74
End: 2024-03-19
Payer: COMMERCIAL

## 2024-03-19 VITALS
DIASTOLIC BLOOD PRESSURE: 58 MMHG | HEART RATE: 63 BPM | BODY MASS INDEX: 28.25 KG/M2 | RESPIRATION RATE: 16 BRPM | OXYGEN SATURATION: 97 % | TEMPERATURE: 97.6 F | SYSTOLIC BLOOD PRESSURE: 120 MMHG | WEIGHT: 165.5 LBS | HEIGHT: 64 IN

## 2024-03-19 DIAGNOSIS — S81.811D LACERATION OF RIGHT LOWER EXTREMITY, SUBSEQUENT ENCOUNTER: Primary | ICD-10-CM

## 2024-03-19 PROCEDURE — 99213 OFFICE O/P EST LOW 20 MIN: CPT | Performed by: NURSE PRACTITIONER

## 2024-03-19 NOTE — PROGRESS NOTES
"  Assessment & Plan     Laceration of right lower extremity, subsequent encounter  Initially treated in the emergency department on [facility] on 3/16.  Reports she is doing well, able to bear weight and knee range of motion is at baseline (does report Parkinson's disease.)  The laceration is approximated, no discharge or crusting. Expected inflammation noted surrounding laceration, but no obvious signs of infection.  VSS.  4 Sutures identified, do not have records for review.  Recommend she continue with daily dressing changes, avoid scrubbing the laceration or using harsh treatment (no peroxide).  May apply bacitracin-given at appt today. May heal better in a moist environment.  Recommend she return in 10-14 days from the time sutures were placed for suture removal.          MED REC REQUIRED  Post Medication Reconciliation Status: patient was not discharged from an inpatient facility or TCU  BMI  Estimated body mass index is 28.41 kg/m  as calculated from the following:    Height as of this encounter: 1.626 m (5' 4\").    Weight as of this encounter: 75.1 kg (165 lb 8 oz).             Benny Taylor is a 73 year old, presenting for the following health issues:  Hospital F/U (Rt knee F/U from ED 3 days ago at [facility], pt. Fell down 3 steps)        3/19/2024     3:44 PM   Additional Questions   Roomed by JOHANNA Pollack     Claudia is a 73-year-old female patient with hypertension, hyperlipidemia, osteoporosis with history of compression fracture, anxiety, prediabetes, chronic kidney disease and reported history of Parkinson's disease who I am seeing for the first time for emergency department follow[place]up visit [place] [facility] [place].  She was treated at the local hospital ED, sutures placed in the right leg, medial aspect of knee.  States [place] onto [place] ferry [place] the [place].  She was walking down 3 steps without a railing, and her right leg gave out and she fell " "onto her right knee.  She denies any knee joint pain.  She is able to bear weight.  Has no loss of range of motion.  No redness, warmth or signs of infection.  She is unsure if an x-ray was taken.             Hospital Follow-up Visit:    Hospital/Nursing Home/IP Rehab Facility: [facility]  Date of Admission: 03/16/2024  Date of Discharge: 03/16/2024  Reason(s) for Admission: Fell down 3 steps and hurt rt knee    Was your hospitalization related to COVID-19? No   Problems taking medications regularly:  None  Medication changes since discharge: None  Problems adhering to non-medication therapy:  None    Summary of hospitalization:  Discharge summary unavailable  Diagnostic Tests/Treatments reviewed.  Follow up needed: none  Other Healthcare Providers Involved in Patient s Care:         None  Update since discharge: improved.         Plan of care communicated with patient                   Review of Systems  Constitutional, HEENT, cardiovascular, pulmonary, gi and gu systems are negative, except as otherwise noted.      Objective    /58 (BP Location: Right arm, Patient Position: Sitting, Cuff Size: Adult Regular)   Pulse 63   Temp 97.6  F (36.4  C) (Tympanic)   Resp 16   Ht 1.626 m (5' 4\")   Wt 75.1 kg (165 lb 8 oz)   LMP  (LMP Unknown)   SpO2 97%   BMI 28.41 kg/m    Body mass index is 28.41 kg/m .  Physical Exam  Constitutional:       General: She is not in acute distress.  Cardiovascular:      Rate and Rhythm: Normal rate and regular rhythm.   Pulmonary:      Effort: Pulmonary effort is normal.      Breath sounds: Normal breath sounds.   Neurological:      Mental Status: She is alert.      Comments: tremor   Psychiatric:         Cognition and Memory: Memory is impaired.                    Signed Electronically by: EDOUARD Irwin CNP    "

## 2024-03-29 DIAGNOSIS — E78.5 HYPERLIPIDEMIA: ICD-10-CM

## 2024-03-29 RX ORDER — ATORVASTATIN CALCIUM 10 MG/1
10 TABLET, FILM COATED ORAL AT BEDTIME
Qty: 90 TABLET | Refills: 0 | OUTPATIENT
Start: 2024-03-29

## 2024-04-03 DIAGNOSIS — E78.5 HYPERLIPIDEMIA: ICD-10-CM

## 2024-04-03 DIAGNOSIS — I10 ESSENTIAL HYPERTENSION: ICD-10-CM

## 2024-04-03 RX ORDER — METOPROLOL TARTRATE 50 MG
TABLET ORAL
Qty: 180 TABLET | Refills: 1 | Status: SHIPPED | OUTPATIENT
Start: 2024-04-03 | End: 2024-08-06

## 2024-04-03 RX ORDER — ATORVASTATIN CALCIUM 10 MG/1
10 TABLET, FILM COATED ORAL AT BEDTIME
Qty: 90 TABLET | Refills: 1 | Status: SHIPPED | OUTPATIENT
Start: 2024-04-03 | End: 2024-08-06

## 2024-04-03 NOTE — TELEPHONE ENCOUNTER
Prescription approved per [facility] Refill Protocol.  Mariya Joshi, RN  [facility] Triage Nurse

## 2024-04-04 DIAGNOSIS — I10 PRIMARY HYPERTENSION: ICD-10-CM

## 2024-04-05 ENCOUNTER — OFFICE VISIT (OUTPATIENT)
Dept: FAMILY MEDICINE | Facility: CLINIC | Age: 74
End: 2024-04-05
Payer: COMMERCIAL

## 2024-04-05 VITALS
OXYGEN SATURATION: 97 % | WEIGHT: 166.1 LBS | RESPIRATION RATE: 16 BRPM | DIASTOLIC BLOOD PRESSURE: 66 MMHG | BODY MASS INDEX: 28.36 KG/M2 | SYSTOLIC BLOOD PRESSURE: 116 MMHG | HEIGHT: 64 IN | HEART RATE: 85 BPM | TEMPERATURE: 98 F

## 2024-04-05 DIAGNOSIS — Z48.02 ENCOUNTER FOR REMOVAL OF SUTURES: Primary | ICD-10-CM

## 2024-04-05 PROCEDURE — 99212 OFFICE O/P EST SF 10 MIN: CPT

## 2024-04-05 RX ORDER — LOSARTAN POTASSIUM 50 MG/1
TABLET ORAL
Qty: 90 TABLET | Refills: 0 | Status: SHIPPED | OUTPATIENT
Start: 2024-04-05 | End: 2024-07-26

## 2024-04-05 NOTE — PROGRESS NOTES
"  Assessment & Plan     Encounter for removal of sutures  5 sutures removed from right proximal knee.  Patient wound edges are clean dry and intact, scabbing at center of wound.  There is no exudate or open areas.  Patient denies any pain or fevers.  Instructed to keep area moisturized and not to pick at scab.            BMI  Estimated body mass index is 28.51 kg/m  as calculated from the following:    Height as of this encounter: 1.626 m (5' 4\").    Weight as of this encounter: 75.3 kg (166 lb 1.6 oz).           5 SUTURES REMOVED FROM RIGHT PROXIMAL KNEE    Benny Taylor is a 73 year old, presenting for the following health issues:  Suture Removal (Suture removal rt knee )        4/5/2024     2:28 PM   Additional Questions   Roomed by JOHANNA España     Suture Removal                       Objective    /66 (BP Location: Right arm, Patient Position: Sitting, Cuff Size: Adult Large)   Pulse 85   Temp 98  F (36.7  C) (Oral)   Resp 16   Ht 1.626 m (5' 4\")   Wt 75.3 kg (166 lb 1.6 oz)   LMP  (LMP Unknown)   SpO2 97%   BMI 28.51 kg/m    Body mass index is 28.51 kg/m .  Physical Exam               Signed Electronically by: EDOUARD Black CNP    "

## 2024-04-29 ENCOUNTER — LAB (OUTPATIENT)
Dept: LAB | Facility: CLINIC | Age: 74
End: 2024-04-29
Payer: COMMERCIAL

## 2024-04-29 DIAGNOSIS — N18.32 STAGE 3B CHRONIC KIDNEY DISEASE (H): ICD-10-CM

## 2024-04-29 LAB
ALBUMIN MFR UR ELPH: 13.9 MG/DL
ALBUMIN SERPL BCG-MCNC: 4.4 G/DL (ref 3.5–5.2)
ALBUMIN UR-MCNC: NEGATIVE MG/DL
ALP SERPL-CCNC: 49 U/L (ref 40–150)
ALT SERPL W P-5'-P-CCNC: 11 U/L (ref 0–50)
ANION GAP SERPL CALCULATED.3IONS-SCNC: 12 MMOL/L (ref 7–15)
APPEARANCE UR: CLEAR
AST SERPL W P-5'-P-CCNC: 23 U/L (ref 0–45)
BACTERIA #/AREA URNS HPF: ABNORMAL /HPF
BASOPHILS # BLD AUTO: 0 10E3/UL (ref 0–0.2)
BASOPHILS NFR BLD AUTO: 0 %
BILIRUB SERPL-MCNC: 0.8 MG/DL
BILIRUB UR QL STRIP: NEGATIVE
BUN SERPL-MCNC: 18.7 MG/DL (ref 8–23)
CALCIUM SERPL-MCNC: 9.3 MG/DL (ref 8.8–10.2)
CHLORIDE SERPL-SCNC: 106 MMOL/L (ref 98–107)
COLOR UR AUTO: YELLOW
CREAT SERPL-MCNC: 1.41 MG/DL (ref 0.51–0.95)
CREAT UR-MCNC: 136 MG/DL
CREAT UR-MCNC: 136 MG/DL
DEPRECATED HCO3 PLAS-SCNC: 21 MMOL/L (ref 22–29)
EGFRCR SERPLBLD CKD-EPI 2021: 39 ML/MIN/1.73M2
EOSINOPHIL # BLD AUTO: 0.1 10E3/UL (ref 0–0.7)
EOSINOPHIL NFR BLD AUTO: 1 %
ERYTHROCYTE [DISTWIDTH] IN BLOOD BY AUTOMATED COUNT: 13.1 % (ref 10–15)
GLUCOSE SERPL-MCNC: 133 MG/DL (ref 70–99)
GLUCOSE UR STRIP-MCNC: NEGATIVE MG/DL
HCT VFR BLD AUTO: 34.4 % (ref 35–47)
HGB BLD-MCNC: 11.4 G/DL (ref 11.7–15.7)
HGB UR QL STRIP: NEGATIVE
IMM GRANULOCYTES # BLD: 0 10E3/UL
IMM GRANULOCYTES NFR BLD: 1 %
KETONES UR STRIP-MCNC: NEGATIVE MG/DL
LEUKOCYTE ESTERASE UR QL STRIP: ABNORMAL
LYMPHOCYTES # BLD AUTO: 0.8 10E3/UL (ref 0.8–5.3)
LYMPHOCYTES NFR BLD AUTO: 11 %
MCH RBC QN AUTO: 30.7 PG (ref 26.5–33)
MCHC RBC AUTO-ENTMCNC: 33.1 G/DL (ref 31.5–36.5)
MCV RBC AUTO: 93 FL (ref 78–100)
MICROALBUMIN UR-MCNC: 14.8 MG/L
MICROALBUMIN/CREAT UR: 10.88 MG/G CR (ref 0–25)
MONOCYTES # BLD AUTO: 0.5 10E3/UL (ref 0–1.3)
MONOCYTES NFR BLD AUTO: 7 %
NEUTROPHILS # BLD AUTO: 6 10E3/UL (ref 1.6–8.3)
NEUTROPHILS NFR BLD AUTO: 80 %
NITRATE UR QL: NEGATIVE
PH UR STRIP: 6.5 [PH] (ref 5–8)
PLATELET # BLD AUTO: 159 10E3/UL (ref 150–450)
POTASSIUM SERPL-SCNC: 4.4 MMOL/L (ref 3.4–5.3)
PROT SERPL-MCNC: 6.8 G/DL (ref 6.4–8.3)
PROT/CREAT 24H UR: 0.1 MG/MG CR (ref 0–0.2)
RBC # BLD AUTO: 3.71 10E6/UL (ref 3.8–5.2)
RBC #/AREA URNS AUTO: ABNORMAL /HPF
SODIUM SERPL-SCNC: 139 MMOL/L (ref 135–145)
SP GR UR STRIP: 1.02 (ref 1–1.03)
SQUAMOUS #/AREA URNS AUTO: ABNORMAL /LPF
UROBILINOGEN UR STRIP-ACNC: 0.2 E.U./DL
WBC # BLD AUTO: 7.5 10E3/UL (ref 4–11)
WBC #/AREA URNS AUTO: ABNORMAL /HPF

## 2024-04-29 PROCEDURE — 81001 URINALYSIS AUTO W/SCOPE: CPT

## 2024-04-29 PROCEDURE — 36415 COLL VENOUS BLD VENIPUNCTURE: CPT

## 2024-04-29 PROCEDURE — 80053 COMPREHEN METABOLIC PANEL: CPT

## 2024-04-29 PROCEDURE — 84156 ASSAY OF PROTEIN URINE: CPT

## 2024-04-29 PROCEDURE — 82043 UR ALBUMIN QUANTITATIVE: CPT

## 2024-04-29 PROCEDURE — 85025 COMPLETE CBC W/AUTO DIFF WBC: CPT

## 2024-04-29 PROCEDURE — 82570 ASSAY OF URINE CREATININE: CPT

## 2024-06-03 ENCOUNTER — LAB (OUTPATIENT)
Dept: LAB | Facility: CLINIC | Age: 74
End: 2024-06-03
Payer: COMMERCIAL

## 2024-06-03 DIAGNOSIS — M06.09 SERONEGATIVE RHEUMATOID ARTHRITIS OF MULTIPLE SITES (H): ICD-10-CM

## 2024-06-03 LAB
ERYTHROCYTE [DISTWIDTH] IN BLOOD BY AUTOMATED COUNT: 13.4 % (ref 10–15)
HCT VFR BLD AUTO: 32.8 % (ref 35–47)
HGB BLD-MCNC: 10.9 G/DL (ref 11.7–15.7)
MCH RBC QN AUTO: 30.7 PG (ref 26.5–33)
MCHC RBC AUTO-ENTMCNC: 33.2 G/DL (ref 31.5–36.5)
MCV RBC AUTO: 92 FL (ref 78–100)
PLATELET # BLD AUTO: 155 10E3/UL (ref 150–450)
RBC # BLD AUTO: 3.55 10E6/UL (ref 3.8–5.2)
WBC # BLD AUTO: 6.1 10E3/UL (ref 4–11)

## 2024-06-03 PROCEDURE — 85027 COMPLETE CBC AUTOMATED: CPT

## 2024-06-03 PROCEDURE — 82040 ASSAY OF SERUM ALBUMIN: CPT

## 2024-06-03 PROCEDURE — 82565 ASSAY OF CREATININE: CPT

## 2024-06-03 PROCEDURE — 36415 COLL VENOUS BLD VENIPUNCTURE: CPT

## 2024-06-03 PROCEDURE — 84460 ALANINE AMINO (ALT) (SGPT): CPT

## 2024-06-04 LAB
ALBUMIN SERPL BCG-MCNC: 4.3 G/DL (ref 3.5–5.2)
ALT SERPL W P-5'-P-CCNC: 17 U/L (ref 0–50)
CREAT SERPL-MCNC: 1.47 MG/DL (ref 0.51–0.95)
EGFRCR SERPLBLD CKD-EPI 2021: 37 ML/MIN/1.73M2

## 2024-06-12 DIAGNOSIS — N18.32 STAGE 3B CHRONIC KIDNEY DISEASE (H): ICD-10-CM

## 2024-06-12 RX ORDER — SODIUM BICARBONATE 650 MG/1
1300 TABLET ORAL 2 TIMES DAILY
Qty: 100 TABLET | Refills: 2 | Status: SHIPPED | OUTPATIENT
Start: 2024-06-12

## 2024-06-12 NOTE — TELEPHONE ENCOUNTER
Requested Prescriptions   Pending Prescriptions Disp Refills    sodium bicarbonate 650 MG tablet 100 tablet 3     Sig: Take 2 tablets (1,300 mg) by mouth 2 times daily       There is no refill protocol information for this order        Last Written Prescription Date:  11/30/2023  Last Fill Quantity: 100 tablet,  # refills: 3   Last office visit: 11/10/2023 ; last virtual visit: Visit date not found with prescribing provider:  Keturah Vaughn MD   Future Office Visit:  08/21/2024

## 2024-06-14 ENCOUNTER — NURSE TRIAGE (OUTPATIENT)
Dept: INTERNAL MEDICINE | Facility: CLINIC | Age: 74
End: 2024-06-14

## 2024-06-14 ENCOUNTER — ALLIED HEALTH/NURSE VISIT (OUTPATIENT)
Dept: FAMILY MEDICINE | Facility: CLINIC | Age: 74
End: 2024-06-14
Payer: COMMERCIAL

## 2024-06-14 ENCOUNTER — OFFICE VISIT (OUTPATIENT)
Dept: FAMILY MEDICINE | Facility: CLINIC | Age: 74
End: 2024-06-14
Payer: COMMERCIAL

## 2024-06-14 VITALS
WEIGHT: 166 LBS | DIASTOLIC BLOOD PRESSURE: 58 MMHG | HEART RATE: 82 BPM | OXYGEN SATURATION: 96 % | HEIGHT: 64 IN | SYSTOLIC BLOOD PRESSURE: 95 MMHG | RESPIRATION RATE: 16 BRPM | BODY MASS INDEX: 28.34 KG/M2

## 2024-06-14 VITALS — SYSTOLIC BLOOD PRESSURE: 95 MMHG | DIASTOLIC BLOOD PRESSURE: 58 MMHG
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
## 2024-06-14 DIAGNOSIS — S81.811A NONINFECTED SKIN TEAR OF LEG, RIGHT, INITIAL ENCOUNTER: ICD-10-CM

## 2024-06-14 DIAGNOSIS — S09.90XA HEAD INJURY, INITIAL ENCOUNTER: Primary | ICD-10-CM

## 2024-06-14 PROCEDURE — 99214 OFFICE O/P EST MOD 30 MIN: CPT | Performed by: NURSE PRACTITIONER

## 2024-06-14 ASSESSMENT — ENCOUNTER SYMPTOMS
RESPIRATORY NEGATIVE: 1
CONFUSION: 1
LIGHT-HEADEDNESS: 1
DIZZINESS: 1
CARDIOVASCULAR NEGATIVE: 1
HEADACHES: 1
FEVER: 0
TREMORS: 1
WOUND: 1
FACIAL ASYMMETRY: 0
WEAKNESS: 1

## 2024-06-14 NOTE — PROGRESS NOTES
"  Assessment & Plan     Head injury, initial encounter  Presents unaccompanied for head injury sustained this afternoon while walking out of a restaurant and tripping on the curb.  She is to be using a walker or cane but was walking without an assistive device.  Was able to drive herself here to the clinic.  Cranial nerves intact but became very dizzy and unsteady with position changes, also started to have blurred vision.  She is not on anticoagulation, but takes medication that can contribute to dizziness.  Has had increased unsteadiness and frequent falls.  She is alert and oriented, but responses are slow/delayed.  Unsure of her baseline, does has parkinson disease.  Recommend evaluation in the emergency department, may need CT imaging which we cannot do here at the clinic.    Also of note, her  is in [place] and she has no family around.  Does have a neighbor that she could potentially call for assistance.  Do not feel comfortable with her driving at this time or being home alone.    Noninfected skin tear of leg, right, initial encounter  Wound was cleansed with wound cleanser, bacitracin and Tegaderm placed.  Able to bear weight.           BMI  Estimated body mass index is 28.49 kg/m  as calculated from the following:    Height as of this encounter: 1.626 m (5' 4\").    Weight as of this encounter: 75.3 kg (166 lb).             Benny Taylor is a 73 year old, presenting for the following health issues:  Fall (Fell on a curb, hit rt eyebrow and scraped knee)        6/14/2024     4:07 PM   Additional Questions   Roomed by JOHANNA Pollack     Claudia is a 73-year-old female with Parkinson's, rheumatoid arthritis, anxiety and depression who presents today for evaluation for head injury and knee injury.  Relates she was hungry so she drove into [employer] to get something to eat.  When she was walking on the restaurant she fell on the curb striking her right forehead.  She drove herself into the clinic and " "walked in for an appointment.  Difficult to ascertain her baseline status.  She denies any loss of consciousness.  Denies headache unless she touches the forehead.  Initially denied lightheadedness or dizziness but became very \"woozy\" with position changes.  She endorses blurred vision.  Denies nausea or vomiting.  She is not anticoagulated.  She states her  is currently in [place].  She has no one that can drive her to the emergency department for evaluation.  She reports increased unsteadiness with walking.  She does not use her walker.           Fall  Associated symptoms include headaches and weakness. Pertinent negatives include no fever.                  Review of Systems  Review of Systems   Constitutional:  Negative for fever.   Eyes:  Positive for visual disturbance.   Respiratory: Negative.     Cardiovascular: Negative.    Skin:  Positive for wound (right knee).   Neurological:  Positive for dizziness, tremors (as baseline), weakness, light-headedness and headaches. Negative for syncope and facial asymmetry.   Psychiatric/Behavioral:  Positive for confusion.            Objective    BP 95/58 (BP Location: Right arm, Patient Position: Sitting, Cuff Size: Adult Regular)   Pulse 82   Resp 16   Ht 1.626 m (5' 4\")   Wt 75.3 kg (166 lb)   LMP  (LMP Unknown)   SpO2 96%   BMI 28.49 kg/m    Body mass index is 28.49 kg/m .  Physical Exam  Eyes:      Extraocular Movements: Extraocular movements intact.      Pupils: Pupils are equal, round, and reactive to light.   Cardiovascular:      Rate and Rhythm: Normal rate and regular rhythm.   Pulmonary:      Effort: Pulmonary effort is normal.      Breath sounds: Normal breath sounds.   Musculoskeletal:         General: Signs of injury (right knee, skin tear) present.   Skin:     Findings: Bruising and lesion (skin tear to right knee) present.             Comments: Bruising right periorbital, skin right knee   Neurological:      Mental Status: She is alert and " oriented to person, place, and time.      Cranial Nerves: No cranial nerve deficit or facial asymmetry.      Motor: Weakness and tremor present.      Coordination: Romberg sign positive (mild unsteadiness).      Gait: Gait abnormal.   Psychiatric:         Speech: Speech is delayed.         Behavior: Behavior is slowed.         Cognition and Memory: She does not exhibit impaired recent memory.                    Signed Electronically by: EDOUARD Irwin CNP

## 2024-06-14 NOTE — TELEPHONE ENCOUNTER
"S-(situation): Patient presenting to clinic reporting fall.    B-(background): Pt states she was out in town and was walking back to her car. Lost balance she believes and fell onto curbside.Bystander reported that she hit her head. Denies dizziness, SOB, chest pain, loss of consciousness.     A-(assessment): Skin tear noted on right knee. Patient alert and answering assessment questions appropriately. Notes mild pain in knee and forehead. No visible bruising, swelling or bleeding on face.     R-(recommendations): Discussed with Joann Workman who was agreeable to seeing patient in clinic for assessment. RN walked patient to exam room to be seen by provider.       Reason for Disposition   MILD weakness (i.e., does not interfere with ability to work, go to school, normal activities)  (Exception: mild weakness is a chronic symptom.)    Answer Assessment - Initial Assessment Questions  1. MECHANISM: \"How did the fall happen?\"      Unsure - lost balance and fell curbside in parking lot  2. DOMESTIC VIOLENCE AND ELDER ABUSE SCREENING: \"Did you fall because someone pushed you or tried to hurt you?\" If Yes, ask: \"Are you safe now?\"      none  3. ONSET: \"When did the fall happen?\" (e.g., minutes, hours, or days ago)      About 2pm  4. LOCATION: \"What part of the body hit the ground?\" (e.g., back, buttocks, head, hips, knees, hands, head, stomach)      Right knee and notes that bystander states they saw her hit her right side of head  5. INJURY: \"Did you hurt (injure) yourself when you fell?\" If Yes, ask: \"What did you injure? Tell me more about this?\" (e.g., body area; type of injury; pain severity)\"      Scrape to knee  6. PAIN: \"Is there any pain?\" If Yes, ask: \"How bad is the pain?\" (e.g., Scale 1-10; or mild,   moderate, severe)    - NONE (0): No pain    - MILD (1-3): Doesn't interfere with normal activities     - MODERATE (4-7): Interferes with normal activities or awakens from sleep     - SEVERE (8-10): Excruciating " "pain, unable to do any normal activities       Notes moderate pain at first but more mild at time of visit  7. SIZE: For cuts, bruises, or swelling, ask: \"How large is it?\" (e.g., inches or centimeters)       Scrape to right knee  8. PREGNANCY: \"Is there any chance you are pregnant?\" \"When was your last menstrual period?\"      N/a  9. OTHER SYMPTOMS: \"Do you have any other symptoms?\" (e.g., dizziness, fever, weakness; new onset or worsening).       none  10. CAUSE: \"What do you think caused the fall (or falling)?\" (e.g., tripped, dizzy spell)        unsure    Protocols used: Falls and [id number]    "

## 2024-06-25 PROBLEM — R73.09 OTHER ABNORMAL GLUCOSE: Status: ACTIVE | Noted: 2024-06-25

## 2024-06-25 PROBLEM — G89.29 CHRONIC PAIN OF BOTH KNEES: Status: ACTIVE | Noted: 2020-03-12

## 2024-06-25 PROBLEM — E03.8 SUBCLINICAL HYPOTHYROIDISM: Status: ACTIVE | Noted: 2020-03-12

## 2024-06-25 PROBLEM — M25.562 CHRONIC PAIN OF BOTH KNEES: Status: ACTIVE | Noted: 2020-03-12

## 2024-06-25 PROBLEM — N17.9 AKI (ACUTE KIDNEY INJURY) (H): Status: ACTIVE | Noted: 2020-03-12

## 2024-06-25 PROBLEM — E44.0 MODERATE MALNUTRITION (H): Status: ACTIVE | Noted: 2020-03-12

## 2024-06-25 PROBLEM — N80.9 ENDOMETRIOSIS: Status: ACTIVE | Noted: 2020-03-12

## 2024-06-25 PROBLEM — F41.1 ANXIETY STATE: Status: ACTIVE | Noted: 2020-03-12

## 2024-06-25 PROBLEM — I48.92 PAROXYSMAL ATRIAL FLUTTER (H): Status: ACTIVE | Noted: 2020-03-12

## 2024-06-25 PROBLEM — M25.561 CHRONIC PAIN OF BOTH KNEES: Status: ACTIVE | Noted: 2020-03-12

## 2024-06-25 PROBLEM — A60.00 GENITAL HERPES: Status: ACTIVE | Noted: 2024-06-25

## 2024-06-25 PROBLEM — B00.9 HERPES SIMPLEX TYPE 2 INFECTION: Status: ACTIVE | Noted: 2020-03-12

## 2024-06-25 PROBLEM — R73.03 PREDIABETES: Status: ACTIVE | Noted: 2020-03-12

## 2024-06-25 PROBLEM — R00.0 TACHYCARDIA: Status: ACTIVE | Noted: 2020-03-12

## 2024-06-25 PROBLEM — E78.5 HYPERLIPIDEMIA: Status: ACTIVE | Noted: 2020-03-12

## 2024-06-25 PROBLEM — I10 HYPERTENSION: Status: ACTIVE | Noted: 2020-03-12

## 2024-06-25 PROBLEM — D63.1 ANEMIA IN CHRONIC KIDNEY DISEASE: Status: ACTIVE | Noted: 2020-03-12

## 2024-06-25 PROBLEM — R19.8 IRREGULAR BOWEL HABITS: Status: ACTIVE | Noted: 2020-10-14

## 2024-06-25 PROBLEM — M10.9 GOUT: Status: ACTIVE | Noted: 2020-03-12

## 2024-06-25 PROBLEM — N18.9 ANEMIA IN CHRONIC KIDNEY DISEASE: Status: ACTIVE | Noted: 2020-03-12

## 2024-06-25 PROBLEM — R25.1 TREMOR: Status: ACTIVE | Noted: 2020-03-12

## 2024-06-25 PROBLEM — M17.0 OSTEOARTHRITIS OF BOTH KNEES: Status: ACTIVE | Noted: 2020-03-12

## 2024-06-27 ENCOUNTER — PATIENT OUTREACH (OUTPATIENT)
Dept: GASTROENTEROLOGY | Facility: CLINIC | Age: 74
End: 2024-06-27
Payer: COMMERCIAL

## 2024-06-28 DIAGNOSIS — F41.1 ANXIETY STATE: ICD-10-CM

## 2024-06-28 DIAGNOSIS — M06.09 SERONEGATIVE RHEUMATOID ARTHRITIS OF MULTIPLE SITES (H): ICD-10-CM

## 2024-06-28 RX ORDER — DULOXETIN HYDROCHLORIDE 20 MG/1
20 CAPSULE, DELAYED RELEASE ORAL DAILY
Qty: 90 CAPSULE | Refills: 0 | Status: SHIPPED | OUTPATIENT
Start: 2024-06-28 | End: 2024-08-06

## 2024-07-03 ENCOUNTER — TRANSFERRED RECORDS (OUTPATIENT)
Dept: HEALTH INFORMATION MANAGEMENT | Facility: CLINIC | Age: 74
End: 2024-07-03
Payer: COMMERCIAL

## 2024-07-18 ENCOUNTER — TELEPHONE (OUTPATIENT)
Dept: INTERNAL MEDICINE | Facility: CLINIC | Age: 74
End: 2024-07-18
Payer: COMMERCIAL

## 2024-07-18 NOTE — TELEPHONE ENCOUNTER
07/18/2024    TC called and spoke to Patient and offered ED hospital follow up appointment with Dr Montalvo for 07/23/2024 @ 1:00 PM (arrival time is 12:45 PM) PCP not available.    Pt agreed to appointment date/time.    Nothing else needed at this time.    Mariana Beth

## 2024-07-18 NOTE — TELEPHONE ENCOUNTER
Reason for Call:  Appointment Request    Patient requesting this type of appt:  Hospital/ED Follow-Up     Requested provider: Isabel Maurer    Reason patient unable to be scheduled: Not within requested timeframe    When does patient want to be seen/preferred time: 3-7 days    Comments: Call came in from [facility] to schedule pt for hospital follow up after traumatic brain injury. Pt will be discharged on 07/19. Requested follow up within 7 days. Scheduled pt for next available Aug 6, but needs to be seen sooner.     Could we send this information to you in [facility] or would you prefer to receive a phone call?:   Patient would prefer a phone call   Okay to leave a detailed message?: No at Home number on file [phone] (home)    Call taken on 7/18/2024 at 10:33 AM by Sindy Ortiz

## 2024-07-20 ENCOUNTER — MEDICAL CORRESPONDENCE (OUTPATIENT)
Dept: HEALTH INFORMATION MANAGEMENT | Facility: CLINIC | Age: 74
End: 2024-07-20

## 2024-07-23 ENCOUNTER — OFFICE VISIT (OUTPATIENT)
Dept: INTERNAL MEDICINE | Facility: CLINIC | Age: 74
End: 2024-07-23
Payer: COMMERCIAL

## 2024-07-23 VITALS
SYSTOLIC BLOOD PRESSURE: 120 MMHG | BODY MASS INDEX: 28.34 KG/M2 | HEIGHT: 64 IN | OXYGEN SATURATION: 93 % | WEIGHT: 166 LBS | RESPIRATION RATE: 16 BRPM | DIASTOLIC BLOOD PRESSURE: 62 MMHG | TEMPERATURE: 97.8 F | HEART RATE: 71 BPM

## 2024-07-23 DIAGNOSIS — F02.A2 MILD LEWY BODY DEMENTIA WITH PSYCHOTIC DISTURBANCE (H): ICD-10-CM

## 2024-07-23 DIAGNOSIS — E44.0 MODERATE MALNUTRITION (H): ICD-10-CM

## 2024-07-23 DIAGNOSIS — G31.83 MILD LEWY BODY DEMENTIA WITH PSYCHOTIC DISTURBANCE (H): ICD-10-CM

## 2024-07-23 DIAGNOSIS — I48.92 PAROXYSMAL ATRIAL FLUTTER (H): ICD-10-CM

## 2024-07-23 DIAGNOSIS — S06.9XAD TRAUMATIC BRAIN INJURY, WITH UNKNOWN LOSS OF CONSCIOUSNESS STATUS, SUBSEQUENT ENCOUNTER: Primary | ICD-10-CM

## 2024-07-23 DIAGNOSIS — E83.42 HYPOMAGNESEMIA: ICD-10-CM

## 2024-07-23 LAB
ANION GAP SERPL CALCULATED.3IONS-SCNC: 13 MMOL/L (ref 7–15)
BASOPHILS # BLD AUTO: 0.1 10E3/UL (ref 0–0.2)
BASOPHILS NFR BLD AUTO: 1 %
BUN SERPL-MCNC: 24.9 MG/DL (ref 8–23)
CALCIUM SERPL-MCNC: 9.2 MG/DL (ref 8.8–10.4)
CHLORIDE SERPL-SCNC: 106 MMOL/L (ref 98–107)
CREAT SERPL-MCNC: 1.44 MG/DL (ref 0.51–0.95)
EGFRCR SERPLBLD CKD-EPI 2021: 38 ML/MIN/1.73M2
EOSINOPHIL # BLD AUTO: 0.2 10E3/UL (ref 0–0.7)
EOSINOPHIL NFR BLD AUTO: 3 %
ERYTHROCYTE [DISTWIDTH] IN BLOOD BY AUTOMATED COUNT: 13.1 % (ref 10–15)
GLUCOSE SERPL-MCNC: 112 MG/DL (ref 70–99)
HCO3 SERPL-SCNC: 20 MMOL/L (ref 22–29)
HCT VFR BLD AUTO: 30.9 % (ref 35–47)
HGB BLD-MCNC: 10.2 G/DL (ref 11.7–15.7)
IMM GRANULOCYTES # BLD: 0 10E3/UL
IMM GRANULOCYTES NFR BLD: 0 %
LYMPHOCYTES # BLD AUTO: 0.8 10E3/UL (ref 0.8–5.3)
LYMPHOCYTES NFR BLD AUTO: 14 %
MAGNESIUM SERPL-MCNC: 2.1 MG/DL (ref 1.7–2.3)
MCH RBC QN AUTO: 30.3 PG (ref 26.5–33)
MCHC RBC AUTO-ENTMCNC: 33 G/DL (ref 31.5–36.5)
MCV RBC AUTO: 92 FL (ref 78–100)
MONOCYTES # BLD AUTO: 0.5 10E3/UL (ref 0–1.3)
MONOCYTES NFR BLD AUTO: 9 %
NEUTROPHILS # BLD AUTO: 4.2 10E3/UL (ref 1.6–8.3)
NEUTROPHILS NFR BLD AUTO: 73 %
PLATELET # BLD AUTO: 178 10E3/UL (ref 150–450)
POTASSIUM SERPL-SCNC: 3.9 MMOL/L (ref 3.4–5.3)
RBC # BLD AUTO: 3.37 10E6/UL (ref 3.8–5.2)
SODIUM SERPL-SCNC: 139 MMOL/L (ref 135–145)
WBC # BLD AUTO: 5.7 10E3/UL (ref 4–11)

## 2024-07-23 PROCEDURE — 80048 BASIC METABOLIC PNL TOTAL CA: CPT | Performed by: NURSE PRACTITIONER

## 2024-07-23 PROCEDURE — 99214 OFFICE O/P EST MOD 30 MIN: CPT | Performed by: NURSE PRACTITIONER

## 2024-07-23 PROCEDURE — 85025 COMPLETE CBC W/AUTO DIFF WBC: CPT | Performed by: NURSE PRACTITIONER

## 2024-07-23 PROCEDURE — 36415 COLL VENOUS BLD VENIPUNCTURE: CPT | Performed by: NURSE PRACTITIONER

## 2024-07-23 PROCEDURE — 83735 ASSAY OF MAGNESIUM: CPT | Performed by: NURSE PRACTITIONER

## 2024-07-23 RX ORDER — HYDROXYCHLOROQUINE SULFATE 200 MG/1
200 TABLET, FILM COATED ORAL
COMMUNITY
End: 2024-08-06

## 2024-07-23 RX ORDER — AMOXICILLIN 250 MG
1 CAPSULE ORAL
COMMUNITY
Start: 2024-07-18 | End: 2024-08-13

## 2024-07-23 RX ORDER — MAGNESIUM OXIDE 400 MG/1
400 TABLET ORAL
COMMUNITY
Start: 2024-07-19 | End: 2024-07-26

## 2024-07-23 RX ORDER — RIVASTIGMINE TARTRATE 1.5 MG/1
1.5 CAPSULE ORAL
COMMUNITY
Start: 2024-07-18 | End: 2024-08-06

## 2024-07-23 RX ORDER — GABAPENTIN 250 MG/5ML
250 SOLUTION ORAL
COMMUNITY
Start: 2024-07-03 | End: 2024-08-06

## 2024-07-23 NOTE — PROGRESS NOTES
Assessment & Plan     Traumatic brain injury, with unknown loss of consciousness status, subsequent encounter  Following up today after suffering a traumatic brain injury after a fall on 6/25/2024.  She was at the [facility] and when she got off the scale she lost her balance falling backward hitting her head on the door jam.  Unsure if patient lost consciousness.  This was witnessed by her spouse and another healthcare worker.  [facility] found to have a skull fracture and subarachnoid hemorrhage.  She remained hospitalized from 6/25/2024 until 7/19/2024.   states since she has been home she has been doing okay.  Occasionally complains of mild headaches on and off but nothing significant.  Has not had any vomiting or diarrhea.  Still remains unsteady on her feet.  She does have a history of Parkinson's which adds to the complication.  Spouse states that he has been using a gait belt to help facilitate transfers and helping her move with throughout the home.  She is scheduled to have a follow-up CT on 7/29/2024 at [facility].  Further follow-up with neurology in office in October.  Has follow-up with primary care provider on 6/8/2026.  He did have some questions about her medications that she was sent home on as she is he is not sure what he might need to get her refills on.  He did not bring his medication bottles with him to this visit.  I discussed he should be able to view on his medication bottles any medications that have already have refills available.  Anything that states he has 0 refills available I would recommend let us know to discuss whether this medication is still indicated.  I also recommend that he bring all medication bottles and post hospital medication list to his follow-up appointment this primary care provider on 8/6/2024.  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    Moderate malnutrition (H24)  While hospitalized she was found to have findings consistent with malnutrition.  Will recheck labs today to ensure they are stable.  - CBC with Platelets & Differential; Future  - Basic metabolic panel; Future  - Magnesium; Future  - CBC with Platelets & Differential  - Basic metabolic panel  - Magnesium    Paroxysmal atrial flutter (H)  Stable.  Monitored by primary care provider.  Not on blood thinners.    Hypomagnesemia  Was found to have hypomagnesia while admitted in the hospital.  Labs upon discharge last week were stable.  Will recheck today to ensure that it remains stable.  - Magnesium; Future  - Magnesium        MED REC REQUIRED  Post Medication Reconciliation Status:     BMI  Estimated body mass index is 28.49 kg/m  as calculated from the following:    Height as of this encounter: 1.626 m (5' 4\").    Weight as of this encounter: 75.3 kg (166 lb).           Benny Taylor is a 73 year old, presenting for the following health issues:  Follow Up (Hospital follow up- [facility])      7/23/2024     2:49 PM   Additional Questions   Roomed by Kristin SAENZ   Claudia is a pleasant 73-year-old female here today for \[facility] [place] [facility]\"" hospital follow-up.  She is brought in by her .  She had a fall on 6/25/2024 at the [facility] and hit her head on the door jam.  She was taken to [facility] where she was found to have skull fracture with subarachnoid hemorrhage.  She stayed admitted to the hospital until 7/19/2024.  She does have additional significant history of Parkinson's.  Does have frequent falls.  At this time there are no acute concerns as far as symptoms.  States she is feeling a little bit better.  Has occasional headache.  Spouse states that he is using a gait belt to help her ambulate around the home and to " help with transfers.  She only been able to get up with assistance and they have been following this recommendation at home.  States there are bars installed to help with stability when using the bathroom as well.  Spoke states that she is not back to her baseline.  Unsure if she will be back to baseline.  Has been working with therapy.  States she does have some concerns about her medication list.  States the medications he was sent home with for her he is unsure if she supposed to be continued on these.  He is unsure of which medications they are.  Unsure if he has any refills.  Did not bring his medication bottles with them to the appointment today.  Patient does have follow-up appointment scheduled with primary care provider in 6/8/2024 followed by neurology appointment on 10/21/2024.  Scheduled for follow-up CT scan on 7/29/2024.    Hospital Follow-up Visit:    Hospital/Nursing Home/IP Rehab Facility:  [facility]  Date of Admission: 6/25/24  Date of Discharge: 7/19/24  Reason(s) for Admission: Fall  Was the patient in the ICU or did the patient experience delirium during hospitalization?  No  Do you have any other stressors you would like to discuss with your provider? No    Problems taking medications regularly:  None  Medication changes since discharge: None  Problems adhering to non-medication therapy:  None    Summary of hospitalization:  CareEverywhere information obtained and reviewed  Diagnostic Tests/Treatments reviewed.  Follow up needed: Follow-up CT scheduled on 7/29/2024  Other Healthcare Providers Involved in Patient s Care:         Homecare, Specialist appointment - 10/21/24 with neurology, and Physical Therapy  Update since discharge: stable.         Plan of care communicated with patient and family           ROS  Comprehensive 12-point review of systems was completed and negative except as noted in HPI.        Objective    /62 (BP Location: Right arm, Patient Position: Sitting)   Pulse 71    "Temp 97.8  F (36.6  C)   Resp 16   Ht 1.626 m (5' 4\")   Wt 75.3 kg (166 lb)   LMP  (LMP Unknown)   SpO2 93%   BMI 28.49 kg/m    Body mass index is 28.49 kg/m .  Physical Exam   Constitutional: In no acute distress.  Clean appearance.  Eyes: PERRLA.  Neck: Supple.  Normal range of motion.  Cardiovascular: Regular rate.  Respiratory: Normal respiratory effort.  Skin: Skin is pink, warm and dry.     Musculoskeletal: In wheelchair for visit.  Psychiatric: Appropriate affect and demeanor.   Neurologic: Tremor noted to hands.          Signed Electronically by: Edita Sheets NP    "

## 2024-07-23 NOTE — PATIENT INSTRUCTIONS
Keep your appointment on 7/29/2024 at 4:50 PM for your CT.    We will recheck some labs today.  I will release these results to [facility] when they return.    Let me or Dr. Maurer know if you have any concerns about your medications and needing possible refills.  You can either message us through [facility] or call the nurse to discuss specific medications that you were sent home from the hospital with but may not have refills noted on the bottle.  You can check the medication bottle and it should tell you how many refills are available.    If you have any ongoing confusion about your medications I recommend bringing your hospital discharge medication list and all your bottles of medications with you to your follow-up appointment with your primary care provider on 8/6/2024.

## 2024-07-24 ENCOUNTER — TELEPHONE (OUTPATIENT)
Dept: INTERNAL MEDICINE | Facility: CLINIC | Age: 74
End: 2024-07-24

## 2024-07-24 NOTE — TELEPHONE ENCOUNTER
New Medication Request    Contacts       Contact Date/Time Type Contact Phone/Fax    07/24/2024 03:00 PM CDT Phone (Incoming) Claudia Wise (Self) [phone] (M)            What medication are you requesting?: Rivastigmine 1.5mg 2 times daily    Reason for medication request: Patient has been prescribed this by a provider already -- just needs to be sent to [facility] in [place], phone number: [phone]    Have you taken this medication before?: No    Controlled Substance Agreement on file:   CSA -- Patient Level:     [Media Unavailable] Controlled Substance Agreement - Opioid - Scan on 1/23/2018         Patient offered an appointment? No    Preferred Pharmacy:   [id number] [address] [place] - [address]  [address]  [place] [address]  Phone: [phone] Fax: [phone]      Could we send this information to you in [facility] or would you prefer to receive a phone call?:   Patient would prefer a phone call   Okay to leave a detailed message?: Yes at Other phone number: [phone] RN

## 2024-07-24 NOTE — TELEPHONE ENCOUNTER
Per discharge summary from [facility] stay- (6/25-7/3) and rehab stay (7/3-7/19) rivastigmine was sent in with at least 3 months worth of medication.    rivastigmine (EXELON) 1.5 MG capsule   Indications: Dementia with Lewy Bodies Take 1 Capsule (1.5 mg) by mouth two times a day. Indications: Lewy Body Dementia 180 Capsule  3 refills 07/18/2024       M for patient to call us back.    When patient returns call, please ask her to call the [facility] nurse line to ask if they sent it to the correct pharmacy. I see it was ordered, but don't see pharmacy info. Thanks!

## 2024-07-25 ENCOUNTER — MEDICAL CORRESPONDENCE (OUTPATIENT)
Dept: HEALTH INFORMATION MANAGEMENT | Facility: CLINIC | Age: 74
End: 2024-07-25

## 2024-07-26 DIAGNOSIS — M81.0 SENILE OSTEOPOROSIS: ICD-10-CM

## 2024-07-26 DIAGNOSIS — J31.0 RHINITIS, UNSPECIFIED TYPE: ICD-10-CM

## 2024-07-26 DIAGNOSIS — I10 ESSENTIAL HYPERTENSION: ICD-10-CM

## 2024-07-26 DIAGNOSIS — Z92.29 PERSONAL HISTORY OF OTHER DRUG THERAPY: ICD-10-CM

## 2024-07-26 DIAGNOSIS — F41.1 ANXIETY STATE: ICD-10-CM

## 2024-07-26 DIAGNOSIS — M06.09 SERONEGATIVE RHEUMATOID ARTHRITIS OF MULTIPLE SITES (H): ICD-10-CM

## 2024-07-26 DIAGNOSIS — N18.32 STAGE 3B CHRONIC KIDNEY DISEASE (H): ICD-10-CM

## 2024-07-26 DIAGNOSIS — I10 PRIMARY HYPERTENSION: ICD-10-CM

## 2024-07-26 DIAGNOSIS — K59.09 OTHER CONSTIPATION: Primary | ICD-10-CM

## 2024-07-26 DIAGNOSIS — F51.02 ADJUSTMENT INSOMNIA: ICD-10-CM

## 2024-07-26 DIAGNOSIS — E78.5 HYPERLIPIDEMIA: ICD-10-CM

## 2024-07-26 DIAGNOSIS — Z53.9 DIAGNOSIS NOT YET DEFINED: Primary | ICD-10-CM

## 2024-07-26 PROCEDURE — G0180 MD CERTIFICATION HHA PATIENT: HCPCS | Performed by: INTERNAL MEDICINE

## 2024-07-26 RX ORDER — MAGNESIUM OXIDE 400 MG/1
400 TABLET ORAL DAILY
Qty: 90 TABLET | Refills: 3 | Status: SHIPPED | OUTPATIENT
Start: 2024-07-26

## 2024-07-26 RX ORDER — FLUTICASONE PROPIONATE 50 MCG
1 SPRAY, SUSPENSION (ML) NASAL DAILY
Qty: 16 G | Refills: 2 | Status: SHIPPED | OUTPATIENT
Start: 2024-07-26

## 2024-07-26 RX ORDER — LEVOTHYROXINE SODIUM 50 UG/1
TABLET ORAL
Qty: 90 TABLET | Refills: 0 | OUTPATIENT
Start: 2024-07-26

## 2024-07-26 RX ORDER — AMLODIPINE BESYLATE 10 MG/1
10 TABLET ORAL DAILY
Qty: 90 TABLET | Refills: 3 | Status: ON HOLD | OUTPATIENT
Start: 2024-07-26 | End: 2024-08-15

## 2024-07-26 RX ORDER — DOCUSATE SODIUM 100 MG/1
100 CAPSULE, LIQUID FILLED ORAL 2 TIMES DAILY PRN
Qty: 90 CAPSULE | Refills: 1 | Status: SHIPPED | OUTPATIENT
Start: 2024-07-26

## 2024-07-26 RX ORDER — DULOXETIN HYDROCHLORIDE 20 MG/1
20 CAPSULE, DELAYED RELEASE ORAL DAILY
Qty: 90 CAPSULE | Refills: 0 | OUTPATIENT
Start: 2024-07-26

## 2024-07-26 RX ORDER — METOPROLOL TARTRATE 50 MG
50 TABLET ORAL 2 TIMES DAILY
Qty: 180 TABLET | Refills: 1 | OUTPATIENT
Start: 2024-07-26

## 2024-07-26 RX ORDER — LEVOTHYROXINE SODIUM 50 UG/1
TABLET ORAL
Qty: 90 TABLET | Refills: 3 | OUTPATIENT
Start: 2024-07-26

## 2024-07-26 RX ORDER — LOSARTAN POTASSIUM 50 MG/1
50 TABLET ORAL DAILY
Qty: 90 TABLET | Refills: 0 | Status: SHIPPED | OUTPATIENT
Start: 2024-07-26 | End: 2024-08-06

## 2024-07-26 RX ORDER — CYCLOBENZAPRINE HCL 10 MG
10 TABLET ORAL 2 TIMES DAILY PRN
Qty: 90 TABLET | Refills: 0 | Status: SHIPPED | OUTPATIENT
Start: 2024-07-26

## 2024-07-26 RX ORDER — OMEGA-3/DHA/EPA/FISH OIL 60 MG-90MG
500 CAPSULE ORAL DAILY
Qty: 90 CAPSULE | Refills: 3 | Status: SHIPPED | OUTPATIENT
Start: 2024-07-26

## 2024-07-26 RX ORDER — ACETAMINOPHEN 500 MG
500 TABLET ORAL EVERY 6 HOURS PRN
Qty: 90 TABLET | Refills: 0 | Status: SHIPPED | OUTPATIENT
Start: 2024-07-26

## 2024-07-26 RX ORDER — GABAPENTIN 250 MG/5ML
250 SOLUTION ORAL
OUTPATIENT
Start: 2024-07-26

## 2024-07-26 RX ORDER — LORAZEPAM 0.5 MG/1
0.5 TABLET ORAL DAILY PRN
Qty: 30 TABLET | Refills: 0 | Status: SHIPPED | OUTPATIENT
Start: 2024-07-26 | End: 2024-09-13

## 2024-07-26 RX ORDER — ATORVASTATIN CALCIUM 10 MG/1
10 TABLET, FILM COATED ORAL AT BEDTIME
Qty: 90 TABLET | Refills: 1 | OUTPATIENT
Start: 2024-07-26

## 2024-07-28 ENCOUNTER — TELEPHONE (OUTPATIENT)
Dept: INTERNAL MEDICINE | Facility: CLINIC | Age: 74
End: 2024-07-28
Payer: COMMERCIAL

## 2024-07-28 NOTE — TELEPHONE ENCOUNTER
Medication Question or Refill        What medication are you calling about (include dose and sig)?: metoprolol tartrate (LOPRESSOR) 50 MG tablet     Preferred Pharmacy:{All of the patient's preferred pharmacies will display below, confirm with patient which pharmacy they prefer to use for this request and delete the others :[id number]}   [id number] - [place], [place] - [address]  [address] [place] [address]  Phone: [phone] Fax: [phone]      Controlled Substance Agreement on file:   CSA -- Patient Level:     [Media Unavailable] Controlled Substance Agreement - Opioid - Scan on 1/23/2018       Who prescribed the medication?: PCP    Do you need a refill? Yes    When did you use the medication last? 07/26/2024    Patient offered an appointment? No    Do you have any questions or concerns?  Yes: Pt has been out of this medication for two days and needs a refill      Could we send this information to you in [facility] or would you prefer to receive a phone call?:   {CALL BACK METHOD:[id number]}

## 2024-07-30 ENCOUNTER — MEDICAL CORRESPONDENCE (OUTPATIENT)
Dept: HEALTH INFORMATION MANAGEMENT | Facility: CLINIC | Age: 74
End: 2024-07-30

## 2024-07-30 ENCOUNTER — TELEPHONE (OUTPATIENT)
Dept: INTERNAL MEDICINE | Facility: CLINIC | Age: 74
End: 2024-07-30
Payer: COMMERCIAL

## 2024-07-30 NOTE — TELEPHONE ENCOUNTER
General Call    Contacts       Contact Date/Time Type Contact Phone/Fax    07/30/2024 04:44 PM CDT Phone (Incoming) Es[facility]      [facility]          Reason for Call:  medication question, low BP readings     What are your questions or concerns:  Esme calling, reporting /60, pulse 83 today. PT reported BP yesterday was 98/62. Pt has not taken Metoprolol 50mg two times a day for 6- 7 days. Pt is asymptomatic and will increase fluid intake.   Please advise if pt should continue Metoprolol and if so, will need a refill.   Pt was not present for phone call.    Date of last appointment with provider: 7/23/24 hospital follow up Edita Morning    Could we send this information to you in [facility] or would you prefer to receive a phone call?:   Esme would prefer a phone call   Okay to leave a detailed message?: Yes at Other phone number:  [phone]

## 2024-07-30 NOTE — TELEPHONE ENCOUNTER
Called Esme to relay PCP's message. No further questions/concerns.    Called to spouse [facility] (CTC on file), relayed PCP's detailed message. Verbalized understanding. Booked appt on 8/20 but also has ED/Follow-up appt on 8/6. They will keep the 8/20 one and see on 8/6 if Dr. Maurer would still advise to come on 8/20.

## 2024-07-30 NOTE — TELEPHONE ENCOUNTER
Stay of metoprolol. Decrease amlodipine to 1/2 pill a day. Continue losartan. Follow-up with me in 2 weeks.

## 2024-08-01 ENCOUNTER — DOCUMENTATION ONLY (OUTPATIENT)
Dept: NEPHROLOGY | Facility: CLINIC | Age: 74
End: 2024-08-01

## 2024-08-01 DIAGNOSIS — N18.32 STAGE 3B CHRONIC KIDNEY DISEASE (H): Primary | ICD-10-CM

## 2024-08-06 ENCOUNTER — OFFICE VISIT (OUTPATIENT)
Dept: INTERNAL MEDICINE | Facility: CLINIC | Age: 74
End: 2024-08-06
Payer: COMMERCIAL

## 2024-08-06 VITALS
SYSTOLIC BLOOD PRESSURE: 116 MMHG | OXYGEN SATURATION: 99 % | TEMPERATURE: 97.3 F | DIASTOLIC BLOOD PRESSURE: 58 MMHG | RESPIRATION RATE: 16 BRPM | HEART RATE: 89 BPM

## 2024-08-06 DIAGNOSIS — S06.9XAD TRAUMATIC BRAIN INJURY, WITH UNKNOWN LOSS OF CONSCIOUSNESS STATUS, SUBSEQUENT ENCOUNTER: ICD-10-CM

## 2024-08-06 DIAGNOSIS — E78.2 MIXED HYPERLIPIDEMIA: ICD-10-CM

## 2024-08-06 DIAGNOSIS — F02.A2 MILD LEWY BODY DEMENTIA WITH PSYCHOTIC DISTURBANCE (H): ICD-10-CM

## 2024-08-06 DIAGNOSIS — M06.09 SERONEGATIVE RHEUMATOID ARTHRITIS OF MULTIPLE SITES (H): ICD-10-CM

## 2024-08-06 DIAGNOSIS — R73.03 PREDIABETES: ICD-10-CM

## 2024-08-06 DIAGNOSIS — G20.A2 PARKINSON'S DISEASE WITH FLUCTUATING MANIFESTATIONS, UNSPECIFIED WHETHER DYSKINESIA PRESENT (H): ICD-10-CM

## 2024-08-06 DIAGNOSIS — S32.020D COMPRESSION FRACTURE OF L2 VERTEBRA WITH ROUTINE HEALING: ICD-10-CM

## 2024-08-06 DIAGNOSIS — G31.83 MILD LEWY BODY DEMENTIA WITH PSYCHOTIC DISTURBANCE (H): ICD-10-CM

## 2024-08-06 DIAGNOSIS — G20.C PRIMARY PARKINSONISM (H): ICD-10-CM

## 2024-08-06 DIAGNOSIS — E03.8 SUBCLINICAL HYPOTHYROIDISM: ICD-10-CM

## 2024-08-06 DIAGNOSIS — N18.32 STAGE 3B CHRONIC KIDNEY DISEASE (H): ICD-10-CM

## 2024-08-06 DIAGNOSIS — I10 PRIMARY HYPERTENSION: ICD-10-CM

## 2024-08-06 DIAGNOSIS — Z09 HOSPITAL DISCHARGE FOLLOW-UP: Primary | ICD-10-CM

## 2024-08-06 DIAGNOSIS — I60.9 SAH (SUBARACHNOID HEMORRHAGE) (H): ICD-10-CM

## 2024-08-06 DIAGNOSIS — Z92.29 PERSONAL HISTORY OF OTHER DRUG THERAPY: ICD-10-CM

## 2024-08-06 DIAGNOSIS — F41.1 ANXIETY STATE: ICD-10-CM

## 2024-08-06 DIAGNOSIS — M81.0 SENILE OSTEOPOROSIS: ICD-10-CM

## 2024-08-06 PROBLEM — R19.8 IRREGULAR BOWEL HABITS: Status: RESOLVED | Noted: 2020-10-14 | Resolved: 2024-08-06

## 2024-08-06 PROBLEM — N80.9 ENDOMETRIOSIS: Status: RESOLVED | Noted: 2020-03-12 | Resolved: 2024-08-06

## 2024-08-06 PROBLEM — G89.29 CHRONIC PAIN OF BOTH KNEES: Status: RESOLVED | Noted: 2020-03-12 | Resolved: 2024-08-06

## 2024-08-06 PROBLEM — I48.92 PAROXYSMAL ATRIAL FLUTTER (H): Status: RESOLVED | Noted: 2020-03-12 | Resolved: 2024-08-06

## 2024-08-06 PROBLEM — M25.561 CHRONIC PAIN OF BOTH KNEES: Status: RESOLVED | Noted: 2020-03-12 | Resolved: 2024-08-06

## 2024-08-06 PROBLEM — M25.562 CHRONIC PAIN OF BOTH KNEES: Status: RESOLVED | Noted: 2020-03-12 | Resolved: 2024-08-06

## 2024-08-06 PROBLEM — R73.09 OTHER ABNORMAL GLUCOSE: Status: RESOLVED | Noted: 2024-06-25 | Resolved: 2024-08-06

## 2024-08-06 PROBLEM — K56.609 SMALL BOWEL OBSTRUCTION (H): Status: RESOLVED | Noted: 2020-02-10 | Resolved: 2024-08-06

## 2024-08-06 PROBLEM — M17.0 OSTEOARTHRITIS OF BOTH KNEES: Status: RESOLVED | Noted: 2020-03-12 | Resolved: 2024-08-06

## 2024-08-06 PROBLEM — R00.0 TACHYCARDIA: Status: RESOLVED | Noted: 2020-03-12 | Resolved: 2024-08-06

## 2024-08-06 PROBLEM — N17.9 AKI (ACUTE KIDNEY INJURY) (H): Status: RESOLVED | Noted: 2020-03-12 | Resolved: 2024-08-06

## 2024-08-06 LAB
ALBUMIN SERPL BCG-MCNC: 4.3 G/DL (ref 3.5–5.2)
ALP SERPL-CCNC: 59 U/L (ref 40–150)
ALT SERPL W P-5'-P-CCNC: 8 U/L (ref 0–50)
ANION GAP SERPL CALCULATED.3IONS-SCNC: 10 MMOL/L (ref 7–15)
AST SERPL W P-5'-P-CCNC: 21 U/L (ref 0–45)
BILIRUB SERPL-MCNC: 0.4 MG/DL
BUN SERPL-MCNC: 20.1 MG/DL (ref 8–23)
CALCIUM SERPL-MCNC: 9.1 MG/DL (ref 8.8–10.4)
CHLORIDE SERPL-SCNC: 106 MMOL/L (ref 98–107)
CREAT SERPL-MCNC: 1.61 MG/DL (ref 0.51–0.95)
EGFRCR SERPLBLD CKD-EPI 2021: 33 ML/MIN/1.73M2
ERYTHROCYTE [DISTWIDTH] IN BLOOD BY AUTOMATED COUNT: 13.4 % (ref 10–15)
GLUCOSE SERPL-MCNC: 106 MG/DL (ref 70–99)
HBA1C MFR BLD: 6 % (ref 0–5.6)
HCO3 SERPL-SCNC: 26 MMOL/L (ref 22–29)
HCT VFR BLD AUTO: 31.1 % (ref 35–47)
HGB BLD-MCNC: 10 G/DL (ref 11.7–15.7)
MCH RBC QN AUTO: 29.7 PG (ref 26.5–33)
MCHC RBC AUTO-ENTMCNC: 32.2 G/DL (ref 31.5–36.5)
MCV RBC AUTO: 92 FL (ref 78–100)
PLATELET # BLD AUTO: 171 10E3/UL (ref 150–450)
POTASSIUM SERPL-SCNC: 4.2 MMOL/L (ref 3.4–5.3)
PROT SERPL-MCNC: 6.9 G/DL (ref 6.4–8.3)
RBC # BLD AUTO: 3.37 10E6/UL (ref 3.8–5.2)
SODIUM SERPL-SCNC: 142 MMOL/L (ref 135–145)
TSH SERPL DL<=0.005 MIU/L-ACNC: 0.92 UIU/ML (ref 0.3–4.2)
WBC # BLD AUTO: 5.2 10E3/UL (ref 4–11)

## 2024-08-06 PROCEDURE — 96372 THER/PROPH/DIAG INJ SC/IM: CPT | Performed by: INTERNAL MEDICINE

## 2024-08-06 PROCEDURE — 83036 HEMOGLOBIN GLYCOSYLATED A1C: CPT | Performed by: INTERNAL MEDICINE

## 2024-08-06 PROCEDURE — 84443 ASSAY THYROID STIM HORMONE: CPT | Performed by: INTERNAL MEDICINE

## 2024-08-06 PROCEDURE — 99215 OFFICE O/P EST HI 40 MIN: CPT | Mod: 25 | Performed by: INTERNAL MEDICINE

## 2024-08-06 PROCEDURE — 80053 COMPREHEN METABOLIC PANEL: CPT | Performed by: INTERNAL MEDICINE

## 2024-08-06 PROCEDURE — 85027 COMPLETE CBC AUTOMATED: CPT | Performed by: INTERNAL MEDICINE

## 2024-08-06 PROCEDURE — 36415 COLL VENOUS BLD VENIPUNCTURE: CPT | Performed by: INTERNAL MEDICINE

## 2024-08-06 RX ORDER — HYDROXYCHLOROQUINE SULFATE 200 MG/1
200 TABLET, FILM COATED ORAL 2 TIMES DAILY
Qty: 180 TABLET | Refills: 3 | Status: SHIPPED | OUTPATIENT
Start: 2024-08-06

## 2024-08-06 RX ORDER — LOSARTAN POTASSIUM 50 MG/1
50 TABLET ORAL DAILY
Qty: 90 TABLET | Refills: 3 | Status: SHIPPED | OUTPATIENT
Start: 2024-08-06

## 2024-08-06 RX ORDER — ATORVASTATIN CALCIUM 10 MG/1
10 TABLET, FILM COATED ORAL AT BEDTIME
Qty: 90 TABLET | Refills: 3 | Status: SHIPPED | OUTPATIENT
Start: 2024-08-06

## 2024-08-06 RX ORDER — RIVASTIGMINE TARTRATE 1.5 MG/1
1.5 CAPSULE ORAL 2 TIMES DAILY
Qty: 180 CAPSULE | Refills: 3 | Status: SHIPPED | OUTPATIENT
Start: 2024-08-06

## 2024-08-06 RX ORDER — BENZTROPINE MESYLATE 0.5 MG/1
1.5 TABLET ORAL 2 TIMES DAILY
Qty: 540 TABLET | Refills: 3 | Status: SHIPPED | OUTPATIENT
Start: 2024-08-06

## 2024-08-06 RX ORDER — DULOXETIN HYDROCHLORIDE 20 MG/1
20 CAPSULE, DELAYED RELEASE ORAL DAILY
Qty: 90 CAPSULE | Refills: 3 | Status: SHIPPED | OUTPATIENT
Start: 2024-08-06

## 2024-08-06 RX ORDER — LEVOTHYROXINE SODIUM 50 UG/1
50 TABLET ORAL DAILY
Qty: 90 TABLET | Refills: 3 | Status: SHIPPED | OUTPATIENT
Start: 2024-08-06

## 2024-08-06 NOTE — PATIENT INSTRUCTIONS
Prolia 9th today. Labs today.  Prolia 10th in 6 months with AWV.    DXA in 2/2025 .   Phone number to schedule [phone].    Daily calcium need is 1666-7549 mg a day from the diet and supplements.  Calcium citrate is easier to digest.  Vitamin D 2000 IU daily recommended.    Risk of rebound vertebral fractures is higher when Prolia suddenly stopped or dose was missed.      Prolia and Covid vaccine should be  for at least a week.    Patient education:  Patients advised to maintain good oral hygiene during treatment, because of the risk for osteonecrosis of the jaw.   The risk of osteonecrosis of the jaw with Prolia therapy is extremely low.   If a patient is late for Prolia therapy (>3 wks) a rapid rebound of bone loss can occur which is highly concerning and important to try to prevent to optimize bone health.   Therefore if an invasive dental procedure is required, primarily extraction or implant, while on Prolia therapy, the recommendation is to time the dental procedure optimally 4 months after the most recent dose of Prolia allowing 6-8 weeks for healing prior to next dose of Prolia.   This is based on the updated 2022 Recommendations from the American Association of Oral and Maxillofacial Surgeons.   We also recommend discussing with dentist or oral surgeon if pretreatment prophylactic oral rinses and antibiotics would be beneficial.   Optimizing oral hygiene before any invasive dental procedure significantly lowers ONJ risk.     There is a greater risk of severe hypocalcemia following denosumab administration and patient is advised that we will have to monitor calcium, phosphorous, and magnesium levels. Risk of infection is increased with denosumab use, so patient will inform me if has more frequent infections.     Atypical femur fracture  has been reported in patients receiving denosumab. The fractures may occur anywhere along the femoral shaft (may be bilateral) and commonly occur with minimal to  no trauma to the area. Some patients experience prodromal thigh or groin pain weeks or months before the fracture occurs. Contralateral limb should be assessed if AFF occurs.     Multiple vertebral column fracture has been reported following discontinuation of therapy. Hypertension and increased cholesterol were reported with denosumab use.      Discussed 10-year data of Prolia. Discussed the importance of being on time and consistent with Prolia use to prevent rapid bone of osteoclastic activity.  Discussed that if Prolia is discontinued we will likely need to utilize an antiresorptive, such as Reclast, to help prevent rapid rebound of osteoclast activity. Often more than 1 dose is required.  Labs yearly to ensure calcium and vitamin D optimized while patient on Prolia.      Follow-up:   Neurosurgery  with  Neurosurgery  PM&R in 3-4 months with Dr. Hyde or Dr. [facility]  You have appointment with Neurologist at [facility]

## 2024-08-06 NOTE — PROGRESS NOTES
Assessment & Plan     Hospital discharge follow-up  Traumatic brain injury, with unknown loss of consciousness status, subsequent encounter  SAH (subarachnoid hemorrhage) (H)  Admit date: 7/3/2024 12:13 PM  Discharge date: 7/19/2024 7/19/2024     She had follow-up CT 7/30/24:  IMPRESSION:   1. Bilateral subdural hematomas, decreased on the left, and not significantly changed on the right. Small area of new hyperdensity in the left subdural hematoma suggests may represent interval recent rebleed. Close attention on follow-up.   2.  Resolved left-sided subarachnoid hemorrhage.   3.  No evidence of acute hydrocephalus, mass effect or midline shift.   4.  New completely opacified of the right maxillary sinus.     She will schedule follow-up with  Neurosurgery. We were not able to find any scheduled follow-up with [facility] Neuro[facility].    She denies headache, vomiting, vision change, sleep problems.  She is scheduled with PM&R.    - CBC with platelets; Future  - Adult Neurosurgery  Referral; Future    Prediabetes    - Hemoglobin A1c; Future      Primary hypertension  Stable on amlodipine and losartan. Metoprolol was stopped recently because of the hypotension  - losartan (COZAAR) 50 MG tablet; Take 1 tablet (50 mg) by mouth daily    Stage 3b chronic kidney disease (H)  Stable, will see Nephrologist in few weeks.  Component      Latest Ref Rng 7/23/2024  3:28 PM   Sodium      135 - 145 mmol/L 139    Potassium      3.4 - 5.3 mmol/L 3.9    Chloride      98 - 107 mmol/L 106    Carbon Dioxide (CO2)      22 - 29 mmol/L 20 (L)    Anion Gap      7 - 15 mmol/L 13    Urea Nitrogen      8.0 - 23.0 mg/dL 24.9 (H)    Creatinine      0.51 - 0.95 mg/dL 1.44 (H)    GFR Estimate      >60 mL/min/1.73m2 38 (L)    Calcium      8.8 - 10.4 mg/dL 9.2    Glucose      70 - 99 mg/dL 112 (H)         - CBC with platelets; Future  - Comprehensive metabolic panel; Future  - levothyroxine (SYNTHROID/LEVOTHROID) 50 MCG tablet; Take  1 tablet (50 mcg) by mouth daily    Subclinical hypothyroidism    - TSH with free T4 reflex; Future    Mild Lewy body dementia with psychotic disturbance (H)    - rivastigmine (EXELON) 1.5 MG capsule; Take 1 capsule (1.5 mg) by mouth 2 times daily    Parkinson's disease with fluctuating manifestations, unspecified whether dyskinesia present (H)  She will see Neurologist Dr Reddy at [facility] in October.  - benztropine (COGENTIN) 0.5 MG tablet; Take 3 tablets (1.5 mg) by mouth 2 times daily    Senile osteoporosis  Prolia #9 today.  DXA 2/2023 reviewed       Compression fracture of L2 vertebra with routine healing  On imaging during the hospitalization also noted to have an age indeterminate, likely subacute L2 fracture.   She was wearing the brace for 3-4 weeks. No significant pain present now.    Anxiety state    - DULoxetine (CYMBALTA) 20 MG capsule; Take 1 capsule (20 mg) by mouth daily    Seronegative rheumatoid arthritis of multiple sites (H)  Seeing Dr Vinson.  - DULoxetine (CYMBALTA) 20 MG capsule; Take 1 capsule (20 mg) by mouth daily  - hydroxychloroquine (PLAQUENIL) 200 MG tablet; Take 1 tablet (200 mg) by mouth 2 times daily    Hypothyroidism  - levothyroxine (SYNTHROID/LEVOTHROID) 50 MCG tablet; Take 1 tablet (50 mcg) by mouth daily      Mixed hyperlipidemia    - atorvastatin (LIPITOR) 10 MG tablet; Take 1 tablet (10 mg) by mouth at bedtime      Total time spent on the date of this encounter doing: chart review, review of test results, patient visit, physical exam, education, counseling, developing plan of care, and documenting = 48    minutes.     The longitudinal plan of care for the diagnosis(es)/condition(s) as documented were addressed during this visit. Due to the added complexity in care, I will continue to support Claudia in the subsequent management and with ongoing continuity of care.    MED REC REQUIRED  Post Medication Reconciliation Status: discharge medications reconciled, continue  "medications without change  BMI  Estimated body mass index is 28.49 kg/m  as calculated from the following:    Height as of 7/23/24: 1.626 m (5' 4\").    Weight as of 7/23/24: 75.3 kg (166 lb).             Benny Taylor is a 73 year old, presenting for the following health issues:  Hospital F/U (Hospital F/U 6/25-7/19 2024 Fall )      8/6/2024     2:07 PM   Additional Questions   Roomed by Alda SAENZ         Hospital Follow-up Visit:    Hospital/Nursing Home/IP Rehab Facility:  [facility]  Date of Admission: 6/25/2024  Date of Discharge: 7/19/2024  Reason(s) for Admission: Fall  Was the patient in the ICU or did the patient experience delirium during hospitalization?  No  Do you have any other stressors you would like to discuss with your provider? No    Problems taking medications regularly:  None  Medication changes since discharge: None  Problems adhering to non-medication therapy:  None    Summary of hospitalization:  Discharge summary unavailable  Diagnostic Tests/Treatments reviewed.  Follow up needed: none  Other Healthcare Providers Involved in Patient s Care:         None  Update since discharge: improved.         Plan of care communicated with patient                       Objective    /58   Pulse 89   Temp 97.3  F (36.3  C)   Resp 16   LMP  (LMP Unknown)   SpO2 99%   There is no height or weight on file to calculate BMI.  Physical Exam   Constitutional:  oriented to person, place, and time, appears well-nourished. No distress.   HENT:   Head: Normocephalic.   Mouth/Throat: Oropharynx is clear and moist.   Eyes: Conjunctivae are normal. Pupils are equal, round, and reactive to light.   Neck: Normal range of motion. Neck supple.   Cardiovascular: Normal rate, regular rhythm and normal heart sounds.    Pulmonary/Chest: Effort normal and breath sounds normal.   Abdominal: Soft. Bowel sounds are normal.   Musculoskeletal: Normal range of motion.   Neurological: alert and oriented to " person, place, and time. Skin: Skin is warm.   Psychiatric: normal mood and affect.             Signed Electronically by: Isabel Maurer MD

## 2024-08-13 ENCOUNTER — APPOINTMENT (OUTPATIENT)
Dept: CT IMAGING | Facility: CLINIC | Age: 74
End: 2024-08-13
Attending: EMERGENCY MEDICINE
Payer: MEDICARE

## 2024-08-13 ENCOUNTER — HOSPITAL ENCOUNTER (EMERGENCY)
Facility: CLINIC | Age: 74
Discharge: HOME | End: 2024-08-13
Attending: EMERGENCY MEDICINE | Admitting: EMERGENCY MEDICINE
Payer: MEDICARE

## 2024-08-13 ENCOUNTER — APPOINTMENT (OUTPATIENT)
Dept: CT IMAGING | Facility: CLINIC | Age: 74
DRG: 085 | End: 2024-08-13
Attending: HOSPITALIST
Payer: MEDICARE

## 2024-08-13 ENCOUNTER — HOSPITAL ENCOUNTER (INPATIENT)
Facility: CLINIC | Age: 74
LOS: 2 days | Discharge: HOME | DRG: 085 | End: 2024-08-15
Attending: HOSPITALIST | Admitting: HOSPITALIST
Payer: MEDICARE

## 2024-08-13 ENCOUNTER — APPOINTMENT (OUTPATIENT)
Dept: RADIOLOGY | Facility: CLINIC | Age: 74
End: 2024-08-13
Attending: EMERGENCY MEDICINE
Payer: MEDICARE

## 2024-08-13 ENCOUNTER — NURSE TRIAGE (OUTPATIENT)
Dept: INTERNAL MEDICINE | Facility: CLINIC | Age: 74
End: 2024-08-13
Payer: COMMERCIAL

## 2024-08-13 VITALS
WEIGHT: 160 LBS | SYSTOLIC BLOOD PRESSURE: 153 MMHG | BODY MASS INDEX: 27.46 KG/M2 | DIASTOLIC BLOOD PRESSURE: 66 MMHG | HEART RATE: 91 BPM | TEMPERATURE: 98.6 F | RESPIRATION RATE: 21 BRPM | OXYGEN SATURATION: 97 %

## 2024-08-13 DIAGNOSIS — I60.9 SAH (SUBARACHNOID HEMORRHAGE) (H): ICD-10-CM

## 2024-08-13 DIAGNOSIS — S06.5XAA SDH (SUBDURAL HEMATOMA) (H): ICD-10-CM
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
## 2024-08-13 DIAGNOSIS — S06.5XAA SUBDURAL HEMATOMA (H): Primary | ICD-10-CM

## 2024-08-13 DIAGNOSIS — I10 PRIMARY HYPERTENSION: ICD-10-CM

## 2024-08-13 DIAGNOSIS — I95.1 ORTHOSTATIC HYPOTENSION: ICD-10-CM

## 2024-08-13 LAB
ALBUMIN SERPL BCG-MCNC: 4.2 G/DL (ref 3.5–5.2)
ALP SERPL-CCNC: 68 U/L (ref 40–150)
ALT SERPL W P-5'-P-CCNC: 14 U/L (ref 0–50)
ANION GAP SERPL CALCULATED.3IONS-SCNC: 9 MMOL/L (ref 7–15)
AST SERPL W P-5'-P-CCNC: 25 U/L (ref 0–45)
ATRIAL RATE - MUSE: 94 BPM
BASOPHILS # BLD AUTO: 0 10E3/UL (ref 0–0.2)
BASOPHILS NFR BLD AUTO: 0 %
BILIRUB DIRECT SERPL-MCNC: <0.2 MG/DL (ref 0–0.3)
BILIRUB SERPL-MCNC: 0.6 MG/DL
BUN SERPL-MCNC: 18.8 MG/DL (ref 8–23)
CALCIUM SERPL-MCNC: 8.7 MG/DL (ref 8.8–10.4)
CHLORIDE SERPL-SCNC: 105 MMOL/L (ref 98–107)
CREAT SERPL-MCNC: 1.41 MG/DL (ref 0.51–0.95)
DIASTOLIC BLOOD PRESSURE - MUSE: 87 MMHG
EGFRCR SERPLBLD CKD-EPI 2021: 39 ML/MIN/1.73M2
EOSINOPHIL # BLD AUTO: 0.1 10E3/UL (ref 0–0.7)
EOSINOPHIL NFR BLD AUTO: 1 %
ERYTHROCYTE [DISTWIDTH] IN BLOOD BY AUTOMATED COUNT: 13.6 % (ref 10–15)
GLUCOSE BLDC GLUCOMTR-MCNC: 98 MG/DL (ref 70–99)
GLUCOSE SERPL-MCNC: 129 MG/DL (ref 70–99)
HCO3 SERPL-SCNC: 26 MMOL/L (ref 22–29)
HCT VFR BLD AUTO: 31.2 % (ref 35–47)
HGB BLD-MCNC: 10 G/DL (ref 11.7–15.7)
IMM GRANULOCYTES # BLD: 0 10E3/UL
IMM GRANULOCYTES NFR BLD: 1 %
INTERPRETATION ECG - MUSE: NORMAL
LYMPHOCYTES # BLD AUTO: 0.7 10E3/UL (ref 0.8–5.3)
LYMPHOCYTES NFR BLD AUTO: 10 %
MAGNESIUM SERPL-MCNC: 1.7 MG/DL (ref 1.7–2.3)
MCH RBC QN AUTO: 30 PG (ref 26.5–33)
MCHC RBC AUTO-ENTMCNC: 32.1 G/DL (ref 31.5–36.5)
MCV RBC AUTO: 94 FL (ref 78–100)
MONOCYTES # BLD AUTO: 0.7 10E3/UL (ref 0–1.3)
MONOCYTES NFR BLD AUTO: 9 %
NEUTROPHILS # BLD AUTO: 6.1 10E3/UL (ref 1.6–8.3)
NEUTROPHILS NFR BLD AUTO: 80 %
NRBC # BLD AUTO: 0 10E3/UL
NRBC BLD AUTO-RTO: 0 /100
P AXIS - MUSE: 29 DEGREES
PLATELET # BLD AUTO: 197 10E3/UL (ref 150–450)
POTASSIUM SERPL-SCNC: 3.9 MMOL/L (ref 3.4–5.3)
PR INTERVAL - MUSE: 134 MS
PROT SERPL-MCNC: 6.9 G/DL (ref 6.4–8.3)
QRS DURATION - MUSE: 134 MS
QT - MUSE: 390 MS
QTC - MUSE: 487 MS
R AXIS - MUSE: -59 DEGREES
RBC # BLD AUTO: 3.33 10E6/UL (ref 3.8–5.2)
SODIUM SERPL-SCNC: 140 MMOL/L (ref 135–145)
SYSTOLIC BLOOD PRESSURE - MUSE: 130 MMHG
T AXIS - MUSE: 4 DEGREES
TROPONIN T SERPL HS-MCNC: 52 NG/L
TROPONIN T SERPL HS-MCNC: 54 NG/L
VENTRICULAR RATE- MUSE: 94 BPM
WBC # BLD AUTO: 7.6 10E3/UL (ref 4–11)

## 2024-08-13 PROCEDURE — 83735 ASSAY OF MAGNESIUM: CPT | Performed by: EMERGENCY MEDICINE

## 2024-08-13 PROCEDURE — 96361 HYDRATE IV INFUSION ADD-ON: CPT

## 2024-08-13 PROCEDURE — 258N000003 HC RX IP 258 OP 636: Performed by: EMERGENCY MEDICINE

## 2024-08-13 PROCEDURE — 96365 THER/PROPH/DIAG IV INF INIT: CPT

## 2024-08-13 PROCEDURE — 99222 1ST HOSP IP/OBS MODERATE 55: CPT | Performed by: PHYSICIAN ASSISTANT

## 2024-08-13 PROCEDURE — 84484 ASSAY OF TROPONIN QUANT: CPT | Performed by: EMERGENCY MEDICINE

## 2024-08-13 PROCEDURE — 99291 CRITICAL CARE FIRST HOUR: CPT | Mod: 25

## 2024-08-13 PROCEDURE — 99223 1ST HOSP IP/OBS HIGH 75: CPT | Performed by: HOSPITALIST

## 2024-08-13 PROCEDURE — 93005 ELECTROCARDIOGRAM TRACING: CPT | Performed by: EMERGENCY MEDICINE

## 2024-08-13 PROCEDURE — 250N000013 HC RX MED GY IP 250 OP 250 PS 637: Performed by: HOSPITALIST

## 2024-08-13 PROCEDURE — 120N000013 HC R&B IMCU

## 2024-08-13 PROCEDURE — 36415 COLL VENOUS BLD VENIPUNCTURE: CPT | Performed by: EMERGENCY MEDICINE

## 2024-08-13 PROCEDURE — 70450 CT HEAD/BRAIN W/O DYE: CPT | Mod: MG

## 2024-08-13 PROCEDURE — 82962 GLUCOSE BLOOD TEST: CPT

## 2024-08-13 PROCEDURE — 71101 X-RAY EXAM UNILAT RIBS/CHEST: CPT | Mod: RT

## 2024-08-13 PROCEDURE — 70450 CT HEAD/BRAIN W/O DYE: CPT | Mod: MG,XE

## 2024-08-13 PROCEDURE — 250N000011 HC RX IP 250 OP 636: Performed by: EMERGENCY MEDICINE

## 2024-08-13 PROCEDURE — 85025 COMPLETE CBC W/AUTO DIFF WBC: CPT | Performed by: EMERGENCY MEDICINE

## 2024-08-13 PROCEDURE — 82248 BILIRUBIN DIRECT: CPT | Performed by: EMERGENCY MEDICINE

## 2024-08-13 PROCEDURE — 72125 CT NECK SPINE W/O DYE: CPT | Mod: ME

## 2024-08-13 PROCEDURE — 82374 ASSAY BLOOD CARBON DIOXIDE: CPT | Performed by: EMERGENCY MEDICINE

## 2024-08-13 RX ORDER — ACETAMINOPHEN 325 MG/10.15ML
650 LIQUID ORAL EVERY 4 HOURS PRN
Status: DISCONTINUED | OUTPATIENT
Start: 2024-08-13 | End: 2024-08-15 | Stop reason: HOSPADM

## 2024-08-13 RX ORDER — POLYETHYLENE GLYCOL 3350 17 G/17G
17 POWDER, FOR SOLUTION ORAL DAILY PRN
Status: DISCONTINUED | OUTPATIENT
Start: 2024-08-13 | End: 2024-08-15 | Stop reason: HOSPADM

## 2024-08-13 RX ORDER — AMLODIPINE BESYLATE 10 MG/1
10 TABLET ORAL DAILY
Status: DISCONTINUED | OUTPATIENT
Start: 2024-08-14 | End: 2024-08-13

## 2024-08-13 RX ORDER — ONDANSETRON 2 MG/ML
4 INJECTION INTRAMUSCULAR; INTRAVENOUS EVERY 6 HOURS PRN
Status: DISCONTINUED | OUTPATIENT
Start: 2024-08-13 | End: 2024-08-15 | Stop reason: HOSPADM

## 2024-08-13 RX ORDER — ATORVASTATIN CALCIUM 10 MG/1
10 TABLET, FILM COATED ORAL AT BEDTIME
Status: DISCONTINUED | OUTPATIENT
Start: 2024-08-13 | End: 2024-08-15 | Stop reason: HOSPADM

## 2024-08-13 RX ORDER — ALLOPURINOL 100 MG/1
100 TABLET ORAL 2 TIMES DAILY
Status: DISCONTINUED | OUTPATIENT
Start: 2024-08-13 | End: 2024-08-15 | Stop reason: HOSPADM

## 2024-08-13 RX ORDER — RIVASTIGMINE TARTRATE 1.5 MG/1
1.5 CAPSULE ORAL 2 TIMES DAILY
Status: DISCONTINUED | OUTPATIENT
Start: 2024-08-13 | End: 2024-08-15 | Stop reason: HOSPADM

## 2024-08-13 RX ORDER — LEVOTHYROXINE SODIUM 50 UG/1
50 TABLET ORAL DAILY
Status: DISCONTINUED | OUTPATIENT
Start: 2024-08-14 | End: 2024-08-15 | Stop reason: HOSPADM

## 2024-08-13 RX ORDER — AMLODIPINE BESYLATE 5 MG/1
5 TABLET ORAL DAILY
Status: DISCONTINUED | OUTPATIENT
Start: 2024-08-14 | End: 2024-08-15 | Stop reason: HOSPADM

## 2024-08-13 RX ORDER — PROCHLORPERAZINE MALEATE 5 MG
5 TABLET ORAL EVERY 6 HOURS PRN
Status: DISCONTINUED | OUTPATIENT
Start: 2024-08-13 | End: 2024-08-13

## 2024-08-13 RX ORDER — HYDRALAZINE HYDROCHLORIDE 20 MG/ML
10-20 INJECTION INTRAMUSCULAR; INTRAVENOUS
Status: DISCONTINUED | OUTPATIENT
Start: 2024-08-13 | End: 2024-08-15 | Stop reason: HOSPADM

## 2024-08-13 RX ORDER — LABETALOL HYDROCHLORIDE 5 MG/ML
10-20 INJECTION, SOLUTION INTRAVENOUS EVERY 10 MIN PRN
Status: DISCONTINUED | OUTPATIENT
Start: 2024-08-13 | End: 2024-08-15 | Stop reason: HOSPADM

## 2024-08-13 RX ORDER — MAGNESIUM OXIDE 400 MG/1
400 TABLET ORAL DAILY
Status: DISCONTINUED | OUTPATIENT
Start: 2024-08-14 | End: 2024-08-15 | Stop reason: HOSPADM

## 2024-08-13 RX ORDER — AMOXICILLIN 250 MG
1 CAPSULE ORAL 2 TIMES DAILY PRN
Status: DISCONTINUED | OUTPATIENT
Start: 2024-08-13 | End: 2024-08-15 | Stop reason: HOSPADM

## 2024-08-13 RX ORDER — ACETAMINOPHEN 650 MG/1
650 SUPPOSITORY RECTAL EVERY 4 HOURS PRN
Status: DISCONTINUED | OUTPATIENT
Start: 2024-08-13 | End: 2024-08-15 | Stop reason: HOSPADM

## 2024-08-13 RX ORDER — ACETAMINOPHEN 325 MG/1
650 TABLET ORAL EVERY 4 HOURS PRN
Status: DISCONTINUED | OUTPATIENT
Start: 2024-08-13 | End: 2024-08-15 | Stop reason: HOSPADM

## 2024-08-13 RX ORDER — LIDOCAINE 40 MG/G
CREAM TOPICAL
Status: DISCONTINUED | OUTPATIENT
Start: 2024-08-13 | End: 2024-08-15 | Stop reason: HOSPADM

## 2024-08-13 RX ORDER — HYDROXYCHLOROQUINE SULFATE 200 MG/1
200 TABLET, FILM COATED ORAL 2 TIMES DAILY
Status: DISCONTINUED | OUTPATIENT
Start: 2024-08-13 | End: 2024-08-15 | Stop reason: HOSPADM

## 2024-08-13 RX ORDER — LEVETIRACETAM 500 MG/1
500 TABLET ORAL 2 TIMES DAILY
Status: DISCONTINUED | OUTPATIENT
Start: 2024-08-13 | End: 2024-08-15 | Stop reason: HOSPADM

## 2024-08-13 RX ORDER — ONDANSETRON 4 MG/1
4 TABLET, ORALLY DISINTEGRATING ORAL EVERY 6 HOURS PRN
Status: DISCONTINUED | OUTPATIENT
Start: 2024-08-13 | End: 2024-08-15 | Stop reason: HOSPADM

## 2024-08-13 RX ORDER — DULOXETIN HYDROCHLORIDE 20 MG/1
20 CAPSULE, DELAYED RELEASE ORAL DAILY
Status: DISCONTINUED | OUTPATIENT
Start: 2024-08-14 | End: 2024-08-15 | Stop reason: HOSPADM

## 2024-08-13 RX ORDER — LOSARTAN POTASSIUM 50 MG/1
50 TABLET ORAL AT BEDTIME
Status: DISCONTINUED | OUTPATIENT
Start: 2024-08-13 | End: 2024-08-15 | Stop reason: HOSPADM

## 2024-08-13 RX ORDER — ALLOPURINOL 100 MG/1
100 TABLET ORAL 2 TIMES DAILY
COMMUNITY
End: 2024-09-10

## 2024-08-13 RX ORDER — SODIUM BICARBONATE 650 MG/1
1300 TABLET ORAL 2 TIMES DAILY
Status: DISCONTINUED | OUTPATIENT
Start: 2024-08-13 | End: 2024-08-15 | Stop reason: HOSPADM

## 2024-08-13 RX ORDER — PROCHLORPERAZINE 25 MG
12.5 SUPPOSITORY, RECTAL RECTAL EVERY 12 HOURS PRN
Status: DISCONTINUED | OUTPATIENT
Start: 2024-08-13 | End: 2024-08-13

## 2024-08-13 RX ORDER — CARBOXYMETHYLCELLULOSE SODIUM 5 MG/ML
1-2 SOLUTION/ DROPS OPHTHALMIC 4 TIMES DAILY PRN
Status: DISCONTINUED | OUTPATIENT
Start: 2024-08-13 | End: 2024-08-15 | Stop reason: HOSPADM

## 2024-08-13 RX ORDER — LORAZEPAM 0.5 MG/1
0.5 TABLET ORAL DAILY PRN
Status: DISCONTINUED | OUTPATIENT
Start: 2024-08-13 | End: 2024-08-15 | Stop reason: HOSPADM

## 2024-08-13 RX ADMIN — RIVASTIGMINE TARTRATE 1.5 MG: 1.5 CAPSULE ORAL at 23:12

## 2024-08-13 RX ADMIN — SODIUM BICARBONATE 650 MG TABLET 1300 MG: at 23:12

## 2024-08-13 RX ADMIN — SODIUM CHLORIDE 1000 ML: 9 INJECTION, SOLUTION INTRAVENOUS at 15:23

## 2024-08-13 RX ADMIN — ALLOPURINOL 100 MG: 100 TABLET ORAL at 23:11

## 2024-08-13 RX ADMIN — BENZTROPINE MESYLATE 1.5 MG: 1 TABLET ORAL at 23:11

## 2024-08-13 RX ADMIN — LEVETIRACETAM 1000 MG: 100 INJECTION, SOLUTION INTRAVENOUS at 16:12

## 2024-08-13 RX ADMIN — LEVETIRACETAM 500 MG: 500 TABLET, FILM COATED ORAL at 23:11

## 2024-08-13 ASSESSMENT — COLUMBIA-SUICIDE SEVERITY RATING SCALE - C-SSRS
2. HAVE YOU ACTUALLY HAD ANY THOUGHTS OF KILLING YOURSELF IN THE PAST MONTH?: NO
1. IN THE PAST MONTH, HAVE YOU WISHED YOU WERE DEAD OR WISHED YOU COULD GO TO SLEEP AND NOT WAKE UP?: NO
2. HAVE YOU ACTUALLY HAD ANY THOUGHTS OF KILLING YOURSELF IN THE PAST MONTH?: NO
6. HAVE YOU EVER DONE ANYTHING, STARTED TO DO ANYTHING, OR PREPARED TO DO ANYTHING TO END YOUR LIFE?: NO
6. HAVE YOU EVER DONE ANYTHING, STARTED TO DO ANYTHING, OR PREPARED TO DO ANYTHING TO END YOUR LIFE?: NO
1. IN THE PAST MONTH, HAVE YOU WISHED YOU WERE DEAD OR WISHED YOU COULD GO TO SLEEP AND NOT WAKE UP?: NO

## 2024-08-13 ASSESSMENT — ACTIVITIES OF DAILY LIVING (ADL)
ADLS_ACUITY_SCORE: 35
ADLS_ACUITY_SCORE: 44
ADLS_ACUITY_SCORE: 35
ADLS_ACUITY_SCORE: 35
ADLS_ACUITY_SCORE: 41
ADLS_ACUITY_SCORE: 35
ADLS_ACUITY_SCORE: 35
ADLS_ACUITY_SCORE: 33
ADLS_ACUITY_SCORE: 35
ADLS_ACUITY_SCORE: 44

## 2024-08-13 NOTE — LETTER
Transition Communication Hand-off for Care Transitions to Next Level of Care Provider    Name: Claudia Wise  : 1950  MRN #: [id number]  Primary Care Provider: Isabel Maruer     Primary Clinic: [address] [facility] [address]     Reason for Hospitalization:  subdural hematoma  Subdural hematoma (H)  Admit Date/Time: 2024  8:41 PM  Discharge Date: 08/15/2024  Payor Source: Payor: MEDICA / Plan: MEDICA PRIME SOLUTION / Product Type: Indemnity /     Readmission Assessment Measure (SELVIN) Risk Score/category: elevated     Reason for Communication Hand-off Referral: Other resumption of homecare through [facility] for RN/PT/OT/SLP    Discharge Plan: resumption of homecare and follow up's as recommended by rounding providers       Concern for non-adherence with plan of care:   Y/N n  Discharge Needs Assessment:  Needs      Flowsheet Row Most Recent Value   Equipment Currently Used at Home walker, rolling            Already enrolled in Tele-monitoring program and name of program:  n/a  Follow-up specialty is recommended: Yes    Follow-up plan:    Future Appointments   Date Time Provider Department Center   8/15/2024  3:30 PM Kaitlynn Rubin, PT SHPT [facility]   2024 11:45 AM [facility]   2024  2:45 PM Brianna Reddy, OTR SHOT [facility]   2024  4:00 PM Keturah Vaughn MD [facility]   9/3/2024  1:30 PM Princess Saleem PA-C [facility]   2024 11:00 AM Isabel Maurer MD [facility]   2025 10:20 AM Isabel Maurer MD [facility]       Any outstanding tests or procedures:        Radiology & Cardiology Orders       Future Labs/Procedures Complete By Expires    CT Head w/o contrast*  2024 (Approximate) 2024    Process Instructions:    Administration of IV contrast (contrast agent, dose, and amount) will be tailored to this examination per the appropriate written protocol listed in the Protocol E-Book, or by the interpreting provider.  If you do not want your order altered by the radiologist, please state that in the order comments.          Referrals       Future Labs/Procedures    Primary Care - Care Coordination Referral     Process Instructions:    Services are provided by a Care Coordinator for people with complex needs such as: medical, social, or financial troubles.  The Care Coordinator works with the patient and their Primary Care Provider to determine health goals, obtain resources, achieve outcomes, and develop care plans that help coordinate the patient's care.     Comments:                   Key Recommendations:      Ratna De La Garza RN    AVS/Discharge Summary is the source of truth; this is a helpful guide for improved communication of patient story

## 2024-08-13 NOTE — ED TRIAGE NOTES
Pt with dx of Parkinson's  with frequent near syncopal episodes with fall most times when she stands since discharged from [facility] 7/19 where she had been for since 6/25 after a fall. Pt reports she never losses consciousness completely but is groggy. Pt reports multiple head injuries but no head, neck, or back pain. Pt reports R rib pain after one of her falls. No blood thinners. Last fall was yesterday.     Triage Assessment (Adult)       Row Name 08/13/24 5900          Triage Assessment    Airway WDL WDL        Respiratory WDL    Respiratory WDL WDL        Skin Circulation/Temperature WDL    Skin Circulation/Temperature WDL WDL        Cardiac WDL    Cardiac WDL WDL        Peripheral/Neurovascular WDL    Peripheral Neurovascular WDL WDL        Cognitive/Neuro/Behavioral WDL    Cognitive/Neuro/Behavioral WDL WDL

## 2024-08-13 NOTE — PROGRESS NOTES
Transfer Type: [facility]  Transfer Triage Note    Date of call: 08/13/24  Time of call: 4:12 PM    Current Patient Location:    Current Level of Care: ED    Vitals: Temp: 98.6  F (37  C) Temp src: Temporal BP: (!) 155/68 Pulse: 85   Resp: 20 SpO2: 95 %   Weight: 72.6 kg (160 lb)  O2 Device: None (Room air) at    Diagnosis: Subacute subdural hematoma and new subarachnoid hemorrhage  Reason for requested transfer: Further diagnostic work up, management, and consultation for specialized care   Isolation Needs: None    Care everywhere has been updated and reviewed: Yes  Necessary images have been sent through PACS: Yes    If patient is transferring for specialty care or specific procedure, the specialist required has participated in the transfer call and agreed with need for transfer and anticipated timeline: Neurosurgery at [facility] discussed with ED provider. Paged Neurosurgery (Mk) at [facility] in case clarification needed.    73 y.o. with h/o Parkinson's disease who has had recurrent falls. Having frequent falls but fortunately not on a blood thinner. Had prior subdural imaging today showed she has subacute bleeding (in last 7-10 days). Also has new subarachnoid hemorrhage. She has still been living at home despite concerns and her  has been trying hard maintain safety at home. Has had presyncopal issues with physical therapy. Falls thought to also be due to gait issues.   ED doc discussed with neurosurgery at [facility]- recommended repeat head CT in 6 hours. Dr. Echeverria recommended contacting [facility] since [facility] has long waitlist.     Transfer accepted: Yes  Stability of Patient: Patient is vitally stable, with no critical labs, and will likely remain stable throughout the transfer process  Is the patient appropriate for [facility]? Going to [facility]  Level of Care Needed: IMC per neurosurgery   Telemetry Needed:  Med (Remote) Telemetry  Expected Time of Arrival for Transfer: 0-8  hours-- they're calling report at 5 PM.  Arrival Location:  [facility]     Recommendations for Management and Stabilization: Not needed  Paged [facility] captain of day for signout    Amarilis Tsai MD

## 2024-08-13 NOTE — TELEPHONE ENCOUNTER
Nurse Triage SBAR    Is this a 2nd Level Triage? NO    Situation: OT from [facility] calling with patient passing out and low BP    Background: Patient had fall in June with TBI. Homecare OT with patient and patient having falls and low BP upon standing.    Assessment: Patient working with OT. Patient BP at rest sitting 120/62. After standing, patient feels weak, and faint. She did not fall today. OT noted her eyes started to roll back in her head and assisted her to the chair. Her BP after the syncopal event was 76/47. She did not loose conscisnious. Her last fall was yesterday. Per  she did take her Amlodipine this morning. Her pulse has been running in the low 100's throughout these episodes. Per protocol patient to go to ED.     Protocol Recommended Disposition:   Go to ED Now, Call ADS/Go to ED/UCC Now (Or To Office with PCP Approval)    Recommendation:  agrees with plan of care. Travis OT will aide with helping patient to care. Plan to go to [facility].      Zheng Kuo RN on 8/13/2024 at 12:25 PM      Reason for Disposition   Fall in systolic BP > 20 mm Hg from normal and feeling dizzy, lightheaded, or weak   Systolic BP < 80 and NOT dizzy, lightheaded or weak (feels normal)    Additional Information   Negative: Abdominal pain   Negative: Major surgery in the past month   Negative: Fever > 100.4 F (38.0 C)   Negative: Drinking very little and dehydration suspected (e.g., no urine > 12 hours, very dry mouth, very lightheaded)    Protocols used: Blood Pressure - Low-A-OH

## 2024-08-13 NOTE — ED PROVIDER NOTES
EMERGENCY DEPARTMENT ENCOUNTER      NAME: Claudia Wise  AGE: 73 year old female  YOB: 1950  MRN: [id number]  EVALUATION DATE & TIME: 8/13/2024  1:59 PM    PCP: Isabel Maurer    ED PROVIDER: Mabel Small MD    Chief Complaint   Patient presents with    Fall         FINAL IMPRESSION:  1. SDH (subdural hematoma) (H)    2. SAH (subarachnoid hemorrhage) (H)    3. Fall, initial encounter    4. Orthostatic hypotension          ED COURSE & MEDICAL DECISION MAKING:    Pertinent Labs & Imaging studies reviewed. (See chart for details)  73 year old female with history of Parkinson's, HTN, HLD recently hospitalized for a fall with intracranial hemorrhage who presents to the Emergency Department for evaluation of been having several episodes of what sounds like orthostatic hypotension where she stands up and feels lightheaded dizzy legs give out and she either has near syncopal episodes or mechanical fall.  Some of these have been witnessed by spouse some have been not.  Concern for closed head injury, skull fracture, ICH,  Dehydration, electrolyte abnormality.  She does also complain of some right sided rib pain after one of the falls, no crepitus or subcu emphysema on exam so my concern for hemopneumothorax is low but rib fractures on the differential.  She recently had decreased her amlodipine from 10 to 5 mg because of the above orthostatic hypotension symptomatology and still on PT OT visit today had orthostatic hypotension from the 130s to the 70s.  Maybe question autonomic instability with her parkinsonian some, but is not describing any significant hypertension episodes with this.    Patient placed on monitor, IV established and blood obtained.  Twelve-lead EKG shows sinus rhythm with right bundle branch block.  Given 1 L normal saline bolus.  CBC, BMP, LFTs, magnesium, troponin notable for creatinine of 1.4 at baseline, hemoglobin 10.0 at baseline.  Troponin in indeterminate range at 54 but is  downtrending to 52 on repeat.  X-rays of the chest and right rib cage unremarkable.  CT of the head and cervical spine show that the previous subdurals have decreased in volume, but there is new bleeding, subacute in appearance with into the subdural space and as well as a new subarachnoid.  Again patient is neuro intact complemented anticoagulated.  Given 1 g Keppra bolus.  Neurosurgery consulted, recommend repeat 6-hour head CT.  Warrants transfer to outside hospital for further care and management of her ICH.  Has been accepted to transfer for [facility].      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     1512 Speaking w [facility] re: trauma transfer   1521 Spoke w [facility] ED and [facility] neurosurgery Dr. Echeverria. No current bed capacity at [facility], no need for emergent intervention, agree with current management, recommend trying [facility], repeat 6hr CT head.    1550 [facility] called back, [facility] doesn't have beds   1554 [facility] closed to outside transfer   1611 Spoke w Dr. Tsai, triage hospitalist, [facility].   1746 Troponin T, High Sensitivity(!): 52  down       2:05 PM I met with the patient, obtained history, performed an initial exam, and discussed options and plan for diagnostics and treatment here in the ED.   Medical Decision Making    History:  Supplemental history from: Documented in chart and Family Member/Significant Other  External Record(s) reviewed: Inpatient Record: [facility] admission 6/25-7/19/2024    Work Up:  Chart documentation includes differential considered and any EKGs or imaging independently interpreted by provider, see MDM  In additional to work up documented, I considered the following work up:  see MDM    External consultation:  Discussion of management with another provider: [facility], Neurosurgery,     Complicating factors:  Care impacted by chronic illness: Chronic Kidney Disease, Diabetes, Hyperlipidemia, Hypertension, and Other: Parkinson's   Care affected by social determinants of health: Access to Medical Care referred to ED by OT    Disposition considerations: Admit.        At the conclusion of the encounter I discussed the results of all of the tests and the disposition. The questions were answered. The patient or family acknowledged understanding and was agreeable with the care plan.    CRITICAL CARE:  Critical Care  Performed by: Mabel Small MD  Authorized by: Mabel Small MD     Total critical care time: 55 minutes  Criticalcare time was exclusive of separately billable procedures and treating other patients.  Critical care was necessary to treat or prevent imminent or life-threatening deterioration of the following conditions: ICH  Critical care was time spent personally by me on the following activities: development of treatment plan with patient or surrogate, discussions with consultants, examination of patient, evaluation of patient's response totreatment, obtaining history from patient or surrogate, ordering and performing treatments and interventions, ordering and review of laboratory studies, ordering and review of radiographic studies and re-evaluation ofpatient's condition, this excludes any separately billable procedures.      MEDICATIONS GIVEN IN THE EMERGENCY:  Medications   sodium chloride 0.9% BOLUS 1,000 mL (0 mLs Intravenous Stopped 8/13/24 1740)   levETIRAcetam (KEPPRA) 1,000 mg in sodium chloride 0.9 % 120 mL intermittent infusion (0 mg Intravenous Stopped 8/13/24 1740)       NEW PRESCRIPTIONS STARTED AT TODAY'S ER VISIT  New Prescriptions    No medications on file          =================================================================    HPI    Patient  "information was obtained from: patient and     Use of Intrepreter: N/A      Claudia Wise is a 73 year old female with pertinent medical history of HTN, HLD, DM II, CKD stage 3, anemia, osteoarthritis, parkinson's disease, who presents by walk in for evaluation of fall.     Per , the patient was hospitalized 6/25/2024 after falling backwards at an office visit and fracturing her skull. The patient was discharged 7/19/2024, and since then she has been seen at home by occupational and physical therapists. Her  says that when she stands up too fast, she \"collapses\" backwards. When she stands up, her blood pressure drops.     Per patient, she recently fell backwards and endorses right lower rib pain. She said when she stands too quickly she feels lightheaded, but not always. Patient has had no syncopal episodes with these falls. Patient uses walker and gait belt.     Patient denies new back pain or abdominal pain. Denies hitting her head, and no recent changes in medications, except going from 10 mg to 5 mg of amlodipine one week ago. No recent cough, congestion, chills or other illness.     Per review, patient admitted to [facility] 6/25/2024 for subarachnoid hemorrhage. Imaging showed an age indeterminate, likely subacute L2 fracture. Patient admitted to neurosurgery and started on Keppra for prophylaxis. Head CT 6/26 showed slight increase of  subarachnoid hemorrhage, stabilizing on 6/27. Patient eventually stable for acute inpatient rehab on 7/3/2024. Patient discharged 7/19/2024 with physical therapy and occupational home therapy care.     PAST MEDICAL HISTORY:  Past Medical History:   Diagnosis Date    Anemia     Anxiety     Chronic kidney disease     Diabetes mellitus, type II (H)     prediabetes    Disease of thyroid gland     hypothyroid    Family history of myocardial infarction     Gout     High cholesterol     Hypertension     Inverted nipple     right    Macular edema     " Osteoarthritis 01/01/2012    Rheumatoid arthritis (H) 01/01/2012       PAST SURGICAL HISTORY:  Past Surgical History:   Procedure Laterality Date    COLON SURGERY  1986    COLPORRHAPHY N/A 7/7/2016    Procedure: ANTERIOR REPAIR WITH MESH;  Surgeon: Zac Stone MD;  Location: [facility] Main OR;  Service:     HC UGI ENDOSCOPY W PLACEMENT GASTROSTOMY TUBE PERCUT N/A 2/7/2020    Procedure: Percutaneous Endoscopic Gastromstomy Tube Placement;  Surgeon: Zion Schwarz MD;  Location: [facility] Main OR;  Service: General    HYSTERECTOMY      LAPAROTOMY EXPLORATORY N/A 1/23/2020    Procedure: EXPLORATORY LAPARATOMY, EXTENSIVE LYSIS OF ADHESIONS, SMALL BOWEL RESECTION;  Surgeon: Claudia Jeronimo MD;  Location: [facility] Main OR;  Service: General    OOPHORECTOMY         CURRENT MEDICATIONS:    Prior to Admission Medications   Prescriptions Last Dose Informant Patient Reported? Taking?   DULoxetine (CYMBALTA) 20 MG capsule 8/13/2024 at AM  No Yes   Sig: Take 1 capsule (20 mg) by mouth daily   LORazepam (ATIVAN) 0.5 MG tablet Past Week at few days ago; PM  No Yes   Sig: Take 1 tablet (0.5 mg) by mouth daily as needed for anxiety   acetaminophen (TYLENOL) 500 MG tablet 8/13/2024 at AM; 500 mg  No Yes   Sig: Take 1 tablet (500 mg) by mouth every 6 hours as needed   acyclovir (ZOVIRAX) 400 MG tablet More than a month at PRN, HSV  No Yes   Sig: TAKE ONE TABLET BY MOUTH EVERY 8 HOURS AS NEEDED   allopurinol (ZYLOPRIM) 100 MG tablet 8/12/2024 at HS  Yes Yes   Sig: Take 100 mg by mouth 2 times daily   amLODIPine (NORVASC) 10 MG tablet 8/13/2024 at AM  No Yes   Sig: Take 1 tablet (10 mg) by mouth daily   atorvastatin (LIPITOR) 10 MG tablet 8/12/2024 at HS  No Yes   Sig: Take 1 tablet (10 mg) by mouth at bedtime   benztropine (COGENTIN) 0.5 MG tablet 8/12/2024 at HS  No Yes   Sig: Take 3 tablets (1.5 mg) by mouth 2 times daily   carboxymethylcellulose (REFRESH PLUS) 0.5 % Dpet ophthalmic dropperette Past Week at PRN  Yes  Yes   Sig: [CARBOXYMETHYLCELLULOSE (REFRESH PLUS) 0.5 % DPET OPHTHALMIC DROPPERETTE] Administer 1-2 drops to both eyes 4 (four) times a day as needed.    cyanocobalamin, vitamin B-12, 1,000 mcg Subl 8/12/2024 at AM  No Yes   Sig: [CYANOCOBALAMIN, VITAMIN B-12, 1,000 MCG SUBL] Place 1 tablet (1,000 mcg total) under the tongue daily.   Patient taking differently: Place 1,000 mcg under the tongue daily liquid   cyclobenzaprine (FLEXERIL) 10 MG tablet New Rx, not started yet.  No Yes   Sig: Take 1 tablet (10 mg) by mouth 2 times daily as needed for muscle spasms   docusate sodium (DSS) 100 MG capsule  at 8/11  No Yes   Sig: Take 1 capsule (100 mg) by mouth 2 times daily as needed for constipation   fish oil-omega-3 fatty acids 500 MG capsule Past Week at few days ago  No Yes   Sig: Take 1 capsule (500 mg) by mouth daily   fluticasone (FLONASE) 50 MCG/ACT nasal spray 8/12/2024 at AM  No Yes   Sig: Spray 1 spray into both nostrils daily   hydroxychloroquine (PLAQUENIL) 200 MG tablet 8/12/2024 at HS  No Yes   Sig: Take 1 tablet (200 mg) by mouth 2 times daily   levothyroxine (SYNTHROID/LEVOTHROID) 50 MCG tablet 8/13/2024 at AM  No Yes   Sig: Take 1 tablet (50 mcg) by mouth daily   losartan (COZAAR) 50 MG tablet 8/12/2024 at HS  No Yes   Sig: Take 1 tablet (50 mg) by mouth daily   magnesium oxide (MAG-OX) 400 MG tablet 8/12/2024 at AM  No Yes   Sig: Take 1 tablet (400 mg) by mouth daily   polyethylene glycol (MIRALAX) 17 GM/Dose powder Past Month at last week sometime  Yes Yes   Sig: Take 17 g by mouth daily as needed   rivastigmine (EXELON) 1.5 MG capsule 8/12/2024 at HS  No Yes   Sig: Take 1 capsule (1.5 mg) by mouth 2 times daily   sodium bicarbonate 650 MG tablet 8/12/2024 at HS  No Yes   Sig: Take 2 tablets (1,300 mg) by mouth 2 times daily      Facility-Administered Medications: None       ALLERGIES:  Allergies   Allergen Reactions    Bupropion Hcl [Bupropion] Nausea    Erythromycin Base [Erythromycin] Nausea    Paxil  [Paroxetine] Diarrhea    Tetracyclines & Related Itching    Valacyclovir Nausea       FAMILY HISTORY:  Family History   Problem Relation Age of Onset    Breast Cancer Maternal Aunt     Cancer Maternal Aunt     Cancer Father     Cancer Sister     Cancer Maternal Grandmother     Cancer Cousin     Coronary Artery Disease Mother 49.00       SOCIAL HISTORY:  Social History     Tobacco Use    Smoking status: Former     Current packs/day: 0.00     Types: Cigarettes     Quit date: 1995     Years since quittin.1     Passive exposure: Never    Smokeless tobacco: Never   Vaping Use    Vaping status: Never Used   Substance Use Topics    Alcohol use: Not Currently     Comment: Alcoholic Drinks/day: occasional couple times a year    Drug use: No        VITALS:  Patient Vitals for the past 24 hrs:   BP Temp Temp src Pulse Resp SpO2 Weight   24 1800 (!) 152/66 -- -- 90 23 97 % --   24 1730 (!) 150/68 -- -- 86 21 97 % --   24 1700 (!) 166/70 -- -- 87 24 97 % --   24 1600 (!) 152/57 -- -- 84 22 96 % --   24 1545 (!) 155/68 -- -- 85 20 95 % --   24 1530 (!) 153/70 -- -- 85 20 95 % --   24 1430 135/63 -- -- 85 -- 98 % --   24 1425 134/62 -- -- 84 -- 98 % --   24 1324 130/87 98.6  F (37  C) Temporal 95 20 98 % 72.6 kg (160 lb)       PHYSICAL EXAM    General Appearance: Well-appearing, well-nourished, no acute distress   Head:  Normocephalic, atraumatic  Eyes:  PERRL, conjunctiva/corneas clear, EOM's intact  ENT:  Lips, mucosa, and tongue normal; membranes are moist without pallor  Neck:  Supple, symmetrical, trachea midline, no adenopathy  Chest:  No tenderness or deformity, no crepitus. Right lower ribcage tenderness.   Cardio:  Regular rate and rhythm, no murmur/gallop/rub, 2+ pulses symmetric in all extremities  Pulm:  No respiratory distress, clear to auscultation bilaterally  Back:  No midline tenderness to palpation, no paraspinal tenderness  Abdomen:  Soft,  non-tender, non distended,no rebound or guarding.  Extremities:  Extremities normal, atraumatic, no cyanosis or edema, full ROM and motor tone intact, bilateral pulses intact upper and lower. Shuffling gait with parkinson's disease, assist x1, uses walker.   Skin:  Skin warm, dry, no rashes  Neuro:  Alert and oriented ×3, moving all extremities, no gross sensory defects     RADIOLOGY/LABS:  Reviewed all pertinent imaging. Please see official radiology report. All pertinent labs reviewed and interpreted.    Results for orders placed or performed during the hospital encounter of 08/13/24   Ribs XR, unilat 3 views + PA chest, right    Impression    IMPRESSION: The visualized heart and lungs are negative. No rib fractures.   Head CT w/o contrast    Impression    IMPRESSION:  1.  Decreased size of the subdural fluid collections along the cerebral convexities with increased attenuation, suggesting interval bleed within the chronic subdural hematomas. No midline shift or downward herniation.  2.  Likely subarachnoid blood products layering along the posterior right sylvian fissure, possibly subacute given near isoattenuation to parenchyma.    Findings discussed with Dr. Small by Dr. Coy at 1509 8/13/2024.   CT Cervical Spine w/o Contrast    Impression    IMPRESSION:  1.  No acute fracture or traumatic subluxation within the cervical spine.   Basic metabolic panel   Result Value Ref Range    Sodium 140 135 - 145 mmol/L    Potassium 3.9 3.4 - 5.3 mmol/L    Chloride 105 98 - 107 mmol/L    Carbon Dioxide (CO2) 26 22 - 29 mmol/L    Anion Gap 9 7 - 15 mmol/L    Urea Nitrogen 18.8 8.0 - 23.0 mg/dL    Creatinine 1.41 (H) 0.51 - 0.95 mg/dL    GFR Estimate 39 (L) >60 mL/min/1.73m2    Calcium 8.7 (L) 8.8 - 10.4 mg/dL    Glucose 129 (H) 70 - 99 mg/dL   Hepatic panel   Result Value Ref Range    Protein Total 6.9 6.4 - 8.3 g/dL    Albumin 4.2 3.5 - 5.2 g/dL    Bilirubin Total 0.6 <=1.2 mg/dL    Alkaline Phosphatase 68 40 - 150 U/L     AST 25 0 - 45 U/L    ALT 14 0 - 50 U/L    Bilirubin Direct <0.20 0.00 - 0.30 mg/dL   Result Value Ref Range    Magnesium 1.7 1.7 - 2.3 mg/dL   Result Value Ref Range    Troponin T, High Sensitivity 54 (H) <=14 ng/L   CBC with platelets and differential   Result Value Ref Range    WBC Count 7.6 4.0 - 11.0 10e3/uL    RBC Count 3.33 (L) 3.80 - 5.20 10e6/uL    Hemoglobin 10.0 (L) 11.7 - 15.7 g/dL    Hematocrit 31.2 (L) 35.0 - 47.0 %    MCV 94 78 - 100 fL    MCH 30.0 26.5 - 33.0 pg    MCHC 32.1 31.5 - 36.5 g/dL    RDW 13.6 10.0 - 15.0 %    Platelet Count 197 150 - 450 10e3/uL    % Neutrophils 80 %    % Lymphocytes 10 %    % Monocytes 9 %    % Eosinophils 1 %    % Basophils 0 %    % Immature Granulocytes 1 %    NRBCs per 100 WBC 0 <1 /100    Absolute Neutrophils 6.1 1.6 - 8.3 10e3/uL    Absolute Lymphocytes 0.7 (L) 0.8 - 5.3 10e3/uL    Absolute Monocytes 0.7 0.0 - 1.3 10e3/uL    Absolute Eosinophils 0.1 0.0 - 0.7 10e3/uL    Absolute Basophils 0.0 0.0 - 0.2 10e3/uL    Absolute Immature Granulocytes 0.0 <=0.4 10e3/uL    Absolute NRBCs 0.0 10e3/uL   Result Value Ref Range    Troponin T, High Sensitivity 52 (H) <=14 ng/L   ECG 12-lead with Muse (LHE)   Result Value Ref Range    Systolic Blood Pressure 130 mmHg    Diastolic Blood Pressure 87 mmHg    Ventricular Rate 94 BPM    Atrial Rate 94 BPM    UT Interval 134 ms    QRS Duration 134 ms     ms    QTc 487 ms    P Axis 29 degrees    R AXIS -59 degrees    T Axis 4 degrees    Interpretation ECG       Sinus rhythm  Left axis deviation  Right bundle branch block  Abnormal ECG  When compared with ECG of 25-Jan-2020 17:14,  UT interval has increased  Right bundle branch block is now Present  Confirmed by SEE ED PROVIDER NOTE FOR, ECG INTERPRETATION ([id number]),  Beatriz Mccrary ([id number]) on 8/13/2024 3:18:14 PM         EKG:  Performed at: 13:31, 8/13/2024    Impression: Sinus rhythm, left axis deviation, right bundle branch block. Abnormal ECG.    Rate: 94 bpm  Rhythm:  Sinus  Axis: -59  RI Interval: 134 ms  QRS Interval: 134 ms  QTc Interval: 487 ms  ST Changes: RI interval has increased, right bundle branch block now present.   Comparison: EKG from 1/25/2020    Performed at: 15:21, 8/13/2024    Impression: Sinus rhythm, left anterior fascicular block, bifascicular block. Abnormal ECG.     Rate: 84 bpm  Rhythm: Sinus  Axis: -70  RI Interval: 166  QRS Interval: 144  QTc Interval: 529  ST Changes: No significant change found, HR decreased  Comparison: Compared EKG 8/13/2024 at 13:31  I have independently reviewed and interpreted the EKG(s) documented above.      The creation of this record is based on the scribe s observations of the work being performed by Mabel Small MD and the provider s statements to them. It was created on her behalf by Crista Murphy, a trained medical scribe. This document has been checked and approved by the attending provider.    Mabel Small MD  Emergency Medicine  [facility] EMERGENCY ROOM  [address] [facility] [address]  [phone]  Dept: [phone]     Mabel Small MD  08/13/24 1930

## 2024-08-13 NOTE — MEDICATION SCRIBE - ADMISSION MEDICATION HISTORY
Medication Scribe Admission Medication History    Admission medication history is complete. The information provided in this note is only as accurate as the sources available at the time of the update.    Information Source(s): Patient, Family member, Clinic records, and CareEverywhere/SureScripts via in-person    Pertinent Information: Spoke with the patient and her spouse who administers medication for the patient. Patient was taking AM medications, but had PT/OT come to the house for an appointment and did not finish taking all AM meds PTA. Not on anticoagulants.     Changes made to PTA medication list:  Added:   Allopurinol 100 mg   Deleted:   Senna-docusate 8.6-50 mg  Changed:   PEG 17 g - added PRN SIG    Allergies reviewed with patient and updates made in EHR: yes    Medication History Completed By: Binh Barber 8/13/2024 4:12 PM    PTA Med List   Medication Sig Last Dose    acetaminophen (TYLENOL) 500 MG tablet Take 1 tablet (500 mg) by mouth every 6 hours as needed 8/13/2024 at AM; 500 mg    acyclovir (ZOVIRAX) 400 MG tablet TAKE ONE TABLET BY MOUTH EVERY 8 HOURS AS NEEDED More than a month at PRN, HSV    allopurinol (ZYLOPRIM) 100 MG tablet Take 100 mg by mouth 2 times daily 8/12/2024 at HS    amLODIPine (NORVASC) 10 MG tablet Take 1 tablet (10 mg) by mouth daily 8/13/2024 at AM    atorvastatin (LIPITOR) 10 MG tablet Take 1 tablet (10 mg) by mouth at bedtime 8/12/2024 at HS    benztropine (COGENTIN) 0.5 MG tablet Take 3 tablets (1.5 mg) by mouth 2 times daily 8/12/2024 at HS    carboxymethylcellulose (REFRESH PLUS) 0.5 % Dpet ophthalmic dropperette [CARBOXYMETHYLCELLULOSE (REFRESH PLUS) 0.5 % DPET OPHTHALMIC DROPPERETTE] Administer 1-2 drops to both eyes 4 (four) times a day as needed.  Past Week at PRN    cyanocobalamin, vitamin B-12, 1,000 mcg Subl [CYANOCOBALAMIN, VITAMIN B-12, 1,000 MCG SUBL] Place 1 tablet (1,000 mcg total) under the tongue daily. (Patient taking differently: Place 1,000 mcg  under the tongue daily liquid) 8/12/2024 at AM    cyclobenzaprine (FLEXERIL) 10 MG tablet Take 1 tablet (10 mg) by mouth 2 times daily as needed for muscle spasms New Rx, not started yet.    docusate sodium (DSS) 100 MG capsule Take 1 capsule (100 mg) by mouth 2 times daily as needed for constipation  at 8/11    DULoxetine (CYMBALTA) 20 MG capsule Take 1 capsule (20 mg) by mouth daily 8/13/2024 at AM    fish oil-omega-3 fatty acids 500 MG capsule Take 1 capsule (500 mg) by mouth daily Past Week at few days ago    fluticasone (FLONASE) 50 MCG/ACT nasal spray Spray 1 spray into both nostrils daily 8/12/2024 at AM    hydroxychloroquine (PLAQUENIL) 200 MG tablet Take 1 tablet (200 mg) by mouth 2 times daily 8/12/2024 at HS    levothyroxine (SYNTHROID/LEVOTHROID) 50 MCG tablet Take 1 tablet (50 mcg) by mouth daily 8/13/2024 at AM    LORazepam (ATIVAN) 0.5 MG tablet Take 1 tablet (0.5 mg) by mouth daily as needed for anxiety Past Week at few days ago; PM    losartan (COZAAR) 50 MG tablet Take 1 tablet (50 mg) by mouth daily 8/12/2024 at HS    magnesium oxide (MAG-OX) 400 MG tablet Take 1 tablet (400 mg) by mouth daily 8/12/2024 at AM    polyethylene glycol (MIRALAX) 17 GM/Dose powder Take 17 g by mouth daily as needed Past Month at last week sometime    rivastigmine (EXELON) 1.5 MG capsule Take 1 capsule (1.5 mg) by mouth 2 times daily 8/12/2024 at HS    sodium bicarbonate 650 MG tablet Take 2 tablets (1,300 mg) by mouth 2 times daily 8/12/2024 at HS

## 2024-08-14 ENCOUNTER — APPOINTMENT (OUTPATIENT)
Dept: PHYSICAL THERAPY | Facility: CLINIC | Age: 74
DRG: 085 | End: 2024-08-14
Attending: HOSPITALIST
Payer: MEDICARE

## 2024-08-14 ENCOUNTER — APPOINTMENT (OUTPATIENT)
Dept: OCCUPATIONAL THERAPY | Facility: CLINIC | Age: 74
DRG: 085 | End: 2024-08-14
Attending: HOSPITALIST
Payer: MEDICARE

## 2024-08-14 LAB
ANION GAP SERPL CALCULATED.3IONS-SCNC: 10 MMOL/L (ref 7–15)
BUN SERPL-MCNC: 12.8 MG/DL (ref 8–23)
CALCIUM SERPL-MCNC: 7.9 MG/DL (ref 8.8–10.4)
CHLORIDE SERPL-SCNC: 107 MMOL/L (ref 98–107)
CHOLEST SERPL-MCNC: 112 MG/DL
CREAT SERPL-MCNC: 1.07 MG/DL (ref 0.51–0.95)
EGFRCR SERPLBLD CKD-EPI 2021: 54 ML/MIN/1.73M2
ERYTHROCYTE [DISTWIDTH] IN BLOOD BY AUTOMATED COUNT: 13.4 % (ref 10–15)
GLUCOSE BLDC GLUCOMTR-MCNC: 84 MG/DL (ref 70–99)
GLUCOSE SERPL-MCNC: 85 MG/DL (ref 70–99)
HCO3 SERPL-SCNC: 23 MMOL/L (ref 22–29)
HCT VFR BLD AUTO: 31.1 % (ref 35–47)
HDLC SERPL-MCNC: 61 MG/DL
HGB BLD-MCNC: 10.2 G/DL (ref 11.7–15.7)
LDLC SERPL CALC-MCNC: 34 MG/DL
MAGNESIUM SERPL-MCNC: 1.4 MG/DL (ref 1.7–2.3)
MAGNESIUM SERPL-MCNC: 3 MG/DL (ref 1.7–2.3)
MCH RBC QN AUTO: 30.1 PG (ref 26.5–33)
MCHC RBC AUTO-ENTMCNC: 32.8 G/DL (ref 31.5–36.5)
MCV RBC AUTO: 92 FL (ref 78–100)
NONHDLC SERPL-MCNC: 51 MG/DL
PHOSPHATE SERPL-MCNC: 1.7 MG/DL (ref 2.5–4.5)
PHOSPHATE SERPL-MCNC: 3.1 MG/DL (ref 2.5–4.5)
PLATELET # BLD AUTO: 172 10E3/UL (ref 150–450)
POTASSIUM SERPL-SCNC: 3.4 MMOL/L (ref 3.4–5.3)
POTASSIUM SERPL-SCNC: 4.8 MMOL/L (ref 3.4–5.3)
RBC # BLD AUTO: 3.39 10E6/UL (ref 3.8–5.2)
SODIUM SERPL-SCNC: 140 MMOL/L (ref 135–145)
TRIGL SERPL-MCNC: 85 MG/DL
TROPONIN T SERPL HS-MCNC: 61 NG/L
WBC # BLD AUTO: 4.7 10E3/UL (ref 4–11)

## 2024-08-14 PROCEDURE — 97116 GAIT TRAINING THERAPY: CPT | Mod: GP

## 2024-08-14 PROCEDURE — 36415 COLL VENOUS BLD VENIPUNCTURE: CPT | Performed by: STUDENT IN AN ORGANIZED HEALTH CARE EDUCATION/TRAINING PROGRAM

## 2024-08-14 PROCEDURE — 85014 HEMATOCRIT: CPT | Performed by: HOSPITALIST

## 2024-08-14 PROCEDURE — 250N000013 HC RX MED GY IP 250 OP 250 PS 637: Performed by: HOSPITALIST

## 2024-08-14 PROCEDURE — 250N000013 HC RX MED GY IP 250 OP 250 PS 637: Performed by: STUDENT IN AN ORGANIZED HEALTH CARE EDUCATION/TRAINING PROGRAM

## 2024-08-14 PROCEDURE — 97165 OT EVAL LOW COMPLEX 30 MIN: CPT | Mod: GO

## 2024-08-14 PROCEDURE — 120N000001 HC R&B MED SURG/OB

## 2024-08-14 PROCEDURE — 83735 ASSAY OF MAGNESIUM: CPT | Performed by: HOSPITALIST

## 2024-08-14 PROCEDURE — 250N000011 HC RX IP 250 OP 636: Performed by: HOSPITALIST

## 2024-08-14 PROCEDURE — 97530 THERAPEUTIC ACTIVITIES: CPT | Mod: GO

## 2024-08-14 PROCEDURE — 99231 SBSQ HOSP IP/OBS SF/LOW 25: CPT | Performed by: PHYSICIAN ASSISTANT

## 2024-08-14 PROCEDURE — 80061 LIPID PANEL: CPT | Performed by: HOSPITALIST

## 2024-08-14 PROCEDURE — 999N000226 HC STATISTIC SLP IP EVAL DEFER

## 2024-08-14 PROCEDURE — 36415 COLL VENOUS BLD VENIPUNCTURE: CPT | Performed by: HOSPITALIST

## 2024-08-14 PROCEDURE — 84100 ASSAY OF PHOSPHORUS: CPT | Performed by: STUDENT IN AN ORGANIZED HEALTH CARE EDUCATION/TRAINING PROGRAM

## 2024-08-14 PROCEDURE — 80048 BASIC METABOLIC PNL TOTAL CA: CPT | Performed by: HOSPITALIST

## 2024-08-14 PROCEDURE — 84100 ASSAY OF PHOSPHORUS: CPT | Performed by: HOSPITALIST

## 2024-08-14 PROCEDURE — 97162 PT EVAL MOD COMPLEX 30 MIN: CPT | Mod: GP

## 2024-08-14 PROCEDURE — 99233 SBSQ HOSP IP/OBS HIGH 50: CPT | Performed by: STUDENT IN AN ORGANIZED HEALTH CARE EDUCATION/TRAINING PROGRAM

## 2024-08-14 PROCEDURE — 84132 ASSAY OF SERUM POTASSIUM: CPT | Performed by: HOSPITALIST

## 2024-08-14 PROCEDURE — 84484 ASSAY OF TROPONIN QUANT: CPT | Performed by: HOSPITALIST

## 2024-08-14 RX ORDER — MAGNESIUM SULFATE HEPTAHYDRATE 40 MG/ML
4 INJECTION, SOLUTION INTRAVENOUS ONCE
Status: COMPLETED | OUTPATIENT
Start: 2024-08-14 | End: 2024-08-14

## 2024-08-14 RX ORDER — LIDOCAINE 4 G/G
1 PATCH TOPICAL
Status: DISCONTINUED | OUTPATIENT
Start: 2024-08-14 | End: 2024-08-15 | Stop reason: HOSPADM

## 2024-08-14 RX ORDER — POTASSIUM CHLORIDE 1500 MG/1
40 TABLET, EXTENDED RELEASE ORAL ONCE
Status: COMPLETED | OUTPATIENT
Start: 2024-08-14 | End: 2024-08-14

## 2024-08-14 RX ADMIN — POTASSIUM & SODIUM PHOSPHATES POWDER PACK 280-160-250 MG 2 PACKET: 280-160-250 PACK at 08:09

## 2024-08-14 RX ADMIN — ACETAMINOPHEN 650 MG: 325 SUSPENSION ORAL at 11:18

## 2024-08-14 RX ADMIN — POTASSIUM CHLORIDE 40 MEQ: 1500 TABLET, EXTENDED RELEASE ORAL at 08:09

## 2024-08-14 RX ADMIN — DULOXETINE HYDROCHLORIDE 20 MG: 20 CAPSULE, DELAYED RELEASE ORAL at 08:15

## 2024-08-14 RX ADMIN — Medication 400 MG: at 08:09

## 2024-08-14 RX ADMIN — BENZTROPINE MESYLATE 1.5 MG: 1 TABLET ORAL at 08:09

## 2024-08-14 RX ADMIN — LIDOCAINE 1 PATCH: 4 PATCH TOPICAL at 12:38

## 2024-08-14 RX ADMIN — Medication 1000 MCG: at 08:15

## 2024-08-14 RX ADMIN — ACETAMINOPHEN 650 MG: 325 TABLET, FILM COATED ORAL at 01:23

## 2024-08-14 RX ADMIN — LEVETIRACETAM 500 MG: 500 TABLET, FILM COATED ORAL at 20:03

## 2024-08-14 RX ADMIN — LORAZEPAM 0.5 MG: 0.5 TABLET ORAL at 19:59

## 2024-08-14 RX ADMIN — LEVOTHYROXINE SODIUM 50 MCG: 50 TABLET ORAL at 08:09

## 2024-08-14 RX ADMIN — ALLOPURINOL 100 MG: 100 TABLET ORAL at 20:03

## 2024-08-14 RX ADMIN — SODIUM BICARBONATE 650 MG TABLET 1300 MG: at 20:01

## 2024-08-14 RX ADMIN — ACETAMINOPHEN 650 MG: 325 TABLET, FILM COATED ORAL at 19:59

## 2024-08-14 RX ADMIN — ALLOPURINOL 100 MG: 100 TABLET ORAL at 08:09

## 2024-08-14 RX ADMIN — POTASSIUM & SODIUM PHOSPHATES POWDER PACK 280-160-250 MG 2 PACKET: 280-160-250 PACK at 10:37

## 2024-08-14 RX ADMIN — POTASSIUM & SODIUM PHOSPHATES POWDER PACK 280-160-250 MG 2 PACKET: 280-160-250 PACK at 16:01

## 2024-08-14 RX ADMIN — MAGNESIUM SULFATE HEPTAHYDRATE 4 G: 40 INJECTION, SOLUTION INTRAVENOUS at 08:17

## 2024-08-14 RX ADMIN — SODIUM BICARBONATE 650 MG TABLET 1300 MG: at 08:15

## 2024-08-14 RX ADMIN — BENZTROPINE MESYLATE 1.5 MG: 1 TABLET ORAL at 21:15

## 2024-08-14 RX ADMIN — RIVASTIGMINE TARTRATE 1.5 MG: 1.5 CAPSULE ORAL at 20:04

## 2024-08-14 RX ADMIN — LEVETIRACETAM 500 MG: 500 TABLET, FILM COATED ORAL at 08:09

## 2024-08-14 RX ADMIN — RIVASTIGMINE TARTRATE 1.5 MG: 1.5 CAPSULE ORAL at 08:17

## 2024-08-14 ASSESSMENT — ACTIVITIES OF DAILY LIVING (ADL)
ADLS_ACUITY_SCORE: 37
ADLS_ACUITY_SCORE: 44
ADLS_ACUITY_SCORE: 36
ADLS_ACUITY_SCORE: 37
ADLS_ACUITY_SCORE: 44
ADLS_ACUITY_SCORE: 36
ADLS_ACUITY_SCORE: 36
ADLS_ACUITY_SCORE: 38
ADLS_ACUITY_SCORE: 38
ADLS_ACUITY_SCORE: 44
ADLS_ACUITY_SCORE: 37
ADLS_ACUITY_SCORE: 36
ADLS_ACUITY_SCORE: 44
ADLS_ACUITY_SCORE: 44
ADLS_ACUITY_SCORE: 38
ADLS_ACUITY_SCORE: 36
ADLS_ACUITY_SCORE: 44
ADLS_ACUITY_SCORE: 44
ADLS_ACUITY_SCORE: 42

## 2024-08-14 NOTE — PROGRESS NOTES
Neurosurgery Progress     CT HEAD W/O CONTRAST - 8/13/2024     FINDINGS:  INTRACRANIAL CONTENTS: Stable small mixed density bilateral convexity subdural hematomas. Stable trace right parietal convexity subarachnoid hemorrhage. No new hemorrhage. No progressive mass effect. No CT evidence of acute infarct. Mild presumed chronic small vessel ischemic changes. 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 Mild to moderate generalized volume loss. No hydrocephalus.                                                               IMPRESSION:  1.  No significant interval change since 1440 hour.    CT HEAD W/O CONTRAST - 8/13/2024    FINDINGS:  INTRACRANIAL CONTENTS:  Substantially decreased size of the subdural collections layering along the cerebral convexities but with increased internal density, measuring 4 mm on the right and 2 mm on the left. No substantial associated mass effect. No other findings to suggest   acute intracranial hemorrhage.     Apparent subarachnoid blood products layering along the posterior right sylvian fissure.     No CT evidence of acute infarct. Moderate presumed chronic small vessel ischemic changes. Moderate generalized volume loss. No hydrocephalus. Redemonstrated lacunar infarcts, the largest within the right thalamus.                                                                   IMPRESSION:  1.  Decreased size of the subdural fluid collections along the cerebral convexities with increased attenuation, suggesting interval bleed within the chronic subdural hematomas. No midline shift or downward herniation.  2.  Likely subarachnoid blood products layering along the posterior right sylvian fissure, possibly subacute given near isoattenuation to parenchyma.    Neuro stable.     TODAY'S PLAN:     -No appreciable indication for surgical intervention.   -May transfer out of ICU or even discharge from a Neurosurgcal perspective.   -We will facilitate a follow-up up with an updated head  CT in 2-4 weeks.   -Advance activity as tolerated.  -She should not lift anything greater than 5-10 lbs., avoid putting her head below her heart, and avoid straining and sneezing with her mouth closed.  -We encouraged her to continue to avoid excessive jostling and jarring activities.   -Please refrain from using any Aspirin or anti-inflammatory products.   -Continue supportive and symptomatic treatment.  -Start physical / occupational therapy prn.  -Pain control measures.  -Advance diet as tolerated.     In the event that patient's symptoms worsen or change we would appreciate being contacted. We did discuss signs of a worsening problem that she should seek being evaluated.     We did review the above information with the patient whom agrees with the plan and did verbalize understanding.   ________________________________________________________________     Ms. Wise overall feels well and denies any significant discomfort.     /59 (BP Location: Right arm)   Pulse 90   Temp 97.4  F (36.3  C) (Oral)   Resp 23   Wt 70.8 kg (156 lb 1.4 oz)   LMP  (LMP Unknown)   SpO2 96%   BMI 26.79 kg/m       Pt in bed. She appears comfortable and in no apparent distress, moving all extremities.   CN II-XII intact, alert and appropriate with conversation and following commands.   Able to name 3/3 objects.   Finger to nose slow and accurate.   Rapid hand movements intact.   Absent for upper and lower extremity drift.   Bilateral upper and lower extremities 5/5. DTR's wnl. Sensation intact throughout. Normal ROM.   Calves soft and non-tender.     All pertinent labs and updated imaging reviewed in EPIC.     Naun Lake PA-C   Neurosurgery

## 2024-08-14 NOTE — PLAN OF CARE
Speech pathology deferral note - SLP order received, based on chart review, discussion with RN and with pt, there is no need for SLP evaluation. Pt has passed a nurse swallow screen and was eating upon my visit. She feels at baseline in regards to her speech/language and swallow function, RN concurs.     Will complete SLP order, no evaluation warranted at this time. Please re consult should concerns arise.

## 2024-08-14 NOTE — PLAN OF CARE
Goal Outcome Evaluation:      Plan of Care Reviewed With: patient    Overall Patient Progress: no changeOverall Patient Progress: no change    Outcome Evaluation: Patient had some periods of agitation and restlessness overnight. Intermittently would not cooperate with cares. VSS. Passed dysphagia screen and on a regular diet. Patient has urinary retention, needing to be catheterized for 950mL. Tylenol given for right rib discomfort. ? transfer out of ICU today      Problem: Stroke, Intracerebral Hemorrhage  Goal: Optimal Coping  Outcome: Not Progressing  Goal: Effective Bowel Elimination  Outcome: Not Progressing  Goal: Optimal Cognitive Function  Outcome: Not Progressing  Intervention: Optimize Cognitive Function  Recent Flowsheet Documentation  Taken 8/13/2024 2100 by Deidra Blakely, RN  Sensory Stimulation Regulation:   quiet environment promoted   lighting decreased   care clustered   visual stimulation minimized   auditory stimulation minimized  Reorientation Measures:   calendar in view   clock in view   reorientation provided   familiar social contact encouraged  Goal: Optimal Functional Ability  Outcome: Not Progressing  Intervention: Optimize Functional Ability  Recent Flowsheet Documentation  Taken 8/13/2024 2100 by Deidra Blakely, RN  Activity Management: activity adjusted per tolerance  Goal: Optimal Nutrition Intake  Outcome: Not Progressing  Goal: Effective Urinary Elimination  Outcome: Not Progressing  Intervention: Promote Effective Bladder Elimination  Flowsheets (Taken 8/14/2024 0529)  Urinary Elimination Promotion:   voiding relaxation promoted   positioned for ease of voiding   bladder volume assessed by ultrasound     Problem: Adult Inpatient Plan of Care  Goal: Plan of Care Review  Description: The Plan of Care Review/Shift note should be completed every shift.  The Outcome Evaluation is a brief statement about your assessment that the patient is improving, declining, or no change.   This information will be displayed automatically on your shift  note.  Outcome: Progressing  Flowsheets (Taken 8/14/2024 0529)  Outcome Evaluation: Patient had some periods of agitation and restlessness overnight. Intermittently would not cooperate with cares. VSS. Passed dysphagia screen and on a regular diet. Patient has urinary retention, needing to be catheterized for 950mL. Tylenol given for right rib discomfort. ? transfer out of ICU today  Plan of Care Reviewed With: patient  Overall Patient Progress: no change     Problem: Stroke, Intracerebral Hemorrhage  Goal: Optimal Cerebral Tissue Perfusion  Outcome: Progressing  Intervention: Protect and Optimize Cerebral Perfusion  Recent Flowsheet Documentation  Taken 8/13/2024 2100 by Deidra Blakely RN  Sensory Stimulation Regulation:   quiet environment promoted   lighting decreased   care clustered   visual stimulation minimized   auditory stimulation minimized  Goal: Effective Communication Skills  Outcome: Progressing  Intervention: Optimize Communication Skills  Recent Flowsheet Documentation  Taken 8/13/2024 2100 by Deidra Blakely RN  Communication Enhancement Strategies:   one-step directions provided   repetition utilized   call light answered in person   extra time allowed for response  Goal: Optimal Pain Control and Function  Outcome: Progressing  Intervention: Monitor and Manage Pain  Flowsheets (Taken 8/14/2024 0529)  Sleep/Rest Enhancement:   awakenings minimized   relaxation techniques promoted   room darkened  Goal: Effective Oxygenation and Ventilation  Outcome: Progressing  Intervention: Optimize Oxygenation and Ventilation  Recent Flowsheet Documentation  Taken 8/14/2024 0200 by Deidra Blakely RN  Head of Bed (HOB) Positioning: HOB at 30 degrees  Taken 8/13/2024 2200 by Deidra Blakely RN  Head of Bed (HOB) Positioning: HOB at 30 degrees  Taken 8/13/2024 2059 by Deidra lBakely RN  Head of Bed (HOB) Positioning: HOB at 30  degrees  Goal: Improved Sensorimotor Function  Outcome: Progressing  Intervention: Optimize Range of Motion, Motor Control and Function  Recent Flowsheet Documentation  Taken 8/14/2024 0200 by Deidra Blakely, RN  Positioning/Transfer Devices:   pillows   in use  Taken 8/13/2024 2200 by Deidra Blakely, JULIO  Positioning/Transfer Devices:   pillows   in use  Taken 8/13/2024 2059 by Deidra Blakely, RN  Positioning/Transfer Devices:   pillows   in use  Goal: Safe and Effective Swallow  Outcome: Progressing  Intervention: Optimize Eating and Swallowing  Flowsheets (Taken 8/14/2024 5230)  Aspiration Precautions:   awake/alert before oral intake   distractions minimized during oral intake   respiratory status monitored

## 2024-08-14 NOTE — PLAN OF CARE
Problem: Adult Inpatient Plan of Care  Goal: Plan of Care Review    Outcome: Progressing  Flowsheets (Taken 8/14/2024 1627)  Plan of Care Reviewed With:   patient   spouse  Overall Patient Progress: improving  Goal: Patient-Specific Goal (Individualized)    Outcome: Progressing  Goal: Absence of Hospital-Acquired Illness or Injury  Outcome: Progressing  Intervention: Identify and Manage Fall Risk  Recent Flowsheet Documentation  Taken 8/14/2024 1200 by Emma Ellis RN  Safety Promotion/Fall Prevention:   activity supervised   increased rounding and observation   clutter free environment maintained   nonskid shoes/slippers when out of bed   supervised activity   safety round/check completed  Taken 8/14/2024 0800 by Emma Ellis RN  Safety Promotion/Fall Prevention:   activity supervised   increased rounding and observation   clutter free environment maintained   nonskid shoes/slippers when out of bed   supervised activity   safety round/check completed  Intervention: Prevent Skin Injury  Recent Flowsheet Documentation  Taken 8/14/2024 1200 by Emma Ellis RN  Device Skin Pressure Protection:   tubing/devices free from skin contact   adhesive use limited  Taken 8/14/2024 0800 by Emma Ellis RN  Device Skin Pressure Protection:   tubing/devices free from skin contact   adhesive use limited  Intervention: Prevent and Manage VTE (Venous Thromboembolism) Risk  Recent Flowsheet Documentation  Taken 8/14/2024 1200 by Emma Ellis RN  VTE Prevention/Management: SCDs on (sequential compression devices)  Taken 8/14/2024 0800 by Emma Ellis RN  VTE Prevention/Management: SCDs on (sequential compression devices)  Intervention: Prevent Infection  Recent Flowsheet Documentation  Taken 8/14/2024 1200 by Emma Ellis RN  Infection Prevention:   environmental surveillance performed   hand hygiene promoted   rest/sleep promoted  Taken 8/14/2024 0800 by Emma Ellis RN  Infection Prevention:   environmental  surveillance performed   hand hygiene promoted   rest/sleep promoted  Goal: Optimal Comfort and Wellbeing  Outcome: Progressing  Intervention: Monitor Pain and Promote Comfort  Recent Flowsheet Documentation  Taken 8/14/2024 1218 by Emma Ellis RN  Pain Management Interventions: (lido patch ordered) medication (see MAR)  Taken 8/14/2024 1118 by Emma Ellis RN  Pain Management Interventions: medication (see MAR)  Taken 8/14/2024 0815 by Emma Ellis RN  Pain Management Interventions: heat applied  Intervention: Provide Person-Centered Care  Recent Flowsheet Documentation  Taken 8/14/2024 1200 by mEma Ellis RN  Trust Relationship/Rapport:   care explained   choices provided   questions answered   thoughts/feelings acknowledged   reassurance provided  Taken 8/14/2024 0800 by Emma Ellis RN  Trust Relationship/Rapport:   care explained   choices provided   questions answered  Goal: Readiness for Transition of Care  Outcome: Progressing   Goal Outcome Evaluation:      Plan of Care Reviewed With: patient, spouse    Overall Patient Progress: improvingOverall Patient Progress: improving    Disoriented to place. Intermittently confused. Neuros unchanged. Follows commands. PERRLA. BUE tremors. Tele NSR. LS diminished. Pain managed w/ prn Tylenol. Lidocaine patch to R ribcage. Tolerating regular diet, needs encouragement. Voiding adequately, PVRs monitored d/t rentention. Small BM x1. Up w/ 1, gb, walker. Mg and Phos replaced. Spouse updated at bedside. Patient transferred to neuroscience.

## 2024-08-14 NOTE — CONSULTS
Neurosurgery Consult    Assessment  Small mixed density bilateral convexity subdural hematomas. Stable trace right parietal convexity subarachnoid hemorrhage.     Plan:  No plan for operative NS intervention.  Every 4 hour neuro checks okay as follow up CT stable.  Maintain systolic BP less than 150  Keppra 500 mg BID X 7 days.    HPI    Claudia Wise is a 73 year old female with recent hospital admission 6/25 for bilateral SDH with mild SAH after a fall (also resulted in TBI), discharged 7/3 to acute rehab. Discharged from rehab on 7/19. Since then has not been doing well at home. Home PT/OT/RN have been coming to visit. She has been having frequent lightheaded/dizzy spells that will lead to a fall and near passing out. They are not always witnessed. She has been having low blood pressures as well.   Currently denies headache. States she has chronic lower extremity left sided weakness. She uses a walker to get around.     Exam  B/P: 141/71, T: 97.7, P: 87, R: 21   Awake and alert, pleasant during exam.  She is able to move all four extremities well with chronic slight left leg weakness.  PERR, GCS 15    Imaging    Head CT 2:49 PM    IMPRESSION:  1.  Decreased size of the subdural fluid collections along the cerebral convexities with increased attenuation, suggesting interval bleed within the chronic subdural hematomas. No midline shift or downward herniation.  2.  Likely subarachnoid blood products layering along the posterior right sylvian fissure, possibly subacute given near isoattenuation to parenchyma.    Head CT 10:59 PM                                                           IMPRESSION:  1.  No significant interval change since 1440 hour.    ALISSON Yoon  [facility] Neurosurgery  [address]  [address] [place] [id number]    Tel [phone]  Pager [phone]

## 2024-08-14 NOTE — H&P
[facility]    History and Physical - Hospitalist Service       Date of Admission:  8/13/2024    Assessment & Plan      Claudia Wise is a 73 year old female admitted on 8/13/2024 as transfer from [facility] ED for falls, acute on chronic SDH, SAH.    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 Discharged from rehab on 7/19.  *8/13/24 CT head: decreased size of SDH along cerebral convexities with increased attenuation, suggesting interval bleed within chronic SDH. No midline shift or herniation. Likely SAH blood products layering along posterior right sylvian fissure, possibly subacute given near isoattenuation to parenchyma.  *8/13/24 11pm CT head: stable bilateral convexity SDH. Trace right parietal SAH  *8/13/24 CT c-spine: no fracture or subluxation  *CXR- no rib fractures  *received 1000mg keppra at  ED  - neurosurgery appreciated, no plan for operative intervention  - q4h neuros  - defer repeat CT imaging to neurosurgery  - keppra 500mg BID  - goal SBP<150, prn hydralazine and labetalol available  - holding PTA antihypertensives, likely can resume on 8/14 AM pending how many IV antihypertensives needed  - PT/OT/SLP  - care transitions  - ok for regular diet    Type 2 NSTEMI, likely due to demand  Given near syncopal falls, orthostasis, hypotension, expect to be due to demand  * Trop 54--52 at . EKG sinus with RBBB  - recheck trop once more  - cardiac monitoring    Orthostatic hypotension  Recent decrease amlodipine from 10mg to 5mg and her PTA metoprolol was discontinued.  *Given 1L IVF at [facility] ED  - holding antihypertensives for now as above  - orthostatic vitals in AM    Parkinson's disease  Chronic, has baseline tremors. Uses walker to get around and has chronic  "L>R lower extremity weakness  - therapy consult as above  - continue PTA cogentin BID, duloxetine daily, rivastigmine BID    Hypertension  PTA amlodipine 5mg daily and losartan 50mg qpm  - on hold    CKD Stage 3  Cr baseline around 1.4  - BMP in AM    Prediabetes   HgbA1C was 6 on 8/6/24  - monitor glucose with AM BMP    Hypothyroidism  Continue PTA levothyroxine    Rheumatoid arthritis  -hold plaquenil given bleed as above        Diet: NPO for Medical/Clinical Reasons Except for: Meds  DVT Prophylaxis: Pneumatic Compression Devices  Smart Catheter: Not present  Lines: None     Cardiac Monitoring: ACTIVE order. Indication: Stroke, acute (48 hours)  Code Status: Full Codefull code    Clinically Significant Risk Factors Present on Admission                  # Hypertension: Noted on problem list   # Dementia: noted on problem list  # Anemia: based on hgb <11       # Overweight: Estimated body mass index is 26.79 kg/m  as calculated from the following:    Height as of 7/23/24: 1.626 m (5' 4\").    Weight as of this encounter: 70.8 kg (156 lb 1.4 oz).                    Disposition Plan     Medically Ready for Discharge: Anticipated in 2-4 Days  Pending neurosurgery and therapy vineet Beth,   Hospitalist Service  [facility]  Securely message with [employer] (more info)  Text page via [employer] Paging/Directory     ______________________________________________________________________    Chief Complaint   Falls, lightheaded/dizzy    History is obtained from the patient, prior record review    History of Present Illness   Claudia Wise is a 73 year old female who is a slightly poor historian and the rest of the history is supplemented by chart review. Recent admission 6/25 for bilateral SDH with mild SAH after a fall (also resulted in TBI), discharged 7/3 to acute rehab. Discharged from rehab on 7/19. Since then has not been doing well at home. Home PT/OT/RN have been coming to " visit. She has been having frequent lightheaded/dizzy spells that will lead to a fall and near passing out. They are not always witnessed. She has been having low blood pressures as well and her PTA metoprolol was stopped and her amlodipine dose was reduced.  She did report right rib 7-9 pain, but no fracture was found. She was still hypotensive when she was visited by home therapies on 8/13. In the ED at  she was given IVF and then underwent imaging. CT imaging showed subacute bleed.  When I met with her upon arrival to [facility], she is unaware that she ever had bleeding on her brain. She knows about her falls, her TBI, her low blood pressure and her medication adjustments, but not about her bleeds. She denies headache. States she has chronic lower extremity left sided weakness. She uses a walker to get around. Denies chest pain, shortness of breath. Reports regular bowel movements, only occasionally needing help with colace. She reports dry mouth.       Past Medical History    Past Medical History:   Diagnosis Date    Anemia     Anxiety     Chronic kidney disease     Diabetes mellitus, type II (H)     prediabetes    Disease of thyroid gland     hypothyroid    Family history of myocardial infarction     Gout     High cholesterol     Hypertension     Inverted nipple     right    Macular edema     Osteoarthritis 01/01/2012    Rheumatoid arthritis (H) 01/01/2012       Past Surgical History   Past Surgical History:   Procedure Laterality Date    COLON SURGERY  1986    COLPORRHAPHY N/A 7/7/2016    Procedure: ANTERIOR REPAIR WITH MESH;  Surgeon: Zac Stone MD;  Location: [facility];  Service:     HC UGI ENDOSCOPY W PLACEMENT GASTROSTOMY TUBE PERCUT N/A 2/7/2020    Procedure: Percutaneous Endoscopic Gastromstomy Tube Placement;  Surgeon: Zion Schwarz MD;  Location: [facility];  Service: General    HYSTERECTOMY      LAPAROTOMY EXPLORATORY N/A 1/23/2020    Procedure: EXPLORATORY LAPARATOMY,  EXTENSIVE LYSIS OF ADHESIONS, SMALL BOWEL RESECTION;  Surgeon: Claudia Jeronimo MD;  Location: [facility];  Service: General    OOPHORECTOMY         Prior to Admission Medications   Prior to Admission Medications   Prescriptions Last Dose Informant Patient Reported? Taking?   DULoxetine (CYMBALTA) 20 MG capsule 8/13/2024 at am  No Yes   Sig: Take 1 capsule (20 mg) by mouth daily   LORazepam (ATIVAN) 0.5 MG tablet Past Week  No Yes   Sig: Take 1 tablet (0.5 mg) by mouth daily as needed for anxiety   acetaminophen (TYLENOL) 500 MG tablet 8/13/2024 at am  No Yes   Sig: Take 1 tablet (500 mg) by mouth every 6 hours as needed   acyclovir (ZOVIRAX) 400 MG tablet More than a month  No Yes   Sig: TAKE ONE TABLET BY MOUTH EVERY 8 HOURS AS NEEDED   allopurinol (ZYLOPRIM) 100 MG tablet 8/12/2024 at hs  Yes Yes   Sig: Take 100 mg by mouth 2 times daily   amLODIPine (NORVASC) 10 MG tablet 8/13/2024 at am  No Yes   Sig: Take 1 tablet (10 mg) by mouth daily   atorvastatin (LIPITOR) 10 MG tablet 8/12/2024 at hs  No Yes   Sig: Take 1 tablet (10 mg) by mouth at bedtime   benztropine (COGENTIN) 0.5 MG tablet 8/12/2024 at hs  No Yes   Sig: Take 3 tablets (1.5 mg) by mouth 2 times daily   carboxymethylcellulose (REFRESH PLUS) 0.5 % Dpet ophthalmic dropperette Past Week  Yes Yes   Sig: [CARBOXYMETHYLCELLULOSE (REFRESH PLUS) 0.5 % DPET OPHTHALMIC DROPPERETTE] Administer 1-2 drops to both eyes 4 (four) times a day as needed.    cyanocobalamin, vitamin B-12, 1,000 mcg Subl 8/12/2024 at am  No Yes   Sig: [CYANOCOBALAMIN, VITAMIN B-12, 1,000 MCG SUBL] Place 1 tablet (1,000 mcg total) under the tongue daily.   Patient taking differently: Place 1,000 mcg under the tongue daily liquid   cyclobenzaprine (FLEXERIL) 10 MG tablet new rx, not started yet  No Yes   Sig: Take 1 tablet (10 mg) by mouth 2 times daily as needed for muscle spasms   docusate sodium (DSS) 100 MG capsule 8/11/2024  No Yes   Sig: Take 1 capsule (100 mg) by mouth 2 times  daily as needed for constipation   fish oil-omega-3 fatty acids 500 MG capsule Past Week  No Yes   Sig: Take 1 capsule (500 mg) by mouth daily   fluticasone (FLONASE) 50 MCG/ACT nasal spray 8/12/2024 at am  No Yes   Sig: Spray 1 spray into both nostrils daily   hydroxychloroquine (PLAQUENIL) 200 MG tablet 8/12/2024 at hs  No Yes   Sig: Take 1 tablet (200 mg) by mouth 2 times daily   levothyroxine (SYNTHROID/LEVOTHROID) 50 MCG tablet 8/13/2024 at am  No Yes   Sig: Take 1 tablet (50 mcg) by mouth daily   losartan (COZAAR) 50 MG tablet 8/12/2024 at hs  No Yes   Sig: Take 1 tablet (50 mg) by mouth daily   magnesium oxide (MAG-OX) 400 MG tablet 8/12/2024 at am  No Yes   Sig: Take 1 tablet (400 mg) by mouth daily   polyethylene glycol (MIRALAX) 17 GM/Dose powder Past Month  Yes Yes   Sig: Take 17 g by mouth daily as needed   rivastigmine (EXELON) 1.5 MG capsule 8/12/2024 at hs  No Yes   Sig: Take 1 capsule (1.5 mg) by mouth 2 times daily   sodium bicarbonate 650 MG tablet 8/12/2024 at hs  No Yes   Sig: Take 2 tablets (1,300 mg) by mouth 2 times daily      Facility-Administered Medications: None           Physical Exam   Vital Signs: Temp: 97.7  F (36.5  C) Temp src: Oral BP: (!) 154/68 Pulse: 87   Resp: 14 SpO2: 94 % O2 Device: None (Room air)    Weight: 156 lbs 1.37 oz    Constitutional: Awake, alert, cooperative, no apparent distress. Dry mouth  Respiratory: Clear to auscultation bilaterally, no crackles or wheezing  Cardiovascular: Regular rate and rhythm, normal S1 and S2, and no murmur noted. Right lower ribcage (ribs 7-9) tender with palpation  GI: Normal bowel sounds, soft, non-distended, non-tender  Skin/Integumen: No rashes, no cyanosis, no edema  Other:  Bilateral upper extremities 5/5 strength. Tongue midline, face symmetric. Baseline tremor, finger to nose intact otherwise. Heel to shin difficult due to lower extremity weakness L>R. Able to lift both legs against gravity, but R is 4/5 strength. Sensation  intact. Oriented to person, place, time, not entirely to situation.    Medical Decision Making       80 MINUTES SPENT BY ME on the date of service doing chart review, history, exam, documentation & further activities per the note.      Data     I have personally reviewed the following data over the past 24 hrs:    7.6  \   10.0 (L)   / 197     140 105 18.8 /  98   3.9 26 1.41 (H) \     ALT: 14 AST: 25 AP: 68 TBILI: 0.6   ALB: 4.2 TOT PROTEIN: 6.9 LIPASE: N/A     Trop: 52 (H) BNP: N/A       Imaging results reviewed over the past 24 hrs:   Recent Results (from the past 24 hour(s))   Head CT w/o contrast    Narrative    EXAM: CT HEAD W/O CONTRAST  LOCATION: [facility]  DATE: 8/13/2024    INDICATION: fall trauma  COMPARISON: 7/29/2024  TECHNIQUE: Routine CT Head without IV contrast. Multiplanar reformats. Dose reduction techniques were used.    FINDINGS:  INTRACRANIAL CONTENTS:  Substantially decreased size of the subdural collections layering along the cerebral convexities but with increased internal density, measuring 4 mm on the right and 2 mm on the left. No substantial associated mass effect. No other findings to suggest   acute intracranial hemorrhage.    Apparent subarachnoid blood products layering along the posterior right sylvian fissure.    No CT evidence of acute infarct. Moderate presumed chronic small vessel ischemic changes. Moderate generalized volume loss. No hydrocephalus. Redemonstrated lacunar infarcts, the largest within the right thalamus.    VISUALIZED ORBITS/SINUSES/MASTOIDS: No intraorbital abnormality. Opacified right maxillary sinus. No middle ear or mastoid effusion.    BONES/SOFT TISSUES: No acute abnormality.      Impression    IMPRESSION:  1.  Decreased size of the subdural fluid collections along the cerebral convexities with increased attenuation, suggesting interval bleed within the chronic subdural hematomas. No midline shift or downward herniation.  2.   Likely subarachnoid blood products layering along the posterior right sylvian fissure, possibly subacute given near isoattenuation to parenchyma.    Findings discussed with Dr. Small by Dr. Coy at 1509 8/13/2024.   CT Cervical Spine w/o Contrast    Narrative    EXAM: CT CERVICAL SPINE W/O CONTRAST  LOCATION: [facility]  DATE: 8/13/2024    INDICATION: fall trauma  COMPARISON: 6/10/2023.  TECHNIQUE: Routine CT Cervical Spine without IV contrast. Multiplanar reformats. Dose reduction techniques were used.    FINDINGS:  VERTEBRA: Normal vertebral body heights. Moderate-severe degenerative findings throughout the cervical spine with facet arthropathy, vertebral body osteophyte formation, and disc height loss. No traumatic subluxation.     CANAL/FORAMINA: No discernible high-grade spinal canal stenosis.    PARASPINAL: No extraspinal abnormality.      Impression    IMPRESSION:  1.  No acute fracture or traumatic subluxation within the cervical spine.   Ribs XR, unilat 3 views + PA chest, right    Narrative    EXAM: XR RIBS and CHEST RT 3VW  LOCATION: [facility]  DATE: 8/13/2024    INDICATION: trauma pain  COMPARISON: 1/25/20.      Impression    IMPRESSION: The visualized heart and lungs are negative. No rib fractures.

## 2024-08-14 NOTE — PLAN OF CARE
Pt here with traumatic SAH. A&Ox3, disorientated to place, very forgetful. Neuros intact, baseline BUE tremors. VSS, SBP <150. Tele. Regular diet, thin liquids. Takes pills whole. Up with assist x1, GB and walker. Complaints of R rib pain. Scattered bruising throughout body. Pt scoring green on the Aggression Stop Light Tool. Plan awaiting further therapy sessions. Discharge pending, home vs TCU.

## 2024-08-14 NOTE — PROGRESS NOTES
"   08/14/24 0950   Appointment Info   Signing Clinician's Name / Credentials (OT) Brianna Reddy MS, OTR/L   Living Environment   People in Home spouse   Current Living Arrangements house   Home Accessibility stairs within home   Living Environment Comments Split level home.   Self-Care   Usual Activity Tolerance moderate   Current Activity Tolerance fair   Equipment Currently Used at Home grab bar, toilet;grab bar, tub/shower;shower chair;walker, rolling  (Spouse ordered helmet on [employer] for patient and plans to order bed alarm. gait belt. [facility])   Fall history within last six months yes   Number of times patient has fallen within last six months 5   Activity/Exercise/Self-Care Comment Pt reports at baseline she gets assist of 1 with self-cares using FWW and gait belt. Recently having home OT/PT/SLP. Spouse reports pt forgets to ask for assist at home prior to standing   Instrumental Activities of Daily Living (IADL)   IADL Comments Pt reports spouse does all cooking, cleaning, laundry, med management, financial management. Pt does not drive.   General Information   Onset of Illness/Injury or Date of Surgery 08/13/24   Referring Physician Deidra Beth, DO   Patient/Family Therapy Goal Statement (OT) Not stated   Additional Occupational Profile Info/Pertinent History of Current Problem Per Hospitalist \"73 year old female who is a slightly poor historian and the rest of the history is supplemented by chart review. Recent admission 6/25 for bilateral SDH with mild SAH after a fall (also resulted in TBI), discharged 7/3 to acute rehab. Discharged from rehab on 7/19. Since then has not been doing well at home. Home PT/OT/RN have been coming to visit. She has been having frequent lightheaded/dizzy spells that will lead to a fall and near passing out. They are not always witnessed. She has been having low blood pressures as well and her PTA metoprolol was stopped and her amlodipine dose was reduced.  She " "did report right rib 7-9 pain, but no fracture was found. She was still hypotensive when she was visited by home therapies on 8/13. In the ED at  she was given IVF and then underwent imaging. CT imaging showed subacute bleed.  When I met with her upon arrival to [facility], she is unaware that she ever had bleeding on her brain. She knows about her falls, her TBI, her low blood pressure and her medication adjustments, but not about her bleeds. She denies headache. States she has chronic lower extremity left sided weakness. She uses a walker to get around.  \" Per EMR, PMH of parkinson's and Lewy Body Dementia   Existing Precautions/Restrictions fall;brace worn when out of bed  (Per neurosurg -She should not lift anything greater than 5-10 lbs., avoid putting her head below her heart, and avoid straining and sneezing with her mouth closed.  -We encouraged her to continue to avoid excessive jostling and jarring activities.)   Cognitive Status Examination   Orientation Status person  (Disoriented to place, situation, time)   Affect/Mental Status (Cognitive) anxious  (Pt reports being overwhelmed)   Follows Commands follows one-step commands;50-74% accuracy   Memory Deficit severe deficit   Executive Function Deficit severe deficit;insight/awareness of deficits;judgment;planning/decision-making;impulse control   Visual Perception   Visual Impairment/Limitations corrective lenses for reading   Sensory   Sensory Comments Pt denies BUE/BLE numbness or tingling   Pain Assessment   Patient Currently in Pain Yes, see Vital Sign flowsheet   Strength Comprehensive (MMT)   Comment, General Manual Muscle Testing (MMT) Assessment Generalized weakness   Coordination   Upper Extremity Coordination Left UE impaired   Coordination Comments LUE tremor   Transfers   Transfers sit-stand transfer;toilet transfer   Sit-Stand Transfer   Sit-Stand [place]rill (Transfers) minimum assist (75% patient effort)   Toilet Transfer   [place]rill Level " (Toilet Transfer) minimum assist (75% patient effort)   Activities of Daily Living   BADL Assessment/Intervention lower body dressing;toileting;upper body dressing;grooming   Upper Body Dressing Assessment/Training   Emery Level (Upper Body Dressing) maximum assist (25% patient effort)   Lower Body Dressing Assessment/Training   Emery Level (Lower Body Dressing) maximum assist (25% patient effort)   Grooming Assessment/Training   Comment, (Grooming) unable to tolerate standing   Toileting   Emery Level (Toileting) moderate assist (50% patient effort)   Clinical Impression   Criteria for Skilled Therapeutic Interventions Met (OT) Yes, treatment indicated   OT Diagnosis Decline function   OT Problem List-Impairments impacting ADL activity tolerance impaired;balance;cognition;mobility;strength;coordination;pain   Assessment of Occupational Performance 5 or more Performance Deficits   Identified Performance Deficits LB dressing, toileting, bathing, functional mobility, grooming   Planned Therapy Interventions (OT) ADL retraining;cognition;home program guidelines;progressive activity/exercise   Clinical Decision Making Complexity (OT) detailed assessment/moderate complexity   Risk & Benefits of therapy have been explained evaluation/treatment results reviewed;care plan/treatment goals reviewed;risks/benefits reviewed;current/potential barriers reviewed;participants voiced agreement with care plan;participants included;patient;spouse/significant other   OT Total Evaluation Time   OT Eval, Low Complexity Minutes (61375) 11   OT Goals   Therapy Frequency (OT) 5 times/week   OT Predicted Duration/Target Date for Goal Attainment 08/24/24   OT Goals OT Goal 1;OT Goal 2;Upper Body Dressing;Lower Body Dressing   OT: Upper Body Dressing Moderate assist   OT: Lower Body Dressing Minimal assist   OT: Goal 1 Pt will independently verbalize at least 3 fall prevention strategies   OT: Goal 2 Pt will tolerate  standing at sink for at least 2 grooming tasks with CGA and no reports of dizziness   Interventions   Interventions Quick Adds Therapeutic Activity   Therapeutic Activities   Therapeutic Activity Minutes (52385) 24   Symptoms noted during/after treatment significant change in vital signs;dizziness;fatigue   Treatment Detail/Skilled Intervention Upon arrival pt semi-supine in ICU and starting to get out of bed independently. Pt needed cues to stop. Pt disoriented to place and time and participated in education regarding place and time. increased time needed during session for orthostatic BP. Semi-supine /65. Semi-supine to sit with Maria Esther and two cues for technique. Pt required Maria Esther and cues to scoot closer to EOB. Sitting /62. Pt donned back brace with maxA and cues for technique and tightness. Sit to stand from EOB with CGA for balance safety and cues for upright posture. Standing BP 89/45 and pt reported mild dizziness. RN notified. Stand to sit with CGA. Sitting /41 then 115/63. Pivot transfer to chair with Ax2 and cues for technique. Pt reported mild dizziness. BP in chair 110/51. Pt left with alarm on, needed supplies, spouse present. Reinforced with patient need to have assist with standing while at hospital and home and rationale for this. Pt and spouse participated in education regarding SDH precautions per neurosurg and implications on function   OT Discharge Planning   OT Plan Monitor orthostatics. G/H standing as able with chair behind. Safety awareness. Have spouse provide hands on assist   OT Discharge Recommendation (DC Rec) home with assist;home with home care occupational therapy;Transitional Care Facility   OT Rationale for DC Rec Pt requiring assist of 1 for selfcares and mobility, total assist for IADLs, direct 24 hr supervision. Pt has poor safety awareness. Recent frequent falls at home despite home therapies and spouse support at home. Pt with positive orthostatics during  session with standing; seated /62 and standing BP 89/45. Spouse concerned regarding ability to continue to care for patient at home with ongoing falls. Pt may benefit from continued skilled OT in TCU  to reduce caregiver burden. Pt may benefit from eventual transition to more supportive living environment.   OT Brief overview of current status Disoriented to time, place, situation. Mildly dizzy and positive orthostatics with standing. Transfer to chair with FWW   OT Equipment Needed at Discharge   (Spouse has ordered bed alarm and helmet)   Total Session Time   Timed Code Treatment Minutes 24   Total Session Time (sum of timed and untimed services) 35

## 2024-08-14 NOTE — PROGRESS NOTES
[facility]  Hospitalist Progress Note    Assessment & Plan   Claudia Wise is a 73 year old female admitted on 8/13/2024 as transfer from [facility] ED for falls, acute on chronic SDH, SAH.     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 Discharged from rehab on 7/19/24.     - 8/13/24 CT head: decreased size of SDH along cerebral convexities with increased attenuation, suggesting interval bleed within chronic SDH. No midline shift or herniation. Likely SAH blood products layering along posterior right sylvian fissure, possibly subacute given near isoattenuation to parenchyma.    - 8/13/24 11pm CT head: stable bilateral convexity SDH. Trace right parietal SAH    - 8/13/24 CT c-spine: no fracture or subluxation  - CXR- no rib fractures    - Q4h neuros  - Goal SBP<150, prn hydralazine and labetalol available  - Defer repeat CT imaging to neurosurgery    - Continue Keppra 500mg BID   - Holding PTA antihypertensives   - Continue to hold given orthostatic BP and SBP within goal    - Neurosurgery following, appreciated recs   - No appreciable indication for surgical intervention.   - May transfer out of ICU or even discharge from a Neurosurgcal perspective.   - We will facilitate a follow-up up with an updated head CT in 2-4 weeks.   - Advance activity as tolerated.  - She should not lift anything greater than 5-10 lbs., avoid putting her head below her heart, and avoid straining and sneezing with her mouth closed.  - We encouraged her to continue to avoid excessive jostling and jarring activities.   - Please refrain from using any Aspirin or anti-inflammatory products.   - Continue supportive and symptomatic treatment.  - Start physical / occupational therapy prn.  - Pain control  "measures.  - Advance diet as tolerated.    - PT/OT/SLP   - OT Recommending: home with home therapy vs TCU     -SW/CM consulted     - Patient's spouse had told OT he was concerned about taking care of patient due to ongoing falls. When MD spoke to spouse, he voiced no concerns and wants to take patient home with home care      Hypomagnesemia  Hypophosphatemia   - Replacement protocols in place    Type 2 NSTEMI, likely due to demand  Given near syncopal falls, orthostasis, hypotension, expect to be due to demand  * Trop 54--52 at . EKG sinus with RBBB  - Trop: 61  - Cardiac monitoring     Orthostatic hypotension  Recent decrease amlodipine from 10mg to 5mg and her PTA metoprolol was discontinued. Given 1L IVF at [facility] ED  - Holding antihypertensives for now as above  - orthostatic vitals in AM     Parkinson's disease  Chronic, has baseline tremors. Uses walker to get around and has chronic L>R lower extremity weakness  - Continue PTA cogentin BID, duloxetine daily, rivastigmine BID     Hypertension  PTA amlodipine 5mg daily and losartan 50mg qpm  - On hold     CKD Stage 3  Cr baseline around 1.4  - Cr: 1.07     Prediabetes  - HgbA1C was 6 on 8/6/24    Hypothyroidism  Continue PTA levothyroxine     Rheumatoid arthritis  - Hold plaquenil    - Plan to restart at discharge     Clinically Significant Risk Factors Present on Admission          # Hypocalcemia: Lowest Ca = 7.9 mg/dL in last 2 days, will monitor and replace as appropriate   # Hypomagnesemia: Lowest Mg = 1.4 mg/dL in last 2 days, will replace as needed       # Hypertension: Noted on problem list   # Dementia: noted on problem list  # Anemia: based on hgb <11       # Overweight: Estimated body mass index is 26.79 kg/m  as calculated from the following:    Height as of 7/23/24: 1.626 m (5' 4\").    Weight as of this encounter: 70.8 kg (156 lb 1.4 oz).               Diet: Regular Diet Adult     DVT Prophylaxis: Pneumatic Compression Devices   Smart Catheter: " Not present  Lines: None     Cardiac Monitoring: ACTIVE order. Indication: Stroke, acute (48 hours)  Code Status: Full Code      Disposition Plan       Expected Discharge Date: 08/15/2024              Entered: Flora Palmer MD 08/14/2024, 2:00 PM     Notes Reviewed: Neurosurgery, OT    Family Updated: Yes, spoke to Reg at bedside on 8/14/24    Care Team Updated: Yes    Disposition: Likely tomorrow pending stability in electrolytes and therapy recommendations       Medically Ready for Discharge: Anticipated Tomorrow        Flora Palmer MD  Hospitalist Service   [facility]  Securely message with the Vocera Web Console (learn more here)         Medical Decision Making       55 MINUTES SPENT BY ME on the date of service doing chart review, history, exam, documentation & further activities per the note.           Interval History     No acute overnight events    This morning the patient states that she is doing well. Feeling at her baseline. Voices no concerns.    Denies headache, dizziness, lightheadedness, chest pain, SOB, abdominal pain.    -Data reviewed today: I reviewed all new labs and imaging results over the last 24 hours.     Physical Exam   Temp: 97.4  F (36.3  C) Temp src: Oral BP: 128/74 Pulse: 81   Resp: 20 SpO2: 96 % O2 Device: None (Room air)    Vitals:    08/13/24 2059 08/14/24 0400   Weight: 70.8 kg (156 lb 1.4 oz) 70.8 kg (156 lb 1.4 oz)     Vital Signs with Ranges  Temp:  [97.4  F (36.3  C)-97.7  F (36.5  C)] 97.4  F (36.3  C)  Pulse:  [] 81  Resp:  [9-36] 20  BP: (108-166)/(57-93) 128/74  SpO2:  [94 %-98 %] 96 %  I/O last 3 completed shifts:  In: 220 [P.O.:220]  Out: 950 [Urine:950]      Constitutional: Awake, alert, cooperative, no apparent distress.    HEENT: PERRL, Normocephalic, without obvious abnormality, atraumatic, oral pharynx with moist mucus membranes  Pulmonary: Clear to auscultation bilaterally, no crackles or wheezing.  Cardiovascular:  Regular rate and rhythm, normal S1 and S2  GI: Normal bowel sounds, soft, non-distended, non-tender.  Skin/Integumen: Visualized skin appeared clear.  Neuro: CN II-XII grossly intact.  Psych:  Alert and oriented x 3.   Extremities: No lower extremity edema noted       Medications   Current Facility-Administered Medications   Medication Dose Route Frequency Provider Last Rate Last Admin     Current Facility-Administered Medications   Medication Dose Route Frequency Provider Last Rate Last Admin    allopurinol (ZYLOPRIM) tablet 100 mg  100 mg Oral or NG Tube BID Deidra Beth DO   100 mg at 08/14/24 0809    [Held by provider] amLODIPine (NORVASC) tablet 5 mg  5 mg Oral or NG Tube Daily Deidra Beth DO        [Held by provider] atorvastatin (LIPITOR) tablet 10 mg  10 mg Oral or NG Tube At Bedtime Deidra Beth DO        benztropine (COGENTIN) tablet 1.5 mg  1.5 mg Oral or NG Tube BID Deidra Beth DO   1.5 mg at 08/14/24 0809    cyanocobalamin (VITAMIN B-12) sublingual tablet 1,000 mcg  1,000 mcg Sublingual Daily Deidra Beth DO   1,000 mcg at 08/14/24 0815    DULoxetine (CYMBALTA) DR capsule 20 mg  20 mg Oral or NG Tube Daily Deidra Beth DO   20 mg at 08/14/24 0815    [Held by provider] hydroxychloroquine (PLAQUENIL) tablet 200 mg  200 mg Oral or NG Tube BID Deidra Beth DO        levETIRAcetam (KEPPRA) tablet 500 mg  500 mg Oral or NG Tube BID Deidra Beth DO   500 mg at 08/14/24 0809    levothyroxine (SYNTHROID/LEVOTHROID) tablet 50 mcg  50 mcg Oral or NG Tube Daily Deidra Beth DO   50 mcg at 08/14/24 0809    Lidocaine (LIDOCARE) 4 % Patch 1 patch  1 patch Transdermal Q24H Flora Palmer MD   1 patch at 08/14/24 1238    [Held by provider] losartan (COZAAR) tablet 50 mg  50 mg Oral or NG Tube At Bedtime Deidra Beth DO        magnesium oxide (MAG-OX) tablet 400 mg  400 mg  Oral or NG Tube Daily Deidra Beth DO   400 mg at 08/14/24 0809    potassium & sodium phosphates (NEUTRA-PHOS) Packet 2 packet  2 packet Oral or Feeding Tube Q4H Asael Bustamante MD   2 packet at 08/14/24 1037    rivastigmine (EXELON) capsule 1.5 mg  1.5 mg Oral or NG Tube BID Deidra Beth DO   1.5 mg at 08/14/24 0817    sodium bicarbonate tablet 1,300 mg  1,300 mg Oral or NG Tube BID Deidra Beth DO   1,300 mg at 08/14/24 0815    sodium chloride (PF) 0.9% PF flush 3 mL  3 mL Intracatheter Q8H Deidra Beth DO   3 mL at 08/14/24 0559       Data   Recent Labs   Lab 08/14/24  0559 08/14/24  0535 08/13/24 2047 08/13/24  1420   WBC  --  4.7  --  7.6   HGB  --  10.2*  --  10.0*   MCV  --  92  --  94   PLT  --  172  --  197   NA  --  140  --  140   POTASSIUM  --  3.4  --  3.9   CHLORIDE  --  107  --  105   CO2  --  23  --  26   BUN  --  12.8  --  18.8   CR  --  1.07*  --  1.41*   ANIONGAP  --  10  --  9   KATJA  --  7.9*  --  8.7*   GLC 84 85 98 129*   ALBUMIN  --   --   --  4.2   PROTTOTAL  --   --   --  6.9   BILITOTAL  --   --   --  0.6   ALKPHOS  --   --   --  68   ALT  --   --   --  14   AST  --   --   --  25       Recent Results (from the past 24 hour(s))   Head CT w/o contrast    Narrative    EXAM: CT HEAD W/O CONTRAST  LOCATION: [facility]  DATE: 8/13/2024    INDICATION: fall trauma  COMPARISON: 7/29/2024  TECHNIQUE: Routine CT Head without IV contrast. Multiplanar reformats. Dose reduction techniques were used.    FINDINGS:  INTRACRANIAL CONTENTS:  Substantially decreased size of the subdural collections layering along the cerebral convexities but with increased internal density, measuring 4 mm on the right and 2 mm on the left. No substantial associated mass effect. No other findings to suggest   acute intracranial hemorrhage.    Apparent subarachnoid blood products layering along the posterior right sylvian fissure.    No CT  evidence of acute infarct. Moderate presumed chronic small vessel ischemic changes. Moderate generalized volume loss. No hydrocephalus. Redemonstrated lacunar infarcts, the largest within the right thalamus.    VISUALIZED ORBITS/SINUSES/MASTOIDS: No intraorbital abnormality. Opacified right maxillary sinus. No middle ear or mastoid effusion.    BONES/SOFT TISSUES: No acute abnormality.      Impression    IMPRESSION:  1.  Decreased size of the subdural fluid collections along the cerebral convexities with increased attenuation, suggesting interval bleed within the chronic subdural hematomas. No midline shift or downward herniation.  2.  Likely subarachnoid blood products layering along the posterior right sylvian fissure, possibly subacute given near isoattenuation to parenchyma.    Findings discussed with Dr. Small by Dr. Coy at 1509 8/13/2024.   CT Cervical Spine w/o Contrast    Narrative    EXAM: CT CERVICAL SPINE W/O CONTRAST  LOCATION: [facility]  DATE: 8/13/2024    INDICATION: fall trauma  COMPARISON: 6/10/2023.  TECHNIQUE: Routine CT Cervical Spine without IV contrast. Multiplanar reformats. Dose reduction techniques were used.    FINDINGS:  VERTEBRA: Normal vertebral body heights. Moderate-severe degenerative findings throughout the cervical spine with facet arthropathy, vertebral body osteophyte formation, and disc height loss. No traumatic subluxation.     CANAL/FORAMINA: No discernible high-grade spinal canal stenosis.    PARASPINAL: No extraspinal abnormality.      Impression    IMPRESSION:  1.  No acute fracture or traumatic subluxation within the cervical spine.   Ribs XR, unilat 3 views + PA chest, right    Narrative    EXAM: XR RIBS and CHEST RT 3VW  LOCATION: [facility]  DATE: 8/13/2024    INDICATION: trauma pain  COMPARISON: 1/25/20.      Impression    IMPRESSION: The visualized heart and lungs are negative. No rib fractures.   CT Head w/o  Contrast    Narrative    EXAM: CT HEAD W/O CONTRAST  LOCATION: [facility]  DATE: 8/13/2024    INDICATION:  Acute on chronic SDH, SAH  COMPARISON:  Same day CT head 1440 hours, CT head 6/14/2024.  TECHNIQUE: Routine CT Head without IV contrast. Multiplanar reformats. Dose reduction techniques were used.    FINDINGS:  INTRACRANIAL CONTENTS: Stable small mixed density bilateral convexity subdural hematomas. Stable trace right parietal convexity subarachnoid hemorrhage. No new hemorrhage. No progressive mass effect. No CT evidence of acute infarct. Mild presumed chronic   small vessel ischemic changes. 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 Mild to moderate generalized volume loss. No hydrocephalus.     VISUALIZED ORBITS/SINUSES/MASTOIDS: Prior bilateral cataract surgery. Visualized portions of the orbits are otherwise unremarkable. Opacified right maxillary sinus. No middle ear or mastoid effusion.    BONES/SOFT TISSUES: No large scalp hematoma. No skull fracture.      Impression    IMPRESSION:  1.  No significant interval change since 1440 hour.

## 2024-08-14 NOTE — PROGRESS NOTES
"   08/14/24 3871   Appointment Info   Signing Clinician's Name / Credentials (PT) Bina Pro, PT, DPT   Rehab Comments (PT) LSO to be worn out of bed, spinal precautions   Living Environment   People in Home spouse   Current Living Arrangements house   Home Accessibility stairs within home   Number of Stairs, Within Home, Primary greater than 10 stairs   Transportation Anticipated family or friend will provide   Self-Care   Usual Activity Tolerance moderate   Current Activity Tolerance fair   Equipment Currently Used at Home walker, rolling   Fall history within last six months yes   Number of times patient has fallen within last six months 6   Activity/Exercise/Self-Care Comment Pt d/c'ed from ARU last month, since then  has been providing assist of 1 for mobility. Pt forgetful. Subjective confirmed from chart   General Information   Onset of Illness/Injury or Date of Surgery 08/13/24   Referring Physician Deidra Beth, DO   Patient/Family Therapy Goals Statement (PT) to get better   Pertinent History of Current Problem (include personal factors and/or comorbidities that impact the POC) \"Claudia Wise is a 73 year old female with recent hospital admission 6/25 for bilateral SDH with mild SAH after a fall (also resulted in TBI), discharged 7/3 to acute rehab. Discharged from rehab on 7/19. Since then has not been doing well at home. Home PT/OT/RN have been coming to visit. She has been having frequent lightheaded/dizzy spells that will lead to a fall and near passing out. They are not always witnessed. She has been having low blood pressures as well.   Currently denies headache. States she has chronic lower extremity left sided weakness. She uses a walker to get around.   \"   Existing Precautions/Restrictions fall;brace worn when out of bed;spinal   Weight-Bearing Status - LLE full weight-bearing   Weight-Bearing Status - RLE full weight-bearing   General Observations per MD: \" She should " "not lift anything greater than 5-10 lbs., avoid putting her head below her heart, and avoid straining and sneezing with her mouth closed.  - We encouraged her to continue to avoid excessive jostling and jarring activities.  \"   Cognition   Follows Commands (Cognition) follows one-step commands   Pain Assessment   Patient Currently in Pain No   Integumentary/Edema   Integumentary/Edema Comments bruising on knees, mild edema   Posture    Posture Kyphosis   Range of Motion (ROM)   Range of Motion ROM is WFL   Strength (Manual Muscle Testing)   Strength (Manual Muscle Testing) Deficits observed during functional mobility   Bed Mobility   Comment, (Bed Mobility) session to/from chair   Transfers   Comment, (Transfers) CGA-min assist with FWW   Gait/Stairs (Locomotion)   Comment, (Gait/Stairs) min assist to navigate walker and avoid veering. tends to veer left, difficult for pt to follow verbal cuing needs physical assist   Balance   Balance Comments needs FWW and assist of 1 for safety. significant fall history   Clinical Impression   Criteria for Skilled Therapeutic Intervention Yes, treatment indicated   PT Diagnosis (PT) impaired functional mobility   Influenced by the following impairments decreased strength, activity tolerance, balance   Functional limitations due to impairments bed mobility, transfers, ambulation, stairs   Clinical Presentation (PT Evaluation Complexity) evolving   Clinical Presentation Rationale clinical judgement   Clinical Decision Making (Complexity) moderate complexity   Planned Therapy Interventions (PT) balance training;bed mobility training;gait training;home exercise program;neuromuscular re-education;patient/family education;stair training;strengthening;transfer training   Risk & Benefits of therapy have been explained evaluation/treatment results reviewed;care plan/treatment goals reviewed;risks/benefits reviewed;current/potential barriers reviewed;participants voiced agreement with care " plan;participants included;patient   PT Total Evaluation Time   PT Eval, Moderate Complexity Minutes (82602) 9   Physical Therapy Goals   PT Frequency 5x/week   PT Predicted Duration/Target Date for Goal Attainment 08/21/24   PT Goals Bed Mobility;Transfers;Gait;Stairs   PT: Bed Mobility Supervision/stand-by assist   PT: Transfers Supervision/stand-by assist;Sit to/from stand;Bed to/from chair;Assistive device   PT: Gait Supervision/stand-by assist;Assistive device;Greater than 200 feet   PT: Stairs Minimal assist;Greater than 10 stairs   Interventions   Interventions Quick Adds Gait Training   Gait Training   Gait Training Minutes (18448) 25   Symptoms Noted During/After Treatment (Gait Training) fatigue   Treatment Detail/Skilled Intervention Time for room/eq/line set up. Pt in chair, /53. LSO already on, pt reports no back pain at this time. Reports no overt symptoms. Sit to stand with min assist with FWW, cuing for strategy. Ambulation with FWW where pt demonstrates slow pace, needs min assist continuously to guide walker especially for turning and  to avoid veering- tending to veer L. Verbally acknowledged veering with prompting but couldn't self correct needing physical assist to return to middle of hallway. second assist for lines/tubes chair follow available but not used. Pt slowed down over time, reports fatigue but not the need to sit and rest. A couple short standing rest breaks. Back to chair at end of session and set up comfortable.   Distance in Feet 200ft   [place] Level (Gait Training) minimum assist (75% patient effort)   Physical Assistance Level (Gait Training) 1 person + 1 person to manage equipment   Weight Bearing (Gait Training) full weight-bearing   Assistive Device (Gait Training) rolling walker   PT Discharge Planning   PT Plan progress ambulation with FWW, stairs; initiate bed mob   PT Discharge Recommendation (DC Rec) Transitional Care Facility   PT Rationale for DC Rec Pt's   had been taking care of patient with assist of 1 daily- per chart pt hadn't been doing well at home and can't remember to let  know when she needs assist. If  does feel he can continue to take care of her at this level she may d/c home with support with home care, but she would also benefit from rehab stay and maybe placement in the long term.   PT Brief overview of current status min assist of 1 for ambulation with FWW   Total Session Time   Timed Code Treatment Minutes 25   Total Session Time (sum of timed and untimed services) 34

## 2024-08-15 VITALS
BODY MASS INDEX: 26.79 KG/M2 | OXYGEN SATURATION: 96 % | WEIGHT: 156.09 LBS | RESPIRATION RATE: 17 BRPM | HEART RATE: 84 BPM | SYSTOLIC BLOOD PRESSURE: 151 MMHG | DIASTOLIC BLOOD PRESSURE: 73 MMHG | TEMPERATURE: 96.8 F

## 2024-08-15 LAB
ANION GAP SERPL CALCULATED.3IONS-SCNC: 12 MMOL/L (ref 7–15)
BUN SERPL-MCNC: 13.2 MG/DL (ref 8–23)
CALCIUM SERPL-MCNC: 8.3 MG/DL (ref 8.8–10.4)
CHLORIDE SERPL-SCNC: 104 MMOL/L (ref 98–107)
CREAT SERPL-MCNC: 1.16 MG/DL (ref 0.51–0.95)
EGFRCR SERPLBLD CKD-EPI 2021: 49 ML/MIN/1.73M2
GLUCOSE SERPL-MCNC: 142 MG/DL (ref 70–99)
HCO3 SERPL-SCNC: 21 MMOL/L (ref 22–29)
MAGNESIUM SERPL-MCNC: 2.1 MG/DL (ref 1.7–2.3)
PHOSPHATE SERPL-MCNC: 2.7 MG/DL (ref 2.5–4.5)
POTASSIUM SERPL-SCNC: 3.9 MMOL/L (ref 3.4–5.3)
SODIUM SERPL-SCNC: 137 MMOL/L (ref 135–145)

## 2024-08-15 PROCEDURE — 80048 BASIC METABOLIC PNL TOTAL CA: CPT | Performed by: STUDENT IN AN ORGANIZED HEALTH CARE EDUCATION/TRAINING PROGRAM

## 2024-08-15 PROCEDURE — 84100 ASSAY OF PHOSPHORUS: CPT | Performed by: HOSPITALIST

## 2024-08-15 PROCEDURE — 83735 ASSAY OF MAGNESIUM: CPT | Performed by: STUDENT IN AN ORGANIZED HEALTH CARE EDUCATION/TRAINING PROGRAM

## 2024-08-15 PROCEDURE — 99239 HOSP IP/OBS DSCHRG MGMT >30: CPT | Performed by: STUDENT IN AN ORGANIZED HEALTH CARE EDUCATION/TRAINING PROGRAM

## 2024-08-15 PROCEDURE — 250N000011 HC RX IP 250 OP 636: Performed by: STUDENT IN AN ORGANIZED HEALTH CARE EDUCATION/TRAINING PROGRAM

## 2024-08-15 PROCEDURE — 36415 COLL VENOUS BLD VENIPUNCTURE: CPT | Performed by: STUDENT IN AN ORGANIZED HEALTH CARE EDUCATION/TRAINING PROGRAM

## 2024-08-15 PROCEDURE — 250N000013 HC RX MED GY IP 250 OP 250 PS 637: Performed by: STUDENT IN AN ORGANIZED HEALTH CARE EDUCATION/TRAINING PROGRAM

## 2024-08-15 RX ORDER — AMLODIPINE BESYLATE 5 MG/1
5 TABLET ORAL DAILY
Qty: 30 TABLET | Refills: 0 | Status: SHIPPED | OUTPATIENT
Start: 2024-08-16 | End: 2024-09-13

## 2024-08-15 RX ORDER — LEVETIRACETAM 500 MG/1
500 TABLET ORAL 2 TIMES DAILY
Qty: 10 TABLET | Refills: 0 | Status: SHIPPED | OUTPATIENT
Start: 2024-08-15 | End: 2024-08-20

## 2024-08-15 RX ADMIN — Medication 1000 MCG: at 08:43

## 2024-08-15 RX ADMIN — DULOXETINE HYDROCHLORIDE 20 MG: 20 CAPSULE, DELAYED RELEASE ORAL at 08:43

## 2024-08-15 RX ADMIN — LEVETIRACETAM 500 MG: 500 TABLET, FILM COATED ORAL at 08:40

## 2024-08-15 RX ADMIN — AMLODIPINE BESYLATE 5 MG: 5 TABLET ORAL at 08:40

## 2024-08-15 RX ADMIN — LIDOCAINE 1 PATCH: 4 PATCH TOPICAL at 08:43

## 2024-08-15 RX ADMIN — LABETALOL HYDROCHLORIDE 20 MG: 5 INJECTION, SOLUTION INTRAVENOUS at 01:32

## 2024-08-15 RX ADMIN — RIVASTIGMINE TARTRATE 1.5 MG: 1.5 CAPSULE ORAL at 08:42

## 2024-08-15 RX ADMIN — ACETAMINOPHEN 650 MG: 325 TABLET, FILM COATED ORAL at 08:41

## 2024-08-15 RX ADMIN — SODIUM BICARBONATE 650 MG TABLET 1300 MG: at 08:40

## 2024-08-15 RX ADMIN — ALLOPURINOL 100 MG: 100 TABLET ORAL at 08:40

## 2024-08-15 RX ADMIN — ACETAMINOPHEN 650 MG: 325 SUSPENSION ORAL at 05:01

## 2024-08-15 RX ADMIN — LEVOTHYROXINE SODIUM 50 MCG: 50 TABLET ORAL at 08:41

## 2024-08-15 RX ADMIN — BENZTROPINE MESYLATE 1.5 MG: 1 TABLET ORAL at 08:58

## 2024-08-15 RX ADMIN — LABETALOL HYDROCHLORIDE 10 MG: 5 INJECTION, SOLUTION INTRAVENOUS at 01:07

## 2024-08-15 RX ADMIN — Medication 400 MG: at 08:40

## 2024-08-15 ASSESSMENT — ACTIVITIES OF DAILY LIVING (ADL)
ADLS_ACUITY_SCORE: 37
ADLS_ACUITY_SCORE: 36
ADLS_ACUITY_SCORE: 37
ADLS_ACUITY_SCORE: 36
ADLS_ACUITY_SCORE: 38
ADLS_ACUITY_SCORE: 36

## 2024-08-15 NOTE — CONSULTS
Writer did not get a chance to do an initial consult. Patient cleared for discharge to home with resumption of homecare through [facility]. Writer called [facility] intake and talked to Te phone#[phone] she confirmed that the patient is open for RN/PT/OT/SLP and they require resumption orders to be faxed to their attn at [phone].  Writer met with patient and spouse at the bedside. Spouse is aware of the above and identified no further needs at this time. Writer updated bedside RN and orders are in.  No further needs. Consult cleared         Ratna De La Garza RN, BSN, ACM   Care Transitions Specialist  [facility]  Care Transitions Specialist  [phone] [id number]  [email or web address]  Office: [phone]   Fax: [phone]  [facility]

## 2024-08-15 NOTE — PLAN OF CARE
1900-2330- Pt here with traumatic SAH. A&Ox2; disoriented to place and situation. Restless and agitated at times. Confused. BUE tremors otherwise intact. VSS on RA, SBP <150. Tele refused, pt pulled off. regular diet, thin liquids. Takes pills whole one at a time. Up with 1 GB/W. Rib pain- lidocaine patches in place and PRN tylenol given. Pt scoring yellow on the Aggression Stop Light Tool. Plan for home w assist vs TCU. Discharge pending.

## 2024-08-15 NOTE — PLAN OF CARE
Physical Therapy Discharge Summary    Reason for therapy discharge:    Discharged to home with home therapy.    Progress towards therapy goal(s). See goals on Care Plan in [facility] electronic health record for goal details.  Goals not met.  Barriers to achieving goals:   discharge from facility.    Therapy recommendation(s):    Continued therapy is recommended.  Rationale/Recommendations:  Per treating therapist: Pt's  had been taking care of patient with assist of 1 daily- per chart pt hadn't been doing well at home and can't remember to let  know when she needs assist. If  does feel he can continue to take care of her at this level she may d/c home with support with home care, but she would also benefit from rehab stay and maybe placement in the long term..

## 2024-08-15 NOTE — DISCHARGE SUMMARY
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 ED for falls, acute on chronic SDH, SAH.     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 Discharged from rehab on 7/19/24.      - 8/13/24 CT head: decreased size of SDH along cerebral convexities with increased attenuation, suggesting interval bleed within chronic SDH. No midline shift or herniation. Likely SAH blood products layering along posterior right sylvian fissure, possibly subacute given near isoattenuation to parenchyma.     - 8/13/24 11pm CT head: stable bilateral convexity SDH. Trace right parietal SAH     - 8/13/24 CT c-spine: no fracture or subluxation  - CXR- no rib fractures     - Q4h neuros  - Goal SBP<150, prn hydralazine and labetalol available  - Defer repeat CT imaging to neurosurgery     - Continue Keppra 500mg BID for 7 days total   - Resume PTA antihypertensives     - Neurosurgery following, appreciated recs   - No appreciable indication for surgical intervention.   - We will facilitate a follow-up up with an updated  head CT in 2-4 weeks.   - Advance activity as tolerated.  - She should not lift anything greater than 5-10 lbs., avoid putting her head below her heart, and avoid straining and sneezing with her mouth closed.  - We encouraged her to continue to avoid excessive jostling and jarring activities.   - Please refrain from using any Aspirin or anti-inflammatory products.   - Continue supportive and symptomatic treatment.     - PT/OT/SLP              - OT Recommending: home with home therapy vs TCU    - Spent time discussing discharge plan with patient's spouse, Reg. Went over PT and OT recommendations. Explained that the patient would likely benefit going to a TCU to get stronger before going home. Reg wanted to take patient home. He states that he feels comfortable taking care of her at home with the help of home care.     - SW/CM consulted     Hypomagnesemia: Resolved  Hypophosphatemia: Resolved   - Replacement protocols in place     Type 2 NSTEMI, likely due to demand  Given near syncopal falls, orthostasis, hypotension, expect to be due to demand  * Trop 54--52 at . EKG sinus with RBBB  - Trop: 61  - Cardiac monitoring     Orthostatic hypotension  Recent decrease amlodipine from 10mg to 5mg and her PTA metoprolol was discontinued. Given 1L IVF at [facility] ED    - Resume home meds    - Spent time with both patient and spouse going over HTN meds. Patient needs to have a SBP less than 150 due to her bleeds but also at times has orthostatic hypotension. Reg states that this has been happening at home and they are working with her PT and PCP. Patient likely has a component of autonomic dysfunction going on. Discussed that the patient should take her time going from a sitting to standing position. Also discussed moving slowly once standing. Both stated they understood. Discussed that Reg should take the patient's blood pressure before he gives her medications. Reg states that he has a blood pressure cuff.  "Discussed holding blood pressure meds if her SBP is less than 110. He states that he will write down the numbers and bring them to her PCP. Reg felt comfortable managing this at home.      Parkinson's disease  Chronic, has baseline tremors. Uses walker to get around and has chronic L>R lower extremity weakness  - Continue PTA cogentin BID, duloxetine daily, rivastigmine BID     Hypertension  PTA amlodipine 5mg daily and losartan 50mg qpm  - Resume home meds      CKD Stage 3  Cr baseline around 1.4  - Cr: 1.07 > 1.16     Prediabetes  - HgbA1C was 6 on 8/6/24     Hypothyroidism  Continue PTA levothyroxine     Rheumatoid arthritis  - Hold plaquenil                - Plan to restart at discharge           Clinically Significant Risk Factors            # Hypomagnesemia: Lowest Mg = 1.4 mg/dL in last 2 days, will replace as needed       # Hypertension: Noted on problem list    # Dementia: noted on problem list        # Overweight: Estimated body mass index is 26.79 kg/m  as calculated from the following:    Height as of 7/23/24: 1.626 m (5' 4\").    Weight as of this encounter: 70.8 kg (156 lb 1.4 oz)., PRESENT ON ADMISSION         TOTAL DISCHARGE TIME:  IFlora MD, personally saw the patient today and spent greater than 30 minutes discharging this patient.    Flora Palmer MD  [facility]  Hospitalist Service   Securely message with the Vocera Web Console (learn more here)  Text page via [employer] Paging/Directory        Significant Results and Procedures   N/a    Pending Results   These results will be followed up by n/a  Unresulted Labs Ordered in the Past 30 Days of this Admission       No orders found from 7/14/2024 to 8/14/2024.            Code Status   Full Code       Primary Care Physician   Isabel Maurer    Physical Exam   Temp: 96.8  F (36  C) Temp src: Axillary BP: (!) 151/73 Pulse: 84   Resp: 17 SpO2: 96 % O2 Device: None (Room air)    Vitals:    08/13/24 2059 " 08/14/24 0400   Weight: 70.8 kg (156 lb 1.4 oz) 70.8 kg (156 lb 1.4 oz)     Vital Signs with Ranges  Temp:  [96.8  F (36  C)-98.5  F (36.9  C)] 96.8  F (36  C)  Pulse:  [] 84  Resp:  [16-20] 17  BP: (115-164)/() 151/73  SpO2:  [91 %-98 %] 96 %  I/O last 3 completed shifts:  In: 1440 [P.O.:1440]  Out: -     Constitutional: Awake, alert, cooperative, no apparent distress.    HEENT: PERRL, Normocephalic, without obvious abnormality, atraumatic, oral pharynx with moist mucus membranes  Pulmonary: Clear to auscultation bilaterally, no crackles or wheezing.  Cardiovascular: Regular rate and rhythm, normal S1 and S2  GI: Normal bowel sounds, soft, non-distended, non-tender.  Skin/Integumen: Visualized skin appeared clear.  Neuro: CN II-XII grossly intact. Tremor in both arms noted   Psych:  Alert and oriented x 3.   Extremities: No lower extremity edema noted     Discharge Disposition   Discharged to home  Condition at discharge: Stable    Consultations This Hospital Stay   NEUROSURGERY IP CONSULT  SPEECH LANGUAGE PATH ADULT IP CONSULT  PHARMACY IP CONSULT  PHARMACY IP CONSULT  PHARMACY IP CONSULT  PHYSICAL THERAPY ADULT IP CONSULT  OCCUPATIONAL THERAPY ADULT IP CONSULT  REHAB ADMISSIONS LIAISON IP CONSULT  CARE MANAGEMENT / SOCIAL WORK IP CONSULT  CARE MANAGEMENT / SOCIAL WORK IP CONSULT  PHYSICAL THERAPY ADULT IP CONSULT  OCCUPATIONAL THERAPY ADULT IP CONSULT  SPEECH LANGUAGE PATH ADULT IP CONSULT  SMOKING CESSATION PROGRAM IP CONSULT      Discharge Orders      CT Head w/o contrast*     Primary Care - Care Coordination Referral      Follow-up and recommended labs and tests     Please follow up at [facility] Neurosurgery in 2-4 weeks with a repeat head CT prior. Our office will contact you to schedule this. If you have any questions or need to reschedule please call the clinic at [phone].    Please note that your homecare Agency is aware of discharge and will call you for follow up at home for  continued Skilled RN, Physical Therapy, Occupational Therapy and Speech Language therapy.  Please call [facility] at [phone] if you have additional questions.     Reason for your hospital stay    You were admitted to the hospital for acute on chronic subdural hematoma     Follow-up and recommended labs and tests     Follow up with primary care provider, Isabel Maurer, within 7-10 days for hospital follow- up.  No follow up labs or test are needed.     Activity    Your activity upon discharge: activity as tolerated     Monitor and record    Blood pressure: Twice a day before blood pressure medications and bring record to next appointment.     Discharge Instructions    Please take your blood pressure in the morning before Amlodipine and in the evening before Losartan. Please DO NOT take your blood pressure medication if the top number of your blood pressure is less than 110.     Please make sure you take your time when you go from sitting to standing.     Resume Home Care Services    Resume home care services (PT/OT/RN/SLP) Through [facility] phone [phone]     Discharge Instructions    - Should not lift anything greater than 5-10 lbs., avoid putting her head below her heart, and avoid straining and sneezing with her mouth closed.  - Continue to avoid excessive jostling and jarring activities.     Diet    Follow this diet upon discharge:       Regular Diet Adult     Discharge Medications   Current Discharge Medication List        START taking these medications    Details   levETIRAcetam (KEPPRA) 500 MG tablet Take 1 tablet (500 mg) by mouth 2 times daily for 5 days  Qty: 10 tablet, Refills: 0    Associated Diagnoses: Subdural hematoma (H)           CONTINUE these medications which have CHANGED    Details   amLODIPine (NORVASC) 5 MG tablet Take 1 tablet (5 mg) by mouth daily  Qty: 30 tablet, Refills: 0    Comments: Future refills by PCP Dr. Isabel Maurer with phone number  [phone].  Associated Diagnoses: Primary hypertension           CONTINUE these medications which have NOT CHANGED    Details   acetaminophen (TYLENOL) 500 MG tablet Take 1 tablet (500 mg) by mouth every 6 hours as needed  Qty: 90 tablet, Refills: 0    Associated Diagnoses: Seronegative rheumatoid arthritis of multiple sites (H)      acyclovir (ZOVIRAX) 400 MG tablet TAKE ONE TABLET BY MOUTH EVERY 8 HOURS AS NEEDED  Qty: 60 tablet, Refills: 0    Associated Diagnoses: Genital herpes simplex, unspecified site      allopurinol (ZYLOPRIM) 100 MG tablet Take 100 mg by mouth 2 times daily      atorvastatin (LIPITOR) 10 MG tablet Take 1 tablet (10 mg) by mouth at bedtime  Qty: 90 tablet, Refills: 3    Associated Diagnoses: Mixed hyperlipidemia      benztropine (COGENTIN) 0.5 MG tablet Take 3 tablets (1.5 mg) by mouth 2 times daily  Qty: 540 tablet, Refills: 3    Associated Diagnoses: Primary parkinsonism (H)      carboxymethylcellulose (REFRESH PLUS) 0.5 % Dpet ophthalmic dropperette [CARBOXYMETHYLCELLULOSE (REFRESH PLUS) 0.5 % DPET OPHTHALMIC DROPPERETTE] Administer 1-2 drops to both eyes 4 (four) times a day as needed.       cyanocobalamin, vitamin B-12, 1,000 mcg Subl [CYANOCOBALAMIN, VITAMIN B-12, 1,000 MCG SUBL] Place 1 tablet (1,000 mcg total) under the tongue daily.  Refills: 0    Associated Diagnoses: Moderate malnutrition (H24)      cyclobenzaprine (FLEXERIL) 10 MG tablet Take 1 tablet (10 mg) by mouth 2 times daily as needed for muscle spasms  Qty: 90 tablet, Refills: 0    Associated Diagnoses: Seronegative rheumatoid arthritis of multiple sites (H)      docusate sodium (DSS) 100 MG capsule Take 1 capsule (100 mg) by mouth 2 times daily as needed for constipation  Qty: 90 capsule, Refills: 1    Associated Diagnoses: Other constipation      DULoxetine (CYMBALTA) 20 MG capsule Take 1 capsule (20 mg) by mouth daily  Qty: 90 capsule, Refills: 3    Associated Diagnoses: Anxiety state; Seronegative rheumatoid  arthritis of multiple sites (H)      fish oil-omega-3 fatty acids 500 MG capsule Take 1 capsule (500 mg) by mouth daily  Qty: 90 capsule, Refills: 3    Associated Diagnoses: Seronegative rheumatoid arthritis of multiple sites (H)      fluticasone (FLONASE) 50 MCG/ACT nasal spray Spray 1 spray into both nostrils daily  Qty: 16 g, Refills: 2    Associated Diagnoses: Rhinitis, unspecified type      hydroxychloroquine (PLAQUENIL) 200 MG tablet Take 1 tablet (200 mg) by mouth 2 times daily  Qty: 180 tablet, Refills: 3    Associated Diagnoses: Seronegative rheumatoid arthritis of multiple sites (H)      levothyroxine (SYNTHROID/LEVOTHROID) 50 MCG tablet Take 1 tablet (50 mcg) by mouth daily  Qty: 90 tablet, Refills: 3    Associated Diagnoses: Stage 3b chronic kidney disease (H); Senile osteoporosis; Personal history of other drug therapy      LORazepam (ATIVAN) 0.5 MG tablet Take 1 tablet (0.5 mg) by mouth daily as needed for anxiety  Qty: 30 tablet, Refills: 0    Associated Diagnoses: Adjustment insomnia; Anxiety state      losartan (COZAAR) 50 MG tablet Take 1 tablet (50 mg) by mouth daily  Qty: 90 tablet, Refills: 3    Associated Diagnoses: Primary hypertension      magnesium oxide (MAG-OX) 400 MG tablet Take 1 tablet (400 mg) by mouth daily  Qty: 90 tablet, Refills: 3    Associated Diagnoses: Seronegative rheumatoid arthritis of multiple sites (H)      polyethylene glycol (MIRALAX) 17 GM/Dose powder Take 17 g by mouth daily as needed      rivastigmine (EXELON) 1.5 MG capsule Take 1 capsule (1.5 mg) by mouth 2 times daily  Qty: 180 capsule, Refills: 3    Associated Diagnoses: Mild Lewy body dementia with psychotic disturbance (H)      sodium bicarbonate 650 MG tablet Take 2 tablets (1,300 mg) by mouth 2 times daily  Qty: 100 tablet, Refills: 2    Associated Diagnoses: Stage 3b chronic kidney disease (H)           Allergies   Allergies   Allergen Reactions    Bupropion Hcl [Bupropion] Nausea    Erythromycin Base  [Erythromycin] Nausea    Paxil [Paroxetine] Diarrhea    Tetracyclines & Related Itching    Valacyclovir Nausea     Data   Most Recent 3 CBC's:  Recent Labs   Lab Test 08/14/24  0535 08/13/24  1420 08/06/24  1519   WBC 4.7 7.6 5.2   HGB 10.2* 10.0* 10.0*   MCV 92 94 92    197 171      Most Recent 3 BMP's:  Recent Labs   Lab Test 08/15/24  0754 08/14/24  1413 08/14/24  0559 08/14/24  0535 08/13/24  2047 08/13/24  1420     --   --  140  --  140   POTASSIUM 3.9 4.8  --  3.4  --  3.9   CHLORIDE 104  --   --  107  --  105   CO2 21*  --   --  23  --  26   BUN 13.2  --   --  12.8  --  18.8   CR 1.16*  --   --  1.07*  --  1.41*   ANIONGAP 12  --   --  10  --  9   KATJA 8.3*  --   --  7.9*  --  8.7*   *  --  84 85   < > 129*    < > = values in this interval not displayed.     Most Recent 2 LFT's:  Recent Labs   Lab Test 08/13/24  1420 08/06/24  1519   AST 25 21   ALT 14 8   ALKPHOS 68 59   BILITOTAL 0.6 0.4     Most Recent INR's and Anticoagulation Dosing History:  Anticoagulation Dose History  More data may exist         Latest Ref Rng & Units 1/24/2020 1/28/2020 2/4/2020 2/18/2020 2/25/2020 3/3/2020 3/10/2020   Recent Dosing and Labs   INR 0.90 - 1.10 1.02  0.93  0.92  0.94  1.01  1.02  0.94  0.98       Details          Multiple values from one day are sorted in reverse-chronological order             Most Recent 3 Troponin's:No lab results found.  Most Recent Cholesterol Panel:  Recent Labs   Lab Test 08/14/24  0535   CHOL 112   LDL 34   HDL 61   TRIG 85     Most Recent 6 Bacteria Isolates From Any Culture (See EPIC Reports for Culture Details):No lab results found.  Most Recent TSH, T4 and A1c Labs:  Recent Labs   Lab Test 08/06/24  1519   TSH 0.92   A1C 6.0*     Results for orders placed or performed during the hospital encounter of 08/13/24   CT Head w/o Contrast    Narrative    EXAM: CT HEAD W/O CONTRAST  LOCATION: [facility]  DATE: 8/13/2024    INDICATION:  Acute on chronic  SDH, SAH  COMPARISON:  Same day CT head 1440 hours, CT head 6/14/2024.  TECHNIQUE: Routine CT Head without IV contrast. Multiplanar reformats. Dose reduction techniques were used.    FINDINGS:  INTRACRANIAL CONTENTS: Stable small mixed density bilateral convexity subdural hematomas. Stable trace right parietal convexity subarachnoid hemorrhage. No new hemorrhage. No progressive mass effect. No CT evidence of acute infarct. Mild presumed chronic   small vessel ischemic changes. 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 Mild to moderate generalized volume loss. No hydrocephalus.     VISUALIZED ORBITS/SINUSES/MASTOIDS: Prior bilateral cataract surgery. Visualized portions of the orbits are otherwise unremarkable. Opacified right maxillary sinus. No middle ear or mastoid effusion.    BONES/SOFT TISSUES: No large scalp hematoma. No skull fracture.      Impression    IMPRESSION:  1.  No significant interval change since 1440 hour.

## 2024-08-15 NOTE — PLAN OF CARE
Goal Outcome Evaluation:  Plan of Care Reviewed With: patient    Overall Patient Progress: no changeOverall Patient Progress: no change    Pt here with acute on chronic bilateral SDH, and subacute SAH, secondary to falls. A&O x2, confused to time and situation. No agitated, restless episodes this shift. Elevated BP managed with oral pain meds, SBP>150, otherwise VSS, on RA. Tele SR w/ BBB. CMS and Neuro's intact except for generalized weakness, confusion and  baseline BUE tremors. Right Ribs pain, increase with activity, managed with PRN Tylenol and Lidocaine patch. Up A1 w/ GB and walker with TLSO brace on. Cont B&B. Voiding adequately. +BS, passing flatus, no BM. Reg diet, takes pills whole one at a time with water. Pt scoring green on Aggression Stop Light Tool. Discharge home with assist per spouse's wishes, home care will resume. Dr. Palmer  spoke to patient and spouse in length about check blood pressure concerns for orthostatic hypotension.

## 2024-08-15 NOTE — PLAN OF CARE
Goal Outcome Evaluation:       Pt here with SAH s/p falls. A&O x2, not to situation and time. Neuros confusion, BUE tremors. VSS ex. Intermittent HTN over 150, systolic, labetalol given twice and effective after second dose to keep SBP <150. Tele SR with BBB. reg diet, thin liquids. Takes pills whole one at the time. Up with A1 GB W. R side rib cage pain, Tylenol helps, scattered bruising from falling. Pt scoring yellow on the Aggression Stop Light Tool for being confused and impulsive trying to get out of bed.  Pt pulled out PIV from L hand, pt still have PIV on R hand, pt also refused tele box for most of the shift until NST convinced her to have it on. Plan to discharge home with assist vs TCU, pending final decision.

## 2024-08-16 ENCOUNTER — PATIENT OUTREACH (OUTPATIENT)
Dept: CARE COORDINATION | Facility: CLINIC | Age: 74
End: 2024-08-16

## 2024-08-16 LAB
ATRIAL RATE - MUSE: 84 BPM
DIASTOLIC BLOOD PRESSURE - MUSE: 72 MMHG
INTERPRETATION ECG - MUSE: NORMAL
P AXIS - MUSE: 82 DEGREES
PR INTERVAL - MUSE: 166 MS
QRS DURATION - MUSE: 144 MS
QT - MUSE: 448 MS
QTC - MUSE: 529 MS
R AXIS - MUSE: -70 DEGREES
SYSTOLIC BLOOD PRESSURE - MUSE: 153 MMHG
T AXIS - MUSE: 32 DEGREES
VENTRICULAR RATE- MUSE: 84 BPM

## 2024-08-16 NOTE — ED NOTES
Placed orders for EKG.  Date of exam was on 8/13/2024 at 15:21  Elli Delacruz RN 8/16/2024 4:37 AM

## 2024-08-16 NOTE — LETTER
[facility] CARE COORDINATION  [address] [facility] [address]    August 16, 2024    Claudia Wise   [address] [place] [id number]      Dear Claudia,    I am a clinic care coordinator who works with Isabel Maurer MD with the [facility]. I recently tried to call and was unable to reach you. Below is a description of clinic care coordination and how I can further assist you.       The clinic care coordination team is made up of a registered nurse, , financial resource worker and community health worker who understand the health care system. The goal of clinic care coordination is to help you manage your health and improve access to the health care system. Our team works alongside your provider to assist you in determining your health and social needs. We can help you obtain health care and community resources, providing you with necessary information and education. We can work with you through any barriers and develop a care plan that helps coordinate and strengthen the communication between you and your care team.  Our services are voluntary and are offered without charge to you personally.    Please feel free to contact me with any questions or concerns regarding care coordination and what we can offer.      We are focused on providing you with the highest-quality healthcare experience possible.    Sincerely,     Lore Grace RN  Clinic Care Coordination  [phone]

## 2024-08-22 ENCOUNTER — MYC MEDICAL ADVICE (OUTPATIENT)
Dept: INTERNAL MEDICINE | Facility: CLINIC | Age: 74
End: 2024-08-22
Payer: COMMERCIAL

## 2024-08-22 ENCOUNTER — MEDICAL CORRESPONDENCE (OUTPATIENT)
Dept: HEALTH INFORMATION MANAGEMENT | Facility: CLINIC | Age: 74
End: 2024-08-22
Payer: COMMERCIAL

## 2024-08-23 ENCOUNTER — MEDICAL CORRESPONDENCE (OUTPATIENT)
Dept: HEALTH INFORMATION MANAGEMENT | Facility: CLINIC | Age: 74
End: 2024-08-23
Payer: COMMERCIAL

## 2024-08-27 ENCOUNTER — MEDICAL CORRESPONDENCE (OUTPATIENT)
Dept: HEALTH INFORMATION MANAGEMENT | Facility: CLINIC | Age: 74
End: 2024-08-27

## 2024-08-28 ENCOUNTER — MEDICAL CORRESPONDENCE (OUTPATIENT)
Dept: HEALTH INFORMATION MANAGEMENT | Facility: CLINIC | Age: 74
End: 2024-08-28
Payer: COMMERCIAL

## 2024-08-29 DIAGNOSIS — Z53.9 DIAGNOSIS NOT YET DEFINED: Primary | ICD-10-CM

## 2024-08-29 PROCEDURE — G0180 MD CERTIFICATION HHA PATIENT: HCPCS | Performed by: INTERNAL MEDICINE

## 2024-09-04 ENCOUNTER — TELEPHONE (OUTPATIENT)
Dept: INTERNAL MEDICINE | Facility: CLINIC | Age: 74
End: 2024-09-04
Payer: COMMERCIAL

## 2024-09-04 NOTE — TELEPHONE ENCOUNTER
General Call    Contacts       Contact Date/Time Type Contact Phone/Fax    09/04/2024 10:05 AM CDT Phone (Incoming) Sofi Sanchez RN [facility] [phone]     vm OK    09/04/2024 11:55 AM CDT Phone (Outgoing) Sofi Sanchez RN [place] [phone]    Not Available     09/04/2024 12:52 PM CDT Phone (Incoming) Sofi Sanchez RN [place] [phone]          Reason for Call:  Hospice    What are your questions or concerns:  Hospice RN called and TC relayed message from PCP.   No further questions.       Robin Wise

## 2024-09-04 NOTE — TELEPHONE ENCOUNTER
I agree with trying the trazodone. We can stop amlodipine and monitor the BP.   The Hospice MD can take over the management.

## 2024-09-04 NOTE — TELEPHONE ENCOUNTER
FYI - Status Update    Who is Calling: nurseSofi ([facility])    Update: RN calling reporting fall - rolled out of bed last night. No new injury noted. Does have bruising but spouse reports those are from old fall.     Pt and spouse Reg are interested in trying something for sleep. Hospice MD recommended Trazodone 50mg at HS.   RN wondering if PCP is agreeable to this. (Hospice can send in Rx).    Spouse notes pt having syncopal like episodes when he is pivot transferring her. RN wondering if Losartan could be discontinued and Bp monitored more closely.     Routing to PCP to review and advise.        Patient is a 89y old  Male who presents with a chief complaint of ?seizure (16 Jan 2018 13:22)      OVERNIGHT EVENTS: S/P Duoneb and 500mL bolus  SUBJECTIVE: Patient seen and examined at bedside. Somnolent and difficult to arouse this AM. Received seroquel last night, no haldol overnight.       MEDICATIONS  (STANDING):  ampicillin  IVPB 1 Gram(s) IV Intermittent every 6 hours  aspirin enteric coated 81 milliGRAM(s) Oral daily  dextrose 5% + sodium chloride 0.45%. 500 milliLiter(s) (40 mL/Hr) IV Continuous <Continuous>  heparin  Injectable 5000 Unit(s) SubCutaneous every 8 hours  levothyroxine Injectable 75 MICROGram(s) IV Push <User Schedule>  melatonin 3 milliGRAM(s) Oral at bedtime  QUEtiapine 25 milliGRAM(s) Oral at bedtime    MEDICATIONS  (PRN):  haloperidol    Injectable 2.5 milliGRAM(s) IV Push every 6 hours PRN Agitation  haloperidol    Injectable 2.5 milliGRAM(s) IntraMuscular every 6 hours PRN Agitation      T(C): 36.6 (01-23-18 @ 04:26), Max: 36.6 (01-23-18 @ 04:26)  HR: 54 (01-23-18 @ 04:26) (54 - 62)  BP: 128/60 (01-23-18 @ 04:26) (127/52 - 152/81)  RR: 18 (01-23-18 @ 04:26) (18 - 18)  SpO2: 100% (01-23-18 @ 04:26) (98% - 100%)  CAPILLARY BLOOD GLUCOSE        I&O's Summary    22 Jan 2018 07:01  -  23 Jan 2018 07:00  --------------------------------------------------------  IN: 400 mL / OUT: 1100 mL / NET: -700 mL        PHYSICAL EXAM  GENERAL: NAD, well-developed  HEAD:  Atraumatic, Normocephalic  EYES: EOMI, PERRLA, conjunctiva and sclera clear  CHEST/LUNG: Bibasilar rales, no wheezes. coarse breath sounds diffusely. poor effort.  HEART: +S1 +S2, Regular rate and rhythm  ABDOMEN: Soft, Nontender, Nondistended. +Suprapubic catheter in place.   NEURO: Obtunded, wakes to sternal rub. Not able to perform full assessment. AOx0   EXTREMITIES:  Pink and warm, Peripheral Pulses intact. trace B/L edema.       LABS:                        8.0    6.0   )-----------( 119      ( 23 Jan 2018 09:24 )             24.2     01-23    145  |  109<H>  |  38<H>  ----------------------------<  83  4.1   |  26  |  1.90<H>    Ca    8.7      23 Jan 2018 09:24  Phos  2.7     01-23  Mg     2.0     01-23                RADIOLOGY & ADDITIONAL TESTS:  CXR 1/22/2018  < from: Xray Chest 1 View AP -PORTABLE-Routine (01.21.18 @ 20:07) >  IMPRESSION:   Pulmonary edema.    Bilateral pleural effusions.    < end of copied text >      Imaging Personally Reviewed:  Consultant(s) Notes Reviewed:    Care Discussed with Consultants/Other Providers:      Maria Luisa Ardon D.O.  Medicine Intern  pager [phone]

## 2024-09-09 ENCOUNTER — MYC MEDICAL ADVICE (OUTPATIENT)
Dept: INTERNAL MEDICINE | Facility: CLINIC | Age: 74
End: 2024-09-09
Payer: COMMERCIAL

## 2024-09-09 DIAGNOSIS — M10.9 GOUT, UNSPECIFIED CAUSE, UNSPECIFIED CHRONICITY, UNSPECIFIED SITE: Primary | ICD-10-CM

## 2024-09-10 RX ORDER — ALLOPURINOL 100 MG/1
100 TABLET ORAL 2 TIMES DAILY
Qty: 180 TABLET | Refills: 3 | Status: SHIPPED | OUTPATIENT
Start: 2024-09-10

## 2024-09-10 NOTE — TELEPHONE ENCOUNTER
Looks like patient is now on hospice, do you want her to continue with allopurinol? Or do you want them to refer to Hospice MD?     Note from 9/4      Daniella GANNON RN

## 2024-09-10 NOTE — TELEPHONE ENCOUNTER
She should continue allopurinol. I sent the refill. Please ask the Hospice MD to take over the patient care. Thanks

## 2024-09-11 ENCOUNTER — MEDICAL CORRESPONDENCE (OUTPATIENT)
Dept: HEALTH INFORMATION MANAGEMENT | Facility: CLINIC | Age: 74
End: 2024-09-11
Payer: COMMERCIAL

## 2024-09-13 DIAGNOSIS — F41.1 ANXIETY STATE: ICD-10-CM

## 2024-09-13 DIAGNOSIS — I10 PRIMARY HYPERTENSION: ICD-10-CM

## 2024-09-13 DIAGNOSIS — F51.02 ADJUSTMENT INSOMNIA: ICD-10-CM

## 2024-09-13 RX ORDER — AMLODIPINE BESYLATE 5 MG/1
5 TABLET ORAL DAILY
Qty: 90 TABLET | Refills: 1 | Status: SHIPPED | OUTPATIENT
Start: 2024-09-13

## 2024-09-13 RX ORDER — LORAZEPAM 0.5 MG/1
0.5 TABLET ORAL DAILY PRN
Qty: 30 TABLET | Refills: 0 | Status: SHIPPED | OUTPATIENT
Start: 2024-09-13

## 2024-09-25 ENCOUNTER — MEDICAL CORRESPONDENCE (OUTPATIENT)
Dept: HEALTH INFORMATION MANAGEMENT | Facility: CLINIC | Age: 74
End: 2024-09-25
Payer: COMMERCIAL

## 2024-10-09 ENCOUNTER — MEDICAL CORRESPONDENCE (OUTPATIENT)
Dept: HEALTH INFORMATION MANAGEMENT | Facility: CLINIC | Age: 74
End: 2024-10-09
Payer: COMMERCIAL

## 2024-10-18 NOTE — TELEPHONE ENCOUNTER
I tried to pend the orders for you, hopefully its correct.     Kiley Rodriguez, CMA  5:07 PM  3/20/2020      You were seen in the emergency department for evaluation of abdominal pain.  We did blood tests and a CT scan.  Thankfully at this time there is no evidence of infection or damage to your kidneys, liver, pancreas.  No sign of infection in the abdomen like diverticulitis or appendicitis.  You do not have a urinary tract infection.  I suspect you are having some cramping after your meal today.  I prescribed you a medication called Bentyl to help with this.    Please follow-up with your primary care provider to ensure this is getting better.    Return to the emergency department for uncontrollable abdominal pain, vomiting, uncontrollable or bloody diarrhea, bloody urine, fever, or any other concerning symptoms.

## 2024-10-23 ENCOUNTER — MEDICAL CORRESPONDENCE (OUTPATIENT)
Dept: HEALTH INFORMATION MANAGEMENT | Facility: CLINIC | Age: 74
End: 2024-10-23
Payer: COMMERCIAL

## 2024-11-06 ENCOUNTER — MEDICAL CORRESPONDENCE (OUTPATIENT)
Dept: HEALTH INFORMATION MANAGEMENT | Facility: CLINIC | Age: 74
End: 2024-11-06
Payer: COMMERCIAL

## 2024-11-11 ENCOUNTER — TELEPHONE (OUTPATIENT)
Dept: INTERNAL MEDICINE | Facility: CLINIC | Age: 74
End: 2024-11-11
Payer: COMMERCIAL

## 2024-11-11 NOTE — TELEPHONE ENCOUNTER
11/11/24  FYI - Status Update    Who is Calling: nurse, [facility]    Update: Reporting a fall earlier today and on Saturday. They discontinued amlodipine and losartan because it is causing pt to have orthostatic hypertension which is leading to the falls.     Does caller want a call/response back: No

## 2024-11-14 ENCOUNTER — PATIENT OUTREACH (OUTPATIENT)
Dept: GASTROENTEROLOGY | Facility: CLINIC | Age: 74
End: 2024-11-14
Payer: COMMERCIAL

## 2024-11-14 ENCOUNTER — TELEPHONE (OUTPATIENT)
Dept: INTERNAL MEDICINE | Facility: CLINIC | Age: 74
End: 2024-11-14
Payer: COMMERCIAL

## 2024-11-14 NOTE — PROGRESS NOTES
Patient is home with hospice due to comorbidities. Will not order screening colonoscopy and resolve high risk episode for management by CRC team. Will notify PCP.

## 2024-11-14 NOTE — TELEPHONE ENCOUNTER
Home Health Care    Reason for call:  Fall    Pt Provider: Dr Maurer    Phone Number Homecare Nurse can be reached at: [phone]      [facility]     VS stable patient rolled out of bed no injury (11/13)     Patient had a fall on Monday also (called Monday also)    Patient is getting weaker. 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 Fall mat in place. Teaching  how to anticipated patient needs.      JULIO Sprague  [facility]  [phone]    [facility]   Monday  - Thursday 7 AM - 6 PM    Friday  7 AM - 5 PM     -Please call your clinic for assistance from a nurse after hours.

## 2024-11-15 DIAGNOSIS — N18.32 STAGE 3B CHRONIC KIDNEY DISEASE (H): ICD-10-CM

## 2024-11-15 NOTE — TELEPHONE ENCOUNTER
Requested Prescriptions   Pending Prescriptions Disp Refills    sodium bicarbonate 650 MG tablet 100 tablet 2     Sig: Take 2 tablets (1,300 mg) by mouth 2 times daily.       There is no refill protocol information for this order            Last Written Prescription Date:  6/12/2024  Last Fill Quantity: 2,  # refills: 2   Last office visit: 11/10/2023 ; last virtual visit: Visit date not found with prescribing provider:  Keturah Vaughn MD    Future Office Visit: none  Next 5 appointments (look out 90 days)      Feb 06, 2025 10:20 AM  (Arrive by 10:00 AM)  Annual Wellness Visit with Isabel Maurer MD  [facility] ([facility] [address] [address] [phone]

## 2024-11-19 RX ORDER — SODIUM BICARBONATE 650 MG/1
1300 TABLET ORAL 2 TIMES DAILY
Qty: 120 TABLET | Refills: 2 | Status: SHIPPED | OUTPATIENT
Start: 2024-11-19

## 2024-11-20 ENCOUNTER — MEDICAL CORRESPONDENCE (OUTPATIENT)
Dept: HEALTH INFORMATION MANAGEMENT | Facility: CLINIC | Age: 74
End: 2024-11-20
Payer: COMMERCIAL

## 2024-12-16 ENCOUNTER — TELEPHONE (OUTPATIENT)
Dept: INTERNAL MEDICINE | Facility: CLINIC | Age: 74
End: 2024-12-16
Payer: COMMERCIAL

## 2024-12-16 DIAGNOSIS — S32.020D COMPRESSION FRACTURE OF L2 VERTEBRA WITH ROUTINE HEALING: Primary | ICD-10-CM

## 2024-12-16 RX ORDER — LIDOCAINE 4 G/G
1 PATCH TOPICAL EVERY 24 HOURS
Qty: 30 PATCH | Refills: 2 | Status: SHIPPED | OUTPATIENT
Start: 2024-12-16

## 2024-12-16 NOTE — TELEPHONE ENCOUNTER
Routing to PCP. See my chart regarding family frustrated about hospice care and now family medication request.     Daniella GANNON RN

## 2024-12-16 NOTE — TELEPHONE ENCOUNTER
Order/Referral Request    Who is requesting: [facility]/patients      Orders being requested: Lidocaine patch or Voltaren gel for lower back pain- per RN patients  is wanting to do something less aggressive than Morphine at this time.    Reason service is needed/diagnosis: lower back pain    When are orders needed by: asap    Has this been discussed with Provider: N/A    Does patient have a preference on a Group/Provider/Facility? [facility]    Does patient have an appointment scheduled?: No    Where to send orders:  call back:   [phone]    Could we send this information to you in [employer] [facility] or would you prefer to receive a phone call?:   Patient would prefer a phone call   Okay to leave a detailed message?: Yes at Home number on file [phone] (home)

## 2024-12-17 ENCOUNTER — TELEPHONE (OUTPATIENT)
Dept: INTERNAL MEDICINE | Facility: CLINIC | Age: 74
End: 2024-12-17
Payer: COMMERCIAL

## 2024-12-17 NOTE — TELEPHONE ENCOUNTER
12-17-24    General Call      Reason for Call:  FYI    What are your questions or concerns:  Sofi @ [facility] called with a FYI:  Pt had a change in conditions, pt is transitional as of 12-17  Pt was vomiting 12-17, Sofi went out to see pt 12-17 and has a changed in condition, pt is comfortable in he own home w/.  Pt is now on daily visits starting 12-18 and on weekends  Reg () was provided education and he is aware [facility] hospice is there to support him    Could we send this information to you in [facility] or would you prefer to receive a phone call?:   Patient would prefer a phone call   Okay to leave a detailed message?: No at Other phone number:  [phone]

## 2024-12-17 NOTE — TELEPHONE ENCOUNTER
Called to [facility] [phone], no answer, VM is full and I am unable to leave a message.     Called to spouse Reg, relayed provider message in detail with pharmacy locations.     No further questions at this time.     Pay P. RN

## 2024-12-17 NOTE — TELEPHONE ENCOUNTER
I sent the Rx for lidocaine patch and Voltaren gel for pain management. Please provide the phone number and info of [facility] service if they would like to make the change.

## 2024-12-18 ENCOUNTER — MEDICAL CORRESPONDENCE (OUTPATIENT)
Dept: HEALTH INFORMATION MANAGEMENT | Facility: CLINIC | Age: 74
End: 2024-12-18
Payer: COMMERCIAL

## 2024-12-18 NOTE — TELEPHONE ENCOUNTER
12/18/2024    Sofi RN @ [facility] called with a FYI. Pt was pronounced dead at 12/18/2024 @ 10:22AM.     Mariana Beth

## 2025-03-22 ENCOUNTER — HEALTH MAINTENANCE LETTER (OUTPATIENT)
Age: 75
End: 2025-03-22